# Patient Record
Sex: FEMALE | Race: OTHER | HISPANIC OR LATINO | Employment: UNEMPLOYED | URBAN - METROPOLITAN AREA
[De-identification: names, ages, dates, MRNs, and addresses within clinical notes are randomized per-mention and may not be internally consistent; named-entity substitution may affect disease eponyms.]

---

## 2017-01-05 ENCOUNTER — TRANSCRIBE ORDERS (OUTPATIENT)
Dept: ADMINISTRATIVE | Facility: HOSPITAL | Age: 47
End: 2017-01-05

## 2017-01-05 ENCOUNTER — ALLSCRIPTS OFFICE VISIT (OUTPATIENT)
Dept: OTHER | Facility: OTHER | Age: 47
End: 2017-01-05

## 2017-01-05 ENCOUNTER — APPOINTMENT (OUTPATIENT)
Dept: LAB | Facility: HOSPITAL | Age: 47
End: 2017-01-05
Payer: COMMERCIAL

## 2017-01-05 DIAGNOSIS — R30.0 DYSURIA: ICD-10-CM

## 2017-01-05 DIAGNOSIS — R20.2 PARESTHESIA: Primary | ICD-10-CM

## 2017-01-05 PROCEDURE — 87086 URINE CULTURE/COLONY COUNT: CPT

## 2017-01-05 PROCEDURE — 81001 URINALYSIS AUTO W/SCOPE: CPT

## 2017-01-06 LAB
BACTERIA UR QL AUTO: ABNORMAL /HPF
BILIRUB UR QL STRIP: NEGATIVE
CLARITY UR: ABNORMAL
COLOR UR: YELLOW
GLUCOSE UR STRIP-MCNC: NEGATIVE MG/DL
HGB UR QL STRIP.AUTO: ABNORMAL
KETONES UR STRIP-MCNC: NEGATIVE MG/DL
LEUKOCYTE ESTERASE UR QL STRIP: ABNORMAL
NITRITE UR QL STRIP: NEGATIVE
NON-SQ EPI CELLS URNS QL MICRO: ABNORMAL /HPF
PH UR STRIP.AUTO: 7 [PH] (ref 4.5–8)
PROT UR STRIP-MCNC: NEGATIVE MG/DL
RBC #/AREA URNS AUTO: ABNORMAL /HPF
SP GR UR STRIP.AUTO: 1 (ref 1–1.03)
UROBILINOGEN UR QL STRIP.AUTO: 0.2 E.U./DL
WBC #/AREA URNS AUTO: ABNORMAL /HPF

## 2017-01-07 LAB — BACTERIA UR CULT: NORMAL

## 2017-01-10 ENCOUNTER — ALLSCRIPTS OFFICE VISIT (OUTPATIENT)
Dept: OTHER | Facility: OTHER | Age: 47
End: 2017-01-10

## 2017-01-10 LAB
BILIRUB UR QL STRIP: NORMAL
CLARITY UR: NORMAL
COLOR UR: YELLOW
GLUCOSE (HISTORICAL): NORMAL
HGB UR QL STRIP.AUTO: NORMAL
KETONES UR STRIP-MCNC: NORMAL MG/DL
LEUKOCYTE ESTERASE UR QL STRIP: NORMAL
NITRITE UR QL STRIP: NORMAL
PH UR STRIP.AUTO: 6 [PH]
PROT UR STRIP-MCNC: NORMAL MG/DL
SP GR UR STRIP.AUTO: 1.02
UROBILINOGEN UR QL STRIP.AUTO: 0.2

## 2017-01-25 ENCOUNTER — TRANSCRIBE ORDERS (OUTPATIENT)
Dept: ADMINISTRATIVE | Facility: HOSPITAL | Age: 47
End: 2017-01-25

## 2017-01-25 ENCOUNTER — OFFICE VISIT (OUTPATIENT)
Dept: RADIOLOGY | Facility: CLINIC | Age: 47
End: 2017-01-25
Payer: COMMERCIAL

## 2017-01-25 ENCOUNTER — ALLSCRIPTS OFFICE VISIT (OUTPATIENT)
Dept: OTHER | Facility: OTHER | Age: 47
End: 2017-01-25

## 2017-01-25 DIAGNOSIS — R20.2 PARESTHESIA: ICD-10-CM

## 2017-01-25 DIAGNOSIS — R30.0 DYSURIA: ICD-10-CM

## 2017-01-25 DIAGNOSIS — R10.10 PAIN OF UPPER ABDOMEN: Primary | ICD-10-CM

## 2017-01-25 PROCEDURE — 95908 NRV CNDJ TST 3-4 STUDIES: CPT

## 2017-02-01 ENCOUNTER — HOSPITAL ENCOUNTER (OUTPATIENT)
Dept: RADIOLOGY | Facility: HOSPITAL | Age: 47
Discharge: HOME | End: 2017-02-01
Payer: COMMERCIAL

## 2017-02-01 DIAGNOSIS — R10.9 ABDOMINAL PAIN: ICD-10-CM

## 2017-02-01 PROCEDURE — 74178 CT ABD&PLV WO CNTR FLWD CNTR: CPT

## 2017-02-01 RX ADMIN — IODIXANOL 100 ML: 320 INJECTION, SOLUTION INTRAVASCULAR at 12:12

## 2017-02-03 ENCOUNTER — GENERIC CONVERSION - ENCOUNTER (OUTPATIENT)
Dept: OTHER | Facility: OTHER | Age: 47
End: 2017-02-03

## 2017-02-03 ENCOUNTER — TRANSCRIBE ORDERS (OUTPATIENT)
Dept: ADMINISTRATIVE | Facility: HOSPITAL | Age: 47
End: 2017-02-03

## 2017-02-03 DIAGNOSIS — M54.9 DORSALGIA: ICD-10-CM

## 2017-02-03 DIAGNOSIS — R10.9 ABDOMINAL PAIN: ICD-10-CM

## 2017-02-03 DIAGNOSIS — R10.9 ABDOMINAL PAIN, UNSPECIFIED SITE: Primary | ICD-10-CM

## 2017-02-16 ENCOUNTER — HOSPITAL ENCOUNTER (OUTPATIENT)
Dept: RADIOLOGY | Age: 47
Discharge: HOME | End: 2017-02-16
Payer: COMMERCIAL

## 2017-02-16 DIAGNOSIS — R10.9 ABDOMINAL PAIN, UNSPECIFIED SITE: ICD-10-CM

## 2017-02-16 LAB — GLUCOSE SERPL-MCNC: 93 MG/DL (ref 65–140)

## 2017-02-16 PROCEDURE — 82948 REAGENT STRIP/BLOOD GLUCOSE: CPT

## 2017-02-16 PROCEDURE — 78815 PET IMAGE W/CT SKULL-THIGH: CPT

## 2017-02-16 PROCEDURE — A9552 F18 FDG: HCPCS

## 2017-02-16 RX ADMIN — IOHEXOL 5 ML: 240 INJECTION, SOLUTION INTRATHECAL; INTRAVASCULAR; INTRAVENOUS; ORAL at 10:05

## 2017-02-21 ENCOUNTER — HOSPITAL ENCOUNTER (OUTPATIENT)
Dept: RADIOLOGY | Age: 47
Discharge: HOME | End: 2017-02-21
Payer: COMMERCIAL

## 2017-02-21 DIAGNOSIS — M54.9 DORSALGIA: ICD-10-CM

## 2017-02-21 PROCEDURE — 77080 DXA BONE DENSITY AXIAL: CPT

## 2017-02-22 ENCOUNTER — ALLSCRIPTS OFFICE VISIT (OUTPATIENT)
Dept: OTHER | Facility: OTHER | Age: 47
End: 2017-02-22

## 2017-03-22 ENCOUNTER — ALLSCRIPTS OFFICE VISIT (OUTPATIENT)
Dept: OTHER | Facility: OTHER | Age: 47
End: 2017-03-22

## 2017-03-22 ENCOUNTER — TRANSCRIBE ORDERS (OUTPATIENT)
Dept: ADMINISTRATIVE | Facility: HOSPITAL | Age: 47
End: 2017-03-22

## 2017-03-22 DIAGNOSIS — G89.29 OTHER CHRONIC PAIN: Primary | ICD-10-CM

## 2017-03-23 ENCOUNTER — HOSPITAL ENCOUNTER (EMERGENCY)
Facility: HOSPITAL | Age: 47
Discharge: HOME | End: 2017-03-23
Attending: EMERGENCY MEDICINE | Admitting: EMERGENCY MEDICINE
Payer: COMMERCIAL

## 2017-03-23 ENCOUNTER — APPOINTMENT (EMERGENCY)
Dept: RADIOLOGY | Facility: HOSPITAL | Age: 47
End: 2017-03-23
Payer: COMMERCIAL

## 2017-03-23 VITALS
OXYGEN SATURATION: 98 % | RESPIRATION RATE: 18 BRPM | BODY MASS INDEX: 20.57 KG/M2 | HEIGHT: 66 IN | WEIGHT: 128 LBS | HEART RATE: 61 BPM | DIASTOLIC BLOOD PRESSURE: 71 MMHG | SYSTOLIC BLOOD PRESSURE: 111 MMHG

## 2017-03-23 DIAGNOSIS — R07.9 CHEST PAIN: Primary | ICD-10-CM

## 2017-03-23 LAB
ATRIAL RATE: 84 BPM
P AXIS: 78 DEGREES
PR INTERVAL: 170 MS
QRS AXIS: 57 DEGREES
QRSD INTERVAL: 82 MS
QT INTERVAL: 350 MS
QTC INTERVAL: 413 MS
SPECIMEN SOURCE: NORMAL
T WAVE AXIS: 70 DEGREES
TROPONIN I BLD-MCNC: 0 NG/ML (ref 0–0.08)
VENTRICULAR RATE: 84 BPM

## 2017-03-23 PROCEDURE — 96375 TX/PRO/DX INJ NEW DRUG ADDON: CPT

## 2017-03-23 PROCEDURE — 93005 ELECTROCARDIOGRAM TRACING: CPT | Performed by: EMERGENCY MEDICINE

## 2017-03-23 PROCEDURE — 96374 THER/PROPH/DIAG INJ IV PUSH: CPT

## 2017-03-23 PROCEDURE — 99285 EMERGENCY DEPT VISIT HI MDM: CPT

## 2017-03-23 PROCEDURE — 71020 HB CHEST X-RAY 2VW FRONTAL&LATL: CPT

## 2017-03-23 PROCEDURE — 84484 ASSAY OF TROPONIN QUANT: CPT

## 2017-03-23 RX ORDER — DIAZEPAM 5 MG/ML
5 INJECTION, SOLUTION INTRAMUSCULAR; INTRAVENOUS ONCE
Status: COMPLETED | OUTPATIENT
Start: 2017-03-23 | End: 2017-03-23

## 2017-03-23 RX ORDER — LIDOCAINE 50 MG/G
1 PATCH TOPICAL EVERY 24 HOURS
Qty: 14 PATCH | Refills: 0 | Status: SHIPPED | OUTPATIENT
Start: 2017-03-23 | End: 2018-03-30

## 2017-03-23 RX ORDER — IBUPROFEN 600 MG/1
600 TABLET ORAL EVERY 6 HOURS PRN
Qty: 30 TABLET | Refills: 0 | Status: SHIPPED | OUTPATIENT
Start: 2017-03-23 | End: 2018-02-13 | Stop reason: CLARIF

## 2017-03-23 RX ORDER — LIDOCAINE 50 MG/G
1 PATCH TOPICAL ONCE
Status: DISCONTINUED | OUTPATIENT
Start: 2017-03-23 | End: 2017-03-23 | Stop reason: HOSPADM

## 2017-03-23 RX ORDER — KETOROLAC TROMETHAMINE 30 MG/ML
15 INJECTION, SOLUTION INTRAMUSCULAR; INTRAVENOUS ONCE
Status: COMPLETED | OUTPATIENT
Start: 2017-03-23 | End: 2017-03-23

## 2017-03-23 RX ORDER — LISINOPRIL AND HYDROCHLOROTHIAZIDE 12.5; 1 MG/1; MG/1
TABLET ORAL
COMMUNITY
Start: 2015-05-07 | End: 2018-05-15 | Stop reason: SDDI

## 2017-03-23 RX ORDER — ACETAMINOPHEN 325 MG/1
650 TABLET ORAL EVERY 6 HOURS PRN
Qty: 30 TABLET | Refills: 0 | Status: SHIPPED | OUTPATIENT
Start: 2017-03-23 | End: 2017-04-22

## 2017-03-23 RX ADMIN — LIDOCAINE 1 PATCH: 50 PATCH CUTANEOUS at 10:27

## 2017-03-23 RX ADMIN — KETOROLAC TROMETHAMINE 15 MG: 30 INJECTION, SOLUTION INTRAMUSCULAR at 10:28

## 2017-03-23 RX ADMIN — DIAZEPAM 5 MG: 5 INJECTION, SOLUTION INTRAMUSCULAR; INTRAVENOUS at 10:28

## 2017-04-06 ENCOUNTER — HOSPITAL ENCOUNTER (OUTPATIENT)
Dept: NON INVASIVE DIAGNOSTICS | Facility: HOSPITAL | Age: 47
Discharge: HOME | End: 2017-04-06
Payer: COMMERCIAL

## 2017-04-06 DIAGNOSIS — G89.29 OTHER CHRONIC PAIN: ICD-10-CM

## 2017-04-06 PROCEDURE — 93306 TTE W/DOPPLER COMPLETE: CPT

## 2017-04-12 ENCOUNTER — ALLSCRIPTS OFFICE VISIT (OUTPATIENT)
Dept: OTHER | Facility: OTHER | Age: 47
End: 2017-04-12

## 2017-07-14 ENCOUNTER — LAB REQUISITION (OUTPATIENT)
Dept: LAB | Facility: HOSPITAL | Age: 47
End: 2017-07-14
Payer: COMMERCIAL

## 2017-07-14 ENCOUNTER — ALLSCRIPTS OFFICE VISIT (OUTPATIENT)
Dept: OTHER | Facility: OTHER | Age: 47
End: 2017-07-14

## 2017-07-14 DIAGNOSIS — R30.0 DYSURIA: ICD-10-CM

## 2017-07-14 LAB
BACTERIA UR QL AUTO: ABNORMAL /HPF
BILIRUB UR QL STRIP: NEGATIVE
BILIRUB UR QL STRIP: NORMAL
CLARITY UR: ABNORMAL
CLARITY UR: NORMAL
COLOR UR: YELLOW
COLOR UR: YELLOW
GLUCOSE (HISTORICAL): NORMAL
GLUCOSE UR STRIP-MCNC: NEGATIVE MG/DL
HGB UR QL STRIP.AUTO: ABNORMAL
HGB UR QL STRIP.AUTO: NORMAL
HYALINE CASTS #/AREA URNS LPF: ABNORMAL /LPF
KETONES UR STRIP-MCNC: NEGATIVE MG/DL
KETONES UR STRIP-MCNC: NORMAL MG/DL
LEUKOCYTE ESTERASE UR QL STRIP: ABNORMAL
LEUKOCYTE ESTERASE UR QL STRIP: NORMAL
NITRITE UR QL STRIP: NEGATIVE
NITRITE UR QL STRIP: NORMAL
NON-SQ EPI CELLS URNS QL MICRO: ABNORMAL /HPF
PH UR STRIP.AUTO: 6.5 [PH]
PH UR STRIP.AUTO: 6.5 [PH] (ref 4.5–8)
PROT UR STRIP-MCNC: NEGATIVE MG/DL
PROT UR STRIP-MCNC: NORMAL MG/DL
RBC #/AREA URNS AUTO: ABNORMAL /HPF
SP GR UR STRIP.AUTO: 1.02 (ref 1–1.03)
SP GR UR STRIP.AUTO: 1015
UROBILINOGEN UR QL STRIP.AUTO: 0.2
UROBILINOGEN UR QL STRIP.AUTO: 0.2 E.U./DL
WBC #/AREA URNS AUTO: ABNORMAL /HPF

## 2017-07-14 PROCEDURE — 81001 URINALYSIS AUTO W/SCOPE: CPT | Performed by: FAMILY MEDICINE

## 2017-08-01 ENCOUNTER — ALLSCRIPTS OFFICE VISIT (OUTPATIENT)
Dept: OTHER | Facility: OTHER | Age: 47
End: 2017-08-01

## 2017-08-01 DIAGNOSIS — Z12.31 ENCOUNTER FOR SCREENING MAMMOGRAM FOR MALIGNANT NEOPLASM OF BREAST: ICD-10-CM

## 2017-08-01 LAB
BACTERIA UR QL AUTO: NORMAL
CLUE CELL (HISTORICAL): NORMAL
HYPHAL YEAST (HISTORICAL): NORMAL
KOH PREP (HISTORICAL): NORMAL
TRICHOMONAS (HISTORICAL): NORMAL
YEAST (HISTORICAL): NORMAL

## 2017-09-12 ENCOUNTER — HOSPITAL ENCOUNTER (OUTPATIENT)
Dept: RADIOLOGY | Facility: HOSPITAL | Age: 47
Discharge: HOME | End: 2017-09-12
Payer: COMMERCIAL

## 2017-09-12 DIAGNOSIS — Z12.31 ENCOUNTER FOR SCREENING MAMMOGRAM FOR MALIGNANT NEOPLASM OF BREAST: ICD-10-CM

## 2017-09-12 PROCEDURE — G0202 SCR MAMMO BI INCL CAD: HCPCS

## 2017-09-22 ENCOUNTER — GENERIC CONVERSION - ENCOUNTER (OUTPATIENT)
Dept: OTHER | Facility: OTHER | Age: 47
End: 2017-09-22

## 2017-11-03 ENCOUNTER — ALLSCRIPTS OFFICE VISIT (OUTPATIENT)
Dept: OTHER | Facility: OTHER | Age: 47
End: 2017-11-03

## 2017-11-03 ENCOUNTER — LAB REQUISITION (OUTPATIENT)
Dept: LAB | Facility: HOSPITAL | Age: 47
End: 2017-11-03
Payer: COMMERCIAL

## 2017-11-03 DIAGNOSIS — R30.0 DYSURIA: ICD-10-CM

## 2017-11-03 LAB
BACTERIA UR QL AUTO: ABNORMAL /HPF
BILIRUB UR QL STRIP: NEGATIVE
BILIRUB UR QL STRIP: NORMAL
CLARITY UR: CLEAR
CLARITY UR: NORMAL
COLOR UR: YELLOW
COLOR UR: YELLOW
GLUCOSE (HISTORICAL): NORMAL
GLUCOSE UR STRIP-MCNC: NEGATIVE MG/DL
HGB UR QL STRIP.AUTO: ABNORMAL
HGB UR QL STRIP.AUTO: NORMAL
HYALINE CASTS #/AREA URNS LPF: ABNORMAL /LPF
KETONES UR STRIP-MCNC: NEGATIVE MG/DL
KETONES UR STRIP-MCNC: NORMAL MG/DL
LEUKOCYTE ESTERASE UR QL STRIP: ABNORMAL
LEUKOCYTE ESTERASE UR QL STRIP: NORMAL
NITRITE UR QL STRIP: NEGATIVE
NITRITE UR QL STRIP: NORMAL
NON-SQ EPI CELLS URNS QL MICRO: ABNORMAL /HPF
PH UR STRIP.AUTO: 6.5 [PH]
PH UR STRIP.AUTO: 7 [PH] (ref 4.5–8)
PROT UR STRIP-MCNC: NEGATIVE MG/DL
PROT UR STRIP-MCNC: NORMAL MG/DL
RBC #/AREA URNS AUTO: ABNORMAL /HPF
SP GR UR STRIP.AUTO: 1.01 (ref 1–1.03)
SP GR UR STRIP.AUTO: 1005
UROBILINOGEN UR QL STRIP.AUTO: 0.2
UROBILINOGEN UR QL STRIP.AUTO: 0.2 E.U./DL
WBC #/AREA URNS AUTO: ABNORMAL /HPF

## 2017-11-03 PROCEDURE — 81001 URINALYSIS AUTO W/SCOPE: CPT | Performed by: FAMILY MEDICINE

## 2017-11-06 ENCOUNTER — GENERIC CONVERSION - ENCOUNTER (OUTPATIENT)
Dept: OTHER | Facility: OTHER | Age: 47
End: 2017-11-06

## 2018-01-05 ENCOUNTER — ALLSCRIPTS OFFICE VISIT (OUTPATIENT)
Dept: OTHER | Facility: OTHER | Age: 48
End: 2018-01-05

## 2018-01-10 NOTE — PROGRESS NOTES
Assessment    1  Vulvovaginitis candida albicans (112 1) (B37 3)    Plan  Vulvovaginitis candida albicans    · Miconazole 7 2 % Vaginal Cream; INSERT 1 APPLICATORFUL INTRAVAGINALLY AT  BEDTIME NIGHTLY   Rx By: Jim De La Rosa; Dispense: 7 Days ; #:1 X 45 GM Tube; Refill: 1; For: Vulvovaginitis candida albicans; RADHA = N; Verified Transmission to [id number] Last Updated By: System, SureScripts; 2016 2:30:39 PM   · 97 Casi Daly; Status:Complete;   Done: 12RSM4617 02:31PM   Performed: In Office; CRJ:26XSB7235;AEDORIS; Today; For:Vulvovaginitis candida albicans; Ordered By:Maria D Rivera;   · Follow-up visit in 6 months Evaluation and Treatment  Follow-up for annual  Status: Hold  For - Scheduling  Requested for: 26TZA8366   Ordered; For: Vulvovaginitis candida albicans; Ordered By: Jim De La Rosa Performed:  Due: 32KEF6855    Discussion/Summary  Discussion Summary:   Vaginal yeast infection, possibly from using new scented, colored soap:  1  Miconazole cream at bedtime for 7 nights  2  Switch to unscented, non colored soap, such as Dove Sensitive  3  Return 6 months for annual gyn visit and prn  Chief Complaint  Chief Complaint Free Text Note Form: vaginal discharge      History of Present Illness  HPI: 38 yo female presents with 3 day history vaginal discharge with itching  Mirena in place  Not sexually active since   in [address] 2015  New soap for 3 days, blue with scent  Review of Systems   Female ROS: vaginal discharge and vaginal itching, but no pelvic pain and no vaginal odor  Active Problems    1  Abdominal pain (789 00) (R10 9)   2  Depression (311) (F32 9)   3  Encounter for screening mammogram for breast cancer (V76 12) (Z12 31)   4  Fatigue (780 79) (R53 83)   5  Hematuria (599 70) (R31 9)   6  Hypertension (401 9) (I10)   7  IUD contraception (V45 51) (Z97 5)   8  Lower abdominal pain (789 09) (R10 30)   9  Lower back pain (724 2) (M54 5)   10  Menopausal symptoms (627 2) (N95 1)   11  Microscopic hematuria (599 72) (R31 2)   12  Neck pain (723 1) (M54 2)   13  Neck Strain (847 0)   14  Need for prophylactic vaccination and inoculation against influenza (V04 81) (Z23)   15  Pain of both elbows (719 42) (M25 522,M25 521)    Past Medical History    1  History of Encounter for insertion of mirena IUD (V25 11) (Z30 430)   2  History of Encounter for routine gynecological examination (V72 31) (Z01 419)   3  No pertinent past medical history   4  History of Screening for hyperlipidemia (V77 91) (O24 785)    Surgical History    1  Denied: History Of Prior Surgery    Family History    1  Family history of hypertension (V17 49) (Z82 49)    2  Family history of Arthritis   3  Family history of Diabetes   4  Family history of hyperlipidemia (V18 19) (Z83 49)   5  Family history of High blood pressure    Social History    · Cigarette smoker one half pack a day or less (305 1) (F17 210)   · Consumes alcohol occasionally (V49 89) (Z78 9)   · Current some day smoker (305 1) (F17 210)   · Denied: History of Drug use   · IUD contraception (V45 51) (Z97 5)   · Light tobacco smoker (305 1) (F17 200)    Current Meds   1  Lisinopril-Hydrochlorothiazide 10-12 5 MG Oral Tablet; TAKE 1 TABLET DAILY IN THE   MORNING; Therapy: 85PSJ8792 to (Evaluate:14Mar2016)  Requested for: 50FHA2374; Last   Rx:23Xfh4039 Ordered   2  TraMADol HCl - 50 MG Oral Tablet; TAKE 1 TABLET EVERY 8 HOURS AS NEEDED; Therapy: 83JKT8130 to (Evaluate:29Jan2016); Last Rx:22Jan2016 Ordered    Allergies    1  No Known Drug Allergies    2  No Known Environmental Allergies   3  No Known Food Allergies    Vitals  Vital Signs [Data Includes: Current Encounter]    Recorded: 65EOL1878 82:28GW   Systolic 018   Diastolic 73   Weight 120 lb    BMI Calculated 20 41   BSA Calculated 1 61     Physical Exam    Constitutional   General appearance: No acute distress, well appearing and well nourished      Genitourinary External genitalia: Abnormal   (erythema at introitus)   Vagina: Abnormal   (clumpy white discharge)   Cervix: Normal, no palpable masses  Health Management  History of Screening for breast cancer   Digital Bilateral Screening Mammogram With CAD; every 1 year; Next Due: 90TSN7521;  Overdue    Signatures   Electronically signed by : Anish Morales; Jan 27 2016  2:35PM EST                       (Author)    Electronically signed by : Mitch Wiley MD; Feb 4 2016  8:22AM EST

## 2018-01-10 NOTE — RESULT NOTES
Message   i will call patient today with results and order a PET scan     Verified Results  Rebecca Bright 44 [id number] 11:35AM Gloria Suryai Order Number: [id number]    - Patient Instructions: To schedule this appointment, please contact Central Scheduling at [phone]  Test Name Result Flag Reference   CT ABDOMEN PELVIS W WO CONTRAST (Report)     CT ABDOMEN AND PELVIS WITH AND WITHOUT IV CONTRAST     INDICATION: Right-sided pain  Abnormal urinalysis  COMPARISON: 10/21/2008     TECHNIQUE: CT of the kidneys was performed without intravenous contrast  Postcontrast CT evaluation of the abdomen and pelvis was performed simultaneously in both nephrographic and delayed post contrast phases using a split bolus technique  Axial,    sagittal, and coronal reformatted images were submitted for interpretation  This examination, like all CT scans performed in the [facility], was performed utilizing techniques to minimize radiation dose exposure, including the use of    iterative reconstruction and automated exposure control  IV Contrast: iodixanol (VISIPAQUE) 320 MG/ML injection 100 mL Note: (SINGLE DOSE/MULTI DOSE) information refers to the container from which the contrast was acquired  Contrast was injected one time intravenously without immediate complication  Enteric Contrast: Enteric contrast was administered  FINDINGS:      ABDOMEN     RIGHT KIDNEY AND URETER:   No solid renal mass  No detectable ureteral mass  No hydronephrosis or hydroureter  No urinary tract calculi  No perinephric collection  LEFT KIDNEY AND URETER:   No solid renal mass  No detectable ureteral mass  No hydronephrosis or hydroureter  No urinary tract calculi  No perinephric collection  URINARY BLADDER: No bladder wall mass  No calculi  LUNG BASES:  Unremarkable  LIVER/BILIARY TREE: Unremarkable  GALLBLADDER: No calcified gallstones   No pericholecystic inflammatory change  SPLEEN: Unremarkable  PANCREAS: Unremarkable  ADRENAL GLANDS: Unremarkable  PELVIS     REPRODUCTIVE ORGANS: Contraceptive intrauterine device is noted in good position  There is a slightly peripherally enhanced cystic-appearing structure along the left side of the vagina, possibly a Bartholin's cyst  This is 6 x 11 mm  There is a small   right ovary cyst or dominant follicle noted as well  This is 19 mm  APPENDIX: No findings to suggest acute appendicitis  ADDITIONAL ABDOMINAL AND PELVIC STRUCTURES     STOMACH AND BOWEL: Unremarkable  ABDOMINOPELVIC CAVITY: There is new mesenteric adenopathy of uncertain etiology  Nodes measure up to about 10 x 21 mm and 11 x 17 mm  There is a fat-containing umbilical hernia  OSSEOUS STRUCTURES: There is a new sclerotic density in the left side of the sacrum which is slightly ill-defined and measures about 11 x 21 mm  There is slightly increased sclerosis along the iliac sides of the sacroiliac joints  There is a new small   sclerotic lesion in the right iliac bone along the wing measuring 5 mm  Along the left lower buttocks region there seems to be a prominent indentation of the skin surface with some surrounding fatty stranding  This might represent an area of soft tissue ulceration and should be correlated with physical exam findings  There   is no underlying soft tissue gas  IMPRESSION:     No urinary tract pathology visible  New mesenteric adenopathy identified as well as new sclerotic bone lesions in the sacrum and pelvis  Etiology and significance uncertain  Neoplastic process could not be excluded  Consider follow-up PET scan to assess for metabolic activity  Bones    scan might also be helpful  Incidental finding of small cystic lesion along the left side of the vagina which might be a Bartholin's cyst      Questionable ulceration of the soft tissues of the left lower buttocks region   Correlate with physical exam findings         ##sigslh##sigslh     ##fuslh2##fuslh2        Workstation performed: [id number]     Signed by:   Jean Marie Acosta MD   2/1/17

## 2018-01-12 VITALS
HEIGHT: 66 IN | BODY MASS INDEX: 22.34 KG/M2 | WEIGHT: 139 LBS | DIASTOLIC BLOOD PRESSURE: 80 MMHG | HEART RATE: 76 BPM | RESPIRATION RATE: 18 BRPM | SYSTOLIC BLOOD PRESSURE: 114 MMHG

## 2018-01-12 VITALS
TEMPERATURE: 98.3 F | BODY MASS INDEX: 20.57 KG/M2 | RESPIRATION RATE: 18 BRPM | WEIGHT: 128 LBS | HEART RATE: 76 BPM | SYSTOLIC BLOOD PRESSURE: 110 MMHG | DIASTOLIC BLOOD PRESSURE: 80 MMHG | HEIGHT: 66 IN

## 2018-01-13 VITALS
BODY MASS INDEX: 21.86 KG/M2 | WEIGHT: 136 LBS | DIASTOLIC BLOOD PRESSURE: 83 MMHG | HEIGHT: 66 IN | SYSTOLIC BLOOD PRESSURE: 137 MMHG

## 2018-01-13 VITALS
WEIGHT: 131 LBS | OXYGEN SATURATION: 98 % | HEART RATE: 81 BPM | TEMPERATURE: 98.1 F | DIASTOLIC BLOOD PRESSURE: 80 MMHG | BODY MASS INDEX: 21.05 KG/M2 | HEIGHT: 66 IN | RESPIRATION RATE: 18 BRPM | SYSTOLIC BLOOD PRESSURE: 112 MMHG

## 2018-01-13 VITALS
HEIGHT: 66 IN | DIASTOLIC BLOOD PRESSURE: 90 MMHG | SYSTOLIC BLOOD PRESSURE: 138 MMHG | TEMPERATURE: 97.6 F | WEIGHT: 127.13 LBS | BODY MASS INDEX: 20.43 KG/M2 | HEART RATE: 76 BPM | RESPIRATION RATE: 16 BRPM

## 2018-01-14 VITALS
HEIGHT: 66 IN | RESPIRATION RATE: 16 BRPM | TEMPERATURE: 97.3 F | HEART RATE: 78 BPM | BODY MASS INDEX: 20.49 KG/M2 | WEIGHT: 127.5 LBS | SYSTOLIC BLOOD PRESSURE: 132 MMHG | DIASTOLIC BLOOD PRESSURE: 90 MMHG

## 2018-01-14 VITALS
WEIGHT: 131 LBS | SYSTOLIC BLOOD PRESSURE: 106 MMHG | BODY MASS INDEX: 21.05 KG/M2 | DIASTOLIC BLOOD PRESSURE: 74 MMHG | HEIGHT: 66 IN
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
## 2018-01-15 NOTE — RESULT NOTES
Verified Results  (1) URINALYSIS w URINE C/S REFLEX (will reflex a microscopy if leukocytes, occult blood, or nitrites are not within normal limits) 31AWU0059 08:28PM Jackeline Batesison     Test Name Result Flag Reference   COLOR Yellow     CLARITY Clear     PH UA 7 0  4 5-8 0   LEUKOCYTE ESTERASE UA Trace A Negative   NITRITE UA Negative  Negative   PROTEIN UA Negative mg/dl  Negative   GLUCOSE UA Negative mg/dl  Negative   KETONES UA Negative mg/dl  Negative   UROBILINOGEN UA 0 2 E U /dl  0 2, 1 0 E U /dl   BILIRUBIN UA Negative  Negative   BLOOD UA Small A Negative   SPECIFIC GRAVITY UA 1 012  1 003-1 030     (1) URINALYSIS w URINE C/S REFLEX (will reflex a microscopy if leukocytes, occult blood, or nitrites are not within normal limits) 89XLU0184 08:28PM Jackeline Arita     Test Name Result Flag Reference   BACTERIA Occasional /hpf  None Seen, Occasional   EPITHELIAL CELLS None Seen /hpf  None Seen, Occasional   HYALINE CASTS None Seen /lpf  None Seen   RBC UA 4-10 /hpf A None Seen, 0-5   WBC UA None Seen /hpf  None Seen, 0-5, 5-55, 5-65

## 2018-01-16 NOTE — MISCELLANEOUS
Message  Insurance denied Elidel and Lyrica requests   Will try desonide cream and start patient on gabapentin      Plan  Lower back pain, Lumbar radiculopathy    · Gabapentin 300 MG Oral Capsule; TAKE 1 CAPSULE AT BEDTIME for 2 weeks  then increase to BID  Lower back pain, PMH: Thoracic back pain    · Cyclobenzaprine HCl - 10 MG Oral Tablet; TAKE 1 TABLET 3 TIMES DAILY AS  NEEDED  Skin rash    · Desonide 0 05 % External Cream; APPLY SPARINGLY TO AFFECTED AREA(S)  TWICE DAILY    Signatures   Electronically signed by : ALONSO Iqbal; Sep 13 2016 12:42PM EST                       (Author)

## 2018-01-16 NOTE — PROGRESS NOTES
Assessment    1  Dysuria (788 1) (R30 0)   2  Arm numbness (782 0) (R20 0)    Plan  Arm numbness left, Dysuria, Right arm numbness    · EMG TWO EXTREMITIES WITH OR W/O RELATED PARASPINAL AREAS; Status:Hold For -  Scheduling; Requested DKK:[id number];   TWO : RUE  ONE : LUE  Dysuria    · (1) URINALYSIS w URINE C/S REFLEX (will reflex a microscopy if leukocytes, occult blood, or  nitrites are not within normal limits); Status:Active; Requested [facility]:[id number];     Discussion/Summary  Discussion Summary:   1  Dysuria ongoing abdominal pain-will check a urinalysis to see if there's any signs of infection and to a reflex culture and sensitivity if positive  2  Arm numbness bilateral-Will do EMG of bilateral upper extremities  Medication SE Review and Pt Understands Tx: Possible side effects of new medications were reviewed with the patient/guardian today  The treatment plan was reviewed with the patient/guardian  The patient/guardian understands and agrees with the treatment plan      Chief Complaint  Chief Complaint Free Text Note Form: follow up side pain wraps around to back kidney area, she stated pain has eased up a little this is why she believes its some type of infection states her urine is really dark yellow      History of Present Illness  HPI: This is a 51-year-old female here for follow-up of her abdominal pain  She states that it is in her Right flank area and wraps around to her kidney  She states the pain has eased up a little but is still there  Her urine is very dark  She denies any dysuria  She will likely tested for UTI  She denies any fever or chills  She is also complaining of numbness in her hands and arms  She states that she has neck pain and pain radiates into bilateral upper extremities  She is a little bit of weakness as well Her bilateral upper extremities  She states that the pain and numbness is worse at night and wakes her up   If she moves her arms around it does improve a little bit     Patient is Divehi-speaking and the language line was used to communicate  #[id number]      Review of Systems  Complete-Female:   Constitutional: no fever and no chills  ENT: no complaints of earache, no loss of hearing, no nose bleeds, no nasal discharge, no sore throat, no hoarseness  Cardiovascular: No complaints of slow heart rate, no fast heart rate, no chest pain, no palpitations, no leg claudication, no lower extremity edema  Respiratory: No complaints of shortness of breath, no wheezing, no cough, no SOB on exertion, no orthopnea, no PND  Gastrointestinal: abdominal pain  Genitourinary: dysuria, pelvic pain and Flank pain, but as noted in HPI  Neurological: numbness, but as noted in HPI  ROS Reviewed:   ROS reviewed  Active Problems    1  Abdominal pain (789 00) (R10 9)   2  Abdominal pain, RUQ (789 01) (R10 11)   3  Acute sinusitis (461 9) (J01 90)   4  Burning with urination (788 1) (R30 0)   5  Depression, major, recurrent (296 30) (F33 9)   6  Dysuria (788 1) (R30 0)   7  Fatigue (780 79) (R53 83)   8  Fissure, anal (565 0) (K60 2)   9  Gastritis (535 50) (K29 70)   10  Hematuria (599 70) (R31 9)   11  Hypertension (401 9) (I10)   12  IUD contraception (V45 51) (Z97 5)   13  Lower back pain (724 2) (M54 5)   14  Menopausal symptoms (627 2) (N95 1)   15  Microscopic hematuria (599 72) (R31 29)   16  Neck pain (723 1) (M54 2)   17  Neck Strain (847 0)   18  Need for prophylactic vaccination and inoculation against influenza (V04 81) (Z23)   19  Nephrolithiasis (592 0) (N20 0)   20  Routine screening for STI (sexually transmitted infection) (V74 5) (Z11 3)   21  Skin rash (782 1) (R21)   22  Vulvovaginitis candida albicans (112 1) (B37 3)    Past Medical History    1  History of Acute UTI (599 0) (N39 0)   2  History of Encounter for insertion of mirena IUD (V25 11) (Z30 430)   3  No pertinent past medical history   4   History of Screening for hyperlipidemia (K67 51) (Z13 220)  Active Problems And Past Medical History Reviewed: The active problems and past medical history were reviewed and updated today  Surgical History    1  Denied: History Of Prior Surgery  Surgical History Reviewed: The surgical history was reviewed and updated today  Family History  Mother    1  Family history of hypertension (V17 49) (Z82 49)  Father    2  Family history of Arthritis   3  Family history of Diabetes   4  Family history of hyperlipidemia (V18 19) (Z83 49)   5  Family history of High blood pressure  Family History Reviewed: The family history was reviewed and updated today  Social History    · Cigarette smoker one half pack a day or less (305 1) (F17 210)   · Consumes alcohol occasionally (V49 89) (Z78 9)   · Current some day smoker (305 1) (F17 200)   · Denied: History of Drug use   · Engages in a hobby   · Housewife or homemaker   · IUD contraception (V45 51) (Z97 5)   · Light tobacco smoker (305 1) (F17 200)   · Lives with family   · Single   · Unemployed (V62 0) (Z56 0)  Social History Reviewed: The social history was reviewed and updated today  The social history was reviewed and is unchanged  Current Meds   1  Aquaphor External Ointment; Apply to affected area 2-3 times daily as needed; Therapy: 17GIU6062 to (Last Rx:67Znm6688)  Requested for: 30QHG5885 Ordered   2  Benadryl 25 MG TABS; TAKE 1 TABLET EVERY 6 HOURS AS NEEDED; Therapy: 33GYK1377 to ([place])  Requested for: 91Cub7106; Last Rx:07Qyv9824   Ordered   3  Clobetasol Propionate 0 05 % External Cream; APPLY SPARINGLY TO AFFECTED AREA(S) TWICE   DAILY; Therapy: 54Fci8753 to (Last Rx:01Kwo8199)  Requested for: 79Mdv2466 Ordered   4  [employer] Allergy 25 MG Oral Capsule; Therapy: 30SRP4220 to Recorded   5  Fluticasone Propionate 50 MCG/ACT Nasal Suspension; SPRAY 2 SPRAYS INTO EACH NOSTRIL   ONCE DAILY;    Therapy: 64HKY6532 to (Cezar Chaudhry)  Requested for: 71OHH4149; Last Rx:96Thy8175   Ordered   6  Lisinopril-Hydrochlorothiazide 10-12 5 MG Oral Tablet; TAKE 1 TABLET DAILY IN THE MORNING; Therapy: 03MNU5943 to (Evaluate:20Mar2017)  Requested for: 24Ejk1576; Last Rx:99Ktn5004   Ordered   7  Meloxicam 15 MG Oral Tablet; take once daily with food; Therapy: 66FFO1394 to (Last Rx:05Jan2017)  Requested for: 34QBG1354 Ordered   8  Omeprazole 40 MG Oral Capsule Delayed Release; take 1 capsule by mouth once daily; Therapy: 12JEW8385 to (Last Rx:89Jnk3306)  Requested for: 53Jee0237 Ordered   9  Ondansetron HCl - 4 MG Oral Tablet; Therapy: 64AVB6062 to Recorded   10  Sucralfate 1 GM Oral Tablet; Therapy: 95TOU3717 to Recorded  Medication List Reviewed: The medication list was reviewed and updated today  Allergies    1  No Known Drug Allergies    2  No Known Environmental Allergies   3  No Known Food Allergies    Vitals  Vital Signs    Recorded: 57MGG3602 10:50AM   Heart Rate 72   Respiration 16   Systolic 876   Diastolic 82   Height 5 ft 6 in   Weight 126 lb 6 4 oz   BMI Calculated 20 4   BSA Calculated 1 65   O2 Saturation 98     Physical Exam    Constitutional   General appearance: No acute distress, well appearing and well nourished  Ears, Nose, Mouth, and Throat   External inspection of ears and nose: Normal     Otoscopic examination: Tympanic membranes translucent with normal light reflex  Canals patent without erythema  Nasal mucosa, septum, and turbinates: Normal without edema or erythema  Oropharynx: Normal with no erythema, edema, exudate or lesions  Pulmonary   Respiratory effort: No increased work of breathing or signs of respiratory distress  Auscultation of lungs: Clear to auscultation  Cardiovascular   Auscultation of heart: Normal rate and rhythm, normal S1 and S2, without murmurs  Abdomen   Abdomen: Non-tender, no masses  Mild tenderness right side of abdomen and flank area     Musculoskeletal   Gait and station: Normal     Neurologic Decreased sensation bilateral hands  Additional Exam:  Neck with full range of motion  3 out of 5 weakness noted with triceps extension against resistance  Thenar atrophy noted on the right hand  Negative Tinel's and negative Phalen's  Health Management  History of Screening for breast cancer   Digital Bilateral Screening Mammogram With CAD; every 1 year; Next Due: 93UMA4167; Overdue    Future Appointments    Date/Time Provider Specialty Site   02/02/2017 10:20 AM Haris Padgett [facility] Urgent [facility] PRIMARY CARE   01/25/2017 03:30 PM DEJON Chávez  [address] [place] [address]   02/01/2017 09:30 AM DEJON Huang   Gastroenterology Adult [facility] GASTROENTEROLOGY SPECIAL     Signatures   Electronically signed by : Debbi Bryant [facility]; Jan 5 2017 11:22AM EST                       (Author)

## 2018-01-19 ENCOUNTER — ALLSCRIPTS OFFICE VISIT (OUTPATIENT)
Dept: OTHER | Facility: OTHER | Age: 48
End: 2018-01-19

## 2018-01-22 VITALS
HEIGHT: 66 IN | WEIGHT: 141.25 LBS | BODY MASS INDEX: 22.7 KG/M2 | DIASTOLIC BLOOD PRESSURE: 80 MMHG | SYSTOLIC BLOOD PRESSURE: 138 MMHG | TEMPERATURE: 97.3 F | HEART RATE: 84 BPM | RESPIRATION RATE: 16 BRPM

## 2018-01-22 VITALS
SYSTOLIC BLOOD PRESSURE: 114 MMHG | TEMPERATURE: 98.3 F | HEIGHT: 66 IN | RESPIRATION RATE: 14 BRPM | HEART RATE: 80 BPM | WEIGHT: 143 LBS | DIASTOLIC BLOOD PRESSURE: 72 MMHG | BODY MASS INDEX: 22.98 KG/M2

## 2018-01-22 VITALS
BODY MASS INDEX: 20.31 KG/M2 | WEIGHT: 126.4 LBS | OXYGEN SATURATION: 98 % | HEART RATE: 72 BPM | DIASTOLIC BLOOD PRESSURE: 82 MMHG | SYSTOLIC BLOOD PRESSURE: 120 MMHG | RESPIRATION RATE: 16 BRPM | HEIGHT: 66 IN

## 2018-01-22 VITALS
HEIGHT: 66 IN | HEART RATE: 64 BPM | WEIGHT: 140.13 LBS | DIASTOLIC BLOOD PRESSURE: 70 MMHG | BODY MASS INDEX: 22.52 KG/M2 | SYSTOLIC BLOOD PRESSURE: 104 MMHG | RESPIRATION RATE: 16 BRPM

## 2018-01-23 VITALS
DIASTOLIC BLOOD PRESSURE: 80 MMHG | BODY MASS INDEX: 23.14 KG/M2 | WEIGHT: 144 LBS | SYSTOLIC BLOOD PRESSURE: 122 MMHG | HEART RATE: 78 BPM | TEMPERATURE: 97.9 F | RESPIRATION RATE: 16 BRPM | HEIGHT: 66 IN

## 2018-01-23 NOTE — MISCELLANEOUS
Message  Return to work or school:   Stephanie Mejia is under my professional care  She was seen in my office on 1/5/18   She is able to return to work on  1/8/18       Veronika Bautista        Signatures   Electronically signed by : Veronika Bautista DO; Jan 5 2018 12:26PM EST                       (Author)    Electronically signed by : DEJON Anaya ; Jan 5 2018  4:08PM EST                       (Author)

## 2018-01-23 NOTE — PROGRESS NOTES
Assessment    1  Trapezius muscle spasm (728 85) (M62 838)   2  Anxiety (300 00) (F41 9)    Plan  Anxiety, Other chronic pain    · Amitriptyline HCl - 50 MG Oral Tablet; TAKE 1 TABLET AT BEDTIME  Trapezius muscle spasm    · Cyclobenzaprine HCl - 5 MG Oral Tablet; TAKE 1 TABLET AT BEDTIME AS NEEDED    Discussion/Summary    1  trapezius muscle spasm   - will prescribe flexeril 5 mg qhs    - can use OTC ibuprofen as needed with food  - patient tolerated OMT well and can return for OMT in 2 weeks  2  Anxiety   - uncontrolled  - will increase to amitriptyline from 25 mg to 50 mg, discussed with PCP Dr Karin Brothers    - follow up in 2-4 weeks  Chief Complaint  This is a 42 yo F who presents for neck pain for OMT  History of Present Illness  This is a 42 yo F who presents for neck pain for OMT  She reports upper trapezius muscle pain, she takes 400 mg of ibuprofen at night time  She reports that she worries about things a lot  She feels anxious very often, this was improved with amytriptilline but still feels daily anxiety      Review of Systems    Constitutional: feeling tired, but no fever and no chills  Cardiovascular: no chest pain and no palpitations  Respiratory: no cough and no shortness of breath during exertion  Gastrointestinal: no abdominal pain, no nausea, no vomiting, no constipation, no diarrhea and no blood in stools  Musculoskeletal: as noted in HPI  Active Problems    1  Abdominal pain (789 00) (R10 9)   2  Abdominal pain, RUQ (789 01) (R10 11)   3  Abdominal pain, suprapubic (789 09) (R10 2)   4  Abnormal CT of the abdomen (793 6) (R93 5)   5  Acute pain of left shoulder (719 41) (M25 512)   6  Acute sinusitis (461 9) (J01 90)   7  Anxiety (300 00) (F41 9)   8  Arm numbness (782 0) (R20 0)   9  Arm numbness left (782 0) (R20 0)   10  Back pain, chronic (724 5,338 29) (M54 9,G89 29)   11  Burning with urination (788 1) (R30 0)   12   Depression, major, recurrent (296 30) (F33 9) 13  Dermatitis (692 9) (L30 9)   14  Dysuria (788 1) (R30 0)   15  Encounter for annual routine gynecological examination (V72 31) (Z01 419)   16  Encounter for screening mammogram for malignant neoplasm of breast (V76 12)    (Z12 31)   17  Fatigue (780 79) (R53 83)   18  Fissure, anal (565 0) (K60 2)   19  Gastritis (535 50) (K29 70)   20  Hematuria (599 70) (R31 9)   21  Hypertension (401 9) (I10)   22  IUD check up (V25 42) (Z30 431)   23  IUD contraception (V45 51) (Z97 5)   24  Lower back pain (724 2) (M54 5)   25  Medicine refill (V68 1) (Z76 0)   26  Menopausal symptoms (627 2) (N95 1)   27  Microscopic hematuria (599 72) (R31 29)   28  Mitral regurgitation (424 0) (I34 0)   29  Neck pain (723 1) (M54 2)   30  Neck Strain (847 0)   31  Need for prophylactic vaccination and inoculation against influenza (V04 81) (Z23)   32  Nephrolithiasis (592 0) (N20 0)   33  Newly recognized murmur (785 2) (R01 1)   34  Other chronic pain (338 29) (G89 29)   35  Retroperitoneal lymphadenopathy (785 6) (R59 0)   36  Right arm numbness (782 0) (R20 2)   37  Routine screening for STI (sexually transmitted infection) (V74 5) (Z11 3)   38  Skin rash (782 1) (R21)   39  Trapezius muscle spasm (728 85) (M62 838)   40  Tricuspid regurgitation (397 0) (I07 1)   41  Tuberculosis screening (V74 1) (Z11 1)   42  UTI (urinary tract infection) (599 0) (N39 0)   43  Vaginal discharge (623 5) (N89 8)   44  Vaginal itching (698 1) (L29 8)   45  Viral URI (465 9) (J06 9,B97 89)   46  Vulvovaginitis candida albicans (112 1) (B37 3)   47  Yeast infection of the vagina (112 1) (B37 3)    Past Medical History    1  History of Acute UTI (599 0) (N39 0)   2  History of Encounter for insertion of mirena IUD (V25 11) (Z30 430)   3  No pertinent past medical history   4  History of Screening for hyperlipidemia (V77 91) (Z13 220)    The active problems and past medical history were reviewed and updated today  Surgical History    1   Denied: History Of Prior Surgery    The surgical history was reviewed and updated today  Family History  Mother    1  Family history of hypertension (V17 49) (Z82 49)  Father    2  Family history of Arthritis   3  Family history of Diabetes   4  Family history of hyperlipidemia (V18 19) (Z83 49)   5  Family history of High blood pressure    The family history was reviewed and updated today  Social History    · Cigarette smoker one half pack a day or less (305 1) (F17 210)   · Consumes alcohol occasionally (V49 89) (Z78 9)   · Current some day smoker (305 1) (F17 200)   · Denied: History of Drug use   · Engages in a hobby   · Housewife or homemaker   · IUD contraception (V45 51) (Z97 5)   · Light tobacco smoker (305 1) (F17 200)   · Lives with family   · Single   · Unemployed (V62 0) (Z56 0)  The social history was reviewed and updated today  Current Meds   1  Amitriptyline HCl - 25 MG Oral Tablet; TAKE 1 TABLET AT BEDTIME; Therapy: 35CWH0474 to (SEMAUZLP:28MAU7217)  Requested for: 40NNC0481; Last   Rx:22Sep2017 Ordered   2  Fluticasone Propionate 50 MCG/ACT Nasal Suspension; USE 1 SPRAY IN EACH   NOSTRIL TWICE DAILY; Therapy: 82ZDY6307 to (Last Rx:05Jan2018)  Requested for: 36MNR7590 Ordered   3  Hydrocortisone 1 % External Cream; APPLY SPARINGLY TO AFFECTED AREA(S) TWICE   DAILY; Therapy: 98WMK4106 to (Evaluate:17Nov2017)  Requested for: 81SBU8696; Last   Rx:03Nov2017 Ordered   4  Ibuprofen 600 MG Oral Tablet; Therapy: 62QAY4240 to Recorded   5  Lidocaine 5 % External Patch; Therapy: 49OQC7293 to Recorded   6  Lisinopril-Hydrochlorothiazide 10-12 5 MG Oral Tablet; TAKE 1 TABLET DAILY IN THE   MORNING; Therapy: 11CGX0289 to (Evaluate:17Sep2018)  Requested for: 56ARZ0146; Last   Rx:22Sep2017 Ordered   7  Mapap 325 MG Oral Tablet; Therapy: 46EHD8794 to Recorded   8  Mupirocin 2 % External Ointment; Therapy: ([id number]) to Recorded   9   Omeprazole 40 MG Oral Capsule Delayed Release; take 1 capsule by mouth once daily; Therapy: 25NQU4575 to (Last Jonathan Jerez)  Requested for: 19Oka2558 Ordered    The medication list was reviewed and updated today  Allergies    1  No Known Drug Allergies    2  No Known Environmental Allergies   3  No Known Food Allergies    Vitals  Vital Signs    Recorded: 02BQB8577 09:14AM   Temperature 98 3 F, Tympanic   Heart Rate 80   Respiration 14   Systolic 748, RUE, Sitting   Diastolic 72, RUE, Sitting   Height 5 ft 6 in   Weight 143 lb    BMI Calculated 23 08   BSA Calculated 1 73   Pain Scale 8     Physical Exam    Region Evaluated and Treated: Cervical   Examination Method: Tissue Texture Change, Stability, Laxity, Effusions, Tone, Asymmetry, Misalignment, Crepitation, Defects, Masses and Range of Motion, Contracture  Severity: Moderate  Osteopathic Findings: R paraspinal hypertonicity  Treatment Method: Soft Tissue Treatment  Response: The somatic dysfunction is improved but not completely resolved   Region Evaluated and Treated: Thoracic T1- T4   Examination Method: Tissue Texture Change, Stability, Laxity, Effusions, Tone, Asymmetry, Misalignment, Crepitation, Defects, Masses and Tenderness, Pain  Severity: Severe  Osteopathic Findings: hypertonicity of trapezius L>R and three tenderpoints of trapezius muscle that patient has extreme tenderness to light touch  Treatment Method: Muscle Energy Treatment, Myofascial Release Treatment and Soft Tissue Treatment  Response: The somatic dysfunction is improved but not completely resolved   Region Evaluated and Treated: Thoracic T5 - T9   Examination Method: Tissue Texture Change, Stability, Laxity, Effusions, Tone, Asymmetry, Misalignment, Crepitation, Defects, Masses and Range of Motion, Contracture  Severity:  Osteopathic Findings: R paraspinal hypertonicity  Treatment Method: Soft Tissue Treatment  Response:  The somatic dysfunction is improved but not completely resolved   Region Evaluated and Treated: Pelvis Innominate   Examination Method: Tissue Texture Change, Stability, Laxity, Effusions, Tone, Asymmetry, Misalignment, Crepitation, Defects, Masses and Range of Motion, Contracture  Severity: Moderate  Osteopathic Findings: +ASIS compression on left, L anterior innominate  Treatment Method: Muscle Energy Treatment  Response: The somatic dysfunction is completely resolved without evidence of it ever having been present      Health Management  History of Screening for breast cancer   Digital Bilateral Screening Mammogram With CAD; every 1 year; Next Due: 59HCU2038; Overdue    Attending Note  Attending Note: I agree with the Resident management plan as it was presented to me  Level of Participation: I was present in clinic, but did not examine the patient  I agree with the Resident's note        Signatures   Electronically signed by : Cristian Rudd DO; Jan 19 2018  1:01PM EST                       (Author)    Electronically signed by : Cristian Rudd DO; Jan 22 2018  4:28PM EST                       (Author)    Electronically signed by : DEJON Blevins ; Jan 23 2018  8:43AM EST                       (Author)

## 2018-01-30 ENCOUNTER — TELEPHONE (OUTPATIENT)
Dept: FAMILY MEDICINE CLINIC | Facility: CLINIC | Age: 48
End: 2018-01-30

## 2018-01-31 ENCOUNTER — TELEPHONE (OUTPATIENT)
Dept: FAMILY MEDICINE CLINIC | Facility: CLINIC | Age: 48
End: 2018-01-31

## 2018-02-13 ENCOUNTER — OFFICE VISIT (OUTPATIENT)
Dept: FAMILY MEDICINE CLINIC | Facility: CLINIC | Age: 48
End: 2018-02-13
Payer: COMMERCIAL

## 2018-02-13 VITALS
WEIGHT: 141 LBS | SYSTOLIC BLOOD PRESSURE: 120 MMHG | RESPIRATION RATE: 18 BRPM | DIASTOLIC BLOOD PRESSURE: 80 MMHG | TEMPERATURE: 96.7 F | BODY MASS INDEX: 22.66 KG/M2 | HEIGHT: 66 IN | HEART RATE: 76 BPM

## 2018-02-13 DIAGNOSIS — M25.512 BILATERAL SHOULDER PAIN, UNSPECIFIED CHRONICITY: ICD-10-CM

## 2018-02-13 DIAGNOSIS — M54.2 NECK PAIN, MUSCULOSKELETAL: Primary | ICD-10-CM

## 2018-02-13 DIAGNOSIS — K21.9 GASTROESOPHAGEAL REFLUX DISEASE WITHOUT ESOPHAGITIS: ICD-10-CM

## 2018-02-13 DIAGNOSIS — M25.511 BILATERAL SHOULDER PAIN, UNSPECIFIED CHRONICITY: ICD-10-CM

## 2018-02-13 PROCEDURE — 99213 OFFICE O/P EST LOW 20 MIN: CPT | Performed by: FAMILY MEDICINE

## 2018-02-13 RX ORDER — AMITRIPTYLINE HYDROCHLORIDE 50 MG/1
1 TABLET, FILM COATED ORAL
COMMUNITY
Start: 2017-03-22 | End: 2018-05-07 | Stop reason: SDUPTHER

## 2018-02-13 RX ORDER — CYCLOBENZAPRINE HCL 5 MG
1 TABLET ORAL
COMMUNITY
Start: 2018-01-19 | End: 2018-02-20 | Stop reason: SDUPTHER

## 2018-02-13 RX ORDER — NAPROXEN 500 MG/1
250 TABLET ORAL 2 TIMES DAILY WITH MEALS
Qty: 28 TABLET | Refills: 0 | Status: SHIPPED | OUTPATIENT
Start: 2018-02-13 | End: 2018-05-15 | Stop reason: ALTCHOICE

## 2018-02-13 RX ORDER — OMEPRAZOLE 20 MG/1
20 CAPSULE, DELAYED RELEASE ORAL DAILY
Qty: 30 CAPSULE | Refills: 2 | Status: SHIPPED | OUTPATIENT
Start: 2018-02-13 | End: 2018-02-23 | Stop reason: SDUPTHER

## 2018-02-13 NOTE — PATIENT INSTRUCTIONS
Dolor de gee mari   LO QUE NECESITA SABER:   El dolor de gee mari comienza repentinamente, [place] rápidamente y desaparece en unos días  El dolor podría ir y venir, o podría empeorar con ciertos movimientos  El dolor podría sentirse solamente en solares gee, o podría pasarse a sandra brazos, espalda u hombros  También podría sentir dolor que comienza en otra área de solraes cuerpo y se pasa a solares gee  INSTRUCCIONES SOBRE EL [facility]ITAL[facility]:   Regrese a la alba de emergencias si:   · Usted tiene julita lesión que le provoca dolor en el gee y dolor punzante debajo de sandra brazos o piernas  · Solares dolor de gee se agrava repentinamente  · Usted tiene dolor de gee junto con entumecimiento, hormigueo o debilidad en sandra brazos o piernas  · Usted tiene rigidez en el gee, dolor de Rushie Parikh y [place]  Pregúntele a solares Rasheeda Meade vitaminas y minerales son adecuados para usted  · Usted tiene nuevos síntomas o sandra síntomas empeoran  · Sandra síntomas continúan aún después del [address]  · Usted tiene preguntas o inquietudes acerca de solares condición o cuidado  Medicamentos:   · AINEs (Analgésicos antiinflamatorios no esteroides) brittany el ibuprofeno, ayudan a disminuir la inflamación, el dolor y la fiebre  Estos medicamentos pueden ser comprados sin orden de un médico  Pregunte a solares médico cuál medicamento cash y con qué frecuencia  [address]  Cuando no se юлия de la [employer], los medicamentos antiinflamatorios no esteroides pueden causar sangrado estomacal o problemas renales  Si usted esta tomando un anticoágulante,  siempre  pregunte si los AINEs son seguros para usted  · El acetaminofén  sirve para calmar el dolor y bajar la fiebre del [place]  Pregunte a solares médico cuánto cash y con qué frecuencia  [address]  El acetaminofén puede causar daño en el hígado cuando no se klaus de forma correcta  · Medicamentos esteroideos  podría usarse para reducir la inflamación  [place] puede ayudarlo a aliviar el dolor a causa de la inflamación  · [place] joyce medicamentos brittany se le haya indicado  Consulte con solares médico si usted marcelo que solares medicamento no le está ayudando o si presenta efectos secundarios  Infórmele si es alérgico a cualquier medicamento  Mantenga julita lista actualizada de los [place], las vitaminas y los productos herbales que klaus  Incluya los siguientes datos de los medicamentos: cantidad, frecuencia y motivo de administración  Traiga con usted la lista o los envases de la píldoras a joyce citas de seguimiento  Lleve la lista de los medicamentos con usted en mari de julita emergencia  Controle o evite el dolor de gee mari:   · Repose el gee brittany se lo indiquen  No realice movimientos repentinos, brittany voltear solares gita rápidamente  Solares médico podría recomendarle que use un collarín cervical por un periodo corto de Sandra  El collarín evitará que usted [place] solares [place]  [place] ayudará a darle tiempo a solares gee para que sane si es que julita lesión está provocando solares dolor  Pregunte a solares médico cuándo puede volver a practicar deportes o realizar otras actividades cotidianas  · Aplique calor según indicaciones  El calor ayuda a aliviar el dolor y la inflamación  Use julita envoltura caliente o empape julita toalla pequeña en agua tibia  Escurra el exceso de [place]  Aplique la venda caliente o la toalla por 20 minutos cada hora o brittany se le indique  · Aplique hielo según las indicaciones  El hielo ayuda a aliviar el dolor y la inflamación, y a evitar daño al tejido  Use un paquete con hielo o ponga hielo en julita bolsa  Cubra el paquete con hielo o la espalda con julita toalla antes de aplicarlo en solares gee  Aplique el paquete de hielo o la bolsa por 15 minutos cada hora o brittany se lo indiquen  Solares médico puede indicarle con qué frecuencia aplicar el hielo  · Mk ejercicios para el gee brittany se lo indiquen    Los ejercicios para el gee ayudan a [employer] y a aumentar el rango de [place]  Solares médico le indicará cuáles ejercicios son adecuados para usted  Podría darle instrucciones o podría recomendarle que acuda con un fisioterapeuta  Solares médico o terapeuta pueden asegurarse que usted esté haciendo estos ejercicios correctamente  · Mantenga julita buena postura  Trate de mantener solares gita y hombros levantados cuando se siente  Si usted trabaja en frente de julita computadora, asegúrese de que el monitor esté al nivel adecuado  Usted no debería tener que mirar hacia abajo para jimmy la pantalla  [place] debe tener que inclinarse hacia adelante para poder leer lo que está en la pantalla  Asegúrese de que solares teclado, ratón y otros artículos de computadora estén colocados en donde usted no tenga que extender joyce hombros para alcanzarlos  [place] a menudo si usted trabaja frente a julita computadora o permanece sentado [place] largos períodos  Estírese o camine para [place] de solares gee relajados  Acuda a joyce consultas de control con solares médico según le indicaron  Solares médico podría referirlo a un especialista si solares dolor no mejora con el tratamiento  Anote joyce preguntas para que se acuerde de hacerlas [place] joyce visitas  © 2017 [address] [facility] Information is for End User's use only and may not be sold, redistributed or otherwise used for commercial purposes  All illustrations and images included in CareNotes® are the copyrighted property of A D A M , Inc  or [address]iel [address]  Esta información es sólo para uso en educación  Solares intención no es darle un consejo médico sobre enfermedades o tratamientos  Colsulte con solares Burlene Jacey farmacéutico antes de seguir cualquier régimen médico para saber si es seguro y efectivo para usted

## 2018-02-13 NOTE — PROGRESS NOTES
Saritha Bustamante 1970 female MRN: [id number]    Family Medicine Follow-up Visit    ASSESSMENT/PLAN  Problem List Items Addressed This Visit     Bilateral shoulder pain     Some improvement with x2 OMT  Will have next OMT in two weeks with Charly Smith  Have increased Elavil 25--> 50mg QD  Will r/o PMR vs myositis vs thyroid ds vs vit deficiency   Order Sed rate, CK, CRP, TSH, Vit D,B12         Relevant Medications    Lido-Capsaicin-Men-Methyl Sal 0 5-0 035-5-20 % PTCH    Other Relevant Orders    Sedimentation rate, automated    C-reactive protein    CK (with reflex to MB)    Vitamin B12    TSH baseline    Vitamin D 25 hydroxy    Neck pain, musculoskeletal - Primary    Relevant Medications    Lido-Capsaicin-Men-Methyl Sal 0 5-0 035-5-20 % PTCH    Other Relevant Orders    Sedimentation rate, automated    C-reactive protein    CK (with reflex to MB)    Vitamin B12    TSH baseline    Vitamin D 25 hydroxy    Gastroesophageal reflux disease without esophagitis     Improved  Will decrease Omeprazole 40mg --> 20mg QD         Relevant Medications    omeprazole (PriLOSEC) 20 mg delayed release capsule              Future Appointments  Date Time Provider Indira Wood   2/20/2018 1:40 PM Sis Smith, DO [facility]          SUBJECTIVE  CC: Follow-up (left shoulder)      HPI:  Saritha Bustamante is a 41 y o  female who presents for follow up visit for B/L shoulder pain and Neck pain L>R  Was seen for OMT 2x and has felt some reduction in pain  Pain: 4/10  Pain increased with weight bearing  Neck: left side: tender to touch  States flexeril helped her a lot  Denies B/L UE numbness, tingling or radiation of pain  Review of Systems   Constitutional: Positive for activity change  Negative for appetite change, chills, diaphoresis, fatigue and fever  HENT: Negative for congestion, rhinorrhea and voice change  Eyes: Negative for visual disturbance     Respiratory: Negative for choking, chest tightness, shortness of breath, wheezing and stridor  Cardiovascular: Negative for chest pain, palpitations and leg swelling  Gastrointestinal: Negative for abdominal distention, abdominal pain, anal bleeding, blood in stool, constipation and vomiting  Endocrine: Negative for polyuria  Genitourinary: Negative for dyspareunia  Musculoskeletal: Positive for myalgias and neck pain  Negative for arthralgias, back pain, gait problem, joint swelling and neck stiffness  Allergic/Immunologic: Negative for immunocompromised state  Neurological: Negative for dizziness, facial asymmetry and speech difficulty  Hematological: Negative for adenopathy  Does not bruise/bleed easily  Psychiatric/Behavioral: Negative for agitation, behavioral problems, confusion, decreased concentration, dysphoric mood and hallucinations  Historical Information   The patient history was reviewed as follows:    Past Medical History:   Diagnosis Date    GERD (gastroesophageal reflux disease)     Headache     Hypertension     Panic attacks     Psychiatric disorder      History reviewed  No pertinent surgical history  No family history on file     Social History   History   Alcohol Use No     History   Drug Use No     History   Smoking Status    Current Every Day Smoker    Packs/day: 0 20    Types: Cigarettes   Smokeless Tobacco    Never Used       Medications:     Current Outpatient Prescriptions:     amitriptyline (ELAVIL) 50 mg tablet, Take 1 tablet by mouth, Disp: , Rfl:     cyclobenzaprine (FLEXERIL) 5 mg tablet, Take 1 tablet by mouth, Disp: , Rfl:     lisinopril-hydrochlorothiazide (PRINZIDE,ZESTORETIC) 10-12 5 MG per tablet, Take by mouth, Disp: , Rfl:     omeprazole (PriLOSEC) 20 mg delayed release capsule, Take 1 capsule (20 mg total) by mouth daily for 30 days, Disp: 30 capsule, Rfl: 2    ibuprofen (MOTRIN) 600 mg tablet, Take 1 tablet by mouth every 6 (six) hours as needed for mild pain for up to 7 days, Disp: 30 tablet, Rfl: 0    Lido-Capsaicin-Men-Methyl Sal 0 5-0 035-5-20 % PTCH, Apply 5 patches topically daily, Disp: 5 patch, Rfl: 1    lidocaine (LIDODERM) 5 %, Place 1 patch on the skin every 24 hours for 14 days Remove & Discard patch within 12 hours or as directed by MD, Disp: 14 patch, Rfl: 0  No Known Allergies    OBJECTIVE    Vitals:   Vitals:    02/13/18 1701   BP: 120/80   Pulse: 76   Resp: 18   Temp: (!) 96 7 °F (35 9 °C)   Weight: 64 kg (141 lb)   Height: 5' 6" (1 676 m)           Physical Exam   Constitutional: She is oriented to person, place, and time  She appears well-developed and well-nourished  No distress  HENT:   Head: Normocephalic  Eyes: Pupils are equal, round, and reactive to light  Right eye exhibits no discharge  Left eye exhibits no discharge  Neck: Normal range of motion  No thyromegaly present  Cardiovascular: Normal rate  Murmur heard  Pulmonary/Chest: Effort normal  No respiratory distress  She has no wheezes  She has no rales  Abdominal: Soft  She exhibits no distension  There is no tenderness  There is no rebound  Musculoskeletal: Normal range of motion  She exhibits no edema  Left shoulder: She exhibits tenderness  She exhibits normal range of motion, no swelling, no effusion, no crepitus, no deformity, no laceration, no pain, no spasm, normal pulse and normal strength  Neurological: She is alert and oriented to person, place, and time  Skin: Skin is warm  No rash noted  She is not diaphoretic  No erythema  No pallor                    Tana Puente MD, PGY-2  [facility]   2/13/2018

## 2018-02-13 NOTE — LETTER
February 13, 2018     Patient: Lala Ruiz   YOB: 1970   Date of Visit: 2/13/2018       To Whom it May Concern:    Lala Ruiz is under my professional care  She was seen in my office on 2/13/2018  She may return to work on Feb 14, 2018       If you have any questions or concerns, please don't hesitate to call           Sincerely,          Jaylen Sterling MD        CC: No Recipients

## 2018-02-14 ENCOUNTER — APPOINTMENT (OUTPATIENT)
Dept: LAB | Facility: HOSPITAL | Age: 48
End: 2018-02-14
Payer: COMMERCIAL

## 2018-02-14 DIAGNOSIS — M54.2 NECK PAIN, MUSCULOSKELETAL: ICD-10-CM

## 2018-02-14 DIAGNOSIS — M25.512 BILATERAL SHOULDER PAIN, UNSPECIFIED CHRONICITY: ICD-10-CM

## 2018-02-14 DIAGNOSIS — M25.511 BILATERAL SHOULDER PAIN, UNSPECIFIED CHRONICITY: ICD-10-CM

## 2018-02-14 LAB
25(OH)D3 SERPL-MCNC: 26 NG/ML (ref 30–100)
CK SERPL-CCNC: 113 U/L (ref 26–192)
CRP SERPL QL: <3 MG/L
ERYTHROCYTE [SEDIMENTATION RATE] IN BLOOD: 11 MM/HOUR (ref 0–20)
TSH SERPL DL<=0.05 MIU/L-ACNC: 0.8 UIU/ML
VIT B12 SERPL-MCNC: 355 PG/ML (ref 100–900)

## 2018-02-14 PROCEDURE — 84443 ASSAY THYROID STIM HORMONE: CPT

## 2018-02-14 PROCEDURE — 82607 VITAMIN B-12: CPT

## 2018-02-14 PROCEDURE — 85652 RBC SED RATE AUTOMATED: CPT

## 2018-02-14 PROCEDURE — 82550 ASSAY OF CK (CPK): CPT

## 2018-02-14 PROCEDURE — 86140 C-REACTIVE PROTEIN: CPT

## 2018-02-14 PROCEDURE — 36415 COLL VENOUS BLD VENIPUNCTURE: CPT

## 2018-02-14 PROCEDURE — 82306 VITAMIN D 25 HYDROXY: CPT

## 2018-02-20 ENCOUNTER — OFFICE VISIT (OUTPATIENT)
Dept: FAMILY MEDICINE CLINIC | Facility: CLINIC | Age: 48
End: 2018-02-20
Payer: COMMERCIAL

## 2018-02-20 VITALS — HEIGHT: 66 IN | WEIGHT: 143.2 LBS | BODY MASS INDEX: 23.01 KG/M2

## 2018-02-20 DIAGNOSIS — M62.838 TRAPEZIUS MUSCLE SPASM: Primary | ICD-10-CM

## 2018-02-20 DIAGNOSIS — M99.01 SOMATIC DYSFUNCTION OF CERVICAL REGION: ICD-10-CM

## 2018-02-20 PROCEDURE — 99213 OFFICE O/P EST LOW 20 MIN: CPT | Performed by: FAMILY MEDICINE

## 2018-02-20 RX ORDER — CYCLOBENZAPRINE HCL 5 MG
5 TABLET ORAL
Qty: 30 TABLET | Refills: 0 | Status: SHIPPED | OUTPATIENT
Start: 2018-02-20 | End: 2018-05-15 | Stop reason: ALTCHOICE

## 2018-02-20 NOTE — ASSESSMENT & PLAN NOTE
Patient tolerated OMT session well, will refill flexeril 5 mg qhs  - gave patient instructions on neck exercises   - follow up for OMT in 2-4 weeks

## 2018-02-20 NOTE — PATIENT INSTRUCTIONS
Neck Exercises   AMBULATORY CARE:   Neck exercises  help reduce neck pain, and improve neck movement and strength  Neck exercises also help prevent long-term neck problems  What you need to know about neck exercises:   · Do the exercises every day,  or as often as directed by your healthcare provider  · Move slowly, gently, and smoothly  Avoid fast or jerky motions  · Stand and sit the way your healthcare provider shows you  Good posture may reduce your neck pain  Check your posture often, even when you are not doing your neck exercises  How to perform neck exercises safely:   · Exercise position:  You may sit or stand while you do neck exercises  Face forward  Your shoulders should be straight and relaxed, with a good posture  · Head tilts, forward and back:  Gently bow your head and try to touch your chin to your chest  Your healthcare provider may tell you to push on the back of your neck to help bow your head  Raise your chin back to the starting position  Tilt your head back as far as possible so you are looking up at the ceiling  Your healthcare provider may tell you to lift your chin to help tilt your head back  Return your head to the starting position  · Head tilts, side to side:  Tilt your head, bringing your ear toward your shoulder  Then tilt your head toward the other shoulder  · Head turns:  Turn your head to look over your shoulder  Tilt your chin down and try to touch it to your shoulder  Do not raise your shoulder to your chin  Face forward again  Do the same on the other side  · Head rolls:  Slowly bring your chin toward your chest  Next, roll your head to the right  Your ear should be positioned over your shoulder  Hold this position for 5 seconds  Roll your head back toward your chest and to the left into the same position  Hold for 5 seconds  Gently roll your head back and around in a clockwise [place] 3 times   Next, move your head in the reverse direction (counterclockwise) in a [place] 3 times  Do not shrug your shoulders upwards while you do this exercise  Contact your healthcare provider if:   · Your pain does not get better, or gets worse  · You have questions or concerns about your condition, care, or exercise program   © 2017 [facility] Information is for End User's use only and may not be sold, redistributed or otherwise used for commercial purposes  All illustrations and images included in CareNotes® are the copyrighted property of [employer]  or [address]  The above information is an  only  It is not intended as medical advice for individual conditions or treatments  Talk to your doctor, nurse or pharmacist before following any medical regimen to see if it is safe and effective for you  Deni Wise

## 2018-02-20 NOTE — PROGRESS NOTES
The Assessment/Plan     Trapezius muscle spasm  Patient tolerated OMT session well, will refill flexeril 5 mg qhs  - gave patient instructions on neck exercises   - follow up for OMT in 2-4 weeks      Diagnoses and all orders for this visit:    Trapezius muscle spasm  -     cyclobenzaprine (FLEXERIL) 5 mg tablet; Take 1 tablet (5 mg total) by mouth daily at bedtime    Somatic dysfunction of cervical region       This is a 41 y o  female who presents for OMT follow-up  1  Patient tolerated OMT well for the above problems,  advised patient to drink fluids and can use NSAID for soreness after treatment     2  OMT Follow up in 2 weeks  Subjective     Neris Franne Schirmer is a 41 y o  female and is here for a OMT follow up  The patient reports she has continued shoulder pain  She felt better after last OMT visit, however, was shoveling snow over the weekend and now has increased pain  She is not having side effects from increased amitriptyline dose  She sometimes feels like her left arm "falls asleep at night "  She had improvement of symptoms with flexeril qhs and she could not afford the patch  She is still talking naproxen BID  Is the patient taking Pain medication? yes  Has the patient completed physical therapy for this condition? no  Did Patient symptoms improve from last OMT appointment? yes    The following portions of the patient's history were reviewed and updated as appropriate: allergies, current medications, past family history, past medical history, past social history, past surgical history and problem list     Review of Systems  Review of Systems   Constitutional: Negative for fatigue and fever  HENT: Negative for congestion  Respiratory: Negative for cough and shortness of breath  Cardiovascular: Negative for chest pain and leg swelling  Gastrointestinal: Negative for abdominal pain, constipation and diarrhea  Genitourinary: Negative for difficulty urinating     Musculoskeletal:        As noted in HPI    Neurological: Positive for headaches           Objective     OMT Exam   Head:   Somatic Dysfunction:  Tissue Texture Changes, Asymmetry  and Restriction  Severity :  2  Osteopathic Findings: exhalation dysfunction of SBS cranial bones   Treatment Method: BLT  Response: improved  Cervical:   Somatic Dysfunction:  Tissue Texture Changes, Asymmetry  and Tenderness  Severity :  2  Osteopathic Findings: tenderpoint of C4 transvere process  Treatment Method: ME, ST and CS  Response: improved  Thoracic T1-4:   Somatic Dysfunction:  Tissue Texture Changes, Asymmetry  and Tenderness  Severity :  3  Osteopathic Findings: hypertonicity and tenderness of left trapezius muscle  Treatment Method: ME and ST  Response: improved  Thoracic T10-12:  Somatic Dysfunction:  Tissue Texture Changes, Asymmetry  and Restriction  Severity :  2  Osteopathic Findings: left paraspinal hypertonicity   Treatment Method: ME and ST  Response: improved

## 2018-02-23 DIAGNOSIS — K21.9 GASTROESOPHAGEAL REFLUX DISEASE WITHOUT ESOPHAGITIS: ICD-10-CM

## 2018-02-26 RX ORDER — OMEPRAZOLE 20 MG/1
20 CAPSULE, DELAYED RELEASE ORAL DAILY
Qty: 90 CAPSULE | Refills: 1 | Status: SHIPPED | OUTPATIENT
Start: 2018-02-26 | End: 2018-06-23 | Stop reason: SDUPTHER

## 2018-03-30 ENCOUNTER — OFFICE VISIT (OUTPATIENT)
Dept: FAMILY MEDICINE CLINIC | Facility: CLINIC | Age: 48
End: 2018-03-30
Payer: COMMERCIAL

## 2018-03-30 VITALS
RESPIRATION RATE: 18 BRPM | HEART RATE: 76 BPM | SYSTOLIC BLOOD PRESSURE: 122 MMHG | BODY MASS INDEX: 23.14 KG/M2 | DIASTOLIC BLOOD PRESSURE: 80 MMHG | HEIGHT: 66 IN | WEIGHT: 144 LBS | TEMPERATURE: 96.2 F

## 2018-03-30 DIAGNOSIS — R30.0 DYSURIA: ICD-10-CM

## 2018-03-30 DIAGNOSIS — Z12.39 SCREENING FOR BREAST CANCER: ICD-10-CM

## 2018-03-30 DIAGNOSIS — F41.9 ANXIETY: ICD-10-CM

## 2018-03-30 DIAGNOSIS — K21.9 GASTROESOPHAGEAL REFLUX DISEASE WITHOUT ESOPHAGITIS: ICD-10-CM

## 2018-03-30 DIAGNOSIS — R93.5 ABNORMAL CT SCAN, PELVIS: Primary | ICD-10-CM

## 2018-03-30 DIAGNOSIS — Z12.4 SCREENING FOR CERVICAL CANCER: ICD-10-CM

## 2018-03-30 DIAGNOSIS — N39.0 UTI (URINARY TRACT INFECTION), UNCOMPLICATED: ICD-10-CM

## 2018-03-30 LAB
BACTERIA UR QL AUTO: ABNORMAL /HPF
BILIRUB UR QL STRIP: NEGATIVE
CLARITY UR: CLEAR
COLOR UR: YELLOW
GLUCOSE UR STRIP-MCNC: NEGATIVE MG/DL
HGB UR QL STRIP.AUTO: ABNORMAL
HYALINE CASTS #/AREA URNS LPF: ABNORMAL /LPF
KETONES UR STRIP-MCNC: NEGATIVE MG/DL
LEUKOCYTE ESTERASE UR QL STRIP: ABNORMAL
NITRITE UR QL STRIP: NEGATIVE
NON-SQ EPI CELLS URNS QL MICRO: ABNORMAL /HPF
PH UR STRIP.AUTO: 7.5 [PH] (ref 4.5–8)
PROT UR STRIP-MCNC: NEGATIVE MG/DL
RBC #/AREA URNS AUTO: ABNORMAL /HPF
SL AMB  POCT GLUCOSE, UA: NEGATIVE
SL AMB LEUKOCYTE ESTERASE,UA: ABNORMAL
SL AMB POCT BILIRUBIN,UA: NEGATIVE
SL AMB POCT BLOOD,UA: NEGATIVE
SL AMB POCT CLARITY,UA: CLEAR
SL AMB POCT COLOR,UA: YELLOW
SL AMB POCT KETONES,UA: NEGATIVE
SL AMB POCT NITRITE,UA: NEGATIVE
SL AMB POCT PH,UA: 5
SL AMB POCT SPECIFIC GRAVITY,UA: 1
SL AMB POCT URINE PROTEIN: NEGATIVE
SL AMB POCT UROBILINOGEN: 0.2
SP GR UR STRIP.AUTO: 1.01 (ref 1–1.03)
UROBILINOGEN UR QL STRIP.AUTO: 0.2 E.U./DL
WBC #/AREA URNS AUTO: ABNORMAL /HPF

## 2018-03-30 PROCEDURE — 81003 URINALYSIS AUTO W/O SCOPE: CPT | Performed by: FAMILY MEDICINE

## 2018-03-30 PROCEDURE — 99213 OFFICE O/P EST LOW 20 MIN: CPT | Performed by: FAMILY MEDICINE

## 2018-03-30 PROCEDURE — 81001 URINALYSIS AUTO W/SCOPE: CPT | Performed by: FAMILY MEDICINE

## 2018-03-30 RX ORDER — PHENAZOPYRIDINE HYDROCHLORIDE 100 MG/1
100 TABLET, FILM COATED ORAL 3 TIMES DAILY PRN
Qty: 10 TABLET | Refills: 0 | Status: SHIPPED | OUTPATIENT
Start: 2018-03-30 | End: 2018-05-15 | Stop reason: ALTCHOICE

## 2018-03-30 RX ORDER — FLUTICASONE PROPIONATE 50 MCG
SPRAY, SUSPENSION (ML) NASAL
COMMUNITY
Start: 2018-03-23 | End: 2019-03-01

## 2018-03-30 RX ORDER — SULFAMETHOXAZOLE AND TRIMETHOPRIM 800; 160 MG/1; MG/1
1 TABLET ORAL EVERY 12 HOURS SCHEDULED
Qty: 14 TABLET | Refills: 0 | Status: SHIPPED | OUTPATIENT
Start: 2018-03-30 | End: 2018-04-06

## 2018-03-30 NOTE — PROGRESS NOTES
Andrei Arango 1970 female MRN: [id number]    Family Medicine Follow-up Visit    ASSESSMENT/PLAN  Problem List Items Addressed This Visit     Gastroesophageal reflux disease without esophagitis    Abnormal CT scan, pelvis - Primary     Stable: much improved    1  Patient has been taking every other day: no acute symptoms  2  Will take only every 2-3 days in order to DC eventually         Relevant Orders    CT abdomen pelvis w wo contrast    Anxiety    Screening for breast cancer     Last Mammogram :  Birad 1 : negative   Sept 2017    Follow up in Sept 2018         Screening for cervical cancer    Dysuria    Relevant Medications    sulfamethoxazole-trimethoprim (BACTRIM DS) 800-160 mg per tablet    phenazopyridine (PYRIDIUM) 100 mg tablet    Other Relevant Orders    UA w Reflex to Microscopic w Reflex to Culture - Clinic Collect      Other Visit Diagnoses     UTI (urinary tract infection), uncomplicated        Relevant Medications    sulfamethoxazole-trimethoprim (BACTRIM DS) 800-160 mg per tablet    phenazopyridine (PYRIDIUM) 100 mg tablet    Other Relevant Orders    UA w Reflex to Microscopic w Reflex to Culture - Clinic Collect              Future Appointments  Date Time Provider Indira Wood   6/11/2018 2:00 PM Nayely Browne MD [facility]          SUBJECTIVE  CC: Follow-up (neck pain)      Difficulty Urinating    This is a new problem  The current episode started yesterday  The problem occurs intermittently  The quality of the pain is described as burning  The pain is at a severity of 7/10  The pain is moderate  There has been no fever  The fever has been present for 1 - 2 days  She is not sexually active  There is no history of pyelonephritis  Associated symptoms include flank pain, frequency and urgency  Pertinent negatives include no chills, discharge, hematuria, hesitancy, nausea, possible pregnancy, sweats or vomiting  She has tried nothing for the symptoms   The treatment provided no relief  There is no history of kidney stones, a single kidney, urinary stasis or a urological procedure  Ezequiel Portillo is a 41 y o  female who presents for follow up:    1  GYN Hx:   : with Mirena IUD: placed : currently amenorrheic   -Screen for cervical Ca: Pap smear + HPV up to date, normal cytology and neg HPV, not due for Pap smear until 2020  - Screen for breast Ca: 2017: Negative BiRad 1    2  Smoking:  +2-3 day: 3x a week only        Review of Systems   Constitutional: Negative for chills  Gastrointestinal: Negative for nausea and vomiting  Genitourinary: Positive for dysuria, flank pain, frequency and urgency  Negative for hematuria and hesitancy         Historical Information   The patient history was reviewed as follows:    Past Medical History:   Diagnosis Date    GERD (gastroesophageal reflux disease)     Headache     Hypertension     Panic attacks     Psychiatric disorder      Past Surgical History:   Procedure Laterality Date    NO PAST SURGERIES       Family History   Problem Relation Age of Onset    Hypertension Mother     Arthritis Father     Diabetes Father     Hypertension Father     Hyperlipidemia Father       Social History   History   Alcohol Use No     Comment: CONSUMES ALCOHOL OCCASIONALLY AS PER [employer]     History   Drug Use No     History   Smoking Status    Current Every Day Smoker    Packs/day: 0 20    Types: Cigarettes   Smokeless Tobacco    Never Used     Comment: ONE HALF PACK A DAY OR LESS, CURRENT SOME DAY SMOKER, LIGHT TOBACCO SMOKER  AS PER [employer]       Medications:     Current Outpatient Prescriptions:     amitriptyline (ELAVIL) 50 mg tablet, Take 1 tablet by mouth, Disp: , Rfl:     cyclobenzaprine (FLEXERIL) 5 mg tablet, Take 1 tablet (5 mg total) by mouth daily at bedtime, Disp: 30 tablet, Rfl: 0    fluticasone (FLONASE) 50 mcg/act nasal spray, , Disp: , Rfl:     lisinopril-hydrochlorothiazide (PRINZIDE,ZESTORETIC) 10-12 5 MG per tablet, Take by mouth, Disp: , Rfl:     naproxen (NAPROSYN) 500 mg tablet, Take 0 5 tablets (250 mg total) by mouth 2 (two) times a day with meals for 14 days, Disp: 28 tablet, Rfl: 0    omeprazole (PriLOSEC) 20 mg delayed release capsule, Take 1 capsule (20 mg total) by mouth daily for 30 days, Disp: 90 capsule, Rfl: 1    phenazopyridine (PYRIDIUM) 100 mg tablet, Take 1 tablet (100 mg total) by mouth 3 (three) times a day as needed for bladder spasms, Disp: 10 tablet, Rfl: 0    sulfamethoxazole-trimethoprim (BACTRIM DS) 800-160 mg per tablet, Take 1 tablet by mouth every 12 (twelve) hours for 7 days, Disp: 14 tablet, Rfl: 0  No Known Allergies    OBJECTIVE    Vitals:   Vitals:    03/30/18 1117   BP: 122/80   Pulse: 76   Resp: 18   Temp: (!) 96 2 °F (35 7 °C)   Weight: 65 3 kg (144 lb)   Height: 5' 6" (1 676 m)           Physical Exam   Constitutional: She is oriented to person, place, and time  She appears well-developed and well-nourished  No distress  HENT:   Head: Normocephalic  Eyes: Pupils are equal, round, and reactive to light  Right eye exhibits no discharge  Left eye exhibits no discharge  Neck: Normal range of motion  Cardiovascular: Normal rate  Pulmonary/Chest: Effort normal and breath sounds normal  No respiratory distress  She has no wheezes  She has no rales  She exhibits no tenderness  Abdominal: Soft  She exhibits no distension  Neurological: She is alert and oriented to person, place, and time  Skin: Skin is warm  No rash noted  She is not diaphoretic  No erythema  No pallor  Psychiatric: She has a normal mood and affect   Her behavior is normal  Judgment and thought content normal              Harsh Wayne MD, PGY-2  [address] [place] [address] Family Medicine   3/30/2018

## 2018-03-30 NOTE — PATIENT INSTRUCTIONS
Disuria   LO QUE NECESITA SABER:   La disuria es la dificultad al orinar, o el dolor, ardor o molestias al Ailin Galen  La disuria por lo general es un síntoma de algún otro problema  INSTRUCCIONES SOBRE [facility]ITAL[facility]:   Regrese a la alba de emergencias si:   · Usted tiene dolor intenso en la espalda, en un costado o en el abdomen  · Usted tiene fiebre y escalofríos con temblor  · Usted vomita varias veces seguidas  Pregúntele a solares Lesle [place] vitaminas y minerales son adecuados para usted  · Joyce síntomas no desaparecen, aún después del tratamiento  · Usted tiene preguntas o inquietudes acerca de solares condición o cuidado  Medicamentos:   · Medicamentos,  para ayudar a tratar julita infección bacteriana o para disminuir los espasmos musculares  · [place] joyce medicamentos brittany se le haya indicado  Consulte con solares médico si usted marcelo que solares medicamento no le está ayudando o si presenta efectos secundarios  Infórmele si es alérgico a algún medicamento  Mantenga julita lista actualizada de los [place], las vitaminas y los productos herbales que klaus  Incluya los siguientes datos de los medicamentos: cantidad, frecuencia y motivo de administración  Traiga con usted la lista o los envases de la píldoras a joyce citas de seguimiento  Lleve la lista de los medicamentos con usted en mari de julita emergencia  Acuda a joyce consultas de control con solares médico según le indicaron  Solares médico podría referirlo a un urólogo o nefrólogo para que le realicen exámenes adicionales  Anote joyce preguntas para que se acuerde de hacerlas [place] joyce visitas  Controle solares disuria:   · Ingiera más líquidos  Los líquidos ayudan a desechar las bacterias que estén provocando julita infección  Pregunte a solares médico sobre la cantidad de líquido que necesita cash todos los días y cuáles le recomienda  · [place] rosalia de asiento brittany se le indique  Llene julita danny de baño con 4 a 6 pulgadas de [place]   [employer] usar un recipiente para baño de asiento que quepa en el inodoro  Siént[place] en el baño de danny por 20 minutos  Mk esto 2 a 3 veces a la semana según le indicaron  El agua cálida va a ayudara reducir el dolor y la inflamación  © 2017 [address] Information is for End User's use only and may not be sold, redistributed or otherwise used for commercial purposes  All illustrations and images included in CareNotes® are the copyrighted property of [employer]  or Esequiel Lau  Esta información es sólo para uso en educación  [place] intención no es darle un consejo médico sobre enfermedades o tratamientos  Colsulte con [place] Payal Revering farmacéutico antes de seguir cualquier régimen médico para saber si es seguro y efectivo para usted

## 2018-03-30 NOTE — ASSESSMENT & PLAN NOTE
Stable: much improved    1  Patient has been taking every other day: no acute symptoms  2   Will take only every 2-3 days in order to DC eventually

## 2018-03-30 NOTE — ASSESSMENT & PLAN NOTE
NM PET CT 2/16/17: IMPRESSION:  1  Mildly prominent mesenteric nodes are again visualized, but without significant hypermetabolism  Six-month follow-up CT is however suggested to ensure stability  2   Sclerotic lesions again visualized in the pelvis and sacrum, but without significant hypermetabolism  3   Mild skin thickening overlying the anterior shoulders with minimal FDG activity may be inflammatory  This may be correlated clinically  4   There is a small cutaneous hypermetabolic focus along the medial left upper thigh region, possibly from contamination  This may also be correlated clinically to exclude a skin lesion        Will order CT abd pelvis

## 2018-04-11 ENCOUNTER — TELEPHONE (OUTPATIENT)
Dept: FAMILY MEDICINE CLINIC | Facility: CLINIC | Age: 48
End: 2018-04-11

## 2018-04-11 NOTE — TELEPHONE ENCOUNTER
PATIENT CALLED SAID THE ANTIBIOTICS DR Gregorio Mukherjee PRESCRIBED HER ARE MAKING HER SICK SHE WOULD LIKE DR Gregorio Mukherjee TO CHANGE MEDICATION  IF SO CAN THE NEW MEDICATION BE SENT TO [facility]

## 2018-04-12 NOTE — TELEPHONE ENCOUNTER
Patient was prescribed antibiotic March 30  Patient states she called numerous times about her nausea 2n2 to antibiotics  Please call patient and make an acute appointment with a provider

## 2018-04-13 ENCOUNTER — OFFICE VISIT (OUTPATIENT)
Dept: FAMILY MEDICINE CLINIC | Facility: CLINIC | Age: 48
End: 2018-04-13
Payer: COMMERCIAL

## 2018-04-13 ENCOUNTER — TELEPHONE (OUTPATIENT)
Dept: FAMILY MEDICINE CLINIC | Facility: CLINIC | Age: 48
End: 2018-04-13

## 2018-04-13 VITALS
HEART RATE: 78 BPM | WEIGHT: 138.4 LBS | SYSTOLIC BLOOD PRESSURE: 112 MMHG | BODY MASS INDEX: 22.24 KG/M2 | DIASTOLIC BLOOD PRESSURE: 80 MMHG | HEIGHT: 66 IN | TEMPERATURE: 96.5 F | RESPIRATION RATE: 14 BRPM

## 2018-04-13 DIAGNOSIS — R30.0 BURNING WITH URINATION: Primary | ICD-10-CM

## 2018-04-13 DIAGNOSIS — R93.5 ABNORMAL CT SCAN, PELVIS: ICD-10-CM

## 2018-04-13 DIAGNOSIS — R35.0 INCREASED FREQUENCY OF URINATION: ICD-10-CM

## 2018-04-13 DIAGNOSIS — R31.29 OTHER MICROSCOPIC HEMATURIA: ICD-10-CM

## 2018-04-13 DIAGNOSIS — F17.200 SMOKER: ICD-10-CM

## 2018-04-13 LAB
BACTERIA UR QL AUTO: ABNORMAL /HPF
BILIRUB UR QL STRIP: NEGATIVE
CLARITY UR: CLEAR
COLOR UR: YELLOW
GLUCOSE UR STRIP-MCNC: NEGATIVE MG/DL
HGB UR QL STRIP.AUTO: ABNORMAL
HYALINE CASTS #/AREA URNS LPF: ABNORMAL /LPF
KETONES UR STRIP-MCNC: NEGATIVE MG/DL
LEUKOCYTE ESTERASE UR QL STRIP: ABNORMAL
NITRITE UR QL STRIP: NEGATIVE
NON-SQ EPI CELLS URNS QL MICRO: ABNORMAL /HPF
PH UR STRIP.AUTO: 7 [PH] (ref 4.5–8)
PROT UR STRIP-MCNC: NEGATIVE MG/DL
RBC #/AREA URNS AUTO: ABNORMAL /HPF
SL AMB  POCT GLUCOSE, UA: NEGATIVE
SL AMB LEUKOCYTE ESTERASE,UA: ABNORMAL
SL AMB POCT BILIRUBIN,UA: NEGATIVE
SL AMB POCT BLOOD,UA: ABNORMAL
SL AMB POCT CLARITY,UA: ABNORMAL
SL AMB POCT COLOR,UA: YELLOW
SL AMB POCT KETONES,UA: NEGATIVE
SL AMB POCT NITRITE,UA: NEGATIVE
SL AMB POCT PH,UA: 7.5
SL AMB POCT SPECIFIC GRAVITY,UA: 1
SL AMB POCT URINE PROTEIN: NEGATIVE
SL AMB POCT UROBILINOGEN: 0.2
SP GR UR STRIP.AUTO: 1.01 (ref 1–1.03)
UROBILINOGEN UR QL STRIP.AUTO: 0.2 E.U./DL
WBC #/AREA URNS AUTO: ABNORMAL /HPF

## 2018-04-13 PROCEDURE — 81003 URINALYSIS AUTO W/O SCOPE: CPT | Performed by: FAMILY MEDICINE

## 2018-04-13 PROCEDURE — 81001 URINALYSIS AUTO W/SCOPE: CPT | Performed by: FAMILY MEDICINE

## 2018-04-13 PROCEDURE — 99213 OFFICE O/P EST LOW 20 MIN: CPT | Performed by: FAMILY MEDICINE

## 2018-04-13 RX ORDER — CEPHALEXIN 500 MG/1
500 CAPSULE ORAL EVERY 12 HOURS SCHEDULED
Qty: 14 CAPSULE | Refills: 0 | Status: SHIPPED | OUTPATIENT
Start: 2018-04-13 | End: 2018-04-20

## 2018-04-13 NOTE — PROGRESS NOTES
Adelina Lucas 1970 female MRN: [id number]    Family Medicine Acute Visit    ASSESSMENT/PLAN   Problem List Items Addressed This Visit        Genitourinary    Other microscopic hematuria     - UA: RBC 4-10 since 7/2017 and POC trace blood today  --> treat as cystitis for now, will repeat UA in 2 weeks; if persistent, will consider cytology and cystoscopy given h/o smoking status             Other    Abnormal CT scan, pelvis     - CT A/P (2017):  new mesenteric adenopathy of uncertain etiology, and new sclerotic bone lesions in sacrum and pelvis; repeat CT A/P 4/20  - in the setting of persistent of hematuria, will consider further testings for urinary tract cancer         Burning with urination - Primary     - POC Ua: trace leukocytes, moderate blood, pH 1 005, pH 7 5  - likely due to untreated cystitis   - s/p 1 dose of Bactrim 3/30, reported to have severe nausea whole day and vomiting multiple times after that dose  --> d/c Bactrim, prescribing Keflex 500 PO BID x 7d; education about UTI given  --> pending microscopic UA w/ reflex culture           Relevant Medications    cephalexin (KEFLEX) 500 mg capsule    Other Relevant Orders    UA w Reflex to Microscopic w Reflex to Culture - Clinic Collect    POCT urine dip auto non-scope (Completed)    Increased frequency of urination     - likely due to cystitis, managed as above         Relevant Medications    cephalexin (KEFLEX) 500 mg capsule    Smoker     - encourage pt to quit smoking and pt stated she would consider that  --> info about quit smoking program given, will address next visit                   Future Appointments  Date Time Provider Indira Wood   4/20/2018 11:00 AM BE CT 1 BE CT 1930 [facility] [facility]   4/27/2018 1:30 PM Kelly Kim MD [facility]   6/11/2018 2:00 PM Farzad Doyle MD [facility]          SUBJECTIVE  CC: Follow-up and Medication Problem      HPI:  Adelina Lucas is a 41 y o  female who presents for urinary symptoms persistent for 2 weeks  Pt stated she has had burning urination, increased frequency of urination, intermittent suprapubic discomfort as urinating  Denies discharge and blood, colic pain; no vaginal discharge, sexual active over 10 yrs, fever/chillings, fatigue  Pt was given Bactrim 3/30, but took only one dose and stopped taking because of severe nausea and multiple vomiting episodes  No headache, chest pain, sob, cough, wheezing, headache, blurry vision, headache, lightheadedness, diarrhea      Review of Systems  As above     Historical Information   The patient history was reviewed as follows:  Past Medical History:   Diagnosis Date    GERD (gastroesophageal reflux disease)     Headache     Hypertension     Panic attacks     Psychiatric disorder        Past Surgical History:   Procedure Laterality Date    NO PAST SURGERIES       Family History   Problem Relation Age of Onset    Hypertension Mother     Arthritis Father     Diabetes Father     Hypertension Father     Hyperlipidemia Father       Social History   History   Alcohol Use No     Comment: CONSUMES ALCOHOL OCCASIONALLY AS PER ALLSCRIPTS     History   Drug Use No     History   Smoking Status    Current Every Day Smoker    Packs/day: 0 20    Types: Cigarettes   Smokeless Tobacco    Never Used     Comment: ONE HALF PACK A DAY OR LESS, CURRENT SOME DAY SMOKER, LIGHT TOBACCO SMOKER  AS PER ALLSCRIPTS       Medications:     Current Outpatient Prescriptions:     amitriptyline (ELAVIL) 50 mg tablet, Take 1 tablet by mouth, Disp: , Rfl:     cyclobenzaprine (FLEXERIL) 5 mg tablet, Take 1 tablet (5 mg total) by mouth daily at bedtime, Disp: 30 tablet, Rfl: 0    fluticasone (FLONASE) 50 mcg/act nasal spray, , Disp: , Rfl:     lisinopril-hydrochlorothiazide (PRINZIDE,ZESTORETIC) 10-12 5 MG per tablet, Take by mouth, Disp: , Rfl:     phenazopyridine (PYRIDIUM) 100 mg tablet, Take 1 tablet (100 mg total) by mouth 3 (three) times a day as needed for bladder spasms, Disp: 10 tablet, Rfl: 0    cephalexin (KEFLEX) 500 mg capsule, Take 1 capsule (500 mg total) by mouth every 12 (twelve) hours for 7 days, Disp: 14 capsule, Rfl: 0    naproxen (NAPROSYN) 500 mg tablet, Take 0 5 tablets (250 mg total) by mouth 2 (two) times a day with meals for 14 days, Disp: 28 tablet, Rfl: 0    omeprazole (PriLOSEC) 20 mg delayed release capsule, Take 1 capsule (20 mg total) by mouth daily for 30 days, Disp: 90 capsule, Rfl: 1    No Known Allergies    OBJECTIVE  Vitals:   Vitals:    04/13/18 1018   BP: 112/80   BP Location: Left arm   Patient Position: Sitting   Cuff Size: Standard   Pulse: 78   Resp: 14   Temp: (!) 96 5 °F (35 8 °C)   TempSrc: Tympanic   Weight: 62 8 kg (138 lb 6 4 oz)   Height: 5' 6" (1 676 m)         Physical Exam   GEN: NAD, AAOx3  HEENT: no pallor  Heart: RRR, no murmurs  Lungs: CTAB, no wheezing  ABD: soft, BS (+), mild tenderness/ suprapubic, no CVAT b/l  EXT: no edema/rash/tenderness; no paraspinal tenderness      Kelly Mercado MD, PGY-1  [facility]   4/13/2018

## 2018-04-13 NOTE — PATIENT INSTRUCTIONS
Infección del tracto urinario en mujeres   LO QUE NECESITA SABER:   Julita infección en el tracto urinario (ITU) ocurre cuando entran bacterias en [place] tracto urinario  La mayoría de las bacterias que entran al tracto urinario salen al Morene Muniz  Si la bacteria permanece en el tracto urinario, usted podría contraer Pitney Yasmine  [place] tracto urinario incluye joyce riñones, uréteres, vejiga y Gondregnies  La orina es producida en los riñones y fluye del uréter a la vejiga  La orina sale de la vejiga a través de la uretra  Julita infección del tracto urinario es más común in [place] parte inferior de [place] tracto urinario que incluye [place] vejiga y uretra  INSTRUCCIONES SOBRE EL CATHIE HOSPITALARIA:   Regrese a la alba de emergencias si:   · Usted está orinando muy poco o nada en absoluto  · Usted tiene fiebre cathie con temblor y escalofríos  · Usted tiene dolor en el costado o en la espalda que DUNIA  Pregúntele a [place] Gabriela Remedies vitaminas y minerales son adecuados para usted  · Usted tiene fiebre  · Usted no siente mejoría después de 2 días de cash los antibióticos  · Usted esta vomitando  · Usted tiene preguntas o inquietudes acerca de [place] condición o cuidado  Medicamentos:   · Antibióticos  ayudan a combatir julita infección bacterial      · Medicamentos,  para disminuir el dolor y el ardor al orinar  También ayudarán a disminuir la sensación de necesitar orinar con frecuencia  Estos medicamentos harán que orine de color anaranjado o rodriguez  · [place] joyce medicamentos brittany se le haya indicado  Consulte con [place] médico si usted marcelo que [place] medicamento no le está ayudando o si presenta efectos secundarios  Infórmele si es alérgico a cualquier medicamento  Mantenga julita lista actualizada de los [place], las vitaminas y los productos herbales que klaus  Incluya los siguientes datos de los medicamentos: cantidad, frecuencia y motivo de administración   Traiga con usted la lista o los envases de la píldoras a joyce citas de seguimiento  Lleve la lista de los medicamentos con usted en mari de julita emergencia  Acuda a joyce consultas de control con solares médico según le indicaron  Anote joyce preguntas para que se acuerde de hacerlas [place] joyce visitas  Evite otra ITU:   · Vacíe la vejiga con frecuencia  Orine y vacíe la vejiga tan pronto brittany usted sienta la necesidad  No retenga la orina por largos períodos de [place]  · Límpiese de adelante hacia atrás después de orinar o de tener julita evacuación intestinal   [place] ayudará a evitar que los gérmenes entren en el tracto urinario a través de la uretra  · [address] se le haya indicado  Pregunte cuánto líquido debe cash cada día y cuáles líquidos son los más adecuados para usted  Es probable que usted necesite cash más líquidos de lo habitual para ayudar a deshacerse de la bacteria  No tome alcohol, cafeína ni jugos cítricos  Estos pueden irritar solares vejiga y aumentar joyce síntomas  Solares médico puede recomendarle el jugo de arándano para prevenir julita infección Emmanuelle Robert  · Orine después de [employer]  Pocola puede ayudar a eliminar las bacterias que pasan [place] el sexo  · No tome duchas vaginales ni use desodorantes femeninos  Estos pueden cambiar el equilibrio químico de la vagina  · [place] las compresas o los tampones a menudo  Pocola ayudará a evitar que los gérmenes entren en el tracto urinario  · Realice ejercicios para el piso pélvico con frecuencia  Los músculos pélvicos ayudan a empezar y parar de orinar  Los músculos pélvicos shawn ayudan a vaciar la vejiga más fácilmente  Apriete estos músculos firmemente [place] 5 segundos brittany si estuviera tratando de retener el flujo de [facility]  Luego relaje por 5 segundos  Aumente gradualmente a 10 segundos  Mk 3 series de 15 repeticiones al día o brittany se le indique    © 2017 [address] Information is for End User's use only and may not be sold, redistributed or otherwise used for commercial purposes  All illustrations and images included in CareNotes® are the copyrighted property of A D A M , Inc  or Esequiel Lua  Esta información es sólo para uso en educación  Solares intención no es darle un consejo médico sobre enfermedades o tratamientos  Colsulte con solares Renee Sparks farmacéutico antes de seguir cualquier régimen médico para saber si es seguro y efectivo para usted

## 2018-04-13 NOTE — ASSESSMENT & PLAN NOTE
- CT A/P (2017):  new mesenteric adenopathy of uncertain etiology, and new sclerotic bone lesions in sacrum and pelvis; repeat CT A/P 4/20  - in the setting of persistent of hematuria, will consider further testings for urinary tract cancer

## 2018-04-13 NOTE — ASSESSMENT & PLAN NOTE
- POC Ua: trace leukocytes, moderate blood, pH 1 005, pH 7 5  - likely due to untreated cystitis   - s/p 1 dose of Bactrim 3/30, reported to have severe nausea whole day and vomiting multiple times after that dose  --> d/c Bactrim, prescribing Keflex 500 PO BID x 7d; education about UTI given  --> pending microscopic UA w/ reflex culture

## 2018-04-13 NOTE — TELEPHONE ENCOUNTER
Dain Ramos~    Thanks for the heads up  Will see pt tmr am to figure out causes of multiple vomiting episodes      Tue

## 2018-04-13 NOTE — ASSESSMENT & PLAN NOTE
- encourage pt to quit smoking and pt stated she would consider that  --> info about quit smoking program given, will address next visit

## 2018-04-13 NOTE — ASSESSMENT & PLAN NOTE
- UA: RBC 4-10 since 7/2017 and POC trace blood today  --> treat as cystitis for now, will repeat UA in 2 weeks; if persistent, will consider cytology and cystoscopy given h/o smoking status

## 2018-04-20 ENCOUNTER — HOSPITAL ENCOUNTER (OUTPATIENT)
Dept: RADIOLOGY | Facility: HOSPITAL | Age: 48
Discharge: HOME | End: 2018-04-20
Payer: COMMERCIAL

## 2018-04-20 ENCOUNTER — APPOINTMENT (OUTPATIENT)
Dept: LAB | Facility: HOSPITAL | Age: 48
End: 2018-04-20
Attending: FAMILY MEDICINE
Payer: COMMERCIAL

## 2018-04-20 DIAGNOSIS — R93.5 ABNORMAL CT SCAN, PELVIS: ICD-10-CM

## 2018-04-20 DIAGNOSIS — R31.9 HEMATURIA, UNSPECIFIED TYPE: Primary | ICD-10-CM

## 2018-04-20 DIAGNOSIS — R31.9 HEMATURIA, UNSPECIFIED TYPE: ICD-10-CM

## 2018-04-20 LAB
ANION GAP SERPL CALCULATED.3IONS-SCNC: 6 MMOL/L (ref 4–13)
BUN SERPL-MCNC: 7 MG/DL (ref 5–25)
CALCIUM SERPL-MCNC: 9.3 MG/DL (ref 8.3–10.1)
CHLORIDE SERPL-SCNC: 104 MMOL/L (ref 100–108)
CO2 SERPL-SCNC: 30 MMOL/L (ref 21–32)
CREAT SERPL-MCNC: 0.68 MG/DL (ref 0.6–1.3)
GFR SERPL CREATININE-BSD FRML MDRD: 106 ML/MIN/1.73SQ M
GLUCOSE P FAST SERPL-MCNC: 98 MG/DL (ref 65–99)
POTASSIUM SERPL-SCNC: 3.4 MMOL/L (ref 3.5–5.3)
SODIUM SERPL-SCNC: 140 MMOL/L (ref 136–145)

## 2018-04-20 PROCEDURE — 74177 CT ABD & PELVIS W/CONTRAST: CPT

## 2018-04-20 PROCEDURE — 80048 BASIC METABOLIC PNL TOTAL CA: CPT

## 2018-04-20 PROCEDURE — 36415 COLL VENOUS BLD VENIPUNCTURE: CPT

## 2018-04-20 RX ADMIN — IOHEXOL 100 ML: 350 INJECTION, SOLUTION INTRAVENOUS at 13:08

## 2018-04-27 ENCOUNTER — OFFICE VISIT (OUTPATIENT)
Dept: FAMILY MEDICINE CLINIC | Facility: CLINIC | Age: 48
End: 2018-04-27
Payer: COMMERCIAL

## 2018-04-27 VITALS
TEMPERATURE: 97 F | HEIGHT: 65 IN | SYSTOLIC BLOOD PRESSURE: 110 MMHG | WEIGHT: 139.6 LBS | BODY MASS INDEX: 23.26 KG/M2 | DIASTOLIC BLOOD PRESSURE: 70 MMHG | HEART RATE: 80 BPM | RESPIRATION RATE: 14 BRPM

## 2018-04-27 DIAGNOSIS — E87.6 HYPOKALEMIA: ICD-10-CM

## 2018-04-27 DIAGNOSIS — R31.29 OTHER MICROSCOPIC HEMATURIA: Primary | ICD-10-CM

## 2018-04-27 DIAGNOSIS — R93.5 ABNORMAL CT SCAN, PELVIS: ICD-10-CM

## 2018-04-27 PROBLEM — R30.0 BURNING WITH URINATION: Status: RESOLVED | Noted: 2018-04-13 | Resolved: 2018-04-27

## 2018-04-27 PROBLEM — R35.0 INCREASED FREQUENCY OF URINATION: Status: RESOLVED | Noted: 2018-04-13 | Resolved: 2018-04-27

## 2018-04-27 PROBLEM — R30.0 DYSURIA: Status: RESOLVED | Noted: 2018-03-30 | Resolved: 2018-04-27

## 2018-04-27 LAB
BACTERIA UR QL AUTO: ABNORMAL /HPF
BILIRUB UR QL STRIP: NEGATIVE
CLARITY UR: CLEAR
COLOR UR: YELLOW
GLUCOSE UR STRIP-MCNC: NEGATIVE MG/DL
HGB UR QL STRIP.AUTO: ABNORMAL
HYALINE CASTS #/AREA URNS LPF: ABNORMAL /LPF
KETONES UR STRIP-MCNC: NEGATIVE MG/DL
LEUKOCYTE ESTERASE UR QL STRIP: NEGATIVE
NITRITE UR QL STRIP: NEGATIVE
NON-SQ EPI CELLS URNS QL MICRO: ABNORMAL /HPF
PH UR STRIP.AUTO: 7 [PH] (ref 4.5–8)
PROT UR STRIP-MCNC: NEGATIVE MG/DL
RBC #/AREA URNS AUTO: ABNORMAL /HPF
SP GR UR STRIP.AUTO: 1.01 (ref 1–1.03)
UROBILINOGEN UR QL STRIP.AUTO: 0.2 E.U./DL
WBC #/AREA URNS AUTO: ABNORMAL /HPF

## 2018-04-27 PROCEDURE — 99213 OFFICE O/P EST LOW 20 MIN: CPT | Performed by: FAMILY MEDICINE

## 2018-04-27 PROCEDURE — 81001 URINALYSIS AUTO W/SCOPE: CPT | Performed by: FAMILY MEDICINE

## 2018-04-27 NOTE — PATIENT INSTRUCTIONS
Hypokalemia   AMBULATORY CARE:   Hypokalemia  is a low level of potassium in your blood  Potassium helps control how your muscles, heart, and digestive system work  Hypokalemia occurs when your body loses too much potassium or does not absorb enough from food  Common signs and symptoms include the following: You may not have any signs or symptoms if you have mild hypokalemia  You may have any of the following if it is more severe:  · Fatigue    · Constipation    · Frequent urination or urinating large amounts    · Muscle cramps or skin tingling    · Muscle weakness    Fast or irregular heartbeat  Hipocalemia   LO QUE NECESITA SABER:   La hipocalemia es cuando el nivel de potasio en la mary es [place]  El potasio ayuda a controlar el funcionamiento de los músculos, el corazón y del aparato digestivo  La hipocalemia ocurre cuando el cuerpo pierde demasiado potasio o no absorbe lo suficiente a partir de los alimentos  INSTRUCCIONES SOBRE EL CATHIE HOSPITALARIA:   Busque atención médica de inmediato si:   Usted no puede  el brazo o la pierna  Solares ritmo cardíaco es acelerado o irregular  Se siente demasiado débil o mareado para ponerse de pie  Pregúntele a solares Gabriela Remedies vitaminas y minerales son adecuados para usted  Tiene vómitos o diarrea  Usted tiene entumecimiento u hormigueo en los brazos o las piernas  Los síntomas no desaparecen o [place]  Usted tiene preguntas o inquietudes acerca de solares condición o cuidado  Medicamentos:   El potasio  para regresar joyce niveles de potasio a la normalidad  [place] joyce medicamentos brittnay se le haya indicado  Consulte con solares médico si usted marcelo que solares medicamento no le está ayudando o si presenta efectos secundarios  Infórmele si es alérgico a algún medicamento  Mantenga juliat lista actualizada de los [place], las vitaminas y los productos herbales que klaus   Incluya los siguientes datos de los medicamentos: cantidad, frecuencia y [place] de administración  Traiga con usted la lista o los envases de la píldoras a joyce citas de seguimiento  Lleve la lista de los medicamentos con usted en mari de julita emergencia  Consuma alimentos ricos en potasio:  Algunos alimentos que tienen alto contenido de potasio son los [place], las [place], los [place], las patatas y el aguacate  Los frijoles pintos, el [place], el salmón, la carne [place], [place] yogur y la [place] también son ricos en potasio  [place] más información a [place] médico o dietista sobre los alimentos que son ricos en potasio  Acuda a joyce consultas de control con [place] médico según le indicaron  Anote joyce preguntas para que se acuerde de hacerlas [place] joyce visitas  © 2017 [address] Information is for End User's use only and may not be sold, redistributed or otherwise used for commercial purposes  All illustrations and images included in CareNotes® are the copyrighted property of [employer] , Inc  or Esequiel Lua  Esta información es sólo para uso en educación  [place] intención no es darle un consejo médico sobre enfermedades o tratamientos  Colsulte con [place] farmacéutico antes de seguir cualquier régimen médico para saber si es seguro y efectivo para usted  ·   Seek care immediately if:   · You cannot move your arm or leg  · You have a fast or irregular heartbeat  · You are too tired or weak to stand up  Contact your healthcare provider if:   · You are vomiting, or you have diarrhea  · You have numbness or tingling in your arms or legs  · Your symptoms do not go away or they get worse  · You have questions or concerns about your condition or care  Treatment:  You will receive potassium to bring your levels back to normal  This may be given as a pill or IV  The amount of potassium you will be given depends on your potassium level     Eat foods that are high in potassium:  Foods that are high in potassium include bananas, oranges, tomatoes, potatoes, and avocado  Glez beans, turkey, salmon, lean beef, yogurt, and milk are also high in potassium  Ask your healthcare provider or dietitian for more information about foods that are high in potassium  Follow up with your healthcare provider as directed:  Write down your questions so you remember to ask them during your visits  © 2017 [facility] Information is for End User's use only and may not be sold, redistributed or otherwise used for commercial purposes  All illustrations and images included in CareNotes® are the copyrighted property of [employer]  or [address]  The above information is an  only  It is not intended as medical advice for individual conditions or treatments  Talk to your doctor, nurse or pharmacist before following any medical regimen to see if it is safe and effective for you

## 2018-04-27 NOTE — PROGRESS NOTES
Genesis Spain 1970 female MRN: [id number]    Family Medicine Acute Visit    ASSESSMENT/PLAN   Problem List Items Addressed This Visit        Genitourinary    Other microscopic hematuria - Primary     - burning urination, dysuria, no fever  - finished 7 day course of Cephalexin  - POCT UA at office: Small blood, pH 6 0, negative for leukocyte and nitrite, ketone, bili, gluc; urobil 0 2; sp gr 1 010  --> referral to Urology given  --> also sent urine sample for micro and reflex           Relevant Orders    Ambulatory referral to Urology    UA w Reflex to Microscopic w Reflex to Culture - Clinic Collect       Other    Abnormal CT scan, pelvis     CT scan (4/2018):  -  Mild interval improvement in mesenteric lymphadenopathy, suggesting infectious or inflammatory process  - Left iliac bone sclerotic focus with some groundglass density on 2/61 raising suspicion for fibrous dysplasia  No significant hypermetabolism PET/CT dated 2/16/2017  Additional sclerotic foci are stable, likely benign   - currently pt stated she is stable and pain resolved with analgesics OTC  Advised pt to monitor off analgesics  If persistent, will refer to specialist    - LAD: will clinically f/u in 3 months and re-assess the need of repeat imaging studies  Relevant Orders    Ambulatory referral to Urology    Hypokalemia     - mild hypokalemia K 3 4  --> will correct by diet; instruction given               Future Appointments  Date Time Provider Indira Navasi   5/24/2018 10:15 AM Dion Addison MD [address]   6/11/2018 2:00 PM Armando Fisher MD [facility]          SUBJECTIVE  CC: Follow-up      HPI:  Genesis Spain is a 41 y o  female here for f/u on her urinary symptoms and CT scan result  All her urinary symptoms significantly resolved s/p a course of abx  However, POCT UA at office showed small blood which has been persistent since 2017    CT Scan repeated and showed adenopathy which mildly improved compared to previous CT in 2016, and a suggestion of FD but PET scan 2016 showed no increased metabolism  Chronic shoulder and back pain controlled with analgesics OTC and seemed to be minimally improved with PT  Review of Systems   Constitutional: Negative for activity change, appetite change, chills, diaphoresis, fatigue, fever and unexpected weight change  HENT: Negative for ear pain, postnasal drip, sinus pain, sinus pressure, sneezing, sore throat, tinnitus, trouble swallowing and voice change  Eyes: Negative for pain and itching  Respiratory: Negative for cough, shortness of breath and wheezing  Cardiovascular: Negative for chest pain and palpitations  Gastrointestinal: Negative for abdominal pain, blood in stool, constipation, diarrhea, nausea and vomiting  Genitourinary: Negative for dysuria, enuresis, flank pain, frequency, vaginal bleeding, vaginal discharge and vaginal pain  Musculoskeletal: Positive for back pain (shoulder, upper back pain not radiating)  Negative for neck pain  Skin: Negative for pallor and rash  Neurological: Negative for tremors, seizures, syncope, weakness, numbness and headaches  Hematological: Does not bruise/bleed easily  Psychiatric/Behavioral: Negative for dysphoric mood         Historical Information   The patient history was reviewed as follows:  Past Medical History:   Diagnosis Date    GERD (gastroesophageal reflux disease)     Headache     Hypertension     Panic attacks     Psychiatric disorder          Past Surgical History:   Procedure Laterality Date    NO PAST SURGERIES       Family History   Problem Relation Age of Onset    Hypertension Mother     Arthritis Father     Diabetes Father     Hypertension Father     Hyperlipidemia Father       Social History   History   Alcohol Use No     Comment: CONSUMES ALCOHOL OCCASIONALLY AS PER ALLSCRIPTS     History   Drug Use No     History   Smoking Status    Current Every Day Smoker    Packs/day: 0 20    Types: Cigarettes   Smokeless Tobacco    Never Used     Comment: ONE HALF PACK A DAY OR LESS, CURRENT SOME DAY SMOKER, LIGHT TOBACCO SMOKER  AS PER ALLSCRIPTS       Medications:     Current Outpatient Prescriptions:     amitriptyline (ELAVIL) 50 mg tablet, Take 1 tablet by mouth, Disp: , Rfl:     cyclobenzaprine (FLEXERIL) 5 mg tablet, Take 1 tablet (5 mg total) by mouth daily at bedtime, Disp: 30 tablet, Rfl: 0    fluticasone (FLONASE) 50 mcg/act nasal spray, , Disp: , Rfl:     lisinopril-hydrochlorothiazide (PRINZIDE,ZESTORETIC) 10-12 5 MG per tablet, Take by mouth, Disp: , Rfl:     phenazopyridine (PYRIDIUM) 100 mg tablet, Take 1 tablet (100 mg total) by mouth 3 (three) times a day as needed for bladder spasms, Disp: 10 tablet, Rfl: 0    naproxen (NAPROSYN) 500 mg tablet, Take 0 5 tablets (250 mg total) by mouth 2 (two) times a day with meals for 14 days, Disp: 28 tablet, Rfl: 0    omeprazole (PriLOSEC) 20 mg delayed release capsule, Take 1 capsule (20 mg total) by mouth daily for 30 days, Disp: 90 capsule, Rfl: 1    No Known Allergies    OBJECTIVE  Vitals:   Vitals:    04/27/18 1334   BP: 110/70   BP Location: Right arm   Patient Position: Sitting   Cuff Size: Standard   Pulse: 80   Resp: 14   Temp: (!) 97 °F (36 1 °C)   TempSrc: Tympanic   Weight: 63 3 kg (139 lb 9 6 oz)   Height: 5' 5" (1 651 m)         Physical Exam   Constitutional: She is oriented to person, place, and time  She appears well-developed and well-nourished  No distress  HENT:   Head: Normocephalic and atraumatic  Right Ear: External ear normal    Left Ear: External ear normal    Nose: Nose normal    Mouth/Throat: Oropharynx is clear and moist  No oropharyngeal exudate  Eyes: Pupils are equal, round, and reactive to light  Right eye exhibits no discharge  Left eye exhibits no discharge  No scleral icterus  Neck: Neck supple  No JVD present  No thyromegaly present     Cardiovascular: Normal rate, regular rhythm and normal heart sounds  Exam reveals no gallop and no friction rub  No murmur heard  Pulmonary/Chest: Effort normal and breath sounds normal  No respiratory distress  She has no wheezes  She has no rales  No axillary lymph nodes b/l   Abdominal: Soft  Bowel sounds are normal  She exhibits no distension  There is no tenderness  There is no rebound and no guarding  Genitourinary:   Genitourinary Comments: No lymph nodes appreciated at groins b/l   Musculoskeletal: Normal range of motion  She exhibits no edema, tenderness or deformity  Shoulder b/l, spine no tenderness or edema  ROM WNL   Lymphadenopathy:     She has cervical adenopathy (right anterior cervical lymph node 0 5 x  0 5 mm, mobile, nontender, no sign of infection surrounding)  Neurological: She is alert and oriented to person, place, and time  No cranial nerve deficit  Skin: Skin is warm and dry  No rash noted  She is not diaphoretic  No erythema  Psychiatric: She has a normal mood and affect          Kelly Moe MD, PGY-1  [facility]   4/27/2018

## 2018-04-27 NOTE — ASSESSMENT & PLAN NOTE
CT scan (4/2018):  -  Mild interval improvement in mesenteric lymphadenopathy, suggesting infectious or inflammatory process  - Left iliac bone sclerotic focus with some groundglass density on 2/61 raising suspicion for fibrous dysplasia  No significant hypermetabolism PET/CT dated 2/16/2017  Additional sclerotic foci are stable, likely benign   - currently pt stated she is stable and pain resolved with analgesics OTC  Advised pt to monitor off analgesics  If persistent, will refer to specialist    - LAD: will clinically f/u in 3 months and re-assess the need of repeat imaging studies

## 2018-04-27 NOTE — ASSESSMENT & PLAN NOTE
- burning urination, dysuria, no fever  - finished 7 day course of Cephalexin  - POCT UA at office: Small blood, pH 6 0, negative for leukocyte and nitrite, ketone, bili, gluc; urobil 0 2; sp gr 1 010  --> referral to Urology given  --> also sent urine sample for micro and reflex

## 2018-04-28 ENCOUNTER — HOSPITAL ENCOUNTER (EMERGENCY)
Facility: HOSPITAL | Age: 48
Discharge: HOME | End: 2018-04-28
Attending: EMERGENCY MEDICINE
Payer: COMMERCIAL

## 2018-04-28 VITALS
DIASTOLIC BLOOD PRESSURE: 71 MMHG | HEIGHT: 67 IN | SYSTOLIC BLOOD PRESSURE: 134 MMHG | RESPIRATION RATE: 20 BRPM | HEART RATE: 69 BPM | WEIGHT: 140 LBS | TEMPERATURE: 98.8 F | BODY MASS INDEX: 21.97 KG/M2 | OXYGEN SATURATION: 98 %

## 2018-04-28 DIAGNOSIS — R31.9 HEMATURIA: Primary | ICD-10-CM

## 2018-04-28 LAB
ALBUMIN SERPL BCP-MCNC: 3.9 G/DL (ref 3.5–5)
ALP SERPL-CCNC: 53 U/L (ref 46–116)
ALT SERPL W P-5'-P-CCNC: 11 U/L (ref 12–78)
ANION GAP SERPL CALCULATED.3IONS-SCNC: 6 MMOL/L (ref 4–13)
AST SERPL W P-5'-P-CCNC: 9 U/L (ref 5–45)
BACTERIA UR QL AUTO: ABNORMAL /HPF
BASOPHILS # BLD AUTO: 0.03 THOUSANDS/ΜL (ref 0–0.1)
BASOPHILS NFR BLD AUTO: 0 % (ref 0–1)
BILIRUB SERPL-MCNC: 0.28 MG/DL (ref 0.2–1)
BILIRUB UR QL STRIP: NEGATIVE
BUN SERPL-MCNC: 9 MG/DL (ref 5–25)
CALCIUM SERPL-MCNC: 9.1 MG/DL (ref 8.3–10.1)
CHLORIDE SERPL-SCNC: 103 MMOL/L (ref 100–108)
CLARITY UR: CLEAR
CO2 SERPL-SCNC: 29 MMOL/L (ref 21–32)
COLOR UR: YELLOW
CREAT SERPL-MCNC: 0.61 MG/DL (ref 0.6–1.3)
EOSINOPHIL # BLD AUTO: 0.64 THOUSAND/ΜL (ref 0–0.61)
EOSINOPHIL NFR BLD AUTO: 8 % (ref 0–6)
ERYTHROCYTE [DISTWIDTH] IN BLOOD BY AUTOMATED COUNT: 12.7 % (ref 11.6–15.1)
GFR SERPL CREATININE-BSD FRML MDRD: 110 ML/MIN/1.73SQ M
GLUCOSE SERPL-MCNC: 101 MG/DL (ref 65–140)
GLUCOSE UR STRIP-MCNC: NEGATIVE MG/DL
HCT VFR BLD AUTO: 36.5 % (ref 34.8–46.1)
HGB BLD-MCNC: 12.1 G/DL (ref 11.5–15.4)
HGB UR QL STRIP.AUTO: ABNORMAL
HYALINE CASTS #/AREA URNS LPF: ABNORMAL /LPF
KETONES UR STRIP-MCNC: NEGATIVE MG/DL
LEUKOCYTE ESTERASE UR QL STRIP: NEGATIVE
LYMPHOCYTES # BLD AUTO: 2.8 THOUSANDS/ΜL (ref 0.6–4.47)
LYMPHOCYTES NFR BLD AUTO: 36 % (ref 14–44)
MCH RBC QN AUTO: 31.3 PG (ref 26.8–34.3)
MCHC RBC AUTO-ENTMCNC: 33.2 G/DL (ref 31.4–37.4)
MCV RBC AUTO: 95 FL (ref 82–98)
MONOCYTES # BLD AUTO: 0.27 THOUSAND/ΜL (ref 0.17–1.22)
MONOCYTES NFR BLD AUTO: 4 % (ref 4–12)
NEUTROPHILS # BLD AUTO: 4.05 THOUSANDS/ΜL (ref 1.85–7.62)
NEUTS SEG NFR BLD AUTO: 52 % (ref 43–75)
NITRITE UR QL STRIP: NEGATIVE
NON-SQ EPI CELLS URNS QL MICRO: ABNORMAL /HPF
NRBC BLD AUTO-RTO: 0 /100 WBCS
PH UR STRIP.AUTO: 6.5 [PH] (ref 4.5–8)
PLATELET # BLD AUTO: 202 THOUSANDS/UL (ref 149–390)
PMV BLD AUTO: 11 FL (ref 8.9–12.7)
POTASSIUM SERPL-SCNC: 3.1 MMOL/L (ref 3.5–5.3)
PROT SERPL-MCNC: 7 G/DL (ref 6.4–8.2)
PROT UR STRIP-MCNC: NEGATIVE MG/DL
RBC # BLD AUTO: 3.86 MILLION/UL (ref 3.81–5.12)
RBC #/AREA URNS AUTO: ABNORMAL /HPF
SODIUM SERPL-SCNC: 138 MMOL/L (ref 136–145)
SP GR UR STRIP.AUTO: 1.01 (ref 1–1.03)
UROBILINOGEN UR QL STRIP.AUTO: 0.2 E.U./DL
WBC # BLD AUTO: 7.8 THOUSAND/UL (ref 4.31–10.16)
WBC #/AREA URNS AUTO: ABNORMAL /HPF

## 2018-04-28 PROCEDURE — 81001 URINALYSIS AUTO W/SCOPE: CPT

## 2018-04-28 PROCEDURE — 99284 EMERGENCY DEPT VISIT MOD MDM: CPT

## 2018-04-28 PROCEDURE — 36415 COLL VENOUS BLD VENIPUNCTURE: CPT | Performed by: EMERGENCY MEDICINE

## 2018-04-28 PROCEDURE — 96361 HYDRATE IV INFUSION ADD-ON: CPT

## 2018-04-28 PROCEDURE — 85025 COMPLETE CBC W/AUTO DIFF WBC: CPT | Performed by: EMERGENCY MEDICINE

## 2018-04-28 PROCEDURE — 80053 COMPREHEN METABOLIC PANEL: CPT | Performed by: EMERGENCY MEDICINE

## 2018-04-28 PROCEDURE — 96374 THER/PROPH/DIAG INJ IV PUSH: CPT

## 2018-04-28 RX ORDER — KETOROLAC TROMETHAMINE 30 MG/ML
15 INJECTION, SOLUTION INTRAMUSCULAR; INTRAVENOUS ONCE
Status: COMPLETED | OUTPATIENT
Start: 2018-04-28 | End: 2018-04-28

## 2018-04-28 RX ORDER — POTASSIUM CHLORIDE 20 MEQ/1
40 TABLET, EXTENDED RELEASE ORAL ONCE
Status: COMPLETED | OUTPATIENT
Start: 2018-04-28 | End: 2018-04-28

## 2018-04-28 RX ADMIN — SODIUM CHLORIDE 1000 ML: 0.9 INJECTION, SOLUTION INTRAVENOUS at 21:22

## 2018-04-28 RX ADMIN — POTASSIUM CHLORIDE 40 MEQ: 1500 TABLET, EXTENDED RELEASE ORAL at 22:15

## 2018-04-28 RX ADMIN — KETOROLAC TROMETHAMINE 15 MG: 30 INJECTION, SOLUTION INTRAMUSCULAR at 21:22

## 2018-04-29 NOTE — ED ATTENDING ATTESTATION
Jane Da Silva DO, saw and evaluated the patient  I have discussed the patient with the resident/non-physician practitioner and agree with the resident's/non-physician practitioner's findings, Plan of Care, and MDM as documented in the resident's/non-physician practitioner's note, except where noted  All available labs and Radiology studies were reviewed  At this point I agree with the current assessment done in the Emergency Department  I have conducted an independent evaluation of this patient a history and physical is as follows:    40 yo female c/o hematuria, lower abd pain has been going on for more than a week, constant but waxes and wanes  Denies radiation of symptoms  Denies f/c, flank pain, vomiting  Pt denies vaginal bleeding or discharge  CT last week unremarkable for acute pathology  Told to f/u with urology  Has appt end of next month  Was told she had a UTI, culture wasn't done seemingly because there were no WBCs or bacteria seen on urine micro  Imp: hematuria, lower abd pain  plan: pelvic exam, labs reassess  Unlikely to elucidate cause of pts symptoms, would strongly recommend urology f/u as planned        Critical Care Time  CritCare Time    Procedures

## 2018-04-29 NOTE — ED PROVIDER NOTES
History  Chief Complaint   Patient presents with    Abdominal Pain     Pt having a lot of UTI, having blood in urine, had ct scan about 1 week ago  Pt today having increased blood with urinatin and pain in the sides of abdomen  This is a 43-year-old female who presents today with abdominal pain  And hematuria  Patient states she was seen by her PCP regarding hematuria prior and 1 week ago had a CT scan which was benign  She was advised to follow up with Urology but does not have an appointment till and the next month  Patient states her pain is not resolved  She describes it as a crampy sensation in her lower abdomen area  She states today when she wiped she noticed some blood on the toilet paper  She states she also has some mild dysuria no frequency or urgency  No nausea vomiting no fevers no night sweats no weight loss  Patient states she is unsure if the bleeding is from bladder versus vaginal   Patient states she stopped having her periods at the age of 47 and early menopausal   She states she has not seen a gyn doctor for about a year  History of 2 pregnancies  No diarrhea  History of constipation  43-year-old female presenting with abdominal pain  Concern for possible bleeding from vaginal source was urethra  Will check labs and urine will do a pelvic exam             Prior to Admission Medications   Prescriptions Last Dose Informant Patient Reported?  Taking?   amitriptyline (ELAVIL) 50 mg tablet  Self Yes Yes   Sig: Take 1 tablet by mouth   cyclobenzaprine (FLEXERIL) 5 mg tablet  Self No Yes   Sig: Take 1 tablet (5 mg total) by mouth daily at bedtime   fluticasone (FLONASE) 50 mcg/act nasal spray  Self Yes Yes   lisinopril-hydrochlorothiazide (PRINZIDE,ZESTORETIC) 10-12 5 MG per tablet  Self Yes Yes   Sig: Take by mouth   naproxen (NAPROSYN) 500 mg tablet  Self No No   Sig: Take 0 5 tablets (250 mg total) by mouth 2 (two) times a day with meals for 14 days   omeprazole (PriLOSEC) 20 mg delayed release capsule  Self No Yes   Sig: Take 1 capsule (20 mg total) by mouth daily for 30 days   phenazopyridine (PYRIDIUM) 100 mg tablet  Self No Yes   Sig: Take 1 tablet (100 mg total) by mouth 3 (three) times a day as needed for bladder spasms      Facility-Administered Medications: None       Past Medical History:   Diagnosis Date    GERD (gastroesophageal reflux disease)     Headache     Hypertension     Panic attacks     Psychiatric disorder        Past Surgical History:   Procedure Laterality Date    NO PAST SURGERIES         Family History   Problem Relation Age of Onset    Hypertension Mother     Arthritis Father     Diabetes Father     Hypertension Father     Hyperlipidemia Father      I have reviewed and agree with the history as documented  Social History   Substance Use Topics    Smoking status: Current Every Day Smoker     Packs/day: 0 20     Types: Cigarettes    Smokeless tobacco: Never Used      Comment: ONE HALF PACK A DAY OR LESS, CURRENT SOME DAY SMOKER, LIGHT TOBACCO SMOKER  AS PER [employer]    Alcohol use No      Comment: CONSUMES ALCOHOL OCCASIONALLY AS PER [employer]        Review of Systems   Constitutional: Negative  Negative for diaphoresis and fever  HENT: Negative  Respiratory: Negative  Negative for cough, shortness of breath and wheezing  Cardiovascular: Negative  Negative for chest pain, palpitations and leg swelling  Gastrointestinal: Positive for abdominal pain  Negative for abdominal distention, nausea and vomiting  Genitourinary: Positive for dysuria and hematuria  Musculoskeletal: Negative  Skin: Negative  Neurological: Negative  Psychiatric/Behavioral: Negative  All other systems reviewed and are negative        Physical Exam  ED Triage Vitals   Temperature Pulse Respirations Blood Pressure SpO2   04/28/18 2010 04/28/18 2010 04/28/18 2010 04/28/18 2010 04/28/18 2010   98 8 °F (37 1 °C) 82 18 125/77 99 %      Temp Source Heart Rate Source Patient Position - Orthostatic VS BP Location FiO2 (%)   04/28/18 2010 04/28/18 2039 04/28/18 2039 04/28/18 2039 --   Oral Monitor Sitting Right arm       Pain Score       04/28/18 2010       8           Orthostatic Vital Signs  Vitals:    04/28/18 2010 04/28/18 2039 04/28/18 2045 04/28/18 2156   BP: 125/77 125/78 125/78 134/71   Pulse: 82 86 84 69   Patient Position - Orthostatic VS:  Sitting  Lying       Physical Exam   Constitutional: She is oriented to person, place, and time  She appears well-developed  HENT:   Head: Normocephalic and atraumatic  Eyes: EOM are normal  Pupils are equal, round, and reactive to light  Neck: Normal range of motion  Neck supple  Cardiovascular: Normal rate, regular rhythm and normal heart sounds  No murmur heard  Pulmonary/Chest: Effort normal and breath sounds normal  No respiratory distress  She has no wheezes  Abdominal: Soft  Bowel sounds are normal  She exhibits no distension  There is tenderness  There is no rebound and no guarding  Mild lower quadrant pain with no rebound or guarding    Musculoskeletal: Normal range of motion  Neurological: She is alert and oriented to person, place, and time  Skin: Skin is warm and dry  Psychiatric: She has a normal mood and affect         ED Medications  Medications   sodium chloride 0 9 % bolus 1,000 mL (0 mL Intravenous Stopped 4/28/18 2301)   ketorolac (TORADOL) injection 15 mg (15 mg Intravenous Given 4/28/18 2122)   potassium chloride (K-DUR,KLOR-CON) CR tablet 40 mEq (40 mEq Oral Given 4/28/18 2215)       Diagnostic Studies  Results Reviewed     Procedure Component Value Units Date/Time    CBC and differential [[id number]]  (Abnormal) Collected:  04/28/18 2122    Lab Status:  Final result Specimen:  Blood from Arm, Left Updated:  04/28/18 2213     WBC 7 80 Thousand/uL      RBC 3 86 Million/uL      Hemoglobin 12 1 g/dL      Hematocrit 36 5 %      MCV 95 fL      MCH 31 3 pg      MCHC 33 2 g/dL      RDW 12 7 %      MPV 11 0 fL      Platelets 349 Thousands/uL      nRBC 0 /100 WBCs      Neutrophils Relative 52 %      Lymphocytes Relative 36 %      Monocytes Relative 4 %      Eosinophils Relative 8 (H) %      Basophils Relative 0 %      Neutrophils Absolute 4 05 Thousands/µL      Lymphocytes Absolute 2 80 Thousands/µL      Monocytes Absolute 0 27 Thousand/µL      Eosinophils Absolute 0 64 (H) Thousand/µL      Basophils Absolute 0 03 Thousands/µL     Comprehensive metabolic panel [[id number]]  (Abnormal) Collected:  04/28/18 2122    Lab Status:  Final result Specimen:  Blood from Arm, Left Updated:  04/28/18 2149     Sodium 138 mmol/L      Potassium 3 1 (L) mmol/L      Chloride 103 mmol/L      CO2 29 mmol/L      Anion Gap 6 mmol/L      BUN 9 mg/dL      Creatinine 0 61 mg/dL      Glucose 101 mg/dL      Calcium 9 1 mg/dL      AST 9 U/L      ALT 11 (L) U/L      Alkaline Phosphatase 53 U/L      Total Protein 7 0 g/dL      Albumin 3 9 g/dL      Total Bilirubin 0 28 mg/dL      eGFR 110 ml/min/1 73sq m     Narrative:         National Kidney Disease Education Program recommendations are as follows:  GFR calculation is accurate only with a steady state creatinine  Chronic Kidney disease less than 60 ml/min/1 73 sq  meters  Kidney failure less than 15 ml/min/1 73 sq  meters      Urine Microscopic [[id number]]  (Abnormal) Collected:  04/28/18 2048    Lab Status:  Final result Specimen:  Urine from Urine, Clean Catch Updated:  04/28/18 2124     RBC, UA 4-10 (A) /hpf      WBC, UA 2-4 (A) /hpf      Epithelial Cells Occasional /hpf      Bacteria, UA Occasional /hpf      Hyaline Casts, UA None Seen /lpf     ED Urine Macroscopic [[id number]]  (Abnormal) Collected:  04/28/18 2048    Lab Status:  Final result Specimen:  Urine Updated:  04/28/18 2045     Color, UA Yellow     Clarity, UA Clear     pH, UA 6 5     Leukocytes, UA Negative     Nitrite, UA Negative     Protein, UA Negative mg/dl      Glucose, UA Negative mg/dl      Ketones, UA Negative mg/dl      Urobilinogen, UA 0 2 E U /dl      Bilirubin, UA Negative     Blood, UA Moderate (A)     Specific Miller, UA 1 015    Narrative:       CLINITEK RESULT                 No orders to display         Procedures  Procedures      Phone Consults  ED Phone Contact    ED Course  ED Course as of Apr 29 2058   Sat Apr 28, 2018 2138 Patient pelvic exam: no blood                                [facility]  CritCare Time    Disposition  Final diagnoses:   Hematuria     Time reflects when diagnosis was documented in both MDM as applicable and the Disposition within this note     Time User Action Codes Description Comment    4/28/2018 10:10 PM Fabi Ashley Add [R31 9] Hematuria       ED Disposition     ED Disposition Condition Comment    Discharge  Rodrigozeushalina 1978 discharge to home/self care      Condition at discharge: Good        Follow-up Information     Follow up With Specialties Details Why Contact Info    Shani Anderson MD Urology Schedule an appointment as soon as possible for a visit you will receive a call from case management regarding appointment  [address] [facility] [address] [facility]  [phone]          Discharge Medication List as of 4/28/2018 10:10 PM      CONTINUE these medications which have NOT CHANGED    Details   amitriptyline (ELAVIL) 50 mg tablet Take 1 tablet by mouth, Starting Wed 3/22/2017, Historical Med      cyclobenzaprine (FLEXERIL) 5 mg tablet Take 1 tablet (5 mg total) by mouth daily at bedtime, Starting Tue 2/20/2018, Normal      fluticasone (FLONASE) 50 mcg/act nasal spray Starting Fri 3/23/2018, Historical Med      lisinopril-hydrochlorothiazide (PRINZIDE,ZESTORETIC) 10-12 5 MG per tablet Take by mouth, Starting Thu 5/7/2015, Historical Med      phenazopyridine (PYRIDIUM) 100 mg tablet Take 1 tablet (100 mg total) by mouth 3 (three) times a day as needed for bladder spasms, Starting Fri 3/30/2018, Normal      naproxen (NAPROSYN) 500 mg tablet Take 0 5 tablets (250 mg total) by mouth 2 (two) times a day with meals for 14 days, Starting Tue 2/13/2018, Until Fri 3/30/2018, Normal      omeprazole (PriLOSEC) 20 mg delayed release capsule Take 1 capsule (20 mg total) by mouth daily for 30 days, Starting Mon 2/26/2018, Until Sat 4/28/2018, Normal           No discharge procedures on file  ED Provider  Attending physically available and evaluated Eileen Mathews I managed the patient along with the ED Attending      Electronically Signed by         Diaz Hernandez MD  04/29/18 9833

## 2018-04-29 NOTE — DISCHARGE INSTRUCTIONS
Hematuria aguda   LO QUE USTED DEBE SABER:   La hematuria significa que hay mary en la orina debido a alguna lesión o trastorno médico  Se considera aguda cuando el problema empieza repentinamente, empeorando con rapidez y es de corta duración  La orina podría ponerse de color rodriguez brillante a café obscuro  Algunas de las causas comunes de la hematuria son infecciones en la vejiga, piedras en los riñones, trauma a los riñones o vejiga, y algunos medicamentos  A veces coágulos sanguíneos pueden terminar bloqueando la uretra provocando que usted no pueda vaciar solares vejiga  DESPUÉS DE SER DADO DE CATHIE:   Medicamentos:  Pregunte sobre éstos y otros medicamentos que podrían ser útiles para tratar la causa de solares hematuria aguda:  · Antibióticos: Estos medicamentos pueden ser administrados para ayudarle a combatir las infecciones causadas por bacterias  [place] joyce antibióticos siguiendo siempre las indicaciones de solares proveedor de [place]  No deje de cash solares medicamento a menos que se lo haya indicado solares proveedor de charlie  Nunca guarde antibióticos o tome antibióticos de sobrante que le de la garza dado antes para otra enfermedad  · [place] joyce medicamentos brittany se le haya indicado  Llame a solares proveedor de charlie si usted piensa que solares medicamento no le está ayudando o si tiene efectos secundarios  Infórmele si es alérgico a cualquier medicamento  Mantenga julita lista vigente de los medicamentos, vitaminas, y hierbas que klaus  [address] cantidades, la frecuencia con que los klaus y por qué los klaus  Traiga la lista o los envases de joyce medicamentos a las citas de seguimiento  Mantenga la lista consigo en mari de julita emergencia  Bote las listas [place]  Aumente la cantidad de líquido que reyna:  Denise líquidos wolf para ayudar a drenar la mary de solares tracto urinario  Siga las instrucciones sobre la cantidad de líquido que debe beber     Programe julita triny con solares médico de cabecera brittany se le indica:  Solares médico de cabecera le indicará la frecuencia de joyce citas de seguimiento  Es probable también que lo refiera a un especialista, brittany un urólogo o nefrólogo  Los especialistas podrían realizarle pruebas o procedimientos para buscar complicaciones con [place] tracto urinario  Anote joyce preguntas para que recuerde hacerlas [place] joyce citas  Comuníquese con [place] médico de cabecera, urólogo, o nefrólogo si:   · [place] fiebre empeora o no se soy con el tratamiento  · No puede mantener líquidos o medicamentos en [place] estómago  · [place] orina se oscurece, aún después de [employer] líquidos extra  · Usted tiene preguntas o inquietudes Evangelist Davila, [address], o cuidado  Busque ayuda inmediata o llame al 911 si:   · Nota mary en la orina después de melany sufrido julita lesión nueva, brittany julita caída  · Orina cantidades muy pequeñas o no orina del todo  · Siente que no puede vaciar la vejiga  · Siente un dolor romina en la espalda o la lado y no se le janis con el [address]  © 2014 [address] is for End User's use only and may not be sold, redistributed or otherwise used for commercial purposes  All illustrations and images included in CareNotes® are the copyrighted property of A D A M , Inc  or DaWanda  Esta información es sólo para uso en educación  [place] intención no es darle un consejo médico sobre enfermedades o tratamientos  Colsulte con [place] [employer] farmacéutico antes de seguir cualquier régimen médico para saber si es seguro y efectivo para usted  Hematuria   LO QUE NECESITA SABER:   ¿Qué es la hematuria? La hematuria significa que hay mary en la orina  La orina podría ponerse de color rodriguez brillante a café obscuro  ¿Qué otros signos o síntomas podría presentar la hematuria?    · Chrystine Tariq o escalofríos     · Náuseas y vómitos     · Dolor o moretones en la parte baja de la espalda o a los lados     · Dolor al orinar     · Más orina de la usual, o necesidad de orinar de inmediato  ¿Qué causa la hematuria? [place]dale a solares médico más información sobre éstas y otras causas de hematuria:  · Infección del tracto urinario    · Cálculos en el riñón o la vejiga    · Próstata inflamada    · Enfermedad del riñón    · Lesiones en el abdomen o pelvis    · Cáncer de próstata, riñón o vejiga    · Ejercicio intenso  ¿Cómo se diagnostica la hematuria aguda? Solares médico le preguntará cuándo fue que usted notó un cambio en el color de solares orina  Dígale sobre cualquier trastorno médico suyo o medicamentos que klaus  Algunos medicamentos pueden dañar joyce riñones o aumentar solares riesgo de sangrado  Es posible que usted necesite alguno de los siguientes:  · Exámenes de mary y [place]  que podrían mostrar julita infección y cómo están funcionando joyce riñones  · [place] M[place], un ultrasonido o julita tomografía computarizada  que podrían mostrar la causa de solares hematuria  Es probable que le administren un medio de contraste para que solares tracto urinario se aprecie mejor en las imágenes  Dígale al médico si usted alguna vez ha tenido julita reacción alérgica al líquido de [place]  ¿Cómo se trata la hematuria? La hematuria podría desaparecer por sí nathalia y sin tratamiento  Usted podría necesitar medicamentos para tratar [place] Yasmine  El tratamiento depende de la causa de solares hematuria  Pídale a solares médico más información sobre el tratamiento que usted podría necesitar  ¿Cómo puedo controlar los síntomas? [place] líquidos brittany se le haya indicado  Es probable que usted necesite cash líquidos adicionales para ayudar a eliminar la mary de solares cuerpo por medio de la orina  El agua es el mejor líquido para cash  Pregunte cuánto líquido debe cash cada día y cuáles líquidos son los más adecuados para usted  ¿Cuándo keke buscar atención inmediata? · Nota mary en la orina después de melany sufrido julita lesión nueva, brittany julita caída  · Orina cantidades muy pequeñas o nada en absoluto       · Siente que no puede vaciar la vejiga  · Siente un dolor romina en la espalda o el costado y no se le janis con el [place]  ¿Cuándo keke comunicarme con mi médico?   · [place] fiebre empeora o no se soy con [address] [place]  · No puede retener líquidos o medicamentos en bernstein estómago  · [place] orina se oscurece, aún después de melany tomado líquidos adicionales  · Usted tiene preguntas o inquietudes acerca de bernstein condición, tratamiento o cuidado  ACUERDOS SOBRE BERNSTEIN CUIDADO:   Usted tiene el derecho de ayudar a planear bernstein cuidado  Aprenda todo lo que pueda sobre bernstein condición y brittany darle tratamiento  Discuta joyce opciones de tratamiento con joyce médicos para decidir el cuidado que usted desea recibir  Usted siempre tiene el derecho de rechazar el tratamiento  Esta información es sólo para uso en educación  [place] intención no es darle un consejo médico sobre enfermedades o tratamientos  Colsulte con bernstein [place] farmacéutico antes de seguir cualquier régimen médico para saber si es seguro y efectivo para usted  © 2017 [address] Information is for End User's use only and may not be sold, redistributed or otherwise used for commercial purposes  All illustrations and images included in CareNotes® are the copyrighted property of A D A M , Inc  or Esequiel Lua

## 2018-04-30 ENCOUNTER — TELEPHONE (OUTPATIENT)
Dept: UROLOGY | Facility: AMBULATORY SURGERY CENTER | Age: 48
End: 2018-04-30

## 2018-04-30 NOTE — SOCIAL WORK
CM consulted to f/u with urology to inquire about an earlier appointment for Pt  Juliann Asif from [facility] reported Pt's appointment is on May 24, and that is the first available  Juliann Asif stated that she will have the nurse call the Pt if there is a cancellation  Pt will be receiving new Pt paperwork in the mail regarding this appointment

## 2018-04-30 NOTE — TELEPHONE ENCOUNTER
Called and spoke with patient  Moved patient to Wednesday 5/2 with Nelda at 1pm per Dr Armando Prince nurse

## 2018-04-30 NOTE — TELEPHONE ENCOUNTER
Patient called said she is now seeing gross hematuria and would like a sooner appt  She was also seen in the [phone] Irish only or you can reach her mother Lizzie Moss at [phone] before 9 or after 3:30pm she speaks english

## 2018-05-02 ENCOUNTER — OFFICE VISIT (OUTPATIENT)
Dept: UROLOGY | Facility: AMBULATORY SURGERY CENTER | Age: 48
End: 2018-05-02
Payer: COMMERCIAL

## 2018-05-02 VITALS
HEART RATE: 80 BPM | HEIGHT: 66 IN | WEIGHT: 135.6 LBS | DIASTOLIC BLOOD PRESSURE: 80 MMHG | SYSTOLIC BLOOD PRESSURE: 132 MMHG | BODY MASS INDEX: 21.79 KG/M2

## 2018-05-02 DIAGNOSIS — R31.29 OTHER MICROSCOPIC HEMATURIA: ICD-10-CM

## 2018-05-02 DIAGNOSIS — R31.0 GROSS HEMATURIA: Primary | ICD-10-CM

## 2018-05-02 DIAGNOSIS — R93.5 ABNORMAL CT SCAN, PELVIS: ICD-10-CM

## 2018-05-02 LAB
SL AMB  POCT GLUCOSE, UA: NORMAL
SL AMB LEUKOCYTE ESTERASE,UA: NORMAL
SL AMB POCT BILIRUBIN,UA: NORMAL
SL AMB POCT BLOOD,UA: NORMAL
SL AMB POCT CLARITY,UA: NORMAL
SL AMB POCT COLOR,UA: YELLOW
SL AMB POCT KETONES,UA: NORMAL
SL AMB POCT NITRITE,UA: NORMAL
SL AMB POCT PH,UA: 6
SL AMB POCT SPECIFIC GRAVITY,UA: 1.01
SL AMB POCT URINE PROTEIN: NORMAL
SL AMB POCT UROBILINOGEN: NORMAL

## 2018-05-02 PROCEDURE — 81002 URINALYSIS NONAUTO W/O SCOPE: CPT | Performed by: UROLOGY

## 2018-05-02 PROCEDURE — 88112 CYTOPATH CELL ENHANCE TECH: CPT | Performed by: PATHOLOGY

## 2018-05-02 PROCEDURE — 99244 OFF/OP CNSLTJ NEW/EST MOD 40: CPT | Performed by: UROLOGY

## 2018-05-02 NOTE — PROGRESS NOTES
5/2/2018    Arianne Horton  1970  [id number]        Assessment  Microscopic/gross hematuria      Discussion  I provided the patient and her sister with reassurance that her recent CT scan showed no evidence of urologic pathology or abnormalities  There were some enlarged mesenteric lymph nodes identified but these appeared to be improved from prior  To complete her hematuria workup a urine cytology was sent from the office today and flexible cystoscopy will be scheduled in the near future  History of Present Illness  41 y o  female with a history of microscopic as well as a single episode of painless gross hematuria  She has had nonspecific abdominal pain for some time and this appears to have been unrelated to the hematuria  She was seen in the emergency room and a CT scan was performed  There was no evidence of urologic abnormalities identified  No kidney stones were visualized  A recent urinalysis shows 4-10 red blood cells from April 2018  She is a long-time smoker  AUA Symptom Score      Review of Systems  Review of Systems   Constitutional: Negative  HENT: Negative  Eyes: Negative  Respiratory: Negative  Cardiovascular: Negative  Gastrointestinal: Negative  Endocrine: Negative  Genitourinary: Positive for hematuria  Musculoskeletal: Negative  Skin: Negative  Allergic/Immunologic: Negative  Neurological: Negative  Hematological: Negative  Psychiatric/Behavioral: Negative  All other systems reviewed and are negative          Past Medical History  Past Medical History:   Diagnosis Date    GERD (gastroesophageal reflux disease)     Headache     Hypertension     Panic attacks     Psychiatric disorder        Past Social History  Past Surgical History:   Procedure Laterality Date    NO PAST SURGERIES         Past Family History  Family History   Problem Relation Age of Onset    Hypertension Mother     Arthritis Father     Diabetes Father    [employer] Hypertension Father     Hyperlipidemia Father        Past Social history  Social History     Social History    Marital status: Single     Spouse name: N/A    Number of children: N/A    Years of education: N/A     Occupational History    HOUSEWIFE OR HOMEMAKER      Social History Main Topics    Smoking status: Current Every Day Smoker     Packs/day: 0 20     Types: Cigarettes    Smokeless tobacco: Never Used      Comment: ONE HALF PACK A DAY OR LESS, CURRENT SOME DAY SMOKER, LIGHT TOBACCO SMOKER  AS PER ALLSCRIPTS    Alcohol use No      Comment: CONSUMES ALCOHOL OCCASIONALLY AS PER ALLSCRIPTS    Drug use: No    Sexual activity: Not on file     Other Topics Concern    Not on file     Social History Narrative    ENGAGES IN A HOBBY - [employer]"    LIVES WITH FAMILY    UNEMPLOYED       Current Medications  Current Outpatient Prescriptions   Medication Sig Dispense Refill    amitriptyline (ELAVIL) 50 mg tablet Take 1 tablet by mouth      cyclobenzaprine (FLEXERIL) 5 mg tablet Take 1 tablet (5 mg total) by mouth daily at bedtime 30 tablet 0    fluticasone (FLONASE) 50 mcg/act nasal spray       lisinopril-hydrochlorothiazide (PRINZIDE,ZESTORETIC) 10-12 5 MG per tablet Take by mouth      phenazopyridine (PYRIDIUM) 100 mg tablet Take 1 tablet (100 mg total) by mouth 3 (three) times a day as needed for bladder spasms 10 tablet 0    naproxen (NAPROSYN) 500 mg tablet Take 0 5 tablets (250 mg total) by mouth 2 (two) times a day with meals for 14 days 28 tablet 0    omeprazole (PriLOSEC) 20 mg delayed release capsule Take 1 capsule (20 mg total) by mouth daily for 30 days 90 capsule 1     No current facility-administered medications for this visit  Allergies  No Known Allergies    Past Medical History, Social History, Family History, medications and allergies were reviewed      Vitals  Vitals:    05/02/18 1259   BP: 132/80   BP Location: Left arm   Patient Position: Sitting   Cuff Size: Adult   Pulse: 80   Weight: 61 5 kg (135 lb 9 6 oz)   Height: 5' 6" (1 676 m)       Physical Exam  Physical Exam  On examination she is in no acute distress  Her abdomen is soft nontender nondistended   examination reveals no CVA tenderness  Skin is warm  Extremities without edema    Neurologic is grossly intact and nonfocal   Gait normal   Affect normal      Results  No results found for: PSA  Lab Results   Component Value Date    GLUCOSE 101 04/28/2018    CALCIUM 9 1 04/28/2018     04/28/2018    K 3 1 (L) 04/28/2018    CO2 29 04/28/2018     04/28/2018    BUN 9 04/28/2018    CREATININE 0 61 04/28/2018     Lab Results   Component Value Date    WBC 7 80 04/28/2018    HGB 12 1 04/28/2018    HCT 36 5 04/28/2018    MCV 95 04/28/2018     04/28/2018         Office Urine Dip  Recent Results (from the past 1 hour(s))   POCT urine dip    Collection Time: 05/02/18  1:02 PM   Result Value Ref Range    LEUKOCYTE ESTERASE,UA -      NITRITE,UA -     SL AMB POCT UROBILINOGEN -     SL AMB POCT URINE PROTEIN +      PH,UA 6 0      BLOOD,UA trace      SPECIFIC GRAVITY,UA 1 010      KETONES,UA -      BILIRUBIN,UA -     GLUCOSE, UA -      COLOR,UA yellow      CLARITY,UA transparent    ]

## 2018-05-07 DIAGNOSIS — F41.9 ANXIETY: Primary | ICD-10-CM

## 2018-05-08 RX ORDER — AMITRIPTYLINE HYDROCHLORIDE 50 MG/1
50 TABLET, FILM COATED ORAL
Qty: 30 TABLET | Refills: 0 | Status: SHIPPED | OUTPATIENT
Start: 2018-05-08 | End: 2018-06-23 | Stop reason: SDUPTHER

## 2018-05-15 ENCOUNTER — OFFICE VISIT (OUTPATIENT)
Dept: FAMILY MEDICINE CLINIC | Facility: CLINIC | Age: 48
End: 2018-05-15
Payer: COMMERCIAL

## 2018-05-15 VITALS
WEIGHT: 134.4 LBS | SYSTOLIC BLOOD PRESSURE: 112 MMHG | DIASTOLIC BLOOD PRESSURE: 60 MMHG | HEART RATE: 78 BPM | BODY MASS INDEX: 22.39 KG/M2 | RESPIRATION RATE: 16 BRPM | HEIGHT: 65 IN | TEMPERATURE: 97.4 F

## 2018-05-15 DIAGNOSIS — E87.6 HYPOKALEMIA: ICD-10-CM

## 2018-05-15 DIAGNOSIS — R31.0 GROSS HEMATURIA: Primary | ICD-10-CM

## 2018-05-15 PROBLEM — R93.5 ABNORMAL CT OF THE ABDOMEN: Status: ACTIVE | Noted: 2017-02-22

## 2018-05-15 PROBLEM — R59.0 RETROPERITONEAL LYMPHADENOPATHY: Status: ACTIVE | Noted: 2017-02-15

## 2018-05-15 PROBLEM — I34.0 MITRAL REGURGITATION: Status: ACTIVE | Noted: 2017-04-12

## 2018-05-15 PROCEDURE — 99213 OFFICE O/P EST LOW 20 MIN: CPT | Performed by: FAMILY MEDICINE

## 2018-05-15 PROCEDURE — 3008F BODY MASS INDEX DOCD: CPT | Performed by: FAMILY MEDICINE

## 2018-05-15 NOTE — PATIENT INSTRUCTIONS
Hipocalemia   LO QUE NECESITA SABER:   La hipocalemia es cuando [address] de potasio en la mary es [place]  El potasio ayuda a controlar el funcionamiento de los músculos, el corazón y del aparato digestivo  La hipocalemia ocurre cuando el cuerpo pierde demasiado potasio o no absorbe lo suficiente a partir de los alimentos  INSTRUCCIONES SOBRE EL [facility]PITAL[facility]IA:   Regrese a la alba de emergencias si:   Usted no puede  el brazo o la pierna  Hipocalemia   CUIDADO AMBULATORIO:   Hipocalemia  es la presencia de niveles bajos de potasio en la [place]  El potasio ayuda a controlar el funcionamiento de los músculos, el corazón y del aparato digestivo  La hipocalemia ocurre cuando el cuerpo pierde demasiado potasio o no absorbe lo suficiente a partir de los alimentos  Los signos y síntomas más comunes incluyen los siguientes:  Es probable que usted no presente ningún signo o síntoma si tiene hipocalemia leve  Es posible que tenga alguno de los siguientes si es más grave:  Cansancio    Estreñimiento    Orinar frecuentemente u orinar en grandes cantidades    Calambres musculares u sensación de hormigueo en la piel    Debilidad muscular    Ritmo cardíaco acelerado o irregular  Busque atención médica de inmediato si:   Usted no puede  el brazo o la pierna  Solares ritmo cardíaco es acelerado o irregular  Se siente demasiado débil o mareado para ponerse de pie  Pregúntele a solares Samule Rode vitaminas y minerales son adecuados para usted  Tiene vómitos o diarrea  Usted tiene entumecimiento u hormigueo en los brazos o las piernas  Los síntomas no desaparecen o Glorietta Kuhn  Usted tiene preguntas o inquietudes acerca de solares condición o cuidado  Tratamiento:  A usted le administrarán potasio para regresar los niveles a la normalidad  [place] podría administrarse en forma de píldora o de julita vía intravenosa (IV)  La cantidad de potasio que se le dará depende de solares nivel de potasio     Consuma alimentos ricos en potasio:  Algunos alimentos que tienen alto contenido de potasio son los [address], las [place], los [place], las patatas y el aguacate  Los frijoles pintos, el [place], el salmón, la carne [place], [place] yogur y la [place] también son ricos en potasio  [place] más información a [place] médico o dietista sobre los alimentos que son ricos en potasio  Acuda a joyce consultas de control con [place] médico según le indicaron  Anote joyce preguntas para que se acuerde de hacerlas [place] joyce visitas  © 2017 [address] Information is for End User's use only and may not be sold, redistributed or otherwise used for commercial purposes  All illustrations and images included in CareNotes® are the copyrighted property of A D A M , Inc  or Esequiel Lua  Esta información es sólo para uso en educación  [place] intención no es darle un consejo médico sobre enfermedades o tratamientos  Colsulte con [place] farmacéutico antes de seguir cualquier régimen médico para saber si es seguro y efectivo para usted  ·   · [place] ritmo cardíaco es acelerado o irregular  · Se siente demasiado débil o mareado para ponerse de pie  Pregúntele a [place] Gabriela Remedies vitaminas y minerales son adecuados para usted  · Tiene vómitos o diarrea  · Usted tiene entumecimiento u hormigueo en los brazos o las piernas  · Los síntomas no desaparecen o empeoran  · Usted tiene preguntas o inquietudes acerca de [place] condición o cuidado  Medicamentos:   · El potasio  para regresar joyce niveles de potasio a la normalidad  · [place] joyce medicamentos brittany se le haya indicado  Consulte con [place] médico si usted marcelo que [place] medicamento no le está ayudando o si presenta efectos secundarios  Infórmele si es alérgico a algún medicamento  Mantenga julita lista actualizada de los [place], las vitaminas y los productos herbales que klaus  Incluya los siguientes datos de los medicamentos: cantidad, frecuencia y motivo de administración   [place] con daisy la lista o los envases de la píldoras a joyce citas de seguimiento  Lleve la lista de los medicamentos con usted en mari de julita emergencia  Consuma alimentos ricos en potasio:  Algunos alimentos que tienen alto contenido de potasio son los [place], las [place], los [place], las patatas y el aguacate  Los frijoles pintos, el [place], el salmón, la carne [place], [place] yogur y la [place] también son ricos en potasio  Pí[place] más información a [place] médico o dietista sobre los alimentos que son ricos en potasio  Acuda a joyce consultas de control con [place] médico según le indicaron  Anote joyce preguntas para que se acuerde de hacerlas [place] joyce visitas  © 2017 [address] Information is for End User's use only and may not be sold, redistributed or otherwise used for commercial purposes  All illustrations and images included in CareNotes® are the copyrighted property of A D A M , Inc  or Esequiel Lua  Esta información es sólo para uso en educación  [place] intención no es darle un consejo médico sobre enfermedades o tratamientos  Colsulte con [place] Rosi Cables farmacéutico antes de seguir cualquier régimen médico para saber si es seguro y efectivo para usted

## 2018-05-15 NOTE — PROGRESS NOTES
Winthrop Hatchet 1970 female MRN: [id number]    Family Medicine Follow-up Visit    ASSESSMENT/PLAN  Problem List Items Addressed This Visit     Hypokalemia     Hypokalemia : etiology unknown    1  K : 3 1 ( 4/28/18)  -s/p 40mEq KDUR at ED   - Patient has not been taking HCTZ  2  Will order BMP           Relevant Orders    Basic metabolic panel    Gross hematuria - Primary     Gross Hematuria/ dysuria:   Improved     - S/p UTI 4/27/18: treated with Cephelexin    Imaging:   CT: abd/pelvis w Contrast: (4/28/18): No acute findings no match hematuria: no nephrolithiasis  Improved lymph nodes      Consult Urology: Appreciate recommendation and treatment: Dr Miller [place] (5/2/18) complete hematuria workup  - CT scan showed no evidence of urologic pathology or abnormalities  There were some enlarged mesenteric lymph nodes identified but these appeared to be improved from prior     - Urine cytology   - Flexible cystoscopy :  Near future      1  Will follow up with Urology on June 6   - Flexible Cystoscopy   2  Monitor s/s for now                   Future Appointments  Date Time Provider Indira Wood   6/8/2018 10:00 AM Dre Rod MD [facility]   6/11/2018 2:00 PM Delmi Murphy MD [facility]          SUBJECTIVE  CC: Follow-up      HPI  Winthrop Hatchet is a 41 y o  female who presents for  Follow-up visit    1  Gross hematuria:  Was seen in the ED on April 28th, and had follow-up urology appointment on May 2nd  Patient reports decrease hematuria,  Denies dysuria,  Fevers, chills, pelvic/ abdominal pain,  Bilateral flank pain  2  Low Potassium:  Was diagnosed in the ED   patient on our help lower her potassium was, was given potassium in the ED and mass to increased her diet with bananas  When asked if patient takes her lisinopril hydrochlorothiazide patient states she has not been taking it over the one or two weeks    Denies weakness, dizziness, nausea, vomiting, chest pain, shortness of breath  Review of Systems   Constitutional: Negative for activity change, chills, fatigue and fever  HENT: Negative for congestion and rhinorrhea  Eyes: Negative for visual disturbance  Respiratory: Negative for cough and shortness of breath  Cardiovascular: Negative for chest pain, palpitations and leg swelling  Gastrointestinal: Negative for abdominal pain, constipation, diarrhea, nausea and vomiting  Endocrine: Negative for polyuria  Genitourinary: Negative for decreased urine volume, difficulty urinating, dysuria, flank pain, frequency, hematuria, pelvic pain, urgency, vaginal bleeding, vaginal discharge and vaginal pain  Musculoskeletal: Negative for back pain and neck pain  Skin: Negative for color change, pallor, rash and wound  Allergic/Immunologic: Negative for immunocompromised state  Neurological: Negative for dizziness, tremors, seizures, speech difficulty, weakness and numbness  Hematological: Negative for adenopathy  Does not bruise/bleed easily  Psychiatric/Behavioral: Negative for sleep disturbance  The patient is not nervous/anxious          Historical Information   The patient history was reviewed as follows:    Past Medical History:   Diagnosis Date    GERD (gastroesophageal reflux disease)     Headache     Hypertension     Panic attacks     Psychiatric disorder      Past Surgical History:   Procedure Laterality Date    NO PAST SURGERIES       Family History   Problem Relation Age of Onset    Hypertension Mother     Arthritis Father     Diabetes Father     Hypertension Father     Hyperlipidemia Father       Social History   History   Alcohol Use No     Comment: CONSUMES ALCOHOL OCCASIONALLY AS PER ALLSCRIPTS     History   Drug Use No     History   Smoking Status    Current Every Day Smoker    Packs/day: 0 20    Types: Cigarettes   Smokeless Tobacco    Never Used     Comment: ONE HALF PACK A DAY OR LESS, CURRENT SOME DAY SMOKER, LIGHT TOBACCO SMOKER  AS PER ALLSCRIPTS       Medications:     Current Outpatient Prescriptions:     amitriptyline (ELAVIL) 50 mg tablet, Take 1 tablet (50 mg total) by mouth daily at bedtime, Disp: 30 tablet, Rfl: 0    fluticasone (FLONASE) 50 mcg/act nasal spray, , Disp: , Rfl:     omeprazole (PriLOSEC) 20 mg delayed release capsule, Take 1 capsule (20 mg total) by mouth daily for 30 days, Disp: 90 capsule, Rfl: 1  No Known Allergies    OBJECTIVE    Vitals:   Vitals:    05/15/18 1450   BP: 112/60   BP Location: Right arm   Patient Position: Sitting   Cuff Size: Standard   Pulse: 78   Resp: 16   Temp: (!) 97 4 °F (36 3 °C)   Weight: 61 kg (134 lb 6 4 oz)   Height: 5' 5" (1 651 m)           Physical Exam   Constitutional: She is oriented to person, place, and time  She appears well-developed and well-nourished  No distress  HENT:   Head: Normocephalic  Eyes: Conjunctivae and EOM are normal  Pupils are equal, round, and reactive to light  Right eye exhibits no discharge  Left eye exhibits no discharge  Neck: Normal range of motion  Cardiovascular: Normal rate  Murmur heard  Pulmonary/Chest: Effort normal and breath sounds normal  No respiratory distress  She has no wheezes  She has no rales  She exhibits no tenderness  Abdominal: Soft  She exhibits no distension and no mass  There is no tenderness  There is no rebound and no guarding  Musculoskeletal: Normal range of motion  She exhibits no edema  Neurological: She is alert and oriented to person, place, and time  Skin: Skin is warm  No rash noted  She is not diaphoretic  No erythema  No pallor  Psychiatric: She has a normal mood and affect   Her behavior is normal  Judgment and thought content normal               Kiki James MD, PGY-2  [address]   5/15/2018

## 2018-05-15 NOTE — ASSESSMENT & PLAN NOTE
Gross Hematuria/ dysuria:   Improved     - S/p UTI 4/27/18: treated with Cephelexin    Imaging:   CT: abd/pelvis w Contrast: (4/28/18): No acute findings no match hematuria: no nephrolithiasis  Improved lymph nodes      Consult Urology: Appreciate recommendation and treatment: Dr Selvin Roque (5/2/18) complete hematuria workup  - CT scan showed no evidence of urologic pathology or abnormalities  There were some enlarged mesenteric lymph nodes identified but these appeared to be improved from prior     - Urine cytology   - Flexible cystoscopy :  Near future      1  Will follow up with Urology on June 6   - Flexible Cystoscopy   2   Monitor s/s for now

## 2018-05-15 NOTE — ASSESSMENT & PLAN NOTE
Hypokalemia : etiology unknown    1  K : 3 1 ( 4/28/18)  -s/p 40mEq KDUR at ED   - Patient has not been taking HCTZ  2   Will order BMP

## 2018-05-21 ENCOUNTER — APPOINTMENT (OUTPATIENT)
Dept: LAB | Facility: HOSPITAL | Age: 48
End: 2018-05-21
Payer: COMMERCIAL

## 2018-05-21 DIAGNOSIS — E87.6 HYPOKALEMIA: ICD-10-CM

## 2018-05-21 LAB
ANION GAP SERPL CALCULATED.3IONS-SCNC: 6 MMOL/L (ref 4–13)
BUN SERPL-MCNC: 5 MG/DL (ref 5–25)
CALCIUM SERPL-MCNC: 9.3 MG/DL (ref 8.3–10.1)
CHLORIDE SERPL-SCNC: 107 MMOL/L (ref 100–108)
CO2 SERPL-SCNC: 28 MMOL/L (ref 21–32)
CREAT SERPL-MCNC: 0.71 MG/DL (ref 0.6–1.3)
GFR SERPL CREATININE-BSD FRML MDRD: 103 ML/MIN/1.73SQ M
GLUCOSE P FAST SERPL-MCNC: 91 MG/DL (ref 65–99)
POTASSIUM SERPL-SCNC: 4.1 MMOL/L (ref 3.5–5.3)
SODIUM SERPL-SCNC: 141 MMOL/L (ref 136–145)

## 2018-05-21 PROCEDURE — 36415 COLL VENOUS BLD VENIPUNCTURE: CPT

## 2018-05-21 PROCEDURE — 80048 BASIC METABOLIC PNL TOTAL CA: CPT

## 2018-05-24 ENCOUNTER — TELEPHONE (OUTPATIENT)
Dept: FAMILY MEDICINE CLINIC | Facility: CLINIC | Age: 48
End: 2018-05-24

## 2018-05-24 DIAGNOSIS — Z12.11 SCREEN FOR COLON CANCER: Primary | ICD-10-CM

## 2018-05-24 NOTE — TELEPHONE ENCOUNTER
PATIENT NEEDS DR Nomi Wadsworth TO CALL HER BECAUSE SHE HAS A PERSONAL QUESTION TO ASK HER  S PATIENT CAN BE REACHED [phone]

## 2018-06-08 ENCOUNTER — PROCEDURE VISIT (OUTPATIENT)
Dept: UROLOGY | Facility: AMBULATORY SURGERY CENTER | Age: 48
End: 2018-06-08
Payer: COMMERCIAL

## 2018-06-08 VITALS
DIASTOLIC BLOOD PRESSURE: 80 MMHG | WEIGHT: 134 LBS | SYSTOLIC BLOOD PRESSURE: 138 MMHG | BODY MASS INDEX: 22.3 KG/M2 | HEART RATE: 104 BPM

## 2018-06-08 DIAGNOSIS — R31.29 MICROHEMATURIA: Primary | ICD-10-CM

## 2018-06-08 LAB
SL AMB  POCT GLUCOSE, UA: ABNORMAL
SL AMB LEUKOCYTE ESTERASE,UA: ABNORMAL
SL AMB POCT BILIRUBIN,UA: ABNORMAL
SL AMB POCT BLOOD,UA: ABNORMAL
SL AMB POCT CLARITY,UA: CLEAR
SL AMB POCT COLOR,UA: CLEAR
SL AMB POCT KETONES,UA: ABNORMAL
SL AMB POCT NITRITE,UA: ABNORMAL
SL AMB POCT PH,UA: 5
SL AMB POCT SPECIFIC GRAVITY,UA: 1
SL AMB POCT URINE PROTEIN: ABNORMAL
SL AMB POCT UROBILINOGEN: ABNORMAL

## 2018-06-08 PROCEDURE — 81002 URINALYSIS NONAUTO W/O SCOPE: CPT | Performed by: UROLOGY

## 2018-06-08 PROCEDURE — 52000 CYSTOURETHROSCOPY: CPT | Performed by: UROLOGY

## 2018-06-08 NOTE — PROGRESS NOTES
Romina is a 59-year-old female with a history of gross hematuria which has since resolved  A CT scan of the abdomen and pelvis with IV contrast revealed mild interval improvement of some mesenteric adenopathy  Otherwise there were no acute findings  A urine cytology did reveal some atypical cells  She returns to the office today to undergo flexible cystoscopy  Risk and benefits of the procedure were discussed and reviewed informed consent was obtained  Cystoscopy Procedure note    Risk and benefits of flexible cystoscopy were discussed  Informed consent was obtained  A urine dip is adequate for cystoscopy  The patient was placed into the modified supine position  Her genitalia was prepped and draped in a sterile fashion  Viscous lidocaine was instilled into the urethra  Flexible cystoscopy was then performed  The bladder was thoroughly inspected  Both ureteral orifices were visualized with clear efflux of urine  The bladder mucosa was thoroughly inspected  There was no evidence of mucosal abnormalities, stones, or lesions  Retroflexion was normal   Overall this was a negative cystoscopy  A female office staff member was present throughout the entire procedure  My impression is resolved gross hematuria, negative hematuria workup  Provided the patient with reassurance that her cystoscopy in the office today was within normal limits  I recommend that she return in 1 year with a repeat urine cytology or sooner if she were to have recurrent gross hematuria

## 2018-06-23 DIAGNOSIS — K21.9 GASTROESOPHAGEAL REFLUX DISEASE WITHOUT ESOPHAGITIS: ICD-10-CM

## 2018-06-23 DIAGNOSIS — F41.9 ANXIETY: ICD-10-CM

## 2018-06-26 RX ORDER — AMITRIPTYLINE HYDROCHLORIDE 50 MG/1
TABLET, FILM COATED ORAL
Qty: 30 TABLET | Refills: 3 | Status: SHIPPED | OUTPATIENT
Start: 2018-06-26 | End: 2018-12-11 | Stop reason: SDUPTHER

## 2018-06-26 RX ORDER — OMEPRAZOLE 20 MG/1
CAPSULE, DELAYED RELEASE ORAL
Qty: 30 CAPSULE | Refills: 0 | Status: SHIPPED | OUTPATIENT
Start: 2018-06-26 | End: 2018-10-05 | Stop reason: SDUPTHER

## 2018-08-24 ENCOUNTER — HOSPITAL ENCOUNTER (EMERGENCY)
Facility: HOSPITAL | Age: 48
Discharge: HOME | End: 2018-08-24
Attending: EMERGENCY MEDICINE
Payer: COMMERCIAL

## 2018-08-24 VITALS
RESPIRATION RATE: 18 BRPM | SYSTOLIC BLOOD PRESSURE: 133 MMHG | TEMPERATURE: 98.1 F | WEIGHT: 140 LBS | DIASTOLIC BLOOD PRESSURE: 83 MMHG | HEIGHT: 67 IN | HEART RATE: 103 BPM | BODY MASS INDEX: 21.97 KG/M2 | OXYGEN SATURATION: 100 %

## 2018-08-24 DIAGNOSIS — R51.9 HEADACHE: Primary | ICD-10-CM

## 2018-08-24 DIAGNOSIS — R29.898 LEG HEAVINESS: ICD-10-CM

## 2018-08-24 LAB
ANION GAP SERPL CALCULATED.3IONS-SCNC: 6 MMOL/L (ref 4–13)
BASOPHILS # BLD AUTO: 0.03 THOUSANDS/ΜL (ref 0–0.1)
BASOPHILS NFR BLD AUTO: 0 % (ref 0–1)
BUN SERPL-MCNC: 7 MG/DL (ref 5–25)
CALCIUM SERPL-MCNC: 9.4 MG/DL (ref 8.3–10.1)
CHLORIDE SERPL-SCNC: 102 MMOL/L (ref 100–108)
CO2 SERPL-SCNC: 28 MMOL/L (ref 21–32)
CREAT SERPL-MCNC: 0.68 MG/DL (ref 0.6–1.3)
EOSINOPHIL # BLD AUTO: 0.17 THOUSAND/ΜL (ref 0–0.61)
EOSINOPHIL NFR BLD AUTO: 2 % (ref 0–6)
ERYTHROCYTE [DISTWIDTH] IN BLOOD BY AUTOMATED COUNT: 12.3 % (ref 11.6–15.1)
GFR SERPL CREATININE-BSD FRML MDRD: 106 ML/MIN/1.73SQ M
GLUCOSE SERPL-MCNC: 107 MG/DL (ref 65–140)
HCT VFR BLD AUTO: 38.5 % (ref 34.8–46.1)
HGB BLD-MCNC: 13.1 G/DL (ref 11.5–15.4)
IMM GRANULOCYTES # BLD AUTO: 0.02 THOUSAND/UL (ref 0–0.2)
IMM GRANULOCYTES NFR BLD AUTO: 0 % (ref 0–2)
LYMPHOCYTES # BLD AUTO: 1.69 THOUSANDS/ΜL (ref 0.6–4.47)
LYMPHOCYTES NFR BLD AUTO: 24 % (ref 14–44)
MCH RBC QN AUTO: 31.3 PG (ref 26.8–34.3)
MCHC RBC AUTO-ENTMCNC: 34 G/DL (ref 31.4–37.4)
MCV RBC AUTO: 92 FL (ref 82–98)
MONOCYTES # BLD AUTO: 0.21 THOUSAND/ΜL (ref 0.17–1.22)
MONOCYTES NFR BLD AUTO: 3 % (ref 4–12)
NEUTROPHILS # BLD AUTO: 5.02 THOUSANDS/ΜL (ref 1.85–7.62)
NEUTS SEG NFR BLD AUTO: 71 % (ref 43–75)
NRBC BLD AUTO-RTO: 0 /100 WBCS
PLATELET # BLD AUTO: 234 THOUSANDS/UL (ref 149–390)
PMV BLD AUTO: 10 FL (ref 8.9–12.7)
POTASSIUM SERPL-SCNC: 3.3 MMOL/L (ref 3.5–5.3)
RBC # BLD AUTO: 4.19 MILLION/UL (ref 3.81–5.12)
SODIUM SERPL-SCNC: 136 MMOL/L (ref 136–145)
WBC # BLD AUTO: 7.14 THOUSAND/UL (ref 4.31–10.16)

## 2018-08-24 PROCEDURE — 80048 BASIC METABOLIC PNL TOTAL CA: CPT | Performed by: EMERGENCY MEDICINE

## 2018-08-24 PROCEDURE — 99285 EMERGENCY DEPT VISIT HI MDM: CPT

## 2018-08-24 PROCEDURE — 85025 COMPLETE CBC W/AUTO DIFF WBC: CPT | Performed by: EMERGENCY MEDICINE

## 2018-08-24 PROCEDURE — 96374 THER/PROPH/DIAG INJ IV PUSH: CPT

## 2018-08-24 PROCEDURE — 36415 COLL VENOUS BLD VENIPUNCTURE: CPT | Performed by: EMERGENCY MEDICINE

## 2018-08-24 RX ORDER — KETOROLAC TROMETHAMINE 30 MG/ML
15 INJECTION, SOLUTION INTRAMUSCULAR; INTRAVENOUS ONCE
Status: COMPLETED | OUTPATIENT
Start: 2018-08-24 | End: 2018-08-24

## 2018-08-24 RX ADMIN — KETOROLAC TROMETHAMINE 15 MG: 30 INJECTION, SOLUTION INTRAMUSCULAR at 14:29

## 2018-08-24 NOTE — ED ATTENDING ATTESTATION
Minesh Del Cid DO, saw and evaluated the patient  I have discussed the patient with the resident/non-physician practitioner and agree with the resident's/non-physician practitioner's findings, Plan of Care, and MDM as documented in the resident's/non-physician practitioner's note, except where noted  All available labs and Radiology studies were reviewed  At this point I agree with the current assessment done in the Emergency Department  I have conducted an independent evaluation of this patient a history and physical is as follows:    Patient complains of mild, occipital headache similar to previous tension headaches but now associated with lower extremity weakness  She is able to ambulate and her coordination is at her baseline but states that she feels like her legs are heavy  She denies any recent trauma or over exertion  She has had no lower extremity edema or pain  She does have a h/o hypertension and states she has been taking her medications as prescribed  ROS: Denies f/c, back pain, CP, SOB, abdominal pain, n/v/d  12 system ROS o/w negative  PE: NAD, appears comfortable, alert; MMM, no posterior oropharyngeal exudate, edema or erythema; no nuchal rigidity; HRR; lungs CTA, POx 100% on RA (nl); abdomen s/nt/nd, nl BS; (-) LE edema; skin p/w/d; CN II-XII GI/NF, oriented, FROM extremities x4 with intact strength and sensation, negative Stiven's sign  DDx: HA - tension headache, doubt migraine, ICH, edema  DDx:  Lower extremity heaviness - headache aura, abnormal electrolytes, doubt Guillain-[place], Lyme or radiculopathy  A/P: Will treat symptoms, reevaluate for further w/u or disposition      Critical Care Time  CritCare Time    Procedures

## 2018-08-24 NOTE — ED PROVIDER NOTES
History  Chief Complaint   Patient presents with    Weakness - Generalized     Pt c/o generally weak and headaches over the last several days     HPI  Patient is a 79-year-old woman with a history of hypertension, headaches, and anxiety who presents to the emergency department for leg weakness and headaches  Patient states that her symptoms began approximately 1 week ago when she began feeling more anxious than usual   Over the past 2 days she has had a tension-type headache that has gradually increased in severity  The headache is holoacranial and associated with tension in her trapezius muscles  This feels like her normal tension headaches  She has tried taking Tylenol without significant improvement  She has no recent falls or injuries to her head  Over the past couple days she also notes a sensation of heaviness in her bilateral lower extremities  This is a subjective weakness and has not actually affected her ability to walk or move her legs  She has no leg pain or calf swelling  Leg heaviness is not exertional   No numbness or tingling in her extremities  No recent travel or immobilization  No chest pain, dyspnea, abdominal pain  Prior to Admission Medications   Prescriptions Last Dose Informant Patient Reported?  Taking?   amitriptyline (ELAVIL) 50 mg tablet 8/24/2018 at Unknown time  No Yes   Sig: TAKE ONE TABLET BY MOUTH AT BEDTIME   fluticasone (FLONASE) 50 mcg/act nasal spray 8/24/2018 at Unknown time Self Yes Yes   omeprazole (PriLOSEC) 20 mg delayed release capsule 8/24/2018 at Unknown time  No Yes   Sig: TAKE ONE CAPSULE BY MOUTH DAILY      Facility-Administered Medications: None       Past Medical History:   Diagnosis Date    GERD (gastroesophageal reflux disease)     Headache     Hematuria     Hypertension     Panic attacks     Psychiatric disorder        Past Surgical History:   Procedure Laterality Date    CYSTOSCOPY  06/08/2018    NO PAST SURGERIES         Family History Problem Relation Age of Onset    Hypertension Mother     Arthritis Father     Diabetes Father     Hypertension Father     Hyperlipidemia Father      I have reviewed and agree with the history as documented  Social History   Substance Use Topics    Smoking status: Current Every Day Smoker     Packs/day: 0 20     Types: Cigarettes    Smokeless tobacco: Never Used      Comment: ONE HALF PACK A DAY OR LESS, CURRENT SOME DAY SMOKER, LIGHT TOBACCO SMOKER  AS PER [facility]    Alcohol use No      Comment: CONSUMES ALCOHOL OCCASIONALLY AS PER [facility]TS        Review of Systems   Constitutional: Negative for chills, diaphoresis, fatigue and fever  HENT: Negative for hearing loss, nosebleeds, sinus pain and sore throat  Eyes: Negative for photophobia, pain, redness and visual disturbance  Respiratory: Negative for cough, chest tightness, shortness of breath, wheezing and stridor  Cardiovascular: Negative for chest pain, palpitations and leg swelling  Gastrointestinal: Negative for abdominal distention, abdominal pain, constipation, diarrhea, nausea and vomiting  Genitourinary: Negative for dysuria, flank pain and hematuria  Musculoskeletal: Negative for back pain and neck stiffness  Skin: Negative for pallor and rash  Allergic/Immunologic: Negative for immunocompromised state  Neurological: Positive for weakness and headaches  Negative for syncope and numbness  Hematological: Negative for adenopathy  Psychiatric/Behavioral: Negative for agitation and confusion  The patient is nervous/anxious  All other systems reviewed and are negative           Physical Exam  ED Triage Vitals [08/24/18 1348]   Temperature Pulse Respirations Blood Pressure SpO2   98 1 °F (36 7 °C) 103 18 133/83 100 %      Temp Source Heart Rate Source Patient Position - Orthostatic VS BP Location FiO2 (%)   Tympanic Monitor Sitting Left arm --      Pain Score       8           Orthostatic Vital Signs  Vitals: 08/24/18 1348   BP: 133/83   Pulse: 103   Patient Position - Orthostatic VS: Sitting       Physical Exam   Constitutional: She is oriented to person, place, and time  She appears well-developed and well-nourished  No distress  HENT:   Head: Normocephalic and atraumatic  Right Ear: External ear normal    Left Ear: External ear normal    Nose: Nose normal    Mouth/Throat: Oropharynx is clear and moist    Eyes: Conjunctivae and EOM are normal  Pupils are equal, round, and reactive to light  No scleral icterus  Neck: Normal range of motion  Neck supple  No JVD present  No tracheal deviation present  There is tenderness to palpation of the bilateral trapezius muscles  No midline spinal tenderness  The neck is not rigid and has full range of motion  Cardiovascular: Normal rate and regular rhythm  Exam reveals no gallop and no friction rub  No murmur heard  Pulmonary/Chest: Effort normal and breath sounds normal  No stridor  No respiratory distress  She has no wheezes  She has no rales  She exhibits no tenderness  Abdominal: Soft  She exhibits no distension  There is no tenderness  There is no rebound and no guarding  Musculoskeletal: Normal range of motion  She exhibits no edema or tenderness  The legs are symmetric without calf swelling or tenderness to palpation  There are no overlying skin changes  Lymphadenopathy:     She has no cervical adenopathy  Neurological: She is alert and oriented to person, place, and time  No cranial nerve deficit or sensory deficit  She exhibits normal muscle tone  5/5 flexor / extensor strength in the bilateral upper and lower extremities  No sensory deficits  Patient is able to ambulate around the room without difficulty  Skin: Skin is warm and dry  She is not diaphoretic  No erythema  No pallor  Psychiatric: She has a normal mood and affect  Her behavior is normal    Nursing note and vitals reviewed        ED Medications  Medications   ketorolac (TORADOL) injection 15 mg (15 mg Intravenous Given 8/24/18 1429)       Diagnostic Studies  Results Reviewed     Procedure Component Value Units Date/Time    Basic metabolic panel [[id number]]  (Abnormal) Collected:  08/24/18 1429    Lab Status:  Final result Specimen:  Blood from Arm, Left Updated:  08/24/18 1510     Sodium 136 mmol/L      Potassium 3 3 (L) mmol/L      Chloride 102 mmol/L      CO2 28 mmol/L      Anion Gap 6 mmol/L      BUN 7 mg/dL      Creatinine 0 68 mg/dL      Glucose 107 mg/dL      Calcium 9 4 mg/dL      eGFR 106 ml/min/1 73sq m     Narrative:         National Kidney Disease Education Program recommendations are as follows:  GFR calculation is accurate only with a steady state creatinine  Chronic Kidney disease less than 60 ml/min/1 73 sq  meters  Kidney failure less than 15 ml/min/1 73 sq  meters      CBC and differential [[id number]]  (Abnormal) Collected:  08/24/18 1429    Lab Status:  Final result Specimen:  Blood from Arm, Left Updated:  08/24/18 1450     WBC 7 14 Thousand/uL      RBC 4 19 Million/uL      Hemoglobin 13 1 g/dL      Hematocrit 38 5 %      MCV 92 fL      MCH 31 3 pg      MCHC 34 0 g/dL      RDW 12 3 %      MPV 10 0 fL      Platelets 845 Thousands/uL      nRBC 0 /100 WBCs      Neutrophils Relative 71 %      Immat GRANS % 0 %      Lymphocytes Relative 24 %      Monocytes Relative 3 (L) %      Eosinophils Relative 2 %      Basophils Relative 0 %      Neutrophils Absolute 5 02 Thousands/µL      Immature Grans Absolute 0 02 Thousand/uL      Lymphocytes Absolute 1 69 Thousands/µL      Monocytes Absolute 0 21 Thousand/µL      Eosinophils Absolute 0 17 Thousand/µL      Basophils Absolute 0 03 Thousands/µL                  No orders to display         Procedures  Procedures      Phone Consults  ED Phone Contact    ED Course  ED Course as of Aug 24 1534   Fri Aug 24, 2018   1451 WBC: 7 14   1451 Hemoglobin: 13 1   1511 Potassium: (!) 3 3           MDM   42-year-old woman presenting to the emergency department for headache and sensation of bilateral leg weakness  Patient has a history of similar headaches, and her current symptoms are consistent with a tension-type headache  She has not tried any anti-inflammatory medications for this  I will treat her with a injection of IV Toradol  I will also check a CBC and electrolytes given her leg discomfort/weakness could possibly be explained by an electrolyte disturbance or anemia  On reassessment patient states she feels better after receiving Toradol  She does not have any significant laboratory abnormalities and clinically appears very well  I have instructed her to take ibuprofen as needed for her headache and return if she experiences worsening head pain, worsening weakness in her legs, or any other neurologic abnormalities  Otherwise she will follow up with her PCP  CritCare Time    Disposition  Final diagnoses:   Headache   Leg heaviness     Time reflects when diagnosis was documented in both MDM as applicable and the Disposition within this note     Time User Action Codes Description Comment    8/24/2018  3:14 PM June Curling Add [R51] Headache     8/24/2018  3:14 PM June Curling Add [R29 898] Leg heaviness       ED Disposition     ED Disposition Condition Comment    Discharge  Mikki Galvan discharge to home/self care      Condition at discharge: Good        Follow-up Information     Follow up With Specialties Details Why Contact Info Additional [address] JIMY Henry MD Family Medicine, Obstetrics and Gynecology, Obstetrics, Gynecology Schedule an appointment as soon as possible for a visit As needed, If symptoms worsen [place] [phone] [id number]       [address] [place] [address] Emergency Department Emergency Medicine Go to As needed, If symptoms worsen [address]  [phone]  ED, [address] [place], [place], [address]          Discharge Medication List as of 8/24/2018  3:15 PM      CONTINUE these medications which have NOT CHANGED    Details   amitriptyline (ELAVIL) 50 mg tablet TAKE ONE TABLET BY MOUTH AT BEDTIME, Normal      fluticasone (FLONASE) 50 mcg/act nasal spray Starting Fri 3/23/2018, Historical Med      omeprazole (PriLOSEC) 20 mg delayed release capsule TAKE ONE CAPSULE BY MOUTH DAILY, Normal           No discharge procedures on file  ED Provider  Attending physically available and evaluated Claudette Lover I managed the patient along with the ED Attending      Electronically Signed by         Jannetta Baumgarten, MD  08/24/18 [address], MD  08/24/18 0463

## 2018-08-24 NOTE — DISCHARGE INSTRUCTIONS
Dolor de gita mari   LO QUE NECESITA SABER:   El dolor de [place] mari es un dolor o molestia que comienza de repetessa y DUNIA rápidamente  Usted puede tener un dolor de gita mari sólo cuando siente estrés o come ciertos alimentos  Otro tipo dolor de gita mari puede producirse todos los días y a veces varias veces al día  INSTRUCCIONES SOBRE EL [facility]PITAL[facility]IA:   Regrese a la alba de emergencias si:   · Usted tiene dolor intenso  · Usted tiene entumecimiento en un lado de solares dirk o cuerpo  · Usted tiene un dolor de gita que ocurre después de un golpe en la gita, julita caída u otro trauma  · Tiene dolor de [place], está olvidadizo o confundido o tiene dificultad para hablar  · Tiene dolor de [place], rigidez en el gee y [place]  Pregúntele a solares Anjum Forester vitaminas y minerales son adecuados para usted  · Usted tiene un dolor de gita vaishali y está vomitando  · Usted tiene dolor de gita todos los días y no se [place] aun después de tratarlo  · Sandra galye de [facility] u ocurren nuevos síntomas cuando tiene dolor de [place]  · Usted tiene preguntas o inquietudes acerca de solares condición o cuidado  Medicamentos:  Es posible que usted necesite alguno de los siguientes:  · Un medicamento con receta para el dolor  podrían ser Gerhardt Shawna  El medicamento que recomienda solares médico dependerá del tipo de dolor de gita que tenga  Usted necesitará cash medicamentos para el dolor de gita según las indicaciones para evitar un problema llamado dolor de gita de rebote  Estos gayle de [place] ocurren con el uso regular de analgésicos para los trastornos de dolor de [place]  · AINEs (Analgésicos antiinflamatorios no esteroides) brittany el ibuprofeno, ayudan a disminuir la inflamación, el dolor y la [place]  [place]anda medicamento esta disponible con o sin julita receta médica  Los AINEs pueden causar sangrado estomacal o problemas renales en ciertas personas   Si usted klaus un medicamento anticoagulante, siempre pregúntele a solares médico si los DOMI son seguros para usted  Siempre bhavana la etiqueta de wilian medicamento y [place] instrucciones  · El acetaminofén  Kissousa el dolor y baja la fiebre  Está disponible sin receta médica  Pregunte la cantidad y la frecuencia con que debe tomarlos  [address]  Bhavana las etiquetas de todos los demás medicamentos que esté usando para saber si también contienen acetaminofén, o pregunte a solares médico o farmacéutico  El acetaminofén puede causar daño en el hígado cuando no se klaus de forma correcta  No use más de 3 gramos (3,000 miligramos) en total de acetaminofeno en un día  · Antidepresivos  se pueden administrar para algunos tipos de gayle de [place]  · [place] joyce medicamentos brittany se le haya indicado  Consulte con solares médico si usted marcelo que solares medicamento no le está ayudando o si presenta efectos secundarios  Infórmele si es alérgico a cualquier medicamento  Mantenga julita lista actualizada de los [facility], las vitaminas y los productos herbales que klaus  Incluya los siguientes datos de los medicamentos: cantidad, frecuencia y motivo de administración  Traiga con usted la lista o los envases de la píldoras a joyce citas de seguimiento  Lleve la lista de los medicamentos con usted en mari de julita emergencia  El manejo de solares síntomas:   · Aplique hielo o calor  en la arvin donde solares hijo siente el dolor de gita  Utilice un paquete (compresa) de hielo o calor  Para un paquete de hielo, también puede colocar hielo molido en julita bolsa plástica  Cubra el paquete de hielo o la bolsa con julita toalla pequeña antes de aplicarla en la piel  Tanto el hielo brittany el calor ayudan a reducir el dolor, y el calor también contribuye a reducir los [employer]  Aplique calor [place] 20 a 30 minutos cada 2 horas  Aplique hielo [place] 15 a 20 minutos cada hora  Aplique calor o hielo [place] el tiempo y la cantidad de días que se le indique   Usted puede alternar el calor y el hielo  · Relaje joyce músculos  Acuéstese en julita posición cómoda y cierre joyce ojos  Relaje joyce músculos lentamente  Comience por los dedos de los pies y avance hacia arriba al aniket de solares cuerpo  · Registre en un diario joyce gayle de [place]  Escriba cuándo comienzan y terminan joyce migrañas  Aayush Means y qué estaba haciendo cuando comenzó la migraña  Registre lo que comió y lo que tomó las 24 horas previas al comienzo de solares migraña  Lucille Ear dolor y dónde le duele: Lleve un registro de lo que hizo para tratar solares Yennifer Garter y si obtuvo un resultado satisfactorio  Cómo prevenir un dolor de gita mari:   · Evite cualquier cosa que provoque un dolor de gita mari  Los ejemplos incluyen la exposición a sustancias químicas, las grandes altitudes o no dormir lo suficiente  Diana julita rutina para dormir  Acuéstese y [employer] días a la misma hora  No utilice aparatos electrónicos antes de acostarse  Pueden provocarle un dolor de gita o impedirle dormir hamlet  · No fume  La nicotina y otras sustancias químicas en los cigarrillos y puros pueden desencadenar un dolor de gita mari o [place]  Pida información a solares médico si usted actualmente fuma y necesita ayuda para dejar de fumar  Los cigarrillos electrónicos o tabaco sin humo todavía contienen nicotina  Consulte con solares médico antes de [employer]  · Limite el consumo de alcohol según le indicaron  El alcohol puede provocar un dolor de gita mari o empeorarlo  Si usted tiene gayle de [place] de racimo, no caitie alcohol [place] un episodio  Para otros tipos de gayle de [place], pregúntele a solares proveedor de atención médica si es seguro para usted beber alcohol  Pregunte cuál es la cantidad maradiaga que puede beber y con qué frecuencia  · Ejercítese según indicaciones  El ejercicio puede reducir la tensión y [place] a aliviar el dolor de [place]   Propóngase hacer 30 minutos de [place] todos los días de la [place]  [place] médico puede ayudarle a crear un plan de ejercicios  · Consuma alimentos saludables y variados  [address] frutas, verduras, productos lácteos bajos en grasa, rosanne [place], pescado y frijoles cocidos  [place] médico o dietista puede ayudarle a crear planes de comidas si desea evitar los alimentos que provocan gayle de [place]  Acuda a joyce consultas de control con [place] médico según le indicaron  Traiga [place] registro de gayle de gita con usted cuando visite a [place] médico  Anote joyce preguntas para que se acuerde de hacerlas [place] joyce visitas  © 2017 [address] Information is for End User's use only and may not be sold, redistributed or otherwise used for commercial purposes  All illustrations and images included in CareNotes® are the copyrighted property of A RANDA A M , Inc  or Esequiel [address]  Esta información es sólo para uso en educación  [place] intención no es darle un consejo médico sobre enfermedades o tratamientos  Colsulte con [place] Snell Cranker farmacéutico antes de seguir cualquier régimen médico para saber si es seguro y efectivo para usted  Calambres en las piernas   LO QUE NECESITA SABER:   Un calambre en la pierna es un apretón doloroso y repentino en la pantorrilla (pierna inferior) o en los músculos del muslo (pierna superior)  El músculo puede sacudirse debajo de la piel o sentirse tyler  [place] pierna podría sentirse dolorida mucho después de que los músculos se relajan  INSTRUCCIONES SOBRE EL [facility]ATHIE HOSPITALARIA:   Para estirar [place] pierna:  [place] joyce músculos antes de estirarse  Mk julita caminata corta o corra lentamente en lugar  Si usted tiene CHIKA Peguero que duerme, es posible que también desee estirarse antes de [place]   [address] [facility] y los muslos para ayudar a detener o prevenir calambres en las piernas:  · Para estirar [place] pantorrilla y [place] talón: ¨ Póngase de pie y [employer] de joyce cecil sobre julita pared  ¨ Inclínese hacia la pared, con julita pierna atrás de la otra  Doble la pierna de adelante y deje la pierna de atrás derecha  ¨ Presione el talón de solares pierna de atrás [employer]  ¨ Mk esto por 15 a 30 segundos, entonces cambie de lado  · Para estirar la parte de atrás de solares Jettie Edman y solares muslo:      ¨ Siéntese en el piso con las piernas enfrente suyo  No extienda las puntas de los pies  [address] solares mano a joyce lados en el piso  Deslice joyce cecil más allá de joyce caderas hacia solares tobillos  ¨ Muévase hacia joyce tobillos hasta que sienta un estiramiento en la parte posterior de joyce piernas  No intente tocar joyce rodillas con solares gita ni redondee solares espalda  ¨ Sostenga está posición por 30 segundos  Denise abundantes líquidos [place] el ejercicio:  El agua y [de-identified] líquidos Ama Alonso a prevenir calambres al [employer] líquidos que se pierden al sudar  Denise más líquidos cuando esté activo cuando hace calor  Para detener un calambre en la pierna si le pasa otra vez:   · Estire solares músculo y leonard un masaje para detener el calambre  · Aplique calor o hielo al calambre  Julita almohadilla o compresa caliente podría ayudar a [employer]  Julita bolsa de hielo o hielo steven en julita bolsa, envuelto en julita toalla puede [employer] músculos sensibles o doloridos  [place] solares medicamento según joyce indicaciones:  Llame a solares médico si usted piensa que el medicamento no está ayudando o si tiene efectos secundarios  Infórmele si está tomando vitaminas, hierbas u otros medicamentos  Lleve julita lista de los medicamentos que klaus  Incluya los siguientes datos de los medicamentos: cantidad, frecuencia y motivo de administración  Traiga con usted la lista o los envases de la píldoras a joyce citas de seguimiento  Acuda a joyce consultas de control con solares médico según le indicaron    Anote cualquier pregunta que tenga así se acuerda de hacerla [place] sandra citas de seguimiento  Pregúntele a solares Fredrich Files vitaminas y minerales son adecuados para usted  · Sandra calambres empeoran o suceden más seguido  · Solares pierna se siente entumecida  · Sandra calambres en las piernas no desaparecen al estirarse ni con masajes  · Se siente mareado, aturdido o confundido cuando hace ejercicios y hace calor  Podría tener dolor de gita o visión qamarrosvivi  © 2017 [address] Information is for End User's use only and may not be sold, redistributed or otherwise used for commercial purposes  All illustrations and images included in CareNotes® are the copyrighted property of A D A M , Inc  or Esequiel Lua  Esta información es sólo para uso en educación  Solares intención no es darle un consejo médico sobre enfermedades o tratamientos  Colsulte con solares Melven Rimes farmacéutico antes de seguir cualquier régimen médico para saber si es seguro y efectivo para usted

## 2018-10-02 ENCOUNTER — TELEPHONE (OUTPATIENT)
Dept: FAMILY MEDICINE CLINIC | Facility: CLINIC | Age: 48
End: 2018-10-02

## 2018-10-02 NOTE — TELEPHONE ENCOUNTER
Pt calling for a refill of her Lisinopril HCTZ 10/12/5 and omeprazole 20 mg 90 day supply I did not see linipril on list

## 2018-10-05 DIAGNOSIS — I10 HYPERTENSION: Primary | ICD-10-CM

## 2018-10-05 DIAGNOSIS — K21.9 GASTROESOPHAGEAL REFLUX DISEASE WITHOUT ESOPHAGITIS: ICD-10-CM

## 2018-10-05 RX ORDER — OMEPRAZOLE 20 MG/1
20 CAPSULE, DELAYED RELEASE ORAL DAILY
Qty: 30 CAPSULE | Refills: 1 | Status: SHIPPED | OUTPATIENT
Start: 2018-10-05 | End: 2018-12-11 | Stop reason: SDUPTHER

## 2018-10-05 RX ORDER — LISINOPRIL AND HYDROCHLOROTHIAZIDE 12.5; 1 MG/1; MG/1
1 TABLET ORAL DAILY
Qty: 90 TABLET | Refills: 1 | Status: SHIPPED | OUTPATIENT
Start: 2018-10-05 | End: 2019-05-20 | Stop reason: SDUPTHER

## 2018-10-18 ENCOUNTER — OFFICE VISIT (OUTPATIENT)
Dept: FAMILY MEDICINE CLINIC | Facility: CLINIC | Age: 48
End: 2018-10-18
Payer: COMMERCIAL

## 2018-10-18 VITALS
DIASTOLIC BLOOD PRESSURE: 72 MMHG | RESPIRATION RATE: 18 BRPM | SYSTOLIC BLOOD PRESSURE: 126 MMHG | HEART RATE: 76 BPM | TEMPERATURE: 97.7 F | BODY MASS INDEX: 20.56 KG/M2 | HEIGHT: 67 IN | WEIGHT: 131 LBS

## 2018-10-18 DIAGNOSIS — Z62.21 FOSTER CARE (STATUS): Primary | ICD-10-CM

## 2018-10-18 PROCEDURE — 99396 PREV VISIT EST AGE 40-64: CPT | Performed by: FAMILY MEDICINE

## 2018-10-18 NOTE — PROGRESS NOTES
Kathi Benitez 1970 female MRN: [id number]    Family Medicine Follow-up Visit    ASSESSMENT/PLAN  Problem List Items Addressed This Visit     Jes Baldwin care (status) - Primary     Foster Care: paperwork needed to be completed    1  Completed paperwork for patient  21 Casi Bowden children/ family: [facility] LIABILITY PARTNERSHIP                 Future Appointments  Date Time Provider Indira Wood   10/19/2018 1:00 PM [address] K[address]y Rizwan [facility]   10/22/2018 1:00 PM Aaron Gregorio Rizwan BE [facility]   2019 11:00 AM Prerna Dillon PA-C [facility]        SUBJECTIVE  CC: Follow-up (forms filled out)    HPI  Kathi Benitez is a 57 y o  female who presents for acute visit:    1  Application/ Physical paperwork for foster Care:  Patient is fostering her nephew Gay Johnson 5and [de-identified] year old: ( ) 3/3/2017  Paperwork is for [address] [place] [address] and family's has taken custody of nephew on 10/2/2018  The patient was removed from the care the biological mother Marianela Cuba secondary to negligence, drug use  Review of Systems   Constitutional: Negative for activity change, chills, fatigue and fever  HENT: Negative for congestion and rhinorrhea  Eyes: Negative for visual disturbance  Respiratory: Negative for cough and shortness of breath  Cardiovascular: Negative for chest pain, palpitations and leg swelling  Gastrointestinal: Negative for abdominal pain, constipation, diarrhea, nausea and vomiting  Endocrine: Negative for polyuria  Genitourinary: Negative for decreased urine volume, difficulty urinating, dysuria, flank pain, frequency, hematuria, pelvic pain, urgency, vaginal bleeding, vaginal discharge and vaginal pain  Musculoskeletal: Negative for back pain and neck pain  Skin: Negative for color change, pallor, rash and wound  Allergic/Immunologic: Negative for immunocompromised state     Neurological: Negative for dizziness, tremors, seizures, speech difficulty, weakness and numbness  Hematological: Negative for adenopathy  Does not bruise/bleed easily  Psychiatric/Behavioral: Negative for sleep disturbance  The patient is not nervous/anxious  Historical Information   The patient history was reviewed as follows:    Past Medical History:   Diagnosis Date    GERD (gastroesophageal reflux disease)     Headache     Hematuria     Hypertension     Panic attacks     Psychiatric disorder      Past Surgical History:   Procedure Laterality Date    CYSTOSCOPY  06/08/2018    NO PAST SURGERIES       Family History   Problem Relation Age of Onset    Hypertension Mother     Arthritis Father     Diabetes Father     Hypertension Father     Hyperlipidemia Father       Social History   History   Alcohol Use No     Comment: CONSUMES ALCOHOL OCCASIONALLY AS PER [employer]SCRIPTS     History   Drug Use No     History   Smoking Status    Current Every Day Smoker    Packs/day: 0 20    Types: Cigarettes   Smokeless Tobacco    Never Used     Comment: ONE HALF PACK A DAY OR LESS, CURRENT SOME DAY SMOKER, LIGHT TOBACCO SMOKER  AS PER [employer]SCRIPTS       Medications:     Current Outpatient Prescriptions:     amitriptyline (ELAVIL) 50 mg tablet, TAKE ONE TABLET BY MOUTH AT BEDTIME, Disp: 30 tablet, Rfl: 3    fluticasone (FLONASE) 50 mcg/act nasal spray, , Disp: , Rfl:     lisinopril-hydrochlorothiazide (PRINZIDE,ZESTORETIC) 10-12 5 MG per tablet, Take 1 tablet by mouth daily, Disp: 90 tablet, Rfl: 1    omeprazole (PriLOSEC) 20 mg delayed release capsule, Take 1 capsule (20 mg total) by mouth daily, Disp: 30 capsule, Rfl: 1  No Known Allergies    OBJECTIVE    Vitals:   Vitals:    10/18/18 1356   BP: 126/72   Pulse: 76   Resp: 18   Temp: 97 7 °F (36 5 °C)   Weight: 59 4 kg (131 lb)   Height: 5' 7" (1 702 m)           Physical Exam   Constitutional: She is oriented to person, place, and time   She appears well-developed and well-nourished  No distress  HENT:   Head: Normocephalic  Eyes: Pupils are equal, round, and reactive to light  Conjunctivae and EOM are normal  Right eye exhibits no discharge  Left eye exhibits no discharge  Neck: Normal range of motion  Cardiovascular: Normal rate  Murmur heard  Pulmonary/Chest: Effort normal and breath sounds normal  No respiratory distress  She has no wheezes  She has no rales  She exhibits no tenderness  Abdominal: Soft  She exhibits no distension and no mass  There is no tenderness  There is no rebound and no guarding  Musculoskeletal: Normal range of motion  She exhibits no edema  Neurological: She is alert and oriented to person, place, and time  Skin: Skin is warm  No rash noted  She is not diaphoretic  No erythema  No pallor  Psychiatric: She has a normal mood and affect   Her behavior is normal  Judgment and thought content normal               Tana Puente MD, PGY-3  [facility]   10/18/2018

## 2018-10-18 NOTE — ASSESSMENT & PLAN NOTE
Foster Care: paperwork needed to be completed    1  Completed paperwork for patient     21 Nachodulce maria Dennise children/ family: [facility] LIMITED LIABILITY PARTNERSHIP

## 2018-10-24 ENCOUNTER — CLINICAL SUPPORT (OUTPATIENT)
Dept: FAMILY MEDICINE CLINIC | Facility: CLINIC | Age: 48
End: 2018-10-24
Payer: COMMERCIAL

## 2018-10-24 DIAGNOSIS — Z23 NEED FOR TUBERCULOSIS VACCINATION: Primary | ICD-10-CM

## 2018-10-24 PROCEDURE — 86580 TB INTRADERMAL TEST: CPT

## 2018-10-26 ENCOUNTER — CLINICAL SUPPORT (OUTPATIENT)
Dept: FAMILY MEDICINE CLINIC | Facility: CLINIC | Age: 48
End: 2018-10-26

## 2018-10-26 DIAGNOSIS — Z11.1 PPD SCREENING TEST: Primary | ICD-10-CM

## 2018-10-26 LAB
INDURATION: 0 MM
TB SKIN TEST: NEGATIVE

## 2018-11-05 ENCOUNTER — TELEPHONE (OUTPATIENT)
Dept: FAMILY MEDICINE CLINIC | Facility: CLINIC | Age: 48
End: 2018-11-05

## 2018-11-05 NOTE — TELEPHONE ENCOUNTER
Pt came in with [address] paper to be signed by doctor, pt was last seen by Dr Rosio Craig on 10/18/18, Can someone else sign since Dr Rosio Craig isnt here, I will place the paper in Dr Rosio Craig folder  Please call pt when ready to  [phone]

## 2018-11-06 NOTE — TELEPHONE ENCOUNTER
Please have attending review -- on quick chart review I am unable to find reason for disability  Thank you!

## 2018-12-11 DIAGNOSIS — K21.9 GASTROESOPHAGEAL REFLUX DISEASE WITHOUT ESOPHAGITIS: ICD-10-CM

## 2018-12-11 DIAGNOSIS — F41.9 ANXIETY: ICD-10-CM

## 2018-12-12 RX ORDER — AMITRIPTYLINE HYDROCHLORIDE 50 MG/1
50 TABLET, FILM COATED ORAL
Qty: 30 TABLET | Refills: 2 | Status: SHIPPED | OUTPATIENT
Start: 2018-12-12 | End: 2020-11-18 | Stop reason: SDUPTHER

## 2018-12-12 RX ORDER — OMEPRAZOLE 20 MG/1
20 CAPSULE, DELAYED RELEASE ORAL DAILY
Qty: 30 CAPSULE | Refills: 2 | Status: SHIPPED | OUTPATIENT
Start: 2018-12-12 | End: 2021-02-24

## 2019-01-08 ENCOUNTER — OFFICE VISIT (OUTPATIENT)
Dept: FAMILY MEDICINE CLINIC | Facility: CLINIC | Age: 49
End: 2019-01-08

## 2019-01-08 VITALS
TEMPERATURE: 98.3 F | DIASTOLIC BLOOD PRESSURE: 88 MMHG | SYSTOLIC BLOOD PRESSURE: 128 MMHG | HEIGHT: 67 IN | HEART RATE: 88 BPM | RESPIRATION RATE: 16 BRPM | BODY MASS INDEX: 20.53 KG/M2 | WEIGHT: 130.8 LBS

## 2019-01-08 DIAGNOSIS — M77.11 LATERAL EPICONDYLITIS, RIGHT ELBOW: Primary | ICD-10-CM

## 2019-01-08 PROCEDURE — 99213 OFFICE O/P EST LOW 20 MIN: CPT | Performed by: FAMILY MEDICINE

## 2019-01-08 RX ORDER — NAPROXEN 375 MG/1
375 TABLET ORAL 2 TIMES DAILY WITH MEALS
Qty: 28 TABLET | Refills: 0 | Status: SHIPPED | OUTPATIENT
Start: 2019-01-08 | End: 2019-03-09

## 2019-01-08 NOTE — PATIENT INSTRUCTIONS
Ejercicios para el codo de [place]   CUIDADO AMBULATORIO:   Ejercicios para el codo de [place] a disminuir el dolor del codo, el Gar[place], la [address] y la mano  Lenetta Porteous a fortalecer los músculos del Graydon Matas y prevenir más lesiones  Empiece con estos ejercicios lentamente  Mk los ejercicios con ambos brazos  Deténgase si siente dolor  · Extensión de los dedos:  Mantenga los dedos juntos  Coloque julita goma elástica alredmarkoor [employer] dedos y pulgar  Separe los dedos y luego júntelos lentamente sin permitir que la cabezas se suelte  Repita 40 veces  Codo de [place]   CUIDADO AMBULATORIO:   El codo de [place]  es la inflamación de los tendones en solares codo  Los tendones son tejidos shawn que conectan el músculo al [employer]  Los síntomas más comunes incluyen los siguientes:   · Dolor en el costado del codo que llega hasta el Graydon Matas, el antebrazo o los dedos    · Debilidad en la [address] 1 o mano    · Dificultad para sujetar, levantar, o agarrar un objeto brittany julita taza de café    · Enrojecimiento, hinchazón, calentura en la piel, en la parte externa de solares codo  Busque atención médica de inmediato si:   · Repentinamente no tiene sensación en solares brazo, mano o dedos  · Repentinamente no puede  solares brazo, dewey o dedos  Pregúntele a solares Cherylann Bimler vitaminas y minerales son adecuados para usted  · Los síntomas no mejoran dentro de las [facility], incluso con tratamiento  · Usted tiene [employer] o debilidad en el brazo, [address], [place] o dedos  · Usted tiene entumecimiento u hormigueo [employer] brazo, [address], mano o dedos  · Usted tiene preguntas o inquietudes acerca de solares condición o cuidado  Tratamiento:   · Los dispositivos de apoyo  pueden ser necesarios para limitar el movimiento del brazo  Por ejemplo, un brazalete, julita abrazadera o julita férula  Estos dispositivos también ayudan a reducir el dolor y prevenir más daño al tendón  · Acetaminofeno:  soy el dolor y baja la fiebre   Está disponible sin receta médica  Pregunte la cantidad y la frecuencia con que debe tomarlos  [address]  Bhavana las etiquetas de todos los demás medicamentos que esté usando para saber si también contienen acetaminofén, o pregunte a solares médico o farmacéutico  El acetaminofén puede causar daño en el hígado cuando no se klaus de forma correcta  No use más de 4 gramos (4000 miligramos) en total de acetaminofeno en un día  · AINEs (Analgésicos antiinflamatorios no esteroides) brittany el ibuprofeno, ayudan a disminuir la inflamación, el dolor y la [place]  Yolanda medicamento esta disponible con o sin marian receta médica  Los AINEs pueden causar sangrado estomacal o problemas renales en ciertas personas  Si usted klaus un medicamento anticoagulante, siempre pregúntele a solares médico si los DOMI son seguros para usted  Siempre bhavana la etiqueta de yolanda medicamento y [place] instrucciones  · Marian inyección de esteroides  ayudará a aliviar el dolor y la hinchazón  · Fisioterapia  podría recomendarse  Un fisioterapeuta le puede enseñar ejercicios para ayudarle a mejorar el movimiento y la fuerza, y para disminuir el dolor  · Cirugía  podría ser necesaria si los síntomas no mejoran con otros tratamientos  Дмитрий la [facility], solares médico extraerá cualquier tejido dañado  También el médico puede cortar solares tendón y luego unirlo nuevamente  Cuidados personales:   · Descanse  el Cyndra Denny y [employer] actividades que causan dolor  [place] ayudará a que los tendones sanen  · Aplique hielo  en el codo de 15 a 20 minutos cada hora o brittany se le indique  Use un paquete de hielo o ponga hielo molido dentro de [employer]  Cúbralo con marian toalla antes de aplicarlo sobre solares piel  El hielo ayuda a evitar daño al tejido y a disminuir la inflamación y el dolor  · Eleve  solares codo de manera que quede por encima del nivel de solares corazón lo más que pueda  [place] va a disminuir inflamación y el dolor   [place] solares codo sobre almohadas o [place] para mantenerlo elevado cómodamente  Acuda a joyce consultas de control con birimngham médico según le indicaron  Anote joyce preguntas para que se acuerde de hacerlas [place] joyce visitas  © 2017 [address] Information is for End User's use only and may not be sold, redistributed or otherwise used for commercial purposes  All illustrations and images included in CareNotes® are the copyrighted property of A D A M , Inc  or Esequiel Lua  Esta información es sólo para uso en educación  [place] intención no es darle un consejo médico sobre enfermedades o tratamientos  Colsulte con [place] Nely Bourdon farmacéutico antes de seguir cualquier régimen médico para saber si es seguro y efectivo para usted  Ejercicios para el codo de [place]   LO QUE NECESITA SABER:   ¿Qué son los ejercicios para el codo de [place]? Los ejercicios para el codo de [place] a disminuir el dolor del codo, el [place], la [address] y la mano  [place] a fortalecer los músculos del [place] y prevenir más lesiones  Empiece con estos ejercicios lentamente  Mk los ejercicios con ambos brazos  Deténgase si siente dolor  · Extensión de los dedos:  Mantenga los dedos juntos  Coloque julita goma elástica alredpriya [employer] dedos y pulgar  Separe los dedos y luego júntelos lentamente sin permitir que la cabezas se suelte  Repita 40 veces  · Estiramiento del flexor de la dewey:  Sostenga el brazo estirado en frente de usted con la huff hacia abajo  Use la otra mano para agarrar los dedos  [employer] codo recto y doble lentamente la mano hacia atrás  Las yemas de los dedos deberían apuntar [employer] arriba y la huff debería apuntar al lado contrario de usted  Mk esto hasta que sienta que se estira la parte superior de [place] [address]  Sostenga está posición por 10 segundos  Repita 5 veces  · Estiramiento del extensor de la dewey:  Yolanda estiramiento es lo opuesto al estiramiento del flexor de la dewey   [place] estirado en frente de usted con la huff hacia abajo  Use la otra mano para agarrar los dedos  Mantenga el codo recto y doble lentamente la mano hacia abajo  Las yemas de los dedos deberían apuntar hacia abajo y la huff debería apuntar hacia usted  Mk esto hasta que sienta el estiramiento de la [address]  Sostenga está posición por 10 segundos  Repita 5 veces  · Flexión del bíceps:  Coloque la mano debajo del codo lesionado brittany soporte  [place] la huff para que apunte [place] y sostenga julita pesa en la mano  Consulte con bernstein médico cuánto peso debería usar  Anisa Robert y estire el codo 30 veces  · Agarre:  Sostenga julita pelota de goma suave o julita pelota de tenis en la [place]  Exprima la pelota tan romina brittany pueda y [place] posición  Pregúntele a bernstein médico por cuánto tiempo tiene que [employer]  Repita estos pasos brittany se lo haya indicado bernstein médico   ¿Cuándo keke comunicarme con mi médico?   · El dolor o la debilidad en el brazo, la [address], la mano o los dedos [place]  · Usted tiene entumecimiento u hormigueo [employer] brazo, [address], mano o dedos  · Tiene preguntas o inquietudes acerca de los ejercicios para el codo de [place]  ACUERDOS SOBRE BERNSTEIN CUIDADO:   Usted tiene el derecho de ayudar a planear bernstein cuidado  Aprenda todo lo que pueda sobre bernstein condición y brittany darle tratamiento  Discuta joyce opciones de tratamiento con joyce médicos para decidir el cuidado que usted desea recibir  Usted siempre tiene el derecho de rechazar el tratamiento  Esta información es sólo para uso en educación  [place] intención no es darle un consejo médico sobre enfermedades o tratamientos  Colsulte con bernstein Karina Seals farmacéutico antes de seguir cualquier régimen médico para saber si es seguro y efectivo para usted  © 2017 [address] Information is for End User's use only and may not be sold, redistributed or otherwise used for commercial purposes   All illustrations and images included in CareNotes® are the copyrighted property of A D A M , Inc  or Esequiel Chanell  · Estiramiento del flexor de la dewey:  Sostenga el brazo estirado en frente de usted con la huff hacia abajo  Use la otra mano para agarrar los dedos  Milon Gent codo recto y doble lentamente la mano hacia atrás  Las yemas de los dedos deberían apuntar Piper Fossa arriba y la huff debería apuntar al lado contrario de usted  Mk esto hasta que sienta que se estira la parte superior de solares [address]  Sostenga está posición por 10 segundos  Repita 5 veces  · Estiramiento del extensor de la dewey:  Yolanda estiramiento es lo opuesto al estiramiento del flexor de la dewey  Sostenga el brazo estirado en frente de usted con la huff hacia abajo  Use la otra mano para agarrar los dedos  Mantenga el codo recto y doble lentamente la mano hacia abajo  Las yemas de los dedos deberían apuntar hacia abajo y la huff debería apuntar hacia usted  Mk esto hasta que sienta el estiramiento de la [address]  Sostenga está posición por 10 segundos  Repita 5 veces  · Flexión del bíceps:  Coloque la mano debajo del codo lesionado brittany soporte  Voltee la huff para que apunte [place] y sostenga julita pesa en la mano  Consulte con solares médico cuánto peso debería usar  Mark Parody y estire el codo 30 veces  · Agarre:  Sostenga julita pelota de goma suave o julita pelota de tenis en la [place]  Exprima la pelota tan romina brittany pueda y [place] posición  Pregúntele a solares médico por cuánto tiempo tiene que [employer]  Repita estos pasos brittany se lo haya indicado solares Julee Miles a solares médico qué vitaminas y minerales son adecuados para usted  · El dolor o la debilidad en el brazo, la [address], la mano o los dedos [place]  · Usted tiene entumecimiento u hormigueo [employer] brazo, [address], mano o dedos      · Tiene preguntas o inquietudes acerca de los ejercicios para el codo de [place]  © 2017 [address] Henry Information is for End User's use only and may not be sold, redistributed or otherwise used for commercial purposes  All illustrations and images included in CareNotes® are the copyrighted property of A RANDA A M , Inc  or Esequiel Lua  Esta información es sólo para uso en educación  [place] intención no es darle un consejo médico sobre enfermedades o tratamientos  Colsulte con [place] Kalyani Kayode farmacéutico antes de seguir cualquier régimen médico para saber si es seguro y efectivo para usted

## 2019-01-08 NOTE — ASSESSMENT & PLAN NOTE
Right elbow pain x 2wks: likely lateral epicondylitis     1  REST  2  Naproxen 375mg BID for 7 days ( prn after that )  3   Follow up prn

## 2019-02-15 ENCOUNTER — OFFICE VISIT (OUTPATIENT)
Dept: OBGYN CLINIC | Facility: CLINIC | Age: 49
End: 2019-02-15

## 2019-02-15 VITALS
HEART RATE: 96 BPM | HEIGHT: 66 IN | WEIGHT: 130 LBS | BODY MASS INDEX: 20.89 KG/M2 | DIASTOLIC BLOOD PRESSURE: 92 MMHG | SYSTOLIC BLOOD PRESSURE: 155 MMHG

## 2019-02-15 DIAGNOSIS — Z97.5 IUD (INTRAUTERINE DEVICE) IN PLACE: ICD-10-CM

## 2019-02-15 DIAGNOSIS — B37.3 VAGINAL YEAST INFECTION: Primary | ICD-10-CM

## 2019-02-15 PROCEDURE — 99213 OFFICE O/P EST LOW 20 MIN: CPT | Performed by: NURSE PRACTITIONER

## 2019-02-15 PROCEDURE — 87210 SMEAR WET MOUNT SALINE/INK: CPT | Performed by: NURSE PRACTITIONER

## 2019-02-15 RX ORDER — FLUCONAZOLE 150 MG/1
150 TABLET ORAL ONCE
Qty: 1 TABLET | Refills: 0 | Status: SHIPPED | OUTPATIENT
Start: 2019-02-15 | End: 2019-02-15

## 2019-02-15 NOTE — PATIENT INSTRUCTIONS
Fluconazol (Por la boca)   Se [place] para prevenir y tratar infecciones causadas por hongos  Zach(s) : Diflucan   Existen muchas otras marcas de yolanda medicamento  Yolanda medicamento no debe ser usado cuando:   Yolanda medicamento no es adecuado para todas las personas  No lo use si ha tenido julita reacción alérgica al fluconazol, o si está embarazada  Forma de usar yolanda medicamento:   Abel Miller  · [place] médico le indicara cuanto medicamento necesita usar  No use más medicamento de lo indicado  · Suspensión oral: Agite hamlet antes de cada uso  Mida el líquido oral con Patrice Perea, [place] para uso oral o taza especialmente marcadas para medir medicamentos  · [place] todo [place] medicamento recetado para eliminar [place] infección por completo aunque usted se sienta mejor después de las primeras dosis  · [place] y siga las instrucciones para el paciente que vienen con el medicamento  Hable con [place] médico o farmacéutico si tiene alguna pregunta  · Si olvida julita dosis: Si olvida julita dosis de [place] medicamento, tómelo lo más pronto posible  Si es fermin la hora para [place] próxima dosis, espere hasta entonces para cash [place] dosis regular  No use medicamento adicional para reponer la dosis olvidada  · Guarde el medicamento en un recipiente cerrado a temperatura ambiente y alejado del calor, la humedad y la facundo directa  Guarde la solución oral  en el refrigerador o a temperatura ambiente y BODØ de 15 días  No lo congele  Medicamentos y [employer] que debe evitar:   Consulte con [place] médico o farmacéutico antes de usar cualquier medicamento, incluyendo los que compra sin receta médica, las vitaminas y los productos herbales  · No use yolanda medicamento junto con astemizol, cisaprida, eritromicina, pimozida, quinidina, o terfenadine  · Algunos alimentos y medicamentos pueden afectar la eficacia de fluconazol   Infórmele al médico si usted está usando cimetidina, midazolam, prednisona, rifabutina, rifampicina, teofilina, tofacitinib, triazolam, suplementos de la vitamina A, o voriconazol  Faythe Drilling a solares médico si usted está usando alguno de los siguientes:   ¨ Un anticoagulante (brittany warfarina)  ¨ Un diurético (brittany hidroclorotiazida), o un medicamento para la presión arterial (brittany amlodipino, felodipina, isradipino, losartán, nifedipina)  ¨ Las pastillas anticonceptivas  ¨ Medicamentos para tratar el cáncer (ciclofosfamida, vinblastina, vincristina)  ¨ Algún medicamento para la diabetes que se klaus por vía oral (glipizida, gliburida, tolbutamida)  ¨ Medicamentos para disminuir el colesterol (atorvastatina, fluvastatina, simvastatina)  ¨ Medicamento para tratar la depresión (amitriptilina, nortriptilina)  ¨ Medicamentos para tratar la infección del VIH/SIDA (saquinavir, zidovudina)  ¨ Medicamentos para tratar la malaria (halofantrina)  ¨ Medicamentos para tratar las convulsiones (carbamazepina, fenitoína)  ¨ Medicamentos que debilitan el sistema inmunológico (ciclosporina, sirolimús, tacrolimús)  ¨ Medicamentos narcóticos para el dolor (alfentanil, fentanil, metadona)  ¨ Medicamentos para el dolor o la artritis (aspirina, celecoxib, diclofenaco, ibuprofeno, naproxeno)  Precauciones [place] el uso de yolanda medicamento:   · No es seguro cash yolanda medicamento [place] el embarazo  Podría dañar al feto  Dígale a solares médico de inmediato si usted [place]  · Infórmele al médico si usted está dando de lactar (amamantando), o si tiene enfermedad renal, enfermedad hepática, enfermedad cardíaca, problemas del ritmo cardíaco, cáncer, o infección del VIH/SIDA  · Yolanda medicamento puede provocarle los siguientes problemas:   ¨ Problemas hepáticos  ¨ Reacciones graves en la piel  ¨ Cambios en el ritmo cardíaco, brittany julita condición conocida brittany prolongación del intervalo QT  · Yolanda medicamento podría causarle a usted mareos o somnolencia  No maneje ni antoni otra tarea que pueda ser peligrosa hasta que sepa cómo le afecta yolanda medicamento    · Llame a solares médico si joyce síntomas no mejoran, o si empeoran  · Guarde todos los medicamentos fuera del alcance de los niños  Nunca comparta joyce medicamentos con [employer]  Efectos secundarios que pueden presentarse [place] el uso de wilian medicamento:   Consulte inmediatamente con el médico si nota cualquiera de estos efectos secundarios:  · Reacción alérgica: Comezón o ronchas, hinchazón del madison o las cecil, hinchazón u hormigueo en la boca o garganta, opresión en el pecho, dificultad para respirar  · Ampollas, despelleje, o sarpullido rodriguez en la piel  · Orina oscura o heces pálidas, náuseas, vómitos, falta de apetito, dolor estomacal, coloración amarillenta en la piel u ojos  · Ritmo cardíaco acelerado, golpeante o irregular  · Sangrados, moretones o debilidad inusuales  Consulte con el médico si nota los siguientes efectos secundarios menos graves:   · Dolor de gita  · Náuseas moderadas, vómitos, dolor de BJURHOLM, o diarrea  Consulte con el médico si nota otros efectos secundarios que marcelo son causados por wilian medicamento  Llame a [place] médico para consultarle Miguel Mahoney puede notificar joyce efectos secundarios al FDA al [phone]  © 2017 [address] Information is for End User's use only and may not be sold, redistributed or otherwise used for commercial purposes  Esta información es sólo para uso en educación  [place] intención no es darle un consejo médico sobre enfermedades o tratamientos  Colsulte con [place] Angelo Keas farmacéutico antes de seguir cualquier régimen médico para saber si es seguro y efectivo para usted  Candidiasis vulvovaginal   LO QUE NECESITA SABER:   ¿Qué es la candidiasis vulvovaginal?  La candidiasis vulvovaginal o vaginitis por hongos es julita infección vaginal común  La candidiasis vulvovaginal es causada por un hongo o microorganismos levaduriformes  La cándida se encuentran normalmente en la vagina   Cuando hay demasiados hongos, se puede provocar julita infección  ¿Qué aumenta mi riesgo de candidiasis vulvovaginal?   · Un embarazo,    · Condiciones médicas que suprimen el sistema inmunológico, brittany la diabetes o el VIH y SIDA    · Los medicamentos, brittany los antibióticos, píldoras anticonceptivas o medicamentos con esteroides    · Dispositivos anticonceptivos, brittany los Cherylene Heller y los dispositivos intrauterinos  ¿Cuáles son los signos y síntomas de la candidiasis vulvovaginal?   · Flujo espeso, bay y con apariencia de queso que proviene de la vagina    · Comezón, inflamación o enrojecimiento en solares vagina    · Ardor al orinar  ¿Cómo se diagnostica la candidiasis vulvovaginal?  Solares médico le preguntará acerca de solares historial médico y lo examinará  Nicholas Bonus del flujo vaginal puede mostrar que levadura le está causando la infección  ¿Cómo se trata la candidiasis vulvovaginal?  Los medicamentos ayudan a tratar la infección por el hongo y a disminuir la inflamación  El [employer] puede venir en presentación de [place], de crema tópica o supositorios para la vagina  Con tratamiento, la infección usualmente desaparece en [facility]  ¿Cómo puedo controlar los síntomas? · Use ropa interior de algodón  · [address] vaginal limpia y [place]  · Límpiese de adelante hacia atrás después de orinar o de tener julita evacuación intestinal     · No tenga relaciones sexuales hasta que joyce síntomas hayan desaparecido  · No tome duchas vaginales  · No use perfumes o jabones shawn    · No use aerosoles para higiene femenina, talcos o rosalia de burbujas  ¿Cómo puedo evitar otra infección? · [place] julita ducha en vez de bañarse  · Coma yogur  · Limite la cantidad de alcohol que klaus  · Limite el tiempo en las tinas de hidromasaje  · Controle el azúcar en solares mary si usted es diabético   ¿Cuándo keke buscar atención inmediata? · Usted tiene fiebre y [address]      · Usted está sangrando por solares vagina y no es solares menstruación  · Usted desarrolla un dolor abdominal o pélvico   ¿Cuándo keke comunicarme con mi médico?   · Sandra signos y síntomas empeoran, [address]  · ted tiene preguntas o inquietudes acerca de bernstein condición o cuidado  ACUERDOS SOBRE BERNSTEIN CUIDADO:   Usted tiene el derecho de ayudar a planear bernstein cuidado  Aprenda todo lo que pueda sobre bernstein condición y brittany darle tratamiento  Discuta sandra opciones de tratamiento con sandra médicos para decidir el cuidado que usted desea recibir  Usted siempre tiene el derecho de rechazar el tratamiento  Esta información es sólo para uso en educación  [place] intención no es darle un consejo médico sobre enfermedades o tratamientos  Colsulte con bernstein Hussain Romo farmacéutico antes de seguir cualquier régimen médico para saber si es seguro y efectivo para usted  © 2017 [address] Information is for End User's use only and may not be sold, redistributed or otherwise used for commercial purposes  All illustrations and images included in CareNotes® are the copyrighted property of [employer]IS [employer] , Inc  or Esequiel Lua

## 2019-03-01 ENCOUNTER — PATIENT OUTREACH (OUTPATIENT)
Dept: OBGYN CLINIC | Facility: CLINIC | Age: 49
End: 2019-03-01

## 2019-03-01 ENCOUNTER — OFFICE VISIT (OUTPATIENT)
Dept: OBGYN CLINIC | Facility: CLINIC | Age: 49
End: 2019-03-01

## 2019-03-01 VITALS
HEIGHT: 67 IN | DIASTOLIC BLOOD PRESSURE: 86 MMHG | SYSTOLIC BLOOD PRESSURE: 125 MMHG | HEART RATE: 93 BPM | WEIGHT: 130 LBS | BODY MASS INDEX: 20.4 KG/M2

## 2019-03-01 DIAGNOSIS — Z12.39 BREAST CANCER SCREENING: ICD-10-CM

## 2019-03-01 DIAGNOSIS — Z78.9 LANGUAGE BARRIER AFFECTING HEALTH CARE: Primary | ICD-10-CM

## 2019-03-01 DIAGNOSIS — Z01.419 WOMEN'S ANNUAL ROUTINE GYNECOLOGICAL EXAMINATION: Primary | ICD-10-CM

## 2019-03-01 DIAGNOSIS — Z30.431 IUD CHECK UP: ICD-10-CM

## 2019-03-01 DIAGNOSIS — R39.9 URINARY SYMPTOM OR SIGN: ICD-10-CM

## 2019-03-01 DIAGNOSIS — N39.0 URINARY TRACT INFECTION WITHOUT HEMATURIA, SITE UNSPECIFIED: ICD-10-CM

## 2019-03-01 PROCEDURE — 87086 URINE CULTURE/COLONY COUNT: CPT | Performed by: NURSE PRACTITIONER

## 2019-03-01 PROCEDURE — 99396 PREV VISIT EST AGE 40-64: CPT | Performed by: NURSE PRACTITIONER

## 2019-03-01 RX ORDER — SULFAMETHOXAZOLE AND TRIMETHOPRIM 800; 160 MG/1; MG/1
1 TABLET ORAL EVERY 12 HOURS SCHEDULED
Qty: 6 TABLET | Refills: 0 | Status: SHIPPED | OUTPATIENT
Start: 2019-03-01 | End: 2019-03-04

## 2019-03-01 NOTE — PROGRESS NOTES
CHERRY MET WITH 49 Y/O-- P2-  Japanese SPEAKING WOMAN TO ASSIST WITH INTERPRETATION DURING GYN VISIT  PT EXAM WNL  NEED PAP NEXT YEAR  PT C/O BACK PAIN  UTI ON URINE DIP, WILL  ANTIBIOTIC TODAY AND WILL TAKE IT TWICE A DAY FOR 3 DAYS  PT WILL CALL CHIKA [address] IF SYMPTOMS NOT RESOLVE  PROVIDER WILL CALL AS NEEDED ONCE CULTURE RESULT ARRIVED  PT VERBALIZED UNDERSTANDING  NO OTHER CONCERN AT THIS TIME   WILL CALL TO SCHEDULE MAMMOGRAM EXAM

## 2019-03-01 NOTE — PATIENT INSTRUCTIONS
Fumar cigarrillos y [address], cuidados ambulatorios   INFORMACIÓN GENERAL:   ¿Cuáles son los riesgos para mi charlie si fumo? Los químicos en el tabaco son Mayo Parsley y dañan cada célula de solares cuerpo  Todos los productos que contienen tabaco son peligrosos para usted y para las personas que no fuman, corwin que inhalan el humo de [place]  Aún si usted fuma poco o es un fumador social, el riesgo de cáncer, enfermedad del corazón y enfermedad de los pulmones [place]  Si usted está embarazada o tiene diabetes, el fumar aumenta solares riesgo de sufrir complicaciones  ¿Cuáles son los beneficios para mi charlie si dejo de fumar? · Riesgo más bajo de cáncer, enfermedad cardíaca, coágulos sanguíneos, ataque cardíaco y derrame cerebral    · Riesgo más bajo de complicaciones de diabetes, brittany enfermedad del riñón, arterias o de los ojos y daños al nervio que pueden resultar en amputaciones    · Riesgo más bajo de infecciones y enfermedades pulmonares, brittany neumonía, asma, bronquitis crónica y enfisema           · Beneficios más altos de la quimioterapia si usted ya tiene cáncer y riesgo más bajo de que el cáncer regrese después del tratamiento    · Mejor circulación, permitiendo así que solares cuerpo reciba más oxígeno    · Mejora en la capacidad de sanar y luchar contra infecciones  ¿Cuáles son los beneficios para la charlie de las otras personas si yo dejo de fumar? · Riesgo más bajo de cáncer pulmonar y enfermedad cardíaca en las personas que no fuman    · Riesgo más bajo de pérdidas, parto temprano, peso bajo en recién nacidos y [facility]    · Riesgo más bajo del Síndrome de muerte súbita del lactante (SMSL), obesidad, atrasos en el desarrollo, infecciones en los oídos, resfriados, neumonía, bronquitis, asma y Trastorno de hiperactividad con déficit de atención para solares ray  ¿Dónde puedo encontrar más información y [de-identified] para dejar de fumar? Nunca es muy tarde para dejar de fumar   Pídale a solares proveedor de [employer] información si usted necesita ayuda  · [employer]  Phone: [phone]  Web Address: www SOLARBRUSH  Programe julita triny con bernstein proveedor de [employer] se le haya indicado: Anote joyce preguntas para que se acuerde de hacerlas [place] joyce visitas  ACUERDOS SOBRE BERNSTEIN CUIDADO:   Usted tiene el derecho de participar en la planificación de bernstein cuidado  Aprenda todo lo que pueda sobre bernstein condición y brittany darle tratamiento  Discuta con joyce médicos joyce opciones de tratamiento para juntos decidir el cuidado que usted quiere recibir  Usted siempre tiene el derecho a rechazar bernstein tratamiento  Esta información es sólo para uso en educación  Bernstein intención no es darle un consejo médico sobre enfermedades o tratamientos  Colsulte con bernstein Paulette Angst farmacéutico antes de seguir cualquier régimen médico para saber si es seguro y efectivo para usted  © 2014 [address] is for End User's use only and may not be sold, redistributed or otherwise used for commercial purposes  All illustrations and images included in CareNotes® are the copyrighted property of A D A M , Inc  or Esequiel Lua  Autoexamen del seno para las mujeres   LO QUE NECESITA SABER:   ¿Qué es el autoexamen de seno? Un autoexamen de seno es Bern[place] de revisar si joyce senos tienen protuberancias u otros cambios  Los autoexámenes de seno regulares pueden ayudarla a conocer cómo se lakshmi y se sienten joyce senos normalmente  La mayoría de las protuberancias o cambios en el seno  no son cáncer, corwin usted siempre debería ir con bernstein médico para que la revise  Bernstein médico también puede observarla e indicarle si usted está realizando bernstein autoexamen correctamente  ¿Por qué debería realizarme un autoexamen de seno? El cáncer de seno es el tipo de cáncer más común en las mujeres  Aún si a usted le [employer], todavía puede seguir revisándose regularmente   Si usted sabe cómo se sienten y se lakshmi joyce senos normalmente, eso podría ayudarle a determinar cuándo comunicarse con solares médico  Es posible que julita mamografía no detecte algún cáncer  Usted podría encontrar julita protuberancia [place] un autoexamen de seno que no se detectó con solares mamografía  ¿Cuándo debería realizarme un autoexamen de seno? Mukul solares calendario para que recuerde hacerse un autoexamen del seno en julita fecha regular  Julita forma fácil de recordar es realizar el autoexamen de seno en el mismo día cada mes  Si usted tiene [place], es posible que lo mejor sea que se antoni un autoexamen 1 semana después de que terminó solares periodo  [place] es cuando joyce senos podrían estar menos inflamados, abultados o sensibles  Usted puede realizarse autoexámenes del seno regulares incluso si está dando de lactar o si tiene implantes en el seno  ¿Cómo debería realizarme un autoexamen de seno? · Observe joyce senos en un dalila  Observe el tamaño y la forma de cada seno y del pezón  Revise si hay inflamación, protuberancias, hoyuelos, piel descamada u otros cambios en la piel  Vigile los cambios en el pezón, brittany cuando tiene dolor o que comienza a hundirse  Apriete cuidadosamente ambos pezones y revise si sale líquido (que no sea [employer]) de ellos  Si usted detecta cualquiera de estos u otros cambios en joyce senos, comuníquese con solares médico  Revise joyce senos mientras está sentada o curiel en las 3 siguientes posiciones:    ¨ Cuelgue joyce brazos en joyce costados  ¨ Levante joyce cecil y [place] detrás de solares gita  ¨ Ejerza presión firme con joyce cecil sobre joyce caderas  Inclínese un poco hacia adelante mientras observa joyce senos en el dalila  · Acuéstese y palpe joyce senos  Cuando usted se [place], el tejido de joyce senos se extiende uniformemente sobre solares pecho  [place] facilita que usted sienta protuberancias o cualquier cosa que podría no ser normal para joyce senos  Realice un autoexamen con un seno a la vez      ¨ Coloque julita [employer] o julita toalla debajo de solares hombro ebony  Coloque solares brazo ebony detrás de solares gita  ¨ Use los 3 dedos medios de solares mano derecha  Use las yemas de los dedos, en la parte superior  La yema del dedo es la parte más sensible de solares dedo  ¨ Mk círculos pequeños para sentir el tejido del seno  Use las yemas de joyce dedos para hacer círculos pequeños empalmados sobre joyce senos y [place]  Use presión ligera, media y firme  More, presione ligeramente  Después, presione con julita presión media para sentir un poco más profundo en solares seno  Por último, use presión firme para sentir solares seno en lo más profundo  ¨ Examine el área completa de solares seno  Examine el área del seno desde arriba hasta abajo donde usted siente las [place]  Noy  pequeños con las yemas de los dedos, comenzando en la parte media de solares axila  Noy  subiendo y [place] el área del seno  Continúe hacia solares seno, hasta llegar al [employer]  Examine toda el área desde la axila hasta el centro de solares pecho (Arabella Rodriguez)  Deténgase en el centro de solares pecho  ¨ Mueva la almohada o toalla hacia solares hombro derecho y coloque solares brazo derecho detrás de solares gita  Use las yemas de los 3 dedos medios de solares mano izquierda y repita los pasos anteriores para realizar un autoexamen en solares seno derecho  ¿Qué más puedo hacer para la revisión de problemas de seno o del cáncer de seno? Algunos expertos sugieren que las mujeres de 36 años de edad y mayores deberían realizarse julita mamografía cada año  Otros sugieren [employer] 50 y 78 años de edad se ramon julita mamografía cada 2 años  Consulte con solares médico sobre cuándo usted debería [employer]  ¿Cuándo keke comunicarme con mi médico?   · Usted encuentra algún tipo de bulto o cambio en joyce senos  · Usted tiene [facility], o le sale líquido de joyce pezones  · Usted tiene preguntas o inquietudes acerca de solares condición o cuidado    ACUERDOS SOBRE SOLARES CUIDADO: Usted tiene el derecho de ayudar a planear [place] cuidado  Aprenda todo lo que pueda sobre [place] condición y brittany darle tratamiento  Discuta joyce opciones de tratamiento con joyce médicos para decidir el cuidado que usted desea recibir  Usted siempre tiene el derecho de rechazar el tratamiento  Esta información es sólo para uso en educación  [place] intención no es darle un consejo médico sobre enfermedades o tratamientos  Colsulte con [place] Hector Patch farmacéutico antes de seguir cualquier régimen médico para saber si es seguro y efectivo para usted  © 2017 [address] is for End User's use only and may not be sold, redistributed or otherwise used for commercial purposes  All illustrations and images included in CareNotes® are the copyrighted property of A D A M , Inc  or Esequiel Lua  Mamografía   INFORMACIÓN GENERAL:   ¿Qué es Makayla Hair? Julita mamografía es julita radiografía que revisa joyce senos para cáncer  ¿Quién debería tener julita mamografía? Usted debe realizarse julita mamografía si siente julita protuberancia o nota otros cambios [place] un autoexamen de los senos  Consulte con [place] médico acerca de cuándo usted debería iniciar a realizar las mamografías  ¿Cómo se prepara para la mamografía? · No se aplique desodorante, talco, crema o perfume  Estos productos podrían provocar que aparezcan partículas en la mamografía  · Vístase con ropa de dos piezas  · Es necesario que se exprima la leche materna antes de realizar la mamografía si usted está amamantando  · [place] consigo julita lista con las fechas y el lugar de las [address], estudios de los senos u otros tratamientos que le de la garza realizado anteriormente  · Si joyce senos se encuentran sensibles antes de [place] período mensual, no realice julita [employer]  Programe [place] triny para la mamografia para julita semana después de terminar [place] período mensual   ¿Cómo se [place]?   Usualmente se klaus julita radiografía de vista superior y Akanksha Flurry de vista lateral para cada seno  Infórmele a los médicos si usted tiene implantes mamarios o problemas en joyce senos antes de realizar la mamografía  Es posible que necesite radiografías adicionales de cada seno  · Se le dará [place] de hospital  Remueva solares ropa de la cintura para [facility]  Se [place] la bata de hospital con la apertura en la parte delantera  · Usted se sentará o se parará cerca de julita pequeña máquina de ayan X  El médico le ayudará a colocar ashok de joyce senos sobre la lámina de ayan X  Se mueve solares brazo y el seno hasta encontrar la posición correcta  · Solares seno se presiona suavemente entre [place] de plástico por unos segundos mientras se klaus la radiografía  [place] podría sentir incómodo  · Se le pedirá que contenga solares respiración mientras se klaus la radiografía  Se efectuará otra radiografía en el mismo seno, luego de modificar la posición de la máquina de ayan X     · Examinarán el otro seno de la [place]  ¿Qué sucede después de la mamografía? Usted podría presentar sensibilidad en joyce senos por julita duración corta de tiempo después de la mamografía  Usted puede realizar todas joyce actividades regulares después de la [place]  Pregúntele al médico cuándo se supone que usted recibirá joyce [place]  ¿Cuáles son los riegos de la mamografía? Se le expondrá a julita pequeña cantidad de radiación  Es posible que no se detecte algunos cánceres con la mamografía  ¿Cuándo necesito comunicarme con el médico?  Comuníquese con solares médico de cabecera si:  · Usted no puede acudir a solares triny a tiempo  · Usted no recibe los resultados cuando esperaba  · Usted tiene preguntas o inquietudes acerca de la mamografía  ACUERDOS SOBRE SOLARES CUIDADO:   Usted tiene el derecho de participar en la planificación de solares cuidado  Aprenda todo lo que pueda sobre solares condición y brittany darle tratamiento   Discuta con joyce médicos joyce opciones de tratamiento para juntos decidir el cuidado que usted quiere recibir  Usted siempre tiene el derecho a rechazar [place] tratamiento  Esta información es sólo para uso en educación  [place] intención no es darle un consejo médico sobre enfermedades o tratamientos  Colsulte con [place] Gaylyn Harsh farmacéutico antes de seguir cualquier régimen médico para saber si es seguro y efectivo para usted  © 2014 [address] is for End User's use only and may not be sold, redistributed or otherwise used for commercial purposes  All illustrations and images included in CareNotes® are the copyrighted property of A D A M , Inc  or Esequiel Lua  Prueba de extensión de Pap   INFORMACIÓN GENERAL:   ¿Qué es julita extensión de Pap? Catherne Raffaele de Pap, o prueba de Pap, es un procedimiento para buscar células anormales en [place] gee uterino o cérvix  El gee uterino es la abertura estrecha en la parte inferior de [place]  El gee uterino se une a la parte superior de la vagina  ¿Cómo me preparo para julita extensión de Pap? El mejor momento para programar julita prueba es inmediatamente después que termine [place] menstruación  No se realice julita extensión de Pap [place] menstruación  No tenga relaciones sexuales o inserte nada dentro de [place] vagina [place] 24 horas antes de [place] examen de extensión de Pap  ¿Qué ocurrirá [place] julita extensión de Pap? · Usted se acostará sobre [place] espalda y colocará joyce pies en unos posapiés conocidos brittany estribos  [place] médico cuidadosamente insertará un aparato conocido brittany espéculo dentro de [place] vagina  El espéculo se [place] para expandir las fuentes de [place] vagina para que él pueda jimmy [place] cérvix  Él utilizará julita brocha delgada o un hisopo de algodón para obtener células de la parte interior de [place] cérvix  · [place] médico también obtendrá células de la superficie de [place] cérvix con julita herramienta de plástico o anderson conocida brittany espátula   Es posible que él también raspe cuidadosamente la parte superior de bernstein vagina para obtener Shannan Quant  Salvadore Bullard son colocadas en un envase con líquido o en julita lámina de jeana  Estos se envían a un laboratorio para ser analizados en busca de células anormales  ¿Con qué frecuencia necesito julita extensión de Pap? Las extensiones de Pap generalmente son realizadas cada 1 a 3 años  Usted puede que necesite julita extensión de Pap más frecuentemente si usted tiene cualquiera de lo siguiente:  · Un resultado positivo de la prueba del virus del papiloma humano (VPH)    · Intraepitelial neoplasma cervical o cáncer cervical    · VIH    · Un sistema inmune débil    · Exposición al [facility]  Holdings dietilstilbestrol (MARYANN) cuando bernstein madre estuvo embarazada de usted  ACUERDOS SOBRE BERNSTEIN CUIDADO:   Usted tiene el derecho de participar en la planificación de bernstein cuidado  Aprenda todo lo que pueda sobre bernstein condición y brittany darle tratamiento  Discuta con joyce médicos joyce opciones de tratamiento para juntos decidir el cuidado que usted quiere recibir  Usted siempre tiene el derecho a rechazar bernstein tratamiento  Esta información es sólo para uso en educación  Bernstein intención no es darle un consejo médico sobre enfermedades o tratamientos  Colsulte con bernstein Lesleigh Margaret farmacéutico antes de seguir cualquier régimen médico para saber si es seguro y efectivo para usted  © 2014 [address] is for End User's use only and may not be sold, redistributed or otherwise used for commercial purposes  All illustrations and images included in CareNotes® are the copyrighted property of A D A M , Inc  or Esequiel Lua  Menopausia   LO QUE NECESITA SABER:   ¿Qué es la menopausia? La menopausia es julita fase de transición normal en la otoniel de la zackary cuando cesa por definitivo bernstein menstruación  La menopausia comienza cuando los ovarios disminuyen lentamente la producción de hormonas femeninas conocidas brittany estrógeno y progesterona   Después de la menopausia, a julita zackary ya no le es posible quedar embarazada  Marian zackary que no ha tenido un periodo por un año completo después de la edad de 47 es considerada que está en la menopausia  La perimenopausia o pre-menopausia es un estado previo a la menopausia que puede causar signos y síntomas parecidos a la menopausia  La perimenopausia puede durar un promedio de 4 a 5 años  ¿Cuáles son los signos y síntomas de la menopausia? Los signos y síntomas de la menopausia pueden ser diferentes de [place] zackary a otra:  · Cambios en los ciclos de la menstruación brittany ausencia de períodos o períodos que son más seguidos el ashok del otro, más ligeros o profusos de lo usual     · Sofocos (sentirse Anny Sierra y pontyrel Wolf)  · Cambios en el estado de ánimo brittany irritabilidad o falta de interés en el sexo  · Cambios en los senos brittany sentirlos sensibles o adoloridos    · Cambios en el jorje brittany adelgazamiento del grosor del jorje o puede notar más vello en solares madison    · Cambios vaginales brittany mayor sequedad     · Cambios del tracto urinario brittany un incremento en las infecciones en el tracto urinario o la urgencia de orinar (sensación que necesita orinar de inmediato)    · Otros síntomas brittany dolor de gita, dificultad para dormir, fatiga o palpitaciones del corazón (latidos cardíacos shawn, acelerados)  ¿Qué es lo que necesito saber sobre la menopausia? · Usted todavía puede quedar embarazada mientras tiene joyce periodos  Si usted no quiere quedar embarazada debe seguir [employer]  Usted necesita seguir utilizando anticonceptivos hasta que haya pasado 1 año de melany terminado joyce períodos  Consulte con solares médico para que le indique cuando puede dejar de utilizar anticonceptivos para prevenir un [place]  · La terapia de reemplazo hormonal se puede utilizar para tratar los síntomas de la menopausia  La terapia de reemplazo hormonal es un medicamento que reemplaza el bajo nivel de joyce hormonas   La terapia contiene estrógeno y algunas veces progestina  La terapia hormonal tiene beneficios y riesgos  La terapia de reemplazo hormonal reduce el riesgo de fractura de huesos al ayudar a prevenir la osteoporosis  También la terapia la proteje de cáncer de colon  Sin embargo esta terapia puede llegar a aumentar solares riesgo de cáncer de seno, coágulos sanguíneos, enfermedad cardíaca y derrame cerebral  Consulte con solares médico si la terapia de reemplazo hormonal es conveniente para usted  ¿Cómo puedo seguir un estilo de otoniel saludable [place] y después de la menopausia? Después de la [facility] solares riesgo de presentar enfermedades del corazón y pérdida de densidad ósea  Consulte [employer] siguientes y otras formas para mantenerse saludable:  · Realice actividad física con regularidad  El ejercicio le ayuda a mantener un peso saludable  Realizar julita actividad física también puede ayudarle a controlar solares presión arterial y los niveles de [place]  Incluya ejercicios de resistencia o levantamiento de pesas para fortalecer los [employer]  Pregunte a solares médico acerca del mejor plan de ejercicio para usted  · Consuma alimentos saludables y variados  Debe incluir frutas, verduras, granos enteros (pan integral, pasta y cereales) productos lácteos descremados, y alimentos con proteínas (frijoles, carne de aves y pescado)  Limite los alimentos altos en sodio (sal)  Solicite con solares médico más información sobre un plan de comidas que sea el adecuado para usted  · Mantenga un peso saludable  Consulte con solares médico antes de empezar cualquier programa para bajar de [place]  · [address]  Usted podría necesitar calcio y vitamina D adicionales para prevenir la osteoporosis  · Limite el consumo de alcohol y cafeína  El alcohol y la cafeína pueden empeorar joyce síntomas  · No fume  Si usted fuma, nunca es demasiado tarde para dejar de hacerlo   Si usted fuma, es más probable que tenga un ataque cardíaco, enfermedad pulmonar, coágulos sanguíneos o cáncer  Solicite información a bernstein médico si usted necesita ayuda para dejar de fumar  ¿Cuándo keke comunicarme con mi médico?   · Usted tiene sangrado vaginal después de la menopausia  · Usted tiene preguntas o inquietudes acerca de bernstein condición o cuidado  ACUERDOS SOBRE BERNSTEIN CUIDADO:   Usted tiene el derecho de ayudar a planear bernstein cuidado  Aprenda todo lo que pueda sobre bernstein condición y brittany darle tratamiento  Discuta joyce opciones de tratamiento con jocye médicos para decidir el cuidado que usted desea recibir  Usted siempre tiene el derecho de rechazar el tratamiento  Esta información es sólo para uso en educación  Bernstein intención no es darle un consejo médico sobre enfermedades o tratamientos  Colsulte con bernstein Rosalva Omid farmacéutico antes de seguir cualquier régimen médico para saber si es seguro y efectivo para usted  © 2017 [address] Information is for End User's use only and may not be sold, redistributed or otherwise used for commercial purposes  All illustrations and images included in CareNotes® are the copyrighted property of A D A M , Inc  or Esequiel Lua  Levonorgestrel (Dentro del Marlinda Sees)   Sirve para prevenir el embarazo y tratar sangrado menstrual abundante  [place] es un dispositivo intrauterino (DIU), el cual es un método anticonceptivo reversible  Wilian dispositivo va liberando lentamente levonorgestrel, julita hormona  Zach(s) : Celena Obregon Arm, Skyla   Existen muchas otras marcas de Marta  Wilian medicamento no debe ser usado cuando:   Wilian dispositivo no es adecuado para todas las personas  No lo use si ha tenido julita reacción alérgica al levonorgestrel, o si está embarazada    Forma de usar wilian medicamento:   Dispositivo  · El DIU por lo general es insertado por bernstein médico [place] bernstein período menstrual  Usted necesitará acudir al [facility] 4 a 6 semanas después de que le coloquen el dispositivo y [employer] vez al Jimenez [place]  · Solares DIU tiene julita cuerda o "cola" que está hecha de hilo plástico  Cerca de Joseph Dayan a dos pulgadas quedan colgando dentro de solares vagina  El cordón no se puede jimmy y no le causará ningún problema cuando tenga relaciones sexuales  Revise el hilo del dispositivo después de cada período menstrual  Es posible que no esté protegida contra el embarazo si usted no puede tocar la cuerda o si puede tocar el plástico del DIU  Mk lo siguiente para comprobar que el DIU está en solares lugar:  [address] cecil con José Miguel y agua tibia  Séqueselas con julita toalla limpia  ¨ Doble las rodillas y póngase en cuclillas cerca del suelo  ¨ Inserte suavemente solares dedo índice dentro de la vagina  El gee uterino (cérvix) está en la parte superior de la vagina  Busque la cuerda del DIU que sale de solares cérvix  Nunca coyne de la cuerda  Usted no  debe sentir el plástico del DIU  Lávese las [place], después de terminar el chequeo de la cuerda de solares dispositivo  · Al cabo de 3 años será necesario que solares [employer] reemplace solares [address], o después de 5 años para [address]  También será necesario reemplazarlo en mari que se salga de solares útero  Medicamentos y [employer] que debe evitar:   Consulte con solares médico o farmacéutico antes de usar cualquier medicamento, incluyendo los que compra sin receta médica, las vitaminas y los productos herbales  · Algunos medicamento pueden afectar el funcionamiento de wilian dispositivo  Dígale a solares médico si usted está usando un anticoagulante (incluyendo Levester Ever)  Precauciones [place] el uso de wilian medicamento:   · Informe a solares médico si está dando de lactar (amamantando) o ha tenido un bebé, un aborto espontaneo o un aborto en los últimos 3 meses   Informe a solares médico si usted tiene enfermedad hepática (incluyendo cáncer o un tumor), cáncer de mama, problemas cardíacos o circulatorios, incluyendo antecedentes de problemas con las válvulas cardíacas, enfermedad del corazón, problemas de coagulación, accidente cerebrovascular, ataque cardíaco o presión arterial sushila  Informe a solares médico si usted tiene problemas con solares sistema inmune o si se ha sometido a [facility] de joyce órganos femeninos (especialmente las trompas de [place])  · Informe a solares médico si ha tenido problemas, infecciones u otras condiciones que le de la garza afectado el sistema reproductivo  Existen demasiados problemas que pueden hacer que el dispositivo no sea la mejor opción, incluyendo fibrosis, sangrados sin explicación, un útero que tiene Diego Lindon forma inusual, julita infección reciente, julita enfermedad de inflamación pélvica, un resultado anormal del examen de [place]apan[place]olaou, un embarazo ectópico, cáncer o sospecha de cáncer o un dispositivo intrauterino anteriormente  · Existe julita mínima posibilidad que usted pueda quedar embarazada mientras utiliza un dispositivo uterino, así brittany la hay con cualquier método de holm para el control de la natalidad  Si usted [place] solares médico puede extraer el dispositivo para disminuir el riesgo de un aborto espontáneo u otros problemas  · Yolanda medicamento puede causar los siguientes problemas:  ¨ Un mayor riesgo de presentar un embarazo ectópico (un [place]on Lemon del [place])  ¨ Un mayor riesgo de presentar julita infección grave conocida brittany enfermedad inflamatoria pélvica  ¨ Un mayor riesgo de presentar quistes en el ovario  ¨ Perforación o un orificio en la pared del [place], lo cual puede dañar otros órganos  · Es posible que usted presente manchado y cólicos [place] las primeras semanas después de melany sido insertado el [place]  Estos síntomas deben disminuir o desaparecer en un par de semanas o duraran hasta 6 meses  · Usted puede tener menos sangrado o incluso la ausencia del período o dina hacia el final del primer año   Llame a solares médico si usted tiene un cambio del patrón de solares sangrado habitual después de melany tenido solares dispositivo uterino por un tiempo, brittany por ejemplo aumento del sangrado o si tiene la ausencia de un período (y usted estaba teniendo períodos aún con solares DIU)  · Un dispositivo, DIU, se puede deslizar parcialmente o totalmente fuera del útero  En mari que ésto suceda, use preservativos u otro método de control anticonceptivo y llame a solares médico de inmediato  · Wilian dispositivo no la protegerá contra el VIH/SIDA, el herpes, o cualquier otras enfermedades de transmisión sexual   · Si usted tiene el dispositivo de la leif Jocelyne IUD o Lesa Pulling, informe al médico que la atiende antes de que le realicen julita RM  Efectos secundarios que pueden presentarse [place] el uso de wilian medicamento:   Consulte inmediatamente con el médico si nota cualquiera de estos efectos secundarios:  · Reacción alérgica: Comezón o ronchas, hinchazón del madison o las cecil, hinchazón u hormigueo en la boca o garganta, opresión en el pecho, dificultad para respirar  · Dolor en el pecho, problemas con el habla o para caminar, adormecimiento o debilidad en solares brazo o pierna, o en un lado de solares cuerpo  · Sangrado abundante de solares vagina  · Dolor [place] el coito o solares priscila siente el plástico tyler del DIU [place] el coito  · Sergo dolor de gita, cambios de la visión  · Dolor de estómago o en la pelvis, sensibilidad o cólicos súbitos o intensos  · Flujo vaginal maloliente, fiebre, escalofríos, llagas en el área genital  · Piel u ojos amarillos  Consulte con el médico si nota los siguientes efectos secundarios menos graves:   · Acné u otros cambios en la piel  · Dolor en el pecho  · Cambio en el patrón de sangrado después de los primeros meses  · [employer], desvanecimientos después de introducido el DIU  · Comezón leve alrededor de solares vagina o en el área genital  Consulte con el médico si nota otros efectos secundarios que marcelo son causados por wilian medicamento  Llame a solares médico para consultarle Miguel Mahoney puede notificar joyce [employer] secundarios al [facility] [phone]  © 2017 [address] Information is for End User's use only and may not be sold, redistributed or otherwise used for commercial purposes  Esta información es sólo para uso en educación  Solares intención no es darle un consejo médico sobre enfermedades o tratamientos  Colsulte con solares Litzy Lesser farmacéutico antes de seguir cualquier régimen médico para saber si es seguro y efectivo para usted  Sulfametoxazol/trimetoprima (Sulfamethoxazole/Trimethoprim) (Por la boca)   Se [place] para tratar o prevenir infecciones  Zach(s) : Bactrim, Bactrim DS, [id number]-TMP Pediatric, Sulfatrim, Sulfatrim Pediatric   Existen muchas otras marcas de yolanda medicamento  Yolanda medicamento no debe ser usado cuando:   Yolanda medicamento no es adecuado para todas las personas  No lo use si ha tenido julita reacción alérgica a la trimetroprima, al sulfametoxazol o a cualquier sulfonamida  No use yolanda medicamento si está embarazada, si padece de anemia causada por niveles bajos de ácido fólico o tiene antecedentes de trombocitopenia inducida por fármacos  Forma de usar yolanda medicamento:   Aline Alex  · Solares médico le indicara cuanto medicamento necesita usar  No use más medicamento de lo indicado  · Mida el líquido oral con [place], [place] para uso oral o taza especialmente marcadas para medir medicamentos  · [employer] líquidos para que pueda orinar con Alveria Limber y ayudar a prevenir problemas en joyce riñones  · [place] todo solares medicamento recetado para eliminar solares infección por completo aunque usted se sienta mejor después de las primeras dosis  · Si olvida julita dosis: Si olvida julita dosis de solares medicamento, tómelo lo más pronto posible  Si es fermin la hora para solares próxima dosis, espere hasta entonces para cash solares dosis regular  No use medicamento adicional para reponer la dosis olvidada    · Guarde el medicamento en un recipiente cerrado a temperatura ambiente y [place] del calor, la humedad y [facility]  No congele la solución oral   Medicamentos y alimentos que debe evitar:   Consulte con solares médico o farmacéutico antes de usar cualquier medicamento, incluyendo los que compra sin receta médica, las vitaminas y los productos herbales  · Algunos medicamentos pueden afectar la función de [email or web address]  Informe a solares médico si [facility] [place] siguientes medicamentos:   ¨ amantadina, ciclosporina, digoxina, indometacina, [place], metotrexato, fenitoína, pirimetamina, o warfarina  ¨ un inhibidor de la ECA, medicamentos para la diabetes (glipizida, gliburida, metformina, pioglitazone, repaglinida, rosiglitazona), un diurético (brittany hidroclorotiazida), o antidepresivos tricíclicos  Precauciones [place] el uso de yolanda medicamento:   · No es seguro cash yolanda medicamento [place] el embarazo  Podría dañar al feto  Dígale a solares médico de inmediato si usted [place]  · Infórmele a solares médico si usted está dando de lactar, o si padece de enfermedad en el riñón o hígado, diabetes, malabsorción o desnutrición, deficiencia de ácido fólico, angie, problemas de tiroides, o antecedentes de alcoholismo  Infórmele a solares médico si usted sufre de asma o alergias graves, especialmente si usted es alérgico a cualquier medicamento  Es importante que solares médico sepa si usted padece de VIH o BODØ, porque yolanda medicamento puede funcionar de forma diferente para usted  · Yolanda medicamento podría causar Alberteen Levar reacción alérgica severa  · Yolanda medicamento podría disminuir la cantidad de plaquetas en solares cuerpo, que son necesarias para la coagulación [place] de [facility]  [place] podría ocasionar que usted mary o que contraiga infecciones con más facilidad  Consulte con solares médico si tiene inquietudes al [employer]  · Yolanda medicamento puede causar diarrea  Llame a solares médico si la diarrea se intensifica, no se detiene, o contiene mary   No tome ningún medicamento para suspender la diarrea hasta que hable con solares médico  La diarrea puede ocurrir 2 meces o más después de suspender el uso de Marta  · Dígale a todo médico o dentista encargado de atenderle que usted está usando Marta  Puede que wilian medicamento afecte algunos resultados de [employer]  · El médico solicitará exámenes de laboratorio [place] las citas de rutina para revisar los efectos de Marta  Asista a todas joyce citas  · Guarde todos los medicamentos fuera del alcance de los niños  Nunca comparta joyce medicamentos con [employer]  Efectos secundarios que pueden presentarse [place] el uso de wilian medicamento:   Consulte inmediatamente con el médico si nota cualquiera de estos efectos secundarios:  · Reacción alérgica: Comezón o ronchas, hinchazón del madison o las cecil, hinchazón u hormigueo en la boca o garganta, opresión en el pecho, dificultad para respirar  · Ampollas, despelleje, o sarpullido rodriguez en la piel  · Orina oscura o heces pálidas, náuseas, vómitos, falta de apetito, dolor estomacal, coloración amarillenta en la piel u ojos  · Dolor del pecho, tos, dificultad para respirar  · Confusión, debilidad  · Espasmos musculares  · Diarrea intensa, dolor, calambres, o distensión estomacal  · Sarpullido o manchas moradas en la piel, o piel muy pálida o amarillenta  · Dolor de garganta, fiebre, dolor muscular  · Ritmo cardíaco irregular, entumecimiento u hormigueo en las cecil, pies, o labios  · Sangrados, moretones o debilidad inusuales  Consulte con el médico si nota los siguientes efectos secundarios menos graves:   · [place], vómito, o pérdida del apetito  Consulte con el médico si nota otros efectos secundarios que marcelo son causados por wilian medicamento  Llame a solares médico para consultarle Miguel Mahoney puede notificar joyce efectos secundarios al FDA al [phone]    © 2017 [address] Information is for End User's use only and may not be sold, redistributed or otherwise used for commercial purposes  Esta información es sólo para uso en educación  Solares intención no es darle un consejo médico sobre enfermedades o tratamientos  Colsulte con solares Karina Seals farmacéutico antes de seguir cualquier régimen médico para saber si es seguro y efectivo para usted  Infección del tracto urinario relacionada al uso de julita sonda   LO QUE NECESITA SABER:   ¿Qué es julita infección del tracto urinario relacionada al uso de julita sonda? Julita infección urinaria relacionada al uso de julita sonda (CAUTI, por joyce siglas en inglés) es julita infección causada por julita sonda urinaria permanente  Julita sonda urinaria permanente es un tubo wen y flexible que se inserta en la vejiga  Se fernandez en el lugar para drenar la orina  La infección podría viajar a lo maik de la sonda y entrar a la vejiga o a los riñones  ¿Cuál es la causa de julita infección del tracto urinario relacionada al uso de julita sonda? Julita infección urinaria relacionada al uso de julita sonda es provocada por gérmenes que usualmente no viven en el tracto urinario  El germen puede ser un hongo o julita bacteria  Los gérmenes podrían entrar en solares tracto urinario cuando le colocan la sonda o mientras la sonda permanece en la vejiga  Ser zackary y tener julita sonda por más de 48 horas podría aumentar solares riesgo de julita infección relacionada al uso de julita sonda  Las [employer], brittany la diabetes también pueden aumentar solares riesgo  ¿Cuáles son los signos y síntomas de Caitlin infección del tracto urinario relacionada al uso de julita sonda?   Julita infección del tracto urinario relacionada al uso de julita sonda podría no provocar síntomas, o es posible que presente cualquiera de los siguientes:  · Dawn Blacksmith y escalofríos    · Dolor en la parte baja del abdomen o la espalda    · Pus que sale del área en donde se introdujo la sonda, o pus en solares orina     · Orina que tiene mal [place], [place] turbia, con Yudi u Dileep     · Ardor mientras orina u orinar con frecuencia después de que le quitan la sonda     · Necesidad repentina e intensa de orinar    · Confusión en los adultos mayores  ¿Cómo se diagnostica julita infección del tracto urinario relacionada al uso de julita sonda? · Un análisis de orina  proporcionará información acerca del tracto urinario o de la charlie en general      · Un cultivo de orina  podría mostrar el tipo de germen que provoca la infección  · Los análisis de mary:  mostrarán la infección y la función del [place]  ¿Cómo se trata julita infección del tracto urinario relacionada al uso de julita sonda? · Medicamentos,  podrían administrarse para tratar [place] infección o disminuir el dolor y la [place]  · El quitar o cambiar la sonda  podría ayudar a eliminar la infección  ¿Cómo puedo controlar los síntomas? · [address] se le indique  Los líquidos podrían ayudar a que joyce riñones y vejiga eliminen la infección  · Mantenga el área de la sonda limpia  Limpie solares piel alrededor de la sonda brittany se le indique  Báñese julita vez al día  No tome rosalia de danny o se meta en julita danny de hidromasaje hasta que solares infección se cure  · No tenga relaciones sexuales  hasta que solares médico lo autorice  Las relaciones sexuales podrían retardar solares recuperación o provocar otra infección del tracto urinario  ¿Cómo puedo ayudar a evitar julita infección del tracto urinario relacionada al uso de julita sonda? · [employer] cecil antes y después de usar el baño o de tocar la sonda  Lávese las cecil para evitar la propagación de julita infección a solares tracto urinario  · Limpie todas las partes de solares sonda brittany se le indique  Mantenga la tubería de solares sonda limpia  No ponga la sonda en el piso  No permita que la boquilla del drenaje toque el inodoro  Use un hisopo con alcohol para limpiar el extremo de la boquilla del drenaje brittany se le indique  · Mantenga la bolsa del drenaje por debajo de solares cintura    [place] evitará que la orina se devuelva a solares vejiga, lo cuál puede [employer] infección  · Vacíe la bolsa de la orina brtitany se le indique  [place] podría evitar que la orina se devuelva a bernstein vejiga  · Las mujeres deberían limpiarse de adelante hacia atrás  después de julita evacuación intestinal  [place] podría evitar que los gérmenes entren en el tracto urinario  · Mantenga la sonda asegurada a bernstein pierna brittany se le indique  Use cinta o soporte especial para la sonda para evitar que la jale  [place] también podría evitar torceduras que provoquen que la orina se devuelva a bernstein vejiga  ¿Cuándo keke buscar atención inmediata? · Usted tiene dolor severo en la parte inferior de bernstein espalda o en el abdomen  · Usted orina con mary  · Usted fernandez de orinar u orina mucho menos de lo normal   ¿Cuándo keke comunicarme con mi médico?   · Usted tiene fiebre  · Joyce síntomas no mejoran o si empeoran  · Usted tiene preguntas o inquietudes acerca de bernstein condición o cuidado  ACUERDOS SOBRE BERNSTEIN CUIDADO:   Usted tiene el derecho de ayudar a planear bernstein cuidado  Aprenda todo lo que pueda sobre bernstein condición y brittany darle tratamiento  Discuta joyce opciones de tratamiento con joyce médicos para decidir el cuidado que usted desea recibir  Usted siempre tiene el derecho de rechazar el tratamiento  Esta información es sólo para uso en educación  Bernstein intención no es darle un consejo médico sobre enfermedades o tratamientos  Colsulte con bernstein Daniela Meade farmacéutico antes de seguir cualquier régimen médico para saber si es seguro y efectivo para usted  © 2017 [facility] Information is for End User's use only and may not be sold, redistributed or otherwise used for commercial purposes  All illustrations and images included in CareNotes® are the copyrighted property of A D A M , Inc  or Esequiel Lua

## 2019-03-01 NOTE — PROGRESS NOTES
Patient is Malay-speaking- MARIUSZ Jeffery used to translate at today's visit  Subjective      Romina Stallings is a 57 y o  female who presents for annual well woman exam  Last pap 2015- NILM/ HR HPV negative  Next pap smear due 2020  Last mammogram 2017  Due for mammogram today  Amenorrhea  Has Mirena IUD since 2015  Complains today of low back pain, urinary frequency  Denies urgency, dysuria and odor x 2 days  Current contraception: Mirena   History of abnormal Pap smear: no  Family history of uterine or ovarian cancer: no  Regular self breast exam: yes  History of abnormal mammogram: no  Family history of breast cancer: no  History of abnormal lipids: no  Menstrual History:    OB History        2    Para   2    Term   2            AB        Living   2       SAB        TAB        Ectopic        Multiple        Live Births                    Menarche age: 17   No LMP recorded (lmp unknown)  Patient is postmenopausal   Period Pattern: (Mirena IUD - amenorrhea )    The following portions of the patient's history were reviewed and updated as appropriate: allergies, current medications, past family history, past medical history, past social history, past surgical history and problem list     Review of Systems  Pertinent items are noted in HPI        Objective      /86 (BP Location: Left arm)   Pulse 93   Ht 5' 7" (1 702 m)   Wt 59 kg (130 lb)   LMP  (LMP Unknown)   BMI 20 36 kg/m²     General:   alert and oriented, in no acute distress, alert, appears stated age and cooperative   Heart: regular rate and rhythm, S1, S2 normal, no murmur, click, rub or gallop   Lungs: clear to auscultation bilaterally   Abdomen: soft, non-tender, without masses or organomegaly, nondistended and normal bowel sounds   Vulva: normal, Bartholin's, Urethra, Brimley's normal, female escutcheon   Vagina: normal mucosa, normal discharge, no palpable nodules, Mirena IUD string easily visualized on speculum exam Cervix: no cervical motion tenderness and no lesions   Uterus: normal size, non-tender, normal shape and consistency   Adnexa: normal adnexa and no mass, fullness, tenderness   Breast:  Nontender, no palpable masses, no nipple discharge, no skin changes bilaterally          Assessment      @well woman  no contraindication to continue hormonal therapy@   Plan      All questions answered  Breast self exam technique reviewed and patient encouraged to perform self-exam monthly  Contraception: Mirena placed 2/2015 effective through 2/2020  Diagnosis explained in detail, including differential   Dietary diary  Discussed healthy lifestyle modifications  Educational material distributed  Follow up in 1 Year for annual exam   Follow up as needed  Mammogram   Urinalysis  Urine culture and sensitivity  Breast awareness reviewed    Will call with abnormal results  Urinary symptoms-urine dip in office + nitrates; will send urine culture     - Rx Bactrim DS 1 tablet every 12 hours x3 days       - encouraged to increase hydration  Encouraged smoking cessation   RTO 2/ 2020 Mirena removal, 3/2020 annual exam or sooner as needed

## 2019-03-02 LAB — BACTERIA UR CULT: NORMAL

## 2019-03-06 ENCOUNTER — TELEPHONE (OUTPATIENT)
Dept: OBGYN CLINIC | Facility: CLINIC | Age: 49
End: 2019-03-06

## 2019-03-06 NOTE — TELEPHONE ENCOUNTER
Rec'd call from pt, still with c/o lower back pain "feels like kidney pain" and urinary frequency (has been drinking a lot of water)  Pt seen in office on 3/1/19, completed antibiotic treatment for UTI  Message sent to provider for recommendations

## 2019-03-07 ENCOUNTER — TELEPHONE (OUTPATIENT)
Dept: OBGYN CLINIC | Facility: CLINIC | Age: 49
End: 2019-03-07

## 2019-03-07 NOTE — TELEPHONE ENCOUNTER
Per recommendation from [place], [address] [place] [address] Drive, pt is to f/u with her PCP for lower back issues and urinary frequency (urine culture was negative, is not r/t UTI)  I called pt to relay to her this info- pt verbalized understanding

## 2019-03-09 ENCOUNTER — HOSPITAL ENCOUNTER (EMERGENCY)
Facility: HOSPITAL | Age: 49
Discharge: HOME | End: 2019-03-09
Attending: EMERGENCY MEDICINE | Admitting: EMERGENCY MEDICINE
Payer: COMMERCIAL

## 2019-03-09 VITALS
HEART RATE: 80 BPM | HEIGHT: 66 IN | TEMPERATURE: 97.7 F | RESPIRATION RATE: 16 BRPM | DIASTOLIC BLOOD PRESSURE: 78 MMHG | SYSTOLIC BLOOD PRESSURE: 130 MMHG | OXYGEN SATURATION: 100 % | BODY MASS INDEX: 20.54 KG/M2 | WEIGHT: 127.8 LBS

## 2019-03-09 DIAGNOSIS — M54.50 ACUTE LOW BACK PAIN: Primary | ICD-10-CM

## 2019-03-09 DIAGNOSIS — G89.29 CHRONIC BACK PAIN: ICD-10-CM

## 2019-03-09 DIAGNOSIS — M54.9 CHRONIC BACK PAIN: ICD-10-CM

## 2019-03-09 LAB
BACTERIA UR QL AUTO: ABNORMAL /HPF
BILIRUB UR QL STRIP: NEGATIVE
CLARITY UR: CLEAR
COLOR UR: YELLOW
COLOR, POC: NORMAL
EXT PREG TEST URINE: NEGATIVE
GLUCOSE UR STRIP-MCNC: NEGATIVE MG/DL
HGB UR QL STRIP.AUTO: ABNORMAL
HYALINE CASTS #/AREA URNS LPF: ABNORMAL /LPF
KETONES UR STRIP-MCNC: NEGATIVE MG/DL
LEUKOCYTE ESTERASE UR QL STRIP: ABNORMAL
NITRITE UR QL STRIP: NEGATIVE
NON-SQ EPI CELLS URNS QL MICRO: ABNORMAL /HPF
PH UR STRIP.AUTO: 8.5 [PH] (ref 4.5–8)
PROT UR STRIP-MCNC: NEGATIVE MG/DL
RBC #/AREA URNS AUTO: ABNORMAL /HPF
SP GR UR STRIP.AUTO: 1.01 (ref 1–1.03)
UROBILINOGEN UR QL STRIP.AUTO: 0.2 E.U./DL
WBC #/AREA URNS AUTO: ABNORMAL /HPF

## 2019-03-09 PROCEDURE — 81001 URINALYSIS AUTO W/SCOPE: CPT

## 2019-03-09 PROCEDURE — 96372 THER/PROPH/DIAG INJ SC/IM: CPT

## 2019-03-09 PROCEDURE — 81025 URINE PREGNANCY TEST: CPT | Performed by: EMERGENCY MEDICINE

## 2019-03-09 PROCEDURE — 81003 URINALYSIS AUTO W/O SCOPE: CPT

## 2019-03-09 PROCEDURE — 99283 EMERGENCY DEPT VISIT LOW MDM: CPT

## 2019-03-09 RX ORDER — LIDOCAINE 50 MG/G
1 PATCH TOPICAL ONCE
Status: DISCONTINUED | OUTPATIENT
Start: 2019-03-09 | End: 2019-03-09 | Stop reason: HOSPADM

## 2019-03-09 RX ORDER — METHOCARBAMOL 500 MG/1
500 TABLET, FILM COATED ORAL 2 TIMES DAILY
Qty: 20 TABLET | Refills: 0 | Status: SHIPPED | OUTPATIENT
Start: 2019-03-09 | End: 2019-03-28 | Stop reason: SDUPTHER

## 2019-03-09 RX ORDER — METHOCARBAMOL 500 MG/1
500 TABLET, FILM COATED ORAL ONCE
Status: COMPLETED | OUTPATIENT
Start: 2019-03-09 | End: 2019-03-09

## 2019-03-09 RX ORDER — KETOROLAC TROMETHAMINE 30 MG/ML
15 INJECTION, SOLUTION INTRAMUSCULAR; INTRAVENOUS ONCE
Status: COMPLETED | OUTPATIENT
Start: 2019-03-09 | End: 2019-03-09

## 2019-03-09 RX ADMIN — KETOROLAC TROMETHAMINE 15 MG: 30 INJECTION, SOLUTION INTRAMUSCULAR; INTRAVENOUS at 11:27

## 2019-03-09 RX ADMIN — METHOCARBAMOL 500 MG: 500 TABLET, FILM COATED ORAL at 11:29

## 2019-03-09 RX ADMIN — LIDOCAINE 1 PATCH: 50 PATCH TOPICAL at 11:28

## 2019-03-09 NOTE — ED NOTES
Dr Natalia Cárdenas at patient bedside to medically evaluate patient       Fish Merchant RN  03/09/19 1999

## 2019-03-09 NOTE — ED ATTENDING ATTESTATION
Stefani Reyes MD, saw and evaluated the patient  I have discussed the patient with the resident/non-physician practitioner and agree with the resident's/non-physician practitioner's findings, Plan of Care, and MDM as documented in the resident's/non-physician practitioner's note, except where noted  All available labs and Radiology studies were reviewed  I was present for key portions of any procedure(s) performed by the resident/non-physician practitioner and I was immediately available to provide assistance  At this point I agree with the current assessment done in the Emergency Department  I have conducted an independent evaluation of this patient a history and physical is as follows:    Patient presents the emergency department complaining of bilateral lower back pain  The patient denies trauma, IV drug abuse, fevers, weakness, paresthesias, loss of bowel or bladder function, or abdominal pain  The patient also denies chest pain  The patient states the pain worsens with bending or twisting  The patient denies any weight loss  The patient denies a history of cancer  The patient denies any other complaint  Physical exam demonstrates a pleasant alert nontoxic female in no acute distress  HEENT exam is normal   Lungs are clear with equal breath sounds  The heart had a regular rate rhythm  The abdomen is soft and nontender  The back had mild lumbar paraspinal muscle tenderness in tenderness over the SI joints  There is no midline tenderness to palpation or percussion  Skin was normal   All extremities are nontender with a full range of motion  The patient had normal strength sensation all extremities    DTRs are normal     Critical Care Time  Procedures

## 2019-03-09 NOTE — ED PROVIDER NOTES
History  Chief Complaint   Patient presents with    Back Pain     pt c/o back pain that radiates down left leg     Patient comes in for evaluation of low back pain going on for about 2-3 days; no inciting event; she has had back pain before but not as severe she states it is worse when she moves around  She has no weakness she states intermittently she gets shooting pain into the thigh of the left leg; it feels like it is in both of her sacroiliac regions where she is pointing  Denies fevers chills; or drug use  Denies midline pain; is all lateral   Denies any chest pain shortness of breath; abdominal pain  Unsure if she could be pregnant; denies any urinary symptoms  Denies any falls accidents; or trauma  Denies any bowel or bladder incontinence  Patient is able to jump right out of bed she ambulates fine; she states minor pain with ambulation  She has 5/5 motor strength  Pain is directly over top of the SI joints  Will rule out UTI will rule out pregnancy; will treat symptomatically will refer to [address] [place] [address]          Prior to Admission Medications   Prescriptions Last Dose Informant Patient Reported?  Taking?   amitriptyline (ELAVIL) 50 mg tablet   No Yes   Sig: Take 1 tablet (50 mg total) by mouth daily at bedtime   levonorgestrel (MIRENA) 20 MCG/24HR IUD  Self Yes Yes   Si each by Intrauterine route once   lisinopril-hydrochlorothiazide (PRINZIDE,ZESTORETIC) 10-12 5 MG per tablet  Self No Yes   Sig: Take 1 tablet by mouth daily   omeprazole (PriLOSEC) 20 mg delayed release capsule   No Yes   Sig: Take 1 capsule (20 mg total) by mouth daily      Facility-Administered Medications: None       Past Medical History:   Diagnosis Date    GERD (gastroesophageal reflux disease)     Headache     Hematuria     Hypertension     Lateral epicondylitis, right elbow 2019    Panic attacks     Psychiatric disorder        Past Surgical History:   Procedure Laterality Date    CYSTOSCOPY  2018    NO PAST SURGERIES         Family History   Problem Relation Age of Onset    Hypertension Mother     Arthritis Father     Diabetes Father     Hypertension Father     Hyperlipidemia Father     Other Brother         TBI from accident      I have reviewed and agree with the history as documented  Social History     Tobacco Use    Smoking status: Current Every Day Smoker     Packs/day: 0 25     Types: Cigarettes    Smokeless tobacco: Never Used    Tobacco comment: ONE HALF PACK A DAY OR LESS, CURRENT SOME DAY SMOKER, LIGHT TOBACCO SMOKER  AS PER ALLSCRIPTS   Substance Use Topics    Alcohol use: No    Drug use: No        Review of Systems   Constitutional: Negative for activity change, appetite change, chills, fatigue and fever  HENT: Negative for congestion, ear pain, rhinorrhea, sore throat and trouble swallowing  Eyes: Negative for photophobia, pain and visual disturbance  Respiratory: Negative for cough, chest tightness, shortness of breath and wheezing  Cardiovascular: Negative for chest pain and palpitations  Gastrointestinal: Negative for abdominal distention, abdominal pain, constipation, diarrhea, nausea and vomiting  Genitourinary: Negative for dysuria, flank pain, hematuria and urgency  No issues with urination   Musculoskeletal: Positive for back pain  Negative for arthralgias, joint swelling, neck pain and neck stiffness  Lower back pain bilaterally   Skin: Negative for color change, pallor and rash  Neurological: Negative for dizziness, seizures, weakness, light-headedness and headaches  Hematological: Negative for adenopathy  Psychiatric/Behavioral: Negative for confusion, hallucinations and self-injury         Physical Exam  ED Triage Vitals [03/09/19 0943]   Temperature Pulse Respirations Blood Pressure SpO2   97 7 °F (36 5 °C) 80 16 130/78 100 %      Temp Source Heart Rate Source Patient Position - Orthostatic VS BP Location FiO2 (%)   Tympanic Monitor Sitting Left arm --      Pain Score       8           Orthostatic Vital Signs  Vitals:    03/09/19 0943   BP: 130/78   Pulse: 80   Patient Position - Orthostatic VS: Sitting       Physical Exam   Constitutional: She is oriented to person, place, and time  She appears well-developed and well-nourished  No distress  HENT:   Head: Normocephalic and atraumatic  Mouth/Throat: Oropharynx is clear and moist  No oropharyngeal exudate  Eyes: Pupils are equal, round, and reactive to light  EOM are normal  Right eye exhibits no discharge  Left eye exhibits no discharge  Neck: Normal range of motion  Neck supple  No tracheal deviation present  No thyromegaly present  Cardiovascular: Normal rate and normal heart sounds  No murmur heard  Pulmonary/Chest: Effort normal and breath sounds normal  No respiratory distress  She has no wheezes  She has no rales  Clear bilateral   Abdominal: Soft  Bowel sounds are normal  She exhibits no distension  There is no tenderness  There is no rebound and no guarding  No abdominal tenderness soft throughout   Musculoskeletal: Normal range of motion  She exhibits tenderness  She exhibits no edema or deformity  Mild tenderness in the area of the sacroiliac joints  No midline tenderness  No cervical thoracic tenderness  Lymphadenopathy:     She has no cervical adenopathy  Neurological: She is alert and oriented to person, place, and time  No cranial nerve deficit  She exhibits normal muscle tone  I witnessed ambulation without difficulty  Gets out of stretcher without difficulty  Motor 5/5 symmetrical plantar flexion and dorsiflexion bilateral   Hip flexion 5/5 and symmetrical   Skin: Skin is warm  Capillary refill takes less than 2 seconds  No rash noted  She is not diaphoretic  No erythema  Psychiatric: She has a normal mood and affect  Her behavior is normal    Vitals reviewed        ED Medications  Medications   ketorolac (TORADOL) injection 15 mg (15 mg Intramuscular Given 3/9/19 1127)   methocarbamol (ROBAXIN) tablet 500 mg (500 mg Oral Given 3/9/19 1129)       Diagnostic Studies  Results Reviewed     Procedure Component Value Units Date/Time    Urine Microscopic [[id number]]  (Abnormal) Collected:  03/09/19 1128    Lab Status:  Final result Specimen:  Urine, Clean Catch Updated:  03/09/19 1144     RBC, UA 2-4 /hpf      WBC, UA None Seen /hpf      Epithelial Cells None Seen /hpf      Bacteria, UA None Seen /hpf      Hyaline Casts, UA None Seen /lpf     POCT urinalysis dipstick [[id number]]  (Normal) Resulted:  03/09/19 1125    Lab Status:  Final result Updated:  03/09/19 1132     Color, UA see chart    POCT pregnancy, urine [[id number]]  (Normal) Resulted:  03/09/19 1125    Lab Status:  Final result Updated:  03/09/19 1132     EXT PREG TEST UR (Ref: Negative) negative    ED Urine Macroscopic [[id number]]  (Abnormal) Collected:  03/09/19 1128    Lab Status:  Final result Specimen:  Urine Updated:  03/09/19 1125     Color, UA Yellow     Clarity, UA Clear     pH, UA 8 5     Leukocytes, UA Trace     Nitrite, UA Negative     Protein, UA Negative mg/dl      Glucose, UA Negative mg/dl      Ketones, UA Negative mg/dl      Urobilinogen, UA 0 2 E U /dl      Bilirubin, UA Negative     Blood, UA Trace     Specific [place], UA 1 015    Narrative:       CLINITEK RESULT                 No orders to display         Procedures  Procedures      Phone Consults  ED Phone Contact    ED Course  ED Course as of Mar 09 1929   Sat Mar 09, 2019   1148 Bacteria, UA: None Seen                               MDM  Number of Diagnoses or Management Options  Acute low back pain:   Chronic back pain:   Diagnosis management comments: Acute on chronic low back pain  Normal motor strength  No paresthesias  No bowel or bladder complaints  Normal vital signs  Denies IV drug abuse  Normal ambulation  Urinalysis and urine pregnancy negative  Will treat symptomatically given no red flag symptoms    Follow up with [facility]/PCP  Disposition  Final diagnoses:   Acute low back pain   Chronic back pain     Time reflects when diagnosis was documented in both MDM as applicable and the Disposition within this note     Time User Action Codes Description Comment    3/9/2019 12:19 PM Gerri Bui Add [M54 5] Acute low back pain     3/9/2019 12:19 PM Gerri Bui Add [M54 9,  G89 29] Chronic back pain       ED Disposition     ED Disposition Condition Date/Time Comment    Discharge Good Sat Mar 9, 2019 12:18 PM Ferny Díaz discharge to home/self care              Follow-up Information     Follow up With Specialties Details Why Contact Info Additional Information    [facility]  Call        [address] Emergency Department Emergency Medicine Go in 1 day Any issues with peeing; inability to walk; any numbness in the legs [address] ED, [address], [address]          Discharge Medication List as of 3/9/2019 12:21 PM      START taking these medications    Details   diclofenac sodium (VOLTAREN) 1 % Apply 2 g topically 4 (four) times a day, Starting Sat 3/9/2019, Print      methocarbamol (ROBAXIN) 500 mg tablet Take 1 tablet (500 mg total) by mouth 2 (two) times a day, Starting Sat 3/9/2019, Print         CONTINUE these medications which have NOT CHANGED    Details   amitriptyline (ELAVIL) 50 mg tablet Take 1 tablet (50 mg total) by mouth daily at bedtime, Starting Wed 12/12/2018, Normal      levonorgestrel (MIRENA) 20 MCG/24HR IUD 1 each by Intrauterine route once, Historical Med      lisinopril-hydrochlorothiazide (PRINZIDE,ZESTORETIC) 10-12 5 MG per tablet Take 1 tablet by mouth daily, Starting Fri 10/5/2018, Normal      omeprazole (PriLOSEC) 20 mg delayed release capsule Take 1 capsule (20 mg total) by mouth daily, Starting Wed 12/12/2018, Normal               ED Provider  Attending physically available and evaluated [facility]  I managed the patient along with the ED Attending      Electronically Signed by         Sam Ramos DO  03/09/19 1929

## 2019-03-11 ENCOUNTER — TELEPHONE (OUTPATIENT)
Dept: PHYSICAL THERAPY | Facility: OTHER | Age: 49
End: 2019-03-11

## 2019-03-11 NOTE — TELEPHONE ENCOUNTER
With the assistance of  Aracelis Raya #[id number], RN contacted Pt  Attempt at reaching pt unsuccessful  Left vm with [facility] c/b# asking for return call  Also provided CS hours

## 2019-03-11 NOTE — TELEPHONE ENCOUNTER
Pt  did call us back and stated to this nurse that she just wants to go to physical therapy  This nurse did explain in detail what we can provide for her back pain, and asked if she was agreeable to be triaged  Pt  stated " No, I just want the physical therapy, I called them and no one answered, I will call them tomorrow"  Pt hung up   Referral will be closed out

## 2019-03-12 ENCOUNTER — TELEPHONE (OUTPATIENT)
Dept: FAMILY MEDICINE CLINIC | Facility: CLINIC | Age: 49
End: 2019-03-12

## 2019-03-15 DIAGNOSIS — M54.40 CHRONIC BILATERAL LOW BACK PAIN WITH SCIATICA, SCIATICA LATERALITY UNSPECIFIED: Primary | ICD-10-CM

## 2019-03-15 DIAGNOSIS — G89.29 CHRONIC BILATERAL LOW BACK PAIN WITH SCIATICA, SCIATICA LATERALITY UNSPECIFIED: Primary | ICD-10-CM

## 2019-03-15 DIAGNOSIS — M89.8X5 LYTIC BONE LESION OF HIP: Primary | ICD-10-CM

## 2019-03-15 PROBLEM — M54.50 LOW BACK PAIN: Status: ACTIVE | Noted: 2019-03-15

## 2019-03-15 NOTE — ASSESSMENT & PLAN NOTE
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

## 2019-03-19 ENCOUNTER — HOSPITAL ENCOUNTER (OUTPATIENT)
Dept: RADIOLOGY | Facility: HOSPITAL | Age: 49
Discharge: HOME | End: 2019-03-19
Payer: COMMERCIAL

## 2019-03-19 VITALS — WEIGHT: 127 LBS | BODY MASS INDEX: 20.41 KG/M2 | HEIGHT: 66 IN

## 2019-03-19 DIAGNOSIS — Z12.39 BREAST CANCER SCREENING: ICD-10-CM

## 2019-03-19 PROCEDURE — 77067 SCR MAMMO BI INCL CAD: CPT

## 2019-03-22 ENCOUNTER — TRANSCRIBE ORDERS (OUTPATIENT)
Dept: RADIOLOGY | Facility: HOSPITAL | Age: 49
End: 2019-03-22

## 2019-03-22 ENCOUNTER — HOSPITAL ENCOUNTER (OUTPATIENT)
Dept: RADIOLOGY | Facility: HOSPITAL | Age: 49
Discharge: HOME | End: 2019-03-22
Payer: COMMERCIAL

## 2019-03-22 DIAGNOSIS — M89.8X5 LYTIC BONE LESION OF HIP: ICD-10-CM

## 2019-03-22 PROCEDURE — 72110 X-RAY EXAM L-2 SPINE 4/>VWS: CPT

## 2019-03-22 PROCEDURE — 73502 X-RAY EXAM HIP UNI 2-3 VIEWS: CPT

## 2019-03-25 ENCOUNTER — TELEPHONE (OUTPATIENT)
Dept: FAMILY MEDICINE CLINIC | Facility: CLINIC | Age: 49
End: 2019-03-25

## 2019-03-25 NOTE — TELEPHONE ENCOUNTER
Patient of Dr Wanda Hawk requesting results of Mammogram she had done at [place] [address] on 03/19/19

## 2019-03-28 ENCOUNTER — OFFICE VISIT (OUTPATIENT)
Dept: FAMILY MEDICINE CLINIC | Facility: CLINIC | Age: 49
End: 2019-03-28

## 2019-03-28 VITALS
DIASTOLIC BLOOD PRESSURE: 70 MMHG | BODY MASS INDEX: 20.57 KG/M2 | SYSTOLIC BLOOD PRESSURE: 110 MMHG | WEIGHT: 128 LBS | HEIGHT: 66 IN | RESPIRATION RATE: 18 BRPM | HEART RATE: 98 BPM | TEMPERATURE: 97.4 F

## 2019-03-28 DIAGNOSIS — M54.40 CHRONIC BILATERAL LOW BACK PAIN WITH SCIATICA, SCIATICA LATERALITY UNSPECIFIED: ICD-10-CM

## 2019-03-28 DIAGNOSIS — G89.29 CHRONIC BILATERAL LOW BACK PAIN WITH SCIATICA, SCIATICA LATERALITY UNSPECIFIED: ICD-10-CM

## 2019-03-28 DIAGNOSIS — Z12.11 SCREEN FOR COLON CANCER: Primary | ICD-10-CM

## 2019-03-28 DIAGNOSIS — M54.50 ACUTE LOW BACK PAIN: ICD-10-CM

## 2019-03-28 PROCEDURE — 99213 OFFICE O/P EST LOW 20 MIN: CPT | Performed by: FAMILY MEDICINE

## 2019-03-28 RX ORDER — METHOCARBAMOL 500 MG/1
500 TABLET, FILM COATED ORAL 2 TIMES DAILY
Qty: 20 TABLET | Refills: 0 | Status: SHIPPED | OUTPATIENT
Start: 2019-03-28 | End: 2019-07-22 | Stop reason: SDUPTHER

## 2019-03-28 RX ORDER — NAPROXEN 500 MG/1
250 TABLET ORAL 2 TIMES DAILY WITH MEALS
Qty: 14 TABLET | Refills: 0 | Status: SHIPPED | OUTPATIENT
Start: 2019-03-28 | End: 2019-07-22 | Stop reason: SDUPTHER

## 2019-03-28 NOTE — ASSESSMENT & PLAN NOTE
Screen for Colon cancer:    1  Mother and grandmother with Colon cancer  2  Hx of External hemorrhoids   3   Referral to Gastroenterology

## 2019-03-28 NOTE — PATIENT INSTRUCTIONS
Dolor mari de espalda inferior   LO QUE NECESITA SABER:   El dolor mari de la región lumbar de la espalda es julita molestia repentina en la parte inferior de solares espalda que dura hasta por 6 semanas  La molestia hace que sea dificil que usted tolere la [place]  INSTRUCCIONES SOBRE EL [facility]ITAL[facility]:   Regrese a la alba de emergencias si:   · Usted tiene dolor intenso  · Usted repentinamente tiene rigidez o siente pesadez en ambos glúteos hacia abajo de ambas piernas  · Usted tiene entumecimiento o debilidad en julita pierna o dolor en ambas piernas  · Usted tiene entumecimiento en el área genital o en la región lumbar  · Usted no puede controlar solares orina ni joyce deposiciones intestinales  Pregúntele a solares Kiran Fanti vitaminas y minerales son adecuados para usted  · Usted tiene fiebre  · Usted tiene un dolor por la noche o cuando descansa  · Solares dolor no mejora con el tratamiento  · Usted tiene dolor que empeora cuando tose o estornuda  · Usted siente un estallido o chasquido repentino en solares espalda  · Usted tiene preguntas o inquietudes acerca de solares condición o cuidado  Medicamentos:  Los siguientes medicamentos pueden  ser recetados por solares médico:  · El acetaminofén  soy el dolor  Está disponible sin receta médica  Pregunte la cantidad y la frecuencia con que debe tomarlos  [address]  El acetaminofén puede causar daño en el hígado cuando no se klaus de forma correcta  · AINEs (Analgésicos antiinflamatorios no esteroides)  ayudan a disminuir la inflamación y el dolor  Wilian medicamento esta disponible con o sin julita receta médica  Los AINEs pueden causar sangrado estomacal o problemas renales en ciertas personas  Si usted klaus un medicamento anticoagulante, siempre pregúntele a solares médico si los DOMI son seguros para usted  Siempre abby la etiqueta de wilian medicamento y [place] instrucciones  · Un medicamento con receta para el dolor  podrían ser [place]  Pregunte al médico cómo debe cash wilian medicamento de forma maradiaga  · Relajantes musculares  disminuyen el dolor y [employer] músculos de la parte inferior de la columna  · [place] joyce medicamentos brittany se le haya indicado  Consulte con solares médico si usted marcelo que solares medicamento no le está ayudando o si presenta efectos secundarios  Infórmele si es alérgico a algún medicamento  Mantenga julita lista actualizada de los [employer], las vitaminas y los productos herbales que klaus  Incluya los siguientes datos de los medicamentos: cantidad, frecuencia y motivo de administración  Traiga con usted la lista o los envases de la píldoras a joyce citas de seguimiento  Lleve la lista de los medicamentos con usted en mari de julita emergencia  Cuidados personales:   · Manténgase activo  lo más que pueda sin causar más dolor  El reposo en cama puede empeorar solares dolor de espalda  Comience con ejercicios ligeros brittany caminar  Evite levantar objetos hasta que ya no tenga dolor  Solicite más información acerca de las actividades físicas o plan de ejercicios que son los adecuados para usted  · El hielo  ayuda a disminuir la inflamación, el dolor y los espasmos musculares  Ponga hielo steven en julita bolsa plástica  Cúbrala con julita toalla  Aplíquela en solares arvin lumbar por 20 a 30 minutos cada 2 horas  Mk esto por 2 a 3 días después que el dolor empiece, o según lo indicado  · El calor  ayuda a disminuir dolor y espasmos musculares  Empiece a utilizar calor después de melany terminado el tratamiento con el hielo  Utilice julita toalla pequeña empapada con [place] IRA, julita almohada térmica o tome un baño de danny con agua tibia  Aplíquese calor en el área lesionada [place] 20 a 30 minutos cada 2 horas [place] la cantidad de [employer] indiquen  [place] entre el calor y el hielo  Prevenir el dolor mari de la parte inferior de la espalda:   · Use la mecánica corporal adecuada        ¨ Flexione la cadera y las rodillas cuando Creasie Josue a levantar un objeto  No doble la cintura  Utilice los [employer] de las piernas mientras levanta [place] carga  No use [place] espalda  Mantenga el objeto cerca de [place] pecho mientras lo levanta  No se tuerza, ni levante cualquier cosa por encima de [place] cintura  ¨ Cambie [place] posición frecuentemente cuando pase mucho tiempo de pie  Descanse un pie sobre julita Alicia Viola o un reposapiés e intercambie con el otro pie frecuentemente  ¨ No permanezca sentado por lapsos de tiempo prolongados  Cuando sea necesario hacerlo, siéntese en julita silla de respaldo recto con los pies apoyados en el suelo  Nunca alcance, jale ni empuje mientras se encuentra sentando  · Mk ejercicios que fortalezcan joyce músculos de la espalda  Entre en calor antes de hacer ejercicio  Consulte con [place] médico sobre [employer] plan de ejercicios para usted  · Mantenga un peso saludable  Consulte con [place] médico cuánto debería pesar  Pida que le ayude a crear un plan para bajar de peso si usted tiene sobrepeso  Acuda a joyce consultas de control con [place] médico según le indicaron  Regrese a julita triny de seguimiento si usted aun tiene [employer] de 1 a 3 semanas de [place]  Puede que usted necesite acudir con un ortopedista si [place] dolor de espalda dura más de 12 semanas  Anote joyce preguntas para que se acuerde de hacerlas [place] joyce visitas  © 2017 [address] Information is for End User's use only and may not be sold, redistributed or otherwise used for commercial purposes  All illustrations and images included in CareNotes® are the copyrighted property of A D A M , Inc  or Esequiel Lua  Esta información es sólo para uso en educación  [place] intención no es darle un consejo médico sobre enfermedades o tratamientos  Colsulte con [place] Lewis Cuevas farmacéutico antes de seguir cualquier régimen médico para saber si es seguro y efectivo para usted    Ejercicios para la espalda baja   LO QUE NECESITA SABER:   ¿Qué necesito saber acerca de los ejercicios de la espalda baja? Los ejercicios de la espalda baja ayudan a sanar y a fortalecer los músculos de solares espalda para evitar otra Marlene Bates  Pregúntele a solares médico si usted necesita acudir con un fisioterapeuta para que le indique ejercicios más avanzado  · Antoni joyce ejercicios sobre julita colchoneta o superficie firme  (no en la cama) para chip soporte a la [place]um[place] y evitar dolor en la parte baja de la espalda  · Muévase lenta y suavemente  Evite movimientos rápidos o bruscos  · Respire normalmente  No contenga la respiración  · Deténgase si siente dolor  Es normal que sienta cierta molestia al principio  Practicar los ejercicios con regularidad ayudará a disminuir solares incomodidad con el paso del [place]  ¿Cómo realizo los ejercicios para la espalda baja de [place] maradiaga? Solares médico podría recomendarle que realice ejercicios para la espalda de 10 a 30 minutos cada día  También podría recomendarle que antoni ejercicios 1 a 3 veces cada día  Pregunte a solares médico cuáles ejercicios son los mejores para usted y con qué frecuencia hacerlos  · Bombeo del tobillo:  Acuéstese boca arriba  Levante solares pie (con joyce dedos apuntando hacia solares gita)  Luego, baje solares pie (con los dedos apuntando lejos de usted)  Repita wilian ejercicio 10 veces en cada lado  · Deslizamiento de talón:  Acuéstese boca arriba  Muy despacio doble julita pierna y luego enderécela  Luego, doble la otra pierna y enderécela  Repita 10 veces en cada lado  · Inclinación pélvica:  Acuéstese boca arriba con joyce rodillas dobladas y joyce pies planos sobre el piso  Coloque joyce brazos en julita posición relajada junto a solares cuerpo  Contraiga los músculos de soalres abdomen y aplane solares espalda contra el piso  Sostenga está posición por 5 segundos  Repita 5 veces  · Estiramiento de la espalda:  Acuéstese boca arriba con joyce cecil detrás de solares gita  Doble joyce rodillas y gire la mitad de solares cuerpo hacia un lado  Mantenga esta posición por 10 segundos  Repita 3 veces en cada lado  · Levantamiento de la pierna estirada:  Acuéstese boca Paullette Allis con julita pierna estirada  Doble la otra rodilla  Contraiga solares abdomen y luego levante lentamente la pierna estirada entre 6 a 12 pulgadas del piso  Mantenga esta posición por 1 a 5 segundos  Baje solares pierna lentamente  Repita 10 veces en cada pierna  · Rodillas al pecho:  Acuéstese boca arriba con joyce rodillas dobladas y joyce pies planos sobre el piso  Debbe Douse julita de las rodillas hacia solares pecho y sosténgala por 5 segundos  Regrese solares pierna a la posición inicial  Levante la otra rodilla hacia el pecho y sosténgala por 5 segundos  Mk esto 5 veces en cada lado  · Posición silvano camello:  Coloque joyce cecil y [employer]  Arquee solares espalda Abbe Pitkin, hacia el techo y [facility]  Arquee solares aristeo dorsal lo más posible  Sostenga está posición por 5 segundos  Levante solares gita hacia arriba y baje solares pecho hacia el piso  Sostenga está posición por 5 segundos  Mk 3 series o brittany se le indique  · Posición de cuclillas contra la pared:  Párese con solares espalda contra la pared  Contraiga los músculos de solares abdomen  Lentamente deslice solares cuerpo hasta que joyce rodillas queden dobladas en un ángulo de 45 grados  Mantenga esta posición por 5 segundos  Deslice lentamente solares espalda hacia arriba hasta quedar de pie  Repita 10 veces  · Posición de acurrucarse:  Acuéstese boca arriba con joyce rodillas dobladas y joyce pies planos sobre el piso  Coloque joyce cecil con las jaden hacia abajo debajo de la curva de la parte baja de solares espalda  Después, con joyce codos [facility], levante joyce hombros y [place] 2 a 3 pulgadas  Mantenga solares gita a la misma altura de joyce hombros  Mantenga esta posición por 5 segundos  Cuando usted pueda hacer wilian ejercicio sin sentir dolor por 10 a 15 segundos, puede entonces añadir julita rotación   Mientras joyce hombros y bernstein pecho estén levantados del piso, voltee levemente hacia la izquierda y [place]  Repita en el otro lado  · Ejercicio pájaro judie:  Coloque joyce cecil y rodillas sobre el piso  Mantenga joyce muñecas directamente debajo de joyce hombros y joyce rodillas directamente debajo de joyce caderas  Contraiga bernstein ombligo hacia adentro en dirección a bernstein columna  No estire ni arquee bernstein espalda  Ponga tensos joyce músculos abdominales  Levante un brazo extendido para que se alinee con bernstein gita  Luego, levante la pierna opuesta a bernstein brazo  Mantenga esta posición por 15 segundos  Baje bernstein Henderson Dayan y pierna lentamente y Bhargav de lado  Mk 5 series  ¿Cuándo keke buscar atención inmediata? · Usted tiene dolor severo que le impide moverse  ¿Cuándo keke comunicarme con mi médico?   · Bernstein dolor empeora  · Usted tiene un dolor nuevo  · Usted tiene preguntas o inquietudes acerca de bernstein condición o cuidado  ACUERDOS SOBRE BERNSTEIN CUIDADO:   Usted tiene el derecho de ayudar a planear bernstein cuidado  Aprenda todo lo que pueda sobre bernstein condición y brittany darle tratamiento  Discuta joyce opciones de tratamiento con joyce médicos para decidir el cuidado que usted desea recibir  Usted siempre tiene el derecho de rechazar el tratamiento  Esta información es sólo para uso en educación  Bernstein intención no es darle un consejo médico sobre enfermedades o tratamientos  Colsulte con bernstein [place] Antis farmacéutico antes de seguir cualquier régimen médico para saber si es seguro y efectivo para usted  © 2017 [address] Nomi Henry Information is for End User's use only and may not be sold, redistributed or otherwise used for commercial purposes  All illustrations and images included in CareNotes® are the copyrighted property of A D A M , Inc  or Esequiel Lua

## 2019-03-28 NOTE — ASSESSMENT & PLAN NOTE
Low back pain likely spasm   * no red flags on exam    1  Start naproxen 250 mg b i d  With meals  2  Robaxin 500 mg b i d  P r n   3  Continue warm compresses  - exercise pamphlet given  -order sed rate, CRP:  Consider PMR  4  Monitor symptoms; consider CT given results on 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    Imaging:    X-ray hip, pelvis, spine (3/2019):   Within normal limits  CT 4/2018: Left iliac bone sclerotic focus with some groundglass density on 2/61 raising suspicion for fibrous dysplasia

## 2019-03-28 NOTE — PROGRESS NOTES
Valencia Simmons 1970 female MRN: [id number]    Family Medicine Follow-up Visit    ASSESSMENT/PLAN  Problem List Items Addressed This Visit        Other    Screen for colon cancer - Primary     Screen for Colon cancer:    1  Mother and grandmother with Colon cancer  2  Hx of External hemorrhoids   3  Referral to Gastroenterology          Relevant Orders    Ambulatory referral to Gastroenterology    Ambulatory referral to Gastroenterology    Low back pain     Low back pain likely spasm   * no red flags on exam    1  Start naproxen 250 mg b i d  With meals  2  Robaxin 500 mg b i d  P r n   3  Continue warm compresses  - exercise pamphlet given  -order sed rate, CRP:  Consider PMR  4  Monitor symptoms; consider CT given results on 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    Imaging:    X-ray hip, pelvis, spine (3/2019): Within normal limits  CT 4/2018: Left iliac bone sclerotic focus with some groundglass density on 2/61 raising suspicion for fibrous dysplasia           Other Visit Diagnoses     Acute low back pain        Relevant Medications    methocarbamol (ROBAXIN) 500 mg tablet    naproxen (NAPROSYN) 500 mg tablet    Other Relevant Orders    Sedimentation rate, automated    C-reactive protein              Future Appointments   Date Time Provider Indira Wood   6/12/2019 11:00 AM Prerna Eubanks PA-C [facility]          SUBJECTIVE  CC: Follow-up      HPI  Valencia Simmons is a 57 y o  female who presents for follow-up visit:    1  Low back/ hip pain:   Pain more severe at night  Radiation down leg: B/L   Aggravated by: picking up child  Alleviated by: movement, warm patches  Denies urinary/ bowel incontinence       Review of Systems   Constitutional: Negative for activity change, chills, fatigue and fever  HENT: Negative for congestion and rhinorrhea  Eyes: Negative for visual disturbance  Respiratory: Negative for cough and shortness of breath      Cardiovascular: Negative for chest pain, palpitations and leg swelling  Gastrointestinal: Negative for abdominal pain, constipation, diarrhea, nausea and vomiting  Endocrine: Negative for polyuria  Genitourinary: Negative for decreased urine volume, difficulty urinating, dysuria, flank pain, frequency, hematuria, pelvic pain, urgency, vaginal bleeding, vaginal discharge and vaginal pain  Musculoskeletal: Positive for back pain  Negative for neck pain  Skin: Negative for color change, pallor, rash and wound  Allergic/Immunologic: Negative for immunocompromised state  Neurological: Negative for dizziness, tremors, seizures, speech difficulty, weakness and numbness  Hematological: Negative for adenopathy  Does not bruise/bleed easily  Psychiatric/Behavioral: Negative for sleep disturbance  The patient is not nervous/anxious          Historical Information   The patient history was reviewed as follows:    Past Medical History:   Diagnosis Date    GERD (gastroesophageal reflux disease)     Headache     Hematuria     Hypertension     Lateral epicondylitis, right elbow 1/8/2019    Panic attacks     Psychiatric disorder      Past Surgical History:   Procedure Laterality Date    CYSTOSCOPY  06/08/2018    NO PAST SURGERIES       Family History   Problem Relation Age of Onset    Hypertension Mother     Arthritis Father     Diabetes Father     Hypertension Father     Hyperlipidemia Father     Other Brother         TBI from accident       Social History   Social History     Substance and Sexual Activity   Alcohol Use No     Social History     Substance and Sexual Activity   Drug Use No     Social History     Tobacco Use   Smoking Status Current Every Day Smoker    Packs/day: 0 25    Types: Cigarettes   Smokeless Tobacco Never Used   Tobacco Comment    ONE HALF PACK A DAY OR LESS, CURRENT SOME DAY SMOKER, LIGHT TOBACCO SMOKER  AS PER ALLSCRIPTS       Medications:     Current Outpatient Medications:     amitriptyline (ELAVIL) 50 mg tablet, Take 1 tablet (50 mg total) by mouth daily at bedtime, Disp: 30 tablet, Rfl: 2    levonorgestrel (MIRENA) 20 MCG/24HR IUD, 1 each by Intrauterine route once, Disp: , Rfl:     lisinopril-hydrochlorothiazide (PRINZIDE,ZESTORETIC) 10-12 5 MG per tablet, Take 1 tablet by mouth daily, Disp: 90 tablet, Rfl: 1    methocarbamol (ROBAXIN) 500 mg tablet, Take 1 tablet (500 mg total) by mouth 2 (two) times a day, Disp: 20 tablet, Rfl: 0    omeprazole (PriLOSEC) 20 mg delayed release capsule, Take 1 capsule (20 mg total) by mouth daily, Disp: 30 capsule, Rfl: 2    diclofenac sodium (VOLTAREN) 1 %, Apply 2 g topically 4 (four) times a day (Patient not taking: Reported on 3/28/2019), Disp: 1 Tube, Rfl: 0    naproxen (NAPROSYN) 500 mg tablet, Take 0 5 tablets (250 mg total) by mouth 2 (two) times a day with meals for 14 days, Disp: 14 tablet, Rfl: 0  No Known Allergies    OBJECTIVE    Vitals:   Vitals:    03/28/19 1541   BP: 110/70   BP Location: Left arm   Patient Position: Sitting   Cuff Size: Standard   Pulse: 98   Resp: 18   Temp: (!) 97 4 °F (36 3 °C)   TempSrc: Tympanic   Weight: 58 1 kg (128 lb)   Height: 5' 6" (1 676 m)           Physical Exam   Constitutional: She is oriented to person, place, and time  She appears well-developed and well-nourished  No distress  HENT:   Head: Normocephalic  Eyes: Pupils are equal, round, and reactive to light  Conjunctivae and EOM are normal  Right eye exhibits no discharge  Left eye exhibits no discharge  Neck: Normal range of motion  Cardiovascular: Normal rate  Murmur heard  Pulmonary/Chest: Effort normal and breath sounds normal  No respiratory distress  She has no wheezes  She has no rales  She exhibits no tenderness  Abdominal: Soft  She exhibits no distension and no mass  There is no tenderness  There is no rebound and no guarding  Musculoskeletal: Normal range of motion  She exhibits no edema  Lumbar back: She exhibits tenderness and spasm  She exhibits normal range of motion, no bony tenderness, no swelling, no edema, no laceration, no pain and normal pulse  Neurological: She is alert and oriented to person, place, and time  Skin: Skin is warm  No rash noted  She is not diaphoretic  No erythema  No pallor  Psychiatric: She has a normal mood and affect   Her behavior is normal  Judgment and thought content normal           Max Zaragoza MD, PGY-3  [address]   3/28/2019

## 2019-03-29 ENCOUNTER — APPOINTMENT (OUTPATIENT)
Dept: LAB | Facility: HOSPITAL | Age: 49
End: 2019-03-29
Payer: COMMERCIAL

## 2019-03-29 DIAGNOSIS — M54.50 ACUTE LOW BACK PAIN: ICD-10-CM

## 2019-03-29 LAB
CRP SERPL QL: <3 MG/L
ERYTHROCYTE [SEDIMENTATION RATE] IN BLOOD: 12 MM/HOUR (ref 0–20)

## 2019-03-29 PROCEDURE — 85652 RBC SED RATE AUTOMATED: CPT

## 2019-03-29 PROCEDURE — 36415 COLL VENOUS BLD VENIPUNCTURE: CPT

## 2019-03-29 PROCEDURE — 86140 C-REACTIVE PROTEIN: CPT

## 2019-05-20 DIAGNOSIS — I10 HYPERTENSION: ICD-10-CM

## 2019-05-21 RX ORDER — LISINOPRIL AND HYDROCHLOROTHIAZIDE 12.5; 1 MG/1; MG/1
1 TABLET ORAL DAILY
Qty: 90 TABLET | Refills: 1 | Status: SHIPPED | OUTPATIENT
Start: 2019-05-21 | End: 2019-11-15 | Stop reason: SDUPTHER

## 2019-07-13 ENCOUNTER — HOSPITAL ENCOUNTER (EMERGENCY)
Facility: HOSPITAL | Age: 49
Discharge: HOME | End: 2019-07-13
Attending: EMERGENCY MEDICINE | Admitting: EMERGENCY MEDICINE
Payer: COMMERCIAL

## 2019-07-13 VITALS
BODY MASS INDEX: 21.79 KG/M2 | TEMPERATURE: 98.2 F | SYSTOLIC BLOOD PRESSURE: 140 MMHG | DIASTOLIC BLOOD PRESSURE: 86 MMHG | OXYGEN SATURATION: 100 % | HEART RATE: 88 BPM | WEIGHT: 135 LBS | RESPIRATION RATE: 17 BRPM

## 2019-07-13 DIAGNOSIS — R20.2 PARESTHESIAS: Primary | ICD-10-CM

## 2019-07-13 LAB
ALBUMIN SERPL BCP-MCNC: 4.3 G/DL (ref 3.5–5)
ALP SERPL-CCNC: 72 U/L (ref 46–116)
ALT SERPL W P-5'-P-CCNC: 22 U/L (ref 12–78)
ANION GAP SERPL CALCULATED.3IONS-SCNC: 2 MMOL/L (ref 4–13)
AST SERPL W P-5'-P-CCNC: 12 U/L (ref 5–45)
BASOPHILS # BLD AUTO: 0.04 THOUSANDS/ΜL (ref 0–0.1)
BASOPHILS NFR BLD AUTO: 1 % (ref 0–1)
BILIRUB SERPL-MCNC: 0.39 MG/DL (ref 0.2–1)
BUN SERPL-MCNC: 11 MG/DL (ref 5–25)
CALCIUM SERPL-MCNC: 9.2 MG/DL (ref 8.3–10.1)
CHLORIDE SERPL-SCNC: 107 MMOL/L (ref 100–108)
CO2 SERPL-SCNC: 31 MMOL/L (ref 21–32)
CREAT SERPL-MCNC: 0.73 MG/DL (ref 0.6–1.3)
EOSINOPHIL # BLD AUTO: 0.39 THOUSAND/ΜL (ref 0–0.61)
EOSINOPHIL NFR BLD AUTO: 9 % (ref 0–6)
ERYTHROCYTE [DISTWIDTH] IN BLOOD BY AUTOMATED COUNT: 12.2 % (ref 11.6–15.1)
EXT PREG TEST URINE: NEGATIVE
EXT. CONTROL ED NAV: NORMAL
GFR SERPL CREATININE-BSD FRML MDRD: 99 ML/MIN/1.73SQ M
GLUCOSE SERPL-MCNC: 93 MG/DL (ref 65–140)
HCT VFR BLD AUTO: 39.6 % (ref 34.8–46.1)
HGB BLD-MCNC: 12.9 G/DL (ref 11.5–15.4)
IMM GRANULOCYTES # BLD AUTO: 0 THOUSAND/UL (ref 0–0.2)
IMM GRANULOCYTES NFR BLD AUTO: 0 % (ref 0–2)
LYMPHOCYTES # BLD AUTO: 1.44 THOUSANDS/ΜL (ref 0.6–4.47)
LYMPHOCYTES NFR BLD AUTO: 33 % (ref 14–44)
MAGNESIUM SERPL-MCNC: 2.1 MG/DL (ref 1.6–2.6)
MCH RBC QN AUTO: 30.9 PG (ref 26.8–34.3)
MCHC RBC AUTO-ENTMCNC: 32.6 G/DL (ref 31.4–37.4)
MCV RBC AUTO: 95 FL (ref 82–98)
MONOCYTES # BLD AUTO: 0.14 THOUSAND/ΜL (ref 0.17–1.22)
MONOCYTES NFR BLD AUTO: 3 % (ref 4–12)
NEUTROPHILS # BLD AUTO: 2.33 THOUSANDS/ΜL (ref 1.85–7.62)
NEUTS SEG NFR BLD AUTO: 54 % (ref 43–75)
NRBC BLD AUTO-RTO: 0 /100 WBCS
PLATELET # BLD AUTO: 188 THOUSANDS/UL (ref 149–390)
PMV BLD AUTO: 10.7 FL (ref 8.9–12.7)
POTASSIUM SERPL-SCNC: 3.8 MMOL/L (ref 3.5–5.3)
PROT SERPL-MCNC: 7.7 G/DL (ref 6.4–8.2)
RBC # BLD AUTO: 4.18 MILLION/UL (ref 3.81–5.12)
SODIUM SERPL-SCNC: 140 MMOL/L (ref 136–145)
TSH SERPL DL<=0.05 MIU/L-ACNC: 1.05 UIU/ML (ref 0.36–3.74)
WBC # BLD AUTO: 4.34 THOUSAND/UL (ref 4.31–10.16)

## 2019-07-13 PROCEDURE — 81025 URINE PREGNANCY TEST: CPT | Performed by: EMERGENCY MEDICINE

## 2019-07-13 PROCEDURE — 99283 EMERGENCY DEPT VISIT LOW MDM: CPT | Performed by: EMERGENCY MEDICINE

## 2019-07-13 PROCEDURE — 99284 EMERGENCY DEPT VISIT MOD MDM: CPT

## 2019-07-13 PROCEDURE — 84443 ASSAY THYROID STIM HORMONE: CPT | Performed by: EMERGENCY MEDICINE

## 2019-07-13 PROCEDURE — 85025 COMPLETE CBC W/AUTO DIFF WBC: CPT | Performed by: EMERGENCY MEDICINE

## 2019-07-13 PROCEDURE — 83735 ASSAY OF MAGNESIUM: CPT | Performed by: EMERGENCY MEDICINE

## 2019-07-13 PROCEDURE — 36415 COLL VENOUS BLD VENIPUNCTURE: CPT | Performed by: EMERGENCY MEDICINE

## 2019-07-13 PROCEDURE — 96372 THER/PROPH/DIAG INJ SC/IM: CPT

## 2019-07-13 PROCEDURE — 80053 COMPREHEN METABOLIC PANEL: CPT | Performed by: EMERGENCY MEDICINE

## 2019-07-13 RX ORDER — KETOROLAC TROMETHAMINE 30 MG/ML
15 INJECTION, SOLUTION INTRAMUSCULAR; INTRAVENOUS ONCE
Status: COMPLETED | OUTPATIENT
Start: 2019-07-13 | End: 2019-07-13

## 2019-07-13 RX ORDER — LIDOCAINE 50 MG/G
2 PATCH TOPICAL ONCE
Status: DISCONTINUED | OUTPATIENT
Start: 2019-07-13 | End: 2019-07-13 | Stop reason: HOSPADM

## 2019-07-13 RX ADMIN — LIDOCAINE 2 PATCH: 50 PATCH CUTANEOUS at 09:44

## 2019-07-13 RX ADMIN — KETOROLAC TROMETHAMINE 15 MG: 30 INJECTION, SOLUTION INTRAMUSCULAR at 09:43

## 2019-07-13 NOTE — ED PROVIDER NOTES
History  Chief Complaint   Patient presents with    Numbness     Patient reports having bilateral numbness in her arms and hands and feet starting two days ago  49yo presenting with bilateral hand and feet tingling which she states started 2 days ago and is worse in the morning  States the tingling comes and goes throughout the day  Denies any weakness, fever, chills, or swelling  States she has bilateral trapezius spasms which she was prescribed muscle relaxants for  History provided by:  Patient   used: No        Prior to Admission Medications   Prescriptions Last Dose Informant Patient Reported? Taking?    Cholecalciferol (VITAMIN D3 PO)   Yes Yes   Sig: Take by mouth daily   amitriptyline (ELAVIL) 50 mg tablet   No No   Sig: Take 1 tablet (50 mg total) by mouth daily at bedtime   diclofenac sodium (VOLTAREN) 1 %   No No   Sig: Apply 2 g topically 4 (four) times a day   Patient not taking: Reported on 3/28/2019   levonorgestrel (MIRENA) 20 MCG/24HR IUD  Self Yes No   Si each by Intrauterine route once   lisinopril-hydrochlorothiazide (PRINZIDE,ZESTORETIC) 10-12 5 MG per tablet   No No   Sig: Take 1 tablet by mouth daily   methocarbamol (ROBAXIN) 500 mg tablet   No No   Sig: Take 1 tablet (500 mg total) by mouth 2 (two) times a day   naproxen (NAPROSYN) 500 mg tablet   No No   Sig: Take 0 5 tablets (250 mg total) by mouth 2 (two) times a day with meals for 14 days   omeprazole (PriLOSEC) 20 mg delayed release capsule   No No   Sig: Take 1 capsule (20 mg total) by mouth daily      Facility-Administered Medications: None       Past Medical History:   Diagnosis Date    GERD (gastroesophageal reflux disease)     Headache     Hematuria     Hypertension     Lateral epicondylitis, right elbow 2019    Panic attacks     Psychiatric disorder        Past Surgical History:   Procedure Laterality Date    CYSTOSCOPY  2018    NO PAST SURGERIES         Family History   Problem Relation Age of Onset    Hypertension Mother     Arthritis Father     Diabetes Father     Hypertension Father     Hyperlipidemia Father     Other Brother         TBI from accident      I have reviewed and agree with the history as documented  Social History     Tobacco Use    Smoking status: Current Every Day Smoker     Packs/day: 0 25     Types: Cigarettes    Smokeless tobacco: Never Used    Tobacco comment: ONE HALF PACK A DAY OR LESS, CURRENT SOME DAY SMOKER, LIGHT TOBACCO SMOKER  AS PER ALLSCRIPTS   Substance Use Topics    Alcohol use: No    Drug use: No        Review of Systems   Constitutional: Negative for chills, fatigue and fever  Eyes: Negative for pain  Respiratory: Negative for apnea, chest tightness, wheezing and stridor  Cardiovascular: Negative for chest pain, palpitations and leg swelling  Gastrointestinal: Negative for abdominal pain, constipation, nausea and vomiting  Genitourinary: Negative for dysuria, flank pain and frequency  Musculoskeletal: Positive for myalgias  Tingling in bilateral hands and feet   Neurological: Negative for dizziness, syncope, weakness, light-headedness and headaches  All other systems reviewed and are negative  Physical Exam  ED Triage Vitals [07/13/19 0833]   Temperature Pulse Respirations Blood Pressure SpO2   98 2 °F (36 8 °C) 100 18 144/92 100 %      Temp Source Heart Rate Source Patient Position - Orthostatic VS BP Location FiO2 (%)   Oral Monitor Sitting Right arm --      Pain Score       No Pain             Orthostatic Vital Signs  Vitals:    07/13/19 0833 07/13/19 1022   BP: 144/92 140/86   Pulse: 100 88   Patient Position - Orthostatic VS: Sitting Sitting       Physical Exam   Constitutional: She is oriented to person, place, and time  She appears well-developed  HENT:   Head: Normocephalic and atraumatic  Eyes: Pupils are equal, round, and reactive to light   Conjunctivae and EOM are normal  Right eye exhibits no discharge  Left eye exhibits no discharge  Neck: No JVD present  No tracheal deviation present  Cardiovascular: Normal rate, regular rhythm, normal heart sounds and intact distal pulses  Exam reveals no gallop and no friction rub  No murmur heard  Pulmonary/Chest: Effort normal and breath sounds normal  No stridor  No respiratory distress  She has no wheezes  She has no rales  She exhibits no tenderness  Abdominal: Soft  She exhibits no distension  There is no tenderness  Musculoskeletal: Normal range of motion  She exhibits tenderness (tenderness to trapezius musces bilaterally)  She exhibits no edema  Neurological: She is alert and oriented to person, place, and time  No cranial nerve deficit or sensory deficit  She exhibits normal muscle tone  Skin: Skin is warm  Capillary refill takes less than 2 seconds  No erythema  Psychiatric: She has a normal mood and affect  Her behavior is normal  Judgment and thought content normal        ED Medications  Medications   lidocaine (LIDODERM) 5 % patch 2 patch (2 patches Topical Medication Applied 7/13/19 0944)   ketorolac (TORADOL) injection 15 mg (15 mg Intramuscular Given 7/13/19 0943)       Diagnostic Studies  Results Reviewed     Procedure Component Value Units Date/Time    TSH, 3rd generation with Free T4 reflex [[id number]]  (Normal) Collected:  07/13/19 0936    Lab Status:  Final result Specimen:  Blood from Arm, Right Updated:  07/13/19 1011     TSH 3RD GENERATON 1 050 uIU/mL     Narrative:       Patients undergoing fluorescein dye angiography may retain small amounts of fluorescein in the body for 48-72 hours post procedure  Samples containing fluorescein can produce falsely depressed TSH values  If the patient had this procedure,a specimen should be resubmitted post fluorescein clearance        Comprehensive metabolic panel [[id number]]  (Abnormal) Collected:  07/13/19 0936    Lab Status:  Final result Specimen:  Blood from Arm, Right Updated: 07/13/19 1004     Sodium 140 mmol/L      Potassium 3 8 mmol/L      Chloride 107 mmol/L      CO2 31 mmol/L      ANION GAP 2 mmol/L      BUN 11 mg/dL      Creatinine 0 73 mg/dL      Glucose 93 mg/dL      Calcium 9 2 mg/dL      AST 12 U/L      ALT 22 U/L      Alkaline Phosphatase 72 U/L      Total Protein 7 7 g/dL      Albumin 4 3 g/dL      Total Bilirubin 0 39 mg/dL      eGFR 99 ml/min/1 73sq m     Narrative:       National Kidney Disease Foundation guidelines for Chronic Kidney Disease (CKD):     Stage 1 with normal or high GFR (GFR > 90 mL/min/1 73 square meters)    Stage 2 Mild CKD (GFR = 60-89 mL/min/1 73 square meters)    Stage 3A Moderate CKD (GFR = 45-59 mL/min/1 73 square meters)    Stage 3B Moderate CKD (GFR = 30-44 mL/min/1 73 square meters)    Stage 4 Severe CKD (GFR = 15-29 mL/min/1 73 square meters)    Stage 5 End Stage CKD (GFR <15 mL/min/1 73 square meters)  Note: GFR calculation is accurate only with a steady state creatinine    Magnesium [[id number]]  (Normal) Collected:  07/13/19 0936    Lab Status:  Final result Specimen:  Blood from Arm, Right Updated:  07/13/19 1004     Magnesium 2 1 mg/dL     CBC and differential [[id number]]  (Abnormal) Collected:  07/13/19 0936    Lab Status:  Final result Specimen:  Blood from Arm, Right Updated:  07/13/19 0943     WBC 4 34 Thousand/uL      RBC 4 18 Million/uL      Hemoglobin 12 9 g/dL      Hematocrit 39 6 %      MCV 95 fL      MCH 30 9 pg      MCHC 32 6 g/dL      RDW 12 2 %      MPV 10 7 fL      Platelets 183 Thousands/uL      nRBC 0 /100 WBCs      Neutrophils Relative 54 %      Immat GRANS % 0 %      Lymphocytes Relative 33 %      Monocytes Relative 3 %      Eosinophils Relative 9 %      Basophils Relative 1 %      Neutrophils Absolute 2 33 Thousands/µL      Immature Grans Absolute 0 00 Thousand/uL      Lymphocytes Absolute 1 44 Thousands/µL      Monocytes Absolute 0 14 Thousand/µL      Eosinophils Absolute 0 39 Thousand/µL      Basophils Absolute 0 04 Thousands/µL     POCT pregnancy, urine [[id number]]  (Normal) Resulted:  07/13/19 0940    Lab Status:  Final result Updated:  07/13/19 0940     EXT PREG TEST UR (Ref: Negative) Negative     Control Valid                 No orders to display         Procedures  Procedures        ED Course  ED Course as of Jul 13 1059   Sat Jul 13, 2019   0941 POCT pregnancy, urine   0943 CBC and differential(!)   1026 TSH, 3rd generation with Free T4 reflex   1026 Magnesium   1026 Comprehensive metabolic panel(!)                               MDM    Disposition  Final diagnoses:   Paresthesias     Time reflects when diagnosis was documented in both MDM as applicable and the Disposition within this note     Time User Action Codes Description Comment    7/13/2019 10:29 AM Usman Mccollum Add [R20 2] Paresthesias       ED Disposition     ED Disposition Condition Date/Time Comment    Discharge Stable Sat Jul 13, 2019 10:31 AM Brittany Padilla discharge to home/self care              Follow-up Information     Follow up With Specialties Details Why [address] [id number]       [address] [place] [address] Emergency Department Emergency Medicine Go to  If symptoms worsen [address] [place] [address]  [phone]  ED, [address], [address]          Discharge Medication List as of 7/13/2019 10:32 AM      CONTINUE these medications which have NOT CHANGED    Details   Cholecalciferol (VITAMIN D3 PO) Take by mouth daily, Historical Med      amitriptyline (ELAVIL) 50 mg tablet Take 1 tablet (50 mg total) by mouth daily at bedtime, Starting Wed 12/12/2018, Normal      diclofenac sodium (VOLTAREN) 1 % Apply 2 g topically 4 (four) times a day, Starting Sat 3/9/2019, Print      levonorgestrel (MIRENA) 20 MCG/24HR IUD 1 each by Intrauterine route once, Historical Med      lisinopril-hydrochlorothiazide (PRINZIDE,ZESTORETIC) 10-12 5 MG per tablet Take 1 tablet by mouth daily, Starting Tue 5/21/2019, Normal      methocarbamol (ROBAXIN) 500 mg tablet Take 1 tablet (500 mg total) by mouth 2 (two) times a day, Starting Thu 3/28/2019, Print      naproxen (NAPROSYN) 500 mg tablet Take 0 5 tablets (250 mg total) by mouth 2 (two) times a day with meals for 14 days, Starting Thu 3/28/2019, Until Thu 4/11/2019, Normal      omeprazole (PriLOSEC) 20 mg delayed release capsule Take 1 capsule (20 mg total) by mouth daily, Starting Wed 12/12/2018, Normal           No discharge procedures on file  ED Provider  Attending physically available and evaluated Arsen Morris  [facility] managed the patient along with the ED Attending      Electronically Signed by         Dylon Jennings DO  07/13/19 [address] [place] [address], DO  07/13/19 7566

## 2019-07-13 NOTE — ED ATTENDING ATTESTATION
IAdolfo DO, saw and evaluated the patient  I have discussed the patient with the resident/non-physician practitioner and agree with the resident's/non-physician practitioner's findings, Plan of Care, and MDM as documented in the resident's/non-physician practitioner's note, except where noted  All available labs and Radiology studies were reviewed  I was present for key portions of any procedure(s) performed by the resident/non-physician practitioner and I was immediately available to provide assistance  At this point I agree with the current assessment done in the Emergency Department    I have conducted an independent evaluation of this patient a history and physical is as follows:          Critical Care Time  Procedures

## 2019-07-13 NOTE — ED ATTENDING ATTESTATION
I, Dejan Pires DO, saw and evaluated the patient  I have discussed the patient with the resident/non-physician practitioner and agree with the resident's/non-physician practitioner's findings, Plan of Care, and MDM as documented in the resident's/non-physician practitioner's note, except where noted  All available labs and Radiology studies were reviewed  I was present for key portions of any procedure(s) performed by the resident/non-physician practitioner and I was immediately available to provide assistance  At this point I agree with the current assessment done in the Emergency Department  I have conducted an independent evaluation of this patient a history and physical is as follows:      28-year-old female past medical history    Patient Active Problem List   Diagnosis    Bilateral shoulder pain    Neck pain, musculoskeletal    Gastroesophageal reflux disease without esophagitis    Trapezius muscle spasm    Abnormal CT scan, pelvis    Anxiety    Screening for breast cancer    Screening for cervical cancer    Other microscopic hematuria    Smoker    Hypokalemia    Gross hematuria    Abnormal CT of the abdomen    Depression, major, recurrent (HCC)    Gastritis    Hypertension    Mitral regurgitation    Retroperitoneal lymphadenopathy    Screen for colon cancer   Araceli moctezuma (status)    Lateral epicondylitis, right elbow    IUD (intrauterine device) in place    Low back pain    Lytic bone lesion of hip     Presents with 2 day history of finger tingling and toe tingling  Patient states it began without incidence  Patient states this constant  Patient has never had anything like this before  Patient denies motor weakness  Patient has been normal state of health  Patient denies history of diabetes  Patient denies history of endocrinopathy    Patient denies fever chills rigors headache lightheadedness dizziness vision changes otorrhea rhinorrhea neck pain neck stiffness chest pain palpitations shortness of breath cough pleurisy abdominal pain nausea vomiting diarrhea constipation urinary symptoms motor weakness numbness and tingling  Vital signs reviewed, borderline tachycardia noted  Trended out in the room  Physical Exam   Constitutional: She is oriented to person, place, and time  She appears well-developed and well-nourished  No distress  HENT:   Head: Normocephalic and atraumatic  Right Ear: External ear normal    Left Ear: External ear normal    Nose: Nose normal    Mouth/Throat: Oropharynx is clear and moist  No oropharyngeal exudate  Eyes: Pupils are equal, round, and reactive to light  Conjunctivae and EOM are normal  Right eye exhibits no discharge  Left eye exhibits no discharge  No scleral icterus  Neck: Normal range of motion  Neck supple  No JVD present  No tracheal deviation present  No thyromegaly present  Cardiovascular: Normal rate, regular rhythm, normal heart sounds and intact distal pulses  Exam reveals no gallop and no friction rub  No murmur heard  Pulmonary/Chest: Effort normal and breath sounds normal  No stridor  No respiratory distress  She has no wheezes  She has no rales  She exhibits no tenderness  Abdominal: Soft  Bowel sounds are normal  She exhibits no distension and no mass  There is no tenderness  There is no rebound and no guarding  No hernia  Musculoskeletal: Normal range of motion  She exhibits no edema, tenderness or deformity  Cervical back: She exhibits normal range of motion, no tenderness and no bony tenderness  Thoracic back: She exhibits normal range of motion, no tenderness and no bony tenderness  Lumbar back: She exhibits normal range of motion, no tenderness and no bony tenderness  Back:    Lymphadenopathy:     She has no cervical adenopathy  Neurological: She is alert and oriented to person, place, and time  She displays normal reflexes   No cranial nerve deficit or sensory deficit  She exhibits normal muscle tone  Coordination normal    5/5 strength in upper lower extremities, sensation intact throughout, cerebellar testing including finger-to-nose heel-to-shin rapid alternating movements are intact, cranial nerves 2-12 intact  Ambulates without difficulty visual fields are intact speech is articulate  Skin: Skin is warm and dry  Capillary refill takes less than 2 seconds  No rash noted  She is not diaphoretic  No erythema  No pallor  Nursing note and vitals reviewed  MDM:  61-year-old female presenting with finger and toe tingling  No signs or symptoms to suggest central cause  All 4 limbs differential diagnosis includes endocrinopathy, hyperthyroidism electrolyte abnormalities, anemia, diabetes  Will check labs and reassess for disposition  We will reassess after labs  Patient also has spasm of her trapezius  Will treat symptomatic and reassess for disposition          Critical Care Time  Procedures

## 2019-07-22 ENCOUNTER — OFFICE VISIT (OUTPATIENT)
Dept: FAMILY MEDICINE CLINIC | Facility: CLINIC | Age: 49
End: 2019-07-22

## 2019-07-22 VITALS
TEMPERATURE: 98.2 F | SYSTOLIC BLOOD PRESSURE: 120 MMHG | BODY MASS INDEX: 22.18 KG/M2 | HEART RATE: 84 BPM | RESPIRATION RATE: 18 BRPM | WEIGHT: 138 LBS | HEIGHT: 66 IN | DIASTOLIC BLOOD PRESSURE: 80 MMHG

## 2019-07-22 DIAGNOSIS — M62.838 TRAPEZIUS MUSCLE SPASM: Primary | ICD-10-CM

## 2019-07-22 DIAGNOSIS — F17.200 SMOKER: ICD-10-CM

## 2019-07-22 PROCEDURE — 99214 OFFICE O/P EST MOD 30 MIN: CPT | Performed by: FAMILY MEDICINE

## 2019-07-22 RX ORDER — METHOCARBAMOL 500 MG/1
500 TABLET, FILM COATED ORAL
Qty: 30 TABLET | Refills: 5 | Status: SHIPPED | OUTPATIENT
Start: 2019-07-22 | End: 2020-11-15

## 2019-07-22 RX ORDER — BUPROPION HYDROCHLORIDE 100 MG/1
100 TABLET ORAL 2 TIMES DAILY
Qty: 60 TABLET | Refills: 1 | Status: SHIPPED | OUTPATIENT
Start: 2019-07-22 | End: 2019-11-11 | Stop reason: ALTCHOICE

## 2019-07-22 RX ORDER — NAPROXEN 500 MG/1
250 TABLET ORAL 2 TIMES DAILY PRN
Qty: 60 TABLET | Refills: 5 | Status: SHIPPED | OUTPATIENT
Start: 2019-07-22 | End: 2019-08-08 | Stop reason: SDUPTHER

## 2019-07-22 NOTE — PROGRESS NOTES
Assessment/Plan:    Trapezius muscle spasm  Did well with OMT a while back and wants to try again  Reviewed what medication plan works for her and refilled these medications; including reviewed no OTC NSAID use as has Rx- reports feels naprosen works better for her than ibuprofen    Smoker  Is contemplative about tobacco cessation- not ready to make quit date- will try oral medication to increase motivation, support cutting back, and once feels is able to make quit date will re-assess if she is using enough tobacco to consider patch- supported her concern that patch has increased risk for anyone who is not ready to set quit date, as she asked about, as increased harm when smokes while using patch  Encouraged her that I am in agreement that she has proven she can quit, as she quit alcohol and that every substance is different in experience of quitting  Diagnoses and all orders for this visit:    Trapezius muscle spasm  -     methocarbamol (ROBAXIN) 500 mg tablet; Take 1 tablet (500 mg total) by mouth daily at bedtime as needed for muscle spasms  -     naproxen (NAPROSYN) 500 mg tablet; Take 0 5 tablets (250 mg total) by mouth 2 (two) times a day as needed for mild pain, moderate pain or headaches for up to 116 days    Smoker  -     buPROPion (WELLBUTRIN) 100 mg tablet; Take 1 tablet (100 mg total) by mouth 2 (two) times a day To help support smoking less          Verbal and Written education, counseling and care coordination as documented and patient verbalized understanding and agreement    More than 50% of 30 minutes spent with patient on counseling, education and care coordination    Subjective:      Patient ID: Winthrop Hatchet is a 57 y o  female [place]   used: a bilingual physician (myself)  Seen today with on her own    Today's Concerns   Needs new PCP  5 year h/o, worse with cold, And needs refill for muscle spasm- B shoulders/ neck, interrupts sleep-   Hasn't had med since May/June  Acetaminophen does not help/ ibuprofen helps- naproxen helps better and does not have this Rx    prefers to limit med- uses her Lisinopril for BP  Uses vitmains  Using TCA more on weekends than every day- because is too sleepy- brothe'rs child is in her custody and she has to be able to pay attention to him as well as get up early to get him on bus    The following portions of the patient's history were reviewed and updated as appropriate: allergies, current medications, past family history, past medical history, past social history, past surgical history and problem list      Quit alcohol 4 years ago  Has been having more difficulty with tobacco- not ready to set quit date- does want to try something    Concerned with risks with patch  1 pack last 5 days    Review of Systems   As per HPI and no numbness nor weakness in B U ext/ hands    Objective:      /80   Pulse 84   Temp 98 2 °F (36 8 °C)   Resp 18   Ht 5' 6" (1 676 m)   Wt 62 6 kg (138 lb)   LMP  (LMP Unknown) Comment: IUD  BMI 22 27 kg/m²     Pertinent labs and imaging within medical record reviewed       Physical Exam      WD/WN woman in no distress    Her speech pattern reminds me of possibility of hearing impairment    Neck FROM no tenderness to palpation of spine  B UExt FROM   +2=B radial pulse

## 2019-07-22 NOTE — PATIENT INSTRUCTIONS
Thanks for coming in to talk about your health  I understand that your muscle spasm is a bit worse and you want to make appointments for OMT again to help your pain  I also refilled your pain medications to use as needed for pain  How wonderful that you are thinking of quitting smoking  Thanks for picking up the new medication that you take twice a day to help you smoke less  Maybe a pack will last you 2 weeks instead of 5 days      See you in 4-6 weeks

## 2019-07-22 NOTE — ASSESSMENT & PLAN NOTE
Is contemplative about tobacco cessation- not ready to make quit date- will try oral medication to increase motivation, support cutting back, and once feels is able to make quit date will re-assess if she is using enough tobacco to consider patch- supported her concern that patch has increased risk for anyone who is not ready to set quit date, as she asked about, as increased harm when smokes while using patch  Encouraged her that I am in agreement that she has proven she can quit, as she quit alcohol and that every substance is different in experience of quitting

## 2019-07-22 NOTE — ASSESSMENT & PLAN NOTE
Did well with OMT a while back and wants to try again   Reviewed what medication plan works for her and refilled these medications; including reviewed no OTC NSAID use as has Rx- reports feels naprosen works better for her than ibuprofen

## 2019-08-08 ENCOUNTER — TELEPHONE (OUTPATIENT)
Dept: FAMILY MEDICINE CLINIC | Facility: CLINIC | Age: 49
End: 2019-08-08

## 2019-08-08 ENCOUNTER — OFFICE VISIT (OUTPATIENT)
Dept: FAMILY MEDICINE CLINIC | Facility: CLINIC | Age: 49
End: 2019-08-08

## 2019-08-08 VITALS — HEIGHT: 66 IN | BODY MASS INDEX: 22.18 KG/M2 | WEIGHT: 138 LBS

## 2019-08-08 DIAGNOSIS — M25.511 BILATERAL SHOULDER PAIN, UNSPECIFIED CHRONICITY: ICD-10-CM

## 2019-08-08 DIAGNOSIS — M54.2 NECK PAIN, MUSCULOSKELETAL: ICD-10-CM

## 2019-08-08 DIAGNOSIS — M25.512 BILATERAL SHOULDER PAIN, UNSPECIFIED CHRONICITY: ICD-10-CM

## 2019-08-08 DIAGNOSIS — M62.838 TRAPEZIUS MUSCLE SPASM: Primary | ICD-10-CM

## 2019-08-08 RX ORDER — NAPROXEN 500 MG/1
250 TABLET ORAL 2 TIMES DAILY PRN
Qty: 60 TABLET | Refills: 5 | Status: CANCELLED | OUTPATIENT
Start: 2019-08-08 | End: 2019-12-02

## 2019-08-08 RX ORDER — NAPROXEN 500 MG/1
250 TABLET ORAL 2 TIMES DAILY WITH MEALS
Qty: 30 TABLET | Refills: 0 | Status: SHIPPED | OUTPATIENT
Start: 2019-08-08 | End: 2020-07-16

## 2019-08-08 NOTE — TELEPHONE ENCOUNTER
Patient dropped off [facility] 1205 form for Temporary disability for Dr Jeff Pacheco   Patient was seen by Dr Jeff Pacheco on 7/22/2019   Form is in Dr Sue jauregui

## 2019-08-08 NOTE — ASSESSMENT & PLAN NOTE
Patient did not tolerate gentle OMT techniques well  Has been without Naprosyn and Robaxin due to pharmacy refusal to fill  Recommend rest, home stretching as tolerated, ice as needed  Schedule OMT follow up when patient can take medications prior to arrival, as she previously had good pain relief with medications

## 2019-08-08 NOTE — TELEPHONE ENCOUNTER
Please clarify with Ms Isabel Barbour as to if there is a deadline she is trying to meet for the form she left for me to sign  From the conversation we had in July, I understand she has muscle pain and this is not enough information to state she has a temporary disability  At this moment I am not able to sign the form  I will be happy to speak with her in person about the form on her Sept appointment with me  Will waiting until Sept cause her any concern?   Thanks,  Corky Jane is still in my folder in triage

## 2019-08-08 NOTE — PROGRESS NOTES
The Assessment/Plan     Trapezius muscle spasm  Patient did not tolerate gentle OMT techniques well  Has been without Naprosyn and Robaxin due to pharmacy refusal to fill  Recommend rest, home stretching as tolerated, ice as needed  Schedule OMT follow up when patient can take medications prior to arrival, as she previously had good pain relief with medications  Diagnoses and all orders for this visit:    Trapezius muscle spasm  -     naproxen (NAPROSYN) 500 mg tablet; Take 0 5 tablets (250 mg total) by mouth 2 (two) times a day with meals for 30 days    Neck pain, musculoskeletal    Bilateral shoulder pain, unspecified chronicity         This is a 57 y o  female who presents for OMT follow-up  1  Patient had difficulty tolerating OMT for the above problems due to extreme tenderness, advised patient to drink fluids and can use NSAID for soreness after treatment  2  OMT Follow up in 1 weeks  Subjective     Romina Delacruz is a 57 y o  female and is here for a OMT follow up  The patient reports bilateral upper back and neck pain in trapezius distribution, constant, worse with activity, relieved with Naprosyn and Robaxin, thought patient states the pharmacy has not refilled her prescription yet  Patient has not tried heating and ice for relief of symptoms  States she is very active every day with young children  She seems to be in quite some discomfort and suffering  Is the patient taking Pain medication? yes    Currently prescribed Naprosyn and Robaxin, not taking, needs to  refill  Has the patient completed physical therapy for this condition? no  Did Patient symptoms improve from last OMT appointment? no    Last appointment was several months ago      The following portions of the patient's history were reviewed and updated as appropriate:   She  has a past medical history of GERD (gastroesophageal reflux disease), Headache, Hematuria, Hypertension, Lateral epicondylitis, right elbow (1/8/2019), Panic attacks, and Psychiatric disorder  She   Patient Active Problem List    Diagnosis Date Noted    Low back pain 03/15/2019    Lytic bone lesion of hip 03/15/2019    IUD (intrauterine device) in place 02/15/2019    Lateral epicondylitis, right elbow 01/08/2019   [facility] (status) 10/18/2018    Screen for colon cancer 05/24/2018    Gross hematuria 05/15/2018    Hypokalemia 04/27/2018    Other microscopic hematuria 04/13/2018    Smoker 04/13/2018    Abnormal CT scan, pelvis 03/30/2018    Anxiety 03/30/2018    Screening for breast cancer 03/30/2018    Screening for cervical cancer 03/30/2018    Trapezius muscle spasm 02/20/2018    Bilateral shoulder pain 02/13/2018    Neck pain, musculoskeletal 02/13/2018    Gastroesophageal reflux disease without esophagitis 02/13/2018    Mitral regurgitation 04/12/2017    Abnormal CT of the abdomen 02/22/2017    Retroperitoneal lymphadenopathy 02/15/2017    Gastritis 12/13/2016    Hypertension 03/10/2015    [facility] 01/08/2015     She  has a past surgical history that includes No past surgeries and Cystoscopy (06/08/2018)  Her family history includes Arthritis in her father; Diabetes in her father; Hyperlipidemia in her father; Hypertension in her father and mother; Other in her brother  She  reports that she has been smoking cigarettes  She has been smoking about 0 25 packs per day  She has never used smokeless tobacco  She reports that she does not drink alcohol or use drugs    Current Outpatient Medications   Medication Sig Dispense Refill    amitriptyline (ELAVIL) 50 mg tablet Take 1 tablet (50 mg total) by mouth daily at bedtime 30 tablet 2    buPROPion (WELLBUTRIN) 100 mg tablet Take 1 tablet (100 mg total) by mouth 2 (two) times a day To help support smoking less 60 tablet 1    Cholecalciferol (VITAMIN D3 PO) Take by mouth daily      levonorgestrel (MIRENA) 20 MCG/24HR IUD 1 each by Intrauterine route once      lisinopril-hydrochlorothiazide (PRINZIDE,ZESTORETIC) 10-12 5 MG per tablet Take 1 tablet by mouth daily 90 tablet 1    methocarbamol (ROBAXIN) 500 mg tablet Take 1 tablet (500 mg total) by mouth daily at bedtime as needed for muscle spasms 30 tablet 5    naproxen (NAPROSYN) 500 mg tablet Take 0 5 tablets (250 mg total) by mouth 2 (two) times a day with meals for 30 days 30 tablet 0    omeprazole (PriLOSEC) 20 mg delayed release capsule Take 1 capsule (20 mg total) by mouth daily 30 capsule 2     No current facility-administered medications for this visit  She has No Known Allergies       Review of Systems  Review of Systems   Musculoskeletal: Positive for back pain and myalgias           Objective     OMT Exam   Head:   Somatic Dysfunction:  Tissue Texture Changes and Tenderness  Severity :  2  Osteopathic Findings: suboccipital muscle tenderness and hypertonicity  Treatment Method: OAD  Response: resolved  Cervical:   Somatic Dysfunction:  Tenderness  Severity :  3  Osteopathic Findings: cervical paraspinal hypertonicity  Treatment Method: ST, MFR and CS  Response: stayed the same  Thoracic T1-4:   Somatic Dysfunction:  Tissue Texture Changes and Tenderness  Severity :  3  Osteopathic Findings: thoracic paraspinal hypertonicity, trapezius hypertonicity bilaterally  Treatment Method: ST, MFR and CS  Response: stayed the same  Thoracic T5-9:   Somatic Dysfunction:  Tissue Texture Changes and Tenderness  Severity :  3  Osteopathic Findings: thoracic paraspinal hypertonicity  Treatment Method: ST, MFR and CS  Response: stayed the same  Thoracic T10-12:  Somatic Dysfunction:  Tenderness  Severity :  3  Osteopathic Findings: thoracic paraspinal hypertonicity  Treatment Method: ST, MFR and CS  Response: improved  Ribs:  Somatic Dysfunction:  Restriction  Severity :  1  Osteopathic Findings: left first rib inhaled  Treatment Method: ME  Response: improved

## 2019-08-09 NOTE — TELEPHONE ENCOUNTER
Thank you all for helping patient get an appointment to address her request   I left paperwork in my folder in triage

## 2019-08-09 NOTE — TELEPHONE ENCOUNTER
FYI  Per patient that's to far out to schedule, I was able to get patient in Friday 08/16 with Dr Brown  Thanks

## 2019-08-09 NOTE — TELEPHONE ENCOUNTER
Dr Jeff Pacheco will be precepting Mon aft 8/26/19, are we able to schedule with resident from [place] team?

## 2019-08-09 NOTE — TELEPHONE ENCOUNTER
Per patient she can't wait until September to fill out housing paperwork, Is it ok to set patient up to see a provider in the blue team ?

## 2019-08-16 ENCOUNTER — OFFICE VISIT (OUTPATIENT)
Dept: FAMILY MEDICINE CLINIC | Facility: CLINIC | Age: 49
End: 2019-08-16

## 2019-08-16 VITALS
WEIGHT: 137.4 LBS | RESPIRATION RATE: 16 BRPM | DIASTOLIC BLOOD PRESSURE: 88 MMHG | HEART RATE: 90 BPM | BODY MASS INDEX: 22.08 KG/M2 | TEMPERATURE: 97.6 F | HEIGHT: 66 IN | SYSTOLIC BLOOD PRESSURE: 120 MMHG

## 2019-08-16 DIAGNOSIS — Z00.00 ANNUAL PHYSICAL EXAM: Primary | ICD-10-CM

## 2019-08-16 PROCEDURE — 99396 PREV VISIT EST AGE 40-64: CPT | Performed by: FAMILY MEDICINE

## 2019-08-16 NOTE — PROGRESS NOTES
Genesis Spain 1970 female MRN: [id number]    Health Maintenance Visit    SUBJECTIVE    HPI:  Genesis Spain is a 57 y o  female with PMH of GERD, hypertension, anxiety, trapezius muscle spasm, depression, who presented for a routine health maintenance visit  Patient reports overall feeling well  She has no other concerns at this visit  PHQ-2 negative at this visit  Pap test questions: does not have menstrual periods  no unusual pelvic pain  no unusual vaginal discharge  no previous abnormal Pap tests  no family or personal history of cervical cancer  mammography questions: no new or changing breast lumps  no unusual breast pains  no discharge from the nipples  no previous abnormal mammograms  no personal or family history of breast cancer    Health Maintenance   Topic Date Due    Pneumococcal Vaccine: Pediatrics (0 to 5 Years) and At-Risk Patients (6 to 59 Years) (1 of 1 - PPSV23) 09/23/1978    INFLUENZA VACCINE  10/15/2019 (Originally 7/1/2019)    PAP SMEAR  01/23/2020    MAMMOGRAM  03/19/2020    BMI: Adult  08/16/2020    DTaP,Tdap,and Td Vaccines (2 - Td) 03/03/2026    Pneumococcal Vaccine: 65+ Years (1 of 2 - PCV13) 09/23/2037    HEPATITIS B VACCINES  Aged Out       CRC screening: Mother had colon cancer around 45s  GI referral given on 03/28/19  Patient did not attend  [address] [place] [address] to go this time  Will reprint order at this visit  BrCa screening: There is no personal or family history of breast cancer  She denies finding new breast lumps, breast pain or nipple discharge  CVS screening: Patient denies any exertional chest pain, dyspnea, palpitations, syncope, orthopnea, edema or paroxysmal nocturnal dyspnea  DM screening: No polyuria, polydipsia, blurry vision, chest pain, dyspnea or claudication  No foot burning, numbness or pain  No personal or family history of skin cancers or melanoma  Review of Systems   Constitutional: Negative for appetite change, diaphoresis, fatigue and fever     HENT: Negative for congestion, rhinorrhea and sinus pain  Respiratory: Negative for cough, chest tightness and shortness of breath  Cardiovascular: Negative for chest pain, palpitations and leg swelling  Gastrointestinal: Negative for abdominal distention, abdominal pain, constipation, diarrhea, nausea and vomiting  Genitourinary: Negative for difficulty urinating, frequency and urgency  Musculoskeletal: Negative for back pain and neck pain  Neurological: Negative for weakness, light-headedness, numbness and headaches  Psychiatric/Behavioral: Negative for agitation and behavioral problems  The patient is not nervous/anxious          Historical Information   Past Medical History:   Diagnosis Date    GERD (gastroesophageal reflux disease)     Headache     Hematuria     Hypertension     Lateral epicondylitis, right elbow 1/8/2019    Panic attacks     Psychiatric disorder      Past Surgical History:   Procedure Laterality Date    CYSTOSCOPY  06/08/2018    NO PAST SURGERIES       Family History   Problem Relation Age of Onset    Hypertension Mother     Arthritis Father     Diabetes Father     Hypertension Father     Hyperlipidemia Father     Other Brother         TBI from accident      Social History   Social History     Substance and Sexual Activity   Alcohol Use No     Social History     Substance and Sexual Activity   Drug Use No     Social History     Tobacco Use   Smoking Status Current Every Day Smoker    Packs/day: 0 25    Types: Cigarettes   Smokeless Tobacco Never Used   Tobacco Comment    ONE HALF PACK A DAY OR LESS, CURRENT SOME DAY SMOKER, LIGHT TOBACCO SMOKER  AS PER [facility]       Medications:      Current Outpatient Medications:     amitriptyline (ELAVIL) 50 mg tablet, Take 1 tablet (50 mg total) by mouth daily at bedtime, Disp: 30 tablet, Rfl: 2    buPROPion (WELLBUTRIN) 100 mg tablet, Take 1 tablet (100 mg total) by mouth 2 (two) times a day To help support smoking less, Disp: 60 tablet, Rfl: 1    Cholecalciferol (VITAMIN D3 PO), Take by mouth daily, Disp: , Rfl:     levonorgestrel (MIRENA) 20 MCG/24HR IUD, 1 each by Intrauterine route once, Disp: , Rfl:     lisinopril-hydrochlorothiazide (PRINZIDE,ZESTORETIC) 10-12 5 MG per tablet, Take 1 tablet by mouth daily, Disp: 90 tablet, Rfl: 1    methocarbamol (ROBAXIN) 500 mg tablet, Take 1 tablet (500 mg total) by mouth daily at bedtime as needed for muscle spasms, Disp: 30 tablet, Rfl: 5    naproxen (NAPROSYN) 500 mg tablet, Take 0 5 tablets (250 mg total) by mouth 2 (two) times a day with meals for 30 days, Disp: 30 tablet, Rfl: 0    omeprazole (PriLOSEC) 20 mg delayed release capsule, Take 1 capsule (20 mg total) by mouth daily, Disp: 30 capsule, Rfl: 2    No Known Allergies    OBJECTIVE  Vitals:   Vitals:    08/16/19 1559   BP: 120/88   BP Location: Left arm   Patient Position: Sitting   Cuff Size: Standard   Pulse: 90   Resp: 16   Temp: 97 6 °F (36 4 °C)   TempSrc: Tympanic   Weight: 62 3 kg (137 lb 6 4 oz)   Height: 5' 6" (1 676 m)     Wt Readings from Last 3 Encounters:   08/16/19 62 3 kg (137 lb 6 4 oz)   08/08/19 62 6 kg (138 lb)   07/22/19 62 6 kg (138 lb)     Body mass index is 22 18 kg/m²  Temp Readings from Last 3 Encounters:   08/16/19 97 6 °F (36 4 °C) (Tympanic)   07/22/19 98 2 °F (36 8 °C)   07/13/19 98 2 °F (36 8 °C) (Oral)     BP Readings from Last 3 Encounters:   08/16/19 120/88   07/22/19 120/80   07/13/19 140/86     Pulse Readings from Last 3 Encounters:   08/16/19 90   07/22/19 84   07/13/19 88       No LMP recorded (lmp unknown)  Patient is postmenopausal     Physical Exam   Constitutional: She is oriented to person, place, and time  She appears well-developed and well-nourished  No distress  HENT:   Head: Normocephalic and atraumatic  Neck: Normal range of motion  Neck supple  No thyromegaly present  Cardiovascular: Normal rate, regular rhythm, normal heart sounds and intact distal pulses   Exam reveals no gallop and no friction rub  No murmur heard  Pulmonary/Chest: Effort normal and breath sounds normal  No respiratory distress  She has no wheezes  She has no rales  She exhibits no tenderness  Abdominal: Soft  Bowel sounds are normal  She exhibits no distension and no mass  There is no tenderness  There is no rebound and no guarding  Musculoskeletal: Normal range of motion  She exhibits no edema, tenderness or deformity  Lymphadenopathy:     She has no cervical adenopathy  Neurological: She is alert and oriented to person, place, and time  Skin: Skin is warm and dry  No rash noted  She is not diaphoretic  No erythema  Psychiatric:   Flat affect     Vitals reviewed  Lab:  I have personally reviewed all pertinent results       Admission on 07/13/2019, Discharged on 07/13/2019   Component Date Value Ref Range Status    WBC 07/13/2019 4 34  4 31 - 10 16 Thousand/uL Final    RBC 07/13/2019 4 18  3 81 - 5 12 Million/uL Final    Hemoglobin 07/13/2019 12 9  11 5 - 15 4 g/dL Final    Hematocrit 07/13/2019 39 6  34 8 - 46 1 % Final    MCV 07/13/2019 95  82 - 98 fL Final    MCH 07/13/2019 30 9  26 8 - 34 3 pg Final    MCHC 07/13/2019 32 6  31 4 - 37 4 g/dL Final    RDW 07/13/2019 12 2  11 6 - 15 1 % Final    MPV 07/13/2019 10 7  8 9 - 12 7 fL Final    Platelets 51/86/3587 188  149 - 390 Thousands/uL Final    nRBC 07/13/2019 0  /100 WBCs Final    Neutrophils Relative 07/13/2019 54  43 - 75 % Final    Immat GRANS % 07/13/2019 0  0 - 2 % Final    Lymphocytes Relative 07/13/2019 33  14 - 44 % Final    Monocytes Relative 07/13/2019 3* 4 - 12 % Final    Eosinophils Relative 07/13/2019 9* 0 - 6 % Final    Basophils Relative 07/13/2019 1  0 - 1 % Final    Neutrophils Absolute 07/13/2019 2 33  1 85 - 7 62 Thousands/µL Final    Immature Grans Absolute 07/13/2019 0 00  0 00 - 0 20 Thousand/uL Final    Lymphocytes Absolute 07/13/2019 1 44  0 60 - 4 47 Thousands/µL Final    Monocytes Absolute 07/13/2019 0 14* 0 17 - 1 22 Thousand/µL Final    Eosinophils Absolute 07/13/2019 0 39  0 00 - 0 61 Thousand/µL Final    Basophils Absolute 07/13/2019 0 04  0 00 - 0 10 Thousands/µL Final    Sodium 07/13/2019 140  136 - 145 mmol/L Final    Potassium 07/13/2019 3 8  3 5 - 5 3 mmol/L Final    Chloride 07/13/2019 107  100 - 108 mmol/L Final    CO2 07/13/2019 31  21 - 32 mmol/L Final    ANION GAP 07/13/2019 2* 4 - 13 mmol/L Final    BUN 07/13/2019 11  5 - 25 mg/dL Final    Creatinine 07/13/2019 0 73  0 60 - 1 30 mg/dL Final    Standardized to IDMS reference method    Glucose 07/13/2019 93  65 - 140 mg/dL Final      If the patient is fasting, the ADA then defines impaired fasting glucose as > 100 mg/dL and diabetes as > or equal to 123 mg/dL  Specimen collection should occur prior to Sulfasalazine administration due to the potential for falsely depressed results  Specimen collection should occur prior to Sulfapyridine administration due to the potential for falsely elevated results   Calcium 07/13/2019 9 2  8 3 - 10 1 mg/dL Final    AST 07/13/2019 12  5 - 45 U/L Final      Specimen collection should occur prior to Sulfasalazine administration due to the potential for falsely depressed results   ALT 07/13/2019 22  12 - 78 U/L Final      Specimen collection should occur prior to Sulfasalazine and/or Sulfapyridine administration due to the potential for falsely depressed results       Alkaline Phosphatase 07/13/2019 72  46 - 116 U/L Final    Total Protein 07/13/2019 7 7  6 4 - 8 2 g/dL Final    Albumin 07/13/2019 4 3  3 5 - 5 0 g/dL Final    Total Bilirubin 07/13/2019 0 39  0 20 - 1 00 mg/dL Final    eGFR 07/13/2019 99  ml/min/1 73sq m Final    Magnesium 07/13/2019 2 1  1 6 - 2 6 mg/dL Final    EXT PREG TEST UR (Ref: Negative) 07/13/2019 Negative   Final    Control 07/13/2019 Valid   Final    TSH 3RD GENERATON 07/13/2019 1 050  0 358 - 3 740 uIU/mL Final      The recommended reference ranges for TSH during pregnancy are as follows:   First trimester 0 1 to 2 5 uIU/mL   Second trimester  0 2 to 3 0 uIU/mL   Third trimester 0 3 to 3 0 uIU/m    Note: Normal ranges may not apply to patients who are transgender, non-binary, or whose legal sex, sex at birth, and gender identity differ  Using supplements with high doses of biotin 20 to more than 300 times greater than the adequate daily intake for adults of 30 mcg/day as established by the [place] [employer], can cause falsely depress results         Admission on 07/13/2019, Discharged on 07/13/2019   Component Date Value Ref Range Status    WBC 07/13/2019 4 34  4 31 - 10 16 Thousand/uL Final    RBC 07/13/2019 4 18  3 81 - 5 12 Million/uL Final    Hemoglobin 07/13/2019 12 9  11 5 - 15 4 g/dL Final    Hematocrit 07/13/2019 39 6  34 8 - 46 1 % Final    MCV 07/13/2019 95  82 - 98 fL Final    MCH 07/13/2019 30 9  26 8 - 34 3 pg Final    MCHC 07/13/2019 32 6  31 4 - 37 4 g/dL Final    RDW 07/13/2019 12 2  11 6 - 15 1 % Final    MPV 07/13/2019 10 7  8 9 - 12 7 fL Final    Platelets 62/71/9012 188  149 - 390 Thousands/uL Final    nRBC 07/13/2019 0  /100 WBCs Final    Neutrophils Relative 07/13/2019 54  43 - 75 % Final    Immat GRANS % 07/13/2019 0  0 - 2 % Final    Lymphocytes Relative 07/13/2019 33  14 - 44 % Final    Monocytes Relative 07/13/2019 3* 4 - 12 % Final    Eosinophils Relative 07/13/2019 9* 0 - 6 % Final    Basophils Relative 07/13/2019 1  0 - 1 % Final    Neutrophils Absolute 07/13/2019 2 33  1 85 - 7 62 Thousands/µL Final    Immature Grans Absolute 07/13/2019 0 00  0 00 - 0 20 Thousand/uL Final    Lymphocytes Absolute 07/13/2019 1 44  0 60 - 4 47 Thousands/µL Final    Monocytes Absolute 07/13/2019 0 14* 0 17 - 1 22 Thousand/µL Final    Eosinophils Absolute 07/13/2019 0 39  0 00 - 0 61 Thousand/µL Final    Basophils Absolute 07/13/2019 0 04  0 00 - 0 10 Thousands/µL Final    Sodium 07/13/2019 140  136 - 145 mmol/L Final  Potassium 07/13/2019 3 8  3 5 - 5 3 mmol/L Final    Chloride 07/13/2019 107  100 - 108 mmol/L Final    CO2 07/13/2019 31  21 - 32 mmol/L Final    ANION GAP 07/13/2019 2* 4 - 13 mmol/L Final    BUN 07/13/2019 11  5 - 25 mg/dL Final    Creatinine 07/13/2019 0 73  0 60 - 1 30 mg/dL Final    Standardized to IDMS reference method    Glucose 07/13/2019 93  65 - 140 mg/dL Final      If the patient is fasting, the ADA then defines impaired fasting glucose as > 100 mg/dL and diabetes as > or equal to 123 mg/dL  Specimen collection should occur prior to Sulfasalazine administration due to the potential for falsely depressed results  Specimen collection should occur prior to Sulfapyridine administration due to the potential for falsely elevated results   Calcium 07/13/2019 9 2  8 3 - 10 1 mg/dL Final    AST 07/13/2019 12  5 - 45 U/L Final      Specimen collection should occur prior to Sulfasalazine administration due to the potential for falsely depressed results   ALT 07/13/2019 22  12 - 78 U/L Final      Specimen collection should occur prior to Sulfasalazine and/or Sulfapyridine administration due to the potential for falsely depressed results       Alkaline Phosphatase 07/13/2019 72  46 - 116 U/L Final    Total Protein 07/13/2019 7 7  6 4 - 8 2 g/dL Final    Albumin 07/13/2019 4 3  3 5 - 5 0 g/dL Final    Total Bilirubin 07/13/2019 0 39  0 20 - 1 00 mg/dL Final    eGFR 07/13/2019 99  ml/min/1 73sq m Final    Magnesium 07/13/2019 2 1  1 6 - 2 6 mg/dL Final    EXT PREG TEST UR (Ref: Negative) 07/13/2019 Negative   Final    Control 07/13/2019 Valid   Final    TSH 3RD GENERATON 07/13/2019 1 050  0 358 - 3 740 uIU/mL Final      The recommended reference ranges for TSH during pregnancy are as follows:   First trimester 0 1 to 2 5 uIU/mL   Second trimester  0 2 to 3 0 uIU/mL   Third trimester 0 3 to 3 0 uIU/m    Note: Normal ranges may not apply to patients who are transgender, non-binary, or whose legal sex, sex at birth, and gender identity differ  Using supplements with high doses of biotin 20 to more than 300 times greater than the adequate daily intake for adults of 30 mcg/day as established by the [place] of Medicine, can cause falsely depress results  Imaging:  I have personally reviewed all pertinent results  Assessment/Plan     Annual physical exam  -Balanced diet and exercise recommended at this visit  -PHQ-2: 0 at this visit  -Patient with FH of Colon cancer in mother and grandmother  She lost previous referral and afraid of going for colonoscopy  Encouraged patient to follow up with GI given her increased risk for colon cancer    -Next annual physical in 1 year    Romina was seen today for forms  Diagnoses and all orders for this visit:    Annual physical exam        No orders of the defined types were placed in this encounter  In addition to the above, the patient was counseled on general preventative health care subjects, including but not limited to:  - Nutrition, healthy weight, aerobic and weight-bearing exercise  - Mental health, social support, and self care  - Advised of the importance of dental hygiene and routine dental visits   - Patient made aware of  services at the office  Full counseling on the many choices of family planning methods including IUD is provided, and all questions answered  Compliance is strongly emphasized  Most Recent Immunizations   Administered Date(s) Administered    Influenza Quadrivalent, 6-35 Months IM 10/26/2016    Tdap 03/03/2016    Tuberculin Skin Test-PPD Intradermal 10/24/2018       Immunization status: up to date and documented  Return to [facility] in 1 year for annual  visit     PCP: Heike Rutherford MD    Future Appointments   Date Time Provider Indira Navasi   9/9/2019 10:50 AM Heike Rutherford MD [facility] Emiliano Humphreys         _____________________________________________________________________   The attending physician, Dr Odalys Badillo, agreed with the plan  Macey Cleaning MD, PGY-2  [facility]   8/16/2019     Please be aware that this note contains text that was dictated and there may be errors pertaining to "sound-alike "words during the dictation process

## 2019-08-16 NOTE — ASSESSMENT & PLAN NOTE
-Balanced diet and exercise recommended at this visit  -PHQ-2: 0 at this visit  -Patient with FH of Colon cancer in mother and grandmother  She lost previous referral and afraid of going for colonoscopy   Encouraged patient to follow up with GI given her increased risk for colon cancer    -Next annual physical in 1 year

## 2019-09-09 ENCOUNTER — OFFICE VISIT (OUTPATIENT)
Dept: FAMILY MEDICINE CLINIC | Facility: CLINIC | Age: 49
End: 2019-09-09

## 2019-09-09 VITALS
SYSTOLIC BLOOD PRESSURE: 130 MMHG | HEIGHT: 66 IN | RESPIRATION RATE: 14 BRPM | TEMPERATURE: 98.3 F | BODY MASS INDEX: 22.43 KG/M2 | WEIGHT: 139.6 LBS | DIASTOLIC BLOOD PRESSURE: 70 MMHG | HEART RATE: 78 BPM

## 2019-09-09 DIAGNOSIS — Z02.9 ADMINISTRATIVE ENCOUNTER: Primary | ICD-10-CM

## 2019-09-10 NOTE — PROGRESS NOTES
Romina is a [place]ian speaking woman whom I met at her brothers visit a couple months ago  At his visit she mentioned to me that when her brother had surgery she wanted to apply to care for him at home as he will need more help then he usually needs  She reports she is here today as she and her brother did not receive the completed forms she dropped off related to this need  She personally does not have a medical need today  Romina was not evaluated for medical appointment today  Forms completed as documented in her brother's chart

## 2019-11-06 PROBLEM — Z02.9 ADMINISTRATIVE ENCOUNTER: Status: ACTIVE | Noted: 2019-11-06

## 2019-11-06 NOTE — ASSESSMENT & PLAN NOTE
Visit scheduled incorrectly as Romina is here as an advocate for her brother to look for his paperwork needing PCP completion

## 2019-11-11 ENCOUNTER — OFFICE VISIT (OUTPATIENT)
Dept: FAMILY MEDICINE CLINIC | Facility: CLINIC | Age: 49
End: 2019-11-11

## 2019-11-11 VITALS
HEIGHT: 66 IN | SYSTOLIC BLOOD PRESSURE: 122 MMHG | BODY MASS INDEX: 23.37 KG/M2 | TEMPERATURE: 98.3 F | WEIGHT: 145.4 LBS | RESPIRATION RATE: 16 BRPM | HEART RATE: 76 BPM | DIASTOLIC BLOOD PRESSURE: 80 MMHG

## 2019-11-11 DIAGNOSIS — F17.200 SMOKER: Primary | ICD-10-CM

## 2019-11-11 DIAGNOSIS — Z23 ENCOUNTER FOR IMMUNIZATION: ICD-10-CM

## 2019-11-11 DIAGNOSIS — I10 ESSENTIAL HYPERTENSION: ICD-10-CM

## 2019-11-11 PROCEDURE — 90686 IIV4 VACC NO PRSV 0.5 ML IM: CPT | Performed by: FAMILY MEDICINE

## 2019-11-11 PROCEDURE — 90471 IMMUNIZATION ADMIN: CPT | Performed by: FAMILY MEDICINE

## 2019-11-11 PROCEDURE — 3008F BODY MASS INDEX DOCD: CPT | Performed by: FAMILY MEDICINE

## 2019-11-11 PROCEDURE — 99214 OFFICE O/P EST MOD 30 MIN: CPT | Performed by: FAMILY MEDICINE

## 2019-11-11 PROCEDURE — 3079F DIAST BP 80-89 MM HG: CPT | Performed by: FAMILY MEDICINE

## 2019-11-11 PROCEDURE — 3074F SYST BP LT 130 MM HG: CPT | Performed by: FAMILY MEDICINE

## 2019-11-11 PROCEDURE — 4004F PT TOBACCO SCREEN RCVD TLK: CPT | Performed by: FAMILY MEDICINE

## 2019-11-11 PROCEDURE — 3725F SCREEN DEPRESSION PERFORMED: CPT | Performed by: FAMILY MEDICINE

## 2019-11-11 RX ORDER — VARENICLINE TARTRATE 25 MG
KIT ORAL
Qty: 53 TABLET | Refills: 0 | Status: SHIPPED | OUTPATIENT
Start: 2019-11-11 | End: 2019-12-02 | Stop reason: DRUGHIGH
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
## 2019-11-11 NOTE — ASSESSMENT & PLAN NOTE
Taking meds without difficulty, labs > 1 year ago and ordered today- as has primary FHx of DM thinks of this a lot and prefers being tested once a year- does take her own BS at home randomly in AM and in 90s so will test A1C for screening

## 2019-11-11 NOTE — PATIENT INSTRUCTIONS
Thanks for coming in to talk about quitting smoking  The plan as we understand it is to start the chantix that I sent to your pharmacy  Try to decrease by one cigarette a day for a few days and once you feel comfortable decrease by another cigarette a day  Try to add a 5 minute walk to the time you usually take a cigarette- for example after a meal     Please come back in 3 weeks to keep talking about quitting smoking  Make a list of things you like to do that you can do when you are thinking of smoking and bring the list to your next visit

## 2019-11-15 DIAGNOSIS — I10 HYPERTENSION: ICD-10-CM

## 2019-11-17 RX ORDER — LISINOPRIL AND HYDROCHLOROTHIAZIDE 12.5; 1 MG/1; MG/1
TABLET ORAL
Qty: 90 TABLET | Refills: 3 | Status: SHIPPED | OUTPATIENT
Start: 2019-11-17 | End: 2020-11-24

## 2019-11-27 ENCOUNTER — APPOINTMENT (OUTPATIENT)
Dept: LAB | Facility: HOSPITAL | Age: 49
End: 2019-11-27
Attending: FAMILY MEDICINE
Payer: COMMERCIAL

## 2019-11-27 DIAGNOSIS — I10 ESSENTIAL HYPERTENSION: ICD-10-CM

## 2019-11-27 LAB
ANION GAP SERPL CALCULATED.3IONS-SCNC: 4 MMOL/L (ref 4–13)
BUN SERPL-MCNC: 11 MG/DL (ref 5–25)
CALCIUM SERPL-MCNC: 9.7 MG/DL (ref 8.3–10.1)
CHLORIDE SERPL-SCNC: 107 MMOL/L (ref 100–108)
CO2 SERPL-SCNC: 29 MMOL/L (ref 21–32)
CREAT SERPL-MCNC: 0.72 MG/DL (ref 0.6–1.3)
EST. AVERAGE GLUCOSE BLD GHB EST-MCNC: 97 MG/DL
GFR SERPL CREATININE-BSD FRML MDRD: 100 ML/MIN/1.73SQ M
GLUCOSE P FAST SERPL-MCNC: 95 MG/DL (ref 65–99)
HBA1C MFR BLD: 5 % (ref 4.2–6.3)
POTASSIUM SERPL-SCNC: 4.2 MMOL/L (ref 3.5–5.3)
SODIUM SERPL-SCNC: 140 MMOL/L (ref 136–145)

## 2019-11-27 PROCEDURE — 83036 HEMOGLOBIN GLYCOSYLATED A1C: CPT

## 2019-11-27 PROCEDURE — 80048 BASIC METABOLIC PNL TOTAL CA: CPT

## 2019-11-27 PROCEDURE — 36415 COLL VENOUS BLD VENIPUNCTURE: CPT

## 2019-12-02 ENCOUNTER — OFFICE VISIT (OUTPATIENT)
Dept: FAMILY MEDICINE CLINIC | Facility: CLINIC | Age: 49
End: 2019-12-02

## 2019-12-02 VITALS
HEART RATE: 80 BPM | WEIGHT: 144.6 LBS | TEMPERATURE: 97 F | DIASTOLIC BLOOD PRESSURE: 70 MMHG | RESPIRATION RATE: 14 BRPM | BODY MASS INDEX: 23.24 KG/M2 | HEIGHT: 66 IN | SYSTOLIC BLOOD PRESSURE: 112 MMHG

## 2019-12-02 DIAGNOSIS — R31.0 GROSS HEMATURIA: ICD-10-CM

## 2019-12-02 DIAGNOSIS — I10 ESSENTIAL HYPERTENSION: ICD-10-CM

## 2019-12-02 DIAGNOSIS — M54.50 CHRONIC BILATERAL LOW BACK PAIN WITHOUT SCIATICA: ICD-10-CM

## 2019-12-02 DIAGNOSIS — G89.29 CHRONIC BILATERAL LOW BACK PAIN WITHOUT SCIATICA: ICD-10-CM

## 2019-12-02 DIAGNOSIS — F17.200 SMOKER: Primary | ICD-10-CM

## 2019-12-02 LAB
SL AMB  POCT GLUCOSE, UA: ABNORMAL
SL AMB LEUKOCYTE ESTERASE,UA: ABNORMAL
SL AMB POCT BILIRUBIN,UA: ABNORMAL
SL AMB POCT BLOOD,UA: ABNORMAL
SL AMB POCT CLARITY,UA: CLEAR
SL AMB POCT COLOR,UA: YELLOW
SL AMB POCT KETONES,UA: ABNORMAL
SL AMB POCT NITRITE,UA: ABNORMAL
SL AMB POCT PH,UA: 6.5
SL AMB POCT SPECIFIC GRAVITY,UA: 1.01
SL AMB POCT URINE PROTEIN: ABNORMAL
SL AMB POCT UROBILINOGEN: 0.2

## 2019-12-02 PROCEDURE — 3008F BODY MASS INDEX DOCD: CPT | Performed by: FAMILY MEDICINE

## 2019-12-02 PROCEDURE — 81003 URINALYSIS AUTO W/O SCOPE: CPT | Performed by: FAMILY MEDICINE

## 2019-12-02 PROCEDURE — 99214 OFFICE O/P EST MOD 30 MIN: CPT | Performed by: FAMILY MEDICINE

## 2019-12-02 PROCEDURE — 3078F DIAST BP <80 MM HG: CPT | Performed by: FAMILY MEDICINE

## 2019-12-02 PROCEDURE — 3074F SYST BP LT 130 MM HG: CPT | Performed by: FAMILY MEDICINE

## 2019-12-02 PROCEDURE — 4004F PT TOBACCO SCREEN RCVD TLK: CPT | Performed by: FAMILY MEDICINE

## 2019-12-02 RX ORDER — VARENICLINE TARTRATE 1 MG/1
1 TABLET, FILM COATED ORAL 2 TIMES DAILY
Qty: 60 TABLET | Refills: 1 | Status: SHIPPED | OUTPATIENT
Start: 2019-12-02 | End: 2020-07-15 | Stop reason: ALTCHOICE

## 2019-12-02 NOTE — PATIENT INSTRUCTIONS
Thank you for coming in to talk about your quitting smoking  How wonderful that you have been smoking less and you feel like the medication is helping you and you will keep using the medication  I understand that you are next trying to make your pack of cigarettes last one week    I sent a new prescription for your current medication to your pharmacy to make sure you do not run out of medicine  We also spoke about how wonderful and healthy year blood work results are  Your blood pressure in the office is healthy  Thank you for keeping a record of how often at home you find her blood pressure to be greater than 140/85    We spoke about the pain getting in her back is due to her muscles being tight and your urine test results in the office showed no concern for infection  Similar to last year, your urine still has hidden blood in it, and since it has been 18 months since you last saw the urologist, and the blood continues, thank you for making another visit to the urologist    Thank you for coming back in about 3 weeks

## 2019-12-02 NOTE — ASSESSMENT & PLAN NOTE
Reviewed BP at goal today and Romina responds positively to plan of not changing medication and keep track of BP at home
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
## 2019-12-02 NOTE — ASSESSMENT & PLAN NOTE
As cystoscopy was over a year ago, hematuria continues and is trying to quit tobacco, however has not quit- urology consult for probably repeat cystoscopy

## 2019-12-02 NOTE — ASSESSMENT & PLAN NOTE
Muscle strain by history and exam- completed UA at [facility] request to confirm no infection, to use heat, stretching for pain- not limiting her activities

## 2019-12-23 ENCOUNTER — OFFICE VISIT (OUTPATIENT)
Dept: UROLOGY | Facility: AMBULATORY SURGERY CENTER | Age: 49
End: 2019-12-23
Payer: COMMERCIAL

## 2019-12-23 VITALS
HEIGHT: 65 IN | BODY MASS INDEX: 24.16 KG/M2 | HEART RATE: 82 BPM | SYSTOLIC BLOOD PRESSURE: 120 MMHG | WEIGHT: 145 LBS | DIASTOLIC BLOOD PRESSURE: 82 MMHG

## 2019-12-23 DIAGNOSIS — R31.0 GROSS HEMATURIA: Primary | ICD-10-CM

## 2019-12-23 DIAGNOSIS — R31.21 ASYMPTOMATIC MICROSCOPIC HEMATURIA: ICD-10-CM

## 2019-12-23 LAB
BACTERIA UR QL AUTO: ABNORMAL /HPF
BILIRUB UR QL STRIP: NEGATIVE
CLARITY UR: CLEAR
COLOR UR: YELLOW
GLUCOSE UR STRIP-MCNC: NEGATIVE MG/DL
HGB UR QL STRIP.AUTO: ABNORMAL
HYALINE CASTS #/AREA URNS LPF: ABNORMAL /LPF
KETONES UR STRIP-MCNC: NEGATIVE MG/DL
LEUKOCYTE ESTERASE UR QL STRIP: NEGATIVE
NITRITE UR QL STRIP: NEGATIVE
NON-SQ EPI CELLS URNS QL MICRO: ABNORMAL /HPF
PH UR STRIP.AUTO: 8 [PH]
PROT UR STRIP-MCNC: NEGATIVE MG/DL
RBC #/AREA URNS AUTO: ABNORMAL /HPF
SL AMB  POCT GLUCOSE, UA: NORMAL
SL AMB LEUKOCYTE ESTERASE,UA: NORMAL
SL AMB POCT BILIRUBIN,UA: NORMAL
SL AMB POCT BLOOD,UA: NORMAL
SL AMB POCT CLARITY,UA: CLEAR
SL AMB POCT COLOR,UA: YELLOW
SL AMB POCT KETONES,UA: NORMAL
SL AMB POCT NITRITE,UA: NORMAL
SL AMB POCT PH,UA: 8
SL AMB POCT SPECIFIC GRAVITY,UA: 1
SL AMB POCT URINE PROTEIN: NORMAL
SL AMB POCT UROBILINOGEN: 0.2
SP GR UR STRIP.AUTO: 1.01 (ref 1–1.03)
UROBILINOGEN UR QL STRIP.AUTO: 0.2 E.U./DL
WBC #/AREA URNS AUTO: ABNORMAL /HPF

## 2019-12-23 PROCEDURE — 88112 CYTOPATH CELL ENHANCE TECH: CPT | Performed by: PATHOLOGY

## 2019-12-23 PROCEDURE — 81001 URINALYSIS AUTO W/SCOPE: CPT | Performed by: NURSE PRACTITIONER

## 2019-12-23 PROCEDURE — 81002 URINALYSIS NONAUTO W/O SCOPE: CPT | Performed by: NURSE PRACTITIONER

## 2019-12-23 PROCEDURE — 99213 OFFICE O/P EST LOW 20 MIN: CPT | Performed by: NURSE PRACTITIONER

## 2019-12-23 NOTE — PROGRESS NOTES
12/23/2019      Chief Complaint   Patient presents with   García Colder Hematuria     Assessment and Plan    54 y o  female managed by Dr Lexi Talley    1  Microscopic Hematuria  · Urine dip in office reveals small blood  ·  Will send urine for urinalysis  ·  urine for cytology ordered  ·  continue to maintain hydration upwards to 40-60 oz per day  ·  use caution with use Naprosyn for back pain   ·  most recent BUN and creatinine performed 11/27/2019 reveals a BUN of 11 with creatinine of 0 72  · Follow up in the office in 1 year      History of Present Illness  Romina Amaro Ma is a 54 y o  female here for follow up evaluation of  microscopic gross hematuria  At previous office visit patient had a CT scan which showed no evidence of urologic pathology or abnormalities  Urine for cytology sent 05/02/2018 showed atypical urothelial cells and a cystoscopy was performed 06/08/2018 which yielded negative findings  She presents to the office today for evaluation of microscopic hematuria  Patient denies all lower urinary tract symptoms including dysuria, hematuria, urinary frequency and urgency  She reports recently being told that she is beginning to go through menopause which is causing her to be very symptomatic with hot flashes  She continues to complain of very low back discomfort which appears to be muscular in nature  Review of Systems   Constitutional: Negative for chills and fever  Respiratory: Negative for cough and shortness of breath  Cardiovascular: Negative for chest pain  Gastrointestinal: Negative for abdominal distention, abdominal pain, blood in stool, nausea and vomiting  Genitourinary: Negative for difficulty urinating, dysuria, enuresis, flank pain, frequency, hematuria and urgency  Musculoskeletal: Negative for back pain  Skin: Negative for rash  Neurological: Negative for dizziness       Past Medical History  Past Medical History:   Diagnosis Date    GERD (gastroesophageal reflux disease)  Headache     Hematuria     Hypertension     Lateral epicondylitis, right elbow 1/8/2019    Panic attacks     Psychiatric disorder        Past Social History  Past Surgical History:   Procedure Laterality Date    CYSTOSCOPY  06/08/2018    NO PAST SURGERIES       Social History     Tobacco Use   Smoking Status Current Every Day Smoker    Packs/day: 0 25    Types: Cigarettes   Smokeless Tobacco Never Used   Tobacco Comment    ONE HALF PACK A DAY OR LESS, CURRENT SOME DAY SMOKER, LIGHT TOBACCO SMOKER  AS PER [employer]       Past Family History  Family History   Problem Relation Age of Onset    Hypertension Mother     Arthritis Father     Diabetes Father     Hypertension Father     Hyperlipidemia Father     Other Brother         TBI from accident        Past Social history  Social History     Socioeconomic History    Marital status: Single     Spouse name: Not on file    Number of children: Not on file    Years of education: Not on file    Highest education level: Not on file   Occupational History    Occupation: HOUSEWIFE OR HOMEMAKER   Social Needs    Financial resource strain: Not on file    Food insecurity:     Worry: Not on file     Inability: Not on file    Transportation needs:     Medical: Not on file     Non-medical: Not on file   Tobacco Use    Smoking status: Current Every Day Smoker     Packs/day: 0 25     Types: Cigarettes    Smokeless tobacco: Never Used    Tobacco comment: ONE HALF PACK A DAY OR LESS, CURRENT SOME DAY SMOKER, LIGHT TOBACCO SMOKER  AS PER [employer]   Substance and Sexual Activity    Alcohol use: No    Drug use: No    Sexual activity: Not Currently     Partners: Male     Birth control/protection: IUD   Lifestyle    Physical activity:     Days per week: Not on file     Minutes per session: Not on file    Stress: Not on file   Relationships    Social connections:     Talks on phone: Not on file     Gets together: Not on file     Attends Synagogue service: Not on file     Active member of club or organization: Not on file     Attends meetings of clubs or organizations: Not on file     Relationship status: Not on file    Intimate partner violence:     Fear of current or ex partner: Not on file     Emotionally abused: Not on file     Physically abused: Not on file     Forced sexual activity: Not on file   Other Topics Concern    Not on file   Social History Narrative    ENGAGES IN A HOBBY - "[employer]"    LIVES WITH FAMILY    UNEMPLOYED       Current Medications  Current Outpatient Medications   Medication Sig Dispense Refill    amitriptyline (ELAVIL) 50 mg tablet Take 1 tablet (50 mg total) by mouth daily at bedtime 30 tablet 2    Cholecalciferol (VITAMIN D3 PO) Take by mouth daily      levonorgestrel (MIRENA) 20 MCG/24HR IUD 1 each by Intrauterine route once      lisinopril-hydrochlorothiazide (PRINZIDE,ZESTORETIC) 10-12 5 MG per tablet TAKE ONE TABLET BY MOUTH DAILY 90 tablet 3    methocarbamol (ROBAXIN) 500 mg tablet Take 1 tablet (500 mg total) by mouth daily at bedtime as needed for muscle spasms 30 tablet 5    omeprazole (PriLOSEC) 20 mg delayed release capsule Take 1 capsule (20 mg total) by mouth daily 30 capsule 2    varenicline (CHANTIX) 1 mg tablet Take 1 tablet (1 mg total) by mouth 2 (two) times a day 60 tablet 1    naproxen (NAPROSYN) 500 mg tablet Take 0 5 tablets (250 mg total) by mouth 2 (two) times a day with meals for 30 days 30 tablet 0     No current facility-administered medications for this visit          Allergies  No Known Allergies      The following portions of the patient's history were reviewed and updated as appropriate: allergies, current medications, past medical history, past social history, past surgical history and problem list       Vitals  Vitals:    12/23/19 1026   BP: 120/82   BP Location: Left arm   Patient Position: Sitting   Cuff Size: Adult   Pulse: 82   Weight: 65 8 kg (145 lb)   Height: 5' 5" (1 651 m) Physical Exam  Physical Exam   Constitutional: She appears well-developed and well-nourished  No distress  HENT:   Head: Atraumatic  Cardiovascular: Normal rate  Pulmonary/Chest: Effort normal    Abdominal: Bowel sounds are normal    Musculoskeletal: Normal range of motion  Neurological: She is alert  Skin: Skin is warm  Psychiatric: Her mood appears anxious  Vitals reviewed      Results  Recent Results (from the past 1 hour(s))   POCT urine dip    Collection Time: 12/23/19 10:32 AM   Result Value Ref Range    LEUKOCYTE ESTERASE,UA neg     NITRITE,UA neg     SL AMB POCT UROBILINOGEN 0 2     POCT URINE PROTEIN neg      PH,UA 8 0     BLOOD,UA small     SPECIFIC GRAVITY,UA 1 005     KETONES,UA neg     BILIRUBIN,UA neg     GLUCOSE, UA neg      COLOR,UA yellow     CLARITY,UA clear      No results found for: PSA  Lab Results   Component Value Date    GLUCOSE 88 09/25/2016    CALCIUM 9 7 11/27/2019     11/01/2015    K 4 2 11/27/2019    CO2 29 11/27/2019     11/27/2019    BUN 11 11/27/2019    CREATININE 0 72 11/27/2019     Lab Results   Component Value Date    WBC 4 34 07/13/2019    HGB 12 9 07/13/2019    HCT 39 6 07/13/2019    MCV 95 07/13/2019     07/13/2019     Orders  Orders Placed This Encounter   Procedures    Urinalysis with microscopic    POCT urine dip       RENE Cervantes

## 2020-01-09 ENCOUNTER — OFFICE VISIT (OUTPATIENT)
Dept: FAMILY MEDICINE CLINIC | Facility: CLINIC | Age: 50
End: 2020-01-09

## 2020-01-09 VITALS
DIASTOLIC BLOOD PRESSURE: 80 MMHG | HEART RATE: 78 BPM | SYSTOLIC BLOOD PRESSURE: 122 MMHG | BODY MASS INDEX: 24.39 KG/M2 | WEIGHT: 146.4 LBS | TEMPERATURE: 98 F | RESPIRATION RATE: 16 BRPM | HEIGHT: 65 IN

## 2020-01-09 DIAGNOSIS — R23.2 HOT FLASHES: ICD-10-CM

## 2020-01-09 DIAGNOSIS — F17.200 SMOKER: Primary | ICD-10-CM

## 2020-01-09 PROCEDURE — 99214 OFFICE O/P EST MOD 30 MIN: CPT | Performed by: FAMILY MEDICINE

## 2020-01-09 PROCEDURE — 3008F BODY MASS INDEX DOCD: CPT | Performed by: FAMILY MEDICINE

## 2020-01-09 PROCEDURE — 4004F PT TOBACCO SCREEN RCVD TLK: CPT | Performed by: FAMILY MEDICINE

## 2020-01-09 NOTE — PROGRESS NOTES
Assessment/Plan:    Hot flashes  Educated on pros and cons and lack of recommendation to use hormones  Reviewed option of trying Black Cohosh  I understand that Romina verbalized understanding and she will think about if she wants to try [employer]- currently undecided    Smoker  Continues to decrease amount of smoking  Reviewed pros and cons of increasing medication and after conversation I understand Romina will increase to BID and return to see me in 4-6 weeks  Will stop before that if she feels she is ready or we will talk about quit date at next visit       Diagnoses and all orders for this visit:    Smoker    Hot flashes          Verbal and Written education, counseling and care coordination as documented and patient verbalized understanding and agreement  More than 50% of 30 minutes spent with patient 1:1 (and with family present) on counseling, education and care coordination    Subjective:      Patient ID: Dannielle Faust is a 54 y o  female Korean   used: a bilingual physician (myself)  Seen today with father whom I have met once before    Today's Concerns none    Does have question about hot flashes; is it worth taking hormones? Tobacco- is only using varenicline at night time- did not start it twice a day  Feels smoking less, 5-6 days a pack lasts (twice as long as before)  Smoking half a cig at one time    'reply to my inquiry on her  thoughts related to not increasing venlafaxine from daily to bid:"I am not in a good mood to take many pills"  Does not feel ready to make quit date and feels motivated to continue to decrease how much she smokes  Did this with drinking- less and less and less and then one day stopped    Review of Systems   Respiratory: Negative for cough and chest tightness  Cardiovascular: Negative for chest pain  Neurological: Negative for headaches           The following portions of the patient's history were reviewed and updated as appropriate: allergies, current medications, past family history, past medical history, past social history, past surgical history and problem list    Acthyvivi Leónat to urologist and told OK to be on yearly f/u              Objective:      /80 (BP Location: Left arm, Patient Position: Sitting, Cuff Size: Large)   Pulse 78   Temp 98 °F (36 7 °C) (Tympanic)   Resp 16   Ht 5' 5" (1 651 m)   Wt 66 4 kg (146 lb 6 4 oz)   LMP  (LMP Unknown) Comment: IUD  BMI 24 36 kg/m²     Pertinent labs and imaging within medical record reviewed       Physical Exam      WD/WN in no distress  Minimally blunted affect compared to mood, which is variable and appropriate to content     Thought process and judgment WNL/intact

## 2020-01-09 NOTE — ASSESSMENT & PLAN NOTE
Continues to decrease amount of smoking  Reviewed pros and cons of increasing medication and after conversation I understand Romina will increase to BID and return to see me in 4-6 weeks    Will stop before that if she feels she is ready or we will talk about quit date at next visit

## 2020-01-09 NOTE — PATIENT INSTRUCTIONS
Thanks for coming in to talk about your health  Today we spoke about how hormones have been found to not help as many women with hot flashes as we thought in the past to help and have other risks and complications  There is a herb called Black Cohosh that has helped some people and has little side effects or complications  If you try it for 3 months and it hleps, keep using it  If you try it for 3 months and you don't notice it helping, then stop it  I also celebrate that you have been able to cut down on your smoking and continue to feel motivated to quit eventually, with right now not being ready to set a quit date  We spoke about the chantix is usually twice a day and so now it is time for your to increase from once a day to twice a day and I understand that you are in agreement with this plan  You have one refill waiting for you at the pharmacy      Thanks for returning to talk about your quitting smoking in 4-6 weeks

## 2020-01-09 NOTE — ASSESSMENT & PLAN NOTE
Educated on pros and cons and lack of recommendation to use hormones  Reviewed option of trying Black Cohosh   I understand that Romina verbalized understanding and she will think about if she wants to try [employer]- currently undecided

## 2020-02-05 ENCOUNTER — TELEPHONE (OUTPATIENT)
Dept: FAMILY MEDICINE CLINIC | Facility: CLINIC | Age: 50
End: 2020-02-05

## 2020-02-26 ENCOUNTER — TRANSCRIBE ORDERS (OUTPATIENT)
Dept: ADMINISTRATIVE | Facility: HOSPITAL | Age: 50
End: 2020-02-26

## 2020-02-26 DIAGNOSIS — Z12.31 VISIT FOR SCREENING MAMMOGRAM: Primary | ICD-10-CM

## 2020-03-02 ENCOUNTER — TELEPHONE (OUTPATIENT)
Dept: GASTROENTEROLOGY | Facility: CLINIC | Age: 50
End: 2020-03-02

## 2020-07-15 ENCOUNTER — OFFICE VISIT (OUTPATIENT)
Dept: FAMILY MEDICINE CLINIC | Facility: CLINIC | Age: 50
End: 2020-07-15

## 2020-07-15 VITALS
SYSTOLIC BLOOD PRESSURE: 110 MMHG | BODY MASS INDEX: 23.32 KG/M2 | WEIGHT: 140 LBS | TEMPERATURE: 97.7 F | RESPIRATION RATE: 16 BRPM | DIASTOLIC BLOOD PRESSURE: 60 MMHG | HEART RATE: 78 BPM | HEIGHT: 65 IN

## 2020-07-15 DIAGNOSIS — Z12.39 SCREENING FOR BREAST CANCER: Primary | ICD-10-CM

## 2020-07-15 DIAGNOSIS — I10 ESSENTIAL HYPERTENSION: ICD-10-CM

## 2020-07-15 PROCEDURE — 99213 OFFICE O/P EST LOW 20 MIN: CPT | Performed by: PHYSICIAN ASSISTANT

## 2020-07-15 PROCEDURE — 3078F DIAST BP <80 MM HG: CPT | Performed by: PHYSICIAN ASSISTANT

## 2020-07-15 PROCEDURE — 3074F SYST BP LT 130 MM HG: CPT | Performed by: PHYSICIAN ASSISTANT

## 2020-07-15 PROCEDURE — 3008F BODY MASS INDEX DOCD: CPT | Performed by: PHYSICIAN ASSISTANT

## 2020-07-15 NOTE — PROGRESS NOTES
Assessment/Plan:    Hypertension  Well controlled on medication -continue  Will check labs    Screening for breast cancer  Reordered screening mammogram       Pt advised to schedule appt with GYN for IUD removal and needs pap  Diagnoses and all orders for this visit:    Screening for breast cancer  -     Mammo screening bilateral w 3d & cad; Future    Essential hypertension  -     Lipid panel; Future  -     Comprehensive metabolic panel; Future  -     CBC and differential; Future        Subjective:      Patient ID: Lala Ruiz is a 54 y o  female presents today for f/u HTN    HPI   Checks bp regularly, readings usually in the 120s-110s/70s-80s  Continues taking lisinopril/HCTZ 10/12 5 mg po qd  Follows a low-sodium diet  Walks 20-30 mins daily  Doing well no complaints voiced at this time  Had Mirena placed 2015, is due for removal 2020  Requesting an updated order for mammogram, previous order   The following portions of the patient's history were reviewed and updated as appropriate: allergies, current medications, past family history, past medical history, past social history, past surgical history and problem list     Review of Systems   Constitutional: Negative for chills, fatigue and fever  Respiratory: Negative for cough, chest tightness, shortness of breath and wheezing  Cardiovascular: Negative for chest pain and palpitations  Gastrointestinal: Negative for abdominal pain, constipation, diarrhea, nausea and vomiting  Genitourinary: Negative for difficulty urinating  Musculoskeletal: Negative for arthralgias, myalgias, neck pain and neck stiffness  Skin: Negative for rash and wound  Neurological: Negative for dizziness, weakness, light-headedness, numbness and headaches  Psychiatric/Behavioral: Negative for agitation and behavioral problems  The patient is not nervous/anxious            Objective:      /60   Pulse 78   Temp 97 7 °F (36 5 °C)   Resp 16   Ht 5' 5" (1 651 m)   Wt 63 5 kg (140 lb)   LMP  (LMP Unknown) Comment: IUD  BMI 23 30 kg/m²          Physical Exam   Constitutional: She is oriented to person, place, and time  She appears well-developed and well-nourished  No distress  HENT:   Head: Normocephalic and atraumatic  Eyes: Conjunctivae are normal    Neck: Normal range of motion  Neck supple  Cardiovascular: Normal rate, regular rhythm and normal heart sounds  No murmur heard  Pulmonary/Chest: Effort normal and breath sounds normal  No respiratory distress  She has no wheezes  Musculoskeletal: She exhibits no edema or deformity  Lymphadenopathy:     She has no cervical adenopathy  Neurological: She is alert and oriented to person, place, and time  Psychiatric: She has a normal mood and affect   Her behavior is normal  Thought content normal

## 2020-07-16 ENCOUNTER — HOSPITAL ENCOUNTER (EMERGENCY)
Facility: HOSPITAL | Age: 50
Discharge: HOME | End: 2020-07-16
Attending: EMERGENCY MEDICINE
Payer: COMMERCIAL

## 2020-07-16 ENCOUNTER — APPOINTMENT (EMERGENCY)
Dept: RADIOLOGY | Facility: HOSPITAL | Age: 50
End: 2020-07-16
Payer: COMMERCIAL

## 2020-07-16 VITALS
WEIGHT: 147 LBS | RESPIRATION RATE: 18 BRPM | HEIGHT: 65 IN | SYSTOLIC BLOOD PRESSURE: 124 MMHG | DIASTOLIC BLOOD PRESSURE: 73 MMHG | BODY MASS INDEX: 24.49 KG/M2 | TEMPERATURE: 98.8 F | HEART RATE: 66 BPM | OXYGEN SATURATION: 100 %

## 2020-07-16 DIAGNOSIS — S99.912A INJURY OF LEFT ANKLE, INITIAL ENCOUNTER: ICD-10-CM

## 2020-07-16 DIAGNOSIS — S80.211A ABRASION OF RIGHT KNEE, INITIAL ENCOUNTER: ICD-10-CM

## 2020-07-16 DIAGNOSIS — S93.402A LEFT ANKLE SPRAIN: Primary | ICD-10-CM

## 2020-07-16 PROCEDURE — 99283 EMERGENCY DEPT VISIT LOW MDM: CPT

## 2020-07-16 PROCEDURE — 73610 X-RAY EXAM OF ANKLE: CPT

## 2020-07-16 PROCEDURE — 99284 EMERGENCY DEPT VISIT MOD MDM: CPT | Performed by: EMERGENCY MEDICINE

## 2020-07-16 PROCEDURE — 73564 X-RAY EXAM KNEE 4 OR MORE: CPT

## 2020-07-16 RX ORDER — ACETAMINOPHEN 500 MG
500 TABLET ORAL EVERY 6 HOURS PRN
Qty: 30 TABLET | Refills: 0 | Status: SHIPPED | OUTPATIENT
Start: 2020-07-16

## 2020-07-16 RX ORDER — NAPROXEN 500 MG/1
500 TABLET ORAL 2 TIMES DAILY WITH MEALS
Qty: 30 TABLET | Refills: 0 | Status: SHIPPED | OUTPATIENT
Start: 2020-07-16 | End: 2020-11-15

## 2020-07-16 RX ORDER — LIDOCAINE 50 MG/G
1 PATCH TOPICAL DAILY
Qty: 15 PATCH | Refills: 0 | Status: SHIPPED | OUTPATIENT
Start: 2020-07-16 | End: 2020-11-15

## 2020-07-16 RX ORDER — IBUPROFEN 600 MG/1
600 TABLET ORAL ONCE
Status: COMPLETED | OUTPATIENT
Start: 2020-07-16 | End: 2020-07-16

## 2020-07-16 RX ORDER — GINSENG 100 MG
1 CAPSULE ORAL ONCE
Status: COMPLETED | OUTPATIENT
Start: 2020-07-16 | End: 2020-07-16

## 2020-07-16 RX ORDER — OXYCODONE HYDROCHLORIDE 5 MG/1
5 TABLET ORAL ONCE
Status: COMPLETED | OUTPATIENT
Start: 2020-07-16 | End: 2020-07-16

## 2020-07-16 RX ADMIN — IBUPROFEN 600 MG: 600 TABLET, FILM COATED ORAL at 17:54

## 2020-07-16 RX ADMIN — BACITRACIN 1 SMALL APPLICATION: 500 OINTMENT TOPICAL at 17:54

## 2020-07-16 RX ADMIN — OXYCODONE HYDROCHLORIDE 5 MG: 5 TABLET ORAL at 19:23

## 2020-07-16 NOTE — ED ATTENDING ATTESTATION
7/16/2020  I, Dalton Garcia MD, saw and evaluated the patient  I have discussed the patient with the resident/non-physician practitioner and agree with the resident's/non-physician practitioner's findings, Plan of Care, and MDM as documented in the resident's/non-physician practitioner's note, except where noted  All available labs and Radiology studies were reviewed  I was present for key portions of any procedure(s) performed by the resident/non-physician practitioner and I was immediately available to provide assistance  At this point I agree with the current assessment done in the Emergency Department  I have conducted an independent evaluation of this patient a history and physical is as follows:    OA: 55 y/o f c/o L ankle pain after tripping forward while carrying a box and twisting her L ankle  Also had an abrasion to her R knee  Denies hitting her head  No numbness/weakness/tingling  Denies hitting hear or other injuries  Tried to ambulate after the fall and was able to stand but had pain with walking  Denies blood thinner/ASA use  PE, well developed f in NAD, VSS, NC/AT, MMM, RR, + 2 DP pulses, + ttp with swelling to lateral malleolus, no ttp over navicular or 5th metatarsal  - ecchymosis, - ttp over proximal tib-fib, + abrasion to R knee, FROM b/l knees, no deformity, + FROM all toes, decreased ROM L ankle secondary to pain, intact DP pulses, capillary refill < 2 sec, AAO  A/p ankle pain, knee abrasion, chart review shows that tetanus is UTD, xray, splint, non-narcotic pain control, f/u and return precautions         ED Course         Critical Care Time  Procedures

## 2020-07-16 NOTE — ED PROVIDER NOTES
History  Chief Complaint   Patient presents with    Ankle Injury     pt was holding box going down step, didn't see step and fell on to right knee and twisted left ankle  (-)head stike (-)LOC, no blood thinners       Ankle Injury   Location:  Left ankle  Severity:  Moderate  Onset quality:  Sudden  Timing:  Constant  Progression:  Unchanged  Chronicity:  New  Context:  Twisted her ankle while carrying a box  Associated symptoms: no abdominal pain, no chest pain, no cough, no diarrhea, no fever, no nausea, no rash, no rhinorrhea, no shortness of breath, no sore throat and no vomiting        Prior to Admission Medications   Prescriptions Last Dose Informant Patient Reported? Taking?    Cholecalciferol (VITAMIN D3 PO)  Self Yes No   Sig: Take by mouth daily   amitriptyline (ELAVIL) 50 mg tablet  Self No No   Sig: Take 1 tablet (50 mg total) by mouth daily at bedtime   levonorgestrel (MIRENA) 20 MCG/24HR IUD  Self Yes No   Si each by Intrauterine route once   lisinopril-hydrochlorothiazide (PRINZIDE,ZESTORETIC) 10-12 5 MG per tablet  Self No No   Sig: TAKE ONE TABLET BY MOUTH DAILY   methocarbamol (ROBAXIN) 500 mg tablet  Self No No   Sig: Take 1 tablet (500 mg total) by mouth daily at bedtime as needed for muscle spasms   naproxen (NAPROSYN) 500 mg tablet  Self No No   Sig: Take 0 5 tablets (250 mg total) by mouth 2 (two) times a day with meals for 30 days   omeprazole (PriLOSEC) 20 mg delayed release capsule  Self No No   Sig: Take 1 capsule (20 mg total) by mouth daily      Facility-Administered Medications: None       Past Medical History:   Diagnosis Date    GERD (gastroesophageal reflux disease)     Headache     Hematuria     Hypertension     Lateral epicondylitis, right elbow 2019    Panic attacks     Psychiatric disorder        Past Surgical History:   Procedure Laterality Date    CYSTOSCOPY  2018    NO PAST SURGERIES         Family History   Problem Relation Age of Onset    Hypertension Mother     Arthritis Father     Diabetes Father     Hypertension Father     Hyperlipidemia Father     Other Brother         TBI from accident      I have reviewed and agree with the history as documented  E-Cigarette/Vaping    E-Cigarette Use Never User      E-Cigarette/Vaping Substances     Social History     Tobacco Use    Smoking status: Current Every Day Smoker     Packs/day: 0 25     Types: Cigarettes    Smokeless tobacco: Never Used    Tobacco comment: ONE HALF PACK A DAY OR LESS, CURRENT SOME DAY SMOKER, LIGHT TOBACCO SMOKER  AS PER [employer]CR[employer]TS   Substance Use Topics    Alcohol use: No    Drug use: No        Review of Systems   Constitutional: Negative for chills and fever  HENT: Negative for rhinorrhea and sore throat  Eyes: Negative for photophobia and visual disturbance  Respiratory: Negative for cough and shortness of breath  Cardiovascular: Negative for chest pain and palpitations  Gastrointestinal: Negative for abdominal pain, blood in stool, diarrhea, nausea and vomiting  Genitourinary: Negative for dysuria and hematuria  Musculoskeletal: Negative for back pain, neck pain and neck stiffness  Skin: Negative for rash and wound  Neurological: Negative for syncope, weakness and numbness  Psychiatric/Behavioral: Negative for agitation and confusion  All other systems reviewed and are negative  Physical Exam  ED Triage Vitals [07/16/20 1736]   Temperature Pulse Respirations Blood Pressure SpO2   98 8 °F (37 1 °C) 66 18 124/73 100 %      Temp Source Heart Rate Source Patient Position - Orthostatic VS BP Location FiO2 (%)   Oral Monitor -- Left arm --      Pain Score       9             Orthostatic Vital Signs  Vitals:    07/16/20 1736   BP: 124/73   Pulse: 66       Physical Exam   Constitutional: She is oriented to person, place, and time  She appears well-developed  No distress  HENT:   Head: Normocephalic and atraumatic     Right Ear: External ear normal    Left Ear: External ear normal    Nose: Nose normal    Mouth/Throat: Oropharynx is clear and moist    Eyes: Conjunctivae and EOM are normal    Neck: No tracheal deviation present  Cardiovascular: Normal rate, normal heart sounds and intact distal pulses  Pulmonary/Chest: Effort normal and breath sounds normal  No stridor  No respiratory distress  She has no wheezes  She has no rales  She exhibits no tenderness  Abdominal: Soft  She exhibits no distension  There is no tenderness  There is no rebound and no guarding  Musculoskeletal:   Left lower extremity:  Tenderness to palpation over the left lateral malleolus with appreciated swelling, no crepitus or deformity, range of motion limited by pain, no bony tenderness distal to the ankle with no bony tenderness in the foot, no bony tenderness appreciated proximal to the ankle, full range of motion of the left knee  Intact distal motor and sensation  Right lower extremity:  Abrasion over right knee, no bony tenderness palpation, mild pain with range of motion, no appreciated edema or bruising, no crepitus or deformity, intact distal motor and sensation    2+ DP and PT pulses bilaterally    Upper extremities nontender to palpation throughout with no bruising or swelling, full range of motion, intact motor and distal sensation, intact pincher grasp, no snuffbox tenderness   Neurological: She is alert and oriented to person, place, and time  No cranial nerve deficit or sensory deficit  She exhibits normal muscle tone  Skin: Skin is warm and dry  Capillary refill takes less than 2 seconds  She is not diaphoretic  Psychiatric: Her behavior is normal    Nursing note and vitals reviewed        ED Medications  Medications   ibuprofen (MOTRIN) tablet 600 mg (600 mg Oral Given 7/16/20 1754)   bacitracin topical ointment 1 small application (1 small application Topical Given 7/16/20 1754)   oxyCODONE (ROXICODONE) IR tablet 5 mg (5 mg Oral Given 7/16/20 1923) Diagnostic Studies  Results Reviewed     None                 XR ankle 3+ views LEFT   Final Result by Payam Hickman MD (07/16 1837)      No acute osseous abnormality  Lateral and anterior soft tissue swelling  Workstation performed: [id number]         XR knee 4+ views Right injury   Final Result by Mary Kay Antony MD (07/16 1912)      No acute osseous abnormality  Workstation performed: [id number]         XR knee 4+ views left injury   Final Result by Mary Kay Antony MD (07/16 1913)      No acute osseous abnormality  Workstation performed: [id number]               Procedures  Procedures      ED Course  ED Course as of Jul 16 1937   Thu Jul 16, 2020   6513 No fractures appreciated on my wet read of the x-rays and no fracture on the final read by radiologist of the left ankle x-rays, however, patient has noted to be quite swelling over the left ankle, given this will place patient in a posterior ankle splint on the left and give patient crutches and have patient follow-up with orthopedic surgery clinic  1 Pt's son at bedside and will help pt get home  Pt declined crutches states she has at home  DAVID/DAST-10      Most Recent Value   How many times in the past year have you    Used an illegal drug or used a prescription medication for non-medical reasons? Never Filed at: 07/16/2020 1738                              MDM  Number of Diagnoses or Management Options  Diagnosis management comments: This is a 61-year-old female who presents with chief complaint of an ankle injury  Patient reports that shortly prior to arrival she was carrying a box when she tripped and inverted her left ankle  She did stand on it afterwards with some pain but did not really ambulate on it  She also did scrape her right knee, she is complaining of some pain at the site of an abrasion on her right knee as well although most of her pain is in her left ankle    She denies any pain proximal to the left ankle  Son helped her into the car and brought her to the emergency department  She does is noted to have had her tetanus updated in 2016 per chart review  She denies any head injury or neck pain  On exam patient is alert and oriented with normal vital signs, she is noted to have some swelling and tenderness over the lateral ankle on exam with no gross deformity or crepitus appreciated  She is neurovascularly intact in all extremities  She does have an abrasion over her right knee as well, mild pain with range of motion  She does not have any tenderness proximal to the left ankle on that extremity  Overall there is concerned that she may have a left ankle sprain or fracture  Will obtain x-rays of the left ankle, and will extend proximally to image the left knee to rule out occult proximal fracture, will also obtain x-rays of the right knee to rule out fracture  Will give ibuprofen for pain  Will apply bacitracin to right knee abrasion  Disposition  Final diagnoses:   Left ankle sprain   Injury of left ankle, initial encounter   Abrasion of right knee, initial encounter     Time reflects when diagnosis was documented in both MDM as applicable and the Disposition within this note     Time User Action Codes Description Comment    7/16/2020  6:51 PM Chelsea Banks Add [S04 244P] Left ankle sprain     7/16/2020  6:51 PM Chelsea Banks Add [Z38 690X] Injury of left ankle, initial encounter     7/16/2020  6:51 PM Chelsea Banks Add [S80 211A] Abrasion of right knee, initial encounter       ED Disposition     ED Disposition Condition Date/Time Comment    Discharge Stable Thu Jul 16, 2020  6:55 PM Summer Aguilar discharge to home/self care              Follow-up Information     Follow up With Specialties Details Why Contact Info Additional [address] [facility] Schedule an appointment as soon as possible for a visit  Left ankle injury [address] [phone]  [address] [facility] [place], [address] [place] [address], [address]  [facility]          Patient's Medications   Discharge Prescriptions    ACETAMINOPHEN (TYLENOL) 500 MG TABLET    Take 1 tablet (500 mg total) by mouth every 6 (six) hours as needed for mild pain       Start Date: 7/16/2020 End Date: --       Order Dose: 500 mg       Quantity: 30 tablet    Refills: 0    LIDOCAINE (LIDODERM) 5 %    Apply 1 patch topically daily Remove & Discard patch within 12 hours or as directed by MD       Start Date: 7/16/2020 End Date: --       Order Dose: 1 patch       Quantity: 15 patch    Refills: 0    NAPROXEN (NAPROSYN) 500 MG TABLET    Take 1 tablet (500 mg total) by mouth 2 (two) times a day with meals       Start Date: 7/16/2020 End Date: --       Order Dose: 500 mg       Quantity: 30 tablet    Refills: 0     No discharge procedures on file  PDMP Review     None           ED Provider  Attending physically available and evaluated Shyam Lopez I managed the patient along with the ED Attending      Electronically Signed by         Melisa Guadarrama MD  07/16/20 Ailyn Falcon MD  07/16/20 0235

## 2020-07-20 ENCOUNTER — ANNUAL EXAM (OUTPATIENT)
Dept: OBGYN CLINIC | Facility: CLINIC | Age: 50
End: 2020-07-20

## 2020-07-20 VITALS
DIASTOLIC BLOOD PRESSURE: 85 MMHG | SYSTOLIC BLOOD PRESSURE: 129 MMHG | WEIGHT: 144.2 LBS | HEART RATE: 93 BPM | HEIGHT: 65 IN | TEMPERATURE: 98.4 F | BODY MASS INDEX: 24.03 KG/M2

## 2020-07-20 DIAGNOSIS — Z01.419 WOMEN'S ANNUAL ROUTINE GYNECOLOGICAL EXAMINATION: ICD-10-CM

## 2020-07-20 DIAGNOSIS — Z12.4 SCREENING FOR MALIGNANT NEOPLASM OF THE CERVIX: Primary | ICD-10-CM

## 2020-07-20 PROCEDURE — 88175 CYTOPATH C/V AUTO FLUID REDO: CPT | Performed by: OBSTETRICS & GYNECOLOGY

## 2020-07-20 PROCEDURE — 87624 HPV HI-RISK TYP POOLED RSLT: CPT | Performed by: OBSTETRICS & GYNECOLOGY

## 2020-07-20 PROCEDURE — 99396 PREV VISIT EST AGE 40-64: CPT | Performed by: OBSTETRICS & GYNECOLOGY

## 2020-07-20 NOTE — PROGRESS NOTES
Subjective      Romina Wood is a 54 y o  female who presents for annual well woman exam  Amenorrheic  Mirena IUD  No intermenstrual bleeding, spotting, or discharge  GYN:  · Denies vaginal discharge, labial erythema or lesions, dyspareunia  · Amenorrheic due to Mirena IUD use, placed 2015  · Patient is not currently sexually active (  8 years ago)  · Last pap smear 2015 NILM, HR HPV negative  Due today  :  · Denies dysuria, urinary frequency or urgency  · Denies hematuria, flank pain, incontinence  Breast:  · Denies breast mass, skin changes, dimpling, reddening, nipple retraction  · Denies breast discharge  · Last mammogram was in 3/19/2019  Results showed no mammographic evidence of malignancy  She has mammogram scheduled for 2021 - patient reports they are unable to move up her mammogram date, despite being overdue for mammogram   · Patient denies family history of breast, endometrial, or ovarian ca  General:  · ETOH use: No  · Tobacco use: No  · Recreational drug use: No    Screening:  · Cervical cancer: due today  · Breast cancer: scheduled for mammogram 2021  · Colon cancer: not indicated  · STD screening: declines  Review of Systems  Pertinent items are noted in HPI  Objective      /85 (BP Location: Left arm, Patient Position: Sitting, Cuff Size: Adult)   Pulse 93   Temp 98 4 °F (36 9 °C) (Temporal)   Ht 5' 5" (1 651 m)   Wt 65 4 kg (144 lb 3 2 oz)   LMP  (LMP Unknown) Comment: IUD  BMI 24 00 kg/m²     Physical Exam   Constitutional: She is oriented to person, place, and time  She appears well-developed and well-nourished  No distress  Pulmonary/Chest: Effort normal  Right breast exhibits no inverted nipple, no mass, no nipple discharge and no skin change  Left breast exhibits no inverted nipple, no mass, no nipple discharge and no skin change  Abdominal: Soft  Bowel sounds are normal  She exhibits no distension  There is no tenderness   There is no rebound and no guarding  Genitourinary: Vagina normal and uterus normal  Rectal exam shows guaiac negative stool  No vaginal discharge found  Genitourinary Comments: The external female genitalia is normal  The Bartholin and Skenes glands are normal  The urethral meatus is normal and midline  Anus without fissure or lesion  Speculum exam reveals vagina without lesion or discharge  Cervix is normal appearing without visible lesions  Mirena IUD strings visualized  No bleeding  No significant cystocele or rectocele noted  Neurological: She is alert and oriented to person, place, and time  No cranial nerve deficit  Coordination normal    Skin: She is not diaphoretic  Psychiatric: She has a normal mood and affect  Her behavior is normal  Judgment and thought content normal    Vitals reviewed        Assessment     54year old with Mirena IUD here for annual exam     Plan     Routine GYN exam  Mirena in place, patient satisfied, would like to continue use  Follow up pap smear with co-testing  Mammogram scheduled 1/4/2021  RTC in 1 year for exam      Janee Parra MD  OBGYN, PGY-4  7/20/2020  1:35 PM

## 2020-07-21 ENCOUNTER — OFFICE VISIT (OUTPATIENT)
Dept: OBGYN CLINIC | Facility: OTHER | Age: 50
End: 2020-07-21
Payer: COMMERCIAL

## 2020-07-21 ENCOUNTER — APPOINTMENT (OUTPATIENT)
Dept: RADIOLOGY | Facility: OTHER | Age: 50
End: 2020-07-21
Payer: COMMERCIAL

## 2020-07-21 ENCOUNTER — APPOINTMENT (OUTPATIENT)
Dept: LAB | Facility: HOSPITAL | Age: 50
End: 2020-07-21
Attending: FAMILY MEDICINE
Payer: COMMERCIAL

## 2020-07-21 VITALS
WEIGHT: 145 LBS | BODY MASS INDEX: 21.98 KG/M2 | HEIGHT: 68 IN | SYSTOLIC BLOOD PRESSURE: 129 MMHG | DIASTOLIC BLOOD PRESSURE: 86 MMHG | HEART RATE: 80 BPM

## 2020-07-21 DIAGNOSIS — S93.402A SPRAIN OF UNSPECIFIED LIGAMENT OF LEFT ANKLE, INITIAL ENCOUNTER: ICD-10-CM

## 2020-07-21 DIAGNOSIS — R23.8 SKIN BULLA: ICD-10-CM

## 2020-07-21 DIAGNOSIS — T14.8XXA SKIN ABRASION: ICD-10-CM

## 2020-07-21 DIAGNOSIS — S93.402A SPRAIN OF UNSPECIFIED LIGAMENT OF LEFT ANKLE, INITIAL ENCOUNTER: Primary | ICD-10-CM

## 2020-07-21 LAB
HPV HR 12 DNA CVX QL NAA+PROBE: NEGATIVE
HPV16 DNA CVX QL NAA+PROBE: NEGATIVE
HPV18 DNA CVX QL NAA+PROBE: NEGATIVE

## 2020-07-21 PROCEDURE — 3074F SYST BP LT 130 MM HG: CPT | Performed by: FAMILY MEDICINE

## 2020-07-21 PROCEDURE — 99204 OFFICE O/P NEW MOD 45 MIN: CPT | Performed by: FAMILY MEDICINE

## 2020-07-21 PROCEDURE — 3008F BODY MASS INDEX DOCD: CPT | Performed by: OBSTETRICS & GYNECOLOGY

## 2020-07-21 PROCEDURE — 73610 X-RAY EXAM OF ANKLE: CPT

## 2020-07-21 PROCEDURE — 3079F DIAST BP 80-89 MM HG: CPT | Performed by: FAMILY MEDICINE

## 2020-07-21 PROCEDURE — 87205 SMEAR GRAM STAIN: CPT

## 2020-07-21 PROCEDURE — 3008F BODY MASS INDEX DOCD: CPT | Performed by: FAMILY MEDICINE

## 2020-07-21 PROCEDURE — 87070 CULTURE OTHR SPECIMN AEROBIC: CPT

## 2020-07-21 RX ORDER — CEPHALEXIN 500 MG/1
500 CAPSULE ORAL EVERY 6 HOURS SCHEDULED
Qty: 40 CAPSULE | Refills: 0 | Status: SHIPPED | OUTPATIENT
Start: 2020-07-21 | End: 2020-07-31

## 2020-07-21 NOTE — PATIENT INSTRUCTIONS
Explained the patient she has left lateral ankle sprain  We will start her on a controlled ankle motion boot for   Walking  She may require crutches for next few days due to pain  We will repeat x-rays at her next visit to further evaluate for possible hidden fracture  Also, patient has a blister on the lateral aspect of her ankle from walking in her splint the could be developing into infection  Today I started antibiotics including cephalexin as well as obtained a culture and sent to the lab  I reviewed red flags including development of redness worsening pain worsening swelling instructed this occurs to go to the ER immediately

## 2020-07-21 NOTE — PROGRESS NOTES
1  Sprain of unspecified ligament of left ankle, initial encounter  XR ankle 3+ vw left    Wound culture and Gram stain    cephalexin (KEFLEX) 500 mg capsule   2  Skin abrasion       Orders Placed This Encounter   Procedures    Incision and drain    Wound culture and Gram stain    XR ankle 3+ vw left        Imaging Studies (I personally reviewed images in PACS and report):  X-ray left ankle 07/21/2020:  No acute osseous abnormality  X-ray left ankle 07/16/2020:  No acute osseous abnormality    IMPRESSION:  Left lateral ankle sprain  Left ankle blister with erythema concerning for developing cellulitis  Date of injury:  07/16/2020  Follow-up from injury:  5 days    Repeat X-ray next visit:   Left ankle      Return in about 1 week (around 7/28/2020)  Patient Instructions   Explained the patient she has left lateral ankle sprain  We will start her on a controlled ankle motion boot for   Walking  She may require crutches for next few days due to pain  We will repeat x-rays at her next visit to further evaluate for possible hidden fracture  Also, patient has a blister on the lateral aspect of her ankle from walking in her splint the could be developing into infection  Today I started antibiotics including cephalexin as well as obtained a culture and sent to the lab  I reviewed red flags including development of redness worsening pain worsening swelling instructed this occurs to go to the ER immediately  CHIEF COMPLAINT:  Left ankle injury    HPI:  Lisa Cardona is a 54 y o  female  who presents for       Visit 7/21/2020 :  Evaluation left ankle injury status post twisting injury event that occurred on 07/16/2020  She was evaluated emergency department where she had x-ray performed showing lateral soft tissue swelling  She had significant swelling with placed in a posterior ankle splint nonweightbearing with crutches      Today, she points left lateral ankle as source of intermittent moderate pain exacerbated by ambulating  She has been ambulating in her splint at home  Associated symptoms included itching left lateral ankle with redness  Upon removal of her splint today blister was reveal lateral ankle  Interpretation today provided by medical assistant Ramon Houser          Review of Systems   Constitutional: Negative for chills, fever and unexpected weight change  HENT: Negative for hearing loss, nosebleeds and sore throat  Eyes: Negative for pain, redness and visual disturbance  Respiratory: Negative for cough, shortness of breath and wheezing  Cardiovascular: Negative for chest pain, palpitations and leg swelling  Gastrointestinal: Negative for abdominal distention, nausea and vomiting  Endocrine: Negative for polydipsia and polyuria  Genitourinary: Negative for dysuria and hematuria  Skin: Negative for rash and wound  Neurological: Negative for dizziness, numbness and headaches  Psychiatric/Behavioral: Negative for decreased concentration and suicidal ideas           Following history reviewed and update:    Past Medical History:   Diagnosis Date    GERD (gastroesophageal reflux disease)     Headache     Hematuria     Hypertension     Lateral epicondylitis, right elbow 1/8/2019    Panic attacks     Psychiatric disorder      Past Surgical History:   Procedure Laterality Date    CYSTOSCOPY  06/08/2018    NO PAST SURGERIES       Social History   Social History     Substance and Sexual Activity   Alcohol Use No     Social History     Substance and Sexual Activity   Drug Use No     Social History     Tobacco Use   Smoking Status Current Every Day Smoker    Packs/day: 0 25    Types: Cigarettes   Smokeless Tobacco Never Used   Tobacco Comment    ONE HALF PACK A DAY OR LESS, CURRENT SOME DAY SMOKER, LIGHT TOBACCO SMOKER  AS PER [employer]CRIPTS     Family History   Problem Relation Age of Onset    Hypertension Mother     Arthritis Father     Diabetes Father    Denia Valencia Hypertension Father     Hyperlipidemia Father     Other Brother         TBI from accident      No Known Allergies       Physical Exam  /86 (BP Location: Right arm, Patient Position: Sitting, Cuff Size: Adult)   Pulse 80   Ht 5' 7 5" (1 715 m)   Wt 65 8 kg (145 lb)   LMP  (LMP Unknown) Comment: IUD  BMI 22 38 kg/m²     Constitutional:  see vital signs  Gen: well-developed, normocephalic/atraumatic, well-groomed  Eyes: No inflammation or discharge of conjunctiva or lids; sclera clear   Pharynx: no inflammation, lesion, or mass of lips  Neck: supple, no masses, non-distended  MSK: no inflammation, lesion, mass, or clubbing of nails and digits except for other than mentioned below  SKIN:  Right knee anterior abrasion no erythema healing well with scab  See below for ankle abrasion and pictures  Pulmonary/Chest: Effort normal  No respiratory distress  NEURO: cranial nerves grossly intact  PSYCH:  Alert and oriented to person, place, and time; recent and remote memory intact; mood normal, no depression, anxiety, or agitation, judgment and insight good and intact     Ortho Exam  LEFT ANKLE  EXAM  Observation  GAIT:  Nonweightbearing    Inspection  Erythema: + mild  See picture below  Ecchymosis: no  Edema:   Moderate lateral ankle edema  Small superficial abrasion anterior ankle healing well  Quarter-sized bulla lateral ankle with surrounding mild erythema nontender to light touch    Tenderness  Proximal Fibula: no  (Maisonneuve frx)  AiTFL: no  (2cm proximal-medial to tip lateral malleolus 92% sens, 29% spec)  ATFL: + reproduces chief complaint of pain  CFL: +  PTFL: no  Achilles:  no  deltoid: No  Peroneal: no  Tibialis Anterior: no  Tibialis Posterior: no     Bony Tenderpoints:  Lateral Malleolus: no  Base of 5th MT: no  Medial Malleolus: no  Navicular: no  Talar dome: No    ROM  Dorsiflexion: intact  Plantarflexion: intact    Muscle Strength  Pronation: intact without pain  Supination: intact without pain    Tib-Fib Squeeze: negative  (vzdsigpaktsn-yr-uuiovlnmazxwkh squeeze; 26% sens, 88% spec; rule in test)    Calcaneal Squeeze: negative    Dorsiflexion (+) ER Stress Test: negative  (reproduce ATiFL mech; 71% sens, 63% spec)      LEFT ACHILLES EXAMINATION:  Simmonds Triad:  Palpable Gap or Defect of Achilles: none  Angle of Declination: angle of baseline plantarflexion symmetric to contralateral side  Matles Test (patient prone, intact and symmetric plantarflexion of ankle when flexing knee): intact  Najera's Calf Squeeze Test: intact obligatory plantarflexion          Incision and drain  Date/Time: 7/21/2020 1:05 PM  Performed by: Curt Duarte III, DO  Authorized by: Curt Duarte III, DO     Patient location:  Clinic  Other Assisting Provider: Yes (comment) (Medical Student Jenn Alonzo)    Consent:     Consent obtained:  Verbal    Consent given by:  Patient    Risks discussed:  Infection    Alternatives discussed:  No treatment and delayed treatment  Universal protocol:     Patient identity confirmed:  Verbally with patient  Location:     Type:  Bulla    Location:  Lower extremity    Lower extremity location:  L ankle  Pre-procedure details:     Skin preparation:  Chloraprep  Anesthesia (see MAR for exact dosages): Anesthesia method:  None  Procedure details:     Complexity:  Simple    Incision types:  Stab incision (18 gauge needle)    Approach:  Puncture    Incision depth:  Dermal    Drainage:  Serous    Drainage amount:  Scant    Wound treatment:  Wound left open    Packing materials:  None  Post-procedure details:     Patient tolerance of procedure: Tolerated well, no immediate complications  Comments:      Antibiotic ointment placed  Wound covered with large adhesive bandage  Anterior ankle abrasion also covered with adhesive bandage as well as right knee abrasion        Lateral ankle bulla status post puncture with 18 gauge needle        Anterior ankle abrasion:

## 2020-07-24 LAB
BACTERIA WND AEROBE CULT: NO GROWTH
GRAM STN SPEC: NORMAL

## 2020-07-27 LAB
LAB AP GYN PRIMARY INTERPRETATION: NORMAL
Lab: NORMAL

## 2020-07-28 ENCOUNTER — OFFICE VISIT (OUTPATIENT)
Dept: OBGYN CLINIC | Facility: OTHER | Age: 50
End: 2020-07-28
Payer: COMMERCIAL

## 2020-07-28 ENCOUNTER — APPOINTMENT (OUTPATIENT)
Dept: RADIOLOGY | Facility: OTHER | Age: 50
End: 2020-07-28
Payer: COMMERCIAL

## 2020-07-28 VITALS
BODY MASS INDEX: 22.22 KG/M2 | DIASTOLIC BLOOD PRESSURE: 85 MMHG | HEART RATE: 81 BPM | WEIGHT: 144 LBS | SYSTOLIC BLOOD PRESSURE: 136 MMHG

## 2020-07-28 DIAGNOSIS — S93.402D SPRAIN OF UNSPECIFIED LIGAMENT OF LEFT ANKLE, SUBSEQUENT ENCOUNTER: ICD-10-CM

## 2020-07-28 DIAGNOSIS — S93.402D SPRAIN OF UNSPECIFIED LIGAMENT OF LEFT ANKLE, SUBSEQUENT ENCOUNTER: Primary | ICD-10-CM

## 2020-07-28 PROCEDURE — 99213 OFFICE O/P EST LOW 20 MIN: CPT | Performed by: FAMILY MEDICINE

## 2020-07-28 PROCEDURE — 3075F SYST BP GE 130 - 139MM HG: CPT | Performed by: FAMILY MEDICINE

## 2020-07-28 PROCEDURE — 3079F DIAST BP 80-89 MM HG: CPT | Performed by: FAMILY MEDICINE

## 2020-07-28 PROCEDURE — 73610 X-RAY EXAM OF ANKLE: CPT

## 2020-07-28 NOTE — PROGRESS NOTES
1  Sprain of unspecified ligament of left ankle, subsequent encounter  XR ankle 3+ vw left    [facility]     Orders Placed This Encounter   Procedures    XR ankle 3+ vw left    [facility]        Imaging Studies (I personally reviewed images in PACS and report):  XR Left ankle 3 view - no acute bony abnormality      IMPRESSION:  Left lateral ankle sprain  Left ankle blister with erythema concerning for developing cellulitis  Date of injury:  07/16/2020  Follow-up from injury:  12 days    Repeat X-ray next visit: Left Ankle    Return in about 6 weeks (around 9/8/2020)  Patient Instructions   Transition lace-up ankle brace  Start physical therapy  We reviewed red flags for infection including worsening pain, swelling, development of redness, fever instructed this occurs to contact physician immediately or go to the emergency department  Recommended completion of her antibiotics in case of possible resolving infection  CHIEF COMPLAINT:  Follow-up left ankle sprain    HPI:  Dulce Dacosta is a 54 y o  female  who presents for follow-up after her left ankle injury on 7/16/20  She states she was holding a box going on a step and fell twisting her left ankle  In the ED she had x-rays of her ankle, and bilateral knees performed  No acute fractures were seen  She followed up in our office on 7/21/20, and was given a Cam boot  At the time she also had a blister on the lateral portion of her ankle, and antibiotics were started  A wound culture was sent, which was negative  Visit 7/28/2020 : Today she presents for follow-up and reports Significant improvement in pain  Minimal to no pain even when walking  She is still icing it to help with the minimal residual swelling  Does not work formally, but around the house  Review of Systems   Constitutional: Negative for chills and fever  Neurological: Negative for weakness and numbness       Following history reviewed and update:    Past Medical History:   Diagnosis Date    GERD (gastroesophageal reflux disease)     Headache     Hematuria     Hypertension     Lateral epicondylitis, right elbow 1/8/2019    Panic attacks     Psychiatric disorder      Past Surgical History:   Procedure Laterality Date    CYSTOSCOPY  06/08/2018    NO PAST SURGERIES       Social History   Social History     Substance and Sexual Activity   Alcohol Use No     Social History     Substance and Sexual Activity   Drug Use No     Social History     Tobacco Use   Smoking Status Current Every Day Smoker    Packs/day: 0 25    Types: Cigarettes   Smokeless Tobacco Never Used   Tobacco Comment    ONE HALF PACK A DAY OR LESS, CURRENT SOME DAY SMOKER, LIGHT TOBACCO SMOKER  AS PER ALLSCRIPTS     Family History   Problem Relation Age of Onset    Hypertension Mother     Arthritis Father     Diabetes Father     Hypertension Father     Hyperlipidemia Father     Other Brother         TBI from accident      No Known Allergies       Physical Exam  /85 (BP Location: Right arm, Patient Position: Sitting, Cuff Size: Adult)   Pulse 81   Wt 65 3 kg (144 lb)   LMP  (LMP Unknown) Comment: IUD  BMI 22 22 kg/m²     Constitutional:  see vital signs  Gen: well-developed, normocephalic/atraumatic, well-groomed  Eyes: No inflammation or discharge of conjunctiva or lids; sclera clear   Pharynx: no inflammation, lesion, or mass of lips  Neck: supple, no masses, non-distended  MSK: no inflammation, lesion, mass, or clubbing of nails and digits except for other than mentioned below  SKIN: no visible rashes or skin lesions  Pulmonary/Chest: Effort normal  No respiratory distress     NEURO: cranial nerves grossly intact  PSYCH:  Alert and oriented to person, place, and time; recent and remote memory intact; mood normal, no depression, anxiety, or agitation, judgment and insight good and intact     Ortho Exam  LEFT ANKLE  EXAM  Observation  GAIT: normal    Inspection  Skin wounds healing both anteriorly and laterally - pictures in media  Erythema: no  Ecchymosis: no  Edema:  trace    Tenderness  Proximal Fibula: no  (Maisonneuve frx)  AiTFL: no  (2cm proximal-medial to tip lateral malleolus 92% sens, 29% spec)  ATFL: minimal tenderness   CFL: no  PTFL: no  Achilles:  no  Deltoid: No  Peroneal: no  Tibialis Anterior: no  Tibialis Posterior: no     Bony Tenderpoints:  Lateral Malleolus: yes  Base of 5th MT: no  Medial Malleolus: no  Navicular: no  Talar dome: No    ROM  Dorsiflexion: intact  Plantarflexion: intact    Muscle Strength  Pronation: intact without pain  Supination: intact without pain    Tib-Fib Squeeze: negative  (eabgchvsyvhv-ch-yhfunyavsubybj squeeze; 26% sens, 88% spec; rule in test)    Calcaneal Squeeze: negative    Dorsiflexion (+) ER Stress Test: negative  (reproduce ATiFL mech; 71% sens, 63% spec)

## 2020-07-28 NOTE — PATIENT INSTRUCTIONS
Transition lace-up ankle brace  Start physical therapy  We reviewed red flags for infection including worsening pain, swelling, development of redness, fever instructed this occurs to contact physician immediately or go to the emergency department  Recommended completion of her antibiotics in case of possible resolving infection

## 2020-11-15 ENCOUNTER — HOSPITAL ENCOUNTER (EMERGENCY)
Facility: HOSPITAL | Age: 50
Discharge: HOME | End: 2020-11-15
Attending: EMERGENCY MEDICINE | Admitting: EMERGENCY MEDICINE
Payer: COMMERCIAL

## 2020-11-15 VITALS
DIASTOLIC BLOOD PRESSURE: 89 MMHG | WEIGHT: 145 LBS | BODY MASS INDEX: 22.38 KG/M2 | SYSTOLIC BLOOD PRESSURE: 145 MMHG | HEART RATE: 92 BPM | TEMPERATURE: 97.7 F | RESPIRATION RATE: 18 BRPM | OXYGEN SATURATION: 99 %

## 2020-11-15 DIAGNOSIS — M54.50 LUMBAR BACK PAIN: Primary | ICD-10-CM

## 2020-11-15 LAB
BACTERIA UR QL AUTO: NORMAL /HPF
BILIRUB UR QL STRIP: NEGATIVE
CLARITY UR: CLEAR
COLOR UR: YELLOW
COLOR, POC: YELLOW
EXT PREG TEST URINE: NEGATIVE
EXT. CONTROL ED NAV: NORMAL
GLUCOSE UR STRIP-MCNC: NEGATIVE MG/DL
HGB UR QL STRIP.AUTO: ABNORMAL
HYALINE CASTS #/AREA URNS LPF: NORMAL /LPF
KETONES UR STRIP-MCNC: NEGATIVE MG/DL
LEUKOCYTE ESTERASE UR QL STRIP: NEGATIVE
NITRITE UR QL STRIP: NEGATIVE
NON-SQ EPI CELLS URNS QL MICRO: NORMAL /HPF
PH UR STRIP.AUTO: 7 [PH] (ref 4.5–8)
PROT UR STRIP-MCNC: NEGATIVE MG/DL
RBC #/AREA URNS AUTO: NORMAL /HPF
SP GR UR STRIP.AUTO: 1.01 (ref 1–1.03)
UROBILINOGEN UR QL STRIP.AUTO: 0.2 E.U./DL
WBC #/AREA URNS AUTO: NORMAL /HPF

## 2020-11-15 PROCEDURE — 81025 URINE PREGNANCY TEST: CPT | Performed by: EMERGENCY MEDICINE

## 2020-11-15 PROCEDURE — 99283 EMERGENCY DEPT VISIT LOW MDM: CPT

## 2020-11-15 PROCEDURE — 99284 EMERGENCY DEPT VISIT MOD MDM: CPT | Performed by: EMERGENCY MEDICINE

## 2020-11-15 PROCEDURE — 81001 URINALYSIS AUTO W/SCOPE: CPT

## 2020-11-15 PROCEDURE — 96372 THER/PROPH/DIAG INJ SC/IM: CPT

## 2020-11-15 RX ORDER — LIDOCAINE 50 MG/G
2 PATCH TOPICAL ONCE
Status: DISCONTINUED | OUTPATIENT
Start: 2020-11-15 | End: 2020-11-15 | Stop reason: HOSPADM

## 2020-11-15 RX ORDER — KETOROLAC TROMETHAMINE 30 MG/ML
30 INJECTION, SOLUTION INTRAMUSCULAR; INTRAVENOUS ONCE
Status: COMPLETED | OUTPATIENT
Start: 2020-11-15 | End: 2020-11-15

## 2020-11-15 RX ORDER — ACETAMINOPHEN 325 MG/1
975 TABLET ORAL ONCE
Status: COMPLETED | OUTPATIENT
Start: 2020-11-15 | End: 2020-11-15

## 2020-11-15 RX ORDER — IBUPROFEN 800 MG/1
800 TABLET ORAL 3 TIMES DAILY
Qty: 21 TABLET | Refills: 0 | Status: SHIPPED | OUTPATIENT
Start: 2020-11-15 | End: 2021-12-02 | Stop reason: SDUPTHER

## 2020-11-15 RX ORDER — CYCLOBENZAPRINE HCL 10 MG
10 TABLET ORAL ONCE
Status: COMPLETED | OUTPATIENT
Start: 2020-11-15 | End: 2020-11-15

## 2020-11-15 RX ORDER — CYCLOBENZAPRINE HCL 10 MG
10 TABLET ORAL 2 TIMES DAILY PRN
Qty: 20 TABLET | Refills: 0 | Status: SHIPPED | OUTPATIENT
Start: 2020-11-15 | End: 2020-11-18 | Stop reason: SDUPTHER

## 2020-11-15 RX ADMIN — ACETAMINOPHEN 975 MG: 325 TABLET, FILM COATED ORAL at 08:46

## 2020-11-15 RX ADMIN — LIDOCAINE 5% 2 PATCH: 700 PATCH TOPICAL at 08:45

## 2020-11-15 RX ADMIN — CYCLOBENZAPRINE HYDROCHLORIDE 10 MG: 10 TABLET, FILM COATED ORAL at 08:46

## 2020-11-15 RX ADMIN — KETOROLAC TROMETHAMINE 30 MG: 30 INJECTION, SOLUTION INTRAMUSCULAR at 08:47

## 2020-11-18 ENCOUNTER — OFFICE VISIT (OUTPATIENT)
Dept: FAMILY MEDICINE CLINIC | Facility: CLINIC | Age: 50
End: 2020-11-18

## 2020-11-18 VITALS
TEMPERATURE: 97.9 F | BODY MASS INDEX: 22.37 KG/M2 | RESPIRATION RATE: 18 BRPM | WEIGHT: 147.6 LBS | HEART RATE: 97 BPM | OXYGEN SATURATION: 99 % | DIASTOLIC BLOOD PRESSURE: 72 MMHG | SYSTOLIC BLOOD PRESSURE: 102 MMHG | HEIGHT: 68 IN

## 2020-11-18 DIAGNOSIS — Z11.4 SCREENING FOR HIV (HUMAN IMMUNODEFICIENCY VIRUS): ICD-10-CM

## 2020-11-18 DIAGNOSIS — D48.9 NEOPLASM OF UNCERTAIN BEHAVIOR: ICD-10-CM

## 2020-11-18 DIAGNOSIS — M54.50 LUMBAR BACK PAIN: Primary | ICD-10-CM

## 2020-11-18 DIAGNOSIS — I10 ESSENTIAL HYPERTENSION: ICD-10-CM

## 2020-11-18 DIAGNOSIS — F41.9 ANXIETY: ICD-10-CM

## 2020-11-18 PROCEDURE — 3074F SYST BP LT 130 MM HG: CPT | Performed by: PHYSICIAN ASSISTANT

## 2020-11-18 PROCEDURE — 4004F PT TOBACCO SCREEN RCVD TLK: CPT | Performed by: PHYSICIAN ASSISTANT

## 2020-11-18 PROCEDURE — 99214 OFFICE O/P EST MOD 30 MIN: CPT | Performed by: PHYSICIAN ASSISTANT

## 2020-11-18 PROCEDURE — 3008F BODY MASS INDEX DOCD: CPT | Performed by: PHYSICIAN ASSISTANT

## 2020-11-18 PROCEDURE — 3725F SCREEN DEPRESSION PERFORMED: CPT | Performed by: PHYSICIAN ASSISTANT

## 2020-11-18 PROCEDURE — 3078F DIAST BP <80 MM HG: CPT | Performed by: PHYSICIAN ASSISTANT

## 2020-11-18 RX ORDER — CYCLOBENZAPRINE HCL 10 MG
10 TABLET ORAL 2 TIMES DAILY PRN
Qty: 20 TABLET | Refills: 0 | Status: SHIPPED | OUTPATIENT
Start: 2020-11-18 | End: 2022-06-28 | Stop reason: SDUPTHER

## 2020-11-18 RX ORDER — CYCLOBENZAPRINE HCL 10 MG
10 TABLET ORAL 2 TIMES DAILY PRN
Qty: 20 TABLET | Refills: 0 | Status: SHIPPED | OUTPATIENT
Start: 2020-11-18 | End: 2020-11-18 | Stop reason: SDUPTHER

## 2020-11-18 RX ORDER — AMITRIPTYLINE HYDROCHLORIDE 50 MG/1
50 TABLET, FILM COATED ORAL
Qty: 30 TABLET | Refills: 2 | Status: SHIPPED | OUTPATIENT
Start: 2020-11-18 | End: 2022-05-09 | Stop reason: SDUPTHER

## 2020-11-23 DIAGNOSIS — I10 HYPERTENSION: ICD-10-CM

## 2020-11-24 RX ORDER — LISINOPRIL AND HYDROCHLOROTHIAZIDE 12.5; 1 MG/1; MG/1
TABLET ORAL
Qty: 90 TABLET | Refills: 2 | Status: SHIPPED | OUTPATIENT
Start: 2020-11-24 | End: 2021-02-02

## 2020-12-29 ENCOUNTER — OFFICE VISIT (OUTPATIENT)
Dept: FAMILY MEDICINE CLINIC | Facility: CLINIC | Age: 50
End: 2020-12-29

## 2020-12-29 VITALS
HEART RATE: 92 BPM | BODY MASS INDEX: 22 KG/M2 | TEMPERATURE: 98.3 F | OXYGEN SATURATION: 98 % | SYSTOLIC BLOOD PRESSURE: 120 MMHG | DIASTOLIC BLOOD PRESSURE: 84 MMHG | WEIGHT: 142.6 LBS | RESPIRATION RATE: 16 BRPM

## 2020-12-29 DIAGNOSIS — Z83.71 FAMILY HISTORY OF POLYPS IN THE COLON: Primary | ICD-10-CM

## 2020-12-29 PROBLEM — Z83.719 FAMILY HISTORY OF POLYPS IN THE COLON: Status: ACTIVE | Noted: 2020-12-29

## 2020-12-29 PROCEDURE — 3074F SYST BP LT 130 MM HG: CPT | Performed by: PHYSICIAN ASSISTANT

## 2020-12-29 PROCEDURE — 3079F DIAST BP 80-89 MM HG: CPT | Performed by: PHYSICIAN ASSISTANT

## 2020-12-29 PROCEDURE — 99213 OFFICE O/P EST LOW 20 MIN: CPT | Performed by: PHYSICIAN ASSISTANT

## 2021-01-23 ENCOUNTER — HOSPITAL ENCOUNTER (OUTPATIENT)
Dept: RADIOLOGY | Age: 51
Discharge: HOME | End: 2021-01-23
Payer: COMMERCIAL

## 2021-01-23 VITALS — WEIGHT: 142 LBS | HEIGHT: 68 IN | BODY MASS INDEX: 21.52 KG/M2

## 2021-01-23 DIAGNOSIS — Z12.39 SCREENING FOR BREAST CANCER: ICD-10-CM

## 2021-01-23 DIAGNOSIS — Z12.31 ENCOUNTER FOR SCREENING MAMMOGRAM FOR MALIGNANT NEOPLASM OF BREAST: ICD-10-CM

## 2021-01-23 PROCEDURE — 77063 BREAST TOMOSYNTHESIS BI: CPT

## 2021-01-23 PROCEDURE — 77067 SCR MAMMO BI INCL CAD: CPT

## 2021-01-29 ENCOUNTER — TRANSCRIBE ORDERS (OUTPATIENT)
Dept: MAMMOGRAPHY | Facility: CLINIC | Age: 51
End: 2021-01-29

## 2021-01-29 ENCOUNTER — HOSPITAL ENCOUNTER (OUTPATIENT)
Dept: MAMMOGRAPHY | Facility: CLINIC | Age: 51
Discharge: HOME | End: 2021-01-29
Payer: COMMERCIAL

## 2021-01-29 VITALS — WEIGHT: 142 LBS | BODY MASS INDEX: 21.52 KG/M2 | HEIGHT: 68 IN

## 2021-01-29 DIAGNOSIS — R92.8 ABNORMAL MAMMOGRAM: ICD-10-CM

## 2021-01-29 DIAGNOSIS — R92.0 MAMMOGRAPHIC MICROCALCIFICATION: Primary | ICD-10-CM

## 2021-01-29 PROCEDURE — 77065 DX MAMMO INCL CAD UNI: CPT

## 2021-01-30 DIAGNOSIS — I10 HYPERTENSION: ICD-10-CM

## 2021-02-02 RX ORDER — LISINOPRIL AND HYDROCHLOROTHIAZIDE 12.5; 1 MG/1; MG/1
TABLET ORAL
Qty: 90 TABLET | Refills: 1 | Status: SHIPPED | OUTPATIENT
Start: 2021-02-02 | End: 2022-02-14

## 2021-02-24 ENCOUNTER — OFFICE VISIT (OUTPATIENT)
Dept: GASTROENTEROLOGY | Facility: CLINIC | Age: 51
End: 2021-02-24
Payer: COMMERCIAL

## 2021-02-24 VITALS
BODY MASS INDEX: 20.61 KG/M2 | DIASTOLIC BLOOD PRESSURE: 82 MMHG | SYSTOLIC BLOOD PRESSURE: 124 MMHG | HEIGHT: 68 IN | WEIGHT: 136 LBS | TEMPERATURE: 98.7 F

## 2021-02-24 DIAGNOSIS — Z12.11 COLON CANCER SCREENING: Primary | ICD-10-CM

## 2021-02-24 DIAGNOSIS — Z83.71 FAMILY HISTORY OF POLYPS IN THE COLON: ICD-10-CM

## 2021-02-24 PROBLEM — K21.9 GASTROESOPHAGEAL REFLUX DISEASE WITHOUT ESOPHAGITIS: Status: RESOLVED | Noted: 2018-02-13 | Resolved: 2021-02-24

## 2021-02-24 PROCEDURE — 4004F PT TOBACCO SCREEN RCVD TLK: CPT | Performed by: INTERNAL MEDICINE

## 2021-02-24 PROCEDURE — 3008F BODY MASS INDEX DOCD: CPT | Performed by: INTERNAL MEDICINE

## 2021-02-24 PROCEDURE — 3074F SYST BP LT 130 MM HG: CPT | Performed by: INTERNAL MEDICINE

## 2021-02-24 PROCEDURE — 3079F DIAST BP 80-89 MM HG: CPT | Performed by: INTERNAL MEDICINE

## 2021-02-24 PROCEDURE — 99243 OFF/OP CNSLTJ NEW/EST LOW 30: CPT | Performed by: INTERNAL MEDICINE

## 2021-02-24 RX ORDER — POLYETHYLENE GLYCOL 3350 17 G/17G
238 POWDER, FOR SOLUTION ORAL DAILY
Qty: 238 G | Refills: 0 | Status: SHIPPED | OUTPATIENT
Start: 2021-02-24 | End: 2022-08-09

## 2021-02-24 NOTE — PATIENT INSTRUCTIONS
Colon on 4/1/21 with Dr Jessica Frances at [facility]    Miralax/dulcolax prep instructions given by Shelly LEWIS

## 2021-02-24 NOTE — PROGRESS NOTES
[facility] - Outpatient Note  Nile Conde 52 y o  female MRN: [id number]  Encounter: [id number]      ASSESSMENT AND PLAN:    Nile Conde is a 52 y o  old female with no relevant past medical history presents for colonoscopy referral    Colon cancer screening - no family history of colon cancer  No previous colonoscopy  Does have family history of polyps but does not know any further details  · Plan for colonoscopy        1  Family history of polyps in the colon  - Ambulatory referral to Gastroenterology    2  Colon cancer screening  - Colonoscopy; Future  - polyethylene glycol (GLYCOLAX) 17 GM/SCOOP powder; Take 238 g by mouth daily Take as instructed in clinic  Dispense: 238 g; Refill: 0    ______________________________________________________________________    SUBJECTIVE: Nile Conde is a 52 y o  old female with no relevant past medical history presents for colonoscopy referral  Patient denies abdominal pain, nausea, vomiting, constipation, diarrhea, fevers/chills  Patient not on blood thinners  No history of IBD  No significant alcohol history  Does smoke 1 pack of cigarettes per week  REVIEW OF SYSTEMS IS OTHERWISE NEGATIVE        Historical Information   Past Medical History:   Diagnosis Date    GERD (gastroesophageal reflux disease)     Headache     Hematuria     Hypertension     Lateral epicondylitis, right elbow 1/8/2019    Panic attacks     Psychiatric disorder      Past Surgical History:   Procedure Laterality Date    CYSTOSCOPY  06/08/2018    NO PAST SURGERIES       Social History   Social History     Substance and Sexual Activity   Alcohol Use No     Social History     Substance and Sexual Activity   Drug Use No     Social History     Tobacco Use   Smoking Status Current Every Day Smoker    Packs/day: 0 50    Types: Cigarettes   Smokeless Tobacco Never Used   Tobacco Comment    ONE HALF PACK A DAY OR LESS, CURRENT SOME DAY SMOKER, LIGHT TOBACCO SMOKER  AS PER ALLSCRIPTS     Family History   Problem Relation Age of Onset    Hypertension Mother     Colonic polyp Mother     Arthritis Father     Diabetes Father     Hypertension Father     Hyperlipidemia Father     Other Brother         TBI from accident     Colonic polyp Maternal Grandmother     No Known Problems Sister     No Known Problems Daughter     No Known Problems Maternal Grandfather     No Known Problems Paternal Grandmother     No Known Problems Paternal Grandfather     No Known Problems Son     No Known Problems Sister     No Known Problems Maternal Aunt     No Known Problems Maternal Aunt     No Known Problems Maternal Aunt     No Known Problems Maternal Aunt     No Known Problems Maternal Aunt     No Known Problems Paternal Aunt     No Known Problems Paternal Aunt     No Known Problems Paternal Aunt        Meds/Allergies       Current Outpatient Medications:     acetaminophen (TYLENOL) 500 mg tablet    amitriptyline (ELAVIL) 50 mg tablet    Cholecalciferol (VITAMIN D3 PO)    cyclobenzaprine (FLEXERIL) 10 mg tablet    ibuprofen (MOTRIN) 800 mg tablet    levonorgestrel (MIRENA) 20 MCG/24HR IUD    lisinopril-hydrochlorothiazide (PRINZIDE,ZESTORETIC) 10-12 5 MG per tablet    polyethylene glycol (GLYCOLAX) 17 GM/SCOOP powder    No Known Allergies        Objective     Blood pressure 124/82, temperature 98 7 °F (37 1 °C), temperature source Tympanic, height 5' 7 5" (1 715 m), weight 61 7 kg (136 lb), not currently breastfeeding  Body mass index is 20 99 kg/m²  PHYSICAL EXAM:      General Appearance:   Alert, cooperative, no distress   HEENT:   Normocephalic, atraumatic, anicteric  Neck:  Supple, symmetrical, trachea midline   Lungs:   Clear to auscultation bilaterally; no rales, rhonchi or wheezing; respirations unlabored    Heart[de-identified]   Regular rate and rhythm; no murmur, rub, or gallop     Abdomen:   Soft, non-tender, non-distended; normal bowel sounds; no masses, no organomegaly    Genitalia:   Deferred    Rectal:   Deferred    Extremities:  No cyanosis, clubbing or edema    Pulses:  2+ and symmetric    Skin:  No jaundice, rashes, or lesions    Lymph nodes:  No palpable cervical lymphadenopathy        Lab Results:   No visits with results within 1 Day(s) from this visit  Latest known visit with results is:   Admission on 11/15/2020, Discharged on 11/15/2020   Component Date Value    EXT PREG TEST UR (Ref: N* 11/15/2020 negative     Control 11/15/2020 valid     Color, UA 11/15/2020 yellow     Color, UA 11/15/2020 Yellow     Clarity, UA 11/15/2020 Clear     pH, UA 11/15/2020 7 0     Leukocytes, UA 11/15/2020 Negative     Nitrite, UA 11/15/2020 Negative     Protein, UA 11/15/2020 Negative     Glucose, UA 11/15/2020 Negative     Ketones, UA 11/15/2020 Negative     Urobilinogen, UA 11/15/2020 0 2     Bilirubin, UA 11/15/2020 Negative     Blood, UA 11/15/2020 Moderate*    Specific [place], UA 11/15/2020 1 015     RBC, UA 11/15/2020 2-4     WBC, UA 11/15/2020 None Seen     Epithelial Cells 11/15/2020 None Seen     Bacteria, UA 11/15/2020 None Seen     Hyaline Casts, UA 11/15/2020 None Seen          Radiology Results:   Mammo Diagnostic Right W Cad    Result Date: 1/29/2021  Narrative: DIAGNOSIS: Abnormal mammogram TECHNIQUE: Digital screening mammography was performed  Computer Aided Detection (CAD) analyzed all applicable images  COMPARISONS: Prior breast imaging dated: 01/23/2021, 03/19/2019, 09/12/2017, and 08/23/2016 RELEVANT HISTORY: Family Breast Cancer History: No known family history of breast cancer  Family Medical History: No known relevant family medical history  Personal History: Hormone history includes other  No known relevant surgical history  No known relevant medical history   RISK ASSESSMENT: 5 Year Tyrer-Cuzick: 1 13 % 10 Year Tyrer-Cuzick: 2 39 % Lifetime Tyrer-Cuzick: 11 45 % TISSUE DENSITY: The breasts are extremely dense, which lowers the sensitivity of mammography  INDICATION: Juliana Leger is a 52 y o  female presenting for abnormal mammogram  FINDINGS: RIGHT 1) CALCIFICATIONS: There are amorphous calcifications in a grouped distribution seen in the outer region of the right breast at 9 o'clock in the posterior depth  Smudgy calcifications do not have a worrisome appearance although six-month 2) CALCIFICATIONS: There are milk of calcium calcifications in a grouped distribution seen in the right breast at 6 o'clock in the anterior depth  3) CALCIFICATIONS: There are round calcifications in a grouped distribution seen in the right breast at 6 o'clock in the middle depth  Impression: At least 4  groups of microcalcifications, 1 of which is definitively benign in appearance and 2 of which are composed of uniform small round calcifications (closely situated groups at 6:00 o'clock Middle depth), and the original finding on the screening study which is probably benign smudgy calcifications at 9:00 o'clock  Six-month diagnostic surveillance imaging recommended with repeat magnification views   ASSESSMENT/BI-RADS CATEGORY: Right: 3 - Probably Benign Overall: 3 - Probably Benign RECOMMENDATION:      - Diagnostic mammogram in 6 months for the right breast  Workstation ID: [id number]

## 2021-03-23 ENCOUNTER — TELEPHONE (OUTPATIENT)
Dept: GASTROENTEROLOGY | Facility: AMBULARY SURGERY CENTER | Age: 51
End: 2021-03-23

## 2021-03-23 NOTE — TELEPHONE ENCOUNTER
Patients GI provider:  Dr Ade Bowden    Number to return call: (  [phone]    Reason for call: Pt calling to reschedule her colon on 4-1-21    Scheduled procedure/appointment date if applicable: Apt/procedure 4-1-21

## 2021-06-16 ENCOUNTER — TELEPHONE (OUTPATIENT)
Dept: GASTROENTEROLOGY | Facility: HOSPITAL | Age: 51
End: 2021-06-16

## 2021-07-20 ENCOUNTER — HOSPITAL ENCOUNTER (EMERGENCY)
Facility: HOSPITAL | Age: 51
Discharge: HOME | End: 2021-07-20
Attending: EMERGENCY MEDICINE
Payer: COMMERCIAL

## 2021-07-20 VITALS
BODY MASS INDEX: 20.99 KG/M2 | RESPIRATION RATE: 18 BRPM | WEIGHT: 136 LBS | SYSTOLIC BLOOD PRESSURE: 119 MMHG | DIASTOLIC BLOOD PRESSURE: 80 MMHG | TEMPERATURE: 98.7 F | OXYGEN SATURATION: 99 % | HEART RATE: 94 BPM

## 2021-07-20 DIAGNOSIS — M79.89 FINGER SWELLING: Primary | ICD-10-CM

## 2021-07-20 PROCEDURE — 99283 EMERGENCY DEPT VISIT LOW MDM: CPT

## 2021-07-20 PROCEDURE — 99284 EMERGENCY DEPT VISIT MOD MDM: CPT | Performed by: EMERGENCY MEDICINE

## 2021-07-20 RX ORDER — IBUPROFEN 400 MG/1
400 TABLET ORAL ONCE
Status: COMPLETED | OUTPATIENT
Start: 2021-07-20 | End: 2021-07-20

## 2021-07-20 RX ORDER — CEPHALEXIN 250 MG/1
500 CAPSULE ORAL ONCE
Status: COMPLETED | OUTPATIENT
Start: 2021-07-20 | End: 2021-07-20

## 2021-07-20 RX ORDER — ACETAMINOPHEN 325 MG/1
975 TABLET ORAL ONCE
Status: COMPLETED | OUTPATIENT
Start: 2021-07-20 | End: 2021-07-20

## 2021-07-20 RX ORDER — CEPHALEXIN 500 MG/1
500 CAPSULE ORAL EVERY 6 HOURS SCHEDULED
Qty: 20 CAPSULE | Refills: 0 | Status: SHIPPED | OUTPATIENT
Start: 2021-07-20 | End: 2021-07-25

## 2021-07-20 RX ADMIN — IBUPROFEN 400 MG: 400 TABLET ORAL at 18:00

## 2021-07-20 RX ADMIN — ACETAMINOPHEN 975 MG: 325 TABLET, FILM COATED ORAL at 18:00

## 2021-07-20 RX ADMIN — CEPHALEXIN 500 MG: 250 CAPSULE ORAL at 18:00

## 2021-07-20 NOTE — ED PROVIDER NOTES
History  Chief Complaint   Patient presents with    Finger Swelling     pt has swelling and small abrasion on right pointer finger for 2 days     79-year-old female who presents for evaluation of right 2nd digit pain  Patient reports that the swelling began approximately 2 days ago  She thinks she may have struck the digit while cleaning her house  Yesterday she developed a pinpoint area of ulceration in the middle of the swelling  She reports no drainage from the site  She has pain with movement of the digit  She denies numbness  She has had no fever, nausea, vomiting, or other systemic symptoms  She has not taken any medications prior to arrival           Prior to Admission Medications   Prescriptions Last Dose Informant Patient Reported? Taking?    Cholecalciferol (VITAMIN D3 PO)  Self Yes No   Sig: Take by mouth daily   acetaminophen (TYLENOL) 500 mg tablet  Self No No   Sig: Take 1 tablet (500 mg total) by mouth every 6 (six) hours as needed for mild pain   amitriptyline (ELAVIL) 50 mg tablet  Self No No   Sig: Take 1 tablet (50 mg total) by mouth daily at bedtime   cyclobenzaprine (FLEXERIL) 10 mg tablet  Self No No   Sig: Take 1 tablet (10 mg total) by mouth 2 (two) times a day as needed for muscle spasms   ibuprofen (MOTRIN) 800 mg tablet  Self No No   Sig: Take 1 tablet (800 mg total) by mouth 3 (three) times a day   levonorgestrel (MIRENA) 20 MCG/24HR IUD  Self Yes No   Si each by Intrauterine route once   lisinopril-hydrochlorothiazide (PRINZIDE,ZESTORETIC) 10-12 5 MG per tablet  Self No No   Sig: TAKE ONE TABLET BY MOUTH DAILY   polyethylene glycol (GLYCOLAX) 17 GM/SCOOP powder   No No   Sig: Take 238 g by mouth daily Take as instructed in clinic      Facility-Administered Medications: None       Past Medical History:   Diagnosis Date    GERD (gastroesophageal reflux disease)     Headache     Hematuria     Hypertension     Lateral epicondylitis, right elbow 2019    Panic attacks  Psychiatric disorder        Past Surgical History:   Procedure Laterality Date    CYSTOSCOPY  06/08/2018    NO PAST SURGERIES         Family History   Problem Relation Age of Onset    Hypertension Mother     Colonic polyp Mother     Arthritis Father     Diabetes Father     Hypertension Father     Hyperlipidemia Father     Other Brother         TBI from accident     Colonic polyp Maternal Grandmother     No Known Problems Sister     No Known Problems Daughter     No Known Problems Maternal Grandfather     No Known Problems Paternal Grandmother     No Known Problems Paternal Grandfather     No Known Problems Son     No Known Problems Sister     No Known Problems Maternal Aunt     No Known Problems Maternal Aunt     No Known Problems Maternal Aunt     No Known Problems Maternal Aunt     No Known Problems Maternal Aunt     No Known Problems Paternal Aunt     No Known Problems Paternal Aunt     No Known Problems Paternal Aunt      I have reviewed and agree with the history as documented  E-Cigarette/Vaping    E-Cigarette Use Never User      E-Cigarette/Vaping Substances     Social History     Tobacco Use    Smoking status: Current Every Day Smoker     Packs/day: 0 50     Types: Cigarettes    Smokeless tobacco: Never Used    Tobacco comment: ONE HALF PACK A DAY OR LESS, CURRENT SOME DAY SMOKER, LIGHT TOBACCO SMOKER  AS PER ALLSCRIPTS   Vaping Use    Vaping Use: Never used   Substance Use Topics    Alcohol use: No    Drug use: No        Review of Systems   Constitutional: Negative for chills and fever  Respiratory: Negative for shortness of breath  Cardiovascular: Negative for chest pain  Gastrointestinal: Negative for abdominal pain, nausea and vomiting  Genitourinary: Negative for flank pain  Musculoskeletal: Positive for arthralgias and joint swelling  Negative for back pain and gait problem  Skin: Negative for pallor and rash     Neurological: Negative for syncope, weakness, light-headedness and headaches  All other systems reviewed and are negative  Physical Exam  ED Triage Vitals   Temperature Pulse Respirations Blood Pressure SpO2   07/20/21 1633 07/20/21 1633 07/20/21 1633 07/20/21 1633 07/20/21 1633   98 7 °F (37 1 °C) 94 18 119/80 99 %      Temp Source Heart Rate Source Patient Position - Orthostatic VS BP Location FiO2 (%)   07/20/21 1633 07/20/21 1633 07/20/21 1633 07/20/21 1633 --   Tympanic Monitor Lying Right arm       Pain Score       07/20/21 1800       8             Orthostatic Vital Signs  Vitals:    07/20/21 1633   BP: 119/80   Pulse: 94   Patient Position - Orthostatic VS: Lying       Physical Exam  Vitals and nursing note reviewed  Constitutional:       General: She is not in acute distress  Appearance: She is well-developed  HENT:      Head: Normocephalic and atraumatic  Eyes:      Conjunctiva/sclera: Conjunctivae normal       Pupils: Pupils are equal, round, and reactive to light  Cardiovascular:      Rate and Rhythm: Normal rate and regular rhythm  Heart sounds: No murmur heard  No friction rub  No gallop  Comments: 2+ radial pulses bilaterally  Pulmonary:      Effort: Pulmonary effort is normal       Breath sounds: Normal breath sounds  No wheezing, rhonchi or rales  Abdominal:      General: Bowel sounds are normal  There is no distension  Palpations: Abdomen is soft  Tenderness: There is no abdominal tenderness  Musculoskeletal:      Cervical back: Normal range of motion and neck supple  Comments: Mild swelling between the D IP and PIP of the right 2nd digit  There is tenderness in this area  Range of motion of the PIP is limited secondary to pain  Skin:     General: Skin is warm and dry  Coloration: Skin is not pale  Findings: No rash  Comments: Mild erythema overlying the above-mentioned area  There is a small area of ulceration in the middle of the erythema  See media  Neurological:      General: No focal deficit present  Mental Status: She is alert and oriented to person, place, and time  Comments: Sensation intact to the right upper extremity  Psychiatric:         Behavior: Behavior normal          ED Medications  Medications   acetaminophen (TYLENOL) tablet 975 mg (975 mg Oral Given 7/20/21 1800)   ibuprofen (MOTRIN) tablet 400 mg (400 mg Oral Given 7/20/21 1800)   cephalexin (KEFLEX) capsule 500 mg (500 mg Oral Given 7/20/21 1800)       Diagnostic Studies  Results Reviewed     None                 No orders to display         Procedures  Procedures      ED Course                             SBIRT 20yo+      Most Recent Value   SBIRT (24 yo +)   In order to provide better care to our patients, we are screening all of our patients for alcohol and drug use  Would it be okay to ask you these screening questions? No Filed at: 07/20/2021 1802                [facility]  Number of Diagnoses or Management Options  Finger swelling: new and does not require workup  Diagnosis management comments: 46-year-old female who presents for evaluation of swelling and pain in her right 2nd digit  On bedside ultrasound, no visible abscess or effusion  The right upper extremity is neurovascularly intact  Vitals normal   No systemic signs of infection  Will treat with Keflex for presumed infection, 1st dose given in the ED  Advised follow-up with PCP  Return precautions discussed         Amount and/or Complexity of Data Reviewed  Review and summarize past medical records: yes    Risk of Complications, Morbidity, and/or Mortality  Presenting problems: high  Diagnostic procedures: minimal  Management options: low    Patient Progress  Patient progress: stable      Disposition  Final diagnoses:   Finger swelling     Time reflects when diagnosis was documented in both MDM as applicable and the Disposition within this note     Time User Action Codes Description Comment    7/20/2021  6:13 PM Lul Gallegos Add [C16 86] Finger swelling       ED Disposition     ED Disposition Condition Date/Time Comment    Discharge Stable Tue Jul 20, 2021  6:13 PM Jesusita Galindo discharge to home/self care  Follow-up Information     Follow up With Specialties Details Why [address] [facility], [facility], Physician Assistant In 2 days  [address]  [phone]            Discharge Medication List as of 7/20/2021  6:14 PM      START taking these medications    Details   cephalexin (KEFLEX) 500 mg capsule Take 1 capsule (500 mg total) by mouth every 6 (six) hours for 5 days, Starting Tue 7/20/2021, Until Sun 7/25/2021, Print         CONTINUE these medications which have NOT CHANGED    Details   acetaminophen (TYLENOL) 500 mg tablet Take 1 tablet (500 mg total) by mouth every 6 (six) hours as needed for mild pain, Starting u 7/16/2020, Normal      amitriptyline (ELAVIL) 50 mg tablet Take 1 tablet (50 mg total) by mouth daily at bedtime, Starting Wed 11/18/2020, Normal      Cholecalciferol (VITAMIN D3 PO) Take by mouth daily, Historical Med      cyclobenzaprine (FLEXERIL) 10 mg tablet Take 1 tablet (10 mg total) by mouth 2 (two) times a day as needed for muscle spasms, Starting Wed 11/18/2020, Normal      ibuprofen (MOTRIN) 800 mg tablet Take 1 tablet (800 mg total) by mouth 3 (three) times a day, Starting Sun 11/15/2020, Print      levonorgestrel (MIRENA) 20 MCG/24HR IUD 1 each by Intrauterine route once, Historical Med      lisinopril-hydrochlorothiazide (PRINZIDE,ZESTORETIC) 10-12 5 MG per tablet TAKE ONE TABLET BY MOUTH DAILY, Normal      polyethylene glycol (GLYCOLAX) 17 GM/SCOOP powder Take 238 g by mouth daily Take as instructed in clinic, Starting Wed 2/24/2021, Normal           No discharge procedures on file  PDMP Review     None           ED Provider  Attending physically available and evaluated Jesusita Galindo I managed the patient along with the ED Attending      Electronically Signed by         Lynne Brown MD  07/20/21 7769

## 2021-07-20 NOTE — DISCHARGE INSTRUCTIONS
Follow up with your primary care physician  Take the prescribed antibiotic as directed  Please return to the emergency department if you develop worsening symptoms, worsening swelling or redness, drainage from the wound, fever, or anything else concerning to you

## 2021-07-28 NOTE — ED ATTENDING ATTESTATION
7/20/2021  ISaskia MD, saw and evaluated the patient  I have discussed the patient with the resident/non-physician practitioner and agree with the resident's/non-physician practitioner's findings, Plan of Care, and MDM as documented in the resident's/non-physician practitioner's note, except where noted  All available labs and Radiology studies were reviewed  I was present for key portions of any procedure(s) performed by the resident/non-physician practitioner and I was immediately available to provide assistance  At this point I agree with the current assessment done in the Emergency Department  I have conducted an independent evaluation of this patient a history and physical is as follows:        ED Course    29-year-old female presents for evaluation of painful right 2nd digit  Patient states that she had an at abrasion followed by swelling erythema and pain  Vitals reviewed  Patient is well-appearing nontoxic  Examination of finger shows mild swelling of the right 2nd digit between the D IP and PIP  No crepitus range of motion is slightly limited secondary to pain but I am able to passively range digit through full range of motion  Digit is neurovascular intact    Impression:Right 2nd digit swelling likely due to cellulitis from underlying abrasion  Will splint for comfort  Oral antibiotics  Patient will be discharged with return precautions for worsening swelling pain fever            Critical Care Time  Procedures

## 2021-08-08 NOTE — ED PROCEDURE NOTE
PROCEDURE  Splint application    Date/Time: 7/20/2021 11:00 PM  Performed by: Lissette Arnett MD  Authorized by: Lissette Arnett MD   [facility] Protocol:  Procedure performed by:  Consent: Verbal consent obtained  Consent given by: patient  Patient understanding: patient states understanding of the procedure being performed  Patient consent: the patient's understanding of the procedure matches consent given  Procedure consent: procedure consent matches procedure scheduled  Relevant documents: relevant documents present and verified  Test results: test results available and properly labeled  Site marked: the operative site was marked  Radiology Images displayed and confirmed  If images not available, report reviewed: imaging studies available  Patient identity confirmed: verbally with patient      Procedure details:     Laterality:  Right    Location:  Finger    Finger:  R index finger    Strapping: no      Splint type:  Finger splint, static    Supplies:  Aluminum splint  Post-procedure details:     Pain:  Unchanged    Sensation:  Normal    Skin color:  Pink     Patient tolerance of procedure:   Tolerated well, no immediate complications         Lissette Arnett MD  08/08/21 1731

## 2021-08-24 ENCOUNTER — TELEMEDICINE (OUTPATIENT)
Dept: FAMILY MEDICINE CLINIC | Facility: CLINIC | Age: 51
End: 2021-08-24

## 2021-08-24 DIAGNOSIS — J02.9 PHARYNGITIS, UNSPECIFIED ETIOLOGY: Primary | ICD-10-CM

## 2021-08-24 PROCEDURE — 3074F SYST BP LT 130 MM HG: CPT | Performed by: PHYSICIAN ASSISTANT

## 2021-08-24 PROCEDURE — 99213 OFFICE O/P EST LOW 20 MIN: CPT | Performed by: PHYSICIAN ASSISTANT

## 2021-08-24 PROCEDURE — 3079F DIAST BP 80-89 MM HG: CPT | Performed by: PHYSICIAN ASSISTANT

## 2021-08-24 RX ORDER — LIDOCAINE HYDROCHLORIDE 20 MG/ML
15 SOLUTION OROPHARYNGEAL 4 TIMES DAILY PRN
Qty: 100 ML | Refills: 0 | Status: SHIPPED | OUTPATIENT
Start: 2021-08-24 | End: 2022-08-09

## 2021-08-24 NOTE — PROGRESS NOTES
Virtual Regular Visit    Verification of patient location:    Patient is located in the following state in which I hold an active license [place]      Assessment/Plan:    Problem List Items Addressed This Visit        Digestive    Pharyngitis - Primary     Pt has no other symptom  Lidocaine viscous swish and spit PRN  Advised to avoid any acidic drinks or foods  OTC pain medication prn  If symptoms persist advised to call office                       Reason for visit is   Chief Complaint   Patient presents with    Virtual Regular Visit        Encounter provider Ariel Saldana PA-C    Provider located at [address] [place] [address]   [phone]      Recent Visits  No visits were found meeting these conditions  Showing recent visits within past 7 days and meeting all other requirements  Today's Visits  Date Type Provider Dept   08/24/21 Telemedicine Ariel Saldana PA-C  [facility]   Showing today's visits and meeting all other requirements  Future Appointments  No visits were found meeting these conditions  Showing future appointments within next 150 days and meeting all other requirements       The patient was identified by name and date of birth  Sheryl Hill was informed that this is a telemedicine visit and that the visit is being conducted through [address] [place] [address] Now and patient was informed that this is a secure, HIPAA-compliant platform  She agrees to proceed     My office door was closed  No one else was in the room  She acknowledged consent and understanding of privacy and security of the video platform  The patient has agreed to participate and understands they can discontinue the visit at any time  Patient is aware this is a billable service  Subjective  Romina Guidry is a 52 y o  female presents for complaint of sore throat x 1 week   Sore Throat   This is a new problem  Episode onset: 1 week   The problem has been unchanged  There has been no fever  The pain is at a severity of 7/10  The patient is experiencing no pain  Pertinent negatives include no abdominal pain, congestion, coughing, diarrhea, drooling, ear discharge, ear pain, headaches, hoarse voice, neck pain, shortness of breath, stridor, swollen glands, trouble swallowing or vomiting  She has had no exposure to strep  She has tried gargles for the symptoms  The treatment provided no relief  Past Medical History:   Diagnosis Date    GERD (gastroesophageal reflux disease)     Headache     Hematuria     Hypertension     Lateral epicondylitis, right elbow 1/8/2019    Panic attacks     Psychiatric disorder        Past Surgical History:   Procedure Laterality Date    CYSTOSCOPY  06/08/2018    NO PAST SURGERIES         Current Outpatient Medications   Medication Sig Dispense Refill    acetaminophen (TYLENOL) 500 mg tablet Take 1 tablet (500 mg total) by mouth every 6 (six) hours as needed for mild pain 30 tablet 0    amitriptyline (ELAVIL) 50 mg tablet Take 1 tablet (50 mg total) by mouth daily at bedtime 30 tablet 2    Cholecalciferol (VITAMIN D3 PO) Take by mouth daily      cyclobenzaprine (FLEXERIL) 10 mg tablet Take 1 tablet (10 mg total) by mouth 2 (two) times a day as needed for muscle spasms 20 tablet 0    ibuprofen (MOTRIN) 800 mg tablet Take 1 tablet (800 mg total) by mouth 3 (three) times a day 21 tablet 0    levonorgestrel (MIRENA) 20 MCG/24HR IUD 1 each by Intrauterine route once      lisinopril-hydrochlorothiazide (PRINZIDE,ZESTORETIC) 10-12 5 MG per tablet TAKE ONE TABLET BY MOUTH DAILY 90 tablet 1    polyethylene glycol (GLYCOLAX) 17 GM/SCOOP powder Take 238 g by mouth daily Take as instructed in clinic 238 g 0     No current facility-administered medications for this visit  No Known Allergies    Review of Systems   Constitutional: Negative for diaphoresis, fatigue and fever  HENT: Positive for sore throat   Negative for congestion, drooling, ear discharge, ear pain, hoarse voice, mouth sores, postnasal drip, rhinorrhea, sinus pressure, sinus pain, sneezing, trouble swallowing and voice change  Respiratory: Negative for cough, shortness of breath and stridor  Gastrointestinal: Negative for abdominal pain, constipation, diarrhea, nausea and vomiting  Musculoskeletal: Negative for neck pain  Neurological: Negative for headaches  Video Exam    There were no vitals filed for this visit  Physical Exam  Constitutional:       Appearance: Normal appearance  HENT:      Head: Normocephalic and atraumatic  Mouth/Throat:      Comments: Unable to perform assess due to limited video capacities  Pulmonary:      Effort: Pulmonary effort is normal  No respiratory distress  Neurological:      Mental Status: She is alert and oriented to person, place, and time  Psychiatric:         Mood and Affect: Mood normal          Behavior: Behavior normal          Thought Content: Thought content normal          Judgment: Judgment normal           I spent 12 minutes directly with the patient during this visit    Hermes verbally agrees to participate in [employer]  Pt is aware that [employer] could be limited without vital signs or the ability to perform a full hands-on physical Davinjosephine John understands she or the provider may request at any time to terminate the video visit and request the patient to seek care or treatment in person

## 2021-08-24 NOTE — ASSESSMENT & PLAN NOTE
Pt has no other symptom  Lidocaine viscous swish and spit PRN  Advised to avoid any acidic drinks or foods  OTC pain medication prn  If symptoms persist advised to call office

## 2021-08-25 ENCOUNTER — HOSPITAL ENCOUNTER (EMERGENCY)
Facility: HOSPITAL | Age: 51
Discharge: HOME | End: 2021-08-25
Attending: EMERGENCY MEDICINE | Admitting: EMERGENCY MEDICINE
Payer: COMMERCIAL

## 2021-08-25 ENCOUNTER — APPOINTMENT (EMERGENCY)
Dept: RADIOLOGY | Facility: HOSPITAL | Age: 51
End: 2021-08-25
Payer: COMMERCIAL

## 2021-08-25 VITALS
BODY MASS INDEX: 21.5 KG/M2 | DIASTOLIC BLOOD PRESSURE: 83 MMHG | TEMPERATURE: 97.6 F | HEIGHT: 67 IN | WEIGHT: 137 LBS | HEART RATE: 86 BPM | RESPIRATION RATE: 18 BRPM | OXYGEN SATURATION: 99 % | SYSTOLIC BLOOD PRESSURE: 139 MMHG

## 2021-08-25 DIAGNOSIS — J02.0 STREP PHARYNGITIS: Primary | ICD-10-CM

## 2021-08-25 LAB — S PYO DNA THROAT QL NAA+PROBE: DETECTED

## 2021-08-25 PROCEDURE — 70360 X-RAY EXAM OF NECK: CPT

## 2021-08-25 PROCEDURE — 87651 STREP A DNA AMP PROBE: CPT

## 2021-08-25 PROCEDURE — 99283 EMERGENCY DEPT VISIT LOW MDM: CPT

## 2021-08-25 PROCEDURE — 99284 EMERGENCY DEPT VISIT MOD MDM: CPT | Performed by: EMERGENCY MEDICINE

## 2021-08-25 RX ORDER — PENICILLIN V POTASSIUM 250 MG/1
500 TABLET ORAL ONCE
Status: DISCONTINUED | OUTPATIENT
Start: 2021-08-25 | End: 2021-08-25 | Stop reason: HOSPADM

## 2021-08-25 RX ORDER — PENICILLIN V POTASSIUM 500 MG/1
500 TABLET ORAL 2 TIMES DAILY
Qty: 20 TABLET | Refills: 0 | Status: SHIPPED | OUTPATIENT
Start: 2021-08-25 | End: 2021-09-04

## 2021-08-25 NOTE — ED ATTENDING ATTESTATION
8/25/2021  IBala MD, saw and evaluated the patient  I have discussed the patient with the resident/non-physician practitioner and agree with the resident's/non-physician practitioner's findings, Plan of Care, and MDM as documented in the resident's/non-physician practitioner's note, except where noted  All available labs and Radiology studies were reviewed  I was present for key portions of any procedure(s) performed by the resident/non-physician practitioner and I was immediately available to provide assistance  At this point I agree with the current assessment done in the Emergency Department  I have conducted an independent evaluation of this patient a history and physical is as follows:    OA: 51 y/o f c/o irritation/sore throat x 1 week  Denies any difficulty swallowing, vomiting, chest pain, SOB or difficulty breathing  Patient has been tolerating p o  Without difficulty since that time  Eating and drinking normally  Denies any difficulty with breathing  No coughing  States that she noted the discomfort in her throat while eating an ascitic fruit  She denies choking or gagging on food  Has not had any food gets stuck in her throat  Denies fevers chills or cough  Unknown sick contacts but denies any fevers or chills  On exam patient is sitting comfortably on stretcher in no acute distress  Patient is handling secretions without any difficulty  Vital signs are stable  HEENT is normocephalic and atraumatic with moist mucous membranes and clear sclera conjunctiva  Posterior oropharynx is erythematous with cryptic tonsils bilaterally that are minimally enlarged  Patient has midline uvula  There is no pooling of secretions  There is no stridor or lymphadenopathy  Neck is symmetrical   There is no edema erythema  There is no tenderness to palpation  There is no crepitus  Patient has full range of motion  Heart is regular rate  Lungs are clear no wheezing rhonchi rales  Moving all extremities equally  No rash  Intact distal pulses  Capillary refill less than 2 seconds  Awake alert oriented appropriate   phone was utilized for history and physical exam   Assessment and plan patient with irritation sore throat  Initially concerned that it was from acidic fruits she ate or possibly a foreign body however denies any episodes of choking gagging or feeling like something got caught in her throat  Will obtain lateral neck imaging  Will obtain strep swab  Will treat accordingly  Will advise follow-up with PCP or return to the emergency department for any worsening of symptoms  Portions of the record may have been created with voice recognition software  Occasional wrong word or sound-a-like" substitutions may have occurred due to the inherent limitations of voice recognition software  Review chart carefully and recognize, using context, where substitutions have occurred      ED Course         Critical Care Time  Procedures

## 2021-08-25 NOTE — DISCHARGE INSTRUCTIONS
Thank you for coming to the emergency department today  At this time, we have determined that you are stable for discharge  Your strep test came back positive  I will prescribe a course of antibiotics  Please continue taking antibiotics to completion  If you experience fever, increased pain, shortness of breath, please return to the ED for evaluation  Otherwise, please see your primary care provider in 2-3 days if symptoms worsen  I advise avoiding acidic foods at this time as they may irritate the throat

## 2021-08-25 NOTE — ED PROVIDER NOTES
History  Chief Complaint   Patient presents with    Sore Throat     Pt c/o sore throat x1 week  Patient is a 33-year-old female with history of GERD as well as hypertension, presented to the ED for one-week history of foreign body sensation in her throat, along with throat irritation and painful swallowing  Patient states that she has been eating Sinhala lungs and feels as if it is part of the fruit or a seed from the fruit might have gotten stuck in her throat  She denies throat swelling, difficulty breathing, or chest pain  Patient has been able to tolerate her own secretions and has been eating and drinking normally without issue since feeling something stuck in her throat  Patient had a virtual visit with her primary care doctor yesterday, and was told to do lidocaine viscous swish and spit  Patient was advised to avoid any acidic drinks or foods as well  Patient denies fevers, cough or congestion postnasal drip, chest pain, shortness of breath, abdominal pain, nausea vomiting  No sick contacts at home  Prior to Admission Medications   Prescriptions Last Dose Informant Patient Reported? Taking?    Cholecalciferol (VITAMIN D3 PO)  Self Yes No   Sig: Take by mouth daily   Lidocaine Viscous HCl (XYLOCAINE) 2 % mucosal solution   No No   Sig: Swish and spit 15 mL 4 (four) times a day as needed for mouth pain or discomfort   acetaminophen (TYLENOL) 500 mg tablet  Self No No   Sig: Take 1 tablet (500 mg total) by mouth every 6 (six) hours as needed for mild pain   amitriptyline (ELAVIL) 50 mg tablet  Self No No   Sig: Take 1 tablet (50 mg total) by mouth daily at bedtime   cyclobenzaprine (FLEXERIL) 10 mg tablet  Self No No   Sig: Take 1 tablet (10 mg total) by mouth 2 (two) times a day as needed for muscle spasms   ibuprofen (MOTRIN) 800 mg tablet  Self No No   Sig: Take 1 tablet (800 mg total) by mouth 3 (three) times a day   levonorgestrel (MIRENA) 20 MCG/24HR IUD  Self Yes No   Si each by Intrauterine route once   lisinopril-hydrochlorothiazide (PRINZIDE,ZESTORETIC) 10-12 5 MG per tablet  Self No No   Sig: TAKE ONE TABLET BY MOUTH DAILY   polyethylene glycol (GLYCOLAX) 17 GM/SCOOP powder   No No   Sig: Take 238 g by mouth daily Take as instructed in clinic      Facility-Administered Medications: None       Past Medical History:   Diagnosis Date    GERD (gastroesophageal reflux disease)     Headache     Hematuria     Hypertension     Lateral epicondylitis, right elbow 1/8/2019    Panic attacks     Psychiatric disorder        Past Surgical History:   Procedure Laterality Date    CYSTOSCOPY  06/08/2018    NO PAST SURGERIES         Family History   Problem Relation Age of Onset    Hypertension Mother     Colonic polyp Mother     Arthritis Father     Diabetes Father     Hypertension Father     Hyperlipidemia Father     Other Brother         TBI from accident     Colonic polyp Maternal Grandmother     No Known Problems Sister     No Known Problems Daughter     No Known Problems Maternal Grandfather     No Known Problems Paternal Grandmother     No Known Problems Paternal Grandfather     No Known Problems Son     No Known Problems Sister     No Known Problems Maternal Aunt     No Known Problems Maternal Aunt     No Known Problems Maternal Aunt     No Known Problems Maternal Aunt     No Known Problems Maternal Aunt     No Known Problems Paternal Aunt     No Known Problems Paternal Aunt     No Known Problems Paternal Aunt      I have reviewed and agree with the history as documented      E-Cigarette/Vaping    E-Cigarette Use Never User      E-Cigarette/Vaping Substances     Social History     Tobacco Use    Smoking status: Current Every Day Smoker     Packs/day: 0 50     Types: Cigarettes    Smokeless tobacco: Never Used    Tobacco comment: ONE HALF PACK A DAY OR LESS, CURRENT SOME DAY SMOKER, LIGHT TOBACCO SMOKER  AS PER [employer]   Vaping Use    Vaping Use: Never used Substance Use Topics    Alcohol use: No    Drug use: No        Review of Systems   Constitutional: Negative for chills and fever  HENT: Positive for sore throat  Negative for congestion and ear pain  Foreign body sensation in throat   Eyes: Negative for pain and visual disturbance  Respiratory: Negative for cough and shortness of breath  Cardiovascular: Negative for chest pain and palpitations  Gastrointestinal: Negative for abdominal pain, diarrhea, nausea and vomiting  Genitourinary: Negative for dysuria and hematuria  Musculoskeletal: Negative for arthralgias and back pain  Skin: Negative for color change and rash  Neurological: Negative for dizziness, seizures, syncope and headaches  All other systems reviewed and are negative  Physical Exam  ED Triage Vitals [08/25/21 1143]   Temperature Pulse Respirations Blood Pressure SpO2   97 6 °F (36 4 °C) 86 18 139/83 99 %      Temp Source Heart Rate Source Patient Position - Orthostatic VS BP Location FiO2 (%)   Oral Monitor Sitting Left arm --      Pain Score       --             Orthostatic Vital Signs  Vitals:    08/25/21 1143   BP: 139/83   Pulse: 86   Patient Position - Orthostatic VS: Sitting       Physical Exam  Vitals and nursing note reviewed  Constitutional:       General: She is not in acute distress  Appearance: She is well-developed  HENT:      Head: Normocephalic and atraumatic  Nose: No congestion or rhinorrhea  Mouth/Throat:      Mouth: Mucous membranes are moist       Pharynx: Oropharynx is clear  Uvula midline  Posterior oropharyngeal erythema present  No uvula swelling  Tonsils: No tonsillar exudate  Eyes:      Conjunctiva/sclera: Conjunctivae normal       Pupils: Pupils are equal, round, and reactive to light  Cardiovascular:      Rate and Rhythm: Normal rate and regular rhythm  Heart sounds: Normal heart sounds  No murmur heard       Pulmonary:      Effort: Pulmonary effort is normal  No respiratory distress  Breath sounds: Normal breath sounds  Abdominal:      Palpations: Abdomen is soft  Tenderness: There is no abdominal tenderness  Musculoskeletal:      Cervical back: Neck supple  Skin:     General: Skin is warm and dry  Capillary Refill: Capillary refill takes less than 2 seconds  Neurological:      General: No focal deficit present  Mental Status: She is alert and oriented to person, place, and time  Psychiatric:         Mood and Affect: Mood normal          ED Medications  Medications   dexamethasone (DECADRON) tablet 6 mg (has no administration in time range)   penicillin V potassium (VEETID) tablet 500 mg (has no administration in time range)       Diagnostic Studies  Results Reviewed     Procedure Component Value Units Date/Time    Strep A PCR [[id number]]  (Abnormal) Collected: 08/25/21 1225    Lab Status: Final result Specimen: Throat Updated: 08/25/21 1320     STREP A PCR Detected                 XR neck soft tissue   ED Interpretation by Roberta Marcano DO (08/25 1355)   No foreign body            Procedures  Procedures      ED Course  ED Course as of Aug 25 1355   Wed Aug 25, 2021   1253 Will swab for strep throat  Also ordered lateral neck x-ray to rule out foreign body  1354 Patient is strep positive  Her XR shows no foreign body  Discussed with patient that I will giver her antibiotics for the infection, 1st dose here, as well as Decadron for her discomfort  Patient agrees with plan and is stable for discharge                                            MDM    Disposition  Final diagnoses:   Strep pharyngitis     Time reflects when diagnosis was documented in both MDM as applicable and the Disposition within this note     Time User Action Codes Description Comment    8/25/2021  1:44 PM Zehra Dang Add [J02 0] Strep pharyngitis       ED Disposition     ED Disposition Condition Date/Time Comment    Discharge Stable Wed Aug 25, 2021  1:44 PM Romina Cleaning discharge to home/self care  Follow-up Information     Follow up With Specialties Details Why [address] [facility], PA-C Family Medicine, Physician Assistant Call in 2 days If symptoms worsen [facility] [phone]            Patient's Medications   Discharge Prescriptions    PENICILLIN V POTASSIUM (VEETID) 500 MG TABLET    Take 1 tablet (500 mg total) by mouth 2 (two) times a day for 10 days       Start Date: 8/25/2021 End Date: 9/4/2021       Order Dose: 500 mg       Quantity: 20 tablet    Refills: 0     No discharge procedures on file  PDMP Review     None           ED Provider  Attending physically available and evaluated Jonnathan Casillas I managed the patient along with the ED Attending      Electronically Signed by         Denzel Becerra DO  08/25/21 9604

## 2021-09-16 ENCOUNTER — OFFICE VISIT (OUTPATIENT)
Dept: FAMILY MEDICINE CLINIC | Facility: CLINIC | Age: 51
End: 2021-09-16

## 2021-09-16 VITALS
HEART RATE: 72 BPM | OXYGEN SATURATION: 99 % | SYSTOLIC BLOOD PRESSURE: 130 MMHG | TEMPERATURE: 98.1 F | DIASTOLIC BLOOD PRESSURE: 82 MMHG | HEIGHT: 67 IN | BODY MASS INDEX: 21.06 KG/M2 | RESPIRATION RATE: 18 BRPM | WEIGHT: 134.2 LBS

## 2021-09-16 DIAGNOSIS — I10 ESSENTIAL HYPERTENSION: ICD-10-CM

## 2021-09-16 DIAGNOSIS — Z00.00 ANNUAL PHYSICAL EXAM: ICD-10-CM

## 2021-09-16 DIAGNOSIS — Z11.59 NEED FOR HEPATITIS C SCREENING TEST: ICD-10-CM

## 2021-09-16 DIAGNOSIS — Z13.6 SCREENING FOR CARDIOVASCULAR CONDITION: ICD-10-CM

## 2021-09-16 DIAGNOSIS — Z23 ENCOUNTER FOR IMMUNIZATION: Primary | ICD-10-CM

## 2021-09-16 PROBLEM — Z12.4 SCREENING FOR CERVICAL CANCER: Status: RESOLVED | Noted: 2018-03-30 | Resolved: 2021-09-16

## 2021-09-16 PROBLEM — R31.0 GROSS HEMATURIA: Status: RESOLVED | Noted: 2018-05-15 | Resolved: 2021-09-16

## 2021-09-16 PROBLEM — J02.9 PHARYNGITIS: Status: RESOLVED | Noted: 2021-08-24 | Resolved: 2021-09-16

## 2021-09-16 PROBLEM — M62.838 TRAPEZIUS MUSCLE SPASM: Status: RESOLVED | Noted: 2018-02-20 | Resolved: 2021-09-16

## 2021-09-16 PROCEDURE — 90471 IMMUNIZATION ADMIN: CPT | Performed by: PHYSICIAN ASSISTANT

## 2021-09-16 PROCEDURE — 90686 IIV4 VACC NO PRSV 0.5 ML IM: CPT | Performed by: PHYSICIAN ASSISTANT

## 2021-09-16 PROCEDURE — 99396 PREV VISIT EST AGE 40-64: CPT | Performed by: PHYSICIAN ASSISTANT

## 2021-09-16 NOTE — PROGRESS NOTES
[address] [facility]    NAME: Lazara Leo  AGE: 52 y o  SEX: female  : 1970     DATE: 2021     Assessment and Plan:     Problem List Items Addressed This Visit        Cardiovascular and Mediastinum    Hypertension    Relevant Orders    Comprehensive metabolic panel    CBC and Platelet       Other    Annual physical exam     No abnormalities noted on exam   Will check labs  Per pt due had  Recommended 6 month follow-up mammogram in 2021 at a site not affiliated with [place]  Received a letter requiring repeat mammogram in September  Patient advised to bring results letter  Flu vaccine placed today   Discussed following healthy eating habits and increasing physical activity           Other Visit Diagnoses     Encounter for immunization    -  Primary    Relevant Orders    influenza vaccine, quadrivalent, 0 5 mL, preservative-free, for adult and pediatric patients 6 mos+ (AFLURIA, FLUARIX, FLULAVAL, FLUZONE) (Completed)    Screening for cardiovascular condition        Relevant Orders    Lipid panel    Need for hepatitis C screening test              Immunizations and preventive care screenings were discussed with patient today  Appropriate education was printed on patient's after visit summary  Counseling:  · Exercise: the importance of regular exercise/physical activity was discussed  Recommend exercise 3-5 times per week for at least 30 minutes  Tobacco Cessation Counseling: Tobacco cessation counseling and education was provided  The patient is sincerely urged to quit consumption of tobacco  She is not ready to quit tobacco  The numerous health risks of tobacco consumption were discussed  If she decides to quit, there are a number of helpful adjunctive aids, and she can see me to discuss nicotine replacement therapy, chantix, or bupropion anytime in the future        Return in about 6 months (around 3/16/2022)  Chief Complaint:     Chief Complaint   Patient presents with    Physical Exam      History of Present Illness:     Adult Annual Physical   Patient here for a comprehensive physical exam  The patient reports no problems  Diet and Physical Activity  · Diet/Nutrition: well balanced diet, limited junk food, low fat diet, consuming 3-5 servings of fruits/vegetables daily and adequate fiber intake  · Exercise: no formal exercise  Depression Screening  PHQ-9 Depression Screening    PHQ-9:   Frequency of the following problems over the past two weeks:      Little interest or pleasure in doing things: 0 - not at all  Feeling down, depressed, or hopeless: 0 - not at all  PHQ-2 Score: 0       General Health  · Sleep: sleeps well and gets more than 8 hours of sleep on average  · Hearing: normal - bilateral   · Vision: vision problems: wears reading glasses and most recent eye exam <1 year ago  · Dental: regular dental visits, brushes teeth three times daily and flosses teeth occasionally  /GYN Health  · Patient is: postmenopausal  · Contraceptive method: none  Review of Systems:     Review of Systems   Constitutional: Negative for chills, fatigue and fever  Respiratory: Negative for cough, chest tightness, shortness of breath and wheezing  Cardiovascular: Negative for chest pain and palpitations  Gastrointestinal: Negative for abdominal pain, constipation, diarrhea, nausea and vomiting  Genitourinary: Negative for difficulty urinating  Musculoskeletal: Negative for arthralgias, myalgias, neck pain and neck stiffness  Skin: Negative for rash and wound  Neurological: Negative for dizziness, weakness, light-headedness, numbness and headaches  Psychiatric/Behavioral: Negative for agitation and behavioral problems  The patient is not nervous/anxious  All other systems reviewed and are negative       Past Medical History:     Past Medical History:   Diagnosis Date    GERD (gastroesophageal reflux disease)     Headache     Hematuria     Hypertension     Lateral epicondylitis, right elbow 1/8/2019    Panic attacks     Psychiatric disorder       Past Surgical History:     Past Surgical History:   Procedure Laterality Date    CYSTOSCOPY  06/08/2018    NO PAST SURGERIES        Social History:     Social History     Socioeconomic History    Marital status: Single     Spouse name: None    Number of children: None    Years of education: None    Highest education level: 12th grade   Occupational History    Occupation: HOUSEWIFE OR HOMEMAKER   Tobacco Use    Smoking status: Current Every Day Smoker     Packs/day: 0 50     Types: Cigarettes    Smokeless tobacco: Never Used    Tobacco comment: ONE HALF PACK A DAY OR LESS, CURRENT SOME DAY SMOKER, LIGHT TOBACCO SMOKER  AS PER [employer]   Vaping Use    Vaping Use: Never used   Substance and Sexual Activity    Alcohol use: No    Drug use: No    Sexual activity: Not Currently     Partners: Male     Birth control/protection: I U D  Other Topics Concern    None   Social History Narrative    ENGAGES IN A HOBBY - "[employer]"    LIVES WITH FAMILY    UNEMPLOYED     Social Determinants of Health     Financial Resource Strain: Low Risk     Difficulty of Paying Living Expenses: Not hard at all   Food Insecurity: No Food Insecurity    Worried About Running Out of Food in the Last Year: Never true    [address] in the Last Year: Never true   Transportation Needs: No Transportation Needs    Lack of Transportation (Medical): No    Lack of Transportation (Non-Medical):  No   Physical Activity:     Days of Exercise per Week:     Minutes of Exercise per Session:    Stress:     Feeling of Stress :    Social Connections:     Frequency of Communication with Friends and Family:     Frequency of Social Gatherings with Friends and Family:     Attends Ep[employer] Services:     Active Member of Clubs or Organizations:     Attends Club or Organization Meetings:     Marital Status:    Intimate Partner Violence:     Fear of Current or Ex-Partner:     Emotionally Abused:     Physically Abused:     Sexually Abused:       Family History:     Family History   Problem Relation Age of Onset    Hypertension Mother     Colonic polyp Mother     Arthritis Father     Diabetes Father     Hypertension Father     Hyperlipidemia Father     Other Brother         TBI from accident     Colonic polyp Maternal Grandmother     No Known Problems Sister     No Known Problems Daughter     No Known Problems Maternal Grandfather     No Known Problems Paternal Grandmother     No Known Problems Paternal Grandfather     No Known Problems Son     No Known Problems Sister     No Known Problems Maternal Aunt     No Known Problems Maternal Aunt     No Known Problems Maternal Aunt     No Known Problems Maternal Aunt     No Known Problems Maternal Aunt     No Known Problems Paternal Aunt     No Known Problems Paternal Aunt     No Known Problems Paternal Aunt       Current Medications:     Current Outpatient Medications   Medication Sig Dispense Refill    acetaminophen (TYLENOL) 500 mg tablet Take 1 tablet (500 mg total) by mouth every 6 (six) hours as needed for mild pain 30 tablet 0    amitriptyline (ELAVIL) 50 mg tablet Take 1 tablet (50 mg total) by mouth daily at bedtime 30 tablet 2    Cholecalciferol (VITAMIN D3 PO) Take by mouth daily      cyclobenzaprine (FLEXERIL) 10 mg tablet Take 1 tablet (10 mg total) by mouth 2 (two) times a day as needed for muscle spasms 20 tablet 0    ibuprofen (MOTRIN) 800 mg tablet Take 1 tablet (800 mg total) by mouth 3 (three) times a day 21 tablet 0    levonorgestrel (MIRENA) 20 MCG/24HR IUD 1 each by Intrauterine route once      Lidocaine Viscous HCl (XYLOCAINE) 2 % mucosal solution Swish and spit 15 mL 4 (four) times a day as needed for mouth pain or discomfort 100 mL 0    lisinopril-hydrochlorothiazide (PRINZIDE,ZESTORETIC) 10-12 5 MG per tablet TAKE ONE TABLET BY MOUTH DAILY 90 tablet 1    polyethylene glycol (GLYCOLAX) 17 GM/SCOOP powder Take 238 g by mouth daily Take as instructed in clinic 238 g 0     No current facility-administered medications for this visit  Allergies:     No Known Allergies   Physical Exam:     /82 (BP Location: Left arm, Patient Position: Sitting, Cuff Size: Standard)   Pulse 72   Temp 98 1 °F (36 7 °C) (Temporal)   Resp 18   Ht 5' 7" (1 702 m)   Wt 60 9 kg (134 lb 3 2 oz)   SpO2 99%   BMI 21 02 kg/m²     Physical Exam  Vitals and nursing note reviewed  Constitutional:       General: She is not in acute distress  Appearance: Normal appearance  She is well-developed  She is not ill-appearing  HENT:      Head: Normocephalic and atraumatic  Right Ear: Tympanic membrane, ear canal and external ear normal  There is no impacted cerumen  Left Ear: Tympanic membrane, ear canal and external ear normal  There is no impacted cerumen  Nose: Nose normal       Mouth/Throat:      Mouth: Mucous membranes are moist       Pharynx: No oropharyngeal exudate or posterior oropharyngeal erythema  Eyes:      General:         Right eye: No discharge  Left eye: No discharge  Conjunctiva/sclera: Conjunctivae normal       Pupils: Pupils are equal, round, and reactive to light  Cardiovascular:      Rate and Rhythm: Normal rate and regular rhythm  Pulses: Normal pulses  Heart sounds: Normal heart sounds  No murmur heard  Pulmonary:      Effort: Pulmonary effort is normal  No respiratory distress  Breath sounds: Normal breath sounds  No wheezing or rhonchi  Abdominal:      General: Bowel sounds are normal  There is no distension  Palpations: Abdomen is soft  There is no mass  Tenderness: There is no abdominal tenderness  There is no guarding  Musculoskeletal:         General: No swelling or deformity        Cervical back: Normal range of motion and neck supple  No muscular tenderness  Lymphadenopathy:      Cervical: No cervical adenopathy  Skin:     General: Skin is warm and dry  Findings: No erythema  Neurological:      Mental Status: She is alert and oriented to person, place, and time  Psychiatric:         Mood and Affect: Mood normal          Behavior: Behavior normal          Thought Content:  Thought content normal          Judgment: Judgment normal           Vy Kent PA-C  [address]

## 2021-09-16 NOTE — PATIENT INSTRUCTIONS

## 2021-09-16 NOTE — ASSESSMENT & PLAN NOTE
No abnormalities noted on exam   Will check labs  Per pt due had  Recommended 6 month follow-up mammogram in July 2021 at a site not affiliated with [facility]  Received a letter requiring repeat mammogram in September   Patient advised to bring results letter  Flu vaccine placed today   Discussed following healthy eating habits and increasing physical activity

## 2021-09-17 ENCOUNTER — TELEPHONE (OUTPATIENT)
Dept: GASTROENTEROLOGY | Facility: CLINIC | Age: 51
End: 2021-09-17

## 2021-09-17 NOTE — TELEPHONE ENCOUNTER
Patients GI provider:  Dr Vicky Queen    Number to return call: [phone]    Reason for call: Pt calling to reschedule her procedure which was cancelled in June       Scheduled procedure/appointment date if applicable: Apt/procedure NA

## 2021-09-20 ENCOUNTER — APPOINTMENT (OUTPATIENT)
Dept: LAB | Facility: HOSPITAL | Age: 51
End: 2021-09-20
Payer: COMMERCIAL

## 2021-09-20 DIAGNOSIS — Z11.4 SCREENING FOR HIV (HUMAN IMMUNODEFICIENCY VIRUS): ICD-10-CM

## 2021-09-20 DIAGNOSIS — I10 ESSENTIAL HYPERTENSION: ICD-10-CM

## 2021-09-20 DIAGNOSIS — Z13.6 SCREENING FOR CARDIOVASCULAR CONDITION: ICD-10-CM

## 2021-09-20 LAB
ALBUMIN SERPL BCP-MCNC: 4.5 G/DL (ref 3.5–5)
ALP SERPL-CCNC: 77 U/L (ref 46–116)
ALT SERPL W P-5'-P-CCNC: 22 U/L (ref 12–78)
ANION GAP SERPL CALCULATED.3IONS-SCNC: 4 MMOL/L (ref 4–13)
AST SERPL W P-5'-P-CCNC: 10 U/L (ref 5–45)
BILIRUB SERPL-MCNC: 0.51 MG/DL (ref 0.2–1)
BUN SERPL-MCNC: 12 MG/DL (ref 5–25)
CALCIUM SERPL-MCNC: 9.7 MG/DL (ref 8.3–10.1)
CHLORIDE SERPL-SCNC: 109 MMOL/L (ref 100–108)
CHOLEST SERPL-MCNC: 176 MG/DL (ref 50–200)
CO2 SERPL-SCNC: 29 MMOL/L (ref 21–32)
CREAT SERPL-MCNC: 0.61 MG/DL (ref 0.6–1.3)
ERYTHROCYTE [DISTWIDTH] IN BLOOD BY AUTOMATED COUNT: 13 % (ref 11.6–15.1)
GFR SERPL CREATININE-BSD FRML MDRD: 108 ML/MIN/1.73SQ M
GLUCOSE P FAST SERPL-MCNC: 91 MG/DL (ref 65–99)
HCT VFR BLD AUTO: 39.4 % (ref 34.8–46.1)
HDLC SERPL-MCNC: 36 MG/DL
HGB BLD-MCNC: 12.8 G/DL (ref 11.5–15.4)
LDLC SERPL CALC-MCNC: 115 MG/DL (ref 0–100)
MCH RBC QN AUTO: 31.5 PG (ref 26.8–34.3)
MCHC RBC AUTO-ENTMCNC: 32.5 G/DL (ref 31.4–37.4)
MCV RBC AUTO: 97 FL (ref 82–98)
NONHDLC SERPL-MCNC: 140 MG/DL
PLATELET # BLD AUTO: 245 THOUSANDS/UL (ref 149–390)
PMV BLD AUTO: 10.9 FL (ref 8.9–12.7)
POTASSIUM SERPL-SCNC: 5 MMOL/L (ref 3.5–5.3)
PROT SERPL-MCNC: 7.9 G/DL (ref 6.4–8.2)
RBC # BLD AUTO: 4.06 MILLION/UL (ref 3.81–5.12)
SODIUM SERPL-SCNC: 142 MMOL/L (ref 136–145)
TRIGL SERPL-MCNC: 127 MG/DL
WBC # BLD AUTO: 5.83 THOUSAND/UL (ref 4.31–10.16)

## 2021-09-20 PROCEDURE — 87389 HIV-1 AG W/HIV-1&-2 AB AG IA: CPT

## 2021-09-20 PROCEDURE — 80053 COMPREHEN METABOLIC PANEL: CPT

## 2021-09-20 PROCEDURE — 80061 LIPID PANEL: CPT

## 2021-09-20 PROCEDURE — 85027 COMPLETE CBC AUTOMATED: CPT

## 2021-09-20 PROCEDURE — 36415 COLL VENOUS BLD VENIPUNCTURE: CPT

## 2021-09-20 NOTE — TELEPHONE ENCOUNTER
Colon on 11/18/21 with Dr Juanita Mcdowell at [facility]  Miralax/dulcolax Uzbek prep instructions mailed to the patient

## 2021-09-21 LAB — HIV 1+2 AB+HIV1 P24 AG SERPL QL IA: NORMAL

## 2021-10-04 ENCOUNTER — TELEPHONE (OUTPATIENT)
Dept: GASTROENTEROLOGY | Facility: AMBULARY SURGERY CENTER | Age: 51
End: 2021-10-04

## 2021-11-17 ENCOUNTER — ANESTHESIA (OUTPATIENT)
Dept: ANESTHESIOLOGY | Facility: HOSPITAL | Age: 51
End: 2021-11-17

## 2021-11-17 ENCOUNTER — TELEPHONE (OUTPATIENT)
Dept: GASTROENTEROLOGY | Facility: HOSPITAL | Age: 51
End: 2021-11-17

## 2021-11-17 ENCOUNTER — ANESTHESIA EVENT (OUTPATIENT)
Dept: ANESTHESIOLOGY | Facility: HOSPITAL | Age: 51
End: 2021-11-17

## 2021-11-18 ENCOUNTER — HOSPITAL ENCOUNTER (OUTPATIENT)
Dept: GASTROENTEROLOGY | Facility: HOSPITAL | Age: 51
Setting detail: OUTPATIENT SURGERY
Discharge: HOME | End: 2021-11-18
Attending: INTERNAL MEDICINE | Admitting: INTERNAL MEDICINE
Payer: COMMERCIAL

## 2021-11-18 ENCOUNTER — ANESTHESIA EVENT (OUTPATIENT)
Dept: GASTROENTEROLOGY | Facility: HOSPITAL | Age: 51
End: 2021-11-18

## 2021-11-18 ENCOUNTER — ANESTHESIA (OUTPATIENT)
Dept: GASTROENTEROLOGY | Facility: HOSPITAL | Age: 51
End: 2021-11-18

## 2021-11-18 VITALS
TEMPERATURE: 98.1 F | OXYGEN SATURATION: 99 % | DIASTOLIC BLOOD PRESSURE: 97 MMHG | SYSTOLIC BLOOD PRESSURE: 162 MMHG | RESPIRATION RATE: 20 BRPM | HEART RATE: 82 BPM

## 2021-11-18 DIAGNOSIS — Z12.11 COLON CANCER SCREENING: ICD-10-CM

## 2021-11-18 LAB
EXT PREGNANCY TEST URINE: NEGATIVE
EXT. CONTROL: NORMAL

## 2021-11-18 PROCEDURE — 81025 URINE PREGNANCY TEST: CPT | Performed by: ANESTHESIOLOGY

## 2021-11-18 PROCEDURE — G0105 COLORECTAL SCRN; HI RISK IND: HCPCS | Performed by: INTERNAL MEDICINE

## 2021-11-18 RX ORDER — PROPOFOL 10 MG/ML
INJECTION, EMULSION INTRAVENOUS CONTINUOUS PRN
Status: DISCONTINUED | OUTPATIENT
Start: 2021-11-18 | End: 2021-11-18

## 2021-11-18 RX ORDER — SODIUM CHLORIDE 9 MG/ML
INJECTION, SOLUTION INTRAVENOUS CONTINUOUS PRN
Status: DISCONTINUED | OUTPATIENT
Start: 2021-11-18 | End: 2021-11-18

## 2021-11-18 RX ORDER — PROPOFOL 10 MG/ML
INJECTION, EMULSION INTRAVENOUS AS NEEDED
Status: DISCONTINUED | OUTPATIENT
Start: 2021-11-18 | End: 2021-11-18

## 2021-11-18 RX ADMIN — PROPOFOL 70 MCG/KG/MIN: 10 INJECTION, EMULSION INTRAVENOUS at 12:00

## 2021-11-18 RX ADMIN — PROPOFOL 20 MG: 10 INJECTION, EMULSION INTRAVENOUS at 12:00

## 2021-11-18 RX ADMIN — SODIUM CHLORIDE: 0.9 INJECTION, SOLUTION INTRAVENOUS at 11:49

## 2021-11-18 RX ADMIN — PROPOFOL 100 MG: 10 INJECTION, EMULSION INTRAVENOUS at 11:59

## 2021-11-28 ENCOUNTER — HOSPITAL ENCOUNTER (EMERGENCY)
Facility: HOSPITAL | Age: 51
Discharge: HOME | End: 2021-11-28
Attending: EMERGENCY MEDICINE
Payer: COMMERCIAL

## 2021-11-28 VITALS
RESPIRATION RATE: 18 BRPM | SYSTOLIC BLOOD PRESSURE: 157 MMHG | TEMPERATURE: 98.8 F | HEART RATE: 83 BPM | OXYGEN SATURATION: 99 % | DIASTOLIC BLOOD PRESSURE: 100 MMHG

## 2021-11-28 DIAGNOSIS — K11.20 SIALOADENITIS: Primary | ICD-10-CM

## 2021-11-28 PROCEDURE — 99284 EMERGENCY DEPT VISIT MOD MDM: CPT | Performed by: EMERGENCY MEDICINE

## 2021-11-28 PROCEDURE — 99283 EMERGENCY DEPT VISIT LOW MDM: CPT

## 2021-12-02 ENCOUNTER — OFFICE VISIT (OUTPATIENT)
Dept: FAMILY MEDICINE CLINIC | Facility: CLINIC | Age: 51
End: 2021-12-02

## 2021-12-02 VITALS
BODY MASS INDEX: 22.1 KG/M2 | SYSTOLIC BLOOD PRESSURE: 150 MMHG | TEMPERATURE: 98.2 F | HEART RATE: 85 BPM | WEIGHT: 140.8 LBS | HEIGHT: 67 IN | RESPIRATION RATE: 18 BRPM | DIASTOLIC BLOOD PRESSURE: 100 MMHG | OXYGEN SATURATION: 98 %

## 2021-12-02 DIAGNOSIS — Z23 ENCOUNTER FOR IMMUNIZATION: ICD-10-CM

## 2021-12-02 DIAGNOSIS — M54.50 LUMBAR BACK PAIN: Primary | ICD-10-CM

## 2021-12-02 DIAGNOSIS — F17.200 SMOKER: ICD-10-CM

## 2021-12-02 PROBLEM — M54.2 NECK PAIN, MUSCULOSKELETAL: Status: RESOLVED | Noted: 2018-02-13 | Resolved: 2021-12-02

## 2021-12-02 PROBLEM — M77.11 LATERAL EPICONDYLITIS, RIGHT ELBOW: Status: RESOLVED | Noted: 2019-01-08 | Resolved: 2021-12-02

## 2021-12-02 PROBLEM — E87.6 HYPOKALEMIA: Status: RESOLVED | Noted: 2018-04-27 | Resolved: 2021-12-02

## 2021-12-02 PROBLEM — R23.2 HOT FLASHES: Status: RESOLVED | Noted: 2020-01-09 | Resolved: 2021-12-02

## 2021-12-02 PROBLEM — M25.512 BILATERAL SHOULDER PAIN: Status: RESOLVED | Noted: 2018-02-13 | Resolved: 2021-12-02

## 2021-12-02 PROBLEM — Z12.39 SCREENING FOR BREAST CANCER: Status: RESOLVED | Noted: 2018-03-30 | Resolved: 2021-12-02

## 2021-12-02 PROBLEM — M25.511 BILATERAL SHOULDER PAIN: Status: RESOLVED | Noted: 2018-02-13 | Resolved: 2021-12-02

## 2021-12-02 PROCEDURE — 3080F DIAST BP >= 90 MM HG: CPT | Performed by: PHYSICIAN ASSISTANT

## 2021-12-02 PROCEDURE — 3077F SYST BP >= 140 MM HG: CPT | Performed by: PHYSICIAN ASSISTANT

## 2021-12-02 PROCEDURE — 99213 OFFICE O/P EST LOW 20 MIN: CPT | Performed by: PHYSICIAN ASSISTANT

## 2021-12-02 RX ORDER — IBUPROFEN 200 MG
TABLET ORAL
Qty: 90 TABLET | Refills: 0 | Status: SHIPPED | OUTPATIENT
Start: 2021-12-02 | End: 2022-06-28 | Stop reason: SDUPTHER

## 2021-12-08 ENCOUNTER — TELEPHONE (OUTPATIENT)
Dept: FAMILY MEDICINE CLINIC | Facility: CLINIC | Age: 51
End: 2021-12-08

## 2022-01-14 ENCOUNTER — TELEMEDICINE (OUTPATIENT)
Dept: FAMILY MEDICINE CLINIC | Facility: CLINIC | Age: 52
End: 2022-01-14

## 2022-01-14 DIAGNOSIS — B34.9 VIRAL INFECTION, UNSPECIFIED: Primary | ICD-10-CM

## 2022-01-14 DIAGNOSIS — Z20.822 EXPOSURE TO COVID-19 VIRUS: ICD-10-CM

## 2022-01-14 PROCEDURE — 3080F DIAST BP >= 90 MM HG: CPT | Performed by: FAMILY MEDICINE

## 2022-01-14 PROCEDURE — G2012 BRIEF CHECK IN BY MD/QHP: HCPCS | Performed by: FAMILY MEDICINE

## 2022-01-14 PROCEDURE — 3077F SYST BP >= 140 MM HG: CPT | Performed by: FAMILY MEDICINE

## 2022-01-14 NOTE — PROGRESS NOTES
COVID-19 Outpatient Progress Note    Assessment/Plan:    Problem List Items Addressed This Visit        Other    Viral infection, unspecified - Primary      Symptom onset 01/13/2022, vaccinated x1   family member exposed in school   mild symptoms overall   will order COVID test at this time   advised to continue all symptomatic treatment as needed and encourage hydration   advised to quarantine until results return   ER and return precautions advised         Relevant Orders    COVID Only- Collected at [place] [address] or [facility]      Other Visit Diagnoses     Exposure to COVID-19 virus        Relevant Orders    COVID Only- Collected at [place] [address] or [facility]         Disposition:     Referred patient to centralized site to test for COVID-19  I have spent 5 minutes directly with the patient  Encounter provider Juana Zuleta MD    Provider located at [address] [place] [id number] [phone]    Recent Visits  No visits were found meeting these conditions  Showing recent visits within past 7 days and meeting all other requirements  Today's Visits  Date Type Provider Dept   01/14/22 Telemedicine Elliot Banks Do [facility] 11 today's visits and meeting all other requirements  Future Appointments  No visits were found meeting these conditions  Showing future appointments within next 150 days and meeting all other requirements     This virtual check-in was done via telephone and she agrees to proceed  Patient agrees to participate in a virtual check in via telephone or video visit instead of presenting to the office to address urgent/immediate medical needs  Patient is aware this is a billable service  After connecting through Telephone, the patient was identified by name and date of birth  Shyam Lopez was informed that this was a telemedicine visit and that the exam was being conducted confidentially over secure lines  My office door was closed  No one else was in the room  Summer Aguilar acknowledged consent and understanding of privacy and security of the telemedicine visit  I informed the patient that I have reviewed her record in Epic and presented the opportunity for her to ask any questions regarding the visit today  The patient agreed to participate  It was my intent to perform this visit via video technology but the patient was not able to do a video connection so the visit was completed via audio telephone only  Verification of patient location:  Patient is located in the following state in which I hold an active license: [place]    Subjective:   Summer Aguilar is a 54 y o  female who is concerned about COVID-19  Patient's symptoms include rhinorrhea and headache  Patient denies fever, congestion and cough       - Date of symptom onset: 1/13/2022      COVID-19 vaccination status: Partially vaccinated    No results found for: Karo Rodriguez, [facility], [facility]  Past Medical History:   Diagnosis Date    GERD (gastroesophageal reflux disease)     Headache     Hematuria     Hypertension     Lateral epicondylitis, right elbow 1/8/2019    Panic attacks     Psychiatric disorder      Past Surgical History:   Procedure Laterality Date    CYSTOSCOPY  06/08/2018    NO PAST SURGERIES       Current Outpatient Medications   Medication Sig Dispense Refill    acetaminophen (TYLENOL) 500 mg tablet Take 1 tablet (500 mg total) by mouth every 6 (six) hours as needed for mild pain 30 tablet 0    amitriptyline (ELAVIL) 50 mg tablet Take 1 tablet (50 mg total) by mouth daily at bedtime 30 tablet 2    Cholecalciferol (VITAMIN D3 PO) Take by mouth daily      cyclobenzaprine (FLEXERIL) 10 mg tablet Take 1 tablet (10 mg total) by mouth 2 (two) times a day as needed for muscle spasms 20 tablet 0    ibuprofen (MOTRIN) 200 mg tablet Take 1-2 tabs po q6hrs prn pain 90 tablet 0    levonorgestrel (MIRENA) 20 MCG/24HR IUD 1 each by Intrauterine route once      Lidocaine Viscous HCl (XYLOCAINE) 2 % mucosal solution Swish and spit 15 mL 4 (four) times a day as needed for mouth pain or discomfort (Patient not taking: Reported on 12/2/2021 ) 100 mL 0    lisinopril-hydrochlorothiazide (PRINZIDE,ZESTORETIC) 10-12 5 MG per tablet TAKE ONE TABLET BY MOUTH DAILY 90 tablet 1    polyethylene glycol (GLYCOLAX) 17 GM/SCOOP powder Take 238 g by mouth daily Take as instructed in clinic (Patient not taking: Reported on 12/2/2021 ) 238 g 0     No current facility-administered medications for this visit  No Known Allergies    Review of Systems   Constitutional: Negative for fever  HENT: Positive for rhinorrhea  Negative for congestion  Respiratory: Negative for cough  Neurological: Positive for headaches  VIRTUAL VISIT DISCLAIMER    Romina Zuñiga verbally agrees to participate in [employer]  Pt is aware that [employer] could be limited without vital signs or the ability to perform a full hands-on physical Jenniffer Maddox understands she or the provider may request at any time to terminate the video visit and request the patient to seek care or treatment in person

## 2022-01-14 NOTE — ASSESSMENT & PLAN NOTE
Symptom onset 01/13/2022, vaccinated x1   family member exposed in school   mild symptoms overall   will order COVID test at this time   advised to continue all symptomatic treatment as needed and encourage hydration   advised to quarantine until results return   ER and return precautions advised

## 2022-01-15 PROCEDURE — U0003 INFECTIOUS AGENT DETECTION BY NUCLEIC ACID (DNA OR RNA); SEVERE ACUTE RESPIRATORY SYNDROME CORONAVIRUS 2 (SARS-COV-2) (CORONAVIRUS DISEASE [COVID-19]), AMPLIFIED PROBE TECHNIQUE, MAKING USE OF HIGH THROUGHPUT TECHNOLOGIES AS DESCRIBED BY CMS-2020-01-R: HCPCS | Performed by: FAMILY MEDICINE

## 2022-01-15 PROCEDURE — U0005 INFEC AGEN DETEC AMPLI PROBE: HCPCS | Performed by: FAMILY MEDICINE

## 2022-02-09 ENCOUNTER — HOSPITAL ENCOUNTER (OUTPATIENT)
Dept: MAMMOGRAPHY | Facility: CLINIC | Age: 52
Discharge: HOME | End: 2022-02-09
Payer: COMMERCIAL

## 2022-02-09 VITALS — HEIGHT: 67 IN | WEIGHT: 140 LBS | BODY MASS INDEX: 21.97 KG/M2

## 2022-02-09 DIAGNOSIS — Z09 FOLLOW-UP EXAM, 3-6 MONTHS SINCE PREVIOUS EXAM: ICD-10-CM

## 2022-02-09 PROCEDURE — G0279 TOMOSYNTHESIS, MAMMO: HCPCS

## 2022-02-09 PROCEDURE — 77066 DX MAMMO INCL CAD BI: CPT

## 2022-02-12 DIAGNOSIS — I10 HYPERTENSION: ICD-10-CM

## 2022-02-14 RX ORDER — LISINOPRIL AND HYDROCHLOROTHIAZIDE 12.5; 1 MG/1; MG/1
TABLET ORAL
Qty: 90 TABLET | Refills: 0 | Status: SHIPPED | OUTPATIENT
Start: 2022-02-14 | End: 2022-05-09 | Stop reason: SDUPTHER

## 2022-03-03 ENCOUNTER — OFFICE VISIT (OUTPATIENT)
Dept: FAMILY MEDICINE CLINIC | Facility: CLINIC | Age: 52
End: 2022-03-03

## 2022-03-03 VITALS
RESPIRATION RATE: 16 BRPM | SYSTOLIC BLOOD PRESSURE: 134 MMHG | HEART RATE: 86 BPM | HEIGHT: 67 IN | OXYGEN SATURATION: 98 % | TEMPERATURE: 98.4 F | BODY MASS INDEX: 22.91 KG/M2 | DIASTOLIC BLOOD PRESSURE: 82 MMHG | WEIGHT: 146 LBS

## 2022-03-03 DIAGNOSIS — I10 PRIMARY HYPERTENSION: ICD-10-CM

## 2022-03-03 DIAGNOSIS — Z23 ENCOUNTER FOR IMMUNIZATION: ICD-10-CM

## 2022-03-03 DIAGNOSIS — F17.200 SMOKER: ICD-10-CM

## 2022-03-03 DIAGNOSIS — E78.2 MIXED HYPERLIPIDEMIA: Primary | ICD-10-CM

## 2022-03-03 PROBLEM — Z02.9 ADMINISTRATIVE ENCOUNTER: Status: RESOLVED | Noted: 2019-11-06 | Resolved: 2022-03-03

## 2022-03-03 PROBLEM — R31.29 OTHER MICROSCOPIC HEMATURIA: Status: RESOLVED | Noted: 2018-04-13 | Resolved: 2022-03-03

## 2022-03-03 PROBLEM — B34.9 VIRAL INFECTION, UNSPECIFIED: Status: RESOLVED | Noted: 2022-01-14 | Resolved: 2022-03-03

## 2022-03-03 PROCEDURE — 3075F SYST BP GE 130 - 139MM HG: CPT | Performed by: PHYSICIAN ASSISTANT

## 2022-03-03 PROCEDURE — 3079F DIAST BP 80-89 MM HG: CPT | Performed by: PHYSICIAN ASSISTANT

## 2022-03-03 PROCEDURE — 90471 IMMUNIZATION ADMIN: CPT | Performed by: PHYSICIAN ASSISTANT

## 2022-03-03 PROCEDURE — 99213 OFFICE O/P EST LOW 20 MIN: CPT | Performed by: PHYSICIAN ASSISTANT

## 2022-03-03 PROCEDURE — 90732 PPSV23 VACC 2 YRS+ SUBQ/IM: CPT | Performed by: PHYSICIAN ASSISTANT

## 2022-03-03 RX ORDER — ATORVASTATIN CALCIUM 10 MG/1
10 TABLET, FILM COATED ORAL DAILY
Qty: 30 TABLET | Refills: 5 | Status: SHIPPED | OUTPATIENT
Start: 2022-03-03 | End: 2022-08-09 | Stop reason: SDUPTHER

## 2022-03-03 NOTE — ASSESSMENT & PLAN NOTE
BP today 134/82-stable   Continue lisinopril/hydrochlorothiazide 10-12 5 mg daily  Discussed following a low-sodium diet//heart healthy  Discussed increasing physical activity    Exercising 3-5 days per week for minimum 30 minute

## 2022-03-03 NOTE — PROGRESS NOTES
Assessment/Plan:    Hypertension  BP today 134/82-stable   Continue lisinopril/hydrochlorothiazide 10-12 5 mg daily  Discussed following a low-sodium diet//heart healthy  Discussed increasing physical activity  Exercising 3-5 days per week for minimum 30 minute    Mixed hyperlipidemia  Discussed ASCVD risk score 7 1% Pt agreed to start atorvastatin 10 mg daily  Recheck CMP and lipid panel in 3 months    Encounter for immunization  PCV 23 placed today    Smoker  Tobacco Cessation Counseling: Tobacco cessation counseling and education was provided  The patient is sincerely urged to quit consumption of tobacco  She is not ready to quit tobacco  The numerous health risks of tobacco consumption were discussed  If she decides to quit, there are a number of helpful adjunctive aids, and she can see me to discuss nicotine replacement therapy, chantix, or bupropion anytime in the future  Diagnoses and all orders for this visit:    Mixed hyperlipidemia  -     atorvastatin (LIPITOR) 10 mg tablet; Take 1 tablet (10 mg total) by mouth daily  -     Lipid panel; Future  -     Comprehensive metabolic panel; Future    Encounter for immunization  -     PNEUMOCOCCAL POLYSACCHARIDE VACCINE 23-VALENT =>3YO SQ IM    Primary hypertension    Smoker        Subjective:      Patient ID: Valencia Simmons is a 54 y o  female presents today for 3 month f/u  HTN-continues on lisinopril/hydrochlorothiazide 10-12 5 mg daily  Home BP readings in the 130s/80s -70  Denies any adverse effects  Pt states she's doing well  No complaints voiced at this time  Continues smoking, smoking 4-5 cigarettes a day  The following portions of the patient's history were reviewed and updated as appropriate: allergies, current medications, past family history, past medical history, past social history, past surgical history and problem list     Review of Systems   Constitutional: Negative for fatigue and fever     Respiratory: Negative for cough, chest tightness, shortness of breath and wheezing  Cardiovascular: Negative for chest pain and leg swelling  Objective:      /82 (BP Location: Left arm, Patient Position: Sitting, Cuff Size: Standard)   Pulse 86   Temp 98 4 °F (36 9 °C) (Temporal)   Resp 16   Ht 5' 7" (1 702 m)   Wt 66 2 kg (146 lb)   SpO2 98%   BMI 22 87 kg/m²          Physical Exam  Constitutional:       General: She is not in acute distress  Appearance: Normal appearance  She is well-developed  She is not ill-appearing  Cardiovascular:      Rate and Rhythm: Normal rate and regular rhythm  Heart sounds: Normal heart sounds  No murmur heard  Pulmonary:      Effort: Pulmonary effort is normal  No respiratory distress  Breath sounds: Normal breath sounds  No wheezing  Musculoskeletal:         General: No deformity  Neurological:      Mental Status: She is alert and oriented to person, place, and time  Psychiatric:         Mood and Affect: Mood normal          Behavior: Behavior normal          Thought Content:  Thought content normal          Judgment: Judgment normal

## 2022-03-03 NOTE — ASSESSMENT & PLAN NOTE
Discussed ASCVD risk score 7 1% Pt agreed to start atorvastatin 10 mg daily  Recheck CMP and lipid panel in 3 months

## 2022-03-22 ENCOUNTER — CLINICAL SUPPORT (OUTPATIENT)
Dept: FAMILY MEDICINE CLINIC | Facility: CLINIC | Age: 52
End: 2022-03-22

## 2022-03-22 DIAGNOSIS — Z11.1 ENCOUNTER FOR PPD TEST: Primary | ICD-10-CM

## 2022-03-22 PROCEDURE — 86580 TB INTRADERMAL TEST: CPT

## 2022-03-24 ENCOUNTER — CLINICAL SUPPORT (OUTPATIENT)
Dept: FAMILY MEDICINE CLINIC | Facility: CLINIC | Age: 52
End: 2022-03-24

## 2022-03-24 DIAGNOSIS — Z11.1 ENCOUNTER FOR PPD SKIN TEST READING: Primary | ICD-10-CM

## 2022-03-24 LAB
INDURATION: 0 MM
TB SKIN TEST: NEGATIVE

## 2022-03-24 NOTE — PROGRESS NOTES
Patient here today for an nurse visit for ppd reading  Patient ppd is negative with 0 mm induration  Per patient she doesn't have any form to be completed but would like a print out of her results  Results printed out and handed to patient

## 2022-03-25 ENCOUNTER — TELEPHONE (OUTPATIENT)
Dept: FAMILY MEDICINE CLINIC | Facility: CLINIC | Age: 52
End: 2022-03-25

## 2022-03-25 NOTE — TELEPHONE ENCOUNTER
Patient had a PPD done on 3/22/2022 with negative result but patient states that her employer wants patient her to get a Quantiferon done   Please put order into epic and call patient at [phone]

## 2022-03-26 ENCOUNTER — APPOINTMENT (OUTPATIENT)
Dept: LAB | Facility: HOSPITAL | Age: 52
End: 2022-03-26
Payer: COMMERCIAL

## 2022-03-26 DIAGNOSIS — E78.2 MIXED HYPERLIPIDEMIA: ICD-10-CM

## 2022-03-26 DIAGNOSIS — Z11.1 TUBERCULOSIS SCREENING: ICD-10-CM

## 2022-03-26 LAB
ALBUMIN SERPL BCP-MCNC: 4 G/DL (ref 3.5–5)
ALP SERPL-CCNC: 72 U/L (ref 46–116)
ALT SERPL W P-5'-P-CCNC: 25 U/L (ref 12–78)
ANION GAP SERPL CALCULATED.3IONS-SCNC: 3 MMOL/L (ref 4–13)
AST SERPL W P-5'-P-CCNC: 15 U/L (ref 5–45)
BILIRUB SERPL-MCNC: 0.44 MG/DL (ref 0.2–1)
BUN SERPL-MCNC: 13 MG/DL (ref 5–25)
CALCIUM SERPL-MCNC: 9.3 MG/DL (ref 8.3–10.1)
CHLORIDE SERPL-SCNC: 107 MMOL/L (ref 100–108)
CHOLEST SERPL-MCNC: 158 MG/DL
CO2 SERPL-SCNC: 30 MMOL/L (ref 21–32)
CREAT SERPL-MCNC: 0.75 MG/DL (ref 0.6–1.3)
GFR SERPL CREATININE-BSD FRML MDRD: 93 ML/MIN/1.73SQ M
GLUCOSE SERPL-MCNC: 98 MG/DL (ref 65–140)
HDLC SERPL-MCNC: 31 MG/DL
LDLC SERPL CALC-MCNC: 95 MG/DL (ref 0–100)
NONHDLC SERPL-MCNC: 127 MG/DL
POTASSIUM SERPL-SCNC: 3.5 MMOL/L (ref 3.5–5.3)
PROT SERPL-MCNC: 7.3 G/DL (ref 6.4–8.2)
SODIUM SERPL-SCNC: 140 MMOL/L (ref 136–145)
TRIGL SERPL-MCNC: 162 MG/DL

## 2022-03-26 PROCEDURE — 86480 TB TEST CELL IMMUN MEASURE: CPT

## 2022-03-26 PROCEDURE — 80053 COMPREHEN METABOLIC PANEL: CPT

## 2022-03-26 PROCEDURE — 36415 COLL VENOUS BLD VENIPUNCTURE: CPT

## 2022-03-26 PROCEDURE — 80061 LIPID PANEL: CPT

## 2022-03-29 LAB
GAMMA INTERFERON BACKGROUND BLD IA-ACNC: 0.03 IU/ML
M TB IFN-G BLD-IMP: NEGATIVE
M TB IFN-G CD4+ BCKGRND COR BLD-ACNC: -0.01 IU/ML
M TB IFN-G CD4+ BCKGRND COR BLD-ACNC: -0.01 IU/ML
MITOGEN IGNF BCKGRD COR BLD-ACNC: >10 IU/ML

## 2022-03-30 ENCOUNTER — TELEPHONE (OUTPATIENT)
Dept: FAMILY MEDICINE CLINIC | Facility: CLINIC | Age: 52
End: 2022-03-30

## 2022-03-30 NOTE — TELEPHONE ENCOUNTER
Patient calling about lab work  If someone could reach out to go over it with her        Romina [phone]

## 2022-03-31 NOTE — TELEPHONE ENCOUNTER
Called patient to let her know about her BW results, leave a Voicemail for the patient to call back to the clinic

## 2022-05-09 ENCOUNTER — TELEPHONE (OUTPATIENT)
Dept: FAMILY MEDICINE CLINIC | Facility: CLINIC | Age: 52
End: 2022-05-09

## 2022-05-09 DIAGNOSIS — I10 HYPERTENSION: ICD-10-CM

## 2022-05-09 DIAGNOSIS — F41.9 ANXIETY: ICD-10-CM

## 2022-05-09 NOTE — TELEPHONE ENCOUNTER
Folder (To be completed by) -   [employer]  Name of [facility]Care  Form to be pi/ck-up by   Patient visit 05/18/22 at 4:30 PM  Patient was made aware of the 7 business day form policy

## 2022-05-10 RX ORDER — LISINOPRIL AND HYDROCHLOROTHIAZIDE 12.5; 1 MG/1; MG/1
1 TABLET ORAL DAILY
Qty: 90 TABLET | Refills: 2 | Status: SHIPPED | OUTPATIENT
Start: 2022-05-10 | End: 2022-08-09 | Stop reason: SDUPTHER

## 2022-05-10 RX ORDER — AMITRIPTYLINE HYDROCHLORIDE 50 MG/1
50 TABLET, FILM COATED ORAL
Qty: 30 TABLET | Refills: 2 | Status: SHIPPED | OUTPATIENT
Start: 2022-05-10 | End: 2022-08-09 | Stop reason: SDUPTHER

## 2022-05-18 ENCOUNTER — OFFICE VISIT (OUTPATIENT)
Dept: FAMILY MEDICINE CLINIC | Facility: CLINIC | Age: 52
End: 2022-05-18

## 2022-05-18 VITALS
BODY MASS INDEX: 22.29 KG/M2 | RESPIRATION RATE: 20 BRPM | TEMPERATURE: 98.2 F | SYSTOLIC BLOOD PRESSURE: 114 MMHG | HEART RATE: 82 BPM | OXYGEN SATURATION: 99 % | HEIGHT: 67 IN | WEIGHT: 142 LBS | DIASTOLIC BLOOD PRESSURE: 74 MMHG

## 2022-05-18 DIAGNOSIS — Z02.82 ENCOUNTER FOR ADOPTION SERVICES: Primary | ICD-10-CM

## 2022-05-18 PROCEDURE — 99214 OFFICE O/P EST MOD 30 MIN: CPT | Performed by: PHYSICIAN ASSISTANT

## 2022-05-18 PROCEDURE — 3078F DIAST BP <80 MM HG: CPT | Performed by: PHYSICIAN ASSISTANT

## 2022-05-18 PROCEDURE — 3074F SYST BP LT 130 MM HG: CPT | Performed by: PHYSICIAN ASSISTANT

## 2022-05-18 NOTE — PROGRESS NOTES
Assessment/Plan:    Encounter for adoption services  Adoption form completed with copy made and original return back to patient      Diagnoses and all orders for this visit:    Encounter for adoption services      Subjective:      Patient ID: Avery Hernandez is a 54 y o  female here today for adoption physical requiring a form completion  She plans on adopting her 11year-old nephew who she has been caring for since he was 1  He was removed from his biological mother due to neglect and drug use  The following portions of the patient's history were reviewed and updated as appropriate: allergies, current medications, past family history, past medical history, past social history, past surgical history and problem list     Review of Systems   Constitutional: Negative for chills, fatigue and fever  Respiratory: Negative for cough, chest tightness, shortness of breath and wheezing  Cardiovascular: Negative for chest pain and palpitations  Gastrointestinal: Negative for abdominal pain, constipation, diarrhea, nausea and vomiting  Genitourinary: Negative for difficulty urinating  Musculoskeletal: Negative for arthralgias, myalgias, neck pain and neck stiffness  Skin: Negative for rash and wound  Neurological: Negative for dizziness, weakness, light-headedness, numbness and headaches  Psychiatric/Behavioral: Negative for agitation and behavioral problems  The patient is not nervous/anxious  Objective:      /74 (BP Location: Left arm, Patient Position: Sitting, Cuff Size: Standard)   Pulse 82   Temp 98 2 °F (36 8 °C) (Temporal)   Resp 20   Ht 5' 7" (1 702 m)   Wt 64 4 kg (142 lb)   SpO2 99%   BMI 22 24 kg/m²          Physical Exam  Constitutional:       General: She is not in acute distress  Appearance: Normal appearance  She is well-developed  She is not ill-appearing  HENT:      Head: Normocephalic and atraumatic        Right Ear: Tympanic membrane, ear canal and external ear normal       Left Ear: Tympanic membrane, ear canal and external ear normal       Nose: Nose normal       Mouth/Throat:      Mouth: Mucous membranes are moist       Pharynx: No oropharyngeal exudate or posterior oropharyngeal erythema  Eyes:      General:         Right eye: No discharge  Left eye: No discharge  Conjunctiva/sclera: Conjunctivae normal       Pupils: Pupils are equal, round, and reactive to light  Neck:      Thyroid: No thyroid mass  Cardiovascular:      Rate and Rhythm: Normal rate and regular rhythm  Heart sounds: Normal heart sounds and S1 normal  No murmur heard  Pulmonary:      Effort: Pulmonary effort is normal  No respiratory distress  Breath sounds: Normal breath sounds  No wheezing or rales  Abdominal:      General: Bowel sounds are normal  There is no distension  Palpations: Abdomen is soft  There is no mass  Tenderness: There is no abdominal tenderness  There is no guarding or rebound  Musculoskeletal:         General: No deformity  Normal range of motion  Cervical back: Normal range of motion and neck supple  Lymphadenopathy:      Cervical: No cervical adenopathy  Skin:     General: Skin is warm and dry  Capillary Refill: Capillary refill takes less than 2 seconds  Neurological:      General: No focal deficit present  Mental Status: She is alert and oriented to person, place, and time  Psychiatric:         Mood and Affect: Mood normal          Speech: Speech normal          Behavior: Behavior normal          Thought Content:  Thought content normal          Judgment: Judgment normal

## 2022-06-28 ENCOUNTER — OFFICE VISIT (OUTPATIENT)
Dept: FAMILY MEDICINE CLINIC | Facility: CLINIC | Age: 52
End: 2022-06-28

## 2022-06-28 VITALS
RESPIRATION RATE: 18 BRPM | DIASTOLIC BLOOD PRESSURE: 98 MMHG | TEMPERATURE: 96.7 F | HEIGHT: 67 IN | BODY MASS INDEX: 21.91 KG/M2 | OXYGEN SATURATION: 100 % | WEIGHT: 139.6 LBS | SYSTOLIC BLOOD PRESSURE: 138 MMHG | HEART RATE: 80 BPM

## 2022-06-28 DIAGNOSIS — M54.50 LUMBAR BACK PAIN: ICD-10-CM

## 2022-06-28 PROBLEM — R93.5 ABNORMAL CT OF THE ABDOMEN: Status: RESOLVED | Noted: 2017-02-22 | Resolved: 2022-06-28

## 2022-06-28 PROBLEM — R59.0 RETROPERITONEAL LYMPHADENOPATHY: Status: RESOLVED | Noted: 2017-02-15 | Resolved: 2022-06-28

## 2022-06-28 PROCEDURE — 3080F DIAST BP >= 90 MM HG: CPT | Performed by: PHYSICIAN ASSISTANT

## 2022-06-28 PROCEDURE — 3075F SYST BP GE 130 - 139MM HG: CPT | Performed by: PHYSICIAN ASSISTANT

## 2022-06-28 PROCEDURE — 99213 OFFICE O/P EST LOW 20 MIN: CPT | Performed by: PHYSICIAN ASSISTANT

## 2022-06-28 RX ORDER — DOXYCYCLINE HYCLATE 100 MG
TABLET ORAL
COMMUNITY
Start: 2022-06-15 | End: 2022-06-28

## 2022-06-28 RX ORDER — GUAIFENESIN 600 MG/1
TABLET, EXTENDED RELEASE ORAL
COMMUNITY
Start: 2022-06-15 | End: 2022-06-28

## 2022-06-28 RX ORDER — BENZONATATE 200 MG/1
CAPSULE ORAL
COMMUNITY
Start: 2022-06-06 | End: 2022-06-28 | Stop reason: ALTCHOICE

## 2022-06-28 RX ORDER — PROMETHAZINE HYDROCHLORIDE AND CODEINE PHOSPHATE 6.25; 1 MG/5ML; MG/5ML
5 SYRUP ORAL DAILY PRN
COMMUNITY
Start: 2022-06-06 | End: 2022-06-28

## 2022-06-28 RX ORDER — IBUPROFEN 200 MG
TABLET ORAL
Qty: 90 TABLET | Refills: 0 | Status: SHIPPED | OUTPATIENT
Start: 2022-06-28

## 2022-06-28 RX ORDER — PREDNISONE 20 MG/1
TABLET ORAL
COMMUNITY
Start: 2022-06-06 | End: 2022-06-28 | Stop reason: ALTCHOICE

## 2022-06-28 RX ORDER — CYCLOBENZAPRINE HCL 10 MG
10 TABLET ORAL 2 TIMES DAILY PRN
Qty: 20 TABLET | Refills: 0 | Status: SHIPPED | OUTPATIENT
Start: 2022-06-28 | End: 2022-08-09 | Stop reason: SDUPTHER

## 2022-06-28 NOTE — PROGRESS NOTES
Assessment/Plan:    Lumbar back pain  H/o chronic LBP current flare caused by moving  Bilateral soft tissue tenderness  Decrease ROM with flexion due to pain  No concerning findings for cauda equina syndrome including LE weakness, loss of bowel or bladder control, saddle parasthesias  May continue Lidoderm patches PRN and ice/heat  Refill Flexeril 10 mg TID PRN and ibuprofen PRN  Refer to PT  ED precautions discussed      Diagnoses and all orders for this visit:    Lumbar back pain  -     cyclobenzaprine (FLEXERIL) 10 mg tablet; Take 1 tablet (10 mg total) by mouth 2 (two) times a day as needed for muscle spasms  -     ibuprofen (MOTRIN) 200 mg tablet; Take 1-2 tabs po q6hrs prn pain  -     Ambulatory Referral to Physical Therapy; Future    Other orders  -     Discontinue: benzonatate (TESSALON) 200 MG capsule  -     Discontinue: promethazine-codeine (PHENERGAN WITH CODEINE) 6 25-10 mg/5 mL syrup; Take 5 mL by mouth daily as needed  -     Discontinue: predniSONE 20 mg tablet  -     Discontinue: Mucus Relief 600 MG 12 hr tablet  -     Discontinue: doxycycline hyclate (VIBRA-TABS) 100 mg tablet      Subjective:      Patient ID: Isai Mishra is a 54 y o  female presents today for increase lower back pain  She's moving to a new apartment and with packing and moving is now having constant lower back pain radiating down both legs  Describes pain as sharp  Rates pain 9/10  Sleeping worsening pain  Denies any saddle paresthesia, bowel or bladder incontinence  Tried Lidoderm patch, heat, ice, and otc pain medication w/o any significant improvement  She was told by [facility], assistance with moving is available  She would require note from her PCP to access services  As of note patient was seen at [place] [id number] she was diagnosed with  Sinobronchitis  Treated with doxycycline 100 mg BID x 10 day and Mucinex  She reports symptoms have resolved        The following portions of the patient's history were reviewed and updated as appropriate: allergies, current medications, past family history, past medical history, past social history, past surgical history and problem list     Review of Systems   Constitutional: Negative for fatigue and fever  Respiratory: Negative  Cardiovascular: Negative  Genitourinary: Negative for difficulty urinating  Musculoskeletal: Positive for back pain  Skin: Positive for rash  Neurological: Negative for tremors and numbness  Objective:      /98 (BP Location: Left arm, Patient Position: Sitting, Cuff Size: Standard)   Pulse 80   Temp (!) 96 7 °F (35 9 °C) (Temporal)   Resp 18   Ht 5' 7" (1 702 m)   Wt 63 3 kg (139 lb 9 6 oz)   SpO2 100%   Breastfeeding Yes   BMI 21 86 kg/m²          Physical Exam  Constitutional:       General: She is not in acute distress  Appearance: Normal appearance  She is not ill-appearing  Pulmonary:      Effort: Pulmonary effort is normal  No respiratory distress  Musculoskeletal:      Lumbar back: Tenderness present  No swelling or bony tenderness  Decreased range of motion  Negative right straight leg raise test and negative left straight leg raise test    Neurological:      Mental Status: She is alert  Motor: No weakness  Gait: Gait is intact     Psychiatric:         Mood and Affect: Mood normal          Behavior: Behavior normal

## 2022-06-28 NOTE — LETTER
June 28, 2022     Patient: Jeramy Beard  YOB: 1970  Date of Visit: 6/28/2022        To Whom it May Concern:    Dannielle Faust is under my professional care  Romina was seen in my office on 6/28/2022  Romina suffers from chronic lower back pain  She is unable to lift or move heavy objects d/t pain  If moving assistance is offered, please provide patient with this assistance  If you have any questions or concerns, please don't hesitate to call           Sincerely,          Jasmina Rodriguez PA-C        CC: No Recipients

## 2022-06-28 NOTE — ASSESSMENT & PLAN NOTE
H/o chronic LBP current flare caused by moving  Bilateral soft tissue tenderness  Decrease ROM with flexion due to pain  No concerning findings for cauda equina syndrome including LE weakness, loss of bowel or bladder control, saddle parasthesias    May continue Lidoderm patches PRN and ice/heat  Refill Flexeril 10 mg TID PRN and ibuprofen PRN  Refer to PT  ED precautions discussed

## 2022-07-09 ENCOUNTER — HOSPITAL ENCOUNTER (EMERGENCY)
Facility: HOSPITAL | Age: 52
Discharge: HOME | End: 2022-07-09
Attending: EMERGENCY MEDICINE
Payer: COMMERCIAL

## 2022-07-09 VITALS
WEIGHT: 141 LBS | RESPIRATION RATE: 16 BRPM | HEART RATE: 84 BPM | TEMPERATURE: 97.8 F | SYSTOLIC BLOOD PRESSURE: 142 MMHG | BODY MASS INDEX: 22.08 KG/M2 | OXYGEN SATURATION: 100 % | DIASTOLIC BLOOD PRESSURE: 84 MMHG

## 2022-07-09 DIAGNOSIS — R31.9 HEMATURIA: ICD-10-CM

## 2022-07-09 DIAGNOSIS — M54.9 MUSCULOSKELETAL BACK PAIN: Primary | ICD-10-CM

## 2022-07-09 LAB
BACTERIA UR QL AUTO: ABNORMAL /HPF
BILIRUB UR QL STRIP: NEGATIVE
CLARITY UR: CLEAR
COLOR UR: COLORLESS
GLUCOSE UR STRIP-MCNC: NEGATIVE MG/DL
HGB UR QL STRIP.AUTO: ABNORMAL
KETONES UR STRIP-MCNC: NEGATIVE MG/DL
LEUKOCYTE ESTERASE UR QL STRIP: NEGATIVE
NITRITE UR QL STRIP: NEGATIVE
NON-SQ EPI CELLS URNS QL MICRO: ABNORMAL /HPF
PH UR STRIP.AUTO: 6 [PH]
PROT UR STRIP-MCNC: NEGATIVE MG/DL
RBC #/AREA URNS AUTO: ABNORMAL /HPF
SP GR UR STRIP.AUTO: 1 (ref 1–1.03)
UROBILINOGEN UR STRIP-ACNC: <2 MG/DL
WBC #/AREA URNS AUTO: ABNORMAL /HPF

## 2022-07-09 PROCEDURE — 81001 URINALYSIS AUTO W/SCOPE: CPT

## 2022-07-09 PROCEDURE — 76775 US EXAM ABDO BACK WALL LIM: CPT | Performed by: EMERGENCY MEDICINE

## 2022-07-09 PROCEDURE — 99284 EMERGENCY DEPT VISIT MOD MDM: CPT

## 2022-07-09 PROCEDURE — 99284 EMERGENCY DEPT VISIT MOD MDM: CPT | Performed by: EMERGENCY MEDICINE

## 2022-07-09 PROCEDURE — 96372 THER/PROPH/DIAG INJ SC/IM: CPT

## 2022-07-09 RX ORDER — ACETAMINOPHEN 325 MG/1
650 TABLET ORAL ONCE
Status: COMPLETED | OUTPATIENT
Start: 2022-07-09 | End: 2022-07-09

## 2022-07-09 RX ORDER — LIDOCAINE 50 MG/G
1 PATCH TOPICAL ONCE
Status: DISCONTINUED | OUTPATIENT
Start: 2022-07-09 | End: 2022-07-09 | Stop reason: HOSPADM

## 2022-07-09 RX ORDER — IBUPROFEN 600 MG/1
600 TABLET ORAL EVERY 8 HOURS PRN
Qty: 30 TABLET | Refills: 0 | Status: SHIPPED | OUTPATIENT
Start: 2022-07-09 | End: 2022-08-09

## 2022-07-09 RX ORDER — METHOCARBAMOL 500 MG/1
500 TABLET, FILM COATED ORAL 2 TIMES DAILY
Qty: 20 TABLET | Refills: 0 | Status: SHIPPED | OUTPATIENT
Start: 2022-07-09 | End: 2022-08-09

## 2022-07-09 RX ORDER — KETOROLAC TROMETHAMINE 30 MG/ML
15 INJECTION, SOLUTION INTRAMUSCULAR; INTRAVENOUS ONCE
Status: COMPLETED | OUTPATIENT
Start: 2022-07-09 | End: 2022-07-09

## 2022-07-09 RX ADMIN — KETOROLAC TROMETHAMINE 15 MG: 30 INJECTION, SOLUTION INTRAMUSCULAR; INTRAVENOUS at 07:06

## 2022-07-09 RX ADMIN — ACETAMINOPHEN 650 MG: 325 TABLET, FILM COATED ORAL at 08:39

## 2022-07-09 RX ADMIN — LIDOCAINE 5% 1 PATCH: 700 PATCH TOPICAL at 08:39

## 2022-07-09 NOTE — DISCHARGE INSTRUCTIONS
Your symptoms today are consistent with musculoskeletal back pain  Likely from lifting heavy objects during the moving process  I recommend that you take Motrin 600 mg every 8 hours and take Robaxin twice daily if needed  Please take medications as prescribed  While using Robaxin, do not use Flexeril with that  Flexeril and Robaxin are similar medications, therefore only use Robaxin  Testing today showed that you have some blood in your urine  The urine is not infected, but I would like you to follow-up with your PCP in 3-4 days in order to have a repeat urine test     If you develop worsening pain, inability to urinate, fevers, chills, nausea, vomiting, or diarrhea, please return to the ED for evaluation  I have also provided you with a [facility] follow-up  They will help you with physical therapy and chronic pain management for your back pain

## 2022-07-09 NOTE — ED ATTENDING ATTESTATION
7/9/2022  Nargis BENTLEY MD, saw and evaluated the patient  I have discussed the patient with the resident/non-physician practitioner and agree with the resident's/non-physician practitioner's findings, Plan of Care, and MDM as documented in the resident's/non-physician practitioner's note, except where noted  All available labs and Radiology studies were reviewed  I was present for key portions of any procedure(s) performed by the resident/non-physician practitioner and I was immediately available to provide assistance  At this point I agree with the current assessment done in the Emergency Department  I have conducted an independent evaluation of this patient a history and physical is as follows:    Patient presents emergency department with a chief complaint of pain in her right low back  The patient reports the pain has been there for 1 and half to 2 weeks  The patient reports she has a longstanding history of waxing waning lower back pain and this pain feels similar to pain she has had the past   Over the last 1 and half to 2 weeks she has had pain in her right low back that worsens with movement, twisting, or bending  The pain is minimal at rest   The pain occasionally radiates into her right lateral leg but is not doing so now  The patient denies fever, IV drug abuse, cancer, weakness, paresthesias, loss of bowel or bladder function, or any other systemic symptoms  The patient does admit to 2 days of mild dysuria and urinary frequency  The patient denies any trauma/injury  The patient has no abdominal pain  Physical exam demonstrates a pleasant alert nontoxic female no acute distress  HEENT exam is normal   Lungs are clear with equal breath sounds  The heart had a regular rate rhythm  The abdomen is soft and nontender to deep palpation  There is no rebound or guarding  No masses were appreciated    The back had mild tenderness in the right lumbar paraspinal muscles without any midline tenderness to palpation or percussion  There is no rash  Straight leg raise was negative  The patient had normal strength and sensation of both lower extremities    Neurologic exam is normal   ED Course         Critical Care Time  Procedures

## 2022-07-09 NOTE — ED PROVIDER NOTES
History  Chief Complaint   Patient presents with    Flank Pain     Pt with flank pain on the right side for about 1 week     Patient is a 54year old female with PMHx chronic low back pain, GERD, headache, HTN, presenting to the ED for evaluation of R lower back pain for the past 1 5-2 weeks  Son at bedside states that patient is currently moving and has been lifting heavy boxes during the move  She states that the pain is sharp, worse with movement of the torso, with bending, and with palpation to the area  The pain occasionally radiates to the R hip and down the lateral side of the R leg  Secondarily, patient also notes that it burns when she urinates for the past few days and she is going more often than usual  No visible blood  Patient denies hx of kidney stones  Denies fevers, chills, chest pain, dyspnea, nausea, vomiting, decreased urination, IVDA, difficulty ambulating, numbness and tingling, saddle anesthesia, bowel/bladder incontinence  Prior to Admission Medications   Prescriptions Last Dose Informant Patient Reported? Taking?    Cholecalciferol (VITAMIN D3 PO)  Self Yes No   Sig: Take by mouth daily   Lidocaine Viscous HCl (XYLOCAINE) 2 % mucosal solution  Self No No   Sig: Swish and spit 15 mL 4 (four) times a day as needed for mouth pain or discomfort   acetaminophen (TYLENOL) 500 mg tablet  Self No No   Sig: Take 1 tablet (500 mg total) by mouth every 6 (six) hours as needed for mild pain   amitriptyline (ELAVIL) 50 mg tablet  Self No No   Sig: Take 1 tablet (50 mg total) by mouth daily at bedtime   atorvastatin (LIPITOR) 10 mg tablet  Self No No   Sig: Take 1 tablet (10 mg total) by mouth daily   cyclobenzaprine (FLEXERIL) 10 mg tablet   No No   Sig: Take 1 tablet (10 mg total) by mouth 2 (two) times a day as needed for muscle spasms   ibuprofen (MOTRIN) 200 mg tablet   No No   Sig: Take 1-2 tabs po q6hrs prn pain   levonorgestrel (MIRENA) 20 MCG/24HR IUD  Self Yes No   Si each by Intrauterine route once   lisinopril-hydrochlorothiazide (PRINZIDE,ZESTORETIC) 10-12 5 MG per tablet  Self No No   Sig: Take 1 tablet by mouth daily   polyethylene glycol (GLYCOLAX) 17 GM/SCOOP powder  Self No No   Sig: Take 238 g by mouth daily Take as instructed in clinic      Facility-Administered Medications: None       Past Medical History:   Diagnosis Date    GERD (gastroesophageal reflux disease)     Headache     Hematuria     Hypertension     Lateral epicondylitis, right elbow 1/8/2019    Panic attacks     Psychiatric disorder        Past Surgical History:   Procedure Laterality Date    CYSTOSCOPY  06/08/2018    NO PAST SURGERIES         Family History   Problem Relation Age of Onset    Hypertension Mother     Colonic polyp Mother     Arthritis Father     Diabetes Father     Hypertension Father     Hyperlipidemia Father     Other Brother         TBI from accident     Colonic polyp Maternal Grandmother     No Known Problems Sister     No Known Problems Daughter     No Known Problems Maternal Grandfather     No Known Problems Paternal Grandmother     No Known Problems Paternal Grandfather     No Known Problems Son     No Known Problems Sister     No Known Problems Maternal Aunt     No Known Problems Maternal Aunt     No Known Problems Maternal Aunt     No Known Problems Maternal Aunt     No Known Problems Maternal Aunt     No Known Problems Paternal Aunt     No Known Problems Paternal Aunt     No Known Problems Paternal Aunt      I have reviewed and agree with the history as documented      E-Cigarette/Vaping    E-Cigarette Use Never User      E-Cigarette/Vaping Substances    Nicotine No     THC No     CBD No     Flavoring No     Other No     Unknown No      Social History     Tobacco Use    Smoking status: Current Every Day Smoker     Packs/day: 0 25     Types: Cigarettes    Smokeless tobacco: Never Used    Tobacco comment: ONE HALF PACK A DAY OR LESS, CURRENT SOME DAY SMOKER, LIGHT TOBACCO SMOKER  AS PER [employer]   Vaping Use    Vaping Use: Never used   Substance Use Topics    Alcohol use: No    Drug use: No        Review of Systems   Constitutional: Negative for chills and fever  HENT: Negative for congestion, ear pain, sinus pressure, sinus pain and sore throat  Eyes: Negative for pain and visual disturbance  Respiratory: Negative for cough, chest tightness and shortness of breath  Cardiovascular: Negative for chest pain and palpitations  Gastrointestinal: Negative for abdominal pain, diarrhea, nausea and vomiting  Genitourinary: Positive for dysuria and frequency  Negative for difficulty urinating, flank pain, hematuria and urgency  Musculoskeletal: Positive for back pain  Negative for arthralgias, gait problem, neck pain and neck stiffness  Skin: Negative for color change and rash  Neurological: Negative for dizziness, seizures, syncope, light-headedness and headaches  All other systems reviewed and are negative  Physical Exam  ED Triage Vitals [07/09/22 0606]   Temperature Pulse Respirations Blood Pressure SpO2   97 8 °F (36 6 °C) 84 16 142/84 100 %      Temp Source Heart Rate Source Patient Position - Orthostatic VS BP Location FiO2 (%)   Tympanic -- -- -- --      Pain Score       8             Orthostatic Vital Signs  Vitals:    07/09/22 0606   BP: 142/84   Pulse: 84       Physical Exam  Vitals and nursing note reviewed  Constitutional:       General: She is not in acute distress  Appearance: She is well-developed  HENT:      Head: Normocephalic and atraumatic  Right Ear: External ear normal       Left Ear: External ear normal       Nose: Nose normal  No congestion  Mouth/Throat:      Mouth: Mucous membranes are moist       Pharynx: Oropharynx is clear  No posterior oropharyngeal erythema  Eyes:      General: No scleral icterus  Extraocular Movements: Extraocular movements intact        Conjunctiva/sclera: Conjunctivae normal       Pupils: Pupils are equal, round, and reactive to light  Cardiovascular:      Rate and Rhythm: Normal rate and regular rhythm  Heart sounds: No murmur heard  Pulmonary:      Effort: Pulmonary effort is normal  No respiratory distress  Breath sounds: Normal breath sounds  No wheezing, rhonchi or rales  Abdominal:      General: Abdomen is flat  Palpations: Abdomen is soft  Tenderness: There is no abdominal tenderness  There is no right CVA tenderness, left CVA tenderness or guarding  Musculoskeletal:      Cervical back: Normal, normal range of motion and neck supple  Thoracic back: Normal       Lumbar back: Tenderness (R paralumbar area, as well as SI joint) present  No swelling, deformity, signs of trauma, spasms or bony tenderness  Decreased range of motion (in sidebending)  Negative right straight leg raise test and negative left straight leg raise test       Right lower leg: No edema  Left lower leg: No edema  Skin:     General: Skin is warm and dry  Capillary Refill: Capillary refill takes less than 2 seconds  Findings: No erythema or rash  Neurological:      General: No focal deficit present  Mental Status: She is alert and oriented to person, place, and time  Motor: No weakness        Gait: Gait normal    Psychiatric:         Mood and Affect: Mood normal          ED Medications  Medications   ketorolac (TORADOL) injection 15 mg (15 mg Intramuscular Given 7/9/22 0706)   acetaminophen (TYLENOL) tablet 650 mg (650 mg Oral Given 7/9/22 0839)       Diagnostic Studies  Results Reviewed     Procedure Component Value Units Date/Time    Urine Microscopic [[id number]]  (Abnormal) Collected: 07/09/22 0706    Lab Status: Final result Specimen: Urine, Clean Catch Updated: 07/09/22 0800     RBC, UA 2-4 /hpf      WBC, UA 1-2 /hpf      Epithelial Cells Occasional /hpf      Bacteria, UA Occasional /hpf     UA w Reflex to Microscopic w Reflex to Culture [[id number]]  (Abnormal) Collected: 07/09/22 0706    Lab Status: Final result Specimen: Urine, Clean Catch Updated: 07/09/22 0756     Color, UA Colorless     Clarity, UA Clear     Specific Gravity, UA 1 004     pH, UA 6 0     Leukocytes, UA Negative     Nitrite, UA Negative     Protein, UA Negative mg/dl      Glucose, UA Negative mg/dl      Ketones, UA Negative mg/dl      Urobilinogen, UA <2 0 mg/dl      Bilirubin, UA Negative     Occult Blood, UA Moderate                 No orders to display         Procedures  POC Renal US    Date/Time: 7/9/2022 8:38 AM  Performed by: Shana Love DO  Authorized by: Shana Love DO     Patient location:  ED  Performed by:  Resident  Other Assisting Provider: No    Procedure details:     Exam Type:  Diagnostic    Indications: flank/back pain      Assessment for:  Suspected hydronephrosis    Views obtained: bladder (transverse and sagittal), left kidney and right kidney      Image quality: diagnostic      Image availability:  Images available in PACS, video obtained and still images obtained  Findings:     LEFT kidney findings: unremarkable      LEFT hydronephrosis: none      RIGHT kidney findings: unremarkable      RIGHT hydronephrosis: none      Bladder:  Visualized  Interpretation:     Renal ultrasound impressions: normal exam            ED Course         I suspect patient's pain is related to exacerbation of chronic lower back pain given recent history of moving and lifting heavy objects during the moving process  However, patient also noting urinary symptoms so cannot exclude kidney stone, UTI  Will test urine for infection and give Toradol 15 IM for discomfort  Urinalysis reviewed and shows moderate blood  No evidence of UTI  Bedside ultrasound documented above  Low suspicion for nephrolithiasis, as patient has had symptoms for 2 weeks and the pain occasionally radiates into the right leg which is not consistent with kidney stone pain    Pain more likely due to musculoskeletal back pain  Will prescribe patient Motrin 600 mg, as she has only been taking 200 mg at a time  I discussed that she can take up to 600 every 8 hours for her pain control  I will also prescribe Robaxin for the patient  Patient states that she does takes Flexeril at home  I advised her that she should take the Robaxin only and not combine with Flexeril as I acted in a similar manner  Patient verbalized understanding and all questions were answered  She is appropriate for discharge at this time and return precautions were given  MDM    Disposition  Final diagnoses:   Musculoskeletal back pain   Hematuria     Time reflects when diagnosis was documented in both MDM as applicable and the Disposition within this note     Time User Action Codes Description Comment    7/9/2022  9:09 AM Kimberly Shelton Add [M54 9] Musculoskeletal back pain     7/9/2022  9:09 AM Kimberly Shelton Add [R31 9] Hematuria       ED Disposition     ED Disposition   Discharge    Condition   Stable    Date/Time   Sat Jul 9, 2022  9:09 AM    Comment   [facility] discharge to home/self care  Follow-up Information     Follow up With Specialties Details Why Contact Info    [address], [facility], Physician Assistant Schedule an appointment as soon as possible for a visit   [address]  [phone]            Discharge Medication List as of 7/9/2022  9:11 AM      START taking these medications    Details   !! ibuprofen (MOTRIN) 600 mg tablet Take 1 tablet (600 mg total) by mouth every 8 (eight) hours as needed for mild pain, Starting Sat 7/9/2022, Normal      methocarbamol (ROBAXIN) 500 mg tablet Take 1 tablet (500 mg total) by mouth 2 (two) times a day, Starting Sat 7/9/2022, Normal       !! - Potential duplicate medications found  Please discuss with provider        CONTINUE these medications which have NOT CHANGED    Details   acetaminophen (TYLENOL) 500 mg tablet Take 1 tablet (500 mg total) by mouth every 6 (six) hours as needed for mild pain, Starting Thu 7/16/2020, Normal      amitriptyline (ELAVIL) 50 mg tablet Take 1 tablet (50 mg total) by mouth daily at bedtime, Starting Tue 5/10/2022, Normal      atorvastatin (LIPITOR) 10 mg tablet Take 1 tablet (10 mg total) by mouth daily, Starting Thu 3/3/2022, Normal      Cholecalciferol (VITAMIN D3 PO) Take by mouth daily, Historical Med      cyclobenzaprine (FLEXERIL) 10 mg tablet Take 1 tablet (10 mg total) by mouth 2 (two) times a day as needed for muscle spasms, Starting Tue 6/28/2022, Normal      !! ibuprofen (MOTRIN) 200 mg tablet Take 1-2 tabs po q6hrs prn pain, Normal      levonorgestrel (MIRENA) 20 MCG/24HR IUD 1 each by Intrauterine route once, Historical Med      Lidocaine Viscous HCl (XYLOCAINE) 2 % mucosal solution Swish and spit 15 mL 4 (four) times a day as needed for mouth pain or discomfort, Starting Tue 8/24/2021, Normal      lisinopril-hydrochlorothiazide (PRINZIDE,ZESTORETIC) 10-12 5 MG per tablet Take 1 tablet by mouth daily, Starting Tue 5/10/2022, Normal      polyethylene glycol (GLYCOLAX) 17 GM/SCOOP powder Take 238 g by mouth daily Take as instructed in clinic, Starting Wed 2/24/2021, Normal       !! - Potential duplicate medications found  Please discuss with provider  PDMP Review     None           ED Provider  Attending physically available and evaluated [facility]  I managed the patient along with the ED Attending      Electronically Signed by         Joanie Reid DO  07/09/22 7984

## 2022-07-11 ENCOUNTER — TELEPHONE (OUTPATIENT)
Dept: PHYSICAL THERAPY | Facility: OTHER | Age: 52
End: 2022-07-11

## 2022-07-12 ENCOUNTER — OFFICE VISIT (OUTPATIENT)
Dept: FAMILY MEDICINE CLINIC | Facility: CLINIC | Age: 52
End: 2022-07-12

## 2022-07-12 VITALS
WEIGHT: 139.2 LBS | RESPIRATION RATE: 18 BRPM | BODY MASS INDEX: 21.85 KG/M2 | HEART RATE: 87 BPM | SYSTOLIC BLOOD PRESSURE: 132 MMHG | HEIGHT: 67 IN | DIASTOLIC BLOOD PRESSURE: 88 MMHG | OXYGEN SATURATION: 100 % | TEMPERATURE: 97.6 F

## 2022-07-12 DIAGNOSIS — R31.29 MICROSCOPIC HEMATURIA: ICD-10-CM

## 2022-07-12 DIAGNOSIS — M54.50 LUMBAR BACK PAIN: Primary | ICD-10-CM

## 2022-07-12 PROCEDURE — 3079F DIAST BP 80-89 MM HG: CPT | Performed by: FAMILY MEDICINE

## 2022-07-12 PROCEDURE — 99213 OFFICE O/P EST LOW 20 MIN: CPT | Performed by: FAMILY MEDICINE

## 2022-07-12 PROCEDURE — 3075F SYST BP GE 130 - 139MM HG: CPT | Performed by: FAMILY MEDICINE

## 2022-07-12 RX ORDER — KETOROLAC TROMETHAMINE 30 MG/ML
30 INJECTION, SOLUTION INTRAMUSCULAR; INTRAVENOUS ONCE
Status: COMPLETED | OUTPATIENT
Start: 2022-07-12 | End: 2022-07-12

## 2022-07-12 RX ADMIN — KETOROLAC TROMETHAMINE 30 MG: 30 INJECTION, SOLUTION INTRAMUSCULAR; INTRAVENOUS at 11:10

## 2022-07-12 NOTE — PROGRESS NOTES
Assessment/Plan:    Lumbar back pain  -Acute on chronic complains of lower back pain  Exacerbated by recent move  Pain is mostly of the right lumbar area with no radiation  UA negative for UTI except microscopic hematuria  Ultrasound of bladder/kidney unremarkable  - Muscle relaxant provides relief  Will give shot of toradol injection today in clinic due to pain  - Discussed management with NSAID's and can use voltaren gel   - Advised  physical therapy and OMT for management of back pain   - pt has referral to comprehensive Spine Program  Advised f/u    Microscopic hematuria  - Per chart review, prior hx of hematuria seen by urology and cystoscopy was done in 2019 which was negative  - Moderate blood noted on UA  Denies gross hematuria but endorses to changes in urinary symptoms and about 30 years smoking hx   - will repeat UA in one month and consider referral to urology after repeat UA       Diagnoses and all orders for this visit:    Lumbar back pain  -     ketorolac (TORADOL) injection 30 mg  -     Diclofenac Sodium (VOLTAREN) 1 %; Apply 2 g topically 4 (four) times a day    Microscopic hematuria  -     Cancel: Ambulatory Referral to Urology; Future  -     Ambulatory Referral to Urology; Future        Subjective:      Patient ID: Wilian Calderon is a 54 y o  female  Patient presents to the clinic for follow up the ER due to complains of back pain  Back pain started when they started moving their apartment  Pain is 5/10 right now  She uses flexeril which provides reflief  Per chart review, patient does have a hx of chronic back pain, imaging studies unremarkable  Patient was found to have large blood in urine  She does have a smoking history since she was 18years old  She reports worsening urinary frequency, and she feels like she is not completely emptying her bladder    Denies weight loss     The following portions of the patient's history were reviewed and updated as appropriate: allergies, current medications, past family history, past medical history, past social history, past surgical history and problem list     Review of Systems   Constitutional: Negative for chills and fever  HENT: Negative for ear pain and sore throat  Eyes: Negative for pain and visual disturbance  Respiratory: Negative for cough and shortness of breath  Cardiovascular: Negative for chest pain and palpitations  Gastrointestinal: Negative for abdominal pain and vomiting  Genitourinary: Negative for dysuria and hematuria  Musculoskeletal: Negative for arthralgias and back pain  Skin: Negative for color change and rash  Neurological: Negative for seizures and syncope  All other systems reviewed and are negative  Objective:      /88 (BP Location: Right arm, Patient Position: Sitting, Cuff Size: Large)   Pulse 87   Temp 97 6 °F (36 4 °C) (Temporal)   Resp 18   Ht 5' 7" (1 702 m)   Wt 63 1 kg (139 lb 3 2 oz)   SpO2 100%   BMI 21 80 kg/m²          Physical Exam  Vitals reviewed  Constitutional:       General: She is not in acute distress  Appearance: Normal appearance  She is not ill-appearing, toxic-appearing or diaphoretic  HENT:      Head: Normocephalic and atraumatic  Eyes:      General:         Right eye: No discharge  Left eye: No discharge  Conjunctiva/sclera: Conjunctivae normal    Cardiovascular:      Rate and Rhythm: Normal rate and regular rhythm  Pulses: Normal pulses  Heart sounds: Normal heart sounds  Pulmonary:      Effort: Pulmonary effort is normal  No respiratory distress  Breath sounds: Normal breath sounds  No wheezing  Abdominal:      General: Abdomen is flat  Bowel sounds are normal  There is no distension  Palpations: Abdomen is soft  Tenderness: There is no abdominal tenderness  There is no guarding  Musculoskeletal:         General: Normal range of motion  Right lower leg: No edema        Left lower leg: No edema    Skin:     General: Skin is warm and dry  Neurological:      Mental Status: She is alert  Mental status is at baseline  Psychiatric:         Mood and Affect: Mood normal          Behavior: Behavior normal          Thought Content:  Thought content normal          Judgment: Judgment normal

## 2022-07-13 ENCOUNTER — TELEPHONE (OUTPATIENT)
Dept: FAMILY MEDICINE CLINIC | Facility: CLINIC | Age: 52
End: 2022-07-13

## 2022-07-13 NOTE — TELEPHONE ENCOUNTER
Received fax from patient pharmacy requesting an prior authorization for the medication Diclofenac Sodium 1%  Prior authorization has been started today 07/13/2022, medical support information attached and faxed over to [address] at [phone]  Confirmation has been received and placed in the prior authorization folder at the nurse station  Will follow up in 2 business day

## 2022-07-14 ENCOUNTER — NURSE TRIAGE (OUTPATIENT)
Dept: PHYSICAL THERAPY | Facility: OTHER | Age: 52
End: 2022-07-14

## 2022-07-14 DIAGNOSIS — M54.41 CHRONIC BILATERAL LOW BACK PAIN WITH RIGHT-SIDED SCIATICA: Primary | ICD-10-CM

## 2022-07-14 DIAGNOSIS — G89.29 CHRONIC BILATERAL LOW BACK PAIN WITH RIGHT-SIDED SCIATICA: Primary | ICD-10-CM

## 2022-07-14 NOTE — TELEPHONE ENCOUNTER
Patients son was present and assisted with interpretation  Additional Information   Negative: Is this related to a work injury?  Negative: Are you currently recieving homecare services?  Negative: Is this related to an MVA?  Negative: Has the patient had unexplained weight loss?  Negative: Does the patient have a fever?  Negative: Is the patient experiencing urine retention?  Negative: Is the patient experiencing acute drop foot or paralysis?  Negative: Is the patient experiencing blood in sputum?  Negative: Has the patient experienced major trauma? (fall from height, high speed collision, direct blow to spine) and is also experiencing nausea, light-headedness, or loss of consciousness?  Affirmative: Is this a chronic condition? Background - Initial Assessment  Clinical complaint: right low back pain which radiates down the right leg  No numbness or tingling  States a history of back pain for many yrs  States it has been "a little worse since I moved recently "  Date of onset: "many yrs"  Frequency of pain: constant  Quality of pain: aching and sharp    Protocols used: [facility] COMPREHENSIVE SPINE PROGRAM PROTOCOL    This RN did review in detail the Comprehensive Spine Program and what we can provide for their back pain  Patient is agreeable to being triaged by this RN and would like to proceed with Physical Therapy  Referral was placed for Physical Therapy at the [facility]  Patients information was sent to the  to make evaluation appointment  Patient made aware that the PT office  will be calling to schedule the appointment  Patient was provided with the phone number to the PT office  No further questions and/or concerns were voiced by the patient at this time  Patient states understanding of the referral that was placed  Referral Closed

## 2022-07-15 NOTE — TELEPHONE ENCOUNTER
Received correspondence back from [employer]  Medication was denied due to it not being medically necessary  It was also denied because patient takes Ibuprofen 600mg tablets and medications work in the same way  Please advise of alternate medication for patient or with further instruction  Letter is in media tab to review  Clerical, please scan prior authorization form into patient's chart  It has been placed in red folder in clerical area

## 2022-08-09 ENCOUNTER — OFFICE VISIT (OUTPATIENT)
Dept: FAMILY MEDICINE CLINIC | Facility: CLINIC | Age: 52
End: 2022-08-09

## 2022-08-09 VITALS
WEIGHT: 138.4 LBS | TEMPERATURE: 97.4 F | HEIGHT: 67 IN | OXYGEN SATURATION: 98 % | BODY MASS INDEX: 21.72 KG/M2 | DIASTOLIC BLOOD PRESSURE: 80 MMHG | HEART RATE: 105 BPM | RESPIRATION RATE: 18 BRPM | SYSTOLIC BLOOD PRESSURE: 120 MMHG

## 2022-08-09 DIAGNOSIS — I10 HYPERTENSION: ICD-10-CM

## 2022-08-09 DIAGNOSIS — E78.2 MIXED HYPERLIPIDEMIA: ICD-10-CM

## 2022-08-09 DIAGNOSIS — R31.29 MICROSCOPIC HEMATURIA: ICD-10-CM

## 2022-08-09 DIAGNOSIS — F41.9 ANXIETY: ICD-10-CM

## 2022-08-09 DIAGNOSIS — M54.50 LUMBAR BACK PAIN: ICD-10-CM

## 2022-08-09 PROBLEM — Z02.82 ENCOUNTER FOR ADOPTION SERVICES: Status: RESOLVED | Noted: 2022-03-03 | Resolved: 2022-08-09

## 2022-08-09 PROBLEM — M89.8X5 LYTIC BONE LESION OF HIP: Status: RESOLVED | Noted: 2019-03-15 | Resolved: 2022-08-09

## 2022-08-09 PROCEDURE — 99214 OFFICE O/P EST MOD 30 MIN: CPT | Performed by: PHYSICIAN ASSISTANT

## 2022-08-09 PROCEDURE — 3074F SYST BP LT 130 MM HG: CPT | Performed by: PHYSICIAN ASSISTANT

## 2022-08-09 PROCEDURE — 3079F DIAST BP 80-89 MM HG: CPT | Performed by: PHYSICIAN ASSISTANT

## 2022-08-09 RX ORDER — CYCLOBENZAPRINE HCL 10 MG
10 TABLET ORAL 2 TIMES DAILY PRN
Qty: 30 TABLET | Refills: 0 | Status: SHIPPED | OUTPATIENT
Start: 2022-08-09

## 2022-08-09 RX ORDER — AMITRIPTYLINE HYDROCHLORIDE 25 MG/1
25 TABLET, FILM COATED ORAL
Qty: 30 TABLET | Refills: 2 | Status: SHIPPED | OUTPATIENT
Start: 2022-08-09

## 2022-08-09 RX ORDER — ATORVASTATIN CALCIUM 10 MG/1
10 TABLET, FILM COATED ORAL DAILY
Qty: 90 TABLET | Refills: 2 | Status: SHIPPED | OUTPATIENT
Start: 2022-08-09

## 2022-08-09 RX ORDER — LISINOPRIL AND HYDROCHLOROTHIAZIDE 12.5; 1 MG/1; MG/1
1 TABLET ORAL DAILY
Qty: 90 TABLET | Refills: 2 | Status: SHIPPED | OUTPATIENT
Start: 2022-08-09

## 2022-08-09 NOTE — ASSESSMENT & PLAN NOTE
Patient tolerating atorvastatin 10 mg daily well  Continue current medication  Lifestyle modifications discussed

## 2022-08-09 NOTE — ASSESSMENT & PLAN NOTE
Microscopic urine 2-4 RBC 7/9/2022  Similar sx in the past  Seen by urology 2019 with cystoscopy negative  Pt is a smoker  Was referred to urology at last visit   Has appointment scheduled with Urology on 08/24/2022

## 2022-08-09 NOTE — ASSESSMENT & PLAN NOTE
BP today 120/80- stable  Refill lisinopril/hydrochlorothiazide 10-12 5 mg daily  Continue following low-sodium/heart healthy diet   Exercise as tolerated

## 2022-08-09 NOTE — ASSESSMENT & PLAN NOTE
Had been taking amitriptyline 50 mg at bedtime for many years for anxiety and depression with symptoms well controlled  Ran out several months ago  Admits feeling a little depressed since recently losing her job  Denies any SI/HI  Would like to restart amitriptyline  Decline counseling at this time  Pt agreed to restart at a lower dose 25 mg at bedtime

## 2022-08-09 NOTE — PROGRESS NOTES
Assessment/Plan:    Mixed hyperlipidemia  Patient tolerating atorvastatin 10 mg daily well  Continue current medication  Lifestyle modifications discussed  Microscopic hematuria  Microscopic urine 2-4 RBC 7/9/2022  Similar sx in the past  Seen by urology 2019 with cystoscopy negative  Pt is a smoker  Was referred to urology at last visit  Has appointment scheduled with Urology on 08/24/2022     Hypertension  BP today 120/80- stable  Refill lisinopril/hydrochlorothiazide 10-12 5 mg daily  Continue following low-sodium/heart healthy diet   Exercise as tolerated    Episode of recurrent major depressive disorder ([address] )  Had been taking amitriptyline 50 mg at bedtime for many years for anxiety and depression with symptoms well controlled  Ran out several months ago  Admits feeling a little depressed since recently losing her job  Denies any SI/HI  Would like to restart amitriptyline  Decline counseling at this time  Pt agreed to restart at a lower dose 25 mg at bedtime  Anxiety  See A/P major depression      Diagnoses and all orders for this visit:    Lumbar back pain  -     cyclobenzaprine (FLEXERIL) 10 mg tablet; Take 1 tablet (10 mg total) by mouth 2 (two) times a day as needed for muscle spasms    Hypertension  -     lisinopril-hydrochlorothiazide (PRINZIDE,ZESTORETIC) 10-12 5 MG per tablet; Take 1 tablet by mouth daily    Anxiety  -     amitriptyline (ELAVIL) 25 mg tablet; Take 1 tablet (25 mg total) by mouth daily at bedtime    Mixed hyperlipidemia  -     atorvastatin (LIPITOR) 10 mg tablet; Take 1 tablet (10 mg total) by mouth daily    Microscopic hematuria      Subjective:      Patient ID: Kingsley Poe is a 54 y o  female presents today for 4 week f/u lower back pain  She reports her back pain has improved  Takes flexeril 10 mg PRN with good results  Tends to have increase pain with increase activity  Microscopic hematuria- scheduled [facility] urology 8/24/2022      Needs refills on amitriptyline, losartan/hydrochlorothiazide and atorvastatin  Ran out of amitriptyline per pt prescribed for anxiety/depression several months ago  Would like refills  The following portions of the patient's history were reviewed and updated as appropriate: allergies, current medications, past family history, past medical history, past social history, past surgical history and problem list     Review of Systems   Constitutional: Negative for chills and fever  Respiratory: Negative for cough, chest tightness, shortness of breath and wheezing  Cardiovascular: Negative for chest pain, palpitations and leg swelling  Genitourinary: Negative for dysuria and hematuria  Musculoskeletal: Positive for back pain  Psychiatric/Behavioral: Negative  Objective:      /80 (BP Location: Left arm, Patient Position: Sitting, Cuff Size: Standard)   Pulse 105   Temp (!) 97 4 °F (36 3 °C) (Temporal)   Resp 18   Ht 5' 7" (1 702 m)   Wt 62 8 kg (138 lb 6 4 oz)   SpO2 98%   BMI 21 68 kg/m²          Physical Exam  Constitutional:       General: She is not in acute distress  Appearance: Normal appearance  She is well-developed  She is not ill-appearing  Cardiovascular:      Rate and Rhythm: Normal rate and regular rhythm  Heart sounds: Normal heart sounds  No murmur heard  Pulmonary:      Effort: Pulmonary effort is normal  No respiratory distress  Breath sounds: Normal breath sounds  No wheezing  Musculoskeletal:         General: No deformity  Cervical back: Neck supple  Neurological:      Mental Status: She is alert and oriented to person, place, and time  Psychiatric:         Mood and Affect: Mood normal          Behavior: Behavior normal          Thought Content:  Thought content normal          Judgment: Judgment normal

## 2022-08-24 ENCOUNTER — OFFICE VISIT (OUTPATIENT)
Dept: UROLOGY | Facility: AMBULATORY SURGERY CENTER | Age: 52
End: 2022-08-24
Payer: COMMERCIAL

## 2022-08-24 VITALS
HEART RATE: 104 BPM | WEIGHT: 141 LBS | BODY MASS INDEX: 22.08 KG/M2 | SYSTOLIC BLOOD PRESSURE: 138 MMHG | OXYGEN SATURATION: 99 % | DIASTOLIC BLOOD PRESSURE: 86 MMHG

## 2022-08-24 DIAGNOSIS — R31.29 MICROSCOPIC HEMATURIA: Primary | ICD-10-CM

## 2022-08-24 LAB
BACTERIA UR QL AUTO: ABNORMAL /HPF
BILIRUB UR QL STRIP: NEGATIVE
CLARITY UR: CLEAR
COLOR UR: COLORLESS
GLUCOSE UR STRIP-MCNC: NEGATIVE MG/DL
HGB UR QL STRIP.AUTO: ABNORMAL
KETONES UR STRIP-MCNC: NEGATIVE MG/DL
LEUKOCYTE ESTERASE UR QL STRIP: NEGATIVE
NITRITE UR QL STRIP: NEGATIVE
NON-SQ EPI CELLS URNS QL MICRO: ABNORMAL /HPF
PH UR STRIP.AUTO: 7.5 [PH]
PROT UR STRIP-MCNC: NEGATIVE MG/DL
RBC #/AREA URNS AUTO: ABNORMAL /HPF
SL AMB  POCT GLUCOSE, UA: NORMAL
SL AMB LEUKOCYTE ESTERASE,UA: NORMAL
SL AMB POCT BILIRUBIN,UA: NORMAL
SL AMB POCT BLOOD,UA: NORMAL
SL AMB POCT CLARITY,UA: CLEAR
SL AMB POCT COLOR,UA: NORMAL
SL AMB POCT KETONES,UA: NORMAL
SL AMB POCT NITRITE,UA: NORMAL
SL AMB POCT PH,UA: 8
SL AMB POCT SPECIFIC GRAVITY,UA: 1
SL AMB POCT URINE PROTEIN: NORMAL
SL AMB POCT UROBILINOGEN: 0.2
SP GR UR STRIP.AUTO: 1 (ref 1–1.03)
UROBILINOGEN UR STRIP-ACNC: <2 MG/DL
WBC #/AREA URNS AUTO: ABNORMAL /HPF

## 2022-08-24 PROCEDURE — 81002 URINALYSIS NONAUTO W/O SCOPE: CPT | Performed by: NURSE PRACTITIONER

## 2022-08-24 PROCEDURE — 99213 OFFICE O/P EST LOW 20 MIN: CPT | Performed by: NURSE PRACTITIONER

## 2022-08-24 PROCEDURE — 87086 URINE CULTURE/COLONY COUNT: CPT | Performed by: NURSE PRACTITIONER

## 2022-08-24 PROCEDURE — 81001 URINALYSIS AUTO W/SCOPE: CPT | Performed by: NURSE PRACTITIONER

## 2022-08-24 NOTE — PROGRESS NOTES
08/24/22    Rmoina Polanco   1970   [id number]     Assessment  1 Microscopic hematuria s/p negative work up 2018     Discussion/Plan  1 Microscopic hematuria s/p negative work up 2018    Urine sent for UA micro and culture   Renal US with PVR ordered   Repeat cystoscopy    Smoking cessation encouraged   Increase hydration with water     Patient agreeable to plan  Await urine studies  Schedule routine cystoscopy  Subjective  HPI   Meliton Magaña is a 54 y o  Welsh speaking female here for follow up evaluation of microscopic hematuria  She is accompanied by her daughter who is assisting with translation  She has had microscopic hematuria for many years  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 and a cystoscopy was performed 06/08/2018 with Dr Lorri Patel which yielded negative findings  Patient denies all lower urinary tract symptoms including dysuria, hematuria, urinary frequency and urgency  No family history of kidney or bladder cancer  She is a current smoker  She has smoked since age 14  Currently approximately 1 pack every 4 days  She is interested in trying to quit with vaping  She quit drinking alcohol 7 years ago        Component      Latest Ref Rng & Units 3/30/2018 4/13/2018 4/27/2018 4/28/2018          12:08 PM 10:51 AM  2:56 PM  8:48 PM   RBC, UA      None Seen, 1-2 /hpf 4-10 (A) 2-4 (A) 4-10 (A) 4-10 (A)     Component      Latest Ref Rng & Units 3/9/2019 12/23/2019 11/15/2020 7/9/2022          11:28 AM 10:52 AM  8:37 AM  7:06 AM   RBC, UA      None Seen, 1-2 /hpf 2-4 (A) 4-10 (A) 2-4 2-4 (A)       Review of Systems - History obtained from chart review and the patient  General ROS: negative  Psychological ROS: negative  Respiratory ROS: no cough, shortness of breath, or wheezing  Cardiovascular ROS: no chest pain or dyspnea on exertion  Gastrointestinal ROS: no abdominal pain, change in bowel habits, or black or bloody stools  Genito-Urinary ROS: no dysuria, trouble voiding, or hematuria  Musculoskeletal ROS: negative  Neurological ROS: no TIA or stroke symptoms  Dermatological ROS: negative       Objective  Physical Exam  Vitals and nursing note reviewed  Constitutional:       General: She is not in acute distress  Appearance: Normal appearance  She is normal weight  She is not ill-appearing, toxic-appearing or diaphoretic  HENT:      Head: Normocephalic and atraumatic  Pulmonary:      Effort: Pulmonary effort is normal  No respiratory distress  Abdominal:      Tenderness: There is no right CVA tenderness or left CVA tenderness  Musculoskeletal:         General: Normal range of motion  Cervical back: Normal range of motion  Skin:     General: Skin is warm and dry  Neurological:      General: No focal deficit present  Mental Status: She is alert and oriented to person, place, and time  Mental status is at baseline  Psychiatric:         Mood and Affect: Mood normal          Behavior: Behavior normal          Thought Content: Thought content normal          Judgment: Judgment normal              RENE Davis     I have spent 15 minutes with Patient and family today in which greater than 50% of this time was spent in counseling/coordination of care regarding Intructions for management and Patient and family education

## 2022-08-25 ENCOUNTER — HOSPITAL ENCOUNTER (OUTPATIENT)
Dept: RADIOLOGY | Facility: HOSPITAL | Age: 52
Discharge: HOME | End: 2022-08-25
Payer: COMMERCIAL

## 2022-08-25 DIAGNOSIS — R31.29 MICROSCOPIC HEMATURIA: ICD-10-CM

## 2022-08-25 LAB — BACTERIA UR CULT: NORMAL

## 2022-08-25 PROCEDURE — 76770 US EXAM ABDO BACK WALL COMP: CPT

## 2022-09-28 NOTE — ASSESSMENT & PLAN NOTE
Improved     Will decrease Omeprazole 40mg --> 20mg QD
Some improvement with x2 OMT    Will have next OMT in two weeks with [place] [facility]  Have increased Elavil 25--> 50mg QD  Will r/o PMR vs myositis vs thyroid ds vs vit deficiency   Order Sed rate, CK, CRP, TSH, Vit D,B12
Some improvement with x2 OMT    Will have next OMT in two weeks with [place] [facility]  Have increased Elavil 25--> 50mg QD  Will r/o PMR vs myositis vs thyroid ds vs vit deficiency   Order Sed rate, CK, CRP, TSH, Vit D,B12
[Other: ____] : [unfilled]

## 2022-12-02 ENCOUNTER — OFFICE VISIT (OUTPATIENT)
Dept: FAMILY MEDICINE CLINIC | Facility: CLINIC | Age: 52
End: 2022-12-02

## 2022-12-02 VITALS
HEART RATE: 89 BPM | BODY MASS INDEX: 22.26 KG/M2 | SYSTOLIC BLOOD PRESSURE: 164 MMHG | DIASTOLIC BLOOD PRESSURE: 108 MMHG | OXYGEN SATURATION: 98 % | HEIGHT: 67 IN | WEIGHT: 141.8 LBS | RESPIRATION RATE: 16 BRPM | TEMPERATURE: 98.8 F

## 2022-12-02 DIAGNOSIS — I10 PRIMARY HYPERTENSION: Primary | ICD-10-CM

## 2022-12-02 DIAGNOSIS — F17.200 SMOKER: ICD-10-CM

## 2022-12-02 DIAGNOSIS — R30.0 DYSURIA: ICD-10-CM

## 2022-12-02 DIAGNOSIS — R35.0 URINARY FREQUENCY: ICD-10-CM

## 2022-12-02 LAB
SL AMB  POCT GLUCOSE, UA: ABNORMAL
SL AMB LEUKOCYTE ESTERASE,UA: 15
SL AMB POCT BILIRUBIN,UA: ABNORMAL
SL AMB POCT BLOOD,UA: ABNORMAL
SL AMB POCT CLARITY,UA: YELLOW
SL AMB POCT COLOR,UA: ABNORMAL
SL AMB POCT KETONES,UA: ABNORMAL
SL AMB POCT NITRITE,UA: ABNORMAL
SL AMB POCT PH,UA: 6
SL AMB POCT SPECIFIC GRAVITY,UA: 1
SL AMB POCT URINE PROTEIN: ABNORMAL
SL AMB POCT UROBILINOGEN: 0.2

## 2022-12-02 RX ORDER — VARENICLINE TARTRATE 25 MG
KIT ORAL
Qty: 53 EACH | Refills: 0 | Status: SHIPPED | OUTPATIENT
Start: 2022-12-02 | End: 2023-01-08

## 2022-12-02 RX ORDER — NITROFURANTOIN 25; 75 MG/1; MG/1
100 CAPSULE ORAL 2 TIMES DAILY
Qty: 10 CAPSULE | Refills: 0 | Status: SHIPPED | OUTPATIENT
Start: 2022-12-02 | End: 2022-12-07

## 2022-12-02 NOTE — PROGRESS NOTES
Name: Jeramy Beard      : 1970      MRN: [id number]  Encounter Provider: Olga Berger DO  Encounter Date: 2022   Encounter department: [address] [place] [address]  Primary hypertension  Assessment & Plan:  Current bp 164/108, manual recheck is 142/86  Recheck at goal   Per chart review, usually well controlled on Lisinopril-HCTZ 10-12 5  - Currently asympt and compliant with medication  - Home -130s  - Goal BP <140/90 per JNC-8 guideline  - BUN/Cr/eGFR on wnl  - C/w home regimen for now  -  on lifestyle improvement including weight loss, DASH diet and exercise  - Rec daily BP check at home and bring log to next visit  - RTC in 2 months for BP recheck and annual physical  - Consider up-titrating antihypertensive if BP persistently above goal    BP Readings from Last 3 Encounters:   22 (!) 164/108   22 138/86   22 120/80       Lab Results   Component Value Date/Time    BUN 13 2022 11:22 AM    BUN 12 2021 08:53 AM    BUN 11 2019 09:52 AM    BUN 7 2015 11:27 AM    BUN 6 2015 06:30 AM    BUN 5 2015 12:03 AM    CREATININE 0 75 2022 11:22 AM    CREATININE 0 61 2021 08:53 AM    CREATININE 0 72 2019 09:52 AM    CREATININE 0 73 2015 11:27 AM    CREATININE 0 52 (L) 2015 06:30 AM    CREATININE 0 57 (L) 2015 12:03 AM    EGFR 93 2022 11:22 AM    EGFR 108 2021 08:53 AM    EGFR 100 2019 09:52 AM    EGFR >60 0 2016 03:47 AM    EGFR >60 0 2016 01:21 AM       Orders:  -     Microalbumin / creatinine urine ratio    2  Smoker  Assessment & Plan:  Tobacco Cessation Counseling: Tobacco cessation counseling and education was provided  The patient is sincerely urged to quit consumption of tobacco  She is ready to quit tobacco  The numerous health risks of tobacco consumption were discussed   Prescribed the following medications: varenicline (Chantix)  Patient currently smokes 1 pack in 3-4 days  Orders:  -     varenicline 0 5 MG X 11 & 1 MG X 42 tablet therapy pack; Take 0 5 mg by mouth daily for 3 days, THEN 0 5 mg 2 (two) times a day for 4 days, THEN 1 mg 2 (two) times a day  3  Urinary frequency  -     POCT urine dip  -     Urinalysis with microscopic  -     Urine culture; Future  -     nitrofurantoin (MACROBID) 100 mg capsule; Take 1 capsule (100 mg total) by mouth 2 (two) times a day for 5 days  -     Urine culture    4  Dysuria  Assessment & Plan:  1 week of dysuria and urinary frequency  POCT Dip + leuks, - nitrites  Plan  - treat as symptomatic uti with Macrobid x5 days  - send urine for UA and culture  - adjust abx as needed based on culture sensitivities  Orders:  -     POCT urine dip  -     Urinalysis with microscopic  -     Urine culture; Future  -     nitrofurantoin (MACROBID) 100 mg capsule; Take 1 capsule (100 mg total) by mouth 2 (two) times a day for 5 days  -     Urine culture           Subjective     HPI   Patient is here for BP follow-up  She also c/o 1 week of urinary frequency and burning while peeing  Patient open to quiting smoking and is agreeable to oral medication  She says she smokes when she feels anxious  Amytriptyline helps with patient's anxiety particularly at night time  Review of Systems   Constitutional: Negative for chills and fever  HENT: Negative for ear pain and sore throat  Eyes: Negative for pain and visual disturbance  Respiratory: Negative for cough and shortness of breath  Cardiovascular: Negative for chest pain and palpitations  Gastrointestinal: Negative for abdominal pain, diarrhea, nausea and vomiting  Genitourinary: Positive for dysuria and frequency  Negative for flank pain and hematuria  Musculoskeletal: Negative for arthralgias and back pain  Skin: Negative for rash  Neurological: Negative for seizures and syncope     All other systems reviewed and are negative  Past Medical History:   Diagnosis Date   • GERD (gastroesophageal reflux disease)    • Headache    • Hematuria    • Hypertension    • Lateral epicondylitis, right elbow 1/8/2019   • Panic attacks    • Psychiatric disorder      Past Surgical History:   Procedure Laterality Date   • CYSTOSCOPY  06/08/2018   • NO PAST SURGERIES       Family History   Problem Relation Age of Onset   • Hypertension Mother    • Colonic polyp Mother    • Arthritis Father    • Diabetes Father    • Hypertension Father    • Hyperlipidemia Father    • Other Brother         TBI from accident    • Colonic polyp Maternal Grandmother    • No Known Problems Sister    • No Known Problems Daughter    • No Known Problems Maternal Grandfather    • No Known Problems Paternal Grandmother    • No Known Problems Paternal Grandfather    • No Known Problems Son    • No Known Problems Sister    • No Known Problems Maternal Aunt    • No Known Problems Maternal Aunt    • No Known Problems Maternal Aunt    • No Known Problems Maternal Aunt    • No Known Problems Maternal Aunt    • No Known Problems Paternal Aunt    • No Known Problems Paternal Aunt    • No Known Problems Paternal Aunt      Social History     Socioeconomic History   • Marital status: Single     Spouse name: None   • Number of children: None   • Years of education: None   • Highest education level: 12th grade   Occupational History   • Occupation: HOUSEWIFE OR HOMEMAKER   Tobacco Use   • Smoking status: Every Day     Packs/day: 0 25     Years: 35 00     Pack years: 8 75     Types: Cigarettes   • Smokeless tobacco: Never   • Tobacco comments:     ONE HALF PACK A DAY OR LESS, CURRENT SOME DAY SMOKER, LIGHT TOBACCO SMOKER  AS PER [employer]   Vaping Use   • Vaping Use: Never used   Substance and Sexual Activity   • Alcohol use: No   • Drug use: No   • Sexual activity: Not Currently     Partners: Male     Birth control/protection: I U D     Other Topics Concern   • None Social History Narrative    ENGAGES IN A HOBBY - "[employer]"    LIVES WITH FAMILY    UNEMPLOYED     Social Determinants of Health     Financial Resource Strain: Low Risk    • Difficulty of Paying Living Expenses: Not hard at all   Food Insecurity: No Food Insecurity   • Worried About [address] in the Last Year: Never true   • Ran Out of Food in the Last Year: Never true   Transportation Needs: No Transportation Needs   • Lack of Transportation (Medical): No   • Lack of Transportation (Non-Medical):  No   Physical Activity: Not on file   Stress: Not on file   Social Connections: Not on file   Intimate Partner Violence: Not on file   Housing Stability: Low Risk    • Unable to Pay for Housing in the Last Year: No   • Number of Places Lived in the Last Year: 1   • Unstable Housing in the Last Year: No     Current Outpatient Medications on File Prior to Visit   Medication Sig   • acetaminophen (TYLENOL) 500 mg tablet Take 1 tablet (500 mg total) by mouth every 6 (six) hours as needed for mild pain   • amitriptyline (ELAVIL) 25 mg tablet Take 1 tablet (25 mg total) by mouth daily at bedtime   • atorvastatin (LIPITOR) 10 mg tablet Take 1 tablet (10 mg total) by mouth daily   • Cholecalciferol (VITAMIN D3 PO) Take by mouth daily   • cyclobenzaprine (FLEXERIL) 10 mg tablet Take 1 tablet (10 mg total) by mouth 2 (two) times a day as needed for muscle spasms   • Diclofenac Sodium (VOLTAREN) 1 % Apply 2 g topically 4 (four) times a day   • ibuprofen (MOTRIN) 200 mg tablet Take 1-2 tabs po q6hrs prn pain   • levonorgestrel (MIRENA) 20 MCG/24HR IUD 1 each by Intrauterine route once   • lisinopril-hydrochlorothiazide (PRINZIDE,ZESTORETIC) 10-12 5 MG per tablet Take 1 tablet by mouth daily     No Known Allergies  Immunization History   Administered Date(s) Administered   • Influenza Quadrivalent, 6-35 Months IM 01/22/2016, 10/26/2016   • Influenza, injectable, quadrivalent, preservative free 0 5 mL 11/11/2019, 09/16/2021   • Pneumococcal Polysaccharide PPV23 03/03/2022   • Tdap 03/03/2016   • Tuberculin Skin Test-PPD Intradermal 08/30/2017, 10/24/2018, 03/22/2022       Objective     BP (!) 164/108 (BP Location: Left arm, Patient Position: Sitting, Cuff Size: Standard)   Pulse 89   Temp 98 8 °F (37 1 °C) (Temporal)   Resp 16   Ht 5' 7" (1 702 m)   Wt 64 3 kg (141 lb 12 8 oz)   SpO2 98%   BMI 22 21 kg/m²     Physical Exam  Constitutional:       General: She is not in acute distress  Appearance: Normal appearance  HENT:      Head: Normocephalic and atraumatic  Right Ear: External ear normal       Left Ear: External ear normal       Nose: Nose normal    Eyes:      Extraocular Movements: Extraocular movements intact  Conjunctiva/sclera: Conjunctivae normal    Cardiovascular:      Rate and Rhythm: Normal rate and regular rhythm  Pulses: Normal pulses  Heart sounds: Normal heart sounds  Pulmonary:      Effort: Pulmonary effort is normal  No respiratory distress  Breath sounds: Normal breath sounds  No wheezing, rhonchi or rales  Abdominal:      General: Bowel sounds are normal       Palpations: Abdomen is soft  Tenderness: There is no abdominal tenderness  There is no right CVA tenderness or left CVA tenderness  Musculoskeletal:      Cervical back: Neck supple  No tenderness  Right lower leg: No edema  Left lower leg: No edema  Lymphadenopathy:      Cervical: No cervical adenopathy  Skin:     General: Skin is warm and dry  Neurological:      General: No focal deficit present  Mental Status: She is alert        Gait: Gait normal    Psychiatric:         Mood and Affect: Mood normal          Behavior: Behavior normal        Graciela Staff, DO

## 2022-12-03 LAB
BACTERIA UR QL AUTO: ABNORMAL /HPF
BILIRUB UR QL STRIP: NEGATIVE
CLARITY UR: CLEAR
COLOR UR: COLORLESS
CREAT UR-MCNC: <13 MG/DL
GLUCOSE UR STRIP-MCNC: NEGATIVE MG/DL
HGB UR QL STRIP.AUTO: ABNORMAL
KETONES UR STRIP-MCNC: NEGATIVE MG/DL
LEUKOCYTE ESTERASE UR QL STRIP: NEGATIVE
MICROALBUMIN UR-MCNC: <5 MG/L (ref 0–20)
NITRITE UR QL STRIP: NEGATIVE
NON-SQ EPI CELLS URNS QL MICRO: ABNORMAL /HPF
PH UR STRIP.AUTO: 6.5 [PH]
PROT UR STRIP-MCNC: NEGATIVE MG/DL
RBC #/AREA URNS AUTO: ABNORMAL /HPF
SP GR UR STRIP.AUTO: 1 (ref 1–1.03)
UROBILINOGEN UR STRIP-ACNC: <2 MG/DL
WBC #/AREA URNS AUTO: ABNORMAL /HPF

## 2022-12-03 NOTE — ASSESSMENT & PLAN NOTE
1 week of dysuria and urinary frequency  POCT Dip + leuks, - nitrites  Plan  - treat as symptomatic uti with Macrobid x5 days  - send urine for UA and culture  - adjust abx as needed based on culture sensitivities

## 2022-12-03 NOTE — ASSESSMENT & PLAN NOTE
Current bp 164/108, manual recheck is 142/86   Recheck at goal   Per chart review, usually well controlled on Lisinopril-HCTZ 10-12 5  - Currently asympt and compliant with medication  - Home -130s  - Goal BP <140/90 per JNC-8 guideline  - BUN/Cr/eGFR on wnl  - C/w home regimen for now  -  on lifestyle improvement including weight loss, DASH diet and exercise  - Rec daily BP check at home and bring log to next visit  - RTC in 2 months for BP recheck and annual physical  - Consider up-titrating antihypertensive if BP persistently above goal    BP Readings from Last 3 Encounters:   12/02/22 (!) 164/108   08/24/22 138/86   08/09/22 120/80       Lab Results   Component Value Date/Time    BUN 13 03/26/2022 11:22 AM    BUN 12 09/20/2021 08:53 AM    BUN 11 11/27/2019 09:52 AM    BUN 7 11/01/2015 11:27 AM    BUN 6 05/23/2015 06:30 AM    BUN 5 05/23/2015 12:03 AM    CREATININE 0 75 03/26/2022 11:22 AM    CREATININE 0 61 09/20/2021 08:53 AM    CREATININE 0 72 11/27/2019 09:52 AM    CREATININE 0 73 11/01/2015 11:27 AM    CREATININE 0 52 (L) 05/23/2015 06:30 AM    CREATININE 0 57 (L) 05/23/2015 12:03 AM    EGFR 93 03/26/2022 11:22 AM    EGFR 108 09/20/2021 08:53 AM    EGFR 100 11/27/2019 09:52 AM    EGFR >60 0 09/25/2016 03:47 AM    EGFR >60 0 09/25/2016 01:21 AM

## 2022-12-03 NOTE — ASSESSMENT & PLAN NOTE
Tobacco Cessation Counseling: Tobacco cessation counseling and education was provided  The patient is sincerely urged to quit consumption of tobacco  She is ready to quit tobacco  The numerous health risks of tobacco consumption were discussed  Prescribed the following medications: varenicline (Chantix)  Patient currently smokes 1 pack in 3-4 days

## 2022-12-04 LAB — BACTERIA UR CULT: NORMAL

## 2023-02-05 ENCOUNTER — HOSPITAL ENCOUNTER (EMERGENCY)
Facility: HOSPITAL | Age: 53
Discharge: HOME | End: 2023-02-05
Attending: EMERGENCY MEDICINE

## 2023-02-05 VITALS
OXYGEN SATURATION: 99 % | WEIGHT: 141 LBS | DIASTOLIC BLOOD PRESSURE: 84 MMHG | HEART RATE: 81 BPM | BODY MASS INDEX: 22.13 KG/M2 | TEMPERATURE: 97.3 F | SYSTOLIC BLOOD PRESSURE: 159 MMHG | RESPIRATION RATE: 18 BRPM | HEIGHT: 67 IN

## 2023-02-05 DIAGNOSIS — M62.838 MUSCLE SPASMS OF NECK: ICD-10-CM

## 2023-02-05 DIAGNOSIS — J02.9 SORE THROAT: Primary | ICD-10-CM

## 2023-02-05 RX ORDER — LIDOCAINE 50 MG/G
1 PATCH TOPICAL ONCE
Status: DISCONTINUED | OUTPATIENT
Start: 2023-02-05 | End: 2023-02-05 | Stop reason: HOSPADM

## 2023-02-05 RX ORDER — CYCLOBENZAPRINE HCL 10 MG
10 TABLET ORAL 2 TIMES DAILY PRN
Qty: 20 TABLET | Refills: 0 | Status: SHIPPED | OUTPATIENT
Start: 2023-02-05

## 2023-02-05 RX ORDER — KETOROLAC TROMETHAMINE 30 MG/ML
15 INJECTION, SOLUTION INTRAMUSCULAR; INTRAVENOUS ONCE
Status: COMPLETED | OUTPATIENT
Start: 2023-02-05 | End: 2023-02-05

## 2023-02-05 RX ORDER — ACETAMINOPHEN 325 MG/1
975 TABLET ORAL ONCE
Status: COMPLETED | OUTPATIENT
Start: 2023-02-05 | End: 2023-02-05

## 2023-02-05 RX ORDER — DIAZEPAM 5 MG/1
5 TABLET ORAL ONCE
Status: COMPLETED | OUTPATIENT
Start: 2023-02-05 | End: 2023-02-05

## 2023-02-05 RX ADMIN — KETOROLAC TROMETHAMINE 15 MG: 30 INJECTION, SOLUTION INTRAMUSCULAR; INTRAVENOUS at 08:14

## 2023-02-05 RX ADMIN — DEXAMETHASONE 10 MG: 2 TABLET ORAL at 08:14

## 2023-02-05 RX ADMIN — ACETAMINOPHEN 975 MG: 325 TABLET ORAL at 08:14

## 2023-02-05 RX ADMIN — LIDOCAINE 5% 1 PATCH: 700 PATCH TOPICAL at 08:14

## 2023-02-05 RX ADMIN — DIAZEPAM 5 MG: 5 TABLET ORAL at 08:13

## 2023-02-05 NOTE — ED ATTENDING ATTESTATION
2/5/2023   I, Uday Soni MD, saw and evaluated the patient  I have discussed the patient with the resident/non-physician practitioner and agree with the resident's/non-physician practitioner's findings, Plan of Care, and MDM as documented in the resident's/non-physician practitioner's note, except where noted  All available labs and Radiology studies were reviewed  I was present for key portions of any procedure(s) performed by the resident/non-physician practitioner and I was immediately available to provide assistance  At this point I agree with the current assessment done in the Emergency Department  I have conducted an independent evaluation of this patient a history and physical is as follows:    Chief Complaint   Patient presents with   • Sore Throat     Pt reports that she stopped smoking 4 days ago and 3 days ago she started with a sore throat  61-year-old female with past medical history of hypertension, tobacco use, presenting with sore throat, as well as bilateral musculoskeletal neck pain  Patient reports switching from cigarettes to vaping 4 days ago  For the past 3 days, she has had soreness in her throat  No changes in voice  No difficulty swallowing or breathing  Patient has had an occasional cough which is baseline for her, no worsening in cough  No fevers  No nausea or vomiting  She has also had bilateral neck muscle pain  This pain has been going on for a long time, possibly years  She has taken ibuprofen for the pain with improvement in symptoms  In the past, she has gotten massage which has made pain better  No trauma  Physical Exam  /84 (BP Location: Right arm)   Pulse 81   Temp (!) 97 3 °F (36 3 °C) (Tympanic)   Resp 18   Ht 5' 7" (1 702 m)   Wt 64 kg (141 lb)   SpO2 99%   BMI 22 08 kg/m²      Vital signs and nursing notes reviewed    CONSTITUTIONAL: female appearing stated age resting in bed, in no acute distress  HEENT: atraumatic, normocephalic  Sclera anicteric, conjunctiva are not injected  Posterior oropharynx is without erythema, tonsillar enlargement, or exudates  Uvula elevates midline  No cervical lymphadenopathy  Moist oral mucosa  CARDIOVASCULAR/CHEST: RRR, no M/R/G  2+ radial pulses  PULMONARY: Breathing comfortably on RA  Breath sounds are equal and clear to auscultation  ABDOMEN: non-distended  MSK: There is tenderness with palpation of cervical paraspinal muscle groups without significant spasm  Moves all extremities, no deformities, no peripheral edema, no calf asymmetry  NEURO: Awake, alert, and oriented x 3  Face symmetric  Moves all extremities spontaneously  No focal neurologic deficits  SKIN: Warm, appears well-perfused  MENTAL STATUS: Normal affect      ED Course  Medications   acetaminophen (TYLENOL) tablet 975 mg (975 mg Oral Given 2/5/23 0814)   ketorolac (TORADOL) injection 15 mg (15 mg Intramuscular Given 2/5/23 0814)   diazepam (VALIUM) tablet 5 mg (5 mg Oral Given 2/5/23 0813)   dexamethasone (DECADRON) tablet 10 mg (10 mg Oral Given 2/5/23 184)     53-year-old female presenting with sore throat x3 days, as well as bilateral musculoskeletal neck pain  Concerning sore throat, differential diagnosis includes viral etiologies, allergies, chemical irritation due to vaping, referred symptoms such as due to intrathoracic pathology such as ACS (patient denies chest pain)  There are no red flags such as voice hoarseness and there are no enlarged cervical lymph nodes on physical exam   We will treat patient's sore throat symptomatically with Toradol, Tylenol, as well as Decadron  Concerning bilateral neck pain, no red flags such as trauma or neurologic deficits  Recommend NSAIDs, heating pads, as well as muscle relaxants  We will start patient on cyclobenzaprine in addition to over-the-counter NSAIDs  Follow-up with primary care physician for reevaluation of symptoms

## 2023-02-05 NOTE — DISCHARGE INSTRUCTIONS
Please take motrin and tylenol for your sore throat  You can take flexeril as prescribed for you neck spasms  Do not use flexeril before you drive or use machinery  Please follow-up with primary care doctor and physical therapy

## 2023-02-05 NOTE — ED PROVIDER NOTES
History  Chief Complaint   Patient presents with   • Sore Throat     Pt reports that she stopped smoking 4 days ago and 3 days ago she started with a sore throat  HPI  82-year-old female presents with complaint of 3 days of sore throat  Patient states that she has a sore throat that has been constant for the last 3 days she denies any fever, chills, nausea, vomiting, chest pain, shortness of breath, lateralizing weakness, headache  She tried Tylenol and Motrin for the pain yet last night and that seemed to help  She denies any cough, nasal congestion  Patient also endorses neck muscle spasm which have been bothering her for the last 3 years off and on  Prior to Admission Medications   Prescriptions Last Dose Informant Patient Reported? Taking? Cholecalciferol (VITAMIN D3 PO)   Yes No   Sig: Take by mouth daily   Diclofenac Sodium (VOLTAREN) 1 %   No No   Sig: Apply 2 g topically 4 (four) times a day   acetaminophen (TYLENOL) 500 mg tablet   No No   Sig: Take 1 tablet (500 mg total) by mouth every 6 (six) hours as needed for mild pain   amitriptyline (ELAVIL) 25 mg tablet   No No   Sig: Take 1 tablet (25 mg total) by mouth daily at bedtime   atorvastatin (LIPITOR) 10 mg tablet   No No   Sig: Take 1 tablet (10 mg total) by mouth daily   cyclobenzaprine (FLEXERIL) 10 mg tablet   No No   Sig: Take 1 tablet (10 mg total) by mouth 2 (two) times a day as needed for muscle spasms   ibuprofen (MOTRIN) 200 mg tablet   No No   Sig: Take 1-2 tabs po q6hrs prn pain   levonorgestrel (MIRENA) 20 MCG/24HR IUD   Yes No   Si each by Intrauterine route once   lisinopril-hydrochlorothiazide (PRINZIDE,ZESTORETIC) 10-12 5 MG per tablet   No No   Sig: Take 1 tablet by mouth daily   varenicline 0 5 MG X 11 & 1 MG X 42 tablet therapy pack   No No   Sig: Take 0 5 mg by mouth daily for 3 days, THEN 0 5 mg 2 (two) times a day for 4 days, THEN 1 mg 2 (two) times a day        Facility-Administered Medications: None       Past Medical History:   Diagnosis Date   • GERD (gastroesophageal reflux disease)    • Headache    • Hematuria    • Hypertension    • Lateral epicondylitis, right elbow 2019   • Panic attacks    • Psychiatric disorder        Past Surgical History:   Procedure Laterality Date   • CYSTOSCOPY  2018   • NO PAST SURGERIES         Family History   Problem Relation Age of Onset   • Hypertension Mother    • Colonic polyp Mother    • Arthritis Father    • Diabetes Father    • Hypertension Father    • Hyperlipidemia Father    • Other Brother         TBI from accident    • Colonic polyp Maternal Grandmother    • No Known Problems Sister    • No Known Problems Daughter    • No Known Problems Maternal Grandfather    • No Known Problems Paternal Grandmother    • No Known Problems Paternal Grandfather    • No Known Problems Son    • No Known Problems Sister    • No Known Problems Maternal Aunt    • No Known Problems Maternal Aunt    • No Known Problems Maternal Aunt    • No Known Problems Maternal Aunt    • No Known Problems Maternal Aunt    • No Known Problems Paternal Aunt    • No Known Problems Paternal Aunt    • No Known Problems Paternal Aunt      I have reviewed and agree with the history as documented  E-Cigarette/Vaping   • E-Cigarette Use Never User      E-Cigarette/Vaping Substances   • Nicotine No    • THC No    • CBD No    • Flavoring No    • Other No    • Unknown No      Social History     Tobacco Use   • Smoking status: Former     Packs/day: 0 25     Years: 35 00     Pack years: 8 75     Types: Cigarettes     Quit date: 2023     Years since quittin 0   • Smokeless tobacco: Never   • Tobacco comments:     ONE HALF PACK A DAY OR LESS, CURRENT SOME DAY SMOKER, LIGHT TOBACCO SMOKER  AS PER [employer]   Vaping Use   • Vaping Use: Never used   Substance Use Topics   • Alcohol use: No   • Drug use: No        Review of Systems   Constitutional: Negative for chills and fever  HENT: Positive for sore throat  Negative for congestion, ear pain and rhinorrhea  Eyes: Negative for pain  Respiratory: Negative for apnea, cough, choking, chest tightness, shortness of breath, wheezing and stridor  Cardiovascular: Negative for chest pain, palpitations and leg swelling  Gastrointestinal: Negative for abdominal pain, constipation, diarrhea, nausea and vomiting  Genitourinary: Negative for hematuria  Musculoskeletal: Negative for arthralgias and back pain  Skin: Negative for rash and wound  Neurological: Negative for dizziness  Psychiatric/Behavioral: Negative for agitation and hallucinations  All other systems reviewed and are negative  Physical Exam  ED Triage Vitals   Temperature Pulse Respirations Blood Pressure SpO2   02/05/23 0655 02/05/23 0655 02/05/23 0655 02/05/23 0655 02/05/23 0655   (!) 97 3 °F (36 3 °C) 81 18 159/84 99 %      Temp Source Heart Rate Source Patient Position - Orthostatic VS BP Location FiO2 (%)   02/05/23 0655 02/05/23 0655 02/05/23 0655 02/05/23 0655 --   Tympanic Monitor Sitting Right arm       Pain Score       02/05/23 0814       8             Orthostatic Vital Signs  Vitals:    02/05/23 0655   BP: 159/84   Pulse: 81   Patient Position - Orthostatic VS: Sitting       Physical Exam  Vitals reviewed  Constitutional:       General: She is not in acute distress  Appearance: She is well-developed  HENT:      Head: Normocephalic and atraumatic  Right Ear: External ear normal  No tenderness  Left Ear: External ear normal  No tenderness  Nose: Nose normal  No congestion or rhinorrhea  Mouth/Throat:      Mouth: Mucous membranes are moist       Pharynx: Oropharynx is clear  Uvula midline  No oropharyngeal exudate or posterior oropharyngeal erythema  Tonsils: No tonsillar exudate or tonsillar abscesses  Eyes:      General:         Right eye: No discharge  Left eye: No discharge  Extraocular Movements: Extraocular movements intact  Right eye: Normal extraocular motion  Left eye: Normal extraocular motion  Conjunctiva/sclera: Conjunctivae normal       Pupils: Pupils are equal, round, and reactive to light  Cardiovascular:      Rate and Rhythm: Normal rate and regular rhythm  Pulses: Normal pulses  Heart sounds: Normal heart sounds  Pulmonary:      Effort: Pulmonary effort is normal  No respiratory distress  Breath sounds: Normal breath sounds  No wheezing or rales  Abdominal:      Palpations: Abdomen is soft  Tenderness: There is no abdominal tenderness  Musculoskeletal:         General: No tenderness  Normal range of motion  Cervical back: Normal range of motion and neck supple  No rigidity  Skin:     General: Skin is warm  Findings: No erythema or rash  Neurological:      General: No focal deficit present  Mental Status: She is alert and oriented to person, place, and time  Cranial Nerves: No cranial nerve deficit  Sensory: No sensory deficit  Motor: No weakness  Coordination: Coordination normal    Psychiatric:         Mood and Affect: Mood normal          ED Medications  Medications   lidocaine (LIDODERM) 5 % patch 1 patch (1 patch Topical Medication Applied 2/5/23 0814)   acetaminophen (TYLENOL) tablet 975 mg (975 mg Oral Given 2/5/23 0814)   ketorolac (TORADOL) injection 15 mg (15 mg Intramuscular Given 2/5/23 0814)   diazepam (VALIUM) tablet 5 mg (5 mg Oral Given 2/5/23 0813)   dexamethasone (DECADRON) tablet 10 mg (10 mg Oral Given 2/5/23 0654)       Diagnostic Studies  Results Reviewed     None                 No orders to display         Procedures  Procedures      ED Course                             SBIRT 20yo+    Flowsheet Row Most Recent Value   SBIRT (25 yo +)    In order to provide better care to our patients, we are screening all of our patients for alcohol and drug use  Would it be okay to ask you these screening questions?  No Filed at: 02/05/2023 Cammy Ary [facility]  53-year-old female presents with complaint of 3 days of sore throat  Sore throat/neck muscle spasm  Patient has clear her throat on exam no tenderness palpation of the anterior neck no anterior lymphadenopathy no stridor  Patient endorses neck muscle spasm of the lateral musculature of the neck which is a chronic issue  Patient is given analgesia here in the emergency department has significant relief is given follow-up and return precautions  Muscle spasms of neck: acute illness or injury  Sore throat: acute illness or injury  Risk  OTC drugs  Prescription drug management  Disposition  Final diagnoses:   Sore throat   Muscle spasms of neck     Time reflects when diagnosis was documented in both MDM as applicable and the Disposition within this note     Time User Action Codes Description Comment    2/5/2023  8:02 AM Mk Cortes Add [J02 9] Sore throat     2/5/2023  8:02 AM Mk Cortes Add [B42 580] Muscle spasms of neck       ED Disposition     ED Disposition   Discharge    Condition   Stable    Date/Time   Sun Feb 5, 2023  8:02 AM    Comment   Trevor Triplett discharge to home/self care                 Follow-up Information     Follow up With Specialties Details Why Contact Info Additional [address] Emergency Department Emergency Medicine Go to  If symptoms worsen or if you have additional concerns [address] [id number]  953 [facility] 64 Psychiatric Emergency Department, [address], 517 [facility] Internal Medicine Schedule an appointment as soon as possible for a visit   [address] [facility] [address] [facility] [address]  [facility] [address], [address] [facility], [place], [place], [address] [phone]    Physical Therapy at [place]  [facility] [address] [facility] Physical Therapy Call   Adam Woody [address] [phone] Physical Therapy at [address] [place] [address], [address]          Discharge Medication List as of 2/5/2023  8:31 AM      START taking these medications    Details   !! cyclobenzaprine (FLEXERIL) 10 mg tablet Take 1 tablet (10 mg total) by mouth 2 (two) times a day as needed for muscle spasms, Starting Sun 2/5/2023, Normal       !! - Potential duplicate medications found  Please discuss with provider        CONTINUE these medications which have NOT CHANGED    Details   acetaminophen (TYLENOL) 500 mg tablet Take 1 tablet (500 mg total) by mouth every 6 (six) hours as needed for mild pain, Starting Thu 7/16/2020, Normal      amitriptyline (ELAVIL) 25 mg tablet Take 1 tablet (25 mg total) by mouth daily at bedtime, Starting Tue 8/9/2022, Normal      atorvastatin (LIPITOR) 10 mg tablet Take 1 tablet (10 mg total) by mouth daily, Starting Tue 8/9/2022, Normal      Cholecalciferol (VITAMIN D3 PO) Take by mouth daily, Historical Med      !! cyclobenzaprine (FLEXERIL) 10 mg tablet Take 1 tablet (10 mg total) by mouth 2 (two) times a day as needed for muscle spasms, Starting Tue 8/9/2022, Normal      Diclofenac Sodium (VOLTAREN) 1 % Apply 2 g topically 4 (four) times a day, Starting Tue 7/12/2022, Normal      ibuprofen (MOTRIN) 200 mg tablet Take 1-2 tabs po q6hrs prn pain, Normal      levonorgestrel (MIRENA) 20 MCG/24HR IUD 1 each by Intrauterine route once, Historical Med      lisinopril-hydrochlorothiazide (PRINZIDE,ZESTORETIC) 10-12 5 MG per tablet Take 1 tablet by mouth daily, Starting Tue 8/9/2022, Normal      varenicline 0 5 MG X 11 & 1 MG X 42 tablet therapy pack Multiple Dosages:Starting Fri 12/2/2022, Until Sun 12/4/2022 at 2359, THEN Starting Mon 12/5/2022, Until Thu 12/8/2022 at 2359, THEN Starting Fri 12/9/2022, Until Sat 1/7/2023 at 2359Take 0 5 mg by mouth daily for 3 days, THEN 0 5 mg 2 (two) times a  day for 4 days, THEN 1 mg 2 (two) times a day , Normal       !! - Potential duplicate medications found  Please discuss with provider  No discharge procedures on file  PDMP Review     None           ED Provider  Attending physically available and evaluated Edmund Rasmussen I managed the patient along with the ED Attending      Electronically Signed by         Susi Pennington DO  02/05/23 4606

## 2023-04-06 ENCOUNTER — HOSPITAL ENCOUNTER (EMERGENCY)
Facility: HOSPITAL | Age: 53
Discharge: HOME | End: 2023-04-06
Attending: EMERGENCY MEDICINE

## 2023-04-06 ENCOUNTER — APPOINTMENT (EMERGENCY)
Dept: RADIOLOGY | Facility: HOSPITAL | Age: 53
End: 2023-04-06

## 2023-04-06 VITALS
RESPIRATION RATE: 18 BRPM | TEMPERATURE: 98.2 F | DIASTOLIC BLOOD PRESSURE: 87 MMHG | SYSTOLIC BLOOD PRESSURE: 153 MMHG | OXYGEN SATURATION: 99 % | HEART RATE: 94 BPM

## 2023-04-06 DIAGNOSIS — D17.21 LIPOMA OF RIGHT UPPER EXTREMITY: ICD-10-CM

## 2023-04-06 DIAGNOSIS — M62.838 TRAPEZIUS MUSCLE SPASM: Primary | ICD-10-CM

## 2023-04-06 DIAGNOSIS — R09.89 SCATTERED RHONCHI OF RIGHT LUNG: ICD-10-CM

## 2023-04-06 RX ORDER — CYCLOBENZAPRINE HCL 10 MG
10 TABLET ORAL 2 TIMES DAILY PRN
Qty: 30 TABLET | Refills: 0 | Status: SHIPPED | OUTPATIENT
Start: 2023-04-06 | End: 2023-04-25

## 2023-04-06 RX ORDER — CYCLOBENZAPRINE HCL 10 MG
10 TABLET ORAL ONCE
Status: COMPLETED | OUTPATIENT
Start: 2023-04-06 | End: 2023-04-06

## 2023-04-06 RX ORDER — LIDOCAINE 50 MG/G
1 PATCH TOPICAL ONCE
Status: DISCONTINUED | OUTPATIENT
Start: 2023-04-06 | End: 2023-04-06 | Stop reason: HOSPADM

## 2023-04-06 RX ORDER — KETOROLAC TROMETHAMINE 30 MG/ML
15 INJECTION, SOLUTION INTRAMUSCULAR; INTRAVENOUS ONCE
Status: COMPLETED | OUTPATIENT
Start: 2023-04-06 | End: 2023-04-06

## 2023-04-06 RX ORDER — NAPROXEN 500 MG/1
500 TABLET ORAL 2 TIMES DAILY WITH MEALS
Qty: 30 TABLET | Refills: 0 | Status: SHIPPED | OUTPATIENT
Start: 2023-04-06 | End: 2023-04-25

## 2023-04-06 RX ADMIN — LIDOCAINE 5% 1 PATCH: 700 PATCH TOPICAL at 10:25

## 2023-04-06 RX ADMIN — KETOROLAC TROMETHAMINE 15 MG: 30 INJECTION, SOLUTION INTRAMUSCULAR; INTRAVENOUS at 10:25

## 2023-04-06 RX ADMIN — CYCLOBENZAPRINE HYDROCHLORIDE 10 MG: 10 TABLET, FILM COATED ORAL at 10:25

## 2023-04-06 NOTE — DISCHARGE INSTRUCTIONS
1 - Naproxen 2 times daily with food  2 - Physical therapy referral placed, call for an appointment  3 - Go to your family doctor for the mass  4 - Return to ER for fevers, worsening pain, chest pain, shortness of breath, unable to feel arm, unable to use arm

## 2023-04-06 NOTE — ED PROVIDER NOTES
History  Chief Complaint   Patient presents with   • Shoulder Pain     Pt c/o R shoulder pain and spasms after moving bed a week ago     Boni Johnston is a 50year old female PMHx HTN, Gerd, Right lateral epicondylitis presenting to the ED for right sided shoulder pain and lump under armpit x 1 week  Pain is constantly sharp and tight with radiation into her upper right back  She has been taking ibuprofen with good relief  States she chronically has neck pain and spasms but has worsened since moving her bed about a week ago  Prior to Admission Medications   Prescriptions Last Dose Informant Patient Reported? Taking? Cholecalciferol (VITAMIN D3 PO)   Yes No   Sig: Take by mouth daily   Diclofenac Sodium (VOLTAREN) 1 %   No No   Sig: Apply 2 g topically 4 (four) times a day   acetaminophen (TYLENOL) 500 mg tablet   No No   Sig: Take 1 tablet (500 mg total) by mouth every 6 (six) hours as needed for mild pain   amitriptyline (ELAVIL) 25 mg tablet   No No   Sig: Take 1 tablet (25 mg total) by mouth daily at bedtime   atorvastatin (LIPITOR) 10 mg tablet   No No   Sig: Take 1 tablet (10 mg total) by mouth daily   levonorgestrel (MIRENA) 20 MCG/24HR IUD   Yes No   Si each by Intrauterine route once   lisinopril-hydrochlorothiazide (PRINZIDE,ZESTORETIC) 10-12 5 MG per tablet   No No   Sig: Take 1 tablet by mouth daily   varenicline 0 5 MG X 11 & 1 MG X 42 tablet therapy pack   No No   Sig: Take 0 5 mg by mouth daily for 3 days, THEN 0 5 mg 2 (two) times a day for 4 days, THEN 1 mg 2 (two) times a day        Facility-Administered Medications: None     Past Medical History:   Diagnosis Date   • GERD (gastroesophageal reflux disease)    • Headache    • Hematuria    • Hypertension    • Lateral epicondylitis, right elbow 2019   • Panic attacks    • Psychiatric disorder      Past Surgical History:   Procedure Laterality Date   • CYSTOSCOPY  2018   • NO PAST SURGERIES       Family History   Problem Relation Age of Onset   • Hypertension Mother    • Colonic polyp Mother    • Arthritis Father    • Diabetes Father    • Hypertension Father    • Hyperlipidemia Father    • Other Brother         TBI from accident    • Colonic polyp Maternal Grandmother    • No Known Problems Sister    • No Known Problems Daughter    • No Known Problems Maternal Grandfather    • No Known Problems Paternal Grandmother    • No Known Problems Paternal Grandfather    • No Known Problems Son    • No Known Problems Sister    • No Known Problems Maternal Aunt    • No Known Problems Maternal Aunt    • No Known Problems Maternal Aunt    • No Known Problems Maternal Aunt    • No Known Problems Maternal Aunt    • No Known Problems Paternal Aunt    • No Known Problems Paternal Aunt    • No Known Problems Paternal Aunt      I have reviewed and agree with the history as documented  E-Cigarette/Vaping   • E-Cigarette Use Never User      E-Cigarette/Vaping Substances   • Nicotine No    • THC No    • CBD No    • Flavoring No    • Other No    • Unknown No      Social History     Tobacco Use   • Smoking status: Former     Packs/day: 0 25     Years: 35 00     Pack years: 8 75     Types: Cigarettes     Quit date: 2023     Years since quittin 1   • Smokeless tobacco: Never   • Tobacco comments:     ONE HALF PACK A DAY OR LESS, CURRENT SOME DAY SMOKER, LIGHT TOBACCO SMOKER  AS PER [employer]TS   Vaping Use   • Vaping Use: Never used   Substance Use Topics   • Alcohol use: No   • Drug use: No     Review of Systems   Constitutional: Negative for chills, diaphoresis, fatigue and fever  Eyes: Negative for photophobia and visual disturbance  Respiratory: Negative for cough and shortness of breath  Cardiovascular: Negative for chest pain and palpitations  Gastrointestinal: Negative for abdominal pain, nausea and vomiting  Musculoskeletal: Positive for arthralgias (shoulder), back pain (chronic), joint swelling (shoulder) and neck pain   Negative for neck stiffness  Skin: Negative for rash and wound  Neurological: Positive for headaches (occasional, not currently)  Negative for weakness, light-headedness and numbness  Physical Exam  Physical Exam  Vitals reviewed  Constitutional:       General: She is not in acute distress  Appearance: Normal appearance  She is not ill-appearing, toxic-appearing or diaphoretic  HENT:      Head: Normocephalic and atraumatic  Right Ear: External ear normal       Left Ear: External ear normal       Nose: Nose normal    Eyes:      General: No scleral icterus  Right eye: No discharge  Left eye: No discharge  Extraocular Movements: Extraocular movements intact  Conjunctiva/sclera: Conjunctivae normal    Cardiovascular:      Rate and Rhythm: Normal rate and regular rhythm  Heart sounds: Normal heart sounds  Pulmonary:      Effort: Pulmonary effort is normal  No respiratory distress  Breath sounds: Rhonchi present  Chest:      Chest wall: No tenderness  Musculoskeletal:         General: Normal range of motion  Left shoulder: Normal  No swelling or laceration  Normal range of motion  Normal strength  Normal pulse  Arms:       Cervical back: Normal range of motion and neck supple  Tenderness present  No rigidity  Lymphadenopathy:      Cervical: No cervical adenopathy  Skin:     General: Skin is warm and dry  Capillary Refill: Capillary refill takes less than 2 seconds  Comments: Fatty, mobile, small mass in the right axilla region mid-axilla place  Without tenderness  Exam consistent with possible lipoma  No suspicion for abscess, infection  Neurological:      General: No focal deficit present  Mental Status: She is alert  Cranial Nerves: No facial asymmetry  Sensory: Sensation is intact  Motor: Motor function is intact  Coordination: Coordination is intact  Comments: Upper extremity strength 5/5 bilaterally    Accessory nerve intact  Rotator cuff intact and without deficits on exam    Psychiatric:         Mood and Affect: Mood normal          Behavior: Behavior normal        Vital Signs  ED Triage Vitals   Temperature Pulse Respirations Blood Pressure SpO2   04/06/23 0927 04/06/23 0927 04/06/23 0927 04/06/23 0927 04/06/23 0927   98 2 °F (36 8 °C) 94 18 153/87 99 %      Temp Source Heart Rate Source Patient Position - Orthostatic VS BP Location FiO2 (%)   04/06/23 0927 04/06/23 0927 04/06/23 0927 04/06/23 0927 --   Oral Monitor Lying Right arm       Pain Score       04/06/23 1025       10 - Worst Possible Pain         Vitals:    04/06/23 0927   BP: 153/87   Pulse: 94   Patient Position - Orthostatic VS: Lying     Visual Acuity    ED Medications  Medications   lidocaine (LIDODERM) 5 % patch 1 patch (1 patch Topical Medication Applied 4/6/23 1025)   ketorolac (TORADOL) injection 15 mg (15 mg Intramuscular Given 4/6/23 1025)   cyclobenzaprine (FLEXERIL) tablet 10 mg (10 mg Oral Given 4/6/23 1025)       Diagnostic Studies  Results Reviewed     None             XR chest 1 view portable   ED Interpretation by Rosy Rodriguez PA-C (04/06 1015)   No acute cardiopulmonary pathology  Final Result by Ivan Cleveland MD (04/06 1104)      No acute cardiopulmonary disease  Workstation performed: [id number]         XR shoulder 2+ views RIGHT   ED Interpretation by Rosy Rodriguez PA-C (04/06 1015)   No acute osseous abnormality  Final Result by Ivan Cleveland MD (04/06 1104)      No acute osseous abnormality  Workstation performed: [id number]                Procedures  Procedures     ED Course  ED Course as of 04/06/23 1127   Thu Apr 06, 2023   6585 Blood Pressure: 153/87   0954 Temperature: 98 2 °F (36 8 °C)   0954 Pulse: 94   0954 Respirations: 18   0954 SpO2: 99 %   1015 XR chest 1 view portable  Per my read, no acute cardiopulmonary pathology     1015 XR shoulder 2+ views RIGHT  Per my interpretation, no acute osseous pathology  Medical Decision Making  Used  #[id number] for the initial encounter  50year old female presenting to the ED for right sided neck/shoulder pain and mass under the axilla region x 1 week s/p moving her bed  She has chronic history of shoulder spasms seen in the ED prior for which lidoderm patches worked well for her  Ibuprofen at home has been managing the pain well  The mass is without tenderness, erythema, drainage  Recent mammogram in February 2023  Physical exam is with point tenderness throughout the trapezius and shoulder joint, mass consistent with lipoma noted to the right axilla region  Full rom, strength 5/5, pulses intact, sensation intact  Accessory nerve intact  Rhonchi noted to the right lung upper and middle lobes  CXR - to evaluate the rhonchi, pt not endorsing cough or shortness of breath  Per my interpretation, without acute cardiopulmonary pathology  Radiology confirmed  Shoulder x rays - per my interpretation, no acute osseous abnormality  Radiology confirmed  Toradol, Flexeril, and Lidoderm patch provided for pain relief which helped per patient report  Discussed supportive care for suspected trapezius muscle strain, light stretches with heat application and physical therapy referral  Naproxen for further pain relief and Flexeril for additional  Discussed Salonpas over the counter pain patches which are cheaper options that the lidoderm patches  Suggest PCP discussion about suspected lipoma, patient has appointment in a few days  Patient stable at time of discharge, vital signs reviewed, questions answered  Strict return to the ED precautions provided  Lipoma of right upper extremity:     Details: located in right axilla region  Scattered rhonchi of right lung:     Details: upper and middle lobes, no cough or shortness of breath endorsed    Amount and/or Complexity of Data Reviewed  External Data Reviewed: labs, radiology and notes  Radiology: ordered and independent interpretation performed  Decision-making details documented in ED Course  Details: CXR - no acute cardiopulmonary pathology per my interpretation  Compared to previous CXR in 2017  Shoulder x rays - no acute osseous abnormality per my interpretation  Discussion of management or test interpretation with external provider(s): Discussed with my attending, Dr Shannon Zelaya, the appearance of the fatty tissue who agrees it is consistent with lipoma and can have outpatient follow up with her PCP for further evaluation  Risk  Prescription drug management  Disposition  Final diagnoses:   Trapezius muscle spasm   Lipoma of right upper extremity - axilla region   Scattered rhonchi of right lung - unremarkable CXR     Time reflects when diagnosis was documented in both MDM as applicable and the Disposition within this note     Time User Action Codes Description Comment    4/6/2023 11:03 AM Murl Seamen Add [U24 147] Trapezius muscle spasm     4/6/2023 11:03 AM Murl Seamen Add [D17 21] Lipoma of right upper extremity     4/6/2023 11:03 AM Murl Seamen Modify [D17 21] Lipoma of right upper extremity axilla region    4/6/2023 11:07 AM Murl Seamen Add [R09 89] Scattered rhonchi of right lung     4/6/2023 11:07 AM Murl Seamen Modify [R09 89] Scattered rhonchi of right lung unremarkable CXR      ED Disposition     ED Disposition   Discharge    Condition   Stable    Date/Time   Thu Apr 6, 2023 11:03 AM    Comment   Tsering Rodriguez discharge to home/self care                 Follow-up Information     Follow up With Specialties Details Why Contact Info    Nik Sweet MD [facility] Medicine Schedule an appointment as soon as possible for a visit  For evaluation of the mass Td Agrawal [address]  [phone]            Discharge Medication List as of 4/6/2023 11:08 AM      START taking these medications    Details   cyclobenzaprine (FLEXERIL) 10 mg tablet Take 1 tablet (10 mg total) by mouth 2 (two) times a day as needed for muscle spasms for up to 15 days, Starting Thu 4/6/2023, Until Fri 4/21/2023 at 2359, Normal      naproxen (Naprosyn) 500 mg tablet Take 1 tablet (500 mg total) by mouth 2 (two) times a day with meals for 15 days, Starting Thu 4/6/2023, Until Fri 4/21/2023, Normal         CONTINUE these medications which have NOT CHANGED    Details   acetaminophen (TYLENOL) 500 mg tablet Take 1 tablet (500 mg total) by mouth every 6 (six) hours as needed for mild pain, Starting Thu 7/16/2020, Normal      amitriptyline (ELAVIL) 25 mg tablet Take 1 tablet (25 mg total) by mouth daily at bedtime, Starting Tue 8/9/2022, Normal      atorvastatin (LIPITOR) 10 mg tablet Take 1 tablet (10 mg total) by mouth daily, Starting Tue 8/9/2022, Normal      Cholecalciferol (VITAMIN D3 PO) Take by mouth daily, Historical Med      Diclofenac Sodium (VOLTAREN) 1 % Apply 2 g topically 4 (four) times a day, Starting Tue 7/12/2022, Normal      levonorgestrel (MIRENA) 20 MCG/24HR IUD 1 each by Intrauterine route once, Historical Med      lisinopril-hydrochlorothiazide (PRINZIDE,ZESTORETIC) 10-12 5 MG per tablet Take 1 tablet by mouth daily, Starting Tue 8/9/2022, Normal      varenicline 0 5 MG X 11 & 1 MG X 42 tablet therapy pack Multiple Dosages:Starting Fri 12/2/2022, Until Sun 12/4/2022 at 2359, THEN Starting Mon 12/5/2022, Until Thu 12/8/2022 at 2359, THEN Starting Fri 12/9/2022, Until Sat 1/7/2023 at 2359Take 0 5 mg by mouth daily for 3 days, THEN 0 5 mg 2 (two) times a  day for 4 days, THEN 1 mg 2 (two) times a day , Normal               PDMP Review     None        ED Provider  Electronically Signed by           Janie Hudson PA-C  04/06/23 8759

## 2023-04-25 ENCOUNTER — OFFICE VISIT (OUTPATIENT)
Dept: FAMILY MEDICINE CLINIC | Facility: CLINIC | Age: 53
End: 2023-04-25

## 2023-04-25 VITALS
OXYGEN SATURATION: 100 % | WEIGHT: 143 LBS | BODY MASS INDEX: 22.98 KG/M2 | DIASTOLIC BLOOD PRESSURE: 86 MMHG | HEART RATE: 89 BPM | TEMPERATURE: 97.9 F | RESPIRATION RATE: 20 BRPM | SYSTOLIC BLOOD PRESSURE: 127 MMHG | HEIGHT: 66 IN

## 2023-04-25 DIAGNOSIS — Z11.59 NEED FOR HEPATITIS C SCREENING TEST: ICD-10-CM

## 2023-04-25 DIAGNOSIS — Z00.00 ANNUAL PHYSICAL EXAM: Primary | ICD-10-CM

## 2023-04-25 DIAGNOSIS — E78.2 MIXED HYPERLIPIDEMIA: ICD-10-CM

## 2023-04-25 DIAGNOSIS — I10 PRIMARY HYPERTENSION: ICD-10-CM

## 2023-04-25 NOTE — ASSESSMENT & PLAN NOTE
Mild maladies noted on exam   Check labs    -Patient had mammogram in 2022 which was normal   -Pap smear done in 2020, follow-up in 2025   -Follow-up 4 weeks for smoking cessation counseling via PCP

## 2023-04-25 NOTE — ASSESSMENT & PLAN NOTE
BP: 127/86 mmHg, recheck at goal    -Continue lisinopril HCTZ 10-12 5 mg  -Continue lifestyle modifications, check labs including CMP, lipid panel and TSH

## 2023-04-25 NOTE — PROGRESS NOTES
ADULT ANNUAL PHYSICAL  [facility]    NAME: Gerardo Diaz  AGE: 50 y o  SEX: female  : 1970     DATE: 2023     Assessment and Plan:     Problem List Items Addressed This Visit        Cardiovascular and Mediastinum    Hypertension     BP: 127/86 mmHg, recheck at goal    -Continue lisinopril HCTZ 10-12 5 mg  -Continue lifestyle modifications, check labs including CMP, lipid panel and TSH         Relevant Orders    Comprehensive metabolic panel    TSH, 3rd generation with Free T4 reflex       Other    Annual physical exam - Primary     Mild maladies noted on exam   Check labs  -Patient had mammogram in  which was normal   -Pap smear done in , follow-up in    -Follow-up 4 weeks for smoking cessation counseling via PCP         Mixed hyperlipidemia     Patient tolerating 10 mg of atorvastatin daily well, continue lifestyle modifications   -Check lipid panel         Relevant Orders    Lipid Panel with Direct LDL reflex    Need for hepatitis C screening test     Order hepatitis C screening  Relevant Orders    Hepatitis C antibody       Immunizations and preventive care screenings were discussed with patient today  Appropriate education was printed on patient's after visit summary  Counseling:  Alcohol/drug use: discussed moderation in alcohol intake, the recommendations for healthy alcohol use, and avoidance of illicit drug use  Dental Health: discussed importance of regular tooth brushing, flossing, and dental visits  Injury prevention: discussed safety/seat belts, safety helmets, smoke detectors, carbon dioxide detectors, and smoking near bedding or upholstery  Sexual health: discussed sexually transmitted diseases, partner selection, use of condoms, avoidance of unintended pregnancy, and contraceptive alternatives  · Exercise: the importance of regular exercise/physical activity was discussed  Recommend exercise 3-5 times per week for at least 30 minutes  No follow-ups on file  Chief Complaint:     Chief Complaint   Patient presents with   • Annual Exam     No complaint and no form to be completed      History of Present Illness:     Adult Annual Physical   Patient here for a comprehensive physical exam  The patient reports no problems  Diet and Physical Activity  · Diet/Nutrition: well balanced diet  · Exercise: walking and 1-2 times a week on average  Depression Screening  PHQ-2/9 Depression Screening    Little interest or pleasure in doing things: 0 - not at all  Feeling down, depressed, or hopeless: 0 - not at all  Trouble falling or staying asleep, or sleeping too much: 0 - not at all  Feeling tired or having little energy: 0 - not at all  Poor appetite or overeatin - not at all  Feeling bad about yourself - or that you are a failure or have let yourself or your family down: 0 - not at all  Trouble concentrating on things, such as reading the newspaper or watching television: 0 - not at all  Moving or speaking so slowly that other people could have noticed  Or the opposite - being so fidgety or restless that you have been moving around a lot more than usual: 0 - not at all  Thoughts that you would be better off dead, or of hurting yourself in some way: 0 - not at all  PHQ-9 Score: 0   PHQ-9 Interpretation: No or Minimal depression        General Health  · Sleep: sleeps well and gets 7-8 hours of sleep on average  · Hearing: normal - bilateral   · Vision: goes for regular eye exams and wears glasses  · Dental: regular dental visits and brushes teeth twice daily  /GYN Health  · Patient is: postmenopausal  · Last menstrual period: N/A  · Contraceptive method: IUD placement  Review of Systems:     Review of Systems   Constitutional: Negative for chills and fever  HENT: Negative for ear pain and sore throat  Eyes: Negative for pain and visual disturbance  "  Respiratory: Negative for cough and shortness of breath  Cardiovascular: Negative for chest pain and palpitations  Gastrointestinal: Negative for abdominal pain and vomiting  Genitourinary: Negative for dysuria and hematuria  Musculoskeletal: Negative for arthralgias and back pain  Skin: Negative for color change and rash  Neurological: Negative for seizures and syncope  All other systems reviewed and are negative  Past Medical History:     Past Medical History:   Diagnosis Date   • GERD (gastroesophageal reflux disease)    • Headache    • Hematuria    • Hypertension    • Lateral epicondylitis, right elbow 2019   • Panic attacks    • Psychiatric disorder       Past Surgical History:     Past Surgical History:   Procedure Laterality Date   • CYSTOSCOPY  2018   • NO PAST SURGERIES        Social History:     Social History     Socioeconomic History   • Marital status: Single     Spouse name: None   • Number of children: None   • Years of education: None   • Highest education level: 12th grade   Occupational History   • Occupation: HOUSEWIFE OR HOMEMAKER   Tobacco Use   • Smoking status: Some Days     Packs/day: 0 25     Years: 35 00     Pack years: 8 75     Types: Cigarettes     Last attempt to quit: 2023     Years since quittin 2   • Smokeless tobacco: Never   • Tobacco comments:     ONE HALF PACK A DAY OR LESS, CURRENT SOME DAY SMOKER, LIGHT TOBACCO SMOKER  AS PER [employer]SCRIPTS   Vaping Use   • Vaping Use: Some days   Substance and Sexual Activity   • Alcohol use: No   • Drug use: No   • Sexual activity: Not Currently     Partners: Male     Birth control/protection: I U D     Other Topics Concern   • None   Social History Narrative    ENGAGES IN A HOBBY - \"[employer]\"    LIVES WITH FAMILY    UNEMPLOYED     Social Determinants of Health     Financial Resource Strain: Low Risk    • Difficulty of Paying Living Expenses: Not hard at all   Food Insecurity: No Food Insecurity   • " Worried About [address] [email or web address] in the Last Year: Never true   • Ran Out of Food in the Last Year: Never true   Transportation Needs: No Transportation Needs   • Lack of Transportation (Medical): No   • Lack of Transportation (Non-Medical):  No   Physical Activity: Inactive   • Days of Exercise per Week: 0 days   • Minutes of Exercise per Session: 0 min   Stress: No Stress Concern Present   • Feeling of Stress : Not at all   Social Connections: Socially Isolated   • Frequency of Communication with Friends and Family: More than three times a week   • Frequency of Social Gatherings with Friends and Family: More than three times a week   • Attends Hoahaoism Services: Never   • Active Member of Clubs or Organizations: No   • Attends Club or Organization Meetings: Never   • Marital Status: Never    Intimate Partner Violence: Not At Risk   • Fear of Current or Ex-Partner: No   • Emotionally Abused: No   • Physically Abused: No   • Sexually Abused: No   Housing Stability: Low Risk    • Unable to Pay for Housing in the Last Year: No   • Number of Places Lived in the Last Year: 1   • Unstable Housing in the Last Year: No      Family History:     Family History   Problem Relation Age of Onset   • Hypertension Mother    • Colonic polyp Mother    • Arthritis Father    • Diabetes Father    • Hypertension Father    • Hyperlipidemia Father    • Other Brother         TBI from accident    • Colonic polyp Maternal Grandmother    • No Known Problems Sister    • No Known Problems Daughter    • No Known Problems Maternal Grandfather    • No Known Problems Paternal Grandmother    • No Known Problems Paternal Grandfather    • No Known Problems Son    • No Known Problems Sister    • No Known Problems Maternal Aunt    • No Known Problems Maternal Aunt    • No Known Problems Maternal Aunt    • No Known Problems Maternal Aunt    • No Known Problems Maternal Aunt    • No Known Problems Paternal Aunt    • No Known Problems Paternal Aunt "   • No Known Problems Paternal Aunt       Current Medications:     Current Outpatient Medications   Medication Sig Dispense Refill   • acetaminophen (TYLENOL) 500 mg tablet Take 1 tablet (500 mg total) by mouth every 6 (six) hours as needed for mild pain 30 tablet 0   • amitriptyline (ELAVIL) 25 mg tablet Take 1 tablet (25 mg total) by mouth daily at bedtime 30 tablet 2   • atorvastatin (LIPITOR) 10 mg tablet Take 1 tablet (10 mg total) by mouth daily 90 tablet 2   • Cholecalciferol (VITAMIN D3 PO) Take by mouth daily     • cyclobenzaprine (FLEXERIL) 10 mg tablet Take 1 tablet (10 mg total) by mouth 2 (two) times a day as needed for muscle spasms for up to 15 days 30 tablet 0   • Diclofenac Sodium (VOLTAREN) 1 % Apply 2 g topically 4 (four) times a day 2 g 0   • levonorgestrel (MIRENA) 20 MCG/24HR IUD 1 each by Intrauterine route once     • lisinopril-hydrochlorothiazide (PRINZIDE,ZESTORETIC) 10-12 5 MG per tablet Take 1 tablet by mouth daily 90 tablet 2   • naproxen (Naprosyn) 500 mg tablet Take 1 tablet (500 mg total) by mouth 2 (two) times a day with meals for 15 days 30 tablet 0   • varenicline 0 5 MG X 11 & 1 MG X 42 tablet therapy pack Take 0 5 mg by mouth daily for 3 days, THEN 0 5 mg 2 (two) times a day for 4 days, THEN 1 mg 2 (two) times a day  (Patient not taking: Reported on 4/25/2023) 53 each 0     No current facility-administered medications for this visit  Allergies:     No Known Allergies   Physical Exam:     /86 (BP Location: Left arm, Patient Position: Sitting, Cuff Size: Standard)   Pulse 89   Temp 97 9 °F (36 6 °C) (Temporal)   Resp 20   Ht 5' 5 7\" (1 669 m)   Wt 64 9 kg (143 lb)   SpO2 100%   BMI 23 29 kg/m²     Physical Exam  Vitals reviewed  Constitutional:       General: She is not in acute distress  Appearance: Normal appearance  She is well-developed  HENT:      Head: Normocephalic and atraumatic     Eyes:      Conjunctiva/sclera: Conjunctivae normal  " Cardiovascular:      Rate and Rhythm: Normal rate and regular rhythm  Heart sounds: No murmur heard  Pulmonary:      Effort: Pulmonary effort is normal  No respiratory distress  Breath sounds: Normal breath sounds  Abdominal:      Palpations: Abdomen is soft  Tenderness: There is no abdominal tenderness  Musculoskeletal:         General: No swelling  Cervical back: Neck supple  Skin:     General: Skin is warm and dry  Capillary Refill: Capillary refill takes less than 2 seconds  Neurological:      Mental Status: She is alert     Psychiatric:         Mood and Affect: Mood normal           Ricardo Amador, DO  [address]

## 2023-04-25 NOTE — ASSESSMENT & PLAN NOTE
Patient tolerating 10 mg of atorvastatin daily well, continue lifestyle modifications   -Check lipid panel

## 2023-05-09 ENCOUNTER — APPOINTMENT (OUTPATIENT)
Dept: LAB | Facility: CLINIC | Age: 53
End: 2023-05-09

## 2023-05-09 DIAGNOSIS — I10 PRIMARY HYPERTENSION: ICD-10-CM

## 2023-05-09 DIAGNOSIS — Z11.59 NEED FOR HEPATITIS C SCREENING TEST: ICD-10-CM

## 2023-05-09 DIAGNOSIS — E78.2 MIXED HYPERLIPIDEMIA: ICD-10-CM

## 2023-05-09 LAB
ALBUMIN SERPL BCP-MCNC: 4 G/DL (ref 3.5–5)
ALP SERPL-CCNC: 68 U/L (ref 46–116)
ALT SERPL W P-5'-P-CCNC: 25 U/L (ref 12–78)
ANION GAP SERPL CALCULATED.3IONS-SCNC: 0 MMOL/L (ref 4–13)
AST SERPL W P-5'-P-CCNC: 18 U/L (ref 5–45)
BILIRUB SERPL-MCNC: 0.26 MG/DL (ref 0.2–1)
BUN SERPL-MCNC: 9 MG/DL (ref 5–25)
CALCIUM SERPL-MCNC: 9.1 MG/DL (ref 8.3–10.1)
CHLORIDE SERPL-SCNC: 108 MMOL/L (ref 96–108)
CHOLEST SERPL-MCNC: 142 MG/DL
CO2 SERPL-SCNC: 30 MMOL/L (ref 21–32)
CREAT SERPL-MCNC: 0.72 MG/DL (ref 0.6–1.3)
GFR SERPL CREATININE-BSD FRML MDRD: 97 ML/MIN/1.73SQ M
GLUCOSE P FAST SERPL-MCNC: 101 MG/DL (ref 65–99)
HCV AB SER QL: REACTIVE
HDLC SERPL-MCNC: 34 MG/DL
LDLC SERPL CALC-MCNC: 89 MG/DL (ref 0–100)
POTASSIUM SERPL-SCNC: 4.1 MMOL/L (ref 3.5–5.3)
PROT SERPL-MCNC: 7.3 G/DL (ref 6.4–8.4)
SODIUM SERPL-SCNC: 138 MMOL/L (ref 135–147)
TRIGL SERPL-MCNC: 94 MG/DL
TSH SERPL DL<=0.05 MIU/L-ACNC: 0.74 UIU/ML (ref 0.45–4.5)

## 2023-06-16 ENCOUNTER — OFFICE VISIT (OUTPATIENT)
Dept: FAMILY MEDICINE CLINIC | Facility: CLINIC | Age: 53
End: 2023-06-16

## 2023-06-16 VITALS
BODY MASS INDEX: 23.23 KG/M2 | WEIGHT: 142.6 LBS | TEMPERATURE: 97.8 F | DIASTOLIC BLOOD PRESSURE: 87 MMHG | HEART RATE: 84 BPM | OXYGEN SATURATION: 98 % | SYSTOLIC BLOOD PRESSURE: 136 MMHG

## 2023-06-16 DIAGNOSIS — F17.200 SMOKER: ICD-10-CM

## 2023-06-16 DIAGNOSIS — E78.2 MIXED HYPERLIPIDEMIA: ICD-10-CM

## 2023-06-16 DIAGNOSIS — Z23 NEED FOR PNEUMOCOCCAL 20-VALENT CONJUGATE VACCINATION: ICD-10-CM

## 2023-06-16 DIAGNOSIS — I10 PRIMARY HYPERTENSION: Primary | ICD-10-CM

## 2023-06-16 PROBLEM — Z71.6 ENCOUNTER FOR TOBACCO USE CESSATION COUNSELING: Status: ACTIVE | Noted: 2023-06-16

## 2023-06-16 PROCEDURE — 3079F DIAST BP 80-89 MM HG: CPT | Performed by: FAMILY MEDICINE

## 2023-06-16 PROCEDURE — 3075F SYST BP GE 130 - 139MM HG: CPT | Performed by: FAMILY MEDICINE

## 2023-06-16 PROCEDURE — 90677 PCV20 VACCINE IM: CPT | Performed by: FAMILY MEDICINE

## 2023-06-16 PROCEDURE — 90471 IMMUNIZATION ADMIN: CPT | Performed by: FAMILY MEDICINE

## 2023-06-16 PROCEDURE — 99214 OFFICE O/P EST MOD 30 MIN: CPT | Performed by: FAMILY MEDICINE

## 2023-06-16 NOTE — PROGRESS NOTES
Name: Mary Avila      : 1970      MRN: [id number]  Encounter Provider: Shanice Steiner DO  Encounter Date: 2023   Encounter department: [address] [place] [address]     1  Primary hypertension  Assessment & Plan:  BP: 136/87 mmHg  - Continue lisinopril HCTZ 10-12 5 mg  - Continue lifestyle modifications      2  Smoker  Assessment & Plan:  Tobacco Cessation Counseling: Tobacco cessation counseling and education was provided  The patient is sincerely urged to quit consumption of tobacco  She is ready to quit tobacco  The numerous health risks of tobacco consumption were discussed  Prescribed the following medications: varenicline (Chantix)  Patient currently smokes 2 cigarettes a day  encouraged Chantix compliance      3  Mixed hyperlipidemia  Assessment & Plan:  Patient tolerating 10 mg of atorvastatin daily well, continue lifestyle modifications  Lipid       Lab Results   Component Value Date    CHOLESTEROL 142 2023    TRIG 94 2023    HDL 34 (L) 2023    LDLCALC 89 2023           4  Need for pneumococcal 20-valent conjugate vaccination  -     Pneumococcal Conjugate Vaccine 20-valent (Pcv20)  -     Mammo screening bilateral w 3d & cad; Future; Expected date: 2023         Subjective     HPI   Pt is here for general follow up  She says she is feeling overall well  Pt reports her home SBP is never >140; on average it is 120s  Pt uses muscle relaxant sometimes at night for back pain  She also takes amitriptyline some nights for sleep and pain  She does not take the muscle relaxant and amitriptyline the same night, only one or the other, or neither  Lipitor 10 tolerating well  Pt has adopted her brother's son Olivia Mcclellan) because her brother has brain damage after a car accident  Pt's  passed away in the same car accident  Pt is taking varenicline but not daily   She used to smoke 1 cig box over 4-5 days prior to Chantix  Now pt smokes about 2 cigarettes a day  She reports that her last period was at 37 yo  Her mom and mother's sister also reached menopause in their early 45s  Review of Systems   Constitutional: Negative for fatigue, fever and unexpected weight change  HENT: Negative for congestion, sore throat and tinnitus  Respiratory: Negative for cough and shortness of breath  Cardiovascular: Negative for chest pain  Gastrointestinal: Negative for abdominal pain, blood in stool, constipation and diarrhea  Genitourinary: Negative for dysuria, hematuria and vaginal bleeding  Musculoskeletal: Positive for back pain (sometimes)  Neurological: Negative for dizziness and headaches  Psychiatric/Behavioral: Negative for hallucinations and sleep disturbance         Past Medical History:   Diagnosis Date   • GERD (gastroesophageal reflux disease)    • Headache    • Hematuria    • Hypertension    • Lateral epicondylitis, right elbow 1/8/2019   • Panic attacks    • Psychiatric disorder      Past Surgical History:   Procedure Laterality Date   • CYSTOSCOPY  06/08/2018   • NO PAST SURGERIES       Family History   Problem Relation Age of Onset   • Hypertension Mother    • Colonic polyp Mother    • Arthritis Father    • Diabetes Father    • Hypertension Father    • Hyperlipidemia Father    • Other Brother         TBI from accident    • Colonic polyp Maternal Grandmother    • No Known Problems Sister    • No Known Problems Daughter    • No Known Problems Maternal Grandfather    • No Known Problems Paternal Grandmother    • No Known Problems Paternal Grandfather    • No Known Problems Son    • No Known Problems Sister    • No Known Problems Maternal Aunt    • No Known Problems Maternal Aunt    • No Known Problems Maternal Aunt    • No Known Problems Maternal Aunt    • No Known Problems Maternal Aunt    • No Known Problems Paternal Aunt    • No Known Problems Paternal Aunt    • No Known Problems Paternal Aunt "    Social History     Socioeconomic History   • Marital status: Single     Spouse name: None   • Number of children: None   • Years of education: None   • Highest education level: 12th grade   Occupational History   • Occupation: HOUSEWIFE OR HOMEMAKER   Tobacco Use   • Smoking status: Some Days     Packs/day: 0 25     Years: 35 00     Total pack years: 8 75     Types: Cigarettes     Last attempt to quit: 2023     Years since quittin 3   • Smokeless tobacco: Never   • Tobacco comments:     ONE HALF PACK A DAY OR LESS, CURRENT SOME DAY SMOKER, LIGHT TOBACCO SMOKER  AS PER [employer]TS   Vaping Use   • Vaping Use: Some days   Substance and Sexual Activity   • Alcohol use: No   • Drug use: No   • Sexual activity: Not Currently     Partners: Male     Birth control/protection: I U D  Other Topics Concern   • None   Social History Narrative    ENGAGES IN A HOBBY - \"PLAYS [employer]\"    LIVES WITH FAMILY    UNEMPLOYED     Social Determinants of Health     Financial Resource Strain: Low Risk  (2023)    Overall Financial Resource Strain (CARDIA)    • Difficulty of Paying Living Expenses: Not hard at all   Food Insecurity: No Food Insecurity (2023)    Hunger Vital Sign    • Worried About Running Out of Food in the Last Year: Never true    • Ran Out of Food in the Last Year: Never true   Transportation Needs: No Transportation Needs (2023)    PRAPARE - Transportation    • Lack of Transportation (Medical): No    • Lack of Transportation (Non-Medical):  No   Physical Activity: Inactive (2023)    Exercise Vital Sign    • Days of Exercise per Week: 0 days    • Minutes of Exercise per Session: 0 min   Stress: No Stress Concern Present (2023)    [address]    • Feeling of Stress : Not at all   Social Connections: Socially Isolated (2023)    Social Connection and Isolation Panel [NHANES]    • Frequency of Communication with Friends " and Family: More than three times a week    • Frequency of Social Gatherings with Friends and Family: More than three times a week    • Attends Denominational Services: Never    • Active Member of Clubs or Organizations: No    • Attends Club or Organization Meetings: Never    • Marital Status: Never    Intimate Partner Violence: Not At Risk (4/25/2023)    Humiliation, Afraid, Rape, and Kick questionnaire    • Fear of Current or Ex-Partner: No    • Emotionally Abused: No    • Physically Abused: No    • Sexually Abused: No   Housing Stability: Low Risk  (4/25/2023)    Housing Stability Vital Sign    • Unable to Pay for Housing in the Last Year: No    • Number of [place] in the Last Year: 1    • Unstable Housing in the Last Year: No     Current Outpatient Medications on File Prior to Visit   Medication Sig   • acetaminophen (TYLENOL) 500 mg tablet Take 1 tablet (500 mg total) by mouth every 6 (six) hours as needed for mild pain   • amitriptyline (ELAVIL) 25 mg tablet Take 1 tablet (25 mg total) by mouth daily at bedtime   • atorvastatin (LIPITOR) 10 mg tablet Take 1 tablet (10 mg total) by mouth daily   • Cholecalciferol (VITAMIN D3 PO) Take by mouth daily   • cyclobenzaprine (FLEXERIL) 10 mg tablet Take 1 tablet (10 mg total) by mouth 2 (two) times a day as needed for muscle spasms for up to 15 days   • Diclofenac Sodium (VOLTAREN) 1 % Apply 2 g topically 4 (four) times a day   • levonorgestrel (MIRENA) 20 MCG/24HR IUD 1 each by Intrauterine route once   • lisinopril-hydrochlorothiazide (PRINZIDE,ZESTORETIC) 10-12 5 MG per tablet Take 1 tablet by mouth daily   • naproxen (Naprosyn) 500 mg tablet Take 1 tablet (500 mg total) by mouth 2 (two) times a day with meals for 15 days   • varenicline 0 5 MG X 11 & 1 MG X 42 tablet therapy pack Take 0 5 mg by mouth daily for 3 days, THEN 0 5 mg 2 (two) times a day for 4 days, THEN 1 mg 2 (two) times a day       No Known Allergies  Immunization History   Administered Date(s) Administered   • Influenza Quadrivalent, 6-35 Months IM 01/22/2016, 10/26/2016   • Influenza, injectable, quadrivalent, preservative free 0 5 mL 11/11/2019, 09/16/2021   • Pneumococcal Conjugate Vaccine 20-valent (Pcv20), Polysace 06/16/2023   • Pneumococcal Polysaccharide PPV23 03/03/2022   • Tdap 03/03/2016   • Tuberculin Skin Test-PPD Intradermal 08/30/2017, 10/24/2018, 03/22/2022       Objective     /87 (BP Location: Right arm, Patient Position: Sitting, Cuff Size: Standard)   Pulse 84   Temp 97 8 °F (36 6 °C) (Temporal)   Wt 64 7 kg (142 lb 9 6 oz)   SpO2 98%   BMI 23 23 kg/m²     Physical Exam  Rafat Ferrer DO

## 2023-06-19 NOTE — ASSESSMENT & PLAN NOTE
Tobacco Cessation Counseling: Tobacco cessation counseling and education was provided  The patient is sincerely urged to quit consumption of tobacco  She is ready to quit tobacco  The numerous health risks of tobacco consumption were discussed  Prescribed the following medications: varenicline (Chantix)  Patient currently smokes 2 cigarettes a day   encouraged Chantix compliance

## 2023-06-19 NOTE — ASSESSMENT & PLAN NOTE
Patient tolerating 10 mg of atorvastatin daily well, continue lifestyle modifications      Lipid       Lab Results   Component Value Date    CHOLESTEROL 142 05/09/2023    TRIG 94 05/09/2023    HDL 34 (L) 05/09/2023    LDLCALC 89 05/09/2023

## 2023-06-24 PROBLEM — Z11.59 NEED FOR HEPATITIS C SCREENING TEST: Status: RESOLVED | Noted: 2023-04-25 | Resolved: 2023-06-24

## 2023-09-07 DIAGNOSIS — E78.2 MIXED HYPERLIPIDEMIA: ICD-10-CM

## 2023-09-07 DIAGNOSIS — I10 HYPERTENSION: ICD-10-CM

## 2023-09-07 RX ORDER — LISINOPRIL AND HYDROCHLOROTHIAZIDE 12.5; 1 MG/1; MG/1
1 TABLET ORAL DAILY
Qty: 90 TABLET | Refills: 1 | Status: SHIPPED | OUTPATIENT
Start: 2023-09-07

## 2023-09-07 RX ORDER — ATORVASTATIN CALCIUM 10 MG/1
10 TABLET, FILM COATED ORAL DAILY
Qty: 90 TABLET | Refills: 1 | Status: SHIPPED | OUTPATIENT
Start: 2023-09-07

## 2023-12-19 ENCOUNTER — TELEPHONE (OUTPATIENT)
Dept: FAMILY MEDICINE CLINIC | Facility: CLINIC | Age: 53
End: 2023-12-19

## 2024-02-02 ENCOUNTER — OFFICE VISIT (OUTPATIENT)
Dept: FAMILY MEDICINE CLINIC | Facility: CLINIC | Age: 54
End: 2024-02-02

## 2024-02-02 VITALS
OXYGEN SATURATION: 98 % | BODY MASS INDEX: 24.32 KG/M2 | TEMPERATURE: 98.2 F | DIASTOLIC BLOOD PRESSURE: 98 MMHG | HEART RATE: 88 BPM | WEIGHT: 146 LBS | HEIGHT: 65 IN | SYSTOLIC BLOOD PRESSURE: 165 MMHG

## 2024-02-02 DIAGNOSIS — N63.31 UNSPECIFIED LUMP IN AXILLARY TAIL OF THE RIGHT BREAST: Primary | ICD-10-CM

## 2024-02-02 DIAGNOSIS — M54.2 NECK PAIN: ICD-10-CM

## 2024-02-02 DIAGNOSIS — F41.9 ANXIETY: ICD-10-CM

## 2024-02-02 DIAGNOSIS — M62.838 TRAPEZIUS MUSCLE SPASM: ICD-10-CM

## 2024-02-02 DIAGNOSIS — I10 HYPERTENSION: ICD-10-CM

## 2024-02-02 PROCEDURE — 99214 OFFICE O/P EST MOD 30 MIN: CPT | Performed by: FAMILY MEDICINE

## 2024-02-02 PROCEDURE — 3077F SYST BP >= 140 MM HG: CPT | Performed by: FAMILY MEDICINE

## 2024-02-02 PROCEDURE — 3080F DIAST BP >= 90 MM HG: CPT | Performed by: FAMILY MEDICINE

## 2024-02-02 RX ORDER — CYCLOBENZAPRINE HCL 5 MG
5 TABLET ORAL 3 TIMES DAILY PRN
Qty: 30 TABLET | Refills: 0 | Status: SHIPPED | OUTPATIENT
Start: 2024-02-02

## 2024-02-02 RX ORDER — AMITRIPTYLINE HYDROCHLORIDE 25 MG/1
25 TABLET, FILM COATED ORAL
Qty: 30 TABLET | Refills: 2 | Status: SHIPPED | OUTPATIENT
Start: 2024-02-02

## 2024-02-02 RX ORDER — NAPROXEN 500 MG/1
500 TABLET ORAL 2 TIMES DAILY WITH MEALS
Qty: 30 TABLET | Refills: 0 | Status: SHIPPED | OUTPATIENT
Start: 2024-02-02 | End: 2024-02-17

## 2024-02-02 NOTE — PROGRESS NOTES
Name: Romina Cleaning      : 1970      MRN: [id number]  Encounter Provider: Claire Mcleod DO  Encounter Date: 2024   Encounter department: [facility]    Assessment & Plan     1. Unspecified lump in axillary tail of the right breast  Assessment & Plan:  Approximate 2 x 2 centimeter lump in right axilla.  Lump is soft, movable, nontender, comes and goes since , no nipple discharge or bleeding  Last mammogram normal in  with recommendation of 1 year follow-up although patient has not scheduled this yet.    Plan:  Diagnostic mammogram of right breast with ultrasound ordered  Return in 3 weeks for results discussion    Orders:  -     US breast axilla right; Future; Expected date: 2024  -     Mammo diagnostic right w cad; Future; Expected date: 2024    2. Hypertension  Assessment & Plan:  Chronic, currently uncontrolled at 165/98  Patient states that home blood pressures are around 138/80.  Also currently very anxious about a lump in her right axilla and attributes elevated blood pressure to this.    Plan:  Continue lisinopril hydrochlorothiazide 10/12.5 mg daily  Advised patient to follow-up in 2 to 3 weeks for discussion of diagnostic mammogram as well as blood pressure recheck      3. Trapezius muscle spasm  Assessment & Plan:  Chronic trapezius muscle spasm of right  Usually relieved with Flexeril and naproxen, although patient is out of both of these medications  Previously referred to physical therapy but did not attend    Plan:  Referral for physical therapy placed today  Naproxen and Flexeril refilled, encouraged patient that even if Flexeril and naproxen relieve pain that she should still attend physical therapy    Orders:  -     naproxen (Naprosyn) 500 mg tablet; Take 1 tablet (500 mg total) by mouth 2 (two) times a day with meals for 15 days    4. Neck pain  -     Ambulatory Referral to Physical Therapy; Future  -     cyclobenzaprine  (FLEXERIL) 5 mg tablet; Take 1 tablet (5 mg total) by mouth 3 (three) times a day as needed for muscle spasms    5. Anxiety  -     amitriptyline (ELAVIL) 25 mg tablet; Take 1 tablet (25 mg total) by mouth daily at bedtime           Subjective      HPI  53-year-old female with past medical history of hypertension presents today with her son for blood pressure recheck.  Blood pressure currently elevated to 165/98, however patient states that she is very anxious as she has a lump in her right axilla which comes and goes but has come back recently.  She states that she is worried that it may be cancerous.  She has no family history of cancer or breast cancer.  Her most recent mammogram was in 2022 which was normal and she did not yet go for her 1 year repeat mammogram.  She states that the lump is nonpainful.  She does not have any nipple discharge or bleeding.  She has not noted any other bumps on her breasts or axilla.    She also complains of right-sided neck pain that is chronic.  She states that she has a lot of stress at home and she also does a lot of cleaning which tends to tighten up the neck muscles.  Previously had OMT which she did not tolerate well.  Usually relieved with Flexeril and naproxen but patient is out of both of these medications today.  She was also given a referral to physical therapy, however patient never attended.  She have radiating pain down her right anterior arm, however no radiating nerve symptoms.    Review of Systems   Constitutional:  Negative for chills and fever.   Eyes:  Negative for visual disturbance.   Respiratory:  Negative for cough and shortness of breath.    Cardiovascular:  Negative for chest pain and palpitations.   Gastrointestinal:  Negative for abdominal pain and vomiting.   Genitourinary:  Negative for dysuria and hematuria.   Musculoskeletal:  Positive for neck pain and neck stiffness. Negative for arthralgias and back pain.   Skin:  Negative for color change and  "rash.   Neurological:  Negative for dizziness and headaches.   All other systems reviewed and are negative.      Current Outpatient Medications on File Prior to Visit   Medication Sig    acetaminophen (TYLENOL) 500 mg tablet Take 1 tablet (500 mg total) by mouth every 6 (six) hours as needed for mild pain    atorvastatin (LIPITOR) 10 mg tablet TAKE ONE TABLET BY MOUTH DAILY    Cholecalciferol (VITAMIN D3 PO) Take by mouth daily    Diclofenac Sodium (VOLTAREN) 1 % Apply 2 g topically 4 (four) times a day    levonorgestrel (MIRENA) 20 MCG/24HR IUD 1 each by Intrauterine route once    lisinopril-hydrochlorothiazide (PRINZIDE,ZESTORETIC) 10-12.5 MG per tablet TAKE 1 TABLET BY MOUTH DAILY    [DISCONTINUED] amitriptyline (ELAVIL) 25 mg tablet Take 1 tablet (25 mg total) by mouth daily at bedtime    cyclobenzaprine (FLEXERIL) 10 mg tablet Take 1 tablet (10 mg total) by mouth 2 (two) times a day as needed for muscle spasms for up to 15 days (Patient not taking: Reported on 2/2/2024)    [DISCONTINUED] naproxen (Naprosyn) 500 mg tablet Take 1 tablet (500 mg total) by mouth 2 (two) times a day with meals for 15 days (Patient not taking: Reported on 2/2/2024)    [DISCONTINUED] varenicline 0.5 MG X 11 & 1 MG X 42 tablet therapy pack Take 0.5 mg by mouth daily for 3 days, THEN 0.5 mg 2 (two) times a day for 4 days, THEN 1 mg 2 (two) times a day. (Patient not taking: Reported on 2/2/2024)       Objective     /98 (BP Location: Left arm, Patient Position: Sitting, Cuff Size: Standard)   Pulse 88   Temp 98.2 °F (36.8 °C) (Temporal)   Ht 5' 5\" (1.651 m)   Wt 66.2 kg (146 lb)   SpO2 98%   BMI 24.30 kg/m²     Physical Exam  Constitutional:       General: She is not in acute distress.     Appearance: Normal appearance. She is not ill-appearing.   HENT:      Head: Normocephalic and atraumatic.      Nose: Nose normal.      Mouth/Throat:      Mouth: Mucous membranes are moist.      Pharynx: Oropharynx is clear.   Eyes:      " General: No scleral icterus.     Conjunctiva/sclera: Conjunctivae normal.      Pupils: Pupils are equal, round, and reactive to light.   Cardiovascular:      Rate and Rhythm: Normal rate and regular rhythm.      Pulses: Normal pulses.      Heart sounds: Normal heart sounds. No murmur heard.     No friction rub. No gallop.   Pulmonary:      Effort: Pulmonary effort is normal.      Breath sounds: Normal breath sounds. No wheezing, rhonchi or rales.   Chest:          Comments: 2 x 2 centimeter lump noted in right axilla, soft, movable, nontender.  Abdominal:      General: Abdomen is flat. Bowel sounds are normal.      Palpations: Abdomen is soft. There is no mass.      Tenderness: There is no abdominal tenderness.   Musculoskeletal:         General: No swelling.      Cervical back: Neck supple.      Right lower leg: No edema.      Left lower leg: No edema.      Comments: Hypertonic cervical muscles in the right   Skin:     General: Skin is warm and dry.   Neurological:      General: No focal deficit present.      Mental Status: She is alert and oriented to person, place, and time.   Psychiatric:         Mood and Affect: Mood normal.         Behavior: Behavior normal.       Claire Mcleod, DO

## 2024-02-02 NOTE — ASSESSMENT & PLAN NOTE
Chronic, currently uncontrolled at 165/98  Patient states that home blood pressures are around 138/80.  Also currently very anxious about a lump in her right axilla and attributes elevated blood pressure to this.    Plan:  Continue lisinopril hydrochlorothiazide 10/12.5 mg daily  Advised patient to follow-up in 2 to 3 weeks for discussion of diagnostic mammogram as well as blood pressure recheck

## 2024-02-02 NOTE — ASSESSMENT & PLAN NOTE
Chronic trapezius muscle spasm of right  Usually relieved with Flexeril and naproxen, although patient is out of both of these medications  Previously referred to physical therapy but did not attend    Plan:  Referral for physical therapy placed today  Naproxen and Flexeril refilled, encouraged patient that even if Flexeril and naproxen relieve pain that she should still attend physical therapy

## 2024-02-02 NOTE — ASSESSMENT & PLAN NOTE
Approximate 2 x 2 centimeter lump in right axilla.  Lump is soft, movable, nontender, comes and goes since 2022, no nipple discharge or bleeding  Last mammogram normal in 2022 with recommendation of 1 year follow-up although patient has not scheduled this yet.    Plan:  Diagnostic mammogram of right breast with ultrasound ordered  Return in 3 weeks for results discussion

## 2024-02-07 ENCOUNTER — EVALUATION (OUTPATIENT)
Dept: PHYSICAL THERAPY | Facility: REHABILITATION | Age: 54
End: 2024-02-07
Payer: COMMERCIAL

## 2024-02-07 DIAGNOSIS — M54.2 NECK PAIN: Primary | ICD-10-CM

## 2024-02-07 PROCEDURE — 97162 PT EVAL MOD COMPLEX 30 MIN: CPT

## 2024-02-07 PROCEDURE — 97110 THERAPEUTIC EXERCISES: CPT

## 2024-02-07 NOTE — PROGRESS NOTES
PT Evaluation     Today's date: 2024  Patient name: Romina Cleaning  : 1970  MRN: [id number]  Referring provider: Claire Mcleod DO  Dx:   Encounter Diagnosis     ICD-10-CM    1. Neck pain  M54.2 Ambulatory Referral to Physical Therapy          Start Time: 1400  Stop Time: 1445  Total time in clinic (min): 45 minutes    Assessment  Assessment details:   Romina Cleaning is a pleasant 53 y.o. female who presents with chronic right-sided neck pain. Patient demonstrates a directional preference for retraction. The impairments listed below are resulting pain with ADLs/IADLs, pain limiting her work, and reducing her QoL.  I expect she will improve in 6-8 weeks.          Impairments: abnormal muscle firing, abnormal muscle tone, abnormal or restricted ROM, activity intolerance, impaired physical strength, lacks appropriate home exercise program, pain with function and poor posture     Symptom irritability: moderateUnderstanding of Dx/Px/POC: good   Prognosis: good    Goals  Short Term Goals (Week 4):  1. Decreased pain by 50%  2. Demonstrate full cervical AROM  3. GROC >50%      Long Term Goals (8 weeks):  1. Patient will exceed FOTO predicted outcome score  2. Patient will be fully independent with HEP by discharge  3. Patient will be able to manage symptoms independently.       Plan  Plan details: Plan to see patient 1-2x/week for the next 6-8 weeks  Patient would benefit from: skilled physical therapy  Planned modality interventions: low level laser therapy, electrical stimulation/Russian stimulation and thermotherapy: hydrocollator packs  Planned therapy interventions: IASTM, joint mobilization, manual therapy, massage, nerve gliding, neuromuscular re-education, patient education, strengthening, stretching, therapeutic activities, therapeutic exercise, home exercise program, graded exercise, graded activity, functional ROM exercises, flexibility, behavior modification and activity  modification  Treatment plan discussed with: patient        Subjective  Patient presents to PT with chronic right sided neck pain that has been ongoing for >1 year. Patient reports the symptoms are localized to her right neck and right upper trap. Patient reports pain in the morning and at night. Symptoms are mostly constant. Symptoms are worsened with activity and lifting. Patient reports slight improvements in her symptoms with flexeril. Patient does experience night pain. Patient denies any radiating symptoms in her arms and legs. Patient denies any numbness/tingling. Patient does experience headaches occasionally but nothing correlated with her pain. Patient denies any previous traumas/surgeries to her neck and/or back.       Objective  Postural Observation   Sitting: stiff and kyphotic  Protruded Head: yes  Lateral Deviation: yes  Change of Posture: no effect  Lateral Deviation Relevant: unclear    Myotomes  Strength WNL bilaterally    Dermatomes  Sensation Intact Bilaterally    Reflexes  R Biceps: 2+  L Biceps: 2+  R Brachioradialis: 2+  L Brachioradialis: 2+  R Triceps: 2+  L Triceps: 2+    Neurodynamic Testing  Median Nerve: positive  Ulnar Nerve: positive  Radial Nerve: NT    Cervical Active Range of Motion  Movement Loss Ugo Mod Min Nil Symptoms   Protrusion        Flexion   x  Right sided neck pain   Retraction   x     Extension   x     R Lat Flex  x      L Lat Flex  x      R Rotation   x     L Rotation   x  Right sided neck pain     [place] Assessment  Sitting:    Rep RET: increased ROM, better    Special Tests  Sharp Jen= (-), Alar Ligament= (-), Flexion-Rotation Test= (+), Compression= (=), Spurlings= (=), Distraction= (-), VBI test (-)           Precautions:       Manuals 2/7                                                                Neuro Re-Ed                                                                                                        Ther Ex             TB Rows HEP            TB  Ext HEP            Rep Ret Seated HEP                                                                             Ther Activity                                       Gait Training                                       Modalities

## 2024-02-14 ENCOUNTER — APPOINTMENT (OUTPATIENT)
Dept: PHYSICAL THERAPY | Facility: REHABILITATION | Age: 54
End: 2024-02-14
Payer: COMMERCIAL

## 2024-02-23 ENCOUNTER — OFFICE VISIT (OUTPATIENT)
Dept: PHYSICAL THERAPY | Facility: REHABILITATION | Age: 54
End: 2024-02-23
Payer: COMMERCIAL

## 2024-02-23 DIAGNOSIS — M54.2 NECK PAIN: Primary | ICD-10-CM

## 2024-02-23 PROCEDURE — 97110 THERAPEUTIC EXERCISES: CPT

## 2024-02-23 NOTE — PROGRESS NOTES
Progress / Daily Note     Today's date: 2024  Patient name: Romina Cleaning  : 1970  MRN: [id number]  Referring provider: Claire Mcleod DO  Dx:   Encounter Diagnosis     ICD-10-CM    1. Neck pain  M54.2           Start Time: 1015  Stop Time: 1035  Total time in clinic (min): 20 minutes    Subjective: Patient reports her neck is significantly better since last visit. States she has been doing her exercises everyday and they are making here feel great. Patient denies any significant pain recently.       Objective: See treatment diary below      Assessment: Patient demonstrates full active and passive cervical ROM without pain. No TTP throughout cervical and scapular paraspinals. Patient had normal cervical spine joint mobility without pain. Neck symptoms appear to have resolved at this time. Patient will f/u with PT in 2 weeks if symptoms worsen or don't continue to improve. Patient would benefit from continued PT      Plan: Continue per plan of care. Follow up with PT as needed.     Precautions:       Manuals                                                   Re-Assessment  PRR           Neuro Re-Ed                                                                                                        Ther Ex             TB Rows HEP viewed           TB Ext HEP reviewed           Rep Ret Seated HEP reviewed                                                                            Ther Activity                                       Gait Training                                       Modalities

## 2024-03-02 DIAGNOSIS — I10 HYPERTENSION: ICD-10-CM

## 2024-03-04 ENCOUNTER — TELEPHONE (OUTPATIENT)
Dept: FAMILY MEDICINE CLINIC | Facility: CLINIC | Age: 54
End: 2024-03-04

## 2024-03-04 RX ORDER — LISINOPRIL AND HYDROCHLOROTHIAZIDE 12.5; 1 MG/1; MG/1
1 TABLET ORAL DAILY
Qty: 90 TABLET | Refills: 0 | Status: SHIPPED | OUTPATIENT
Start: 2024-03-04

## 2024-03-04 NOTE — TELEPHONE ENCOUNTER
Patient has an appointment on 3/5/24 to review her ultrasound. Per chart review, ultrasound is not completed. Patient agreeable to cancel appointment and will call to reschedule after ultrasound is complete.

## 2024-06-11 ENCOUNTER — OFFICE VISIT (OUTPATIENT)
Dept: FAMILY MEDICINE CLINIC | Facility: CLINIC | Age: 54
End: 2024-06-11

## 2024-06-11 VITALS
BODY MASS INDEX: 24.79 KG/M2 | SYSTOLIC BLOOD PRESSURE: 170 MMHG | HEART RATE: 84 BPM | TEMPERATURE: 97.8 F | OXYGEN SATURATION: 99 % | DIASTOLIC BLOOD PRESSURE: 98 MMHG | RESPIRATION RATE: 18 BRPM | HEIGHT: 65 IN | WEIGHT: 148.8 LBS

## 2024-06-11 DIAGNOSIS — E78.2 MIXED HYPERLIPIDEMIA: ICD-10-CM

## 2024-06-11 DIAGNOSIS — I10 HYPERTENSION: ICD-10-CM

## 2024-06-11 DIAGNOSIS — R22.31 MASS OF RIGHT AXILLA: ICD-10-CM

## 2024-06-11 DIAGNOSIS — Z13.1 SCREENING FOR DIABETES MELLITUS: ICD-10-CM

## 2024-06-11 DIAGNOSIS — Z13.6 SCREENING FOR CARDIOVASCULAR CONDITION: ICD-10-CM

## 2024-06-11 DIAGNOSIS — M54.2 NECK PAIN: ICD-10-CM

## 2024-06-11 DIAGNOSIS — Z23 ENCOUNTER FOR IMMUNIZATION: ICD-10-CM

## 2024-06-11 DIAGNOSIS — Z00.00 ANNUAL PHYSICAL EXAM: Primary | ICD-10-CM

## 2024-06-11 PROCEDURE — 99213 OFFICE O/P EST LOW 20 MIN: CPT | Performed by: FAMILY MEDICINE

## 2024-06-11 PROCEDURE — 3080F DIAST BP >= 90 MM HG: CPT | Performed by: FAMILY MEDICINE

## 2024-06-11 PROCEDURE — 3077F SYST BP >= 140 MM HG: CPT | Performed by: FAMILY MEDICINE

## 2024-06-11 PROCEDURE — 99396 PREV VISIT EST AGE 40-64: CPT | Performed by: FAMILY MEDICINE

## 2024-06-11 RX ORDER — CYCLOBENZAPRINE HCL 5 MG
5 TABLET ORAL 3 TIMES DAILY PRN
Qty: 30 TABLET | Refills: 0 | Status: SHIPPED | OUTPATIENT
Start: 2024-06-11

## 2024-06-11 RX ORDER — LISINOPRIL AND HYDROCHLOROTHIAZIDE 25; 20 MG/1; MG/1
1 TABLET ORAL DAILY
Qty: 30 TABLET | Refills: 5 | Status: CANCELLED | OUTPATIENT
Start: 2024-06-11

## 2024-06-11 NOTE — ASSESSMENT & PLAN NOTE
- right breast calcifications noted on 2022 mammogram with recommended follow up in 1 year  - mammogram and US right breast reordered as STAT, to be scheduled

## 2024-06-11 NOTE — ASSESSMENT & PLAN NOTE
- BP above goal today at 170/98  - pt did take meds and smoked this morning  - pt states home BP readings are well controlled with 130s systolic, within JNC goal of < 140/90 for women under 61 yo     Plan:  - Blood Thursday with blood pressure machine at ambulatory blood pressure log.  - if blood pressure machine is accurate and ambulatory pressures are well-controlled, continue lisinopril-hydrochlorothiazide 10/12.5.  -If BP machine is inaccurate consider increasing lisinopril-hydrochlorothiazide to 20/25 daily

## 2024-06-11 NOTE — PROGRESS NOTES
"Adult Annual Physical  Name: Romina Cleaning      : 1970      MRN: [id number]  Encounter Provider: Kristine Arzola DO  Encounter Date: 2024   Encounter department: [facility] [place]    Assessment & Plan   1. Annual physical exam  2. Mixed hyperlipidemia  3. Hypertension  -     Albumin / creatinine urine ratio; Future; Expected date: 2024  -     Basic metabolic panel; Future; Expected date: 2024  4. Neck pain  5. Screening for diabetes mellitus  6. Screening for cardiovascular condition  -     Lipid Panel with Direct LDL reflex; Future; Expected date: 2024  7. Encounter for immunization    Immunizations and preventive care screenings were discussed with patient today. Appropriate education was printed on patient's after visit summary.    Counseling:  {Annual Physical; Counselin}         History of Present Illness   {Disappearing Hyperlinks I Encounters * My Last Note * Since Last Visit * History :29254}  Adult Annual Physical  Review of Systems  {Select to Display PMH (Optional):40780}    Objective   {Disappearing Hyperlinks   Review Vitals * Enter New Vitals * Results Review * Labs * Imaging * Cardiology * Procedures * Lung Cancer Screening :32760}  /98 (BP Location: Left arm, Patient Position: Sitting)   Pulse 84   Temp 97.8 °F (36.6 °C) (Temporal)   Resp 18   Ht 5' 5\" (1.651 m)   Wt 67.5 kg (148 lb 12.8 oz)   SpO2 99%   BMI 24.76 kg/m²     Physical Exam  Administrative Statements {Disappearing Hyperlinks I  Level of Service * PCMH/PCSP:79790}  {Time Spent Statement (Optional):59471}      "

## 2024-06-11 NOTE — ASSESSMENT & PLAN NOTE
Patient overall doing well.  Concern for right-sided mass and axilla.  Blood pressure elevated today see hypertension.  - Screening for cardiovascular disease: yearly BMP and Lipid panel ordered today  - ASCVD score: 9.9% , pt on appropriate therapy   - Colonoscopy: due 2026  - PAP/HPV: Due 2025  - Mammogram: Due STAT with STAT US of right axilla, right sided mass in axilla.   - DEXA: at age 66 yo  - Smoking cessation: current smoker   - LDCT: due at age 57  - HIV and Hep C screenings: wnl  - Vaccinations: TDAP 2016  - Depression screening: PHQ score 0 (06/11/24), neg  - Counseled the patient on healthy lifestyle habits including weight loss, diet, and exercise.

## 2024-06-11 NOTE — PROGRESS NOTES
Adult Annual Physical  Name: Romina Cleaning      : 1970      MRN: [id number]  Encounter Provider: Kristine Arzola DO  Encounter Date: 2024   Encounter department: [facility]    Assessment & Plan   1. Annual physical exam  Assessment & Plan:  Patient overall doing well.  Concern for right-sided mass and axilla.  Blood pressure elevated today see hypertension.  - Screening for cardiovascular disease: yearly BMP and Lipid panel ordered today  - ASCVD score: 9.9% , pt on appropriate therapy   - Colonoscopy: due   - PAP/HPV: Due   - Mammogram: Due STAT with STAT US of right axilla, right sided mass in axilla.   - DEXA: at age 66 yo  - Smoking cessation: current smoker   - LDCT: due at age 57  - HIV and Hep C screenings: wnl  - Vaccinations: TDAP   - Depression screening: PHQ score 0 (24), neg  - Counseled the patient on healthy lifestyle habits including weight loss, diet, and exercise.    2. Mixed hyperlipidemia  3. Hypertension  Assessment & Plan:  - BP above goal today at 170/98  - pt did take meds and smoked this morning  - pt states home BP readings are well controlled with 130s systolic, within JNC goal of < 140/90 for women under 61 yo     Plan:  - Blood Thursday with blood pressure machine at ambulatory blood pressure log.  - if blood pressure machine is accurate and ambulatory pressures are well-controlled, continue lisinopril-hydrochlorothiazide 10/12.5.  -If BP machine is inaccurate consider increasing lisinopril-hydrochlorothiazide to 20/25 daily  Orders:  -     Albumin / creatinine urine ratio; Future; Expected date: 2024  -     Basic metabolic panel; Future; Expected date: 2024  4. Neck pain  Assessment & Plan:  - chronic neck pain managed on Flexeril   Orders:  -     cyclobenzaprine (FLEXERIL) 5 mg tablet; Take 1 tablet (5 mg total) by mouth 3 (three) times a day as needed for muscle spasms  5. Screening for diabetes  mellitus  6. Screening for cardiovascular condition  -     Lipid Panel with Direct LDL reflex; Future; Expected date: 06/11/2024  7. Encounter for immunization  8. Mass of right axilla  Assessment & Plan:  - right breast calcifications noted on 2022 mammogram with recommended follow up in 1 year  - mammogram and US right breast reordered as STAT, to be scheduled   Orders:  -     Mammo diagnostic bilateral w 3d & cad; Future  -     US breast axilla right; Future; Expected date: 06/11/2024  Immunizations and preventive care screenings were discussed with patient today. Appropriate education was printed on patient's after visit summary.    Counseling:  Alcohol/drug use: discussed moderation in alcohol intake, the recommendations for healthy alcohol use, and avoidance of illicit drug use.  Dental Health: discussed importance of regular tooth brushing, flossing, and dental visits.  Injury prevention: discussed safety/seat belts, safety helmets, smoke detectors, carbon dioxide detectors, and smoking near bedding or upholstery.  Exercise: the importance of regular exercise/physical activity was discussed. Recommend exercise 3-5 times per week for at least 30 minutes.          History of Present Illness     Adult Annual Physical:  Patient presents for annual physical.  .     Diet and Physical Activity:  - Diet/Nutrition: limited junk food, well balanced diet and consuming 3-5 servings of fruits/vegetables daily. home cooked meals, eats fruit, no sugary drinks  - Exercise: walking and 3-4 times a week on average.    Depression Screening:  - PHQ-2 Score: 0    General Health:  - Sleep: sleeps well and > 8 hours of sleep on average.  - Hearing: normal hearing bilateral ears.  - Vision: no vision problems. reading glasses, appointment in July to get eyes checked  - Dental: regular dental visits and brushes teeth twice daily.    /GYN Health:  - Follows with GYN: yes.   - Menopause: postmenopausal.   - History of STDs:  "no    Review of Systems   Constitutional:  Negative for chills and fever.   HENT:  Negative for ear pain and sore throat.    Eyes:  Negative for pain and visual disturbance.   Respiratory:  Negative for cough and shortness of breath.    Cardiovascular:  Negative for chest pain and palpitations.   Gastrointestinal:  Negative for abdominal pain and vomiting.   Genitourinary:  Negative for dysuria and hematuria.   Musculoskeletal:  Negative for arthralgias and back pain.        Chronic neck pain     Skin:  Negative for color change and rash.   Neurological:  Negative for seizures and syncope.   All other systems reviewed and are negative.        Objective     /98 (BP Location: Left arm, Patient Position: Sitting)   Pulse 84   Temp 97.8 °F (36.6 °C) (Temporal)   Resp 18   Ht 5' 5\" (1.651 m)   Wt 67.5 kg (148 lb 12.8 oz)   SpO2 99%   BMI 24.76 kg/m²     Physical Exam  Vitals and nursing note reviewed.   Constitutional:       General: She is not in acute distress.     Appearance: She is well-developed.   HENT:      Head: Normocephalic and atraumatic.      Right Ear: External ear normal.      Left Ear: External ear normal.      Mouth/Throat:      Mouth: Mucous membranes are moist.      Pharynx: Oropharynx is clear.   Eyes:      Conjunctiva/sclera: Conjunctivae normal.      Pupils: Pupils are equal, round, and reactive to light.   Cardiovascular:      Rate and Rhythm: Normal rate and regular rhythm.      Heart sounds: No murmur heard.  Pulmonary:      Effort: Pulmonary effort is normal. No respiratory distress.      Breath sounds: Normal breath sounds.   Abdominal:      Palpations: Abdomen is soft.      Tenderness: There is no abdominal tenderness.   Musculoskeletal:         General: No swelling.      Cervical back: Neck supple.   Skin:     General: Skin is warm and dry.   Neurological:      General: No focal deficit present.      Mental Status: She is alert and oriented to person, place, and time. "   Psychiatric:         Mood and Affect: Mood normal.         Nancy Garcia, MS3    Kristine Arzola DO  Family Medicine  PGY 3  [facility]

## 2024-09-18 ENCOUNTER — HOSPITAL ENCOUNTER (OUTPATIENT)
Dept: MAMMOGRAPHY | Facility: CLINIC | Age: 54
Discharge: HOME | End: 2024-09-18
Payer: COMMERCIAL

## 2024-09-18 ENCOUNTER — HOSPITAL ENCOUNTER (OUTPATIENT)
Dept: ULTRASOUND IMAGING | Facility: CLINIC | Age: 54
Discharge: HOME | End: 2024-09-18
Payer: COMMERCIAL

## 2024-09-18 VITALS — HEIGHT: 65 IN | BODY MASS INDEX: 24.66 KG/M2 | WEIGHT: 148 LBS

## 2024-09-18 DIAGNOSIS — R22.31 MASS OF RIGHT AXILLA: ICD-10-CM

## 2024-09-18 PROCEDURE — 76642 ULTRASOUND BREAST LIMITED: CPT

## 2024-09-18 PROCEDURE — 77066 DX MAMMO INCL CAD BI: CPT

## 2024-09-18 PROCEDURE — G0279 TOMOSYNTHESIS, MAMMO: HCPCS

## 2024-09-19 NOTE — PROGRESS NOTES
Met with patient and Dr. Mariscal regarding recommendation for;    ___x__ RIGHT ______LEFT      _____Ultrasound guided breast and lymph node  biopsy.      __X___Verbalized understanding.    Reviewed clip placement with patient, pt states understanding: Yes: __x__ No: ____  Comments:    Blood thinners:  No: __x___ Yes: ______ What:          Biopsy teaching sheet given:  Yes: ___X___ No: ________    Pt given contact information and adv to call with any questions/needs

## 2024-09-20 ENCOUNTER — HOSPITAL ENCOUNTER (OUTPATIENT)
Dept: ULTRASOUND IMAGING | Facility: CLINIC | Age: 54
Discharge: HOME | End: 2024-09-20
Payer: COMMERCIAL

## 2024-09-20 ENCOUNTER — HOSPITAL ENCOUNTER (OUTPATIENT)
Dept: MAMMOGRAPHY | Facility: CLINIC | Age: 54
Discharge: HOME | End: 2024-09-20

## 2024-09-20 ENCOUNTER — DOCUMENTATION (OUTPATIENT)
Dept: OTHER | Facility: HOSPITAL | Age: 54
End: 2024-09-20

## 2024-09-20 VITALS — SYSTOLIC BLOOD PRESSURE: 153 MMHG | HEART RATE: 67 BPM | DIASTOLIC BLOOD PRESSURE: 93 MMHG

## 2024-09-20 DIAGNOSIS — R92.8 ABNORMAL MAMMOGRAM: ICD-10-CM

## 2024-09-20 PROCEDURE — 88360 TUMOR IMMUNOHISTOCHEM/MANUAL: CPT | Performed by: STUDENT IN AN ORGANIZED HEALTH CARE EDUCATION/TRAINING PROGRAM

## 2024-09-20 PROCEDURE — 88341 IMHCHEM/IMCYTCHM EA ADD ANTB: CPT | Performed by: STUDENT IN AN ORGANIZED HEALTH CARE EDUCATION/TRAINING PROGRAM

## 2024-09-20 PROCEDURE — 76942 ECHO GUIDE FOR BIOPSY: CPT

## 2024-09-20 PROCEDURE — 88305 TISSUE EXAM BY PATHOLOGIST: CPT | Performed by: STUDENT IN AN ORGANIZED HEALTH CARE EDUCATION/TRAINING PROGRAM

## 2024-09-20 PROCEDURE — 88342 IMHCHEM/IMCYTCHM 1ST ANTB: CPT | Performed by: STUDENT IN AN ORGANIZED HEALTH CARE EDUCATION/TRAINING PROGRAM

## 2024-09-20 PROCEDURE — 19083 BX BREAST 1ST LESION US IMAG: CPT

## 2024-09-20 PROCEDURE — A4648 IMPLANTABLE TISSUE MARKER: HCPCS

## 2024-09-20 RX ORDER — LIDOCAINE HYDROCHLORIDE 10 MG/ML
5 INJECTION, SOLUTION EPIDURAL; INFILTRATION; INTRACAUDAL; PERINEURAL ONCE
Status: COMPLETED | OUTPATIENT
Start: 2024-09-20 | End: 2024-09-20

## 2024-09-20 NOTE — PROGRESS NOTES
Progress Note -     Name: Romina Cleaning 53 y.o. female I MRN: [id number]  Unit/Bed#:  I Date of Admission: 9/20/2024   Date of Service: 9/20/2024 I Hospital Day: 0     Assessment & Plan      Ice pack given:    __X___yes _____no    Discharge instructions reviewed and given to patient:    __X___yes _____no    Discharged via:    __X___amulatory    _____wheelchair    _____stretcher    Biopsy site clean and dry with no bleeding on discharge:    __X___yes ____no

## 2024-09-20 NOTE — PROGRESS NOTES
Progress Note -     Name: Romina Cleaning 53 y.o. female I MRN: [id number]  Unit/Bed#:  I Date of Admission: 9/20/2024   Date of Service: 9/20/2024 I Hospital Day: 0     Assessment & Plan      Patient arrived via:    __X___ambulatory    _____wheelchair    _____stretcher      Domestic violence screen    ___X___negative______positive    Breast Implants:    _______yes ____X____no

## 2024-09-20 NOTE — PROGRESS NOTES
Progress Note -     Name: Romina Cleaning 53 y.o. female I MRN: [id number]  Unit/Bed#:  I Date of Admission: 9/20/2024   Date of Service: 9/20/2024 I Hospital Day: 0     Assessment & Plan      Procedure type: SITE # 1    _x____ultrasound guided _____stereotactic    Breast:    _____Left ___x__Right    Location: 10 o'clock 7cmfn     Needle: 14G    # of passes: 3    Clip: RENETTA        Procedure type: Site # 2    ___x__ultrasound guided _____stereotactic    Breast:    _____Left ___x__Right    Location: Axilla    Needle: 16G    # of passes: 4    Clip: Mini Renetta    Performed by: Dr. Sharp    Pressure held for 5 minutes by: Tarsha Gonzalez Strips:    ___X__yes _____no    Band aid:    __X___yes_____no    Tolerated procedure:    __X___yes _____no

## 2024-09-23 ENCOUNTER — TELEPHONE (OUTPATIENT)
Dept: MAMMOGRAPHY | Facility: CLINIC | Age: 54
End: 2024-09-23

## 2024-09-23 DIAGNOSIS — C50.919 INVASIVE LOBULAR CARCINOMA OF BREAST IN FEMALE (HCC): Primary | ICD-10-CM

## 2024-09-23 PROCEDURE — 88360 TUMOR IMMUNOHISTOCHEM/MANUAL: CPT | Performed by: STUDENT IN AN ORGANIZED HEALTH CARE EDUCATION/TRAINING PROGRAM

## 2024-09-23 PROCEDURE — 88305 TISSUE EXAM BY PATHOLOGIST: CPT | Performed by: STUDENT IN AN ORGANIZED HEALTH CARE EDUCATION/TRAINING PROGRAM

## 2024-09-23 NOTE — PROGRESS NOTES
Post procedure call completed    Bleeding: _____yes __X___no    Pain: _____yes ___X___no    Redness/Swelling: ______yes ___X___no    Band aid removed: ___x__yes ____no (discussed removing when she showers)    Steri-Strips intact: ___X___yes _____no (discussed with patient to remove steri strips on  9/25/2024  if they have not come off on their own)    Pt with no questions at this time, adv will call when results available, adv to call with any questions or concerns, has name/# for contact

## 2024-09-24 ENCOUNTER — TELEPHONE (OUTPATIENT)
Age: 54
End: 2024-09-24

## 2024-09-24 ENCOUNTER — DOCUMENTATION (OUTPATIENT)
Dept: HEMATOLOGY ONCOLOGY | Facility: CLINIC | Age: 54
End: 2024-09-24

## 2024-09-24 ENCOUNTER — TELEPHONE (OUTPATIENT)
Dept: MAMMOGRAPHY | Facility: CLINIC | Age: 54
End: 2024-09-24

## 2024-09-24 DIAGNOSIS — C50.911 BREAST CANCER METASTASIZED TO AXILLARY LYMPH NODE, RIGHT (HCC): Primary | ICD-10-CM

## 2024-09-24 DIAGNOSIS — C77.3 BREAST CANCER METASTASIZED TO AXILLARY LYMPH NODE, RIGHT (HCC): Primary | ICD-10-CM

## 2024-09-24 PROCEDURE — 88341 IMHCHEM/IMCYTCHM EA ADD ANTB: CPT | Performed by: STUDENT IN AN ORGANIZED HEALTH CARE EDUCATION/TRAINING PROGRAM

## 2024-09-24 PROCEDURE — 88342 IMHCHEM/IMCYTCHM 1ST ANTB: CPT | Performed by: STUDENT IN AN ORGANIZED HEALTH CARE EDUCATION/TRAINING PROGRAM

## 2024-09-24 NOTE — TELEPHONE ENCOUNTER
I called Romina in response to a referral that was received for patient to establish care with Surgical Oncology    Outreach was made to schedule a consultation.    A consultation was scheduled for patient during this call. Patient is scheduled on 10/7/24 at 11:00 AM with Dr Cardarelli at the [facility]    Other scheduling guidelines or patient instructions Confirmed breast cancer - please schedule with Dr Philippe, within 7 days. Consider Dr Burrell if she has first available in [place]     Pt requested to be seen at [place] location. Pt was scheduled for first available appt at requested location.     Please advise if this appt can be moved sooner to meet scheduling guidelines per NN review.   Pt speaks limited English, pt will need  if her son is not with her to translate.

## 2024-09-24 NOTE — PROGRESS NOTES
Breast Cancer Nurse Navigator    Chart reviewed for prepping for initial outreach by nurse navigator.    Nurse Navigator and Patient Navigator added to care team    Diagnosis: R ILC ER/SD/HER2      Recent Imagin2024 Mammo diagnostic bilateral w 3d & cad/US breast axilla right  2024 US guided breast biopsy right complete/US guided breast lymph node biopsy right/Mammo post biopsy right    Recent Pathology: Tissue Exam 2024  Right breast, 10:00, 7cmfn - Invasive lobular carcinoma Gr 2  Atypical lobular hyperplasia.   Calcifications associated with LCIS.  Right axillary lymph node - Infiltrating carcinoma, measuring up to 1.5 cm    Does patient have a MARTHA ? Yes x2    Genetic testing: TBD    Family history of cancer:   No known family history of breast cancer.     Previous Breast Cancer Diagnosis:  No    Any further needs:     Other:    Information forwarded to Outbound PEP: 2024    Please schedule patient with Dr Philippe by 7 days at preferred location    Nurse Navigator will call patient for initial outreach.      Will have Patient Navigator outreach patient after surgical oncology consultation.  Any clinical questions to be directed to office nurse.

## 2024-09-24 NOTE — TELEPHONE ENCOUNTER
Patient was given positive breast biopsy results by Dr. Erickson.  The patient was referred to breast surgeon Dr. Philippe and was advised that she will receive a call to schedule an appointment.  Patient verbalized understanding.

## 2024-09-24 NOTE — PROGRESS NOTES
All records needed are in patients chart. No records retrieval needed at this time.     Referral received/ Chart reviewed for work up completed     Imaging completed:    [] PET/CT   [] MRI   [] CT   [] US   [] Mammo   [] Bone scan   [x] N/A    Pathology completed:    Date: 09/20/2024   Location: [facility]   [x]N/A    Additional records needed:   [] Genomic report   [] Genetic testing results   [] Office Note   [] Procedure/ Operative note   [] Lab results   [x] N/A      [] Radiation Oncology records retrieval needed (PN to route to rad/onc clerical pool once scheduled)  Date:  Location:

## 2024-09-24 NOTE — PROGRESS NOTES
In basket message received from pathology.  Patient had recent breast biopsy.  Results came back positive for breast cancer.  Reflex testing to [employer] to be ordered by pathology based upon established criteria.          Information needed for [employer] Test Requisition Form:      [place]unter Status:  Was there a lymph node biopsied?  yes  [] NX-not submitted or found  [] N0-negative  [x] N1-1-3 nodes  []N2 or N3-greater than or equal to 4 nodes     Was ultrasound/imaging of axilla performed? yes       Tumor Information:  Tumor Size: (based upon breast ultrasound-largest tumor, if more than one)  [x] Less than or equal to 5 cm  [] Greater than or equal to 5 cm      Ordering Physician: Dr. Cassandra Cardarelli  Phone #: [phone]  NPI #: [id number]  Fax #: [phone]      Information sent to pathologist as well as referring physician and staff.

## 2024-09-25 ENCOUNTER — PATIENT OUTREACH (OUTPATIENT)
Dept: HEMATOLOGY ONCOLOGY | Facility: CLINIC | Age: 54
End: 2024-09-25

## 2024-09-25 DIAGNOSIS — Z17.0 MALIGNANT NEOPLASM OF RIGHT BREAST IN FEMALE, ESTROGEN RECEPTOR POSITIVE, UNSPECIFIED SITE OF BREAST (HCC): Primary | ICD-10-CM

## 2024-09-25 DIAGNOSIS — C50.911 MALIGNANT NEOPLASM OF RIGHT BREAST IN FEMALE, ESTROGEN RECEPTOR POSITIVE, UNSPECIFIED SITE OF BREAST (HCC): Primary | ICD-10-CM

## 2024-09-25 NOTE — PROGRESS NOTES
"Breast Oncology Nurse Navigator    Called patient for initial outreach from nurse navigator.  Introduced myself and my role as well as members of the navigation team.  Oncology Nurse Navigator will follow patient until they are seen by surgical oncology, after which the patient navigator initiate outreach and follow the patient to survivorship.      Patient is Macedonian speaking.  Does understand some English.  Used  Dio #[id number] throughout the phone call.    Referral received from [facility] [place], operated by [facility]  Breast cancer diagnosis was made after patient felt a \"small ball\" in her right breast approximately one year ago, according to the patient.  She saw her PCP in June 2024 and diagnostic breast imaging was ordered at that time.  Patient states basic understanding/awareness of diagnosis.  Patient declines any questions at this time.    Any other issues/diagnoses?  No, patient declines other issues.     Confirmed upcoming appointment with Dr. Cardarelli, including date, time and location.  Patient will be accompanied by her son and her sister.  Declines the need for an , stating her family will interpret on her behalf.  Patient has transportation to appointments.    Is patient a current smoker: no.  Patient states she quit smoking one week ago.  Offered SL Smoking Cessation.  Patient declined, stating she does not need any assistance.    Patient lives with her three sons  Support system includes: Mother and 24 year old son  Referral placed to oncology social worker: no    Cancer family history includes: Mother had colon cancer, maternal grandmother had colon cancer, maternal grandfather had cancer from smoking   Referral to oncology genetics: yes, STAT referral placed.  Brief instructions provided.  Made patient aware that someone will reach out with further instructions.  Does patient have a prior cancer diagnosis?  None known  Any previous radiation treatment to the chest?  None known  Is " patient pre/post menopausal?  Unsure.  Patient does not have menstrual cycle  Patient has current Mirena IUD.  This is being removed tomorrow, 9/26/24 by her PCP.      Discussed the Cancer Support Community and some of the programs and services offered, in person and virtually, including educational material provided in [place].  Discussed Breast Cancer Support group that meets monthly at [facility].  Information sent via [employer].    Patient has my contact information and knows she can reach out with questions.  Patient provided with the phone number for [employer]-[phone].  General assessment completed.  Patient does have [employer] set up  [employer] message sent with our team's contact information.  Spoke for 25 minutes using the

## 2024-09-26 ENCOUNTER — PROCEDURE VISIT (OUTPATIENT)
Dept: FAMILY MEDICINE CLINIC | Facility: CLINIC | Age: 54
End: 2024-09-26

## 2024-09-26 ENCOUNTER — TELEPHONE (OUTPATIENT)
Dept: FAMILY MEDICINE CLINIC | Facility: CLINIC | Age: 54
End: 2024-09-26

## 2024-09-26 ENCOUNTER — TELEPHONE (OUTPATIENT)
Dept: GENETICS | Facility: CLINIC | Age: 54
End: 2024-09-26

## 2024-09-26 VITALS
RESPIRATION RATE: 20 BRPM | WEIGHT: 141 LBS | HEART RATE: 87 BPM | HEIGHT: 65 IN | BODY MASS INDEX: 23.49 KG/M2 | TEMPERATURE: 98 F | DIASTOLIC BLOOD PRESSURE: 96 MMHG | OXYGEN SATURATION: 99 % | SYSTOLIC BLOOD PRESSURE: 174 MMHG

## 2024-09-26 DIAGNOSIS — I10 HYPERTENSION: ICD-10-CM

## 2024-09-26 DIAGNOSIS — Z30.432 ENCOUNTER FOR IUD REMOVAL: Primary | ICD-10-CM

## 2024-09-26 PROCEDURE — 58301 REMOVE INTRAUTERINE DEVICE: CPT

## 2024-09-26 RX ORDER — ACETAMINOPHEN 500 MG
500 TABLET ORAL ONCE
Status: COMPLETED | OUTPATIENT
Start: 2024-09-26 | End: 2024-09-26

## 2024-09-26 RX ORDER — IBUPROFEN 100 MG/1
100 TABLET, CHEWABLE ORAL ONCE
Status: DISCONTINUED | OUTPATIENT
Start: 2024-09-26 | End: 2024-09-26

## 2024-09-26 RX ADMIN — Medication 500 MG: at 10:54

## 2024-09-26 NOTE — ASSESSMENT & PLAN NOTE
- Patient recently diagnosed with lobar carcinoma on the right breast, she has Mirena insertions in 2019  -Patient plans to seek surgical oncologist on October 7  -Patient has Mirena insertion in 2019; will plan for removal today.  Not require further contraception

## 2024-09-26 NOTE — PROGRESS NOTES
Iud removal    Performed by: Alessandro Milton DO  Authorized by: Alessandro Milton DO    Procedure: IUD removal    Consent obtained by patient, parent, or legal power of  - including discussion of procedure risks and benefits, patient questions answered, and patient education provided.: yes    Other reason for removal:  Recent diagnosis of breast cancer  Strings visualized: yes    IUD grasped by forceps: yes    IUD removed: yes    Date/Time of Removal:  9/26/2024 10:42 AM  Removed without complications: yes     String visualized, IUD removal without complication, minimal bleeding.  She was given small dose ibuprofen to take postprocedure

## 2024-09-26 NOTE — TELEPHONE ENCOUNTER
Patient at Check Out is requesting  lisinopril-hydrochlorothiazide (PRINZIDE,ZESTORETIC)         Patient can be reached at [phone]

## 2024-09-27 ENCOUNTER — APPOINTMENT (OUTPATIENT)
Dept: LAB | Facility: CLINIC | Age: 54
End: 2024-09-27
Payer: COMMERCIAL

## 2024-09-27 DIAGNOSIS — Z80.3 FAMILY HISTORY OF MALIGNANT NEOPLASM OF BREAST: ICD-10-CM

## 2024-09-27 DIAGNOSIS — I10 HYPERTENSION: ICD-10-CM

## 2024-09-27 DIAGNOSIS — Z80.0 FAMILY HISTORY OF MALIGNANT NEOPLASM OF GASTROINTESTINAL TRACT: ICD-10-CM

## 2024-09-27 DIAGNOSIS — Z13.6 SCREENING FOR CARDIOVASCULAR CONDITION: ICD-10-CM

## 2024-09-27 DIAGNOSIS — D05.01 LOBULAR CARCINOMA IN SITU OF RIGHT BREAST: ICD-10-CM

## 2024-09-27 PROCEDURE — 36415 COLL VENOUS BLD VENIPUNCTURE: CPT

## 2024-09-27 RX ORDER — LISINOPRIL/HYDROCHLOROTHIAZIDE 10-12.5 MG
1 TABLET ORAL DAILY
Qty: 90 TABLET | Refills: 1 | Status: SHIPPED | OUTPATIENT
Start: 2024-09-27

## 2024-10-01 PROBLEM — Z17.0 MALIGNANT NEOPLASM OF UPPER-OUTER QUADRANT OF RIGHT BREAST IN FEMALE, ESTROGEN RECEPTOR POSITIVE (HCC): Status: ACTIVE | Noted: 2024-10-01

## 2024-10-01 PROBLEM — C50.411 MALIGNANT NEOPLASM OF UPPER-OUTER QUADRANT OF RIGHT BREAST IN FEMALE, ESTROGEN RECEPTOR POSITIVE (HCC): Status: ACTIVE | Noted: 2024-10-01

## 2024-10-02 PROBLEM — D48.9 NEOPLASM OF UNCERTAIN BEHAVIOR: Status: RESOLVED | Noted: 2020-11-18 | Resolved: 2024-10-02

## 2024-10-02 PROBLEM — C50.911 CARCINOMA OF RIGHT BREAST METASTATIC TO AXILLARY LYMPH NODE (HCC): Status: ACTIVE | Noted: 2024-06-11

## 2024-10-02 PROBLEM — N63.31 UNSPECIFIED LUMP IN AXILLARY TAIL OF THE RIGHT BREAST: Status: RESOLVED | Noted: 2024-02-02 | Resolved: 2024-10-02

## 2024-10-02 PROBLEM — Z97.5 IUD (INTRAUTERINE DEVICE) IN PLACE: Status: RESOLVED | Noted: 2019-02-15 | Resolved: 2024-10-02

## 2024-10-02 PROBLEM — Z83.719 FAMILY HISTORY OF POLYPS IN THE COLON: Status: RESOLVED | Noted: 2020-12-29 | Resolved: 2024-10-02

## 2024-10-02 PROBLEM — Z00.00 ANNUAL PHYSICAL EXAM: Status: RESOLVED | Noted: 2018-05-24 | Resolved: 2024-10-02

## 2024-10-02 PROBLEM — Z30.432 ENCOUNTER FOR IUD REMOVAL: Status: RESOLVED | Noted: 2024-09-26 | Resolved: 2024-10-02

## 2024-10-02 PROBLEM — C77.3 CARCINOMA OF RIGHT BREAST METASTATIC TO AXILLARY LYMPH NODE (HCC): Status: ACTIVE | Noted: 2024-06-11

## 2024-10-03 ENCOUNTER — TELEPHONE (OUTPATIENT)
Dept: HEMATOLOGY ONCOLOGY | Facility: CLINIC | Age: 54
End: 2024-10-03

## 2024-10-03 DIAGNOSIS — C77.3 BREAST CANCER METASTASIZED TO AXILLARY LYMPH NODE, RIGHT (HCC): ICD-10-CM

## 2024-10-03 DIAGNOSIS — C50.411 MALIGNANT NEOPLASM OF UPPER-OUTER QUADRANT OF RIGHT BREAST IN FEMALE, ESTROGEN RECEPTOR POSITIVE (HCC): Primary | ICD-10-CM

## 2024-10-03 DIAGNOSIS — C50.911 BREAST CANCER METASTASIZED TO AXILLARY LYMPH NODE, RIGHT (HCC): ICD-10-CM

## 2024-10-03 DIAGNOSIS — Z17.0 MALIGNANT NEOPLASM OF UPPER-OUTER QUADRANT OF RIGHT BREAST IN FEMALE, ESTROGEN RECEPTOR POSITIVE (HCC): Primary | ICD-10-CM

## 2024-10-03 NOTE — TELEPHONE ENCOUNTER
I contacted patient  to schedule Medical Oncology consult. I called the patient to discuss scheduling her Medical Oncology consult. She wanted me to speak to her son Anuj and had him on the call. I offered the below Medical oncology consult appointmetn . Patient and son were agreeable. I also confirmed Surgical Oncology consult with Denae Goldstein on 10-7-2024 @ 10:30am . I discussed the roles of each of these department's. I also informed Anuj we do not have a Medical communication consent on file. I explained the purpose of this and ways it can be completed. Anuj had no further questions or concerns at this time. I encouraged him to call should any arise.      Type of appointment-Medical Oncology Consult   Provider-Dr. Garcia   [id number]  Time-2:00pm  Location-[place]

## 2024-10-07 ENCOUNTER — DOCUMENTATION (OUTPATIENT)
Dept: HEMATOLOGY ONCOLOGY | Facility: CLINIC | Age: 54
End: 2024-10-07

## 2024-10-07 ENCOUNTER — OFFICE VISIT (OUTPATIENT)
Dept: SURGICAL ONCOLOGY | Facility: CLINIC | Age: 54
End: 2024-10-07
Payer: COMMERCIAL

## 2024-10-07 VITALS
SYSTOLIC BLOOD PRESSURE: 144 MMHG | WEIGHT: 140.8 LBS | OXYGEN SATURATION: 98 % | BODY MASS INDEX: 23.46 KG/M2 | DIASTOLIC BLOOD PRESSURE: 92 MMHG | HEIGHT: 65 IN | RESPIRATION RATE: 16 BRPM | TEMPERATURE: 97.8 F | HEART RATE: 89 BPM

## 2024-10-07 DIAGNOSIS — C50.911 CARCINOMA OF RIGHT BREAST METASTATIC TO AXILLARY LYMPH NODE (HCC): ICD-10-CM

## 2024-10-07 DIAGNOSIS — Z17.0 MALIGNANT NEOPLASM OF UPPER-OUTER QUADRANT OF RIGHT BREAST IN FEMALE, ESTROGEN RECEPTOR POSITIVE (HCC): Primary | ICD-10-CM

## 2024-10-07 DIAGNOSIS — C50.411 MALIGNANT NEOPLASM OF UPPER-OUTER QUADRANT OF RIGHT BREAST IN FEMALE, ESTROGEN RECEPTOR POSITIVE (HCC): Primary | ICD-10-CM

## 2024-10-07 DIAGNOSIS — C50.919 INVASIVE LOBULAR CARCINOMA OF BREAST IN FEMALE (HCC): ICD-10-CM

## 2024-10-07 DIAGNOSIS — C77.3 CARCINOMA OF RIGHT BREAST METASTATIC TO AXILLARY LYMPH NODE (HCC): ICD-10-CM

## 2024-10-07 PROCEDURE — 99245 OFF/OP CONSLTJ NEW/EST HI 55: CPT | Performed by: SURGERY

## 2024-10-07 NOTE — PROGRESS NOTES
Breast Consultation-Surgical Oncology     [address]  [facility]  [address]  [facility] [id number]    Name:  Romina Cleaning  YOB: 1970  MRN:  [id number]    Assessment & Plan   Diagnoses and all orders for this visit:    Malignant neoplasm of upper-outer quadrant of right breast in female, estrogen receptor positive (HCC)    Carcinoma of right breast metastatic to axillary lymph node (HCC)  -     MRI breast bilateral w and wo contrast w cad; Future  -     CT chest abdomen pelvis w contrast; Future  -     NM bone scan whole body; Future        HPI: Romina Cleaning is a 54 y.o. year old female who presents with newly diagnosed carcinoma of the right breast.  She felt a mass and presented for diagnostic imaging.  She did submit a sample for genetic testing which is currently pending.  She is scheduled to meet with medical oncology later this week.    Surgical treatment to date consisted of not applicable.    Oncology History:    Oncology History   Carcinoma of right breast metastatic to axillary lymph node (HCC)   6/11/2024 Initial Diagnosis    Carcinoma of right breast metastatic to axillary lymph node (HCC)     9/20/2024 -  Cancer Staged    Staging form: Breast, AJCC 8th Edition  - Clinical stage from 9/20/2024: cT0, cN1(f), cM0, GX, ER-, ND-, HER2- - Signed by Jodie Philippe MD on 10/7/2024  Stage prefix: Initial diagnosis  Method of lymph node assessment: Core biopsy  Histologic grading system: 3 grade system       Malignant neoplasm of upper-outer quadrant of right breast in female, estrogen receptor positive (HCC)   9/20/2024 Biopsy    Right breast ultrasound-guided biopsy  A. 10 o'clock, 7 cm from nipple (MARTHA)  Invasive lobular carcinoma  Grade 2  ER >95, ND 70, HER2 0  Lymphovascular invasion not identified    B. Right axillary lymph node biopsy (MARTHA)  Infiltrating carcinoma  Although no definitive capsule is noted, it is favored to  represent lymph node involvement by th e carcinoma  ER <1, TN <1, HER2 1+    Concordant. Malignancy appears unifocal; suspicious masses cover an area up to 1.8 cm. US of right axilla showed multiple enlarged, morphologically abnormal axillary lymph nodes with biopsy confirmed metastatic disease. Left breast clear.     2024 -  Cancer Staged    Staging form: Breast, AJCC 8th Edition  - Clinical stage from 2024: Stage Unknown (cT1c, cNX, cM0, G2, ER+, TN+, HER2-) - Signed by Jodie Philippe MD on 10/7/2024  Stage prefix: Initial diagnosis  Histologic grading system: 3 grade system       2024 Genomic Testing    MammaPrint/BluePrint ordered on breast specimen block A  Ultra-Low risk - luminal A     2024 Genetic Testing    Genetic testing with RNA analysis ordered  Ambry         Pertinent reproductive history:  Age at menarche:    OB History          2    Para   2    Term   2            AB        Living   2         SAB        IAB        Ectopic        Multiple        Live Births   2           Obstetric Comments   Age of menarche: 15  Age at first pregnancy: 29                 Problem List:   Patient Active Problem List   Diagnosis    Neck pain    Trapezius muscle spasm    Abnormal CT scan, pelvis    Anxiety    Dysuria    Microscopic hematuria    Smoker    Episode of recurrent major depressive disorder (HCC)    Hypertension    Mitral regurgitation    Foster care (status)    Lumbar back pain    Mixed hyperlipidemia    Encounter for tobacco use cessation counseling    Carcinoma of right breast metastatic to axillary lymph node (HCC)    Malignant neoplasm of upper-outer quadrant of right breast in female, estrogen receptor positive (HCC)     Past Medical History:   Diagnosis Date    GERD (gastroesophageal reflux disease)     Headache     Hematuria     Hypertension     IUD (intrauterine device) in place 02/15/2019    Mirena placed 2015 effective through 2020      Lateral epicondylitis, right  elbow 2019    Panic attacks     Psychiatric disorder      Past Surgical History:   Procedure Laterality Date    BREAST BIOPSY Right 2024    BREAST BIOPSY Right 2024    CYSTOSCOPY  2018    NO PAST SURGERIES      US GUIDED BREAST BIOPSY RIGHT COMPLETE Right 2024    US GUIDED BREAST LYMPH NODE BIOPSY RIGHT Right 2024     Family History   Problem Relation Age of Onset    Hypertension Mother     Colonic polyp Mother     Colon cancer Mother     Arthritis Father     Diabetes Father     Hypertension Father     Hyperlipidemia Father     No Known Problems Sister     No Known Problems Sister     Other Brother         TBI from accident     No Known Problems Son     No Known Problems Daughter     No Known Problems Maternal Aunt     No Known Problems Maternal Aunt     No Known Problems Maternal Aunt     No Known Problems Maternal Aunt     No Known Problems Maternal Aunt     No Known Problems Paternal Aunt     No Known Problems Paternal Aunt     No Known Problems Paternal Aunt     Colonic polyp Maternal Grandmother     Colon cancer Maternal Grandmother     Throat cancer Maternal Grandfather     No Known Problems Paternal Grandmother     No Known Problems Paternal Grandfather      Social History     Socioeconomic History    Marital status: Single     Spouse name: Not on file    Number of children: Not on file    Years of education: Not on file    Highest education level: 12th grade   Occupational History    Occupation: HOUSEWIFE OR HOMEMAKER   Tobacco Use    Smoking status: Former     Current packs/day: 0.00     Average packs/day: 0.3 packs/day for 35.0 years (8.8 ttl pk-yrs)     Types: Cigarettes     Start date: 1988     Quit date: 2024     Years since quittin.0    Smokeless tobacco: Never    Tobacco comments:     ONE HALF PACK A DAY OR LESS, CURRENT SOME DAY SMOKER, LIGHT TOBACCO SMOKER  AS PER [employer]   Vaping Use    Vaping status: Some Days   Substance and Sexual Activity     "Alcohol use: No    Drug use: No    Sexual activity: Not Currently     Partners: Male     Birth control/protection: I.U.D.   Other Topics Concern    Not on file   Social History Narrative    ENGAGES IN A HOBBY - \"[employer]INOES\"    LIVES WITH FAMILY    UNEMPLOYED     Social Determinants of Health     Financial Resource Strain: Low Risk  (6/11/2024)    Overall Financial Resource Strain (CARDIA)     Difficulty of Paying Living Expenses: Not hard at all   Food Insecurity: No Food Insecurity (6/11/2024)    Hunger Vital Sign     Worried About Running Out of Food in the Last Year: Never true     Ran Out of Food in the Last Year: Never true   Transportation Needs: No Transportation Needs (6/11/2024)    PRAPARE - Transportation     Lack of Transportation (Medical): No     Lack of Transportation (Non-Medical): No   Physical Activity: Insufficiently Active (6/11/2024)    Exercise Vital Sign     Days of Exercise per Week: 3 days     Minutes of Exercise per Session: 30 min   Stress: No Stress Concern Present (4/25/2023)    [employer] - Occupational Stress Questionnaire     Feeling of Stress : Not at all   Social Connections: Unknown (6/11/2024)    Social Connection and Isolation Panel [NHANES]     Frequency of Communication with Friends and Family: More than three times a week     Frequency of Social Gatherings with Friends and Family: More than three times a week     Attends [employer] Services: 1 to 4 times per year     Active Member of Clubs or Organizations: No     Attends Club or Organization Meetings: Never     Marital Status: Not on file   Intimate Partner Violence: Not At Risk (6/11/2024)    Humiliation, Afraid, Rape, and Kick questionnaire     Fear of Current or Ex-Partner: No     Emotionally Abused: No     Physically Abused: No     Sexually Abused: No   Housing Stability: Low Risk  (6/11/2024)    Housing Stability Vital Sign     Unable to Pay for Housing in the Last Year: No     Number of " Times Moved in the Last Year: 1     Homeless in the Last Year: No     Current Outpatient Medications   Medication Sig Dispense Refill    acetaminophen (TYLENOL) 500 mg tablet Take 1 tablet (500 mg total) by mouth every 6 (six) hours as needed for mild pain 30 tablet 0    amitriptyline (ELAVIL) 25 mg tablet Take 1 tablet (25 mg total) by mouth daily at bedtime 30 tablet 2    atorvastatin (LIPITOR) 10 mg tablet TAKE ONE TABLET BY MOUTH DAILY 90 tablet 1    Cholecalciferol (VITAMIN D3 PO) Take by mouth daily      cyclobenzaprine (FLEXERIL) 5 mg tablet Take 1 tablet (5 mg total) by mouth 3 (three) times a day as needed for muscle spasms 30 tablet 0    Diclofenac Sodium (VOLTAREN) 1 % Apply 2 g topically 4 (four) times a day 2 g 0    lisinopril-hydrochlorothiazide (PRINZIDE,ZESTORETIC) 10-12.5 MG per tablet Take 1 tablet by mouth daily 90 tablet 1    naproxen (Naprosyn) 500 mg tablet Take 1 tablet (500 mg total) by mouth 2 (two) times a day with meals for 15 days 30 tablet 0    levonorgestrel (MIRENA) 20 MCG/24HR IUD 1 each by Intrauterine route once (Patient not taking: Reported on 10/7/2024)       No current facility-administered medications for this visit.     No Known Allergies      The following portions of the patient's history were reviewed and updated as appropriate: allergies, current medications, past family history, past medical history, past social history, past surgical history, and problem list.    Review of Systems:  Review of Systems   Constitutional:  Positive for appetite change and unexpected weight change. Negative for fever.   HENT: Negative.  Negative for trouble swallowing.    Eyes: Negative.    Respiratory: Negative.  Negative for cough and shortness of breath.    Cardiovascular: Negative.  Negative for chest pain.   Gastrointestinal:  Positive for constipation. Negative for abdominal pain, nausea and vomiting.   Endocrine: Negative.    Genitourinary: Negative.  Negative for dysuria.    Musculoskeletal:  Positive for back pain. Negative for arthralgias and myalgias.   Skin: Negative.    Allergic/Immunologic: Negative.    Neurological: Negative.  Negative for headaches.   Hematological: Negative.  Negative for adenopathy. Does not bruise/bleed easily.   Psychiatric/Behavioral: Negative.         Physical Exam:  Physical Exam  Constitutional:       General: She is not in acute distress.     Appearance: Normal appearance. She is well-developed.   HENT:      Head: Normocephalic and atraumatic.   Cardiovascular:      Heart sounds: Normal heart sounds.   Pulmonary:      Breath sounds: Normal breath sounds.   Chest:   Breasts:     Right: Mass present. No swelling, bleeding, inverted nipple, nipple discharge, skin change or tenderness.      Left: No swelling, bleeding, inverted nipple, mass, nipple discharge, skin change or tenderness.   Abdominal:      Palpations: Abdomen is soft.   Musculoskeletal:      Right lower leg: No edema.      Left lower leg: No edema.   Lymphadenopathy:      Upper Body:      Right upper body: Axillary adenopathy (multiple enlarged nodes, mobile) present. No supraclavicular or pectoral adenopathy.      Left upper body: No supraclavicular, axillary or pectoral adenopathy.   Neurological:      Mental Status: She is alert and oriented to person, place, and time.   Psychiatric:         Mood and Affect: Mood normal.         Laboratory:  9/20/2024 core biopsy right breast 10:00 7 cm from the nipple    Pathology revealed: invasive lobular carcinoma    Histologic grade: moderately differentiated     Angiolymphatic invasion:  absent    Hormone receptor status: ER is greater than 95%, SD is 70% positive and HER2 was 0      9/20/2024 core biopsy right axillary node    Pathology revealed: Infiltrating carcinoma with patchy areas of lymphoid tissue no definite capsule was noted but favored to represent lymph node involvement    Hormone receptor status: ER is negative, SD is negative, HER2 is  1+    Other studies: Genetic testing is pending    Imagin2024 bilateral 3D diagnostic mammogram and right axillary ultrasound tissue is extremely dense, previously noted calcification has nearly resolved, distortion in the upper outer right breast 10:00 with a sonographic correlate measuring 18 mm and multiple suspicious axillary nodes with cortical thickening and replaced fatty mikayla the largest of which measures 24 mm, left side is benign    2024 postbiopsy mammogram is concordant malignancy in the breast was thought to be unifocal, ultrasound showed multiple enlarged nodes, left side was benign    Discussion/Summary: 54-year-old female who presents with right breast carcinoma.  She presented with an enlarged mass in the upper outer breast/axillary tail.  She then had diagnostic imaging and a biopsy of both the breast and axilla.  She has multiple enlarged nodes visualized on imaging as well as on exam.  On ultrasound there was loss of fatty hilum.  The nodes are however mobile in nature.  I discussed these findings with the patient and her family along with  services.  She is scheduled to meet medical oncology later this week.  She submitted a sample for genetic testing which is pending.  I informed her that we are dealing with 2 types of cancers.  The carcinoma in the upper outer quadrant of the breast is ER/MT positive and HER2 negative.  The lymph node biopsy is revealing triple negative carcinoma.  The breast malignancy was thought to be unifocal; however, this was an ER positive tumor and lymph nodes are ER negative.  Therefore she may have an occult malignancy still remaining in the breast that has not been sampled.  I am therefore recommending breast MRI.  I am also recommending staging scans given the extensive adenopathy and triple negative status.  I will call her with these results.  She should meet with medical oncology as scheduled later this week.  We will also call her with  the results of the genetic testing.  Further recommendations will be made following the receipt of all of these results along with medical oncology consultation.  In addition, she will be presented at our breast tumor board.

## 2024-10-07 NOTE — PROGRESS NOTES
In-basket message received from Dr. Philippe to add patient to the breast MDCC on 10/21/2024. Chart reviewed and prep completed.

## 2024-10-08 ENCOUNTER — TELEPHONE (OUTPATIENT)
Dept: HEMATOLOGY ONCOLOGY | Facility: CLINIC | Age: 54
End: 2024-10-08

## 2024-10-08 ENCOUNTER — PATIENT OUTREACH (OUTPATIENT)
Dept: HEMATOLOGY ONCOLOGY | Facility: CLINIC | Age: 54
End: 2024-10-08

## 2024-10-08 NOTE — PROGRESS NOTES
Patient Navigation attempted to outreach patient with the assistance of the Interpretation Line Hayley # [phone] after Oncology consult to complete the Distress Thermometer.    A voicemail was left stating a reason for my call and my contact information was provided. I requested a return call at patient's earliest convenience.

## 2024-10-08 NOTE — TELEPHONE ENCOUNTER
Advised patient that due to a mandatory provider meeting, Radha Goldstein's schedule will be cut short on Thursday 10/10/2024.    Her consultation has been rescheduled to Wednesday 10/16/2024 @ 11:20 AM.     Apologized to patient for the inconvenience. Office address reviewed with patient. Patient verbalized understanding and agreement.

## 2024-10-09 ENCOUNTER — TELEPHONE (OUTPATIENT)
Dept: GENETICS | Facility: CLINIC | Age: 54
End: 2024-10-09

## 2024-10-09 NOTE — TELEPHONE ENCOUNTER
I agree with the information discussed.    
STAT Genetic Test Result    Summary:    I spoke with Romina to review the result of her STAT genetic test.    [facility] (13 genes): HELGA, BARD1, BRCA1, BRCA2, CDH1, CHEK2, NF1, PALB2, PTEN, RAD51C, RAD51D, STK11, TP53     Result: Negative - No Clinically Significant Variants Detected      Assessment:     Negative   A negative result significantly reduces the likelihood that Romina has a hereditary cancer syndrome related to the high-risk breast cancer genes listed above.      This result does not have surgical implications and surgical options should be discussed with her healthcare provider.        Additional Testing:  [facility]: Cancer+RNAinsight (59 genes): APC, HELGA, AXIN2, BAP1, BARD1, BMPR1A, BRCA1, BRCA2, BRIP1, CDH1, CDK4, CDKN1B, CDKN2A, CHEK2, CTNNA1, DICER1, EGLN1, EPCAM, FH, FLCN, GREM1, HOXB13, KIF1B, KIT, MAX, MEN1, MET, MITF, MLH1, MSH2, MSH3, MSH6, MUTYH, NF1, NTHL1, PALB2, PDGFRA PMS2, POLD1, POLE, POT1, PTEN, RAD51C, RAD51D, RB1, RET, SDHA, SDHAF2, SDHB, SDHC, SDHD, SMAD4, SMARCA4, STK11, MVCC172, TP53, TSC1, TSC2, VHL test is pending.  We will contact Romina once results are available.  Additional recommendations for surveillance/medical management will be made upon final genetic test result.         Romina's breast surgeon Dr. Cardarelli  was made aware of this test result.     
7 (severe pain)

## 2024-10-10 ENCOUNTER — PATIENT OUTREACH (OUTPATIENT)
Dept: HEMATOLOGY ONCOLOGY | Facility: CLINIC | Age: 54
End: 2024-10-10

## 2024-10-10 NOTE — PROGRESS NOTES
Patient Navigation attempted to outreach patient for the second time with the assistance of the Interpretation Line Jose # [phone] after Oncology consult to complete the Distress Thermometer.     I was unable to leave a voice message since there is no voicemail option.  I will try to outreach again in a few days.

## 2024-10-11 ENCOUNTER — DOCUMENTATION (OUTPATIENT)
Dept: HEMATOLOGY ONCOLOGY | Facility: CLINIC | Age: 54
End: 2024-10-11

## 2024-10-11 ENCOUNTER — TELEPHONE (OUTPATIENT)
Dept: RADIOLOGY | Facility: HOSPITAL | Age: 54
End: 2024-10-11

## 2024-10-11 ENCOUNTER — TELEPHONE (OUTPATIENT)
Age: 54
End: 2024-10-11

## 2024-10-11 DIAGNOSIS — Z17.0 MALIGNANT NEOPLASM OF RIGHT BREAST IN FEMALE, ESTROGEN RECEPTOR POSITIVE, UNSPECIFIED SITE OF BREAST (HCC): Primary | ICD-10-CM

## 2024-10-11 DIAGNOSIS — C50.911 MALIGNANT NEOPLASM OF RIGHT BREAST IN FEMALE, ESTROGEN RECEPTOR POSITIVE, UNSPECIFIED SITE OF BREAST (HCC): Primary | ICD-10-CM

## 2024-10-11 DIAGNOSIS — Z01.812 PRE-PROCEDURE LAB EXAM: ICD-10-CM

## 2024-10-11 NOTE — TELEPHONE ENCOUNTER
Called the patient using  #[phone] to discuss. There was no answer so a message was left stating the need for the patient to complete her blood work prior to her CT on Monday or the test would be cancelled. Order was placed per radiology's request.

## 2024-10-11 NOTE — TELEPHONE ENCOUNTER
[facility] Scan department calling to inform provider patient needs to have a CMP before she can have her CT done on Monday. No current orders in chart. If order can be placed and patient contacted to have completed so she does not have to cancel CT on Monday

## 2024-10-11 NOTE — PROGRESS NOTES
"Received email form [facility] regarding patients MammaPrint testing:     \"Hello,    I hope this email finds you well. I am reaching out from [facility]’s Customer Care Department regarding Medical Records.    Thank you for submitting an order for MammaPrint testing on patient:    Patient Last Name - Black  YOB: 1970  Order Number - [id number]    Due to your patient’s insurance, we are requiring additional medical records to submit for Prior Authorization.  Currently, we are missing tumor size.  Please provide the following records within the last 2 years ([id number]):  - Imaging Reports (MRI, Ultrasound, Mammogram, PET CT)  - Consultation Reports/Progress Notes  - Surgical Pathology Reports (Lumpectomy, Mastectomy, Excisional)  The Core Biopsy Pathology Report is NOT sufficient to identify tumor size (cm).\"    Please fax records to [phone] or email to [email or web address]  "

## 2024-10-12 ENCOUNTER — APPOINTMENT (OUTPATIENT)
Dept: LAB | Facility: CLINIC | Age: 54
End: 2024-10-12
Payer: COMMERCIAL

## 2024-10-12 DIAGNOSIS — Z17.0 MALIGNANT NEOPLASM OF RIGHT BREAST IN FEMALE, ESTROGEN RECEPTOR POSITIVE, UNSPECIFIED SITE OF BREAST (HCC): ICD-10-CM

## 2024-10-12 DIAGNOSIS — Z01.812 PRE-PROCEDURE LAB EXAM: ICD-10-CM

## 2024-10-12 DIAGNOSIS — C50.911 MALIGNANT NEOPLASM OF RIGHT BREAST IN FEMALE, ESTROGEN RECEPTOR POSITIVE, UNSPECIFIED SITE OF BREAST (HCC): ICD-10-CM

## 2024-10-12 LAB
ALBUMIN SERPL BCG-MCNC: 4.7 G/DL (ref 3.5–5)
ALP SERPL-CCNC: 77 U/L (ref 34–104)
ALT SERPL W P-5'-P-CCNC: 15 U/L (ref 7–52)
ANION GAP SERPL CALCULATED.3IONS-SCNC: 6 MMOL/L (ref 4–13)
AST SERPL W P-5'-P-CCNC: 13 U/L (ref 13–39)
BILIRUB SERPL-MCNC: 0.5 MG/DL (ref 0.2–1)
BUN SERPL-MCNC: 12 MG/DL (ref 5–25)
CALCIUM SERPL-MCNC: 9.8 MG/DL (ref 8.4–10.2)
CHLORIDE SERPL-SCNC: 104 MMOL/L (ref 96–108)
CHOLEST SERPL-MCNC: 123 MG/DL
CO2 SERPL-SCNC: 31 MMOL/L (ref 21–32)
CREAT SERPL-MCNC: 0.61 MG/DL (ref 0.6–1.3)
GFR SERPL CREATININE-BSD FRML MDRD: 104 ML/MIN/1.73SQ M
GLUCOSE P FAST SERPL-MCNC: 105 MG/DL (ref 65–99)
HDLC SERPL-MCNC: 31 MG/DL
LDLC SERPL CALC-MCNC: 72 MG/DL (ref 0–100)
POTASSIUM SERPL-SCNC: 4.9 MMOL/L (ref 3.5–5.3)
PROT SERPL-MCNC: 7.2 G/DL (ref 6.4–8.4)
SODIUM SERPL-SCNC: 141 MMOL/L (ref 135–147)
TRIGL SERPL-MCNC: 100 MG/DL

## 2024-10-12 PROCEDURE — 80061 LIPID PANEL: CPT

## 2024-10-12 PROCEDURE — 80053 COMPREHEN METABOLIC PANEL: CPT

## 2024-10-12 PROCEDURE — 36415 COLL VENOUS BLD VENIPUNCTURE: CPT

## 2024-10-14 ENCOUNTER — HOSPITAL ENCOUNTER (OUTPATIENT)
Dept: RADIOLOGY | Facility: HOSPITAL | Age: 54
Discharge: HOME | End: 2024-10-14
Attending: SURGERY
Payer: COMMERCIAL

## 2024-10-14 DIAGNOSIS — C50.911 CARCINOMA OF RIGHT BREAST METASTATIC TO AXILLARY LYMPH NODE (HCC): ICD-10-CM

## 2024-10-14 DIAGNOSIS — C77.3 CARCINOMA OF RIGHT BREAST METASTATIC TO AXILLARY LYMPH NODE (HCC): ICD-10-CM

## 2024-10-14 PROCEDURE — 74177 CT ABD & PELVIS W/CONTRAST: CPT

## 2024-10-14 PROCEDURE — 71260 CT THORAX DX C+: CPT

## 2024-10-14 PROCEDURE — A9503 TC99M MEDRONATE: HCPCS

## 2024-10-14 PROCEDURE — 78306 BONE IMAGING WHOLE BODY: CPT

## 2024-10-14 RX ADMIN — IOHEXOL 50 ML: 350 INJECTION, SOLUTION INTRAVENOUS at 08:13

## 2024-10-16 ENCOUNTER — OFFICE VISIT (OUTPATIENT)
Dept: HEMATOLOGY ONCOLOGY | Facility: CLINIC | Age: 54
End: 2024-10-16
Payer: COMMERCIAL

## 2024-10-16 VITALS
WEIGHT: 141 LBS | SYSTOLIC BLOOD PRESSURE: 146 MMHG | RESPIRATION RATE: 18 BRPM | BODY MASS INDEX: 23.49 KG/M2 | HEIGHT: 65 IN | DIASTOLIC BLOOD PRESSURE: 82 MMHG | TEMPERATURE: 97 F | OXYGEN SATURATION: 97 % | HEART RATE: 86 BPM

## 2024-10-16 DIAGNOSIS — C50.411 MALIGNANT NEOPLASM OF UPPER-OUTER QUADRANT OF RIGHT BREAST IN FEMALE, ESTROGEN RECEPTOR POSITIVE (HCC): ICD-10-CM

## 2024-10-16 DIAGNOSIS — C50.911 CARCINOMA OF RIGHT BREAST METASTATIC TO AXILLARY LYMPH NODE (HCC): Primary | ICD-10-CM

## 2024-10-16 DIAGNOSIS — Z17.0 MALIGNANT NEOPLASM OF UPPER-OUTER QUADRANT OF RIGHT BREAST IN FEMALE, ESTROGEN RECEPTOR POSITIVE (HCC): ICD-10-CM

## 2024-10-16 DIAGNOSIS — C50.911 BREAST CANCER METASTASIZED TO AXILLARY LYMPH NODE, RIGHT (HCC): ICD-10-CM

## 2024-10-16 DIAGNOSIS — C77.3 BREAST CANCER METASTASIZED TO AXILLARY LYMPH NODE, RIGHT (HCC): ICD-10-CM

## 2024-10-16 DIAGNOSIS — C77.3 CARCINOMA OF RIGHT BREAST METASTATIC TO AXILLARY LYMPH NODE (HCC): Primary | ICD-10-CM

## 2024-10-16 PROCEDURE — 99245 OFF/OP CONSLTJ NEW/EST HI 55: CPT | Performed by: INTERNAL MEDICINE

## 2024-10-16 NOTE — PROGRESS NOTES
Hematology/Oncology Outpatient Office Note  Date of Service: 10/16/2024    [facility] [address] [address] DARLYN  [facility] [place] [address]  [phone]    Reason for Consultation:   Chief Complaint   Patient presents with    Consult     Referral Physician: Jodie Philippe MD  Primary Care Physician:  Alessandro Milton DO     Oncology history:     Oncology History   Carcinoma of right breast metastatic to axillary lymph node (HCC)   6/11/2024 Initial Diagnosis    Carcinoma of right breast metastatic to axillary lymph node (HCC)     9/20/2024 -  Cancer Staged    Staging form: Breast, AJCC 8th Edition  - Clinical stage from 9/20/2024: cT0, cN1(f), cM0, GX, ER-, NM-, HER2- - Signed by Jodie Philippe MD on 10/7/2024  Stage prefix: Initial diagnosis  Method of lymph node assessment: Core biopsy  Histologic grading system: 3 grade system       Malignant neoplasm of upper-outer quadrant of right breast in female, estrogen receptor positive (HCC)   9/20/2024 Biopsy    Right breast ultrasound-guided biopsy  A. 10 o'clock, 7 cm from nipple (MARTHA)  Invasive lobular carcinoma  Grade 2  ER >95, NM 70, HER2 0  Lymphovascular invasion not identified    B. Right axillary lymph node biopsy (MARTHA)  Infiltrating carcinoma  Although no definitive capsule is noted, it is favored to represent lymph node involvement by th e carcinoma  ER <1, NM <1, HER2 1+    Concordant. Malignancy appears unifocal; suspicious masses cover an area up to 1.8 cm. US of right axilla showed multiple enlarged, morphologically abnormal axillary lymph nodes with biopsy confirmed metastatic disease. Left breast clear.     9/20/2024 -  Cancer Staged    Staging form: Breast, AJCC 8th Edition  - Clinical stage from 9/20/2024: Stage Unknown (cT1c, cNX, cM0, G2, ER+, NM+, HER2-) - Signed by Jodie Philippe MD on 10/7/2024  Stage prefix: Initial diagnosis  Histologic grading system: 3 grade system       9/24/2024 Genomic Testing     MammaPrint/BluePrint ordered on breast specimen block A  Ultra-Low risk - luminal A     9/27/2024 Genetic Testing    Genetic testing with RNA analysis ordered  [facility]       Primary Diagnosis:  1.  cT1c cNx cM0 Right breast invasive lobular carcinoma.  Grade 2.  ER positive (>95%), MN positive (70%), HER2 negative by IHC.  MammaPrint: Ultralow risk (luminal A).     2.  cT0 cN1 cM0 Right axillary lymph node biopsy ER negative, MN negative, HER2 negative  3.  BRCA1/2 negative      Previous Hematologic/ Oncologic Treatment:      Current Hematologic/ Oncologic Treatment:      History of present illness:   Romina Cleaning is a 54 y.o. postmenopausal female with PMHx significant for HTN who recently felt a mass in her right breast which she brought to medical attention.  She underwent ultrasound-guided biopsy which was consistent with an invasive lobular carcinoma, ER positive, MN positive, HER2 negative.  Right axillary lymph node biopsy showed infiltrating carcinoma which was ER negative, MN negative and HER2 negative.  MammaPrint resulted ultralow risk (luminal A), genetic testing pending.  She has been evaluated by breast surgery (Dr. Philippe) and given the 2 different pathologies from the breast mass and axillary lymph node she has been recommended a breast MRI to evaluate for an occult site.  CT of the chest, abdomen, pelvis notes enlarged right axillary and subpectoral lymphadenopathy and no other suspicious lesions.  Bone scan with no evidence of osseous metastasis.  Patient presents for initial medical oncology consultation.  Patient's case was submitted to be discussed at the multidisciplinary breast tumor board on 10/21/2024.  She is following with OB/GYN and had Mirena IUD removal. Pt presents for initial medical oncology consultation accompanied by her son and sister for visit.  She denies any chest pain, palpitations, bone pain or other complaints.    OB/GYN history:  G2, P2  Menarche: 15 years  old  Menopause: in mid 40's  Age of first pregnancy: 29 years old    Maternal grandmother, maternal uncle and mother with colon cancer. Maternal grandfather with throat cancer (smoker).     Patient declined , instead preferred to have her sister and son translate.    Review of systems:   Review of Systems   Constitutional: Negative. Negative for fever and malaise/fatigue.   HENT: Negative.     Cardiovascular: Negative.  Negative for chest pain, dyspnea on exertion and palpitations.   Respiratory: Negative.  Negative for shortness of breath.    Hematologic/Lymphatic: Negative.  Does not bruise/bleed easily.   Skin: Negative.    Musculoskeletal: Negative.    Gastrointestinal: Negative.  Negative for abdominal pain.   Neurological: Negative.    Psychiatric/Behavioral: Negative.          A 10-point review of system was performed, pertinent positive and negative were detailed as above. Otherwise, the 10-point review of system was negative.    Past Medical History:   Diagnosis Date    GERD (gastroesophageal reflux disease)     Headache     Hematuria     Hypertension     IUD (intrauterine device) in place 02/15/2019    Mirena placed 2/2015 effective through 2/2020      Lateral epicondylitis, right elbow 01/08/2019    Panic attacks     Psychiatric disorder        Past Surgical History:   Procedure Laterality Date    BREAST BIOPSY Right 09/20/2024    BREAST BIOPSY Right 09/20/2024    CYSTOSCOPY  06/08/2018    NO PAST SURGERIES      US GUIDED BREAST BIOPSY RIGHT COMPLETE Right 9/20/2024    US GUIDED BREAST LYMPH NODE BIOPSY RIGHT Right 9/20/2024       Family History   Problem Relation Age of Onset    Hypertension Mother     Colonic polyp Mother     Colon cancer Mother     Arthritis Father     Diabetes Father     Hypertension Father     Hyperlipidemia Father     No Known Problems Sister     No Known Problems Sister     Other Brother         TBI from accident     No Known Problems Son     No Known Problems Daughter   "   No Known Problems Maternal Aunt     No Known Problems Maternal Aunt     No Known Problems Maternal Aunt     No Known Problems Maternal Aunt     No Known Problems Maternal Aunt     No Known Problems Paternal Aunt     No Known Problems Paternal Aunt     No Known Problems Paternal Aunt     Colonic polyp Maternal Grandmother     Colon cancer Maternal Grandmother     Throat cancer Maternal Grandfather     No Known Problems Paternal Grandmother     No Known Problems Paternal Grandfather        Social History     Socioeconomic History    Marital status: Single     Spouse name: Not on file    Number of children: Not on file    Years of education: Not on file    Highest education level: 12th grade   Occupational History    Occupation: HOUSEWIFE OR HOMEMAKER   Tobacco Use    Smoking status: Former     Current packs/day: 0.00     Average packs/day: 0.3 packs/day for 35.0 years (8.8 ttl pk-yrs)     Types: Cigarettes     Start date: 1988     Quit date: 2024     Years since quittin.0    Smokeless tobacco: Never    Tobacco comments:     ONE HALF PACK A DAY OR LESS, CURRENT SOME DAY SMOKER, LIGHT TOBACCO SMOKER  AS PER [employer]   Vaping Use    Vaping status: Some Days   Substance and Sexual Activity    Alcohol use: No    Drug use: No    Sexual activity: Not Currently     Partners: Male     Birth control/protection: I.U.D.   Other Topics Concern    Not on file   Social History Narrative    ENGAGES IN A HOBBY - \"PLAYS [employer]\"    LIVES WITH FAMILY    UNEMPLOYED     Social Determinants of Health     Financial Resource Strain: Low Risk  (2024)    Overall Financial Resource Strain (CARDIA)     Difficulty of Paying Living Expenses: Not hard at all   Food Insecurity: No Food Insecurity (2024)    Hunger Vital Sign     Worried About Running Out of Food in the Last Year: Never true     Ran Out of Food in the Last Year: Never true   Transportation Needs: No Transportation Needs (2024)    PRAPARE - " Transportation     Lack of Transportation (Medical): No     Lack of Transportation (Non-Medical): No   Physical Activity: Insufficiently Active (6/11/2024)    Exercise Vital Sign     Days of Exercise per Week: 3 days     Minutes of Exercise per Session: 30 min   Stress: No Stress Concern Present (4/25/2023)    [employer] - Occupational Stress Questionnaire     Feeling of Stress : Not at all   Social Connections: Unknown (6/11/2024)    Social Connection and Isolation Panel [NHANES]     Frequency of Communication with Friends and Family: More than three times a week     Frequency of Social Gatherings with Friends and Family: More than three times a week     Attends Scientology Services: 1 to 4 times per year     Active Member of Clubs or Organizations: No     Attends Club or Organization Meetings: Never     Marital Status: Not on file   Intimate Partner Violence: Not At Risk (6/11/2024)    Humiliation, Afraid, Rape, and Kick questionnaire     Fear of Current or Ex-Partner: No     Emotionally Abused: No     Physically Abused: No     Sexually Abused: No   Housing Stability: Low Risk  (6/11/2024)    Housing Stability Vital Sign     Unable to Pay for Housing in the Last Year: No     Number of Times Moved in the Last Year: 1     Homeless in the Last Year: No       No Known Allergies    Current Outpatient Medications   Medication Sig Dispense Refill    acetaminophen (TYLENOL) 500 mg tablet Take 1 tablet (500 mg total) by mouth every 6 (six) hours as needed for mild pain 30 tablet 0    amitriptyline (ELAVIL) 25 mg tablet Take 1 tablet (25 mg total) by mouth daily at bedtime 30 tablet 2    atorvastatin (LIPITOR) 10 mg tablet TAKE ONE TABLET BY MOUTH DAILY 90 tablet 1    Cholecalciferol (VITAMIN D3 PO) Take by mouth daily      cyclobenzaprine (FLEXERIL) 5 mg tablet Take 1 tablet (5 mg total) by mouth 3 (three) times a day as needed for muscle spasms 30 tablet 0    Diclofenac Sodium (VOLTAREN) 1 %  Apply 2 g topically 4 (four) times a day 2 g 0    lisinopril-hydrochlorothiazide (PRINZIDE,ZESTORETIC) 10-12.5 MG per tablet Take 1 tablet by mouth daily 90 tablet 1    levonorgestrel (MIRENA) 20 MCG/24HR IUD 1 each by Intrauterine route once (Patient not taking: Reported on 10/7/2024)      naproxen (Naprosyn) 500 mg tablet Take 1 tablet (500 mg total) by mouth 2 (two) times a day with meals for 15 days 30 tablet 0     No current facility-administered medications for this visit.     I have reviewed the relevant past medical, surgical, social and family history. I have also reviewed allergies and medications for this patient.    Objective:      Vitals:    10/16/24 1114   BP: 146/82   Pulse: 86   Resp: 18   Temp: (!) 97 °F (36.1 °C)   SpO2: 97%       Wt Readings from Last 3 Encounters:   10/16/24 64 kg (141 lb)   10/07/24 63.9 kg (140 lb 12.8 oz)   09/26/24 64 kg (141 lb)     Performance status: ECOG PS: 0  Physical Exam  Vitals and nursing note reviewed.   Constitutional:       General: She is not in acute distress.     Appearance: She is well-developed.   HENT:      Head: Normocephalic and atraumatic.   Eyes:      Conjunctiva/sclera: Conjunctivae normal.   Cardiovascular:      Rate and Rhythm: Normal rate.   Pulmonary:      Effort: Pulmonary effort is normal. No respiratory distress.      Breath sounds: Normal breath sounds.   Chest:          Comments: Right breast: Palpable mass at 10:00.  No skin erythema/thickening.  No nipple inversion.  Palpable right axillary lymphadenopathy (mobile).  No palpable supra/infraclavicular lymphadenopathy.    Left breast: Negative exam  Abdominal:      Palpations: Abdomen is soft.      Tenderness: There is no abdominal tenderness.   Musculoskeletal:         General: No swelling.      Cervical back: Neck supple.   Lymphadenopathy:      Upper Body:      Right upper body: Axillary adenopathy present. No supraclavicular adenopathy.      Left upper body: No supraclavicular or axillary  adenopathy.   Skin:     General: Skin is warm and dry.   Neurological:      General: No focal deficit present.      Mental Status: She is alert and oriented to person, place, and time.      Gait: Gait normal.   Psychiatric:         Mood and Affect: Mood normal.         Data Review:  Pathology Result:  Final Diagnosis   Date Value Ref Range Status   09/20/2024   Final    A. Right breast, 10:00, 7-cm from nipple, ultrasound-guided core biopsy:  - Invasive lobular carcinoma, modified Burk-Cabezas grade II, (tubules=3, nuclear pleomorphism=2, mitoses=1), measuring up to 1.2 cm.   - Atypical lobular hyperplasia.   - Calcifications associated with LCIS.    B. Right axillary lymph node, ultrasound-guided core biopsy:  - Infiltrating carcinoma, measuring up to 1.5 cm. Please see note.      12/23/2019   Final    A. Urine, Clean Catch, :  Negative for high grade urothelial carcinoma (NHGUC) - see comment.  Benign urothelial cells.  Benign squamous cells.  Few neutrophils, lymphocytes and red blood cells.    Satisfactory for Evaluation.    Comment:  The above diagnostic category is from the recently published book, The [employer] System for Reporting Urinary Cytology, and is in keeping with the ongoing effort for utilization of standardized diagnostic terminology in urine cytology.*    *The [employer] System for Reporting Urinary Cytology. Charu Breaux, Praneeth Ramires; 2016.          05/02/2018   Final    A. Urine, Clean Catch:  Atypical urothelial cells (AUC) - see comment.  Scattered red blood cells.  Benign squamous cells.    Satisfactory for evaluation.    Comment:  The above diagnostic category is from the recently published book, The [employer] System for Reporting Urinary Cytology, and is in keeping with the ongoing effort for utilization of standardized diagnostic terminology in urine cytology.*    *The [employer] System for Reporting Urinary Cytology. Charu Breaux Daniel F.I.  Keyla; 2016.              Image Results:  Image result are reviewed and documented in Hematology/Oncology history    9/18/2024 bilateral 3D diagnostic mammogram and right axillary ultrasound tissue is extremely dense, previously noted calcification has nearly resolved, distortion in the upper outer right breast 10:00 with a sonographic correlate measuring 18 mm and multiple suspicious axillary nodes with cortical thickening and replaced fatty mikayla the largest of which measures 24 mm, left side is benign     9/20/2024 postbiopsy mammogram is concordant malignancy in the breast was thought to be unifocal, ultrasound showed multiple enlarged nodes, left side was benign     10/14/2024: CT chest/abdomen/pelvis:  1. Enlarged right axillary and subpectoral lymphadenopathy correlating to known history of breast cancer. No pulmonary nodules  No mediastinal or hilar lymphadenopathy. No contralateral axillary lymphadenopathy. No intra-abdominal metastatic disease. No adrenal or hepatic metastatic lesions. No suspicious osseous lesions   2. Stable right hepatic lobe hemangioma and cyst.   3. Stable prominent central mesenteric lymph nodes unchanged since 2018 unrelated to patient's breast cancer.    10/14/2024: Bone scan:  1. No scintigraphic evidence of osseous metastasis.     Labs:  Lab data are reviewed and documented in HemOnc history.     Lab Results   Component Value Date    HGB 12.8 09/20/2021    HCT 39.4 09/20/2021    MCV 97 09/20/2021     09/20/2021    WBC 5.83 09/20/2021    NRBC 0 07/13/2019     Lab Results   Component Value Date     11/01/2015    K 4.9 10/12/2024     10/12/2024    CO2 31 10/12/2024    ANIONGAP 0 (L) 11/01/2015    BUN 12 10/12/2024    CREATININE 0.61 10/12/2024    GLUCOSE 88 09/25/2016    GLUF 105 (H) 10/12/2024    CALCIUM 9.8 10/12/2024    AST 13 10/12/2024    ALT 15 10/12/2024    ALKPHOS 77 10/12/2024    PROT 7.7 11/01/2015    BILITOT 0.48 11/01/2015    EGFR 104 10/12/2024      Assessment/plan:     1.  cT1c cNx cM0 Right breast invasive lobular carcinoma.  Grade 2.  ER positive (>95%), AL positive (70%), HER2 negative by IHC.  MammaPrint: Ultralow risk (luminal A).     2.  cT0 cN1 cM0 Right axillary lymph node biopsy ER negative, AL negative, HER2 negative  3.  BRCA1/2 negative    Romina Cleaning is a 54 y.o. 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    She initially palpated a mass in her right breast which she brought to medical attention.  She underwent ultrasound-guided biopsy which was consistent with an invasive lobular carcinoma, ER positive, AL positive, HER2 negative.  Right axillary lymph node biopsy showed infiltrating carcinoma which was ER negative, AL negative and HER2 negative.  MammaPrint resulted ultralow risk (luminal A), genetic testing pending.  She has been evaluated by breast surgery (Dr. Philippe) and given the 2 different pathologies from the breast mass and axillary lymph node she has been recommended a breast MRI to evaluate for an occult site.  CT of the chest, abdomen, pelvis notes enlarged right axillary and subpectoral lymphadenopathy and no other suspicious lesions.  Bone scan with no evidence of osseous metastasis. She is following with OB/GYN and had Mirena IUD removal.    I discussed with the patient, her sister and son the clinical course leading up to their cancer diagnosis. I reviewed relevant office notes, imaging reports and pathology result as well. I personally reviewed the lab results, the imaging studies, pathology, other specialty/physicians consult notes and recommendations, and outside medical records. I had a lengthy discussion with the patient and shared the work-up findings. We discussed the diagnosis and management plan.  We discussed 2  different pathologies (invasive lobular carcinoma, ER/CO positive, HER2 negative with a ultralow risk MammaPrint results as well as a triple negative carcinoma noted on axillary lymph node biopsy).  She will undergo MRI of the breast on 10/23/2024 and her case will be discussed at multidisciplinary breast tumor board conference next week.  I introduced the role of chemotherapy +/- immunotherapy as well as treatment in the adjuvant versus neoadjuvant setting.  Final systemic recommendations pending MRI and pathology review/comparison.  Patient will follow-up in approximately 10 days to finalize treatment recommendations.  Will obtain baseline echocardiogram.    Orders Placed This Encounter   Procedures    Echo complete w/ contrast if indicated      Diagnosis ICD-10-CM Associated Orders   1. Carcinoma of right breast metastatic to axillary lymph node (HCC)  C50.911 Echo complete w/ contrast if indicated    C77.3       2. Malignant neoplasm of upper-outer quadrant of right breast in female, estrogen receptor positive (HCC)  C50.411 Ambulatory Referral to Hematology / Oncology    Z17.0 Echo complete w/ contrast if indicated      3. Breast cancer metastasized to axillary lymph node, right (HCC)  C50.911 Ambulatory Referral to Hematology / Oncology    C77.3 Echo complete w/ contrast if indicated          Return in about 10 days (around 10/26/2024) for Office Visit.    Graciela Garcia, DO 10/16/2024   Hematology & Medical Oncology Physician    Disclaimer: This document was prepared using [employer] technology. If a word or phrase is confusing, or does not make sense, this is likely due to recognition error which was not discovered during this clinician's review. If you believe an error has occurred, please contact me through [employer] service line for darby?cation.

## 2024-10-18 ENCOUNTER — PATIENT OUTREACH (OUTPATIENT)
Dept: HEMATOLOGY ONCOLOGY | Facility: CLINIC | Age: 54
End: 2024-10-18

## 2024-10-18 NOTE — PROGRESS NOTES
Patient Navigation attempted to outreach patient for the third time with the assistance of the Interpretation Line Lynsey # [phone] after Oncology consult to complete the Distress Thermometer.    A voicemail was left stating a reason for my call and my contact information was provided. I requested a return call at patient's earliest convenience.

## 2024-10-21 ENCOUNTER — DOCUMENTATION (OUTPATIENT)
Dept: HEMATOLOGY ONCOLOGY | Facility: CLINIC | Age: 54
End: 2024-10-21

## 2024-10-21 ENCOUNTER — PATIENT OUTREACH (OUTPATIENT)
Dept: HEMATOLOGY ONCOLOGY | Facility: CLINIC | Age: 54
End: 2024-10-21

## 2024-10-21 ENCOUNTER — TELEPHONE (OUTPATIENT)
Dept: GENETICS | Facility: CLINIC | Age: 54
End: 2024-10-21

## 2024-10-21 NOTE — PROGRESS NOTES
I reached out and spoke with Anuj (Pt's son) now that consults have been completed with the oncology teams to review for any barriers to care and offer supportive services as needed. Distress Thermometer completed at this time. Patient scored 7/10. Referral to  placed. I reviewed and updated the members assigned to the care team in [facility].   She knows the members of the care team as well as how and when to contact them with any needs.   She verbalizes managing the schedules well.   She is currently able to drive and denies any transportation needs.    She denies any uncontrolled symptoms. Discussed role of Palliative Care in symptom and side effect management. Declined referral at this time.  Patients states that she is eating and drinking as per usual with no unintentional weight loss.     Patient does not smoke.   Patient states she is well supported by family and friends.    Patient feels she has adequate insurance coverage and denies any financial concerns at this time.     Based on individual needs I will follow up in about 4-6 weeks.   I have provided my direct contact information and welcome them to contact me if their needs as discussed above change. They were appreciative for the call.

## 2024-10-21 NOTE — TELEPHONE ENCOUNTER
Genetic Test Result Note    Summary:    Result Disclosure:   Today I spoke with Romina over the phone to review the results of her genetic test for hereditary cancer. She underwent genetic testing through our program on 9/26/2024 due to her recent diagnosis of breast cancer.    A separate report of her STAT genetic test results were disclosed to her on 10/9/2024. This result includes new genes and does not change her initial genetic test result.     Test Performed: [facility] BRCAplus STAT Panel (13 genes): HELGA, BARD1, BRCA1, BRCA2, CDH1, CHEK2, NF1, PALB2, PTEN, RAD51C, RAD51D, STK11, TP53 with reflex to [facility] [employer]: Cancer+RNAinsight (59 genes): APC, HELGA, AXIN2, BAP1, BARD1, BMPR1A, BRCA1, BRCA2, BRIP1, CDH1, CDK4, CDKN1B, CDKN2A, CHEK2, CTNNA1, DICER1, EGLN1, EPCAM, FH, FLCN, GREM1, HOXB13, KIF1B, KIT, MAX, MEN1, MET, MITF, MLH1, MSH2, MSH3, MSH6, MUTYH, NF1, NTHL1, PALB2, PDGFRA PMS2, POLD1, POLE, POT1, PTEN, RAD51C, RAD51D, RB1, RET, SDHA, SDHAF2, SDHB, SDHC, SDHD, SMAD4, SMARCA4, STK11, [id number], TP53, TSC1, TSC2, VHL     Result: Negative - No Clinically Significant Variants Detected     Assessment:   A negative result significantly reduces the likelihood that Romina has a hereditary cancer syndrome. However, this testing is unable to completely rule out the presence of hereditary cancer. It remains possible that:  There is a variant in an area of a gene which was not tested or there is a variant not detectable due to technical limitations of this test.     There is a variant in another gene that was not included in this test or in a gene not known to be linked to cancer or tumors.   A family member has a genetic variant that the patient did not inherit.   The cancer in the family is sporadic and is related to non-hereditary factors.     Risks and Testing for Family Members:  Romina was made aware that all of her first-degree relatives are at increased risk to develop breast cancer based on her recent diagnosis  and family history of breast cancer. Her family members may also be at increased risk to develop other cancers in her family history, specifically:    Mother - Colon Cancer age 55   Paternal Aunt - Breast Cancer age 77     We recommend that her first-degree relatives make their healthcare providers aware of the family history and discuss their options for screening and risk-reduction.    At this time we do not recommend testing for Romina 's children based on her negative test result.  Romina 's children still need to consider the history of cancer on the other side of their family when determining their risks.     If Romina has any affected family members with a cancer diagnosis, especially at a young age, they may still consider genetic testing. Relatives who wish to pursue genetic testing can reach out to the Cancer Risk [facility]etics Program at [phone] to schedule an appointment or visit [email or web address] to identify a local genetic counselor.         Plan:     Negative Result: This result does not have surgical implications and surgical options should be discussed with her healthcare provider. Romina's breast surgeon, Dr. Cardarelli will be made aware of her results

## 2024-10-21 NOTE — PROGRESS NOTES
BREAST CANCER MULTIDISCIPLINARY CASE CONFERENCE    DATE: 10/21/2024      PRESENTING DOCTOR: Dr Philippe  OTHER RELEVANT PROVIDERS: Dr Garcia      DIAGNOSIS: Right invasive lobular carcinoma Gr 2 ER >95%, MS 70%, HER2 negative (0). 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 female who was presented at the Breast Multidisciplinary Case Conference today. Discussion included reviewing the patients pathology and imaging reports, discuss treatment plan and     GENETICS:  09/27/2024 [facility] STAT - Negative - No Clinically Significant Variants Detected     GENOMICS: MammaPrint Ultra low risk Luminal A    IMAGING REVIEWED:   09/18/2024 Mammo diagnostic bilateral w 3d & cad/US breast axilla right   10/14/2024 CT Chest abdomen pelvis w contrast  10/14/2024 NM Bone scan whole body    PATHOLOGY REVIEWED: Tissue Exam 09/20/2024  Right breast, 10:00, 7cmfn - Invasive lobular carcinoma Gr 2 ER >95%, MS 70%, HER2 negative (0).  Atypical lobular hyperplasia.   Calcifications associated with LCIS.  Right axillary lymph node - Infiltrating carcinoma, ER negative (<1%), MS negative (<1%), HER2 negative (1+)    PHYSICIAN RECOMMENDED PLAN:  Recommend awaiting MRI results for consideration of neoadjuvant chemotherapy      FOLLOW UP APPOINTMENTS:  Future Appointments   Date Time Provider Department Center   10/23/2024  1:15 PM OW MRI 1 OW MRI [facility]   10/28/2024  1:00 PM Graciela Garcia DO HEM ONC [facility]   11/13/2024  2:30 PM BE HV ECHO 1 BE HV Car [address]   11/26/2024 10:10 AM Alessandro Milton DO [facility]   3/31/2025 11:40 AM Alexandrea Godoy RDH, PHDHP [facility] DENT FOW [facility]          Team agreed to plan.    The final treatment plan will be left to the discretion of the patient and the treating physician.     DISCLAIMERS:  TO THE TREATING PHYSICIAN:  This conference is a meeting of clinicians from various specialty areas who evaluate and  discuss patients for whom a multidisciplinary treatment approach is being considered. Please note that the above opinion was a consensus of the conference attendees and is intended only to assist in quality care of the discussed patient.  The responsibility for follow up on the input given during the conference, along with any final decisions regarding plan of care, is that of the patient and the patient's provider. Accordingly, appointments have only been recommended based on this information and have NOT been scheduled unless otherwise noted.      TO THE PATIENT:  This summary is a brief record of major aspects of your cancer treatment. You may choose to share a copy with any of your doctors or nurses. However, this is not a detailed or comprehensive record of your care.      NCCN guidelines were readily available for review at this discussion

## 2024-10-23 ENCOUNTER — HOSPITAL ENCOUNTER (OUTPATIENT)
Dept: MRI IMAGING | Facility: HOSPITAL | Age: 54
Discharge: HOME | End: 2024-10-23
Payer: COMMERCIAL

## 2024-10-23 DIAGNOSIS — C77.3 CARCINOMA OF RIGHT BREAST METASTATIC TO AXILLARY LYMPH NODE (HCC): ICD-10-CM

## 2024-10-23 DIAGNOSIS — C50.911 CARCINOMA OF RIGHT BREAST METASTATIC TO AXILLARY LYMPH NODE (HCC): ICD-10-CM

## 2024-10-23 PROCEDURE — C8908 MRI W/O FOL W/CONT, BREAST,: HCPCS

## 2024-10-23 PROCEDURE — A9585 GADOBUTROL INJECTION: HCPCS | Performed by: SURGERY

## 2024-10-23 RX ORDER — GADOBUTROL 604.72 MG/ML
7 INJECTION INTRAVENOUS
Status: COMPLETED | OUTPATIENT
Start: 2024-10-23 | End: 2024-10-23

## 2024-10-23 RX ADMIN — GADOBUTROL 7 ML: 604.72 INJECTION INTRAVENOUS at 13:29

## 2024-10-24 DIAGNOSIS — R92.8 ABNORMAL MRI, BREAST: Primary | ICD-10-CM

## 2024-10-27 NOTE — PROGRESS NOTES
Hematology/Oncology Outpatient Office Note  Date of Service: 10/28/2024    [facility] [address] [place] [address]  [phone]    Reason for Consultation:   Chief Complaint   Patient presents with    Follow-up     Referral Physician: No ref. provider found  Primary Care Physician:  Alessandro Milton DO     Assessment/plan:   1.  cT1c cNx cM0 Right breast invasive lobular carcinoma.  Grade 2.  ER positive (>95%), IA positive (70%), HER2 negative by IHC.  MammaPrint: Ultralow risk (luminal A).     2.  cT0 cN1 cM0 Right axillary lymph node biopsy ER negative, IA negative, HER2 negative  3.  BRCA1/2 negative     Romina Cleaning is a 54 y.o. 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 She initially palpated a mass in her right breast which she brought to medical attention.  She underwent ultrasound-guided biopsy which was consistent with an invasive lobular carcinoma, ER positive, IA positive, HER2 negative.   MammaPrint resulted ultralow risk (luminal A). Right axillary lymph node biopsy showed infiltrating carcinoma which was ER negative, IA negative and HER2 negative.  Genetic testing for BRCA1/2. She has been evaluated by breast surgery (Dr. Philippe) and given the 2 different pathologies from the breast mass and axillary lymph node she underwent breast MRI on 10/23/2024 which redemonstrates a mass in the upper outer right breast consistent with the biopsy-proven invasive lobular carcinoma and right axillary lymphadenopathy.  Indeterminant 6 mm mass at the 5 o'clock position right breast for which ultrasound was recommended (if there is no ultrasound correlate MRI guided biopsy would be performed).  CT of the chest, abdomen, pelvis notes enlarged right  axillary and subpectoral lymphadenopathy and no other suspicious lesions.  Bone scan with no evidence of osseous metastasis. She is following with OB/GYN and had Mirena IUD removal.     I discussed with the patient, her sister and son the clinical course leading up to their cancer diagnosis. I reviewed relevant office notes, imaging reports and pathology result as well. I personally reviewed the lab results, the imaging studies, pathology, other specialty/physicians consult notes and recommendations, and outside medical records. I had a lengthy discussion with the patient and shared the work-up findings. We discussed the diagnosis and management plan.  We discussed 2 different pathologies (invasive lobular carcinoma, ER/AL positive, HER2 negative with a ultralow risk MammaPrint results as well as a triple negative carcinoma noted on axillary lymph node biopsy).  Bilateral breast MRI noted spiculated mass in the upper outer right breast consistent with biopsy-proven invasive lobular carcinoma as well as an indeterminate 6 mm mass at the 5 o'clock position for which further characterization with ultrasound was recommended.  If no ultrasound correlate MRI guided biopsy would be performed.  Her case was reviewed in multidisciplinary breast tumor board and recommendations were discussed with patient.  Recommend neoadjuvant chemotherapy for triple negative, node positive carcinoma.  Will consider adjuvant endocrine therapy in the future.    We discussed the diagnosis and management plan. I explained the risks/benefits of the proposed cancer therapy and after discussion including understanding risks of possible life-threatening complications and therapy-related malignancy development, informed consent for blood products and treatment has been signed and obtained.  Patient had a teaching session with RN.    Treatment plan:  Pembrolizumab 200 mg day 1, Paclitaxel 80 mg/m2 day 1, Carboplatin AUC 1.5 on days 1, 8, 15 of a  21-day cycle for 4 cycles.  Then neoadjuvant pembrolizumab 200 mg, cyclophosphamide 600 mg/m2, doxorubicin 60 mg/m2 on day 1 of a 21-day cycle for 4 cycles.   Then adjuvant course of pembrolizumab 200 mg on day 1 of a 21-day cycle for 9 cycles pending final pathology review.     Summary:  -Neoadjuvant chemotherapy for triple negative, lymph node positive breast carcinoma.  Consent obtained.  Chemotherapy to start after further workup of indeterminate mass at the 5 o'clock position noted on breast MRI.  -Chemotherapy plan entered in epic.  Rx for Emla and antiemetics sent to pharmacy.  -Refer to interventional radiology for Chemo-Port placement  -Echocardiogram remains pending  -Breast MRI with indeterminate 6 mm mass at the 5 o'clock position for which characterization with ultrasound is recommended.  There was no ultrasound correlate MRI guided biopsy could be performed.  Patient scheduled for ultrasound in 4 weeks.  Will reach out to breast coordinator to expedite ultrasound or sooner appointment.  -Will continue to monitor clinically while undergoing neoadjuvant chemotherapy.  Office follow-up after first cycle of chemo.       Diagnosis ICD-10-CM Associated Orders   1. Carcinoma of right breast metastatic to axillary lymph node (HCC)  C50.911 Ambulatory Referral to Interventional Radiology    C77.3 lidocaine-prilocaine (EMLA) cream     ondansetron (ZOFRAN) 4 mg tablet      2. Malignant neoplasm of upper-outer quadrant of right breast in female, estrogen receptor positive (HCC)  C50.411 Ambulatory Referral to Interventional Radiology    Z17.0 lidocaine-prilocaine (EMLA) cream     ondansetron (ZOFRAN) 4 mg tablet        Return for Office Visit, Infusion - See Treatment Plan, Labs - See Treatment Plan.    Graciela Garcia DO 10/28/2024   Hematology & Medical Oncology Physician    Oncology history:     Oncology History   Carcinoma of right breast metastatic to axillary lymph node (HCC)   6/11/2024 Initial Diagnosis     Carcinoma of right breast metastatic to axillary lymph node (HCC)     9/20/2024 -  Cancer Staged    Staging form: Breast, AJCC 8th Edition  - Clinical stage from 9/20/2024: cT0, cN1(f), cM0, GX, ER-, CA-, HER2- - Signed by Jodie Philippe MD on 10/7/2024  Stage prefix: Initial diagnosis  Method of lymph node assessment: Core biopsy  Histologic grading system: 3 grade system       11/4/2024 -  Chemotherapy    DOXOrubicin (ADRIAMYCIN), 60 mg/m2, Intravenous, Once, 0 of 4 cycles  alteplase (CATHFLO), 2 mg, Intracatheter, Every 1 Minute as needed, 0 of 17 cycles  pegfilgrastim-apgf (Nyvepria), 6 mg, Subcutaneous, Once, 0 of 4 cycles  cyclophosphamide (CYTOXAN) IVPB, 600 mg/m2, Intravenous, Once, 0 of 4 cycles  fosaprepitant (EMEND) IVPB, 150 mg, Intravenous, Once, 0 of 4 cycles  CARBOplatin (PARAPLATIN) IVPB (GOG AUC DOSING), , Intravenous, Once, 0 of 4 cycles  PACLItaxel (TAXOL) chemo IVPB, 80 mg/m2, Intravenous, Once, 0 of 4 cycles  pembrolizumab (KEYTRUDA) IVPB, 200 mg, Intravenous, Once, 0 of 17 cycles     Malignant neoplasm of upper-outer quadrant of right breast in female, estrogen receptor positive (HCC)   9/20/2024 Biopsy    Right breast ultrasound-guided biopsy  A. 10 o'clock, 7 cm from nipple (MARTHA)  Invasive lobular carcinoma  Grade 2  ER >95, CA 70, HER2 0  Lymphovascular invasion not identified    B. Right axillary lymph node biopsy (MARTHA)  Infiltrating carcinoma  Although no definitive capsule is noted, it is favored to represent lymph node involvement by th e carcinoma  ER <1, CA <1, HER2 1+    Concordant. Malignancy appears unifocal; suspicious masses cover an area up to 1.8 cm. US of right axilla showed multiple enlarged, morphologically abnormal axillary lymph nodes with biopsy confirmed metastatic disease. Left breast clear.     9/20/2024 -  Cancer Staged    Staging form: Breast, AJCC 8th Edition  - Clinical stage from 9/20/2024: Stage Unknown (cT1c, cNX, cM0, G2, ER+, CA+, HER2-) - Signed by Jodie  MD Denae on 10/7/2024  Stage prefix: Initial diagnosis  Histologic grading system: 3 grade system       9/24/2024 Genomic Testing    MammaPrint/BluePrint ordered on breast specimen block A  Ultra-Low risk - luminal A     9/27/2024 Genetic Testing    Genetic testing with RNA analysis ordered  [facility]       Primary Diagnosis:  1.  cT1c cNx cM0 Right breast invasive lobular carcinoma.  Grade 2.  ER positive (>95%), WA positive (70%), HER2 negative by IHC.  MammaPrint: Ultralow risk (luminal A).     2.  cT0 cN1 cM0 Right axillary lymph node biopsy ER negative, WA negative, HER2 negative  3.  BRCA1/2 negative      Previous Hematologic/ Oncologic Treatment:      Current Hematologic/ Oncologic Treatment:     PHYSICIAN RECOMMENDED PLAN:  Recommend awaiting MRI results for consideration of neoadjuvant chemotherapy    History of present illness:   Romina Cleaning is a 54 y.o. postmenopausal female with PMHx significant for HTN who recently felt a mass in her right breast which she brought to medical attention.  She underwent ultrasound-guided biopsy which was consistent with an invasive lobular carcinoma, ER positive, WA positive, HER2 negative.  Right axillary lymph node biopsy showed infiltrating carcinoma which was ER negative, WA negative and HER2 negative.  MammaPrint resulted ultralow risk (luminal A), genetic testing pending.  She has been evaluated by breast surgery (Dr. Philippe) and given the 2 different pathologies from the breast mass and axillary lymph node she has been recommended a breast MRI to evaluate for an occult site.  CT of the chest, abdomen, pelvis notes enlarged right axillary and subpectoral lymphadenopathy and no other suspicious lesions.  Bone scan with no evidence of osseous metastasis.  Patient presents for initial medical oncology consultation.  Patient's case was submitted to be discussed at the multidisciplinary breast tumor board on 10/21/2024.  She is following with OB/GYN and had Mirena IUD  removal. Pt presents for initial medical oncology consultation accompanied by her son and sister for visit.  She denies any chest pain, palpitations, bone pain or other complaints.     OB/GYN history:  G2, P2  Menarche: 15 years old  Menopause: in mid 40's  Age of first pregnancy: 29 years old     Maternal grandmother, maternal uncle and mother with colon cancer. Maternal grandfather with throat cancer (smoker).      Patient declined , instead preferred to have her sister and son translate.    Interval History:   Accompanied by sister, son and mother.  Her case was discussed in the multidisciplinary tumor board in the interim.  Recommendations for neoadjuvant chemotherapy to treat node positive, triple negative disease.  Patient offers no complaints today.  Review of systems:   Review of Systems   Constitutional: Negative. Negative for fever and malaise/fatigue.   HENT: Negative.     Cardiovascular: Negative.  Negative for chest pain, dyspnea on exertion and palpitations.   Respiratory: Negative.  Negative for shortness of breath.    Hematologic/Lymphatic: Negative.  Does not bruise/bleed easily.   Skin: Negative.    Musculoskeletal: Negative.    Gastrointestinal: Negative.  Negative for abdominal pain.   Neurological: Negative.    Psychiatric/Behavioral: Negative.          A 10-point review of system was performed, pertinent positive and negative were detailed as above. Otherwise, the 10-point review of system was negative.    Past Medical History:   Diagnosis Date    GERD (gastroesophageal reflux disease)     Headache     Hematuria     Hypertension     IUD (intrauterine device) in place 02/15/2019    Mirena placed 2/2015 effective through 2/2020      Lateral epicondylitis, right elbow 01/08/2019    Panic attacks     Psychiatric disorder        Past Surgical History:   Procedure Laterality Date    BREAST BIOPSY Right 09/20/2024    BREAST BIOPSY Right 09/20/2024    CYSTOSCOPY  06/08/2018    NO PAST  "SURGERIES      US GUIDED BREAST BIOPSY RIGHT COMPLETE Right 2024    US GUIDED BREAST LYMPH NODE BIOPSY RIGHT Right 2024       Family History   Problem Relation Age of Onset    Hypertension Mother     Colonic polyp Mother     Colon cancer Mother     Arthritis Father     Diabetes Father     Hypertension Father     Hyperlipidemia Father     No Known Problems Sister     No Known Problems Sister     Other Brother         TBI from accident     No Known Problems Son     No Known Problems Daughter     No Known Problems Maternal Aunt     No Known Problems Maternal Aunt     No Known Problems Maternal Aunt     No Known Problems Maternal Aunt     No Known Problems Maternal Aunt     No Known Problems Paternal Aunt     No Known Problems Paternal Aunt     No Known Problems Paternal Aunt     Colonic polyp Maternal Grandmother     Colon cancer Maternal Grandmother     Throat cancer Maternal Grandfather     No Known Problems Paternal Grandmother     No Known Problems Paternal Grandfather        Social History     Socioeconomic History    Marital status: Single     Spouse name: Not on file    Number of children: Not on file    Years of education: Not on file    Highest education level: 12th grade   Occupational History    Occupation: HOUSEWIFE OR HOMEMAKER   Tobacco Use    Smoking status: Former     Current packs/day: 0.00     Average packs/day: 0.3 packs/day for 35.0 years (8.8 ttl pk-yrs)     Types: Cigarettes     Start date: 1988     Quit date: 2024     Years since quittin.0    Smokeless tobacco: Never    Tobacco comments:     ONE HALF PACK A DAY OR LESS, CURRENT SOME DAY SMOKER, LIGHT TOBACCO SMOKER  AS PER [employer]   Vaping Use    Vaping status: Some Days   Substance and Sexual Activity    Alcohol use: No    Drug use: No    Sexual activity: Not Currently     Partners: Male     Birth control/protection: I.U.D.   Other Topics Concern    Not on file   Social History Narrative    ENGAGES IN A HOBBY - \"PLAYS " "DOMINOES\"    LIVES WITH FAMILY    UNEMPLOYED     Social Determinants of Health     Financial Resource Strain: Low Risk  (6/11/2024)    Overall Financial Resource Strain (CARDIA)     Difficulty of Paying Living Expenses: Not hard at all   Food Insecurity: No Food Insecurity (6/11/2024)    Nursing - Inadequate Food Risk Classification     Worried About Running Out of Food in the Last Year: Never true     Ran Out of Food in the Last Year: Never true     Ran Out of Food in the Last Year: Not on file   Transportation Needs: No Transportation Needs (6/11/2024)    PRAPARE - Transportation     Lack of Transportation (Medical): No     Lack of Transportation (Non-Medical): No   Physical Activity: Insufficiently Active (6/11/2024)    Exercise Vital Sign     Days of Exercise per Week: 3 days     Minutes of Exercise per Session: 30 min   Stress: No Stress Concern Present (4/25/2023)    [facility] [facility] [employer] [facility] - Occupational Stress Questionnaire     Feeling of Stress : Not at all   Social Connections: Unknown (6/11/2024)    Social Connection and Isolation Panel [NHANES]     Frequency of Communication with Friends and Family: More than three times a week     Frequency of Social Gatherings with Friends and Family: More than three times a week     Attends Christian Services: 1 to 4 times per year     Active Member of Clubs or Organizations: No     Attends Club or Organization Meetings: Never     Marital Status: Not on file   Intimate Partner Violence: Not At Risk (6/11/2024)    Humiliation, Afraid, Rape, and Kick questionnaire     Fear of Current or Ex-Partner: No     Emotionally Abused: No     Physically Abused: No     Sexually Abused: No   Housing Stability: Low Risk  (6/11/2024)    Housing Stability Vital Sign     Unable to Pay for Housing in the Last Year: No     Number of Times Moved in the Last Year: 1     Homeless in the Last Year: No       No Known Allergies    Current Outpatient Medications   Medication Sig " Dispense Refill    acetaminophen (TYLENOL) 500 mg tablet Take 1 tablet (500 mg total) by mouth every 6 (six) hours as needed for mild pain 30 tablet 0    amitriptyline (ELAVIL) 25 mg tablet Take 1 tablet (25 mg total) by mouth daily at bedtime 30 tablet 2    atorvastatin (LIPITOR) 10 mg tablet TAKE ONE TABLET BY MOUTH DAILY 90 tablet 1    Cholecalciferol (VITAMIN D3 PO) Take by mouth daily      cyclobenzaprine (FLEXERIL) 5 mg tablet Take 1 tablet (5 mg total) by mouth 3 (three) times a day as needed for muscle spasms 30 tablet 0    Diclofenac Sodium (VOLTAREN) 1 % Apply 2 g topically 4 (four) times a day 2 g 0    lidocaine-prilocaine (EMLA) cream Apply topically as needed for mild pain 1 g 0    lisinopril-hydrochlorothiazide (PRINZIDE,ZESTORETIC) 10-12.5 MG per tablet Take 1 tablet by mouth daily 90 tablet 1    ondansetron (ZOFRAN) 4 mg tablet Take 1 tablet (4 mg total) by mouth every 8 (eight) hours as needed for nausea or vomiting 30 tablet 2    levonorgestrel (MIRENA) 20 MCG/24HR IUD 1 each by Intrauterine route once (Patient not taking: Reported on 10/7/2024)      naproxen (Naprosyn) 500 mg tablet Take 1 tablet (500 mg total) by mouth 2 (two) times a day with meals for 15 days 30 tablet 0     No current facility-administered medications for this visit.     I have reviewed the relevant past medical, surgical, social and family history. I have also reviewed allergies and medications for this patient.    Objective:      Vitals:    10/28/24 1303   BP: 132/68   Pulse: 88   Resp: 16   Temp: (!) 97.4 °F (36.3 °C)   SpO2: 100%       Wt Readings from Last 3 Encounters:   10/28/24 63.5 kg (140 lb)   10/16/24 64 kg (141 lb)   10/07/24 63.9 kg (140 lb 12.8 oz)     Performance status: ECOG PS: 0  Physical Exam  Vitals and nursing note reviewed.   Constitutional:       General: She is not in acute distress.     Appearance: She is well-developed.   HENT:      Head: Normocephalic and atraumatic.   Eyes:      Conjunctiva/sclera:  Conjunctivae normal.   Cardiovascular:      Rate and Rhythm: Normal rate.   Pulmonary:      Effort: Pulmonary effort is normal. No respiratory distress.   Musculoskeletal:         General: No swelling.      Cervical back: Neck supple.   Skin:     General: Skin is warm and dry.   Neurological:      General: No focal deficit present.      Mental Status: She is alert and oriented to person, place, and time.      Gait: Gait normal.   Psychiatric:         Mood and Affect: Mood normal.         Data Review:  Pathology Result:  Final Diagnosis   Date Value Ref Range Status   09/20/2024   Final    A. Right breast, 10:00, 7-cm from nipple, ultrasound-guided core biopsy:  - Invasive lobular carcinoma, modified Burk-Cabezas grade II, (tubules=3, nuclear pleomorphism=2, mitoses=1), measuring up to 1.2 cm.   - Atypical lobular hyperplasia.   - Calcifications associated with LCIS.    B. Right axillary lymph node, ultrasound-guided core biopsy:  - Infiltrating carcinoma, measuring up to 1.5 cm. Please see note.      12/23/2019   Final    A. Urine, Clean Catch, :  Negative for high grade urothelial carcinoma (NHGUC) - see comment.  Benign urothelial cells.  Benign squamous cells.  Few neutrophils, lymphocytes and red blood cells.    Satisfactory for Evaluation.    Comment:  The above diagnostic category is from the recently published book, The [employer] System for Reporting Urinary Cytology, and is in keeping with the ongoing effort for utilization of standardized diagnostic terminology in urine cytology.*    *The [employer] System for Reporting Urinary Cytology. Joya Beltre, Charu Malave, Praneeth Ramires; 2016.          05/02/2018   Final    A. Urine, Clean Catch:  Atypical urothelial cells (AUC) - see comment.  Scattered red blood cells.  Benign squamous cells.    Satisfactory for evaluation.    Comment:  The above diagnostic category is from the recently published book, The [employer] System for Reporting Urinary Cytology,  and is in keeping with the ongoing effort for utilization of standardized diagnostic terminology in urine cytology.*    *The [employer] System for Reporting Urinary Cytology. Joya Beltre, Charu Malave, Praneeth Ramires; 2016.              Image Results:  Image result are reviewed and documented in Hematology/Oncology history    9/18/2024 bilateral 3D diagnostic mammogram and right axillary ultrasound tissue is extremely dense, previously noted calcification has nearly resolved, distortion in the upper outer right breast 10:00 with a sonographic correlate measuring 18 mm and multiple suspicious axillary nodes with cortical thickening and replaced fatty mikayla the largest of which measures 24 mm, left side is benign     9/20/2024 postbiopsy mammogram is concordant malignancy in the breast was thought to be unifocal, ultrasound showed multiple enlarged nodes, left side was benign     10/14/2024: CT chest/abdomen/pelvis:  1. Enlarged right axillary and subpectoral lymphadenopathy correlating to known history of breast cancer. No pulmonary nodules  No mediastinal or hilar lymphadenopathy. No contralateral axillary lymphadenopathy. No intra-abdominal metastatic disease. No adrenal or hepatic metastatic lesions. No suspicious osseous lesions   2. Stable right hepatic lobe hemangioma and cyst.   3. Stable prominent central mesenteric lymph nodes unchanged since 2018 unrelated to patient's breast cancer.     10/14/2024: Bone scan:  1. No scintigraphic evidence of osseous metastasis.     10/23/2024: MRI breast bilateral:  RIGHT  1) MASS E: There is a 20 mm x 15 mm x 17 mm irregularly shaped mass with heterogeneous internal enhancement seen in the upper outer quadrant of the right breast at 10 o'clock, 7 cm from the nipple, which is isointense on T2 weighted images.  This is compatible with the biopsy-proven invasive lobular carcinoma.     This is located 5 mm anterior to the underlying pectoralis musculature, and 23 mm  from the lateral skin surface.      2) LYMPH NODE F: There are bulky right axillary lymph nodes to include 1 with a biopsy marker clip in keeping with metastatic axillary node.  The largest node measures 27 x 37 mm (series [id number] image 48).   3) MASS G: There is a 6 mm round mass with circumscribed margins seen in the right breast at 5 o'clock, 4 cm from the nipple, which is hyperintense on T2 weighted images.  This has heterogeneous enhancement and mixed kinetics.  This is best visualized on series [id number] image 120.  Further characterization with targeted ultrasound is recommended.      Left  There are no suspicious enhancing masses or suspicious areas of non-mass enhancement. There is no axillary or internal mammary adenopathy.      1.5 cm T2 hyperintense lesion in the right lobe of the liver in keeping with hemangioma identified on recent CT chest abdomen pelvis.  This appears stable in size from 12/27/2016 right upper quadrant ultrasound.     IMPRESSION:   Redemonstration of a spiculated mass in the upper outer right breast in keeping with biopsy-proven invasive lobular carcinoma and bulky right axillary lymphadenopathy in keeping with metastatic nodes.  Indeterminate 6 mm mass at the 5 o'clock position right breast for which further characterization with targeted ultrasound is recommended.  If there is no ultrasound correlate MRI guided biopsy could be performed.  No suspicious enhancement in the left breast.    Labs:  Lab data are reviewed and documented in [facility] history.     Lab Results   Component Value Date    HGB 12.8 09/20/2021    HCT 39.4 09/20/2021    MCV 97 09/20/2021     09/20/2021    WBC 5.83 09/20/2021    NRBC 0 07/13/2019     Lab Results   Component Value Date     11/01/2015    K 4.9 10/12/2024     10/12/2024    CO2 31 10/12/2024    ANIONGAP 0 (L) 11/01/2015    BUN 12 10/12/2024    CREATININE 0.61 10/12/2024    GLUCOSE 88 09/25/2016    GLUF 105 (H) 10/12/2024    CALCIUM 9.8  10/12/2024    AST 13 10/12/2024    ALT 15 10/12/2024    ALKPHOS 77 10/12/2024    PROT 7.7 11/01/2015    BILITOT 0.48 11/01/2015    EGFR 104 10/12/2024     Disclaimer: This document was prepared using ROSTR Direct technology. If a word or phrase is confusing, or does not make sense, this is likely due to recognition error which was not discovered during this clinician's review. If you believe an error has occurred, please contact me through HemOn[facility] service line for darby?cation.

## 2024-10-28 ENCOUNTER — OFFICE VISIT (OUTPATIENT)
Dept: HEMATOLOGY ONCOLOGY | Facility: CLINIC | Age: 54
End: 2024-10-28
Payer: COMMERCIAL

## 2024-10-28 ENCOUNTER — TELEPHONE (OUTPATIENT)
Dept: HEMATOLOGY ONCOLOGY | Facility: CLINIC | Age: 54
End: 2024-10-28

## 2024-10-28 VITALS
SYSTOLIC BLOOD PRESSURE: 132 MMHG | HEIGHT: 65 IN | RESPIRATION RATE: 16 BRPM | HEART RATE: 88 BPM | TEMPERATURE: 97.4 F | OXYGEN SATURATION: 100 % | WEIGHT: 140 LBS | DIASTOLIC BLOOD PRESSURE: 68 MMHG | BODY MASS INDEX: 23.32 KG/M2

## 2024-10-28 DIAGNOSIS — C77.3 CARCINOMA OF RIGHT BREAST METASTATIC TO AXILLARY LYMPH NODE (HCC): Primary | ICD-10-CM

## 2024-10-28 DIAGNOSIS — C50.911 CARCINOMA OF RIGHT BREAST METASTATIC TO AXILLARY LYMPH NODE (HCC): Primary | ICD-10-CM

## 2024-10-28 DIAGNOSIS — Z17.0 MALIGNANT NEOPLASM OF UPPER-OUTER QUADRANT OF RIGHT BREAST IN FEMALE, ESTROGEN RECEPTOR POSITIVE (HCC): ICD-10-CM

## 2024-10-28 DIAGNOSIS — C50.411 MALIGNANT NEOPLASM OF UPPER-OUTER QUADRANT OF RIGHT BREAST IN FEMALE, ESTROGEN RECEPTOR POSITIVE (HCC): ICD-10-CM

## 2024-10-28 PROCEDURE — 99215 OFFICE O/P EST HI 40 MIN: CPT | Performed by: INTERNAL MEDICINE

## 2024-10-28 RX ORDER — ONDANSETRON 4 MG/1
4 TABLET, FILM COATED ORAL EVERY 8 HOURS PRN
Qty: 30 TABLET | Refills: 2 | Status: SHIPPED | OUTPATIENT
Start: 2024-10-28

## 2024-10-28 RX ORDER — LIDOCAINE/PRILOCAINE 2.5 %-2.5%
CREAM (GRAM) TOPICAL AS NEEDED
Qty: 1 G | Refills: 0 | Status: SHIPPED | OUTPATIENT
Start: 2024-10-28

## 2024-10-28 NOTE — TELEPHONE ENCOUNTER
Let message at breast center to see if they could give patient a sooner appointment for us breast right as per dr patricia.

## 2024-10-29 ENCOUNTER — DOCUMENTATION (OUTPATIENT)
Dept: HEMATOLOGY ONCOLOGY | Facility: CLINIC | Age: 54
End: 2024-10-29

## 2024-10-29 NOTE — PROGRESS NOTES
10/28/24 Reviewed Pembrolizumab, Paclitaxel, Carboplatin, Cyclophosphamide, Doxorubicin handouts with Pt, Dtr, Son and Mom.  Gave them copies of the handouts.  Instructed possible side effects and importance of reporting any symptoms.  Reviewed Zofran rx will be sent to retail pharmacy.  Dtr and Son adults are bilingual and .  Pt has good support system with family.

## 2024-10-31 ENCOUNTER — TELEPHONE (OUTPATIENT)
Dept: INTERVENTIONAL RADIOLOGY/VASCULAR | Facility: HOSPITAL | Age: 54
End: 2024-10-31

## 2024-10-31 RX ORDER — CEFAZOLIN SODIUM 1 G/50ML
1000 SOLUTION INTRAVENOUS ONCE
Status: CANCELLED | OUTPATIENT
Start: 2024-10-31 | End: 2024-10-31

## 2024-10-31 RX ORDER — SODIUM CHLORIDE 9 MG/ML
75 INJECTION, SOLUTION INTRAVENOUS CONTINUOUS
Status: CANCELLED | OUTPATIENT
Start: 2024-10-31

## 2024-11-01 ENCOUNTER — HOSPITAL ENCOUNTER (OUTPATIENT)
Dept: ULTRASOUND IMAGING | Facility: CLINIC | Age: 54
End: 2024-11-01
Payer: COMMERCIAL

## 2024-11-01 ENCOUNTER — HOSPITAL ENCOUNTER (OUTPATIENT)
Dept: MAMMOGRAPHY | Facility: CLINIC | Age: 54
End: 2024-11-01
Payer: COMMERCIAL

## 2024-11-01 VITALS — HEART RATE: 68 BPM | SYSTOLIC BLOOD PRESSURE: 130 MMHG | DIASTOLIC BLOOD PRESSURE: 68 MMHG

## 2024-11-01 DIAGNOSIS — R93.89 ABNORMAL ULTRASOUND: ICD-10-CM

## 2024-11-01 DIAGNOSIS — R92.8 ABNORMAL MRI, BREAST: ICD-10-CM

## 2024-11-01 PROCEDURE — 88360 TUMOR IMMUNOHISTOCHEM/MANUAL: CPT | Performed by: PATHOLOGY

## 2024-11-01 PROCEDURE — 88341 IMHCHEM/IMCYTCHM EA ADD ANTB: CPT | Performed by: PATHOLOGY

## 2024-11-01 PROCEDURE — A4648 IMPLANTABLE TISSUE MARKER: HCPCS

## 2024-11-01 PROCEDURE — 88342 IMHCHEM/IMCYTCHM 1ST ANTB: CPT | Performed by: PATHOLOGY

## 2024-11-01 PROCEDURE — 76642 ULTRASOUND BREAST LIMITED: CPT

## 2024-11-01 PROCEDURE — 19083 BX BREAST 1ST LESION US IMAG: CPT

## 2024-11-01 PROCEDURE — 88305 TISSUE EXAM BY PATHOLOGIST: CPT | Performed by: PATHOLOGY

## 2024-11-01 RX ORDER — LIDOCAINE HYDROCHLORIDE 10 MG/ML
5 INJECTION, SOLUTION EPIDURAL; INFILTRATION; INTRACAUDAL; PERINEURAL ONCE
Status: COMPLETED | OUTPATIENT
Start: 2024-11-01 | End: 2024-11-01

## 2024-11-01 RX ADMIN — LIDOCAINE HYDROCHLORIDE 5 ML: 10 INJECTION, SOLUTION EPIDURAL; INFILTRATION; INTRACAUDAL; PERINEURAL at 13:42

## 2024-11-01 NOTE — PROGRESS NOTES
Ice pack given:    __X___yes _____no    Discharge instructions reviewed and given to patient:    __X___yes _____no    Discharged via:    __X___amulatory    _____wheelchair    _____stretcher    Biopsy site clean and dry with no bleeding on discharge:    __X___yes ____no  Patient is to get results from Jodie Philippe.  Ok to leave a message.

## 2024-11-01 NOTE — PROGRESS NOTES
Met with patient, TY Guaman for translation and Dr. Hayley Rogers regarding recommendation for;    __X___ RIGHT ______LEFT      __X___Ultrasound guided  ______Stereotactic breast biopsy.      __X___Verbalized understanding.    Reviewed clip placement with patient, pt states understanding: Yes: __X__ No: ____  Comments:    Blood thinners:  No: __X___ Yes: ______ What:          Biopsy teaching sheet given:  Yes: ___X___ No: ________ (in [place]ian)    Pt given contact information and adv to call with any questions/needs    Patient staying for same day biopsy

## 2024-11-01 NOTE — PROGRESS NOTES
Procedure type:    __x___ultrasound guided _____stereotactic    Breast:    ___x__Left _____Right    Location: 5 o'clock gahssan     Needle: 12 gauge sarbjit     # of passes: 3     Clip: MARTHA      Performed by: Dr. Hayley Rogers     Pressure held for 5 minutes by: Nia Martinez     Stereulalia Strips:    ___x__yes _____no    Band aid:    ___x__yes_____no    Tape and guaze:    _____yes __x___no    Tolerated procedure:    __x___yes _____no

## 2024-11-04 NOTE — PROGRESS NOTES
Post procedure call completed    Bleeding: _____yes __X___no    Pain: _____yes ___X___no    Redness/Swelling: ______yes ___X___no    Band aid removed: _____yes ___X__no (discussed removing when she showers)    Steri-Strips intact: ___X___yes _____no (discussed with patient to remove steri strips on ... if they have not come off on their own)    Pt with no questions at this time, adv will call when results available, adv to call with any questions or concerns, has name/# for contact

## 2024-11-05 PROCEDURE — 88341 IMHCHEM/IMCYTCHM EA ADD ANTB: CPT | Performed by: PATHOLOGY

## 2024-11-05 PROCEDURE — 88360 TUMOR IMMUNOHISTOCHEM/MANUAL: CPT | Performed by: PATHOLOGY

## 2024-11-05 PROCEDURE — 88342 IMHCHEM/IMCYTCHM 1ST ANTB: CPT | Performed by: PATHOLOGY

## 2024-11-05 PROCEDURE — 88305 TISSUE EXAM BY PATHOLOGIST: CPT | Performed by: PATHOLOGY

## 2024-11-06 ENCOUNTER — HOSPITAL ENCOUNTER (OUTPATIENT)
Dept: INTERVENTIONAL RADIOLOGY/VASCULAR | Facility: HOSPITAL | Age: 54
Discharge: HOME | End: 2024-11-06
Attending: INTERNAL MEDICINE
Payer: COMMERCIAL

## 2024-11-06 ENCOUNTER — TELEPHONE (OUTPATIENT)
Age: 54
End: 2024-11-06

## 2024-11-06 VITALS
OXYGEN SATURATION: 98 % | WEIGHT: 140 LBS | DIASTOLIC BLOOD PRESSURE: 82 MMHG | RESPIRATION RATE: 16 BRPM | BODY MASS INDEX: 23.32 KG/M2 | SYSTOLIC BLOOD PRESSURE: 121 MMHG | HEART RATE: 93 BPM | HEIGHT: 65 IN | TEMPERATURE: 97.9 F

## 2024-11-06 DIAGNOSIS — C50.911 CARCINOMA OF RIGHT BREAST METASTATIC TO AXILLARY LYMPH NODE (HCC): ICD-10-CM

## 2024-11-06 DIAGNOSIS — C77.3 CARCINOMA OF RIGHT BREAST METASTATIC TO AXILLARY LYMPH NODE (HCC): ICD-10-CM

## 2024-11-06 PROCEDURE — 36561 INSERT TUNNELED CV CATH: CPT

## 2024-11-06 PROCEDURE — 76937 US GUIDE VASCULAR ACCESS: CPT | Performed by: RADIOLOGY

## 2024-11-06 PROCEDURE — C1788 PORT, INDWELLING, IMP: HCPCS

## 2024-11-06 PROCEDURE — 99153 MOD SED SAME PHYS/QHP EA: CPT

## 2024-11-06 PROCEDURE — 36561 INSERT TUNNELED CV CATH: CPT | Performed by: RADIOLOGY

## 2024-11-06 PROCEDURE — 99152 MOD SED SAME PHYS/QHP 5/>YRS: CPT

## 2024-11-06 PROCEDURE — C1894 INTRO/SHEATH, NON-LASER: HCPCS

## 2024-11-06 PROCEDURE — 99152 MOD SED SAME PHYS/QHP 5/>YRS: CPT | Performed by: RADIOLOGY

## 2024-11-06 PROCEDURE — 77001 FLUOROGUIDE FOR VEIN DEVICE: CPT | Performed by: RADIOLOGY

## 2024-11-06 PROCEDURE — 76937 US GUIDE VASCULAR ACCESS: CPT

## 2024-11-06 RX ORDER — CEFAZOLIN SODIUM 1 G/50ML
1000 SOLUTION INTRAVENOUS ONCE
Status: COMPLETED | OUTPATIENT
Start: 2024-11-06 | End: 2024-11-06

## 2024-11-06 RX ORDER — MIDAZOLAM HYDROCHLORIDE 2 MG/2ML
INJECTION, SOLUTION INTRAMUSCULAR; INTRAVENOUS AS NEEDED
Status: COMPLETED | OUTPATIENT
Start: 2024-11-06 | End: 2024-11-06

## 2024-11-06 RX ORDER — ACETAMINOPHEN 325 MG/1
650 TABLET ORAL EVERY 4 HOURS PRN
Status: DISCONTINUED | OUTPATIENT
Start: 2024-11-06 | End: 2024-11-07 | Stop reason: HOSPADM

## 2024-11-06 RX ORDER — FENTANYL CITRATE 50 UG/ML
INJECTION, SOLUTION INTRAMUSCULAR; INTRAVENOUS AS NEEDED
Status: COMPLETED | OUTPATIENT
Start: 2024-11-06 | End: 2024-11-06

## 2024-11-06 RX ORDER — SODIUM CHLORIDE 9 MG/ML
75 INJECTION, SOLUTION INTRAVENOUS CONTINUOUS
Status: DISCONTINUED | OUTPATIENT
Start: 2024-11-06 | End: 2024-11-07 | Stop reason: HOSPADM

## 2024-11-06 RX ADMIN — MIDAZOLAM 0.5 MG: 1 INJECTION INTRAMUSCULAR; INTRAVENOUS at 11:33

## 2024-11-06 RX ADMIN — FENTANYL CITRATE 25 MCG: 50 INJECTION, SOLUTION INTRAMUSCULAR; INTRAVENOUS at 11:22

## 2024-11-06 RX ADMIN — MIDAZOLAM 1 MG: 1 INJECTION INTRAMUSCULAR; INTRAVENOUS at 11:13

## 2024-11-06 RX ADMIN — FENTANYL CITRATE 50 MCG: 50 INJECTION, SOLUTION INTRAMUSCULAR; INTRAVENOUS at 11:13

## 2024-11-06 RX ADMIN — CEFAZOLIN SODIUM 1000 MG: 1 SOLUTION INTRAVENOUS at 10:10

## 2024-11-06 RX ADMIN — MIDAZOLAM 0.5 MG: 1 INJECTION INTRAMUSCULAR; INTRAVENOUS at 11:22

## 2024-11-06 RX ADMIN — Medication 20 ML: at 11:29

## 2024-11-06 RX ADMIN — FENTANYL CITRATE 25 MCG: 50 INJECTION, SOLUTION INTRAMUSCULAR; INTRAVENOUS at 11:33

## 2024-11-06 NOTE — H&P
"Interventional Radiology  History and Physical 11/6/2024     Romina Cleaning   1970   [id number]    H&P reviewed. There have been no interval changes since the time the H&P was written.    /65   Pulse 87   Temp (!) 96.9 °F (36.1 °C) (Temporal)   Resp 12   Ht 5' 5\" (1.651 m)   Wt 63.5 kg (140 lb)   LMP  (LMP Unknown) Comment: IUD  SpO2 98%   BMI 23.30 kg/m²     Prior imaging was reviewed.  Presents today for port placement.  Right breast cancer metastatic to the axilla.  Procedure discussed and all questions answered.  Will place port on left side.  She is agreeable.    Informed written consent was obtained.    Bobby Pineda MD   "

## 2024-11-06 NOTE — TELEPHONE ENCOUNTER
Patient calling to inform Dr. Garcia her port was placed today.  She would like to her schedule for chemo treatment.  She will need an . [phone]

## 2024-11-06 NOTE — DISCHARGE INSTRUCTIONS
Procedural Sedation   WHAT YOU NEED TO KNOW:   Procedural sedation is medicine used during procedures to help you feel relaxed and calm. You will remember little to none of the procedure. After sedation you may feel tired, weak, or unsteady on your feet. You may also have trouble concentrating or short-term memory loss. These symptoms should go away in 24 hours or less.   DISCHARGE INSTRUCTIONS:   Call 911 or have someone else call for any of the following:   You have sudden trouble breathing.     You cannot be woken.     Contact Interventional Radiology at [phone]   [facility] PATIENTS: Contact Interventional Radiology at [phone] [facility] PATIENTS: Contact Interventional Radiology at [phone]) if any of the following occur:      You have a severe headache or dizziness.     Your heart is beating faster than usual.    You have a fever or chills.     Your skin is itchy, swollen, or you have a rash.     You have nausea or are vomiting for more than 8 hours after the procedure.      You have questions or concerns about your condition or care.  Self-care:   Have someone stay with you for 24 hours. This person can drive you to errands and help you do things around the house. This person can also watch for problems.      Rest and do quiet activities for 24 hours. Do not exercise, ride a bike, or play sports. Stand up slowly to prevent dizziness and falls. Take short walks around the house with another person. Slowly return to your usual activities the next day.      Do not drive or use dangerous machines or tools for 24 hours. You may injure yourself or others. Examples include a lawnmower, saw, or drill. Do not return to work for 24 hours if you use dangerous machines or tools for work.      Do not make important decisions for 24 hours. For example, do not sign important papers or invest money.      Drink liquids as directed. Liquids help flush the sedation medicine out of your body. Ask how much liquid to drink  each day and which liquids are best for you.      Eat small, frequent meals to prevent nausea and vomiting. Start with clear liquids such as juice or broth. If you do not vomit after clear liquids, you can eat your usual foods.      Do not drink alcohol or take medicines that make you drowsy. This includes medicines that help you sleep and anxiety medicines. Ask your healthcare provider if it is safe for you to take pain medicine.  Follow up with your healthcare provider as directed: Write down your questions so you remember to ask them during your visits. Implanted Venous Access Port     WHAT YOU NEED TO KNOW:   An implanted venous access port is a device used to give treatments and take blood. It may also be called a central venous access device (CVAD). The port is a small container that is placed under your skin, usually in your upper chest. The port is attached to a catheter that enters a large vein.   DISCHARGE INSTRUCTIONS:   Resume your normal diet. Small sips of flat soda will help with mild nausea.  Prevent an infection:   Wash your hands often.  Use soap and water. Clean your hands before and after you care for your port. Remind everyone who cares for your port to wash their hands.   Check your skin for infection every day.  Look for redness, swelling, or fluid oozing from the port site.  Care for your port:   1. You may shower beginning 48 hours after procedure.     2.  Leave glue in place.    3. It is normal for some bruising to occur.    4. Use Tylenol for pain.    5. Limit use of arm on the side that your port was placed. Lift nothing heavier than 5 pounds for 1 week, and then gradually increase activity as tolerated.    6. DO NOT apply ointment, lotion or cream to port site until incision is healed. Allow glue to fall off. DO NOT attempt to peel glue from skin even it it begins to flake.     7. After the port incision is healed you may swim, bathe.  Notify the Interventional Radiologist if you have  any of the followin. Fever above 101 F    2. Increased redness or swelling after 1st day.     3. Increased pain after 1st day.    4. Any sign of infection (drainage from port site, skin separation, hot to touch).    5. Persistent nausea or vomiting.    Contact Interventional Radiology at [phone] ([facility] PATIENTS: Contact Interventional Radiology at [phone]) ([facility] PATIENTS: Contact Interventional Radiology at [phone]).

## 2024-11-06 NOTE — BRIEF OP NOTE (RAD/CATH)
INTERVENTIONAL RADIOLOGY PROCEDURE NOTE    Date: 11/6/2024    Procedure:   Procedure Summary       Date: 11/06/24 Room / Location: [facility] Interventional Radiology    Anesthesia Start:  Anesthesia Stop:     Procedure: IR PORT PLACEMENT Diagnosis:       Carcinoma of right breast metastatic to axillary lymph node (HCC)      (Carcinoma of right breast metastatic to axillary lymph node)    Scheduled Providers:  Responsible Provider:     Anesthesia Type: Not recorded ASA Status: Not recorded            Preoperative diagnosis:   1. Carcinoma of right breast metastatic to axillary lymph node (HCC)         Postoperative diagnosis: Same.    Surgeon: Bobby Pineda MD     Assistant: None. No qualified resident was available.    Blood loss: minimal     Specimens: none     Findings: Right chest port placement with image guidance     Complications: None immediate.    Anesthesia: conscious sedation

## 2024-11-06 NOTE — NURSING NOTE
No distress. Tolerated food/drink. Incisions clean, dry and intact with surgical glue. No redness or drainage.

## 2024-11-07 DIAGNOSIS — C77.3 CARCINOMA OF RIGHT BREAST METASTATIC TO AXILLARY LYMPH NODE (HCC): Primary | ICD-10-CM

## 2024-11-07 DIAGNOSIS — C50.911 CARCINOMA OF RIGHT BREAST METASTATIC TO AXILLARY LYMPH NODE (HCC): Primary | ICD-10-CM

## 2024-11-11 ENCOUNTER — APPOINTMENT (OUTPATIENT)
Dept: LAB | Facility: CLINIC | Age: 54
End: 2024-11-11
Payer: COMMERCIAL

## 2024-11-11 DIAGNOSIS — C77.3 CARCINOMA OF RIGHT BREAST METASTATIC TO AXILLARY LYMPH NODE (HCC): ICD-10-CM

## 2024-11-11 DIAGNOSIS — C50.911 CARCINOMA OF RIGHT BREAST METASTATIC TO AXILLARY LYMPH NODE (HCC): ICD-10-CM

## 2024-11-11 LAB
ALBUMIN SERPL BCG-MCNC: 4.7 G/DL (ref 3.5–5)
ALP SERPL-CCNC: 85 U/L (ref 34–104)
ALT SERPL W P-5'-P-CCNC: 14 U/L (ref 7–52)
ANION GAP SERPL CALCULATED.3IONS-SCNC: 10 MMOL/L (ref 4–13)
AST SERPL W P-5'-P-CCNC: 13 U/L (ref 13–39)
BASOPHILS # BLD AUTO: 0.04 THOUSANDS/ÂΜL (ref 0–0.1)
BASOPHILS NFR BLD AUTO: 1 % (ref 0–1)
BILIRUB SERPL-MCNC: 0.58 MG/DL (ref 0.2–1)
BUN SERPL-MCNC: 10 MG/DL (ref 5–25)
CALCIUM SERPL-MCNC: 9.9 MG/DL (ref 8.4–10.2)
CHLORIDE SERPL-SCNC: 97 MMOL/L (ref 96–108)
CO2 SERPL-SCNC: 30 MMOL/L (ref 21–32)
CREAT SERPL-MCNC: 0.63 MG/DL (ref 0.6–1.3)
EOSINOPHIL # BLD AUTO: 0.31 THOUSAND/ÂΜL (ref 0–0.61)
EOSINOPHIL NFR BLD AUTO: 5 % (ref 0–6)
ERYTHROCYTE [DISTWIDTH] IN BLOOD BY AUTOMATED COUNT: 12.1 % (ref 11.6–15.1)
GFR SERPL CREATININE-BSD FRML MDRD: 103 ML/MIN/1.73SQ M
GLUCOSE P FAST SERPL-MCNC: 111 MG/DL (ref 65–99)
HCT VFR BLD AUTO: 38.2 % (ref 34.8–46.1)
HGB BLD-MCNC: 12.8 G/DL (ref 11.5–15.4)
IMM GRANULOCYTES # BLD AUTO: 0.07 THOUSAND/UL (ref 0–0.2)
IMM GRANULOCYTES NFR BLD AUTO: 1 % (ref 0–2)
LYMPHOCYTES # BLD AUTO: 1.28 THOUSANDS/ÂΜL (ref 0.6–4.47)
LYMPHOCYTES NFR BLD AUTO: 20 % (ref 14–44)
MCH RBC QN AUTO: 31.1 PG (ref 26.8–34.3)
MCHC RBC AUTO-ENTMCNC: 33.5 G/DL (ref 31.4–37.4)
MCV RBC AUTO: 93 FL (ref 82–98)
MONOCYTES # BLD AUTO: 0.29 THOUSAND/ÂΜL (ref 0.17–1.22)
MONOCYTES NFR BLD AUTO: 5 % (ref 4–12)
NEUTROPHILS # BLD AUTO: 4.52 THOUSANDS/ÂΜL (ref 1.85–7.62)
NEUTS SEG NFR BLD AUTO: 68 % (ref 43–75)
NRBC BLD AUTO-RTO: 0 /100 WBCS
PLATELET # BLD AUTO: 245 THOUSANDS/UL (ref 149–390)
PMV BLD AUTO: 10.7 FL (ref 8.9–12.7)
POTASSIUM SERPL-SCNC: 3.7 MMOL/L (ref 3.5–5.3)
PROT SERPL-MCNC: 7.7 G/DL (ref 6.4–8.4)
RBC # BLD AUTO: 4.12 MILLION/UL (ref 3.81–5.12)
SODIUM SERPL-SCNC: 137 MMOL/L (ref 135–147)
T3FREE SERPL-MCNC: 3.8 PG/ML (ref 2.5–3.9)
TSH SERPL DL<=0.05 MIU/L-ACNC: 0.6 UIU/ML (ref 0.45–4.5)
WBC # BLD AUTO: 6.51 THOUSAND/UL (ref 4.31–10.16)

## 2024-11-11 PROCEDURE — 36415 COLL VENOUS BLD VENIPUNCTURE: CPT

## 2024-11-11 PROCEDURE — 84443 ASSAY THYROID STIM HORMONE: CPT

## 2024-11-11 PROCEDURE — 84481 FREE ASSAY (FT-3): CPT

## 2024-11-11 PROCEDURE — 85025 COMPLETE CBC W/AUTO DIFF WBC: CPT

## 2024-11-11 PROCEDURE — 80053 COMPREHEN METABOLIC PANEL: CPT

## 2024-11-12 ENCOUNTER — VBI (OUTPATIENT)
Dept: ADMINISTRATIVE | Facility: OTHER | Age: 54
End: 2024-11-12

## 2024-11-12 RX ORDER — SODIUM CHLORIDE 9 MG/ML
20 INJECTION, SOLUTION INTRAVENOUS ONCE
Status: CANCELLED | OUTPATIENT
Start: 2024-11-14

## 2024-11-12 NOTE — TELEPHONE ENCOUNTER
11/12/24 3:48 PM     Chart reviewed for Mammogram was/were submitted to the patient's insurance.     Leandra Judge MA   [facility]

## 2024-11-13 ENCOUNTER — HOSPITAL ENCOUNTER (OUTPATIENT)
Dept: NON INVASIVE DIAGNOSTICS | Facility: CLINIC | Age: 54
Discharge: HOME | End: 2024-11-13
Payer: COMMERCIAL

## 2024-11-13 VITALS
HEART RATE: 93 BPM | DIASTOLIC BLOOD PRESSURE: 82 MMHG | HEIGHT: 65 IN | BODY MASS INDEX: 23.32 KG/M2 | SYSTOLIC BLOOD PRESSURE: 121 MMHG | WEIGHT: 140 LBS

## 2024-11-13 DIAGNOSIS — C50.911 BREAST CANCER METASTASIZED TO AXILLARY LYMPH NODE, RIGHT (HCC): ICD-10-CM

## 2024-11-13 DIAGNOSIS — C50.411 MALIGNANT NEOPLASM OF UPPER-OUTER QUADRANT OF RIGHT BREAST IN FEMALE, ESTROGEN RECEPTOR POSITIVE (HCC): ICD-10-CM

## 2024-11-13 DIAGNOSIS — C77.3 CARCINOMA OF RIGHT BREAST METASTATIC TO AXILLARY LYMPH NODE (HCC): ICD-10-CM

## 2024-11-13 DIAGNOSIS — Z17.0 MALIGNANT NEOPLASM OF UPPER-OUTER QUADRANT OF RIGHT BREAST IN FEMALE, ESTROGEN RECEPTOR POSITIVE (HCC): ICD-10-CM

## 2024-11-13 DIAGNOSIS — C77.3 BREAST CANCER METASTASIZED TO AXILLARY LYMPH NODE, RIGHT (HCC): ICD-10-CM

## 2024-11-13 DIAGNOSIS — C50.911 CARCINOMA OF RIGHT BREAST METASTATIC TO AXILLARY LYMPH NODE (HCC): ICD-10-CM

## 2024-11-13 LAB
AORTIC ROOT: 2.9 CM
APICAL FOUR CHAMBER EJECTION FRACTION: 68 %
ASCENDING AORTA: 3 CM
BSA FOR ECHO PROCEDURE: 1.7 M2
E WAVE DECELERATION TIME: 175 MS
E/A RATIO: 0.77
FRACTIONAL SHORTENING: 37 (ref 28–44)
GLOBAL LONGITUIDAL STRAIN: -20 %
INTERVENTRICULAR SEPTUM IN DIASTOLE (PARASTERNAL SHORT AXIS VIEW): 0.9 CM
INTERVENTRICULAR SEPTUM: 0.9 CM (ref 0.6–1.1)
LAAS-AP2: 8.4 CM2
LAAS-AP4: 11.5 CM2
LEFT ATRIUM SIZE: 2.9 CM
LEFT ATRIUM VOLUME (MOD BIPLANE): 21 ML
LEFT ATRIUM VOLUME INDEX (MOD BIPLANE): 12.4 ML/M2
LEFT INTERNAL DIMENSION IN SYSTOLE: 2.7 CM (ref 2.1–4)
LEFT VENTRICULAR INTERNAL DIMENSION IN DIASTOLE: 4.3 CM (ref 3.5–6)
LEFT VENTRICULAR POSTERIOR WALL IN END DIASTOLE: 1.1 CM
LEFT VENTRICULAR STROKE VOLUME: 57 ML
LVSV (TEICH): 57 ML
MV E'TISSUE VEL-LAT: 11 CM/S
MV E'TISSUE VEL-SEP: 9 CM/S
MV PEAK A VEL: 0.88 M/S
MV PEAK E VEL: 68 CM/S
MV STENOSIS PRESSURE HALF TIME: 51 MS
MV VALVE AREA P 1/2 METHOD: 4.31
RA PRESSURE ESTIMATED: 3 MMHG
RIGHT ATRIUM AREA SYSTOLE A4C: 7.9 CM2
RIGHT VENTRICLE ID DIMENSION: 2.7 CM
RV PSP: 22 MMHG
SL CV LEFT ATRIUM LENGTH A2C: 3.6 CM
SL CV LV EF: 65
SL CV PED ECHO LEFT VENTRICLE DIASTOLIC VOLUME (MOD BIPLANE) 2D: 84 ML
SL CV PED ECHO LEFT VENTRICLE SYSTOLIC VOLUME (MOD BIPLANE) 2D: 27 ML
TR MAX PG: 19 MMHG
TR PEAK VELOCITY: 2.2 M/S
TRICUSPID ANNULAR PLANE SYSTOLIC EXCURSION: 2 CM
TRICUSPID VALVE PEAK REGURGITATION VELOCITY: 2.16 M/S

## 2024-11-13 PROCEDURE — 93356 MYOCRD STRAIN IMG SPCKL TRCK: CPT

## 2024-11-13 PROCEDURE — 93306 TTE W/DOPPLER COMPLETE: CPT

## 2024-11-13 PROCEDURE — 93306 TTE W/DOPPLER COMPLETE: CPT | Performed by: INTERNAL MEDICINE

## 2024-11-13 PROCEDURE — 93356 MYOCRD STRAIN IMG SPCKL TRCK: CPT | Performed by: INTERNAL MEDICINE

## 2024-11-14 ENCOUNTER — HOSPITAL ENCOUNTER (OUTPATIENT)
Dept: INFUSION CENTER | Facility: CLINIC | Age: 54
Discharge: HOME | End: 2024-11-14
Payer: COMMERCIAL

## 2024-11-14 VITALS
WEIGHT: 135.5 LBS | HEART RATE: 85 BPM | TEMPERATURE: 97.2 F | DIASTOLIC BLOOD PRESSURE: 74 MMHG | BODY MASS INDEX: 22.57 KG/M2 | OXYGEN SATURATION: 97 % | SYSTOLIC BLOOD PRESSURE: 107 MMHG | HEIGHT: 65 IN

## 2024-11-14 DIAGNOSIS — C77.3 CARCINOMA OF RIGHT BREAST METASTATIC TO AXILLARY LYMPH NODE (HCC): Primary | ICD-10-CM

## 2024-11-14 DIAGNOSIS — C50.911 CARCINOMA OF RIGHT BREAST METASTATIC TO AXILLARY LYMPH NODE (HCC): Primary | ICD-10-CM

## 2024-11-14 PROCEDURE — 96367 TX/PROPH/DG ADDL SEQ IV INF: CPT

## 2024-11-14 PROCEDURE — 96413 CHEMO IV INFUSION 1 HR: CPT

## 2024-11-14 PROCEDURE — 96417 CHEMO IV INFUS EACH ADDL SEQ: CPT

## 2024-11-14 RX ORDER — SODIUM CHLORIDE 9 MG/ML
20 INJECTION, SOLUTION INTRAVENOUS ONCE
Status: COMPLETED | OUTPATIENT
Start: 2024-11-14 | End: 2024-11-14

## 2024-11-14 RX ADMIN — FAMOTIDINE 20 MG: 10 INJECTION INTRAVENOUS at 10:55

## 2024-11-14 RX ADMIN — SODIUM CHLORIDE 500 ML: 0.9 INJECTION, SOLUTION INTRAVENOUS at 13:10

## 2024-11-14 RX ADMIN — PACLITAXEL 136.8 MG: 6 INJECTION, SOLUTION INTRAVENOUS at 11:24

## 2024-11-14 RX ADMIN — SODIUM CHLORIDE 20 ML/HR: 9 INJECTION, SOLUTION INTRAVENOUS at 09:00

## 2024-11-14 RX ADMIN — CARBOPLATIN 180 MG: 600 INJECTION, SOLUTION INTRAVENOUS at 12:25

## 2024-11-14 RX ADMIN — SODIUM CHLORIDE 200 MG: 9 INJECTION, SOLUTION INTRAVENOUS at 09:31

## 2024-11-14 RX ADMIN — DEXAMETHASONE SODIUM PHOSPHATE: 100 INJECTION INTRAMUSCULAR; INTRAVENOUS at 10:16

## 2024-11-14 RX ADMIN — DIPHENHYDRAMINE HYDROCHLORIDE 25 MG: 50 INJECTION, SOLUTION INTRAMUSCULAR; INTRAVENOUS at 10:36

## 2024-11-14 NOTE — PROGRESS NOTES
Pt here for keytruda, taxol, carbo. Pt reported no issues with treatment until 10 mins were remaining on the taxol infusion. She stated she felt warm in her cheeks and had abdominal pain, denied chest pain/tightness, SOB, or back pain. Likely not a rx d/t lack of symptom progression and timing. Taxol completed. Carbo was hung, no changes noted. Then 10 mins into carbo infusion pt began vomiting. Pt reports abdominal cramping is normal for her after taking dulcolax which she took this AM, but the vomiting is abnormal. Pt reports a normal size and consistency BM this AM but not passing any gas. Bowel sounds present x4 quadrants. No other complaints at this time. Spoke with Charmaine Manning RN, treatment was completed and 500ml bolus given. Pt reports starting to feel a little better after vomiting. Pt advised to call the office or go to the ER for evaluation if symptoms continue or worsen. On completion of bolus, pt's flushing resolved and reports feeling much better.     Pt next appt 11/21 at 0830 at [facility].

## 2024-11-18 ENCOUNTER — APPOINTMENT (OUTPATIENT)
Dept: LAB | Facility: CLINIC | Age: 54
End: 2024-11-18
Payer: COMMERCIAL

## 2024-11-18 DIAGNOSIS — C50.911 CARCINOMA OF RIGHT BREAST METASTATIC TO AXILLARY LYMPH NODE (HCC): ICD-10-CM

## 2024-11-18 DIAGNOSIS — C77.3 CARCINOMA OF RIGHT BREAST METASTATIC TO AXILLARY LYMPH NODE (HCC): ICD-10-CM

## 2024-11-18 LAB
ALBUMIN SERPL BCG-MCNC: 4.5 G/DL (ref 3.5–5)
ALP SERPL-CCNC: 72 U/L (ref 34–104)
ALT SERPL W P-5'-P-CCNC: 18 U/L (ref 7–52)
ANION GAP SERPL CALCULATED.3IONS-SCNC: 5 MMOL/L (ref 4–13)
AST SERPL W P-5'-P-CCNC: 15 U/L (ref 13–39)
BASOPHILS # BLD AUTO: 0.04 THOUSANDS/ÂΜL (ref 0–0.1)
BASOPHILS NFR BLD AUTO: 1 % (ref 0–1)
BILIRUB SERPL-MCNC: 0.49 MG/DL (ref 0.2–1)
BUN SERPL-MCNC: 15 MG/DL (ref 5–25)
CALCIUM SERPL-MCNC: 9.8 MG/DL (ref 8.4–10.2)
CHLORIDE SERPL-SCNC: 103 MMOL/L (ref 96–108)
CO2 SERPL-SCNC: 33 MMOL/L (ref 21–32)
CREAT SERPL-MCNC: 0.64 MG/DL (ref 0.6–1.3)
EOSINOPHIL # BLD AUTO: 0.43 THOUSAND/ÂΜL (ref 0–0.61)
EOSINOPHIL NFR BLD AUTO: 8 % (ref 0–6)
ERYTHROCYTE [DISTWIDTH] IN BLOOD BY AUTOMATED COUNT: 12.2 % (ref 11.6–15.1)
GFR SERPL CREATININE-BSD FRML MDRD: 102 ML/MIN/1.73SQ M
GLUCOSE P FAST SERPL-MCNC: 98 MG/DL (ref 65–99)
HCT VFR BLD AUTO: 39.1 % (ref 34.8–46.1)
HGB BLD-MCNC: 12.6 G/DL (ref 11.5–15.4)
IMM GRANULOCYTES # BLD AUTO: 0.01 THOUSAND/UL (ref 0–0.2)
IMM GRANULOCYTES NFR BLD AUTO: 0 % (ref 0–2)
LYMPHOCYTES # BLD AUTO: 1.51 THOUSANDS/ÂΜL (ref 0.6–4.47)
LYMPHOCYTES NFR BLD AUTO: 27 % (ref 14–44)
MCH RBC QN AUTO: 30.4 PG (ref 26.8–34.3)
MCHC RBC AUTO-ENTMCNC: 32.2 G/DL (ref 31.4–37.4)
MCV RBC AUTO: 94 FL (ref 82–98)
MONOCYTES # BLD AUTO: 0.18 THOUSAND/ÂΜL (ref 0.17–1.22)
MONOCYTES NFR BLD AUTO: 3 % (ref 4–12)
NEUTROPHILS # BLD AUTO: 3.46 THOUSANDS/ÂΜL (ref 1.85–7.62)
NEUTS SEG NFR BLD AUTO: 61 % (ref 43–75)
NRBC BLD AUTO-RTO: 0 /100 WBCS
PLATELET # BLD AUTO: 245 THOUSANDS/UL (ref 149–390)
PMV BLD AUTO: 11.6 FL (ref 8.9–12.7)
POTASSIUM SERPL-SCNC: 4.3 MMOL/L (ref 3.5–5.3)
PROT SERPL-MCNC: 7.2 G/DL (ref 6.4–8.4)
RBC # BLD AUTO: 4.15 MILLION/UL (ref 3.81–5.12)
SODIUM SERPL-SCNC: 141 MMOL/L (ref 135–147)
WBC # BLD AUTO: 5.63 THOUSAND/UL (ref 4.31–10.16)

## 2024-11-18 PROCEDURE — 80053 COMPREHEN METABOLIC PANEL: CPT

## 2024-11-18 PROCEDURE — 36415 COLL VENOUS BLD VENIPUNCTURE: CPT

## 2024-11-18 PROCEDURE — 85025 COMPLETE CBC W/AUTO DIFF WBC: CPT

## 2024-11-19 RX ORDER — SODIUM CHLORIDE 9 MG/ML
20 INJECTION, SOLUTION INTRAVENOUS ONCE
Status: CANCELLED | OUTPATIENT
Start: 2024-11-21

## 2024-11-21 ENCOUNTER — HOSPITAL ENCOUNTER (OUTPATIENT)
Dept: INFUSION CENTER | Facility: CLINIC | Age: 54
Discharge: HOME | End: 2024-11-21
Payer: COMMERCIAL

## 2024-11-21 ENCOUNTER — OFFICE VISIT (OUTPATIENT)
Dept: HEMATOLOGY ONCOLOGY | Facility: CLINIC | Age: 54
End: 2024-11-21
Payer: COMMERCIAL

## 2024-11-21 VITALS
HEART RATE: 105 BPM | RESPIRATION RATE: 18 BRPM | DIASTOLIC BLOOD PRESSURE: 68 MMHG | WEIGHT: 139 LBS | HEIGHT: 65 IN | SYSTOLIC BLOOD PRESSURE: 134 MMHG | BODY MASS INDEX: 23.16 KG/M2 | OXYGEN SATURATION: 98 % | TEMPERATURE: 99 F

## 2024-11-21 VITALS
TEMPERATURE: 97.8 F | SYSTOLIC BLOOD PRESSURE: 158 MMHG | BODY MASS INDEX: 22.91 KG/M2 | WEIGHT: 137.5 LBS | HEART RATE: 73 BPM | HEIGHT: 65 IN | RESPIRATION RATE: 18 BRPM | OXYGEN SATURATION: 96 % | DIASTOLIC BLOOD PRESSURE: 89 MMHG

## 2024-11-21 DIAGNOSIS — T45.1X5A ADVERSE EFFECT OF CHEMOTHERAPY, INITIAL ENCOUNTER: ICD-10-CM

## 2024-11-21 DIAGNOSIS — Z17.0 MALIGNANT NEOPLASM OF UPPER-OUTER QUADRANT OF RIGHT BREAST IN FEMALE, ESTROGEN RECEPTOR POSITIVE (HCC): ICD-10-CM

## 2024-11-21 DIAGNOSIS — C77.3 CARCINOMA OF RIGHT BREAST METASTATIC TO AXILLARY LYMPH NODE (HCC): Primary | ICD-10-CM

## 2024-11-21 DIAGNOSIS — C50.911 CARCINOMA OF RIGHT BREAST METASTATIC TO AXILLARY LYMPH NODE (HCC): Primary | ICD-10-CM

## 2024-11-21 DIAGNOSIS — C50.411 MALIGNANT NEOPLASM OF UPPER-OUTER QUADRANT OF RIGHT BREAST IN FEMALE, ESTROGEN RECEPTOR POSITIVE (HCC): ICD-10-CM

## 2024-11-21 PROCEDURE — 99215 OFFICE O/P EST HI 40 MIN: CPT | Performed by: INTERNAL MEDICINE

## 2024-11-21 RX ORDER — DIPHENHYDRAMINE HYDROCHLORIDE 50 MG/ML
25 INJECTION INTRAMUSCULAR; INTRAVENOUS
Status: ACTIVE | OUTPATIENT
Start: 2024-11-21 | End: 2024-11-21

## 2024-11-21 RX ORDER — SODIUM CHLORIDE 9 MG/ML
20 INJECTION, SOLUTION INTRAVENOUS ONCE
Status: COMPLETED | OUTPATIENT
Start: 2024-11-21 | End: 2024-11-21

## 2024-11-21 RX ORDER — FAMOTIDINE 10 MG/ML
20 INJECTION, SOLUTION INTRAVENOUS ONCE
Status: COMPLETED | OUTPATIENT
Start: 2024-11-21 | End: 2024-11-21

## 2024-11-21 RX ORDER — HYDROCORTISONE SODIUM SUCCINATE 100 MG/2ML
50 INJECTION INTRAMUSCULAR; INTRAVENOUS
Status: COMPLETED | OUTPATIENT
Start: 2024-11-21 | End: 2024-11-21

## 2024-11-21 RX ADMIN — PACLITAXEL 136.8 MG: 6 INJECTION, SOLUTION INTRAVENOUS at 10:21

## 2024-11-21 RX ADMIN — FAMOTIDINE 20 MG: 10 INJECTION INTRAVENOUS at 09:55

## 2024-11-21 RX ADMIN — HYDROCORTISONE SODIUM SUCCINATE 50 MG: 100 INJECTION, POWDER, FOR SOLUTION INTRAMUSCULAR; INTRAVENOUS at 10:52

## 2024-11-21 RX ADMIN — HYDROCORTISONE SODIUM SUCCINATE 50 MG: 100 INJECTION, POWDER, FOR SOLUTION INTRAMUSCULAR; INTRAVENOUS at 10:56

## 2024-11-21 RX ADMIN — DIPHENHYDRAMINE HYDROCHLORIDE 25 MG: 50 INJECTION, SOLUTION INTRAMUSCULAR; INTRAVENOUS at 10:50

## 2024-11-21 RX ADMIN — SODIUM CHLORIDE 500 ML: 0.9 INJECTION, SOLUTION INTRAVENOUS at 10:55

## 2024-11-21 RX ADMIN — SODIUM CHLORIDE 20 ML/HR: 0.9 INJECTION, SOLUTION INTRAVENOUS at 09:14

## 2024-11-21 RX ADMIN — DIPHENHYDRAMINE HYDROCHLORIDE 25 MG: 50 INJECTION, SOLUTION INTRAMUSCULAR; INTRAVENOUS at 09:34

## 2024-11-21 RX ADMIN — CARBOPLATIN 180 MG: 600 INJECTION, SOLUTION INTRAVENOUS at 12:05

## 2024-11-21 RX ADMIN — FAMOTIDINE 20 MG: 10 INJECTION, SOLUTION INTRAVENOUS at 10:53

## 2024-11-21 RX ADMIN — DEXAMETHASONE SODIUM PHOSPHATE: 100 INJECTION INTRAMUSCULAR; INTRAVENOUS at 09:12

## 2024-11-21 NOTE — PROGRESS NOTES
Patient tolerated Carboplatin infusion without incident. Port flushed and de-accessed as per protocol. Patient to follow up with Dr. Garcia today in [place] office. Patient and patient's daughter agreeable. [facility] with [place] office address given to patient.

## 2024-11-21 NOTE — PROGRESS NOTES
Hematology/Oncology Outpatient Office Note  Date of Service: 11/21/2024    [facility] HEMATOLOGY ONCOLOGY SPECIALISTS [address] [place] [address]  [phone]    Reason for Consultation:   Chief Complaint   Patient presents with    Follow-up     Referral Physician: No ref. provider found  Primary Care Physician:  Alessandro Milton DO     Assessment/plan:   1.  cT1c cNx cM0 Right breast invasive lobular carcinoma.  Grade 2.  ER positive (>95%), AK positive (70%), HER2 negative by IHC.  MammaPrint: Ultralow risk (luminal A).     2.  cT0 cN1 cM0 Right axillary lymph node biopsy ER negative, AK negative, HER2 negative  3.  BRCA1/2 negative     Romina Cleaning is a 54 y.o. 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 She initially palpated a mass in her right breast which she brought to medical attention.  She underwent ultrasound-guided biopsy which was consistent with an invasive lobular carcinoma, ER positive, AK positive, HER2 negative.   MammaPrint resulted ultralow risk (luminal A). Right axillary lymph node biopsy showed infiltrating carcinoma which was ER negative, AK negative and HER2 negative.  Genetic testing for BRCA1/2. She has been evaluated by breast surgery (Dr. Philippe) and given the 2 different pathologies from the breast mass and axillary lymph node she underwent breast MRI on 10/23/2024 which redemonstrates a mass in the upper outer right breast consistent with the biopsy-proven invasive lobular carcinoma and right axillary lymphadenopathy.  Indeterminant 6 mm mass at the 5 o'clock position right breast for which ultrasound was recommended (if there is no ultrasound correlate MRI guided biopsy would be performed).  CT of the chest, abdomen, pelvis notes enlarged right  axillary and subpectoral lymphadenopathy and no other suspicious lesions.  Bone scan with no evidence of osseous metastasis. She is following with OB/GYN and had Mirena IUD removal.     She has 2 different pathologies (invasive lobular carcinoma, ER/IL positive, HER2 negative with a ultralow risk MammaPrint results as well as a triple negative carcinoma noted on axillary lymph node biopsy).  Bilateral breast MRI noted spiculated mass in the upper outer right breast consistent with biopsy-proven invasive lobular carcinoma as well as an indeterminate 6 mm mass at the 5 o'clock position for which further characterization with ultrasound was recommended.  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    She started neoadjuvant chemotherapy for triple negative, node positive carcinoma on 11/14/2024.  Unfortunately, patient with reaction to Taxol.  Lengthy discussion with the patient and her brother.  Discussed option of switching to Adriamycin/cyclophosphamide and proceeding with the second half of treatment versus switching Taxol to Abraxane.  Care plan adjusted.  Patient agreeable to switching to Abraxane.  Informed consent obtained.  Patient had a teaching session with RN.    Treatment plan:  Pembrolizumab 200 mg day 1, Paclitaxel 80 mg/m2 day 1, Carboplatin AUC 1.5 on days 1, 8, 15 of a 21-day cycle for 4 cycles.  Then neoadjuvant pembrolizumab 200 mg, cyclophosphamide 600 mg/m2, doxorubicin 60 mg/m2 on day 1 of a 21-day cycle for 4 cycles.   Then adjuvant course of pembrolizumab 200 mg on day 1 of a 21-day cycle for 9 cycles pending final pathology review.   >> Switching paclitaxel to Abraxane 100 mg/m2 D1,8,15 going forward.     Diagnosis ICD-10-CM Associated Orders   1. Carcinoma of right breast metastatic to axillary lymph node (HCC)  C50.911     C77.3       2. Malignant neoplasm of upper-outer quadrant of right breast in female, estrogen receptor positive (HCC)   C50.411     Z17.0       3. Adverse effect of chemotherapy, initial encounter  T45.1X5A         Return in about 3 weeks (around 12/12/2024) for Office Visit, Infusion - See Treatment Plan, On tx visit, Labs - See Treatment Plan.    Graciela Garcia  11/21/2024   Hematology & Medical Oncology Physician    Oncology history:     Oncology History   Carcinoma of right breast metastatic to axillary lymph node (HCC)   6/11/2024 Initial Diagnosis    Carcinoma of right breast metastatic to axillary lymph node (HCC)     9/20/2024 -  Cancer Staged    Staging form: Breast, AJCC 8th Edition  - Clinical stage from 9/20/2024: cT0, cN1(f), cM0, GX, ER-, MS-, HER2- - Signed by Jodie Philippe MD on 10/7/2024  Stage prefix: Initial diagnosis  Method of lymph node assessment: Core biopsy  Histologic grading system: 3 grade system       11/14/2024 -  Chemotherapy    DOXOrubicin (ADRIAMYCIN), 60 mg/m2 = 103 mg, Intravenous, Once, 0 of 4 cycles  alteplase (CATHFLO), 2 mg, Intracatheter, Every 1 Minute as needed, 1 of 17 cycles  pegfilgrastim-apgf (Nyvepria), 6 mg, Subcutaneous, Once, 0 of 4 cycles  sodium chloride, 500 mL (100 % of original dose 500 mL), Intravenous, Once, 1 of 1 cycle  Dose modification: 500 mL (original dose 500 mL, Cycle 1)  Administration: 500 mL (11/14/2024)  cyclophosphamide (CYTOXAN) IVPB, 600 mg/m2 = 1,026 mg, Intravenous, Once, 0 of 4 cycles  fosaprepitant (EMEND) IVPB, 150 mg, Intravenous, Once, 0 of 4 cycles  CARBOplatin (PARAPLATIN) IVPB (GOG AUC DOSING), 180 mg, Intravenous, Once, 1 of 4 cycles  Administration: 180 mg (11/14/2024), 180 mg (11/21/2024)  PACLItaxel (TAXOL) chemo IVPB, 80 mg/m2 = 136.8 mg, Intravenous, Once, 1 of 4 cycles  Administration: 136.8 mg (11/14/2024), 136.8 mg (11/21/2024)  pembrolizumab (KEYTRUDA) IVPB, 200 mg, Intravenous, Once, 1 of 17 cycles  Administration: 200 mg (11/14/2024)     Malignant neoplasm of upper-outer quadrant of right breast in female, estrogen receptor positive (HCC)    9/20/2024 Biopsy    Right breast ultrasound-guided biopsy  A. 10 o'clock, 7 cm from nipple (MARTHA)  Invasive lobular carcinoma  Grade 2  ER >95, WY 70, HER2 0  Lymphovascular invasion not identified    B. Right axillary lymph node biopsy (MARTHA)  Infiltrating carcinoma  Although no definitive capsule is noted, it is favored to represent lymph node involvement by th e carcinoma  ER <1, WY <1, HER2 1+    Concordant. Malignancy appears unifocal; suspicious masses cover an area up to 1.8 cm. US of right axilla showed multiple enlarged, morphologically abnormal axillary lymph nodes with biopsy confirmed metastatic disease. Left breast clear.     9/20/2024 -  Cancer Staged    Staging form: Breast, AJCC 8th Edition  - Clinical stage from 9/20/2024: Stage Unknown (cT1c, cNX, cM0, G2, ER+, WY+, HER2-) - Signed by Jodie Philippe MD on 10/7/2024  Stage prefix: Initial diagnosis  Histologic grading system: 3 grade system       9/24/2024 Genomic Testing    MammaPrint/BluePrint ordered on breast specimen block A  Ultra-Low risk - luminal A     9/27/2024 Genetic Testing    Genetic testing with RNA analysis ordered  [facility]       Primary Diagnosis:  1.  cT1c cNx cM0 Right breast invasive lobular carcinoma.  Grade 2.  ER positive (>95%), WY positive (70%), HER2 negative by IHC.  MammaPrint: Ultralow risk (luminal A).     2.  cT0 cN1 cM0 Right axillary lymph node biopsy ER negative, WY negative, HER2 negative  3.  BRCA1/2 negative      Previous Hematologic/ Oncologic Treatment:      Current Hematologic/ Oncologic Treatment:     PHYSICIAN RECOMMENDED PLAN:  Recommend awaiting MRI results for consideration of neoadjuvant chemotherapy    History of present illness:   Romina Cleaning is a 54 y.o. postmenopausal female with PMHx significant for HTN who recently felt a mass in her right breast which she brought to medical attention.  She underwent ultrasound-guided biopsy which was consistent with an invasive lobular carcinoma, ER  positive, WY positive, HER2 negative.  Right axillary lymph node biopsy showed infiltrating carcinoma which was ER negative, WY negative and HER2 negative.  MammaPrint resulted ultralow risk (luminal A), genetic testing pending.  She has been evaluated by breast surgery (Dr. Philippe) and given the 2 different pathologies from the breast mass and axillary lymph node she has been recommended a breast MRI to evaluate for an occult site.  CT of the chest, abdomen, pelvis notes enlarged right axillary and subpectoral lymphadenopathy and no other suspicious lesions.  Bone scan with no evidence of osseous metastasis.  Patient presents for initial medical oncology consultation.  Patient's case was submitted to be discussed at the multidisciplinary breast tumor board on 10/21/2024.  She is following with OB/GYN and had Mirena IUD removal. Pt presents for initial medical oncology consultation accompanied by her son and sister for visit.  She denies any chest pain, palpitations, bone pain or other complaints.     OB/GYN history:  G2, P2  Menarche: 15 years old  Menopause: in mid 40's  Age of first pregnancy: 29 years old     Maternal grandmother, maternal uncle and mother with colon cancer. Maternal grandfather with throat cancer (smoker).      Patient declined , instead preferred to have her sister and son translate.    Interval History:   Accompanied by sister, son and mother.  Her case was discussed in the multidisciplinary tumor board in the interim.  Recommendations for neoadjuvant chemotherapy to treat node positive, triple negative disease.  Patient offers no complaints today.    Interval History 11/21/24:   Patient presents for urgent visit.  She started treatment on 11/14/2024, but had a reaction to Taxol on day 1 and 8 requiring further treatment to be held.  Patient noted experiencing facial flushing, abdominal cramping (9/10) and Taxol infusion stopped requiring hypersensitivity protocol.  Patient  currently denies any chest pain, palpitations, respiratory symptoms.  Review of systems:   Review of Systems   Constitutional: Negative. Negative for fever and malaise/fatigue.   HENT: Negative.     Cardiovascular: Negative.  Negative for chest pain, dyspnea on exertion and palpitations.   Respiratory: Negative.  Negative for shortness of breath.    Hematologic/Lymphatic: Negative.  Does not bruise/bleed easily.   Skin: Negative.    Musculoskeletal: Negative.    Gastrointestinal: Negative.  Negative for abdominal pain.   Neurological: Negative.    Psychiatric/Behavioral: Negative.          A 10-point review of system was performed, pertinent positive and negative were detailed as above. Otherwise, the 10-point review of system was negative.    Past Medical History:   Diagnosis Date    GERD (gastroesophageal reflux disease)     Headache     Hematuria     Hypertension     IUD (intrauterine device) in place 02/15/2019    Mirena placed 2/2015 effective through 2/2020      Lateral epicondylitis, right elbow 01/08/2019    Panic attacks     Psychiatric disorder        Past Surgical History:   Procedure Laterality Date    BREAST BIOPSY Right 09/20/2024    BREAST BIOPSY Right 09/20/2024    BREAST BIOPSY Right 11/01/2024    CYSTOSCOPY  06/08/2018    IR PORT PLACEMENT  11/6/2024    NO PAST SURGERIES      US GUIDED BREAST BIOPSY RIGHT COMPLETE Right 09/20/2024    US GUIDED BREAST BIOPSY RIGHT COMPLETE Right 11/1/2024    US GUIDED BREAST LYMPH NODE BIOPSY RIGHT Right 09/20/2024       Family History   Problem Relation Age of Onset    Hypertension Mother     Colonic polyp Mother     Colon cancer Mother     Arthritis Father     Diabetes Father     Hypertension Father     Hyperlipidemia Father     No Known Problems Sister     No Known Problems Sister     Other Brother         TBI from accident     No Known Problems Son     No Known Problems Daughter     No Known Problems Maternal Aunt     No Known Problems Maternal Aunt     No Known  "Problems Maternal Aunt     No Known Problems Maternal Aunt     No Known Problems Maternal Aunt     No Known Problems Paternal Aunt     No Known Problems Paternal Aunt     No Known Problems Paternal Aunt     Colonic polyp Maternal Grandmother     Colon cancer Maternal Grandmother     Throat cancer Maternal Grandfather     No Known Problems Paternal Grandmother     No Known Problems Paternal Grandfather        Social History     Socioeconomic History    Marital status: Single     Spouse name: Not on file    Number of children: Not on file    Years of education: Not on file    Highest education level: 12th grade   Occupational History    Occupation: HOUSEWIFE OR HOMEMAKER   Tobacco Use    Smoking status: Former     Current packs/day: 0.00     Average packs/day: 0.3 packs/day for 35.0 years (8.8 ttl pk-yrs)     Types: Cigarettes     Start date: 1988     Quit date: 2024     Years since quittin.1    Smokeless tobacco: Never    Tobacco comments:     ONE HALF PACK A DAY OR LESS, CURRENT SOME DAY SMOKER, LIGHT TOBACCO SMOKER  AS PER [employer]CRIPTS   Vaping Use    Vaping status: Some Days   Substance and Sexual Activity    Alcohol use: No    Drug use: No    Sexual activity: Not Currently     Partners: Male     Birth control/protection: I.U.D.   Other Topics Concern    Not on file   Social History Narrative    ENGAGES IN A HOBBY - \"PLAYS DOMAkohaES\"    LIVES WITH FAMILY    UNEMPLOYED     Social Drivers of Health     Financial Resource Strain: Low Risk  (2024)    Overall Financial Resource Strain (CARDIA)     Difficulty of Paying Living Expenses: Not hard at all   Food Insecurity: No Food Insecurity (2024)    Nursing - Inadequate Food Risk Classification     Worried About Running Out of Food in the Last Year: Never true     Ran Out of Food in the Last Year: Never true     Ran Out of Food in the Last Year: Not on file   Transportation Needs: No Transportation Needs (2024)    PRAPARE - Transportation     " Lack of Transportation (Medical): No     Lack of Transportation (Non-Medical): No   Physical Activity: Insufficiently Active (6/11/2024)    Exercise Vital Sign     Days of Exercise per Week: 3 days     Minutes of Exercise per Session: 30 min   Stress: No Stress Concern Present (4/25/2023)    [employer] [place] [employer] - Occupational Stress Questionnaire     Feeling of Stress : Not at all   Social Connections: Unknown (6/11/2024)    Social Connection and Isolation Panel [NHANES]     Frequency of Communication with Friends and Family: More than three times a week     Frequency of Social Gatherings with Friends and Family: More than three times a week     Attends Faith Services: 1 to 4 times per year     Active Member of Clubs or Organizations: No     Attends Club or Organization Meetings: Never     Marital Status: Not on file   Intimate Partner Violence: Not At Risk (6/11/2024)    Humiliation, Afraid, Rape, and Kick questionnaire     Fear of Current or Ex-Partner: No     Emotionally Abused: No     Physically Abused: No     Sexually Abused: No   Housing Stability: Low Risk  (6/11/2024)    Housing Stability Vital Sign     Unable to Pay for Housing in the Last Year: No     Number of Times Moved in the Last Year: 1     Homeless in the Last Year: No       No Known Allergies    Current Outpatient Medications   Medication Sig Dispense Refill    acetaminophen (TYLENOL) 500 mg tablet Take 1 tablet (500 mg total) by mouth every 6 (six) hours as needed for mild pain 30 tablet 0    atorvastatin (LIPITOR) 10 mg tablet TAKE ONE TABLET BY MOUTH DAILY 90 tablet 1    Cholecalciferol (VITAMIN D3 PO) Take by mouth daily      cyclobenzaprine (FLEXERIL) 5 mg tablet Take 1 tablet (5 mg total) by mouth 3 (three) times a day as needed for muscle spasms 30 tablet 0    Diclofenac Sodium (VOLTAREN) 1 % Apply 2 g topically 4 (four) times a day 2 g 0    levonorgestrel (MIRENA) 20 MCG/24HR IUD 1 each by Intrauterine route once  (Patient not taking: Reported on 10/7/2024)      lidocaine-prilocaine (EMLA) cream Apply topically as needed for mild pain 1 g 0    lisinopril-hydrochlorothiazide (PRINZIDE,ZESTORETIC) 10-12.5 MG per tablet Take 1 tablet by mouth daily 90 tablet 1    naproxen (Naprosyn) 500 mg tablet Take 1 tablet (500 mg total) by mouth 2 (two) times a day with meals for 15 days 30 tablet 0    ondansetron (ZOFRAN) 4 mg tablet Take 1 tablet (4 mg total) by mouth every 8 (eight) hours as needed for nausea or vomiting 30 tablet 2     No current facility-administered medications for this visit.     Facility-Administered Medications Ordered in Other Visits   Medication Dose Route Frequency Provider Last Rate Last Admin    alteplase (CATHFLO) injection 2 mg  2 mg Intracatheter Q1MIN PRN Graciela Garcia DO         I have reviewed the relevant past medical, surgical, social and family history. I have also reviewed allergies and medications for this patient.    Objective:      Vitals:    11/21/24 1507   BP: 134/68   Pulse: 105   Resp: 18   Temp: 99 °F (37.2 °C)   SpO2: 98%       Wt Readings from Last 3 Encounters:   11/21/24 63 kg (139 lb)   11/21/24 62.4 kg (137 lb 8 oz)   11/14/24 61.5 kg (135 lb 8 oz)     Performance status: ECOG PS: 0  Physical Exam  Vitals and nursing note reviewed.   Constitutional:       General: She is not in acute distress.     Appearance: She is well-developed.   HENT:      Head: Normocephalic and atraumatic.   Eyes:      Conjunctiva/sclera: Conjunctivae normal.   Cardiovascular:      Rate and Rhythm: Normal rate.   Pulmonary:      Effort: Pulmonary effort is normal. No respiratory distress.   Chest:          Comments: Right breast: Palpable mass at 10:00.  No skin erythema/thickening.  No nipple inversion.  Palpable right axillary lymphadenopathy (mobile).  No palpable supra/infraclavicular lymphadenopathy.     Left breast: Negative exam    Musculoskeletal:         General: No swelling.      Cervical back: Neck  "supple.   Skin:     General: Skin is warm and dry.   Neurological:      General: No focal deficit present.      Mental Status: She is alert and oriented to person, place, and time.      Gait: Gait normal.   Psychiatric:         Mood and Affect: Mood normal.         Data Review:  Pathology Result:  Final Diagnosis   Date Value Ref Range Status   11/01/2024   Final    A. 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 Right breast, 10:00, 7-cm from nipple, ultrasound-guided core biopsy:  - Invasive lobular carcinoma, modified Burk-Cabezas grade II, (tubules=3, nuclear pleomorphism=2, mitoses=1), measuring up to 1.2 cm.   - Atypical lobular hyperplasia.   - Calcifications associated with LCIS.    B. Right axillary lymph node, ultrasound-guided core biopsy:  - Infiltrating carcinoma, measuring up to 1.5 cm. Please see note.      12/23/2019   Final    A. Urine, Clean Catch, :  Negative for high grade urothelial carcinoma (NHGUC) - see comment.  Benign urothelial cells.  Benign squamous cells.  Few neutrophils, lymphocytes and red blood cells.    Satisfactory for Evaluation.    Comment:  The above diagnostic category is from the recently published book, The [employer] System for Reporting Urinary Cytology, and is in keeping with the ongoing effort for utilization of standardized diagnostic terminology in urine cytology.*    *The [employer] System for Reporting Urinary Cytology. Joya Beltre, Charu Malave, Praneeth Ramires; 2016.          05/02/2018   Final    A. Urine, Clean Catch:  Atypical urothelial cells (AUC) - see comment.  Scattered red blood cells.  Benign squamous cells.    Satisfactory for evaluation.    Comment:  The above " diagnostic category is from the recently published book, The [employer] System for Reporting Urinary Cytology, and is in keeping with the ongoing effort for utilization of standardized diagnostic terminology in urine cytology.*    *The [employer] System for Reporting Urinary Cytology. Joya Beltre, Charu Malave, Praneeth Ramires; 2016.              Image Results:  Image result are reviewed and documented in Hematology/Oncology history    9/18/2024 bilateral 3D diagnostic mammogram and right axillary ultrasound tissue is extremely dense, previously noted calcification has nearly resolved, distortion in the upper outer right breast 10:00 with a sonographic correlate measuring 18 mm and multiple suspicious axillary nodes with cortical thickening and replaced fatty mikayla the largest of which measures 24 mm, left side is benign     9/20/2024 postbiopsy mammogram is concordant malignancy in the breast was thought to be unifocal, ultrasound showed multiple enlarged nodes, left side was benign     10/14/2024: CT chest/abdomen/pelvis:  1. Enlarged right axillary and subpectoral lymphadenopathy correlating to known history of breast cancer. No pulmonary nodules  No mediastinal or hilar lymphadenopathy. No contralateral axillary lymphadenopathy. No intra-abdominal metastatic disease. No adrenal or hepatic metastatic lesions. No suspicious osseous lesions   2. Stable right hepatic lobe hemangioma and cyst.   3. Stable prominent central mesenteric lymph nodes unchanged since 2018 unrelated to patient's breast cancer.     10/14/2024: Bone scan:  1. No scintigraphic evidence of osseous metastasis.     10/23/2024: MRI breast bilateral:  RIGHT  1) MASS E: There is a 20 mm x 15 mm x 17 mm irregularly shaped mass with heterogeneous internal enhancement seen in the upper outer quadrant of the right breast at 10 o'clock, 7 cm from the nipple, which is isointense on T2 weighted images.  This is compatible with the biopsy-proven invasive  lobular carcinoma.     This is located 5 mm anterior to the underlying pectoralis musculature, and 23 mm from the lateral skin surface.      2) LYMPH NODE F: There are bulky right axillary lymph nodes to include 1 with a biopsy marker clip in keeping with metastatic axillary node.  The largest node measures 27 x 37 mm (series [id number] image 48).   3) MASS G: There is a 6 mm round mass with circumscribed margins seen in the right breast at 5 o'clock, 4 cm from the nipple, which is hyperintense on T2 weighted images.  This has heterogeneous enhancement and mixed kinetics.  This is best visualized on series [id number] image 120.  Further characterization with targeted ultrasound is recommended.      Left  There are no suspicious enhancing masses or suspicious areas of non-mass enhancement. There is no axillary or internal mammary adenopathy.      1.5 cm T2 hyperintense lesion in the right lobe of the liver in keeping with hemangioma identified on recent CT chest abdomen pelvis.  This appears stable in size from 12/27/2016 right upper quadrant ultrasound.     IMPRESSION:   Redemonstration of a spiculated mass in the upper outer right breast in keeping with biopsy-proven invasive lobular carcinoma and bulky right axillary lymphadenopathy in keeping with metastatic nodes.  Indeterminate 6 mm mass at the 5 o'clock position right breast for which further characterization with targeted ultrasound is recommended.  If there is no ultrasound correlate MRI guided biopsy could be performed.  No suspicious enhancement in the left breast.    Labs:  Lab data are reviewed and documented in [facility] history.     Lab Results   Component Value Date    HGB 12.6 11/18/2024    HCT 39.1 11/18/2024    MCV 94 11/18/2024     11/18/2024    WBC 5.63 11/18/2024    NRBC 0 11/18/2024     Lab Results   Component Value Date     11/01/2015    K 4.3 11/18/2024     11/18/2024    CO2 33 (H) 11/18/2024    ANIONGAP 0 (L) 11/01/2015    BUN 15  11/18/2024    CREATININE 0.64 11/18/2024    GLUCOSE 88 09/25/2016    GLUF 98 11/18/2024    CALCIUM 9.8 11/18/2024    AST 15 11/18/2024    ALT 18 11/18/2024    ALKPHOS 72 11/18/2024    PROT 7.7 11/01/2015    BILITOT 0.48 11/01/2015    EGFR 102 11/18/2024     Disclaimer: This document was prepared using [employer] technology. If a word or phrase is confusing, or does not make sense, this is likely due to recognition error which was not discovered during this clinician's review. If you believe an error has occurred, please contact me through [facility] service line for darby?cation.

## 2024-11-21 NOTE — PROGRESS NOTES
Called to patient treatment area at 1049 by patient's daughter, patient complaining of  facial flushing and 9/10 cramping abdominal pain. Taxol infusion stopped. Approximately 30 minutes into Taxol infusion. Hypersensitivity protocol initiated, 25 mg IV bendadryl, 100 mg hydrocortisone and 20 mg IV pepicid given  with complete resolution of symptoms at 1115. Charmaine VEGA RN in Dr. Garcia's office notified. Per Dr. Garcia, Taxol infusion not to be restarted. Will proceed with carboplatin infusion.

## 2024-11-25 ENCOUNTER — PATIENT OUTREACH (OUTPATIENT)
Dept: HEMATOLOGY ONCOLOGY | Facility: CLINIC | Age: 54
End: 2024-11-25

## 2024-11-25 NOTE — PROGRESS NOTES
I reached out and spoke with Romina with the assistance of the Interpretation Line Herman # [phone] to follow up and to review for any changes in barriers to care and offer supportive services as needed.    Barriers noted previously; co-morbidities.     Current barriers and interventions provided: co-morbidities.     Bases on individual needs I will follow up with them in 4-6 weeks. I have provided my direct contact information and welcome them to contact me if their needs as discussed above change. They were appreciative for the call.

## 2024-11-26 ENCOUNTER — OFFICE VISIT (OUTPATIENT)
Dept: FAMILY MEDICINE CLINIC | Facility: CLINIC | Age: 54
End: 2024-11-26

## 2024-11-26 VITALS
BODY MASS INDEX: 22.16 KG/M2 | HEART RATE: 92 BPM | WEIGHT: 133 LBS | DIASTOLIC BLOOD PRESSURE: 80 MMHG | HEIGHT: 65 IN | OXYGEN SATURATION: 98 % | TEMPERATURE: 97.8 F | SYSTOLIC BLOOD PRESSURE: 118 MMHG

## 2024-11-26 DIAGNOSIS — Z17.0 MALIGNANT NEOPLASM OF UPPER-OUTER QUADRANT OF RIGHT BREAST IN FEMALE, ESTROGEN RECEPTOR POSITIVE (HCC): Primary | ICD-10-CM

## 2024-11-26 DIAGNOSIS — E78.2 MIXED HYPERLIPIDEMIA: ICD-10-CM

## 2024-11-26 DIAGNOSIS — C50.411 MALIGNANT NEOPLASM OF UPPER-OUTER QUADRANT OF RIGHT BREAST IN FEMALE, ESTROGEN RECEPTOR POSITIVE (HCC): Primary | ICD-10-CM

## 2024-11-26 PROCEDURE — 99213 OFFICE O/P EST LOW 20 MIN: CPT

## 2024-11-26 PROCEDURE — 3079F DIAST BP 80-89 MM HG: CPT

## 2024-11-26 PROCEDURE — 3074F SYST BP LT 130 MM HG: CPT

## 2024-11-26 RX ORDER — ATORVASTATIN CALCIUM 10 MG/1
10 TABLET, FILM COATED ORAL DAILY
Qty: 90 TABLET | Refills: 1 | Status: SHIPPED | OUTPATIENT
Start: 2024-11-26

## 2024-11-26 NOTE — ASSESSMENT & PLAN NOTE
Refilled statin     Orders:    atorvastatin (LIPITOR) 10 mg tablet; Take 1 tablet (10 mg total) by mouth daily

## 2024-11-26 NOTE — PROGRESS NOTES
Name: Romina Cleaning      : 1970      MRN: [id number]  Encounter Provider: Alessandro Milton DO  Encounter Date: 2024   Encounter department: [facility] [place]  :  Assessment & Plan  Malignant neoplasm of upper-outer quadrant of right breast in female, estrogen receptor positive (HCC)  - Had 2 chemotherapy treatment so far, patient patient reports significant side effect with the chemo agent.  She is planned to undergo another chemo agent infusions on this Friday, she will be starting a new agent.   -She lost 3 pounds since last visit.   -Doing well otherwise has good family support.  -Inquired about nutrition consult to educate herself on what her diet should be going through cancer treatment   Plan  -Continue to follow in 3 months provide support  -Nutrition consult placed    Orders:    Ambulatory Referral to Nutrition Services; Future    Mixed hyperlipidemia  Refilled statin     Orders:    atorvastatin (LIPITOR) 10 mg tablet; Take 1 tablet (10 mg total) by mouth daily           History of Present Illness     HPI  54 y.o F. Recently dx with ER+ breast cancer; undergoing chemotherapy treatment. Pt was seen today for overall check up. She is doing well, have day that she felt depressed but not persistent. Had question about nutrition. Plan for another chemotherapy treatment.   Review of Systems   Constitutional:  Negative for chills and fever.   HENT:  Negative for ear pain and sore throat.    Eyes:  Negative for pain and visual disturbance.   Respiratory:  Negative for cough and shortness of breath.    Cardiovascular:  Negative for chest pain and palpitations.   Gastrointestinal:  Negative for abdominal pain and vomiting.   Genitourinary:  Negative for dysuria and hematuria.   Musculoskeletal:  Negative for arthralgias and back pain.   Skin:  Negative for color change and rash.   Neurological:  Negative for seizures and syncope.   All other systems reviewed and are  "negative.         Objective   /80 (BP Location: Left arm, Patient Position: Sitting, Cuff Size: Standard)   Pulse 92   Temp 97.8 °F (36.6 °C) (Temporal)   Ht 5' 5\" (1.651 m)   Wt 60.3 kg (133 lb)   LMP  (LMP Unknown) Comment: IUD  SpO2 98%   BMI 22.13 kg/m²      Physical Exam  Vitals and nursing note reviewed.   Constitutional:       General: She is not in acute distress.     Appearance: She is well-developed.   HENT:      Head: Normocephalic and atraumatic.   Eyes:      Conjunctiva/sclera: Conjunctivae normal.   Cardiovascular:      Rate and Rhythm: Normal rate and regular rhythm.      Heart sounds: No murmur heard.  Pulmonary:      Effort: Pulmonary effort is normal. No respiratory distress.      Breath sounds: Normal breath sounds.   Abdominal:      Palpations: Abdomen is soft.      Tenderness: There is no abdominal tenderness.   Musculoskeletal:         General: No swelling.      Cervical back: Neck supple.   Skin:     General: Skin is warm and dry.      Capillary Refill: Capillary refill takes less than 2 seconds.   Neurological:      Mental Status: She is alert.   Psychiatric:         Mood and Affect: Mood normal.         "

## 2024-11-26 NOTE — ASSESSMENT & PLAN NOTE
- Had 2 chemotherapy treatment so far, patient patient reports significant side effect with the chemo agent.  She is planned to undergo another chemo agent infusions on this Friday, she will be starting a new agent.   -She lost 3 pounds since last visit.   -Doing well otherwise has good family support.  -Inquired about nutrition consult to educate herself on what her diet should be going through cancer treatment   Plan  -Continue to follow in 3 months provide support  -Nutrition consult placed    Orders:    Ambulatory Referral to Nutrition Services; Future

## 2024-11-29 ENCOUNTER — HOSPITAL ENCOUNTER (OUTPATIENT)
Dept: INFUSION CENTER | Facility: CLINIC | Age: 54
Discharge: HOME | End: 2024-11-29

## 2024-11-29 DIAGNOSIS — C50.911 BREAST CANCER METASTASIZED TO AXILLARY LYMPH NODE, RIGHT (HCC): Primary | ICD-10-CM

## 2024-11-29 DIAGNOSIS — C77.3 BREAST CANCER METASTASIZED TO AXILLARY LYMPH NODE, RIGHT (HCC): Primary | ICD-10-CM

## 2024-11-29 DIAGNOSIS — Z17.0 MALIGNANT NEOPLASM OF RIGHT BREAST IN FEMALE, ESTROGEN RECEPTOR POSITIVE, UNSPECIFIED SITE OF BREAST (HCC): ICD-10-CM

## 2024-11-29 DIAGNOSIS — C50.911 CARCINOMA OF RIGHT BREAST METASTATIC TO AXILLARY LYMPH NODE (HCC): ICD-10-CM

## 2024-11-29 DIAGNOSIS — C50.411 MALIGNANT NEOPLASM OF UPPER-OUTER QUADRANT OF RIGHT BREAST IN FEMALE, ESTROGEN RECEPTOR POSITIVE (HCC): ICD-10-CM

## 2024-11-29 DIAGNOSIS — C77.3 CARCINOMA OF RIGHT BREAST METASTATIC TO AXILLARY LYMPH NODE (HCC): ICD-10-CM

## 2024-11-29 DIAGNOSIS — Z17.0 MALIGNANT NEOPLASM OF UPPER-OUTER QUADRANT OF RIGHT BREAST IN FEMALE, ESTROGEN RECEPTOR POSITIVE (HCC): ICD-10-CM

## 2024-11-29 DIAGNOSIS — C50.911 MALIGNANT NEOPLASM OF RIGHT BREAST IN FEMALE, ESTROGEN RECEPTOR POSITIVE, UNSPECIFIED SITE OF BREAST (HCC): ICD-10-CM

## 2024-11-29 NOTE — PROGRESS NOTES
Patient scheduled for infusion D15C1 treatment today. Patient's orders not signed and patient did not have labs done. Patient of Dr. Garcia, Dr. Nguyen covering today, per Dr. Nguyen he is uncomfortable signing treatment plan orders and instructed infusion to defer patient's treatment to next scheduled appointment 12/5/24 for D1C2. Informed patient and her daughter of situation that the doctor is unable to sign her orders without labs and we need her have labs drawn prior to her infusions. Per patient she did not go to another facility for labs and attempted to go yesterday but lab was closed due to Thanksgiving holiday. Provided calendar to patient with her infusion appointments scheduled Thursdays, educated her and daughter it would be best to do her labs Tuesdays so the doctor can review her results and address her orders for treatment. Patient aware next infusion scheduled for 12/5 and to have labs done 12/3.

## 2024-12-03 ENCOUNTER — APPOINTMENT (OUTPATIENT)
Dept: LAB | Facility: CLINIC | Age: 54
End: 2024-12-03
Payer: COMMERCIAL

## 2024-12-03 DIAGNOSIS — C50.911 CARCINOMA OF RIGHT BREAST METASTATIC TO AXILLARY LYMPH NODE (HCC): ICD-10-CM

## 2024-12-03 DIAGNOSIS — C77.3 CARCINOMA OF RIGHT BREAST METASTATIC TO AXILLARY LYMPH NODE (HCC): ICD-10-CM

## 2024-12-03 LAB
ALBUMIN SERPL BCG-MCNC: 4.4 G/DL (ref 3.5–5)
ALP SERPL-CCNC: 70 U/L (ref 34–104)
ALT SERPL W P-5'-P-CCNC: 18 U/L (ref 7–52)
ANION GAP SERPL CALCULATED.3IONS-SCNC: 10 MMOL/L (ref 4–13)
AST SERPL W P-5'-P-CCNC: 15 U/L (ref 13–39)
BASOPHILS # BLD AUTO: 0.04 THOUSANDS/ΜL (ref 0–0.1)
BASOPHILS NFR BLD AUTO: 1 % (ref 0–1)
BILIRUB SERPL-MCNC: 0.36 MG/DL (ref 0.2–1)
BUN SERPL-MCNC: 13 MG/DL (ref 5–25)
CALCIUM SERPL-MCNC: 9.2 MG/DL (ref 8.4–10.2)
CHLORIDE SERPL-SCNC: 101 MMOL/L (ref 96–108)
CO2 SERPL-SCNC: 28 MMOL/L (ref 21–32)
CREAT SERPL-MCNC: 0.58 MG/DL (ref 0.6–1.3)
EOSINOPHIL # BLD AUTO: 0.33 THOUSAND/ΜL (ref 0–0.61)
EOSINOPHIL NFR BLD AUTO: 6 % (ref 0–6)
ERYTHROCYTE [DISTWIDTH] IN BLOOD BY AUTOMATED COUNT: 13 % (ref 11.6–15.1)
GFR SERPL CREATININE-BSD FRML MDRD: 106 ML/MIN/1.73SQ M
GLUCOSE P FAST SERPL-MCNC: 97 MG/DL (ref 65–99)
HCT VFR BLD AUTO: 34 % (ref 34.8–46.1)
HGB BLD-MCNC: 11.4 G/DL (ref 11.5–15.4)
IMM GRANULOCYTES # BLD AUTO: 0.02 THOUSAND/UL (ref 0–0.2)
IMM GRANULOCYTES NFR BLD AUTO: 0 % (ref 0–2)
LYMPHOCYTES # BLD AUTO: 1.75 THOUSANDS/ΜL (ref 0.6–4.47)
LYMPHOCYTES NFR BLD AUTO: 32 % (ref 14–44)
MCH RBC QN AUTO: 31 PG (ref 26.8–34.3)
MCHC RBC AUTO-ENTMCNC: 33.5 G/DL (ref 31.4–37.4)
MCV RBC AUTO: 92 FL (ref 82–98)
MONOCYTES # BLD AUTO: 0.2 THOUSAND/ΜL (ref 0.17–1.22)
MONOCYTES NFR BLD AUTO: 4 % (ref 4–12)
NEUTROPHILS # BLD AUTO: 3.1 THOUSANDS/ΜL (ref 1.85–7.62)
NEUTS SEG NFR BLD AUTO: 57 % (ref 43–75)
NRBC BLD AUTO-RTO: 0 /100 WBCS
PLATELET # BLD AUTO: 255 THOUSANDS/UL (ref 149–390)
PMV BLD AUTO: 10.3 FL (ref 8.9–12.7)
POTASSIUM SERPL-SCNC: 4 MMOL/L (ref 3.5–5.3)
PROT SERPL-MCNC: 7 G/DL (ref 6.4–8.4)
RBC # BLD AUTO: 3.68 MILLION/UL (ref 3.81–5.12)
SODIUM SERPL-SCNC: 139 MMOL/L (ref 135–147)
WBC # BLD AUTO: 5.44 THOUSAND/UL (ref 4.31–10.16)

## 2024-12-03 PROCEDURE — 80053 COMPREHEN METABOLIC PANEL: CPT

## 2024-12-03 PROCEDURE — 36415 COLL VENOUS BLD VENIPUNCTURE: CPT

## 2024-12-03 PROCEDURE — 85025 COMPLETE CBC W/AUTO DIFF WBC: CPT

## 2024-12-03 RX ORDER — SODIUM CHLORIDE 9 MG/ML
20 INJECTION, SOLUTION INTRAVENOUS ONCE
Status: CANCELLED | OUTPATIENT
Start: 2024-12-05

## 2024-12-05 ENCOUNTER — HOSPITAL ENCOUNTER (OUTPATIENT)
Dept: INFUSION CENTER | Facility: CLINIC | Age: 54
Discharge: HOME | End: 2024-12-05
Payer: COMMERCIAL

## 2024-12-05 VITALS
HEART RATE: 83 BPM | SYSTOLIC BLOOD PRESSURE: 113 MMHG | TEMPERATURE: 99 F | HEIGHT: 65 IN | WEIGHT: 137.5 LBS | OXYGEN SATURATION: 96 % | DIASTOLIC BLOOD PRESSURE: 75 MMHG | BODY MASS INDEX: 22.91 KG/M2

## 2024-12-05 DIAGNOSIS — C50.911 CARCINOMA OF RIGHT BREAST METASTATIC TO AXILLARY LYMPH NODE (HCC): Primary | ICD-10-CM

## 2024-12-05 DIAGNOSIS — C77.3 CARCINOMA OF RIGHT BREAST METASTATIC TO AXILLARY LYMPH NODE (HCC): Primary | ICD-10-CM

## 2024-12-05 PROCEDURE — 96367 TX/PROPH/DG ADDL SEQ IV INF: CPT

## 2024-12-05 PROCEDURE — 96413 CHEMO IV INFUSION 1 HR: CPT

## 2024-12-05 PROCEDURE — 96417 CHEMO IV INFUS EACH ADDL SEQ: CPT

## 2024-12-05 RX ORDER — SODIUM CHLORIDE 9 MG/ML
20 INJECTION, SOLUTION INTRAVENOUS ONCE
Status: COMPLETED | OUTPATIENT
Start: 2024-12-05 | End: 2024-12-05

## 2024-12-05 RX ADMIN — Medication 171 MG: at 12:35

## 2024-12-05 RX ADMIN — CARBOPLATIN 180 MG: 600 INJECTION, SOLUTION INTRAVENOUS at 14:01

## 2024-12-05 RX ADMIN — DEXAMETHASONE SODIUM PHOSPHATE: 10 INJECTION, SOLUTION INTRAMUSCULAR; INTRAVENOUS at 10:58

## 2024-12-05 RX ADMIN — SODIUM CHLORIDE 20 ML/HR: 0.9 INJECTION, SOLUTION INTRAVENOUS at 10:57

## 2024-12-05 RX ADMIN — DIPHENHYDRAMINE HYDROCHLORIDE 25 MG: 50 INJECTION, SOLUTION INTRAMUSCULAR; INTRAVENOUS at 11:22

## 2024-12-05 RX ADMIN — FAMOTIDINE 20 MG: 10 INJECTION, SOLUTION INTRAVENOUS at 11:46

## 2024-12-05 NOTE — PROGRESS NOTES
Pt at [facility] for Abraxane and carboplatin tx. Today is pt's day 15 cycle 1 of tx, office notified and agree to proceed with tx plan. Labs reviewed from 12/3 and meet parameters to tx.  Port flushed smoothly and good blood return noted. Pt infusing per mar. Pt has no further questions at this time. Call bell within reach.

## 2024-12-05 NOTE — PROGRESS NOTES
Pt tolerated tx well with no complaints. Calendar printed of pts to February. Pt reminded to get labs done 2 days prior to tx and written note on printed calendar. Pt is aware of appts and verbalized understanding. Next appt scheduled for 12/12 at [address].

## 2024-12-10 ENCOUNTER — APPOINTMENT (OUTPATIENT)
Dept: LAB | Facility: CLINIC | Age: 54
End: 2024-12-10
Payer: COMMERCIAL

## 2024-12-10 DIAGNOSIS — C50.911 CARCINOMA OF RIGHT BREAST METASTATIC TO AXILLARY LYMPH NODE (HCC): ICD-10-CM

## 2024-12-10 DIAGNOSIS — C77.3 CARCINOMA OF RIGHT BREAST METASTATIC TO AXILLARY LYMPH NODE (HCC): ICD-10-CM

## 2024-12-10 LAB
ALBUMIN SERPL BCG-MCNC: 4.8 G/DL (ref 3.5–5)
ALP SERPL-CCNC: 72 U/L (ref 34–104)
ALT SERPL W P-5'-P-CCNC: 21 U/L (ref 7–52)
ANION GAP SERPL CALCULATED.3IONS-SCNC: 9 MMOL/L (ref 4–13)
AST SERPL W P-5'-P-CCNC: 17 U/L (ref 13–39)
BASOPHILS # BLD AUTO: 0.04 THOUSANDS/ÂΜL (ref 0–0.1)
BASOPHILS NFR BLD AUTO: 1 % (ref 0–1)
BILIRUB SERPL-MCNC: 0.84 MG/DL (ref 0.2–1)
BUN SERPL-MCNC: 16 MG/DL (ref 5–25)
CALCIUM SERPL-MCNC: 9.7 MG/DL (ref 8.4–10.2)
CHLORIDE SERPL-SCNC: 100 MMOL/L (ref 96–108)
CO2 SERPL-SCNC: 28 MMOL/L (ref 21–32)
CREAT SERPL-MCNC: 0.63 MG/DL (ref 0.6–1.3)
EOSINOPHIL # BLD AUTO: 0.48 THOUSAND/ÂΜL (ref 0–0.61)
EOSINOPHIL NFR BLD AUTO: 8 % (ref 0–6)
ERYTHROCYTE [DISTWIDTH] IN BLOOD BY AUTOMATED COUNT: 13.2 % (ref 11.6–15.1)
GFR SERPL CREATININE-BSD FRML MDRD: 103 ML/MIN/1.73SQ M
GLUCOSE P FAST SERPL-MCNC: 98 MG/DL (ref 65–99)
HCT VFR BLD AUTO: 35.3 % (ref 34.8–46.1)
HGB BLD-MCNC: 11.8 G/DL (ref 11.5–15.4)
IMM GRANULOCYTES # BLD AUTO: 0.01 THOUSAND/UL (ref 0–0.2)
IMM GRANULOCYTES NFR BLD AUTO: 0 % (ref 0–2)
LYMPHOCYTES # BLD AUTO: 1.96 THOUSANDS/ÂΜL (ref 0.6–4.47)
LYMPHOCYTES NFR BLD AUTO: 33 % (ref 14–44)
MCH RBC QN AUTO: 31 PG (ref 26.8–34.3)
MCHC RBC AUTO-ENTMCNC: 33.4 G/DL (ref 31.4–37.4)
MCV RBC AUTO: 93 FL (ref 82–98)
MONOCYTES # BLD AUTO: 0.16 THOUSAND/ÂΜL (ref 0.17–1.22)
MONOCYTES NFR BLD AUTO: 3 % (ref 4–12)
NEUTROPHILS # BLD AUTO: 3.37 THOUSANDS/ÂΜL (ref 1.85–7.62)
NEUTS SEG NFR BLD AUTO: 55 % (ref 43–75)
NRBC BLD AUTO-RTO: 0 /100 WBCS
PLATELET # BLD AUTO: 258 THOUSANDS/UL (ref 149–390)
PMV BLD AUTO: 10.4 FL (ref 8.9–12.7)
POTASSIUM SERPL-SCNC: 3.9 MMOL/L (ref 3.5–5.3)
PROT SERPL-MCNC: 7.3 G/DL (ref 6.4–8.4)
RBC # BLD AUTO: 3.81 MILLION/UL (ref 3.81–5.12)
SODIUM SERPL-SCNC: 137 MMOL/L (ref 135–147)
T3FREE SERPL-MCNC: 3.6 PG/ML (ref 2.5–3.9)
T4 FREE SERPL-MCNC: 1.22 NG/DL (ref 0.61–1.12)
TSH SERPL DL<=0.05 MIU/L-ACNC: 0.42 UIU/ML (ref 0.45–4.5)
WBC # BLD AUTO: 6.02 THOUSAND/UL (ref 4.31–10.16)

## 2024-12-10 PROCEDURE — 84439 ASSAY OF FREE THYROXINE: CPT

## 2024-12-10 PROCEDURE — 80053 COMPREHEN METABOLIC PANEL: CPT

## 2024-12-10 PROCEDURE — 36415 COLL VENOUS BLD VENIPUNCTURE: CPT

## 2024-12-10 PROCEDURE — 84481 FREE ASSAY (FT-3): CPT

## 2024-12-10 PROCEDURE — 85025 COMPLETE CBC W/AUTO DIFF WBC: CPT

## 2024-12-10 PROCEDURE — 84443 ASSAY THYROID STIM HORMONE: CPT

## 2024-12-12 ENCOUNTER — HOSPITAL ENCOUNTER (OUTPATIENT)
Dept: INFUSION CENTER | Facility: CLINIC | Age: 54
Discharge: HOME | End: 2024-12-12
Payer: COMMERCIAL

## 2024-12-12 VITALS
HEIGHT: 65 IN | OXYGEN SATURATION: 98 % | WEIGHT: 132 LBS | HEART RATE: 85 BPM | SYSTOLIC BLOOD PRESSURE: 107 MMHG | DIASTOLIC BLOOD PRESSURE: 75 MMHG | BODY MASS INDEX: 21.99 KG/M2 | TEMPERATURE: 99 F

## 2024-12-12 DIAGNOSIS — C77.3 CARCINOMA OF RIGHT BREAST METASTATIC TO AXILLARY LYMPH NODE (HCC): Primary | ICD-10-CM

## 2024-12-12 DIAGNOSIS — C50.911 CARCINOMA OF RIGHT BREAST METASTATIC TO AXILLARY LYMPH NODE (HCC): Primary | ICD-10-CM

## 2024-12-12 PROCEDURE — 96367 TX/PROPH/DG ADDL SEQ IV INF: CPT

## 2024-12-12 PROCEDURE — 96417 CHEMO IV INFUS EACH ADDL SEQ: CPT

## 2024-12-12 PROCEDURE — 96413 CHEMO IV INFUSION 1 HR: CPT

## 2024-12-12 RX ORDER — SODIUM CHLORIDE 9 MG/ML
20 INJECTION, SOLUTION INTRAVENOUS ONCE
Status: CANCELLED | OUTPATIENT
Start: 2024-12-12

## 2024-12-12 RX ORDER — SODIUM CHLORIDE 9 MG/ML
20 INJECTION, SOLUTION INTRAVENOUS ONCE
Status: COMPLETED | OUTPATIENT
Start: 2024-12-12 | End: 2024-12-12

## 2024-12-12 RX ADMIN — SODIUM CHLORIDE 200 MG: 9 INJECTION, SOLUTION INTRAVENOUS at 10:57

## 2024-12-12 RX ADMIN — FAMOTIDINE 20 MG: 10 INJECTION INTRAVENOUS at 12:25

## 2024-12-12 RX ADMIN — CARBOPLATIN 180 MG: 600 INJECTION, SOLUTION INTRAVENOUS at 14:18

## 2024-12-12 RX ADMIN — Medication 171 MG: at 13:04

## 2024-12-12 RX ADMIN — DIPHENHYDRAMINE HYDROCHLORIDE 25 MG: 50 INJECTION, SOLUTION INTRAMUSCULAR; INTRAVENOUS at 12:03

## 2024-12-12 RX ADMIN — SODIUM CHLORIDE 20 ML/HR: 0.9 INJECTION, SOLUTION INTRAVENOUS at 10:53

## 2024-12-12 RX ADMIN — DEXAMETHASONE SODIUM PHOSPHATE: 10 INJECTION, SOLUTION INTRAMUSCULAR; INTRAVENOUS at 11:42

## 2024-12-12 NOTE — PROGRESS NOTES
Pt to clinic for keytruda, abraxane, carbo infusion. Pt offers no complaints at this time. Call bell in reach

## 2024-12-12 NOTE — PROGRESS NOTES
Patient tolerated treatment well without incident. Declined AVS. Confirmed next appointment at the [facility] for 12/19/2024 @ 1130. Patient ambulatory at discharge.

## 2024-12-17 ENCOUNTER — APPOINTMENT (OUTPATIENT)
Dept: LAB | Facility: CLINIC | Age: 54
End: 2024-12-17
Payer: COMMERCIAL

## 2024-12-17 ENCOUNTER — TELEPHONE (OUTPATIENT)
Dept: NUTRITION | Facility: CLINIC | Age: 54
End: 2024-12-17

## 2024-12-17 DIAGNOSIS — C77.3 BREAST CANCER METASTASIZED TO AXILLARY LYMPH NODE, RIGHT (HCC): Primary | ICD-10-CM

## 2024-12-17 DIAGNOSIS — C50.911 CARCINOMA OF RIGHT BREAST METASTATIC TO AXILLARY LYMPH NODE (HCC): ICD-10-CM

## 2024-12-17 DIAGNOSIS — C77.3 CARCINOMA OF RIGHT BREAST METASTATIC TO AXILLARY LYMPH NODE (HCC): ICD-10-CM

## 2024-12-17 DIAGNOSIS — C50.911 BREAST CANCER METASTASIZED TO AXILLARY LYMPH NODE, RIGHT (HCC): Primary | ICD-10-CM

## 2024-12-17 LAB
ALBUMIN SERPL BCG-MCNC: 4.9 G/DL (ref 3.5–5)
ALP SERPL-CCNC: 80 U/L (ref 34–104)
ALT SERPL W P-5'-P-CCNC: 20 U/L (ref 7–52)
ANION GAP SERPL CALCULATED.3IONS-SCNC: 10 MMOL/L (ref 4–13)
AST SERPL W P-5'-P-CCNC: 14 U/L (ref 13–39)
BASOPHILS # BLD AUTO: 0.04 THOUSANDS/ÂΜL (ref 0–0.1)
BASOPHILS NFR BLD AUTO: 1 % (ref 0–1)
BILIRUB SERPL-MCNC: 0.94 MG/DL (ref 0.2–1)
BUN SERPL-MCNC: 18 MG/DL (ref 5–25)
CALCIUM SERPL-MCNC: 10 MG/DL (ref 8.4–10.2)
CHLORIDE SERPL-SCNC: 99 MMOL/L (ref 96–108)
CO2 SERPL-SCNC: 28 MMOL/L (ref 21–32)
CREAT SERPL-MCNC: 0.62 MG/DL (ref 0.6–1.3)
EOSINOPHIL # BLD AUTO: 0.27 THOUSAND/ÂΜL (ref 0–0.61)
EOSINOPHIL NFR BLD AUTO: 6 % (ref 0–6)
ERYTHROCYTE [DISTWIDTH] IN BLOOD BY AUTOMATED COUNT: 13.2 % (ref 11.6–15.1)
GFR SERPL CREATININE-BSD FRML MDRD: 104 ML/MIN/1.73SQ M
GLUCOSE P FAST SERPL-MCNC: 105 MG/DL (ref 65–99)
HCT VFR BLD AUTO: 36.2 % (ref 34.8–46.1)
HGB BLD-MCNC: 12.1 G/DL (ref 11.5–15.4)
IMM GRANULOCYTES # BLD AUTO: 0.02 THOUSAND/UL (ref 0–0.2)
IMM GRANULOCYTES NFR BLD AUTO: 0 % (ref 0–2)
LYMPHOCYTES # BLD AUTO: 1.6 THOUSANDS/ÂΜL (ref 0.6–4.47)
LYMPHOCYTES NFR BLD AUTO: 35 % (ref 14–44)
MCH RBC QN AUTO: 31.3 PG (ref 26.8–34.3)
MCHC RBC AUTO-ENTMCNC: 33.4 G/DL (ref 31.4–37.4)
MCV RBC AUTO: 94 FL (ref 82–98)
MONOCYTES # BLD AUTO: 0.14 THOUSAND/ÂΜL (ref 0.17–1.22)
MONOCYTES NFR BLD AUTO: 3 % (ref 4–12)
NEUTROPHILS # BLD AUTO: 2.5 THOUSANDS/ÂΜL (ref 1.85–7.62)
NEUTS SEG NFR BLD AUTO: 55 % (ref 43–75)
NRBC BLD AUTO-RTO: 0 /100 WBCS
PLATELET # BLD AUTO: 196 THOUSANDS/UL (ref 149–390)
PMV BLD AUTO: 10.9 FL (ref 8.9–12.7)
POTASSIUM SERPL-SCNC: 4.1 MMOL/L (ref 3.5–5.3)
PROT SERPL-MCNC: 7.6 G/DL (ref 6.4–8.4)
RBC # BLD AUTO: 3.86 MILLION/UL (ref 3.81–5.12)
SODIUM SERPL-SCNC: 137 MMOL/L (ref 135–147)
T3FREE SERPL-MCNC: 3.64 PG/ML (ref 2.5–3.9)
TSH SERPL DL<=0.05 MIU/L-ACNC: 0.63 UIU/ML (ref 0.45–4.5)
WBC # BLD AUTO: 4.57 THOUSAND/UL (ref 4.31–10.16)

## 2024-12-17 PROCEDURE — 80053 COMPREHEN METABOLIC PANEL: CPT

## 2024-12-17 PROCEDURE — 84481 FREE ASSAY (FT-3): CPT

## 2024-12-17 PROCEDURE — 85025 COMPLETE CBC W/AUTO DIFF WBC: CPT

## 2024-12-17 PROCEDURE — 84443 ASSAY THYROID STIM HORMONE: CPT

## 2024-12-17 PROCEDURE — 36415 COLL VENOUS BLD VENIPUNCTURE: CPT

## 2024-12-17 NOTE — TELEPHONE ENCOUNTER
Received notification by  Dr. 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     phone services used to contact pt: Eamon #[phone]    Contacted Romina today to establish care.  No answer.  Left voice message with the reason for today's call and this RD’s contact information asking that Romina call back as able/desired.

## 2024-12-18 RX ORDER — SODIUM CHLORIDE 9 MG/ML
20 INJECTION, SOLUTION INTRAVENOUS ONCE
Status: CANCELLED | OUTPATIENT
Start: 2024-12-19

## 2024-12-19 ENCOUNTER — HOSPITAL ENCOUNTER (OUTPATIENT)
Dept: INFUSION CENTER | Facility: CLINIC | Age: 54
Discharge: HOME | End: 2024-12-19
Payer: COMMERCIAL

## 2024-12-19 VITALS
RESPIRATION RATE: 18 BRPM | HEART RATE: 89 BPM | BODY MASS INDEX: 22.08 KG/M2 | HEIGHT: 65 IN | DIASTOLIC BLOOD PRESSURE: 80 MMHG | SYSTOLIC BLOOD PRESSURE: 120 MMHG | OXYGEN SATURATION: 99 % | WEIGHT: 132.5 LBS | TEMPERATURE: 98.1 F

## 2024-12-19 DIAGNOSIS — C77.3 CARCINOMA OF RIGHT BREAST METASTATIC TO AXILLARY LYMPH NODE (HCC): Primary | ICD-10-CM

## 2024-12-19 DIAGNOSIS — C50.911 CARCINOMA OF RIGHT BREAST METASTATIC TO AXILLARY LYMPH NODE (HCC): Primary | ICD-10-CM

## 2024-12-19 PROCEDURE — 96367 TX/PROPH/DG ADDL SEQ IV INF: CPT

## 2024-12-19 PROCEDURE — 96413 CHEMO IV INFUSION 1 HR: CPT

## 2024-12-19 PROCEDURE — 96417 CHEMO IV INFUS EACH ADDL SEQ: CPT

## 2024-12-19 RX ORDER — SODIUM CHLORIDE 9 MG/ML
20 INJECTION, SOLUTION INTRAVENOUS ONCE
Status: COMPLETED | OUTPATIENT
Start: 2024-12-19 | End: 2024-12-19

## 2024-12-19 RX ADMIN — Medication 171 MG: at 12:53

## 2024-12-19 RX ADMIN — DEXAMETHASONE SODIUM PHOSPHATE: 10 INJECTION, SOLUTION INTRAMUSCULAR; INTRAVENOUS at 11:41

## 2024-12-19 RX ADMIN — DIPHENHYDRAMINE HYDROCHLORIDE 25 MG: 50 INJECTION, SOLUTION INTRAMUSCULAR; INTRAVENOUS at 12:01

## 2024-12-19 RX ADMIN — SODIUM CHLORIDE 20 ML/HR: 0.9 INJECTION, SOLUTION INTRAVENOUS at 11:42

## 2024-12-19 RX ADMIN — FAMOTIDINE 20 MG: 10 INJECTION INTRAVENOUS at 12:27

## 2024-12-19 RX ADMIN — CARBOPLATIN 180 MG: 600 INJECTION, SOLUTION INTRAVENOUS at 13:52

## 2024-12-19 NOTE — PROGRESS NOTES
Pt arrives to infusion center for Abraxane and Carboplatin. Offers no complaints. Labs from 12/17 are within tx parameters. PAC accessed without issue, brisk blood return, flushed, and infusing. Pt sitting comfortably in chair, wheels locked, call bell within reach.

## 2024-12-19 NOTE — LETTER
[facility]  [address]  [address]  [place] [address]  Dept: [phone]    December 22, 2024     Patient: Romina Cleaning   YOB: 1970   Date of Visit: 12/19/2024       To Whom it May Concern:    Romina Cleaning is under my professional care. She was seen in the hospital from 12/19/2024 to 12/19/24. She {Return to school/sport/work:30730}    If you have any questions or concerns, please don't hesitate to call.         Sincerely,          Denae Arevalo RN

## 2024-12-19 NOTE — LETTER
[facility]  [address]  [address]  [place] [address]  Dept: [phone]    December 19, 2024     Patient: Romina Cleaning   YOB: 1970   Date of Visit: 12/19/2024       To Whom it May Concern:    Romina Cleaning is under my professional care. She was seen in the hospital from 12/19/2024 to 12/19/24. She may return to school on 12/20/24 without limitations. Her daughter accompanies to translate.     If you have any questions or concerns, please don't hesitate to call.         Sincerely,          Malorie Mederos RN

## 2024-12-20 NOTE — TELEPHONE ENCOUNTER
Second attempt made today to reach Romina to establish care and discuss her nutrition.  No answer.  Left a voice message requesting a call back at Romina's convenience.      phone line used: Arina #[phone]

## 2024-12-24 ENCOUNTER — APPOINTMENT (OUTPATIENT)
Dept: LAB | Facility: CLINIC | Age: 54
DRG: 720 | End: 2024-12-24
Payer: COMMERCIAL

## 2024-12-24 DIAGNOSIS — C77.3 CARCINOMA OF RIGHT BREAST METASTATIC TO AXILLARY LYMPH NODE (HCC): ICD-10-CM

## 2024-12-24 DIAGNOSIS — C50.911 CARCINOMA OF RIGHT BREAST METASTATIC TO AXILLARY LYMPH NODE (HCC): ICD-10-CM

## 2024-12-24 LAB
ALBUMIN SERPL BCG-MCNC: 4.7 G/DL (ref 3.5–5)
ALP SERPL-CCNC: 81 U/L (ref 34–104)
ALT SERPL W P-5'-P-CCNC: 39 U/L (ref 7–52)
ANION GAP SERPL CALCULATED.3IONS-SCNC: 10 MMOL/L (ref 4–13)
AST SERPL W P-5'-P-CCNC: 19 U/L (ref 13–39)
BASOPHILS # BLD AUTO: 0.04 THOUSANDS/ÂΜL (ref 0–0.1)
BASOPHILS NFR BLD AUTO: 1 % (ref 0–1)
BILIRUB SERPL-MCNC: 0.79 MG/DL (ref 0.2–1)
BUN SERPL-MCNC: 11 MG/DL (ref 5–25)
CALCIUM SERPL-MCNC: 9.7 MG/DL (ref 8.4–10.2)
CHLORIDE SERPL-SCNC: 100 MMOL/L (ref 96–108)
CO2 SERPL-SCNC: 29 MMOL/L (ref 21–32)
CREAT SERPL-MCNC: 0.53 MG/DL (ref 0.6–1.3)
EOSINOPHIL # BLD AUTO: 0.15 THOUSAND/ÂΜL (ref 0–0.61)
EOSINOPHIL NFR BLD AUTO: 4 % (ref 0–6)
ERYTHROCYTE [DISTWIDTH] IN BLOOD BY AUTOMATED COUNT: 13.2 % (ref 11.6–15.1)
GFR SERPL CREATININE-BSD FRML MDRD: 109 ML/MIN/1.73SQ M
GLUCOSE P FAST SERPL-MCNC: 99 MG/DL (ref 65–99)
HCT VFR BLD AUTO: 32.9 % (ref 34.8–46.1)
HGB BLD-MCNC: 11.3 G/DL (ref 11.5–15.4)
IMM GRANULOCYTES # BLD AUTO: 0.01 THOUSAND/UL (ref 0–0.2)
IMM GRANULOCYTES NFR BLD AUTO: 0 % (ref 0–2)
LYMPHOCYTES # BLD AUTO: 1.66 THOUSANDS/ÂΜL (ref 0.6–4.47)
LYMPHOCYTES NFR BLD AUTO: 46 % (ref 14–44)
MCH RBC QN AUTO: 31.8 PG (ref 26.8–34.3)
MCHC RBC AUTO-ENTMCNC: 34.3 G/DL (ref 31.4–37.4)
MCV RBC AUTO: 93 FL (ref 82–98)
MONOCYTES # BLD AUTO: 0.11 THOUSAND/ÂΜL (ref 0.17–1.22)
MONOCYTES NFR BLD AUTO: 3 % (ref 4–12)
NEUTROPHILS # BLD AUTO: 1.68 THOUSANDS/ÂΜL (ref 1.85–7.62)
NEUTS SEG NFR BLD AUTO: 46 % (ref 43–75)
NRBC BLD AUTO-RTO: 0 /100 WBCS
PLATELET # BLD AUTO: 202 THOUSANDS/UL (ref 149–390)
PMV BLD AUTO: 10.1 FL (ref 8.9–12.7)
POTASSIUM SERPL-SCNC: 3.6 MMOL/L (ref 3.5–5.3)
PROT SERPL-MCNC: 7.3 G/DL (ref 6.4–8.4)
RBC # BLD AUTO: 3.55 MILLION/UL (ref 3.81–5.12)
SODIUM SERPL-SCNC: 139 MMOL/L (ref 135–147)
T3FREE SERPL-MCNC: 4.04 PG/ML (ref 2.5–3.9)
T4 FREE SERPL-MCNC: 1.07 NG/DL (ref 0.61–1.12)
TSH SERPL DL<=0.05 MIU/L-ACNC: 0.37 UIU/ML (ref 0.45–4.5)
WBC # BLD AUTO: 3.65 THOUSAND/UL (ref 4.31–10.16)

## 2024-12-24 PROCEDURE — 80053 COMPREHEN METABOLIC PANEL: CPT

## 2024-12-24 PROCEDURE — 84439 ASSAY OF FREE THYROXINE: CPT

## 2024-12-24 PROCEDURE — 84481 FREE ASSAY (FT-3): CPT

## 2024-12-24 PROCEDURE — 36415 COLL VENOUS BLD VENIPUNCTURE: CPT

## 2024-12-24 PROCEDURE — 85025 COMPLETE CBC W/AUTO DIFF WBC: CPT

## 2024-12-24 PROCEDURE — 84443 ASSAY THYROID STIM HORMONE: CPT

## 2024-12-24 RX ORDER — SODIUM CHLORIDE 9 MG/ML
20 INJECTION, SOLUTION INTRAVENOUS ONCE
OUTPATIENT
Start: 2024-12-26

## 2024-12-25 ENCOUNTER — HOSPITAL ENCOUNTER (INPATIENT)
Facility: HOSPITAL | Age: 54
LOS: 2 days | Discharge: HOME | DRG: 720 | End: 2024-12-27
Attending: EMERGENCY MEDICINE
Payer: COMMERCIAL

## 2024-12-25 ENCOUNTER — APPOINTMENT (EMERGENCY)
Dept: RADIOLOGY | Facility: HOSPITAL | Age: 54
DRG: 720 | End: 2024-12-25
Payer: COMMERCIAL

## 2024-12-25 DIAGNOSIS — A41.9 SEPSIS (HCC): ICD-10-CM

## 2024-12-25 DIAGNOSIS — R50.9 FEVER: ICD-10-CM

## 2024-12-25 DIAGNOSIS — E87.6 HYPOKALEMIA: ICD-10-CM

## 2024-12-25 DIAGNOSIS — D72.819 LEUKOPENIA: ICD-10-CM

## 2024-12-25 DIAGNOSIS — U07.1 COVID: Primary | ICD-10-CM

## 2024-12-25 PROBLEM — R50.81 NEUTROPENIC FEVER  (HCC): Status: ACTIVE | Noted: 2024-12-25

## 2024-12-25 PROBLEM — D70.9 NEUTROPENIC FEVER  (HCC): Status: ACTIVE | Noted: 2024-12-25

## 2024-12-25 LAB
2HR DELTA HS TROPONIN: -1 NG/L
ALBUMIN SERPL BCG-MCNC: 4.4 G/DL (ref 3.5–5)
ALP SERPL-CCNC: 82 U/L (ref 34–104)
ALT SERPL W P-5'-P-CCNC: 36 U/L (ref 7–52)
ANION GAP SERPL CALCULATED.3IONS-SCNC: 10 MMOL/L (ref 4–13)
APTT PPP: 27 SECONDS (ref 23–34)
AST SERPL W P-5'-P-CCNC: 22 U/L (ref 13–39)
BACTERIA UR QL AUTO: ABNORMAL /HPF
BASOPHILS # BLD AUTO: 0.01 THOUSANDS/ÂΜL (ref 0–0.1)
BASOPHILS NFR BLD AUTO: 0 % (ref 0–1)
BILIRUB SERPL-MCNC: 0.48 MG/DL (ref 0.2–1)
BILIRUB UR QL STRIP: NEGATIVE
BUN SERPL-MCNC: 10 MG/DL (ref 5–25)
CALCIUM SERPL-MCNC: 9.5 MG/DL (ref 8.4–10.2)
CARDIAC TROPONIN I PNL SERPL HS: 5 NG/L (ref ?–50)
CARDIAC TROPONIN I PNL SERPL HS: 6 NG/L (ref ?–50)
CHLORIDE SERPL-SCNC: 96 MMOL/L (ref 96–108)
CLARITY UR: CLEAR
CO2 SERPL-SCNC: 28 MMOL/L (ref 21–32)
COLOR UR: COLORLESS
CREAT SERPL-MCNC: 0.65 MG/DL (ref 0.6–1.3)
EOSINOPHIL # BLD AUTO: 0.07 THOUSAND/ÂΜL (ref 0–0.61)
EOSINOPHIL NFR BLD AUTO: 3 % (ref 0–6)
ERYTHROCYTE [DISTWIDTH] IN BLOOD BY AUTOMATED COUNT: 13.2 % (ref 11.6–15.1)
FLUAV RNA RESP QL NAA+PROBE: NEGATIVE
FLUBV RNA RESP QL NAA+PROBE: NEGATIVE
GFR SERPL CREATININE-BSD FRML MDRD: 102 ML/MIN/1.73SQ M
GLUCOSE SERPL-MCNC: 111 MG/DL (ref 65–140)
GLUCOSE UR STRIP-MCNC: NEGATIVE MG/DL
HCT VFR BLD AUTO: 28.7 % (ref 34.8–46.1)
HGB BLD-MCNC: 10.1 G/DL (ref 11.5–15.4)
HGB UR QL STRIP.AUTO: ABNORMAL
IMM GRANULOCYTES # BLD AUTO: 0.02 THOUSAND/UL (ref 0–0.2)
IMM GRANULOCYTES NFR BLD AUTO: 1 % (ref 0–2)
INR PPP: 1.19 (ref 0.85–1.19)
KETONES UR STRIP-MCNC: NEGATIVE MG/DL
LACTATE SERPL-SCNC: 1.4 MMOL/L (ref 0.5–2)
LEUKOCYTE ESTERASE UR QL STRIP: ABNORMAL
LYMPHOCYTES # BLD AUTO: 0.56 THOUSANDS/ÂΜL (ref 0.6–4.47)
LYMPHOCYTES NFR BLD AUTO: 21 % (ref 14–44)
MCH RBC QN AUTO: 32.1 PG (ref 26.8–34.3)
MCHC RBC AUTO-ENTMCNC: 35.2 G/DL (ref 31.4–37.4)
MCV RBC AUTO: 91 FL (ref 82–98)
MONOCYTES # BLD AUTO: 0.12 THOUSAND/ÂΜL (ref 0.17–1.22)
MONOCYTES NFR BLD AUTO: 5 % (ref 4–12)
NEUTROPHILS # BLD AUTO: 1.87 THOUSANDS/ÂΜL (ref 1.85–7.62)
NEUTS SEG NFR BLD AUTO: 70 % (ref 43–75)
NITRITE UR QL STRIP: NEGATIVE
NON-SQ EPI CELLS URNS QL MICRO: ABNORMAL /HPF
NRBC BLD AUTO-RTO: 0 /100 WBCS
PH UR STRIP.AUTO: 5.5 [PH]
PLATELET # BLD AUTO: 163 THOUSANDS/UL (ref 149–390)
PMV BLD AUTO: 9.9 FL (ref 8.9–12.7)
POTASSIUM SERPL-SCNC: 2.6 MMOL/L (ref 3.5–5.3)
PROCALCITONIN SERPL-MCNC: 0.09 NG/ML
PROT SERPL-MCNC: 7 G/DL (ref 6.4–8.4)
PROT UR STRIP-MCNC: NEGATIVE MG/DL
PROTHROMBIN TIME: 15.4 SECONDS (ref 12.3–15)
RBC # BLD AUTO: 3.15 MILLION/UL (ref 3.81–5.12)
RBC #/AREA URNS AUTO: ABNORMAL /HPF
RSV RNA RESP QL NAA+PROBE: NEGATIVE
SARS-COV-2 RNA RESP QL NAA+PROBE: POSITIVE
SODIUM SERPL-SCNC: 134 MMOL/L (ref 135–147)
SP GR UR STRIP.AUTO: 1.01 (ref 1–1.03)
UROBILINOGEN UR STRIP-ACNC: <2 MG/DL
WBC # BLD AUTO: 2.65 THOUSAND/UL (ref 4.31–10.16)
WBC #/AREA URNS AUTO: ABNORMAL /HPF

## 2024-12-25 PROCEDURE — 85025 COMPLETE CBC W/AUTO DIFF WBC: CPT

## 2024-12-25 PROCEDURE — 93005 ELECTROCARDIOGRAM TRACING: CPT

## 2024-12-25 PROCEDURE — 99223 1ST HOSP IP/OBS HIGH 75: CPT

## 2024-12-25 PROCEDURE — 96366 THER/PROPH/DIAG IV INF ADDON: CPT

## 2024-12-25 PROCEDURE — 99285 EMERGENCY DEPT VISIT HI MDM: CPT

## 2024-12-25 PROCEDURE — 96365 THER/PROPH/DIAG IV INF INIT: CPT

## 2024-12-25 PROCEDURE — 87040 BLOOD CULTURE FOR BACTERIA: CPT

## 2024-12-25 PROCEDURE — 99291 CRITICAL CARE FIRST HOUR: CPT | Performed by: EMERGENCY MEDICINE

## 2024-12-25 PROCEDURE — 83605 ASSAY OF LACTIC ACID: CPT

## 2024-12-25 PROCEDURE — 84484 ASSAY OF TROPONIN QUANT: CPT

## 2024-12-25 PROCEDURE — 84145 PROCALCITONIN (PCT): CPT

## 2024-12-25 PROCEDURE — 96375 TX/PRO/DX INJ NEW DRUG ADDON: CPT

## 2024-12-25 PROCEDURE — 0241U HB NFCT DS VIR RESP RNA 4 TRGT: CPT

## 2024-12-25 PROCEDURE — 36415 COLL VENOUS BLD VENIPUNCTURE: CPT

## 2024-12-25 PROCEDURE — 71046 X-RAY EXAM CHEST 2 VIEWS: CPT

## 2024-12-25 PROCEDURE — 85730 THROMBOPLASTIN TIME PARTIAL: CPT

## 2024-12-25 PROCEDURE — 85610 PROTHROMBIN TIME: CPT

## 2024-12-25 PROCEDURE — 80053 COMPREHEN METABOLIC PANEL: CPT

## 2024-12-25 PROCEDURE — 96367 TX/PROPH/DG ADDL SEQ IV INF: CPT

## 2024-12-25 PROCEDURE — 81001 URINALYSIS AUTO W/SCOPE: CPT

## 2024-12-25 RX ORDER — SODIUM CHLORIDE 9 MG/ML
3 INJECTION INTRAVENOUS EVERY 8 HOURS SCHEDULED
Status: DISCONTINUED | OUTPATIENT
Start: 2024-12-25 | End: 2024-12-27 | Stop reason: HOSPADM

## 2024-12-25 RX ORDER — ENOXAPARIN SODIUM 100 MG/ML
40 INJECTION SUBCUTANEOUS DAILY
Status: DISCONTINUED | OUTPATIENT
Start: 2024-12-26 | End: 2024-12-27 | Stop reason: HOSPADM

## 2024-12-25 RX ORDER — ATORVASTATIN CALCIUM 10 MG/1
10 TABLET, FILM COATED ORAL DAILY
Status: DISCONTINUED | OUTPATIENT
Start: 2024-12-26 | End: 2024-12-27 | Stop reason: HOSPADM

## 2024-12-25 RX ORDER — POTASSIUM CHLORIDE 1500 MG/1
40 TABLET, EXTENDED RELEASE ORAL ONCE
Status: COMPLETED | OUTPATIENT
Start: 2024-12-25 | End: 2024-12-25

## 2024-12-25 RX ORDER — CYCLOBENZAPRINE HCL 10 MG
5 TABLET ORAL 3 TIMES DAILY PRN
Status: DISCONTINUED | OUTPATIENT
Start: 2024-12-25 | End: 2024-12-27 | Stop reason: HOSPADM

## 2024-12-25 RX ORDER — POTASSIUM CHLORIDE 14.9 MG/ML
20 INJECTION INTRAVENOUS ONCE
Status: COMPLETED | OUTPATIENT
Start: 2024-12-25 | End: 2024-12-26

## 2024-12-25 RX ORDER — VANCOMYCIN HYDROCHLORIDE 1 G/200ML
15 INJECTION, SOLUTION INTRAVENOUS ONCE
Status: COMPLETED | OUTPATIENT
Start: 2024-12-25 | End: 2024-12-26

## 2024-12-25 RX ORDER — POTASSIUM CHLORIDE 14.9 MG/ML
20 INJECTION INTRAVENOUS
Status: DISPENSED | OUTPATIENT
Start: 2024-12-25 | End: 2024-12-25

## 2024-12-25 RX ORDER — ACETAMINOPHEN 325 MG/1
650 TABLET ORAL EVERY 6 HOURS PRN
Status: DISCONTINUED | OUTPATIENT
Start: 2024-12-25 | End: 2024-12-27 | Stop reason: HOSPADM

## 2024-12-25 RX ORDER — SODIUM CHLORIDE, SODIUM GLUCONATE, SODIUM ACETATE, POTASSIUM CHLORIDE, MAGNESIUM CHLORIDE, SODIUM PHOSPHATE, DIBASIC, AND POTASSIUM PHOSPHATE .53; .5; .37; .037; .03; .012; .00082 G/100ML; G/100ML; G/100ML; G/100ML; G/100ML; G/100ML; G/100ML
1000 INJECTION, SOLUTION INTRAVENOUS ONCE
Status: COMPLETED | OUTPATIENT
Start: 2024-12-25 | End: 2024-12-25

## 2024-12-25 RX ADMIN — SODIUM CHLORIDE, PRESERVATIVE FREE 3 ML: 5 INJECTION INTRAVENOUS at 19:55

## 2024-12-25 RX ADMIN — POTASSIUM CHLORIDE 20 MEQ: 14.9 INJECTION, SOLUTION INTRAVENOUS at 22:46

## 2024-12-25 RX ADMIN — SODIUM CHLORIDE, SODIUM GLUCONATE, SODIUM ACETATE, POTASSIUM CHLORIDE, MAGNESIUM CHLORIDE, SODIUM PHOSPHATE, DIBASIC, AND POTASSIUM PHOSPHATE 1000 ML: .53; .5; .37; .037; .03; .012; .00082 INJECTION, SOLUTION INTRAVENOUS at 17:37

## 2024-12-25 RX ADMIN — SODIUM CHLORIDE, SODIUM GLUCONATE, SODIUM ACETATE, POTASSIUM CHLORIDE, MAGNESIUM CHLORIDE, SODIUM PHOSPHATE, DIBASIC, AND POTASSIUM PHOSPHATE 1000 ML: .53; .5; .37; .037; .03; .012; .00082 INJECTION, SOLUTION INTRAVENOUS at 19:23

## 2024-12-25 RX ADMIN — CEFEPIME 2000 MG: 2 INJECTION, POWDER, FOR SOLUTION INTRAVENOUS at 18:20

## 2024-12-25 RX ADMIN — VANCOMYCIN HYDROCHLORIDE 1000 MG: 1 INJECTION, SOLUTION INTRAVENOUS at 19:14

## 2024-12-25 RX ADMIN — ACETAMINOPHEN 650 MG: 325 TABLET, FILM COATED ORAL at 22:46

## 2024-12-25 RX ADMIN — POTASSIUM CHLORIDE 40 MEQ: 1500 TABLET, EXTENDED RELEASE ORAL at 22:47

## 2024-12-25 NOTE — LETTER
[facility]  [address]  [place] [address]  Dept: [phone]    December 27, 2024     Patient: Romina Cleaning   YOB: 1970   Date of Visit: 12/25/2024       To Whom it May Concern:    Romina Cleaning is under my professional care. She was seen in the hospital from 12/25/2024 to 12/27/24. She may return to work on 01/02/2025 without limitations.    If you have any questions or concerns, please don't hesitate to call.         Sincerely,          Leighton Pineda

## 2024-12-25 NOTE — ED PROVIDER NOTES
Time reflects when diagnosis was documented in both MDM as applicable and the Disposition within this note       Time User Action Codes Description Comment    12/25/2024  8:02 PM June Greene [U07.1] COVID     12/25/2024  8:02 PM June Greene Add [R50.9] Fever     12/25/2024  8:02 PM June Greene Add [A41.9] Sepsis (HCC)     12/25/2024  8:02 PM June Greene Add [D72.819] Leukopenia     12/25/2024  8:03 PM June Greene Add [E87.6] Hypokalemia           ED Disposition       ED Disposition   Admit    Condition   Stable    Date/Time   Wed Dec 25, 2024  8:01 PM    Comment   Admitted to [facility].             Assessment & Plan       Medical Decision Making  Patient is a 54 y.o. female  who presents to the ED with fever.    Vital signs tachycardic, hypotensive. Exam as listed above.    Differential diagnosis includes but is not limited to sepsis, severe sepsis, septic shock, UTI, pneumonia, soft tissue infection, viral URI, medication reaction, neutropenic fever.     Plan   CBC to evaluate for anemia, evaluate for leukocytosis as sign of infectious source of symptoms.  CMP to evaluate for electrolyte derangement, assess renal and hepatic function.  Lactic acid and procalcitonin to evaluate for sepsis.  PT/INR/APTT to evaluate for coagulopathy.  Blood cultures to evaluate for bacteremia.  COVID/flu/RSV to evaluate for cause of respiratory symptoms.  ECG to evaluate for arrhythmia, heart block, ST changes to rule out STEMI, pericarditis.  CXR to evaluate for pneumonia, pneumothorax, pleural effusion, signs of fluid overload.    View ED course below for further discussion on patient workup.     On review of previous records patient is currently undergoing treatment for ER+, MA+, HER2- invasive lobular carcinoma of the right breast, with triple negative right axillary lymph node.    All labs reviewed and utilized in the medical decision making process  All radiology studies independently viewed by  me and interpreted by the radiologist.  I reviewed all testing with the patient.     Upon re-evaluation admitted to [facility] in stable condition for hypokalemia and sepsis, suspected secondary to COVID.      Amount and/or Complexity of Data Reviewed  Labs: ordered. Decision-making details documented in ED Course.  Radiology: ordered.    Risk  Prescription drug management.  Decision regarding hospitalization.        ED Course as of 12/25/24 2003   Wed Dec 25, 2024   1730 Procedure Note: EKG  Date/Time: 12/25/24 5:30 PM   Indications / Diagnosis: CP  ECG reviewed by me, the ED Provider: yes   The EKG demonstrates:  Rhythm: sinus tachycardia, rate 115  Intervals: normal intervals  Axis: normal axis  QRS/Blocks: normal QRS  ST Changes: No acute ST Changes, no STD/TREVON.     1751 WBC(!): 2.65   1751 Hemoglobin(!): 10.1  11.3 yesterday   1821 Potassium(!): 2.6  3.6 yesterday   1849 SARS-COV-2(!): Positive       Medications   sodium chloride (PF) 0.9 % injection 3 mL (3 mL Intravenous Given 12/25/24 1955)   vancomycin (VANCOCIN) IVPB (premix in dextrose) 1,000 mg 200 mL (1,000 mg Intravenous New Bag 12/25/24 1914)   potassium chloride 20 mEq IVPB (premix) (has no administration in time range)   multi-electrolyte (PLASMALYTE-A/ISOLYTE-S PH 7.4) IV solution 1,000 mL (0 mL Intravenous Stopped 12/25/24 1922)     Followed by   multi-electrolyte (PLASMALYTE-A/ISOLYTE-S PH 7.4) IV solution 1,000 mL (1,000 mL Intravenous New Bag 12/25/24 1923)   cefepime (MAXIPIME) 2 g/50 mL dextrose IVPB (0 mg Intravenous Stopped 12/25/24 1900)       ED Risk Strat Scores                          SBIRT 20yo+      Flowsheet Row Most Recent Value   Initial Alcohol Screen: US AUDIT-C     1. How often do you have a drink containing alcohol? 0 Filed at: 12/25/2024 1656   2. How many drinks containing alcohol do you have on a typical day you are drinking?  0 Filed at: 12/25/2024 1656   3b. FEMALE Any Age, or MALE 65+: How often do you have 4 or  more drinks on one occassion? 0 Filed at: 12/25/2024 1659   Audit-C Score 0 Filed at: 12/25/2024 8476   DAVID: How many times in the past year have you...    Used an illegal drug or used a prescription medication for non-medical reasons? Never Filed at: 12/25/2024 4421                            History of Present Illness       Chief Complaint   Patient presents with    Fever     Per pt started to have a fever a couple hours ago with chest pain. Pt took tylenol 1500. Pt is being treated for breast cancer.       Past Medical History:   Diagnosis Date    GERD (gastroesophageal reflux disease)     Headache     Hematuria     Hypertension     IUD (intrauterine device) in place 02/15/2019    Mirena placed 2/2015 effective through 2/2020      Lateral epicondylitis, right elbow 01/08/2019    Panic attacks     Psychiatric disorder       Past Surgical History:   Procedure Laterality Date    BREAST BIOPSY Right 09/20/2024    BREAST BIOPSY Right 09/20/2024    BREAST BIOPSY Right 11/01/2024    CYSTOSCOPY  06/08/2018    IR PORT PLACEMENT  11/6/2024    NO PAST SURGERIES      US GUIDED BREAST BIOPSY RIGHT COMPLETE Right 09/20/2024    US GUIDED BREAST BIOPSY RIGHT COMPLETE Right 11/1/2024    US GUIDED BREAST LYMPH NODE BIOPSY RIGHT Right 09/20/2024      Family History   Problem Relation Age of Onset    Hypertension Mother     Colonic polyp Mother     Colon cancer Mother     Arthritis Father     Diabetes Father     Hypertension Father     Hyperlipidemia Father     No Known Problems Sister     No Known Problems Sister     Other Brother         TBI from accident     No Known Problems Son     No Known Problems Daughter     No Known Problems Maternal Aunt     No Known Problems Maternal Aunt     No Known Problems Maternal Aunt     No Known Problems Maternal Aunt     No Known Problems Maternal Aunt     No Known Problems Paternal Aunt     No Known Problems Paternal Aunt     No Known Problems Paternal Aunt     Colonic polyp Maternal  Grandmother     Colon cancer Maternal Grandmother     Throat cancer Maternal Grandfather     No Known Problems Paternal Grandmother     No Known Problems Paternal Grandfather       Social History     Tobacco Use    Smoking status: Former     Current packs/day: 0.00     Average packs/day: 0.3 packs/day for 35.0 years (8.8 ttl pk-yrs)     Types: Cigarettes     Start date: 1988     Quit date: 2024     Years since quittin.2    Smokeless tobacco: Never    Tobacco comments:     ONE HALF PACK A DAY OR LESS, CURRENT SOME DAY SMOKER, LIGHT TOBACCO SMOKER  AS PER [employer]   Vaping Use    Vaping status: Some Days   Substance Use Topics    Alcohol use: No    Drug use: No      E-Cigarette/Vaping    E-Cigarette Use Current Some Day User       E-Cigarette/Vaping Substances    Nicotine No     THC No     CBD No     Flavoring No     Other No     Unknown No       I have reviewed and agree with the history as documented.     Patient is a 54-year-old female, currently undergoing treatment for breast cancer, other past medical history including hypertension, GERD.  Patient presents today with fever that first measured 107 on the tympanic thermometer at home, when rechecked at home it was between 102, 103.  Fever started around 2 PM.  Took 1000 mg Tylenol at 3 PM.  Onset of fever was also accompanied by congestion, rhinorrhea, chest pain.  Denies cough, sore throat.  Denies abdominal pain.  No nausea vomiting diarrhea.  Some shortness of breath, no increased pain with deep breath.  Patient denies pain or swelling in her lower extremities.  Daughter sick with upper respiratory symptoms.  Patient did not receive flu vaccination this year due to chemotherapy.      History provided by:  Patient and relative  Fever      Review of Systems        Objective       ED Triage Vitals   Temperature Pulse Blood Pressure Respirations SpO2 Patient Position - Orthostatic VS   24 1652 24 1653 24 1653 24 1653 24  1653 12/25/24 1653   99.5 °F (37.5 °C) (!) 121 120/59 20 99 % Sitting      Temp src Heart Rate Source BP Location FiO2 (%) Pain Score    -- 12/25/24 1653 12/25/24 1653 -- 12/25/24 1653     Monitor Left arm  8      Vitals      Date and Time Temp Pulse SpO2 Resp BP Pain Score FACES Pain Rating User   12/25/24 1654 -- -- 98 % -- -- -- -- AM   12/25/24 1653 -- 121 99 % 20 120/59 8 -- AM   12/25/24 1652 99.5 °F (37.5 °C) -- -- -- -- -- -- AM            Physical Exam  Vitals and nursing note reviewed.   Constitutional:       General: She is not in acute distress.     Appearance: She is well-developed. She is not ill-appearing or diaphoretic.      Comments: Chronically ill appearing   HENT:      Head: Normocephalic and atraumatic.      Nose: Congestion and rhinorrhea present.      Mouth/Throat:      Mouth: Mucous membranes are moist.   Eyes:      Conjunctiva/sclera: Conjunctivae normal.   Cardiovascular:      Rate and Rhythm: Normal rate and regular rhythm.      Heart sounds: No murmur heard.  Pulmonary:      Effort: Pulmonary effort is normal. No respiratory distress.      Breath sounds: Normal breath sounds. No wheezing, rhonchi or rales.   Abdominal:      General: Abdomen is flat. There is no distension.      Palpations: Abdomen is soft.      Tenderness: There is no abdominal tenderness. There is no right CVA tenderness or left CVA tenderness.   Musculoskeletal:         General: No swelling or tenderness.      Cervical back: Neck supple.      Right lower leg: No edema.      Left lower leg: No edema.   Skin:     General: Skin is dry.      Capillary Refill: Capillary refill takes less than 2 seconds.      Coloration: Skin is not jaundiced or pale.      Findings: No bruising or erythema.      Comments: Hot to touch   Neurological:      Mental Status: She is alert. Mental status is at baseline.   Psychiatric:         Mood and Affect: Mood normal.       Results Reviewed       Procedure Component Value Units Date/Time     Blood culture #1 [[id number]] Collected: 12/25/24 1733    Lab Status: Preliminary result Specimen: Blood from Arm, Left Updated: 12/25/24 2001     Blood Culture Received in Microbiology Lab. Culture in Progress.    Blood culture #2 [[id number]] Collected: 12/25/24 1733    Lab Status: Preliminary result Specimen: Blood from Arm, Right Updated: 12/25/24 2001     Blood Culture Received in Microbiology Lab. Culture in Progress.    HS Troponin I 2hr [[id number]] Collected: 12/25/24 1955    Lab Status: In process Specimen: Blood from Arm, Left Updated: 12/25/24 2000    HS Troponin I 4hr [[id number]]     Lab Status: No result Specimen: Blood     FLU/RSV/COVID - if FLU/RSV clinically relevant [[id number]]  (Abnormal) Collected: 12/25/24 1733    Lab Status: Final result Specimen: Nares from Nose Updated: 12/25/24 1822     SARS-CoV-2 Positive     INFLUENZA A PCR Negative     INFLUENZA B PCR Negative     RSV PCR Negative    Narrative:      This test has been performed using the CoV-2/Flu/RSV plus assay on the [employer] GeneXpert platform. This test has been validated by the  and verified by the performing laboratory.     This test is designed to amplify and detect the following: nucleocapsid (N), envelope (E), and RNA-dependent RNA polymerase (RdRP) genes of the SARS-CoV-2 genome; matrix (M), basic polymerase (PB2), and acidic protein (PA) segments of the influenza A genome; matrix (M) and non-structural protein (NS) segments of the influenza B genome, and the nucleocapsid genes of RSV A and RSV B.     Positive results are indicative of the presence of Flu A, Flu B, RSV, and/or SARS-CoV-2 RNA. Positive results for SARS-CoV-2 or suspected novel influenza should be reported to state, local, or federal health departments according to local reporting requirements.      All results should be assessed in conjunction with clinical presentation and other laboratory markers for clinical management.     FOR PEDIATRIC PATIENTS -  copy/paste COVID Guidelines URL to browser: [email or web address]       Comprehensive metabolic panel [[id number]]  (Abnormal) Collected: 12/25/24 1733    Lab Status: Final result Specimen: Blood from Arm, Left Updated: 12/25/24 1816     Sodium 134 mmol/L      Potassium 2.6 mmol/L      Chloride 96 mmol/L      CO2 28 mmol/L      ANION GAP 10 mmol/L      BUN 10 mg/dL      Creatinine 0.65 mg/dL      Glucose 111 mg/dL      Calcium 9.5 mg/dL      AST 22 U/L      ALT 36 U/L      Alkaline Phosphatase 82 U/L      Total Protein 7.0 g/dL      Albumin 4.4 g/dL      Total Bilirubin 0.48 mg/dL      eGFR 102 ml/min/1.73sq m     Narrative:      National Kid[employer] Disease Foundation guidelines for Chronic Kidney Disease (CKD):     Stage 1 with normal or high GFR (GFR > 90 mL/min/1.73 square meters)    Stage 2 Mild CKD (GFR = 60-89 mL/min/1.73 square meters)    Stage 3A Moderate CKD (GFR = 45-59 mL/min/1.73 square meters)    Stage 3B Moderate CKD (GFR = 30-44 mL/min/1.73 square meters)    Stage 4 Severe CKD (GFR = 15-29 mL/min/1.73 square meters)    Stage 5 End Stage CKD (GFR <15 mL/min/1.73 square meters)  Note: GFR calculation is accurate only with a steady state creatinine    Procalcitonin [[id number]]  (Normal) Collected: 12/25/24 1733    Lab Status: Final result Specimen: Blood from Arm, Left Updated: 12/25/24 1816     Procalcitonin 0.09 ng/ml     HS Troponin 0hr (reflex protocol) [[id number]]  (Normal) Collected: 12/25/24 1733    Lab Status: Final result Specimen: Blood from Arm, Left Updated: 12/25/24 1811     hs TnI 0hr 6 ng/L     Lactic acid [[id number]]  (Normal) Collected: 12/25/24 1733    Lab Status: Final result Specimen: Blood from Arm, Left Updated: 12/25/24 1806     LACTIC ACID 1.4 mmol/L     Narrative:      Result may be elevated if tourniquet was used during collection.    Urine Microscopic [[id number]]  (Abnormal) Collected: 12/25/24 8101    Lab Status: Final result  Specimen: Urine, Clean Catch Updated: 12/25/24 1802     RBC, UA None Seen /hpf      WBC, UA 4-10 /hpf      Epithelial Cells Occasional /hpf      Bacteria, UA None Seen /hpf     Protime-INR [[id number]]  (Abnormal) Collected: 12/25/24 1733    Lab Status: Final result Specimen: Blood from Arm, Left Updated: 12/25/24 1800     Protime 15.4 seconds      INR 1.19    Narrative:      INR Therapeutic Range    Indication                                             INR Range      Atrial Fibrillation                                               2.0-3.0  Hypercoagulable State                                    2.0.2.3  Left Ventricular Asist Device                            2.0-3.0  Mechanical Heart Valve                                  -    Aortic(with afib, MI, embolism, HF, LA enlargement,    and/or coagulopathy)                                     2.0-3.0 (2.5-3.5)     Mitral                                                             2.5-3.5  Prosthetic/Bioprosthetic Heart Valve               2.0-3.0  Venous thromboembolism (VTE: VT, PE        2.0-3.0    APTT [[id number]]  (Normal) Collected: 12/25/24 1733    Lab Status: Final result Specimen: Blood from Arm, Left Updated: 12/25/24 1800     PTT 27 seconds     UA w Reflex to Microscopic w Reflex to Culture [[id number]]  (Abnormal) Collected: 12/25/24 1749    Lab Status: Final result Specimen: Urine, Clean Catch Updated: 12/25/24 1800     Color, UA Colorless     Clarity, UA Clear     Specific Gravity, UA 1.007     pH, UA 5.5     Leukocytes, UA Moderate     Nitrite, UA Negative     Protein, UA Negative mg/dl      Glucose, UA Negative mg/dl      Ketones, UA Negative mg/dl      Urobilinogen, UA <2.0 mg/dl      Bilirubin, UA Negative     Occult Blood, UA Small    CBC and differential [[id number]]  (Abnormal) Collected: 12/25/24 1733    Lab Status: Final result Specimen: Blood from Arm, Left Updated: 12/25/24 1744     WBC 2.65 Thousand/uL      RBC 3.15 Million/uL      Hemoglobin  10.1 g/dL      Hematocrit 28.7 %      MCV 91 fL      MCH 32.1 pg      MCHC 35.2 g/dL      RDW 13.2 %      MPV 9.9 fL      Platelets 163 Thousands/uL      nRBC 0 /100 WBCs      Segmented % 70 %      Immature Grans % 1 %      Lymphocytes % 21 %      Monocytes % 5 %      Eosinophils Relative 3 %      Basophils Relative 0 %      Absolute Neutrophils 1.87 Thousands/µL      Absolute Immature Grans 0.02 Thousand/uL      Absolute Lymphocytes 0.56 Thousands/µL      Absolute Monocytes 0.12 Thousand/µL      Eosinophils Absolute 0.07 Thousand/µL      Basophils Absolute 0.01 Thousands/µL             XR chest pa & lateral    (Results Pending)       Procedures    ED Medication and Procedure Management   Prior to Admission Medications   Prescriptions Last Dose Informant Patient Reported? Taking?   Cholecalciferol (VITAMIN D3 PO)  Self Yes No   Sig: Take by mouth daily   Diclofenac Sodium (VOLTAREN) 1 %  Self No No   Sig: Apply 2 g topically 4 (four) times a day   acetaminophen (TYLENOL) 500 mg tablet  Self No No   Sig: Take 1 tablet (500 mg total) by mouth every 6 (six) hours as needed for mild pain   atorvastatin (LIPITOR) 10 mg tablet   No No   Sig: Take 1 tablet (10 mg total) by mouth daily   cyclobenzaprine (FLEXERIL) 5 mg tablet  Self No No   Sig: Take 1 tablet (5 mg total) by mouth 3 (three) times a day as needed for muscle spasms   lidocaine-prilocaine (EMLA) cream   No No   Sig: Apply topically as needed for mild pain   lisinopril-hydrochlorothiazide (PRINZIDE,ZESTORETIC) 10-12.5 MG per tablet  Self No No   Sig: Take 1 tablet by mouth daily   naproxen (Naprosyn) 500 mg tablet  Self No No   Sig: Take 1 tablet (500 mg total) by mouth 2 (two) times a day with meals for 15 days   ondansetron (ZOFRAN) 4 mg tablet   No No   Sig: Take 1 tablet (4 mg total) by mouth every 8 (eight) hours as needed for nausea or vomiting      Facility-Administered Medications: None     Patient's Medications   Discharge Prescriptions    No  medications on file     No discharge procedures on file.  ED SEPSIS DOCUMENTATION   Time reflects when diagnosis was documented in both MDM as applicable and the Disposition within this note       Time User Action Codes Description Comment    12/25/2024  8:02 PM June Greene [U07.1] COVID     12/25/2024  8:02 PM June Greene [R50.9] Fever     12/25/2024  8:02 PM Jnue Greene [A41.9] Sepsis (HCC)     12/25/2024  8:02 PM June Greene [D72.819] Leukopenia     12/25/2024  8:03 PM June Greene [E87.6] Hypokalemia            Initial Sepsis Screening       Row Name 12/25/24 1752 12/25/24 1714             Is the patient's history suggestive of a new or worsening infection? -- Yes (Proceed)  -VS       Suspected source of infection suspect infection, source unknown  -VS suspect infection, source unknown  -VS       Indicate SIRS criteria Leukocytosis (WBC > 80910 IJL) OR Leukopenia (WBC <4000 IJL) OR Bandemia (WBC >10% bands);Tachycardia > 90 bpm  -VS --       Are two or more of the above signs & symptoms of infection both present and new to the patient? Yes (Proceed)  -VS --       Assess for evidence of organ dysfunction: Are any of the below criteria present within 6 hours of suspected infection and SIRS criteria that are NOT considered to be chronic conditions? -- --                 User Key  (r) = Recorded By, (t) = Taken By, (c) = Cosigned By      Initials Name Provider Type    VS June Greene DO Resident                       June Greene DO  12/25/24 7602

## 2024-12-25 NOTE — ED ATTENDING ATTESTATION
12/25/2024  I, Karina Fonseca MD, saw and evaluated the patient. I have discussed the patient with the resident/non-physician practitioner and agree with the resident's/non-physician practitioner's findings, Plan of Care, and MDM as documented in the resident's/non-physician practitioner's note, except where noted. All available labs and Radiology studies were reviewed.  I was present for key portions of any procedure(s) performed by the resident/non-physician practitioner and I was immediately available to provide assistance.       At this point I agree with the current assessment done in the Emergency Department.  I have conducted an independent evaluation of this patient a history and physical is as follows:  This is a 54-year-old woman presenting with fever, cough, congestion, and headache.  Patient has history of breast cancer, on chemotherapy.  Patient had been feeling well and doing well with her chemo until earlier today.  Patient started with fever around 2:00.  About bifrontal headache which was slow in onset, chest discomfort which is a heaviness in her mid chest and nasal congestion and facial congestion.  No rashes.  No abdominal pain.  No vomiting, diarrhea, or nausea.  Patient does not have flank pain or urinary symptoms.  Her review of systems is otherwise negative in 12 systems reviewed.  On exam the patient is ill-appearing.  She is hypotensive, tachycardic, and slightly tachypneic.  On HEENT exam, she has mild pallor.  Her neck is supple and nontender.  Her mucous membranes are moist.  She has no stigmata of embolic phenomenon.  Her port is in her left chest, and does not have signs of fluctuance.  Her heart is regular tachycardic without murmurs.  Her lungs are essentially clear bilaterally with good air movement.  Her abdomen is soft and nontender with no masses, rebound, or guarding.  Her extremities are intact.  She does not of lateralizing edema.  She does not of any rashes.  She is  neurologically nonfocal with normal skin and back exam.MEDICAL DECISION MAKING    Number and Complexity of Problems  Differential diagnosis: Sepsis in the setting of chemotherapy, hypotensive here    Medical Decision Making Data  External documents reviewed:   My EKG interpretation:   My CT interpretation:   My X-ray interpretation:   My ultrasound interpretation:     XR chest pa & lateral   Final Result      No acute cardiopulmonary disease.            Workstation performed: [id number]             Labs Reviewed   COVID19, INFLUENZA A/B, RSV PCR, [facility] - Abnormal       Result Value Ref Range Status    SARS-CoV-2 Positive (*) Negative Final    Comment:      INFLUENZA A PCR Negative  Negative Final    Comment:      INFLUENZA B PCR Negative  Negative Final    Comment:      RSV PCR Negative  Negative Final    Comment:      Narrative:     This test has been performed using the CoV-2/Flu/RSV plus assay on the [employer] platform. This test has been validated by the  and verified by the performing laboratory.     This test is designed to amplify and detect the following: nucleocapsid (N), envelope (E), and RNA-dependent RNA polymerase (RdRP) genes of the SARS-CoV-2 genome; matrix (M), basic polymerase (PB2), and acidic protein (PA) segments of the influenza A genome; matrix (M) and non-structural protein (NS) segments of the influenza B genome, and the nucleocapsid genes of RSV A and RSV B.     Positive results are indicative of the presence of Flu A, Flu B, RSV, and/or SARS-CoV-2 RNA. Positive results for SARS-CoV-2 or suspected novel influenza should be reported to state, local, or federal health departments according to local reporting requirements.      All results should be assessed in conjunction with clinical presentation and other laboratory markers for clinical management.     FOR PEDIATRIC PATIENTS - copy/paste COVID Guidelines URL to browser:  [email or web address]      CBC AND DIFFERENTIAL - Abnormal    WBC 2.65 (*) 4.31 - 10.16 Thousand/uL Final    RBC 3.15 (*) 3.81 - 5.12 Million/uL Final    Hemoglobin 10.1 (*) 11.5 - 15.4 g/dL Final    Hematocrit 28.7 (*) 34.8 - 46.1 % Final    MCV 91  82 - 98 fL Final    MCH 32.1  26.8 - 34.3 pg Final    MCHC 35.2  31.4 - 37.4 g/dL Final    RDW 13.2  11.6 - 15.1 % Final    MPV 9.9  8.9 - 12.7 fL Final    Platelets 163  149 - 390 Thousands/uL Final    nRBC 0  /100 WBCs Final    Segmented % 70  43 - 75 % Final    Immature Grans % 1  0 - 2 % Final    Lymphocytes % 21  14 - 44 % Final    Monocytes % 5  4 - 12 % Final    Eosinophils Relative 3  0 - 6 % Final    Basophils Relative 0  0 - 1 % Final    Absolute Neutrophils 1.87  1.85 - 7.62 Thousands/µL Final    Absolute Immature Grans 0.02  0.00 - 0.20 Thousand/uL Final    Absolute Lymphocytes 0.56 (*) 0.60 - 4.47 Thousands/µL Final    Absolute Monocytes 0.12 (*) 0.17 - 1.22 Thousand/µL Final    Eosinophils Absolute 0.07  0.00 - 0.61 Thousand/µL Final    Basophils Absolute 0.01  0.00 - 0.10 Thousands/µL Final   COMPREHENSIVE METABOLIC PANEL - Abnormal    Sodium 134 (*) 135 - 147 mmol/L Final    Potassium 2.6 (*) 3.5 - 5.3 mmol/L Final    Chloride 96  96 - 108 mmol/L Final    CO2 28  21 - 32 mmol/L Final    ANION GAP 10  4 - 13 mmol/L Final    BUN 10  5 - 25 mg/dL Final    Creatinine 0.65  0.60 - 1.30 mg/dL Final    Comment: Standardized to IDMS reference method    Glucose 111  65 - 140 mg/dL Final    Comment: If the patient is fasting, the ADA then defines impaired fasting glucose as > 100 mg/dL and diabetes as > or equal to 123 mg/dL.    Calcium 9.5  8.4 - 10.2 mg/dL Final    AST 22  13 - 39 U/L Final    ALT 36  7 - 52 U/L Final    Comment: Specimen collection should occur prior to Sulfasalazine administration due to the potential for falsely depressed results.     Alkaline Phosphatase 82  34 - 104 U/L Final    Total  Protein 7.0  6.4 - 8.4 g/dL Final    Albumin 4.4  3.5 - 5.0 g/dL Final    Total Bilirubin 0.48  0.20 - 1.00 mg/dL Final    Comment: Use of this assay is not recommended for patients undergoing treatment with eltrombopag due to the potential for falsely elevated results.  N-acetyl-p-benzoquinone imine (metabolite of Acetaminophen) will generate erroneously low results in samples for patients that have taken an overdose of Acetaminophen.    eGFR 102  ml/min/1.73sq m Final    Narrative:     National Kid[employer] guidelines for Chronic Kidney Disease (CKD):     Stage 1 with normal or high GFR (GFR > 90 mL/min/1.73 square meters)    Stage 2 Mild CKD (GFR = 60-89 mL/min/1.73 square meters)    Stage 3A Moderate CKD (GFR = 45-59 mL/min/1.73 square meters)    Stage 3B Moderate CKD (GFR = 30-44 mL/min/1.73 square meters)    Stage 4 Severe CKD (GFR = 15-29 mL/min/1.73 square meters)    Stage 5 End Stage CKD (GFR <15 mL/min/1.73 square meters)  Note: GFR calculation is accurate only with a steady state creatinine   PROTIME-INR - Abnormal    Protime 15.4 (*) 12.3 - 15.0 seconds Final    INR 1.19  0.85 - 1.19 Final    Narrative:     INR Therapeutic Range    Indication                                             INR Range      Atrial Fibrillation                                               2.0-3.0  Hypercoagulable State                                    2.0.2.3  Left Ventricular Asist Device                            2.0-3.0  Mechanical Heart Valve                                  -    Aortic(with afib, MI, embolism, HF, LA enlargement,    and/or coagulopathy)                                     2.0-3.0 (2.5-3.5)     Mitral                                                             2.5-3.5  Prosthetic/Bioprosthetic Heart Valve               2.0-3.0  Venous thromboembolism (VTE: VT, PE        2.0-3.0   UA W REFLEX TO MICROSCOPIC WITH REFLEX TO CULTURE - Abnormal    Color, UA Colorless   Final    Clarity, UA Clear    Final    Specific Gravity, UA 1.007  1.003 - 1.030 Final    pH, UA 5.5  4.5, 5.0, 5.5, 6.0, 6.5, 7.0, 7.5, 8.0 Final    Leukocytes, UA Moderate (*) Negative Final    Nitrite, UA Negative  Negative Final    Protein, UA Negative  Negative mg/dl Final    Glucose, UA Negative  Negative mg/dl Final    Ketones, UA Negative  Negative mg/dl Final    Urobilinogen, UA <2.0  <2.0 mg/dl mg/dl Final    Bilirubin, UA Negative  Negative Final    Occult Blood, UA Small (*) Negative Final   URINE MICROSCOPIC - Abnormal    RBC, UA None Seen  None Seen, 1-2 /hpf Final    WBC, UA 4-10 (*) None Seen, 1-2 /hpf Final    Epithelial Cells Occasional  None Seen, Occasional /hpf Final    Bacteria, UA None Seen  None Seen, Occasional /hpf Final   LACTIC ACID, PLASMA (W/REFLEX IF RESULT > 2.0) - Normal    LACTIC ACID 1.4  0.5 - 2.0 mmol/L Final    Narrative:     Result may be elevated if tourniquet was used during collection.   PROCALCITONIN TEST - Normal    Procalcitonin 0.09  <=0.25 ng/ml Final    Comment: Suspected Lower Respiratory Tract Infection (LRTI):  - LESS than or EQUAL to 0.25 ng/mL:   low likelihood for bacterial LRTI; antibiotics DISCOURAGED.  - GREATER than 0.25 ng/mL:   increased likelihood for bacterial LRTI; antibiotics ENCOURAGED.    Suspected Sepsis:  - Strongly consider initiating antibiotics in ALL UNSTABLE patients.  - LESS than or EQUAL to 0.5 ng/mL:   low likelihood for bacterial sepsis; antibiotics DISCOURAGED.  - GREATER than 0.5 ng/mL:   increased likelihood for bacterial sepsis; antibiotics ENCOURAGED.  - GREATER than 2 ng/mL:   high risk for severe sepsis / septic shock; antibiotics strongly ENCOURAGED.    Decisions on antibiotic use should not be based solely on Procalcitonin (PCT) levels. If PCT is low but uncertainty exists with stopping antibiotics, repeat PCT in 6-24 hours to confirm the low level. If antibiotics are administered (regardless if initial PCT was high or low), repeat PCT every 1-2 days to  "consider early antibiotic cessation (when GREATER than 80% decrease from the peak OR when PCT drops below designated cutoffs, whichever comes first), so long as the infection is NOT one that typically requires prolonged treatment durations (e.g., bone/joint infections, endocarditis, Staph. aureus bacteremia).    Situations of FALSE-POSITIVE Procalcitonin values:  1) Newborns < 72 hours old  2) Massive stress from severe trauma / burns, major surgery, acute pancreatitis, cardiogenic / hemorrhagic shock, sickle cell crisis, or other organ perfusion abnormalities  3) Malaria and some Candidal infections  4) Treatment with agents that stimulate cytokines (e.g., OKT3, anti-lymphocyte globulins, alemtuzumab, IL-2, granulocyte transfusion [NOT GCSFs])  5) Chronic renal disease causes elevated baseline levels (consider GREATER than 0.75 ng/mL as an abnormal cut-off); initiating HD/CRRT may cause transient decreases  6) Paraneoplastic syndromes from medullary thyroid or SCLC, some forms of vasculitis, and acute iccvl-nk-vces disease    Situations of FALSE-NEGATIVE Procalcitonin values:  1) Too early in clinical course for PCT to have reached its peak (may repeat in 6-24 hours to confirm low level)  2) Localized infection WITHOUT systemic (SIRS / sepsis) response (e.g., an abscess, osteomyelitis, cystitis)  3) Mycobacteria (e.g., Tuberculosis, MAC)  4) Cystic fibrosis exacerbations     APTT - Normal    PTT 27  23 - 34 seconds Final    Comment: Therapeutic Heparin Range =  60-90 seconds   HS TROPONIN I 0HR - Normal    hs TnI 0hr 6  \"Refer to ACS Flowchart\"- see link ng/L Final    Comment:                                              Initial (time 0) result  If >=50 ng/L, Myocardial injury suggested ;  Type of myocardial injury and treatment strategy  to be determined.  If 5-49 ng/L, a delta result at 2 hours and or 4 hours will be needed to further evaluate.  If <4 ng/L, and chest pain has been >3 hours since onset, patient " may qualify for discharge based on the HEART score in the ED.  If <5 ng/L and <3hours since onset of chest pain, a delta result at 2 hours will be needed to further evaluate.    HS Troponin 99th Percentile URL of a Health Population=12 ng/L with a 95% Confidence Interval of 8-18 ng/L.    Second Troponin (time 2 hours)  If calculated delta >= 20 ng/L,  Myocardial injury suggested ; Type of myocardial injury and treatment strategy to be determined.  If 5-49 ng/L and the calculated delta is 5-19 ng/L, consult medical service for evaluation.  Continue evaluation for ischemia on ecg and other possible etiology and repeat hs troponin at 4 hours.  If delta is <5 ng/L at 2 hours, consider discharge based on risk stratification via the HEART score (if in ED), or SCARLETT risk score in IP/Observation.    HS Troponin 99th Percentile URL of a Health Population=12 ng/L with a 95% Confidence Interval of 8-18 ng/L.   BLOOD CULTURE    Blood Culture Received in Microbiology Lab. Culture in Progress.   Preliminary   BLOOD CULTURE    Blood Culture Received in Microbiology Lab. Culture in Progress.   Preliminary   HS TROPONIN I 4HR       Labs reviewed by me are significant for:     Clinical decision rules/scores are significant for:     Discussed case with:   Considered admission for:     Treatment and Disposition  ED course: Patient seen and examined.  Will do sepsis evaluation, anticipate admission to the hospital  Shared decision making:   Code status:      ED Course         Critical Care Time  CriticalCare Time    Date/Time: 12/25/2024 5:58 PM    Performed by: Karina Fonseca MD  Authorized by: Karina Fonseca MD    Critical care provider statement:     Critical care time (minutes):  33    Critical care time was exclusive of:  Separately billable procedures and treating other patients and teaching time    Critical care was necessary to treat or prevent imminent or life-threatening deterioration of the following conditions:   Sepsis    Critical care was time spent personally by me on the following activities:  Blood draw for specimens, obtaining history from patient or surrogate, development of treatment plan with patient or surrogate, discussions with consultants, discussions with primary provider, evaluation of patient's response to treatment, examination of patient, review of old charts, re-evaluation of patient's condition, ordering and review of radiographic studies, ordering and review of laboratory studies and ordering and performing treatments and interventions

## 2024-12-26 ENCOUNTER — TELEPHONE (OUTPATIENT)
Dept: INFUSION CENTER | Facility: CLINIC | Age: 54
End: 2024-12-26

## 2024-12-26 ENCOUNTER — HOSPITAL ENCOUNTER (OUTPATIENT)
Dept: INFUSION CENTER | Facility: CLINIC | Age: 54
Discharge: HOME | End: 2024-12-26

## 2024-12-26 LAB
4HR DELTA HS TROPONIN: -2 NG/L
ANION GAP SERPL CALCULATED.3IONS-SCNC: 7 MMOL/L (ref 4–13)
ATRIAL RATE: 101 BPM
ATRIAL RATE: 115 BPM
BASOPHILS # BLD AUTO: 0.01 THOUSANDS/ÂΜL (ref 0–0.1)
BASOPHILS NFR BLD AUTO: 0 % (ref 0–1)
BUN SERPL-MCNC: 8 MG/DL (ref 5–25)
CALCIUM SERPL-MCNC: 8.8 MG/DL (ref 8.4–10.2)
CARDIAC TROPONIN I PNL SERPL HS: 4 NG/L (ref ?–50)
CHLORIDE SERPL-SCNC: 102 MMOL/L (ref 96–108)
CO2 SERPL-SCNC: 28 MMOL/L (ref 21–32)
CREAT SERPL-MCNC: 0.6 MG/DL (ref 0.6–1.3)
EOSINOPHIL # BLD AUTO: 0.03 THOUSAND/ÂΜL (ref 0–0.61)
EOSINOPHIL NFR BLD AUTO: 1 % (ref 0–6)
ERYTHROCYTE [DISTWIDTH] IN BLOOD BY AUTOMATED COUNT: 13.5 % (ref 11.6–15.1)
GFR SERPL CREATININE-BSD FRML MDRD: 105 ML/MIN/1.73SQ M
GLUCOSE SERPL-MCNC: 99 MG/DL (ref 65–140)
HCT VFR BLD AUTO: 26.7 % (ref 34.8–46.1)
HGB BLD-MCNC: 9 G/DL (ref 11.5–15.4)
IMM GRANULOCYTES # BLD AUTO: 0.01 THOUSAND/UL (ref 0–0.2)
IMM GRANULOCYTES NFR BLD AUTO: 0 % (ref 0–2)
LYMPHOCYTES # BLD AUTO: 0.56 THOUSANDS/ÂΜL (ref 0.6–4.47)
LYMPHOCYTES NFR BLD AUTO: 22 % (ref 14–44)
MCH RBC QN AUTO: 32 PG (ref 26.8–34.3)
MCHC RBC AUTO-ENTMCNC: 33.7 G/DL (ref 31.4–37.4)
MCV RBC AUTO: 95 FL (ref 82–98)
MONOCYTES # BLD AUTO: 0.14 THOUSAND/ÂΜL (ref 0.17–1.22)
MONOCYTES NFR BLD AUTO: 5 % (ref 4–12)
NEUTROPHILS # BLD AUTO: 1.83 THOUSANDS/ÂΜL (ref 1.85–7.62)
NEUTS SEG NFR BLD AUTO: 72 % (ref 43–75)
NRBC BLD AUTO-RTO: 0 /100 WBCS
P AXIS: 53 DEGREES
P AXIS: 65 DEGREES
PLATELET # BLD AUTO: 147 THOUSANDS/UL (ref 149–390)
PMV BLD AUTO: 9.9 FL (ref 8.9–12.7)
POTASSIUM SERPL-SCNC: 4 MMOL/L (ref 3.5–5.3)
PR INTERVAL: 166 MS
PR INTERVAL: 172 MS
QRS AXIS: 10 DEGREES
QRS AXIS: 28 DEGREES
QRSD INTERVAL: 80 MS
QRSD INTERVAL: 84 MS
QT INTERVAL: 326 MS
QT INTERVAL: 366 MS
QTC INTERVAL: 451 MS
QTC INTERVAL: 474 MS
RBC # BLD AUTO: 2.81 MILLION/UL (ref 3.81–5.12)
SODIUM SERPL-SCNC: 137 MMOL/L (ref 135–147)
T WAVE AXIS: 67 DEGREES
T WAVE AXIS: 76 DEGREES
VENTRICULAR RATE: 101 BPM
VENTRICULAR RATE: 115 BPM
WBC # BLD AUTO: 2.58 THOUSAND/UL (ref 4.31–10.16)

## 2024-12-26 PROCEDURE — 99232 SBSQ HOSP IP/OBS MODERATE 35: CPT

## 2024-12-26 PROCEDURE — 80048 BASIC METABOLIC PNL TOTAL CA: CPT

## 2024-12-26 PROCEDURE — 93010 ELECTROCARDIOGRAM REPORT: CPT | Performed by: INTERNAL MEDICINE

## 2024-12-26 PROCEDURE — 85025 COMPLETE CBC W/AUTO DIFF WBC: CPT

## 2024-12-26 PROCEDURE — 84484 ASSAY OF TROPONIN QUANT: CPT

## 2024-12-26 PROCEDURE — 99223 1ST HOSP IP/OBS HIGH 75: CPT | Performed by: INTERNAL MEDICINE

## 2024-12-26 RX ADMIN — ACETAMINOPHEN 650 MG: 325 TABLET, FILM COATED ORAL at 17:12

## 2024-12-26 RX ADMIN — NIRMATRELVIR AND RITONAVIR 3 TABLET: KIT at 00:12

## 2024-12-26 RX ADMIN — SODIUM CHLORIDE, PRESERVATIVE FREE 3 ML: 5 INJECTION INTRAVENOUS at 13:57

## 2024-12-26 RX ADMIN — ENOXAPARIN SODIUM 40 MG: 40 INJECTION SUBCUTANEOUS at 08:45

## 2024-12-26 RX ADMIN — SODIUM CHLORIDE, PRESERVATIVE FREE 3 ML: 5 INJECTION INTRAVENOUS at 00:13

## 2024-12-26 RX ADMIN — CEFEPIME 2000 MG: 2 INJECTION, POWDER, FOR SOLUTION INTRAVENOUS at 11:06

## 2024-12-26 RX ADMIN — SODIUM CHLORIDE, PRESERVATIVE FREE 3 ML: 5 INJECTION INTRAVENOUS at 22:50

## 2024-12-26 RX ADMIN — NIRMATRELVIR AND RITONAVIR 3 TABLET: KIT at 08:46

## 2024-12-26 RX ADMIN — SODIUM CHLORIDE, PRESERVATIVE FREE 3 ML: 5 INJECTION INTRAVENOUS at 05:32

## 2024-12-26 RX ADMIN — NIRMATRELVIR AND RITONAVIR 3 TABLET: KIT at 17:12

## 2024-12-26 RX ADMIN — ACETAMINOPHEN 650 MG: 325 TABLET, FILM COATED ORAL at 07:52

## 2024-12-26 RX ADMIN — ATORVASTATIN CALCIUM 10 MG: 10 TABLET, FILM COATED ORAL at 08:45

## 2024-12-26 RX ADMIN — POTASSIUM CHLORIDE 20 MEQ: 14.9 INJECTION, SOLUTION INTRAVENOUS at 00:50

## 2024-12-26 RX ADMIN — CEFEPIME 2000 MG: 2 INJECTION, POWDER, FOR SOLUTION INTRAVENOUS at 03:09

## 2024-12-26 NOTE — ASSESSMENT & PLAN NOTE
Patient neutropenic in setting of active chemotherapy and found to be COVID-19 positive Patient in room air but patient is high risk. Per [facility] covid guidelines patient is Paxlovid candidate.     Plan:  5 day course paxlovid ordered per [facility] guidelines  Contact precautions  Currently on room air  Oxygen PRN to maintain sats >90%  ID consult

## 2024-12-26 NOTE — ASSESSMENT & PLAN NOTE
Patient has history of breast cancer on weekly chemotherapy with pembrolizumab +paclitaxel/carboplatin + doxorubacin cyclophosphamide, last dose Thursday 12/19/24. Patient found to be leukopenic on admission with wbc count 2.65 however ANC 1.85. patient reported fever to 103 at home. She did take tylenol prior to presentation in the ED    Plan:  Trend CBCw/ diff  Careful monitoring of absolute neutrophil count  Monitor for fevers  Lovenox for dvt Ppx as patient is high risk DVT  Neutropenic precautions

## 2024-12-26 NOTE — PLAN OF CARE
Problem: PAIN - ADULT  Goal: Verbalizes/displays adequate comfort level or baseline comfort level  Description: Interventions:  - Encourage patient to monitor pain and request assistance  - Assess pain using appropriate pain scale  - Administer analgesics based on type and severity of pain and evaluate response  - Implement non-pharmacological measures as appropriate and evaluate response  - Consider cultural and social influences on pain and pain management  - Notify physician/advanced practitioner if interventions unsuccessful or patient reports new pain  Outcome: Progressing     Problem: INFECTION - ADULT  Goal: Absence or prevention of progression during hospitalization  Description: INTERVENTIONS:  - Assess and monitor for signs and symptoms of infection  - Monitor lab/diagnostic results  - Monitor all insertion sites, i.e. indwelling lines, tubes, and drains  - Monitor endotracheal if appropriate and nasal secretions for changes in amount and color  - [place] appropriate cooling/warming therapies per order  - Administer medications as ordered  - Instruct and encourage patient and family to use good hand hygiene technique  - Identify and instruct in appropriate isolation precautions for identified infection/condition  Outcome: Progressing  Goal: Absence of fever/infection during neutropenic period  Description: INTERVENTIONS:  - Monitor WBC    Outcome: Progressing     Problem: SAFETY ADULT  Goal: Patient will remain free of falls  Description: INTERVENTIONS:  - Educate patient/family on patient safety including physical limitations  - Instruct patient to call for assistance with activity   - Consult OT/PT to assist with strengthening/mobility   - Keep Call bell within reach  - Keep bed low and locked with side rails adjusted as appropriate  - Keep care items and personal belongings within reach  - Initiate and maintain comfort rounds  - Make Fall Risk Sign visible to staff  - Offer Toileting every 2 Hours,  in advance of need  - Initiate/Maintain  - Obtain necessary fall risk management equipment:   - Apply yellow socks and bracelet for high fall risk patients  - Consider moving patient to room near nurses station  Outcome: Progressing  Goal: Maintain or return to baseline ADL function  Description: INTERVENTIONS:  -  Assess patient's ability to carry out ADLs; assess patient's baseline for ADL function and identify physical deficits which impact ability to perform ADLs (bathing, care of mouth/teeth, toileting, grooming, dressing, etc.)  - Assess/evaluate cause of self-care deficits   - Assess range of motion  - Assess patient's mobility; develop plan if impaired  - Assess patient's need for assistive devices and provide as appropriate  - Encourage maximum independence but intervene and supervise when necessary  - Involve family in performance of ADLs  - Assess for home care needs following discharge   - Consider OT consult to assist with ADL evaluation and planning for discharge  - Provide patient education as appropriate  Outcome: Progressing  Goal: Maintains/Returns to pre admission functional level  Description: INTERVENTIONS:  - Perform AM-PAC 6 Click Basic Mobility/ Daily Activity assessment daily.  - Set and communicate daily mobility goal to care team and patient/family/caregiver.   - Collaborate with rehabilitation services on mobility goals if consulted  - Perform Range of Motion 3 times a day.  - Reposition patient every 2 hours.  - Dangle patient 3 times a day  - Stand patient 3 times a day  - Ambulate patient 3 times a day  - Out of bed to chair 3 times a day   - Out of bed for meals 3 times a day  - Out of bed for toileting  - Record patient progress and toleration of activity level   Outcome: Progressing

## 2024-12-26 NOTE — SEPSIS NOTE
Sepsis Note   Romina Cleaning 54 y.o. female MRN: [id number]  Unit/Bed#: -01 Encounter: [id number]       Initial Sepsis Screening       Row Name 12/25/24 1752 12/25/24 1718             Is the patient's history suggestive of a new or worsening infection? -- Yes (Proceed)  -VS       Suspected source of infection suspect infection, source unknown  -VS suspect infection, source unknown  -VS       Indicate SIRS criteria Leukocytosis (WBC > 29112 IJL) OR Leukopenia (WBC <4000 IJL) OR Bandemia (WBC >10% bands);Tachycardia > 90 bpm  -VS --       Are two or more of the above signs & symptoms of infection both present and new to the patient? Yes (Proceed)  -VS --       Assess for evidence of organ dysfunction: Are any of the below criteria present within 6 hours of suspected infection and SIRS criteria that are NOT considered to be chronic conditions? -- --                 User Key  (r) = Recorded By, (t) = Taken By, (c) = Cosigned By      Initials Name Provider Type    VS June Greene DO Resident                        Body mass index is 21.78 kg/m².  Wt Readings from Last 1 Encounters:   12/25/24 61.2 kg (134 lb 14.7 oz)     IBW (Ideal Body Weight): 59.3 kg    Ideal body weight: 59.3 kg (130 lb 11.7 oz)  Adjusted ideal body weight: 60.1 kg (132 lb 6.5 oz)

## 2024-12-26 NOTE — H&P
H&P - Family Medicine Residency, Rober  Romina Cleaning 1970, 54 y.o. female.  MRN: [id number]  Unit/Bed#: -01 Encounter: [id number]  Primary Care Provider: Alessandro Milton DO      Admission Date: 12/25/2024 5261    Assessments & Plans:   Plans discussed with [facility] team and finalization is pending attending physician attestation.    * Sepsis without acute organ dysfunction (HCC)  Assessment & Plan  Patient is 54-year-old female currently undergoing weekly chemotherapy with pembrolizumab +paclitaxel/carboplatin + doxorubacin cyclophosphamide for breast cancer here for 1 day of fever, cough, shortness of breath.  On presentation to the ED patient tachycardic to 121, white blood cell count low at 2.65 meeting sepsis criteria. Lactic acid negative. In ED patient received bolus of 2 L fluids, blood cultures collected and patient started on cefepime and vancomycin.  Patient also found to be COVID-19 positive. Patient in room air but patient is high risk. Per [facility] covid guidelines patient is Paxlovid candidate.     Plan:  Paxlovid 5 day course ordered per [facility] guidelines   Monitor oxygen saturation   Oxygen PRN to maintain saturation > 90%  Recheck AM labs  Consider heme/onc consult  S/p 2 L fluid resuscitation in ED  Regular diet    COVID-19  Assessment & Plan  Patient neutropenic in setting of active chemotherapy and found to be COVID-19 positive Patient in room air but patient is high risk. Per [facility] covid guidelines patient is Paxlovid candidate.     Plan:  5 day course paxlovid ordered per [facility] guidelines  Contact precautions  Currently on room air  Oxygen PRN to maintain sats >90%    Hypokalemia  Assessment & Plan  Patient hypokalemic to 2.6 on admission in the setting of acute COVID-19 infection meeting sepsis criteria.  Patient given 20 meq IV potassium in ED x2. EKG in ED showing sinus tachycardia.     Plan:  Plan to give 40 meq oral Kdur  20 meq IV potassium x2 ordered again  Telemetry monitoring  for potassium less than 3 as patient is high risk for arrhythmia  Recheck potassium in am  If patient unable to move to room with telemetry will check BMP after K repletion    Malignant neoplasm of upper-outer quadrant of right breast in female, estrogen receptor positive (HCC)  Assessment & Plan  Patient has history of breast cancer on weekly chemotherapy with pembrolizumab +paclitaxel/carboplatin + doxorubacin cyclophosphamide, last dose Thursday 12/19/24. Patient found to be leukopenic on admission with wbc count 2.65 however ANC 1.85. patient reported fever to 103 at home. She did take tylenol prior to presentation in the ED    Plan:  Trend CBCw/ diff  Careful monitoring of absolute neutrophil count  Monitor for fevers  Consider heme/onc consult  Lovenox for dvt Ppx as patient is high risk DVT  Neutropenic precautions    Lumbar back pain  Assessment & Plan  Patient has history of lumbar back pain uses home medications of baclofen TID PRN. Will continue inpatient    Hypertension  Assessment & Plan  Patient has history of hypertension currently on lisinopril hctz 10-12.5 mg at home. Will hold inpatient in setting of hypotension.    Plan:  Holding home medications for now  Resume when hemodynamics improve        Patient Active Problem List   Diagnosis    Neck pain    Trapezius muscle spasm    Abnormal CT scan, pelvis    Anxiety    Dysuria    Microscopic hematuria    Smoker    Episode of recurrent major depressive disorder (HCC)    Hypertension    Mitral regurgitation    Foster care (status)    Lumbar back pain    Mixed hyperlipidemia    Encounter for tobacco use cessation counseling    Carcinoma of right breast metastatic to axillary lymph node (HCC)    Malignant neoplasm of upper-outer quadrant of right breast in female, estrogen receptor positive (HCC)    Hypokalemia    COVID-19    Sepsis without acute organ dysfunction (HCC)       Diet: Diet Regular; Regular House  VTE Pharm PPX: Enoxaparin (Lovenox)  VTE [facility]  PPX: sequential compression device    Code Status:  Level 1 - Full Code    Advance Directive and Living Will:      Power of :    POLST:    Emergency contact:  Primary Emergency Contact: Anuj Gregory   Extended Emergency Contact Information  Primary Emergency Contact: Anuj Gregory  Address: [address] [place] [address] [facility]  Mobile Phone: [phone]  Relation: Son  Secondary Emergency Contact: Renetta Lubin   [facility]  Home Phone: [phone]  Relation: Mother    Disposition: Admit to Inpatient under Med-surg for COVID-19 infection with sepsis and hypokalemia.    Consult: None    Chief Complaints:   Fever (Per pt started to have a fever a couple hours ago with chest pain. Pt took tylenol 1500. Pt is being treated for breast cancer.)      History of Presenting Illness:   Patient is a 54-year-old female currently undergoing weekly chemotherapy with pembrolizumab +paclitaxel/carboplatin + doxorubacin cyclophosphamide for breast cancer here for 1 day of fever, cough, shortness of breath.  Patient states her symptoms started earlier today, her daughter sick at home with a similar illness.  Patient states her fever was up to 103 at home and she took tylenol and then presented to the ED.  She has not eaten much today and states she is not feeling well.  Patient denies any history of asthma COPD and states she was a previous smoker but discontinued cigarettes when she was diagnosed with cancer.  Patient denies any history of heart problems, she had a recent echo 11/13/2024 showing EF 65%.  Patient denies any hematuria, dysuria or change in urinary frequency at this time.      ED course: Patient afebrile but tachycardic to 121.  CBC collected shows white blood cell count low at 2.65, hemoglobin 10.1.  Troponins negative, lactic acid negative.  Potassium low at 2.6, patient given 20 IV potassium x2  in ED.  UA showing moderate leukocytes and small  blood, chest x-ray completed which appears clear per my read.  Patient received 2 L IV fluids and blood cultures collected.  Patient started on cefepime and vancomycin.  Patient found to be COVID-19 positive.  ED Management:     ED Triage Vitals   Temperature Pulse Respirations Blood Pressure SpO2   12/25/24 1652 12/25/24 1653 12/25/24 1653 12/25/24 1653 12/25/24 1653   99.5 °F (37.5 °C) (!) 121 20 120/59 99 %      Temp src Heart Rate Source Patient Position - Orthostatic VS BP Location FiO2 (%)   -- 12/25/24 1653 12/25/24 1653 12/25/24 1653 --    Monitor Sitting Left arm       Pain Score       12/25/24 1653       8         Medications   sodium chloride (PF) 0.9 % injection 3 mL (3 mL Intravenous Given 12/25/24 1955)   potassium chloride 20 mEq IVPB (premix) (20 mEq Intravenous Not Given 12/25/24 2100)   enoxaparin (LOVENOX) subcutaneous injection 40 mg (has no administration in time range)   acetaminophen (TYLENOL) tablet 650 mg (has no administration in time range)   potassium chloride (Klor-Con M20) CR tablet 40 mEq (has no administration in time range)   potassium chloride 20 mEq IVPB (premix) (has no administration in time range)     Followed by   potassium chloride 20 mEq IVPB (premix) (has no administration in time range)   nirmatrelvir 300 mg (150 mg x 2) and ritonavir 100 mg x 1 (Paxlovid) therapy pack (has no administration in time range)   ceFEPime (MAXIPIME) 2,000 mg in dextrose 5 % 50 mL IVPB (has no administration in time range)   multi-electrolyte (PLASMALYTE-A/ISOLYTE-S PH 7.4) IV solution 1,000 mL (0 mL Intravenous Stopped 12/25/24 1922)     Followed by   multi-electrolyte (PLASMALYTE-A/ISOLYTE-S PH 7.4) IV solution 1,000 mL (0 mL Intravenous Stopped 12/25/24 2114)   cefepime (MAXIPIME) 2 g/50 mL dextrose IVPB (0 mg Intravenous Stopped 12/25/24 1900)   vancomycin (VANCOCIN) IVPB (premix in dextrose) 1,000 mg 200 mL (1,000 mg Intravenous New Bag 12/25/24 1914)       Review of System:   Review of  Systems   Constitutional:  Positive for chills and fever.   HENT:  Positive for rhinorrhea. Negative for postnasal drip.    Respiratory:  Positive for cough and chest tightness. Negative for shortness of breath.    Gastrointestinal:  Negative for constipation, diarrhea and nausea.   Genitourinary:  Negative for dysuria, flank pain, frequency and hematuria.   Musculoskeletal:  Positive for back pain.   Skin:  Negative for rash.   Neurological:  Negative for dizziness and headaches.       History:     Past Medical History:   Diagnosis Date    GERD (gastroesophageal reflux disease)     Headache     Hematuria     Hypertension     IUD (intrauterine device) in place 02/15/2019    Mirena placed 2015 effective through 2020      Lateral epicondylitis, right elbow 2019    Panic attacks     Psychiatric disorder      Past Surgical History:   Procedure Laterality Date    BREAST BIOPSY Right 2024    BREAST BIOPSY Right 2024    BREAST BIOPSY Right 2024    CYSTOSCOPY  2018    IR PORT PLACEMENT  2024    NO PAST SURGERIES      US GUIDED BREAST BIOPSY RIGHT COMPLETE Right 2024    US GUIDED BREAST BIOPSY RIGHT COMPLETE Right 2024    US GUIDED BREAST LYMPH NODE BIOPSY RIGHT Right 2024     Social History     Socioeconomic History    Marital status: Single     Spouse name: None    Number of children: None    Years of education: None    Highest education level: 12th grade   Occupational History    Occupation: HOUSEWIFE OR HOMEMAKER   Tobacco Use    Smoking status: Former     Current packs/day: 0.00     Average packs/day: 0.3 packs/day for 35.0 years (8.8 ttl pk-yrs)     Types: Cigarettes     Start date: 1988     Quit date: 2024     Years since quittin.2    Smokeless tobacco: Never    Tobacco comments:     ONE HALF PACK A DAY OR LESS, CURRENT SOME DAY SMOKER, LIGHT TOBACCO SMOKER  AS PER [employer]   Vaping Use    Vaping status: Some Days   Substance and Sexual Activity     "Alcohol use: No    Drug use: No    Sexual activity: Not Currently     Partners: Male     Birth control/protection: I.U.D.   Other Topics Concern    None   Social History Narrative    ENGAGES IN A HOBBY - \"[employer]\"    LIVES WITH FAMILY    UNEMPLOYED     Social Drivers of Health     Financial Resource Strain: Low Risk  (6/11/2024)    Overall Financial Resource Strain (CARDIA)     Difficulty of Paying Living Expenses: Not hard at all   Food Insecurity: No Food Insecurity (6/11/2024)    Nursing - Inadequate Food Risk Classification     Worried About Running Out of Food in the Last Year: Never true     Ran Out of Food in the Last Year: Never true     Ran Out of Food in the Last Year: Not on file   Transportation Needs: No Transportation Needs (6/11/2024)    PRAPARE - Transportation     Lack of Transportation (Medical): No     Lack of Transportation (Non-Medical): No   Physical Activity: Insufficiently Active (6/11/2024)    Exercise Vital Sign     Days of Exercise per Week: 3 days     Minutes of Exercise per Session: 30 min   Stress: No Stress Concern Present (4/25/2023)    [employer] [place] [employer] - Occupational Stress Questionnaire     Feeling of Stress : Not at all   Social Connections: Unknown (6/11/2024)    Social Connection and Isolation Panel [NHANES]     Frequency of Communication with Friends and Family: More than three times a week     Frequency of Social Gatherings with Friends and Family: More than three times a week     Attends Zoroastrian Services: 1 to 4 times per year     Active Member of Clubs or Organizations: No     Attends Club or Organization Meetings: Never     Marital Status: Not on file   Intimate Partner Violence: Not At Risk (6/11/2024)    Humiliation, Afraid, Rape, and Kick questionnaire     Fear of Current or Ex-Partner: No     Emotionally Abused: No     Physically Abused: No     Sexually Abused: No   Housing Stability: Low Risk  (6/11/2024)    Housing Stability Vital Sign "     Unable to Pay for Housing in the Last Year: No     Number of Times Moved in the Last Year: 1     Homeless in the Last Year: No     Family History   Problem Relation Age of Onset    Hypertension Mother     Colonic polyp Mother     Colon cancer Mother     Arthritis Father     Diabetes Father     Hypertension Father     Hyperlipidemia Father     No Known Problems Sister     No Known Problems Sister     Other Brother         TBI from accident     No Known Problems Son     No Known Problems Daughter     No Known Problems Maternal Aunt     No Known Problems Maternal Aunt     No Known Problems Maternal Aunt     No Known Problems Maternal Aunt     No Known Problems Maternal Aunt     No Known Problems Paternal Aunt     No Known Problems Paternal Aunt     No Known Problems Paternal Aunt     Colonic polyp Maternal Grandmother     Colon cancer Maternal Grandmother     Throat cancer Maternal Grandfather     No Known Problems Paternal Grandmother     No Known Problems Paternal Grandfather        Medications & Allergies:   all medications and allergies reviewed  No Known Allergies    PTA Medications:  No current facility-administered medications on file prior to encounter.     Current Outpatient Medications on File Prior to Encounter   Medication Sig Dispense Refill    acetaminophen (TYLENOL) 500 mg tablet Take 1 tablet (500 mg total) by mouth every 6 (six) hours as needed for mild pain 30 tablet 0    atorvastatin (LIPITOR) 10 mg tablet Take 1 tablet (10 mg total) by mouth daily 90 tablet 1    Cholecalciferol (VITAMIN D3 PO) Take by mouth daily      cyclobenzaprine (FLEXERIL) 5 mg tablet Take 1 tablet (5 mg total) by mouth 3 (three) times a day as needed for muscle spasms 30 tablet 0    Diclofenac Sodium (VOLTAREN) 1 % Apply 2 g topically 4 (four) times a day 2 g 0    lidocaine-prilocaine (EMLA) cream Apply topically as needed for mild pain 1 g 0    lisinopril-hydrochlorothiazide (PRINZIDE,ZESTORETIC) 10-12.5 MG per tablet  "Take 1 tablet by mouth daily 90 tablet 1    naproxen (Naprosyn) 500 mg tablet Take 1 tablet (500 mg total) by mouth 2 (two) times a day with meals for 15 days 30 tablet 0    ondansetron (ZOFRAN) 4 mg tablet Take 1 tablet (4 mg total) by mouth every 8 (eight) hours as needed for nausea or vomiting 30 tablet 2         Objective & Vitals:     Vitals: /60   Pulse (!) 107   Temp 98.5 °F (36.9 °C)   Resp (!) 28   Ht 5' 6\" (1.676 m)   Wt 61.2 kg (134 lb 14.7 oz)   LMP  (LMP Unknown) Comment: IUD  SpO2 97%   BMI 21.78 kg/m²       Intake/Output Summary (Last 24 hours) at 12/25/2024 2206  Last data filed at 12/25/2024 1900  Gross per 24 hour   Intake 50 ml   Output --   Net 50 ml       Invasive Devices       Central Venous Catheter Line  Duration             Port A Cath 11/06/24 Left Chest 49 days              Peripheral Intravenous Line  Duration             Peripheral IV 12/25/24 Left;Ventral (anterior) Forearm <1 day    Peripheral IV 12/25/24 Proximal;Right;Ventral (anterior) Forearm <1 day                      Labs:     Recent Results (from the past 24 hours)   ECG 12 lead    Collection Time: 12/25/24  4:55 PM   Result Value Ref Range    Ventricular Rate 115 BPM    Atrial Rate 115 BPM    WA Interval 166 ms    QRSD Interval 80 ms    QT Interval 326 ms    QTC Interval 451 ms    P Axis 65 degrees    QRS Axis 10 degrees    T Wave Axis 76 degrees   CBC and differential    Collection Time: 12/25/24  5:33 PM   Result Value Ref Range    WBC 2.65 (L) 4.31 - 10.16 Thousand/uL    RBC 3.15 (L) 3.81 - 5.12 Million/uL    Hemoglobin 10.1 (L) 11.5 - 15.4 g/dL    Hematocrit 28.7 (L) 34.8 - 46.1 %    MCV 91 82 - 98 fL    MCH 32.1 26.8 - 34.3 pg    MCHC 35.2 31.4 - 37.4 g/dL    RDW 13.2 11.6 - 15.1 %    MPV 9.9 8.9 - 12.7 fL    Platelets 163 149 - 390 Thousands/uL    nRBC 0 /100 WBCs    Segmented % 70 43 - 75 %    Immature Grans % 1 0 - 2 %    Lymphocytes % 21 14 - 44 %    Monocytes % 5 4 - 12 %    Eosinophils Relative 3 0 - 6 " %    Basophils Relative 0 0 - 1 %    Absolute Neutrophils 1.87 1.85 - 7.62 Thousands/µL    Absolute Immature Grans 0.02 0.00 - 0.20 Thousand/uL    Absolute Lymphocytes 0.56 (L) 0.60 - 4.47 Thousands/µL    Absolute Monocytes 0.12 (L) 0.17 - 1.22 Thousand/µL    Eosinophils Absolute 0.07 0.00 - 0.61 Thousand/µL    Basophils Absolute 0.01 0.00 - 0.10 Thousands/µL   Comprehensive metabolic panel    Collection Time: 12/25/24  5:33 PM   Result Value Ref Range    Sodium 134 (L) 135 - 147 mmol/L    Potassium 2.6 (L) 3.5 - 5.3 mmol/L    Chloride 96 96 - 108 mmol/L    CO2 28 21 - 32 mmol/L    ANION GAP 10 4 - 13 mmol/L    BUN 10 5 - 25 mg/dL    Creatinine 0.65 0.60 - 1.30 mg/dL    Glucose 111 65 - 140 mg/dL    Calcium 9.5 8.4 - 10.2 mg/dL    AST 22 13 - 39 U/L    ALT 36 7 - 52 U/L    Alkaline Phosphatase 82 34 - 104 U/L    Total Protein 7.0 6.4 - 8.4 g/dL    Albumin 4.4 3.5 - 5.0 g/dL    Total Bilirubin 0.48 0.20 - 1.00 mg/dL    eGFR 102 ml/min/1.73sq m   Lactic acid    Collection Time: 12/25/24  5:33 PM   Result Value Ref Range    LACTIC ACID 1.4 0.5 - 2.0 mmol/L   Procalcitonin    Collection Time: 12/25/24  5:33 PM   Result Value Ref Range    Procalcitonin 0.09 <=0.25 ng/ml   Protime-INR    Collection Time: 12/25/24  5:33 PM   Result Value Ref Range    Protime 15.4 (H) 12.3 - 15.0 seconds    INR 1.19 0.85 - 1.19   APTT    Collection Time: 12/25/24  5:33 PM   Result Value Ref Range    PTT 27 23 - 34 seconds   Blood culture #1    Collection Time: 12/25/24  5:33 PM    Specimen: Arm, Left; Blood   Result Value Ref Range    Blood Culture Received in Microbiology Lab. Culture in Progress.    Blood culture #2    Collection Time: 12/25/24  5:33 PM    Specimen: Arm, Right; Blood   Result Value Ref Range    Blood Culture Received in Microbiology Lab. Culture in Progress.    FLU/RSV/COVID - if FLU/RSV clinically relevant    Collection Time: 12/25/24  5:33 PM    Specimen: Nose; Nares   Result Value Ref Range    SARS-CoV-2 Positive (A)  "Negative    INFLUENZA A PCR Negative Negative    INFLUENZA B PCR Negative Negative    RSV PCR Negative Negative   HS Troponin 0hr (reflex protocol)    Collection Time: 12/25/24  5:33 PM   Result Value Ref Range    hs TnI 0hr 6 \"Refer to ACS Flowchart\"- see link ng/L   UA w Reflex to Microscopic w Reflex to Culture    Collection Time: 12/25/24  5:49 PM    Specimen: Urine, Clean Catch   Result Value Ref Range    Color, UA Colorless     Clarity, UA Clear     Specific Gravity, UA 1.007 1.003 - 1.030    pH, UA 5.5 4.5, 5.0, 5.5, 6.0, 6.5, 7.0, 7.5, 8.0    Leukocytes, UA Moderate (A) Negative    Nitrite, UA Negative Negative    Protein, UA Negative Negative mg/dl    Glucose, UA Negative Negative mg/dl    Ketones, UA Negative Negative mg/dl    Urobilinogen, UA <2.0 <2.0 mg/dl mg/dl    Bilirubin, UA Negative Negative    Occult Blood, UA Small (A) Negative   Urine Microscopic    Collection Time: 12/25/24  5:49 PM   Result Value Ref Range    RBC, UA None Seen None Seen, 1-2 /hpf    WBC, UA 4-10 (A) None Seen, 1-2 /hpf    Epithelial Cells Occasional None Seen, Occasional /hpf    Bacteria, UA None Seen None Seen, Occasional /hpf   HS Troponin I 2hr    Collection Time: 12/25/24  7:55 PM   Result Value Ref Range    hs TnI 2hr 5 \"Refer to ACS Flowchart\"- see link ng/L    Delta 2hr hsTnI -1 <20 ng/L       Imaging:     CXR: No acute cardiopulmonary abnormality per my read.  No results found.    EKG, Pathology, and Other Studies:   EKG on admission showing sinus tachycardia with ventricular rate 115    Physical Exam:   Physical Exam  Constitutional:       Comments: Pale and diaphoretic appearing   HENT:      Head: Normocephalic and atraumatic.      Right Ear: External ear normal.      Left Ear: External ear normal.      Mouth/Throat:      Mouth: Mucous membranes are moist.   Eyes:      Extraocular Movements: Extraocular movements intact.      Pupils: Pupils are equal, round, and reactive to light.   Cardiovascular:      Rate and " Rhythm: Regular rhythm. Tachycardia present.      Pulses: Normal pulses.      Heart sounds: Normal heart sounds.      Comments: Tachycardic to low 100s  Pulmonary:      Effort: Pulmonary effort is normal.      Breath sounds: Normal breath sounds. No wheezing, rhonchi or rales.   Abdominal:      General: Abdomen is flat.      Palpations: Abdomen is soft.      Tenderness: There is no abdominal tenderness.   Musculoskeletal:         General: Normal range of motion.      Cervical back: Normal range of motion and neck supple.      Right lower leg: No edema.      Left lower leg: No edema.   Skin:     Comments: Port in place on left side of chest   Neurological:      General: No focal deficit present.      Mental Status: She is alert and oriented to person, place, and time.               Sasha Rogers MD  PGY-2, Family Medicine  12/25/24  10:06 PM

## 2024-12-26 NOTE — PLAN OF CARE
Problem: PAIN - ADULT  Goal: Verbalizes/displays adequate comfort level or baseline comfort level  Description: Interventions:  - Encourage patient to monitor pain and request assistance  - Assess pain using appropriate pain scale  - Administer analgesics based on type and severity of pain and evaluate response  - Implement non-pharmacological measures as appropriate and evaluate response  - Consider cultural and social influences on pain and pain management  - Notify physician/advanced practitioner if interventions unsuccessful or patient reports new pain  Outcome: Progressing     Problem: INFECTION - ADULT  Goal: Absence or prevention of progression during hospitalization  Description: INTERVENTIONS:  - Assess and monitor for signs and symptoms of infection  - Monitor lab/diagnostic results  - Monitor all insertion sites, i.e. indwelling lines, tubes, and drains  - Monitor endotracheal if appropriate and nasal secretions for changes in amount and color  - Bath appropriate cooling/warming therapies per order  - Administer medications as ordered  - Instruct and encourage patient and family to use good hand hygiene technique  - Identify and instruct in appropriate isolation precautions for identified infection/condition  Outcome: Progressing     Problem: SAFETY ADULT  Goal: Patient will remain free of falls  Description: INTERVENTIONS:  - Educate patient/family on patient safety including physical limitations  - Instruct patient to call for assistance with activity   - Consult OT/PT to assist with strengthening/mobility   - Keep Call bell within reach  - Keep bed low and locked with side rails adjusted as appropriate  - Keep care items and personal belongings within reach  - Initiate and maintain comfort rounds  - Make Fall Risk Sign visible to staff  - Offer Toileting every 2 Hours, in advance of need  - Apply yellow socks and bracelet for high fall risk patients  - Consider moving patient to room near nurses  station  Outcome: Progressing     Problem: CARDIOVASCULAR - ADULT  Goal: Maintains optimal cardiac output and hemodynamic stability  Description: INTERVENTIONS:  - Monitor I/O, vital signs and rhythm  - Monitor for S/S and trends of decreased cardiac output  - Administer and titrate ordered vasoactive medications to optimize hemodynamic stability  - Assess quality of pulses, skin color and temperature  - Assess for signs of decreased coronary artery perfusion  - Instruct patient to report change in severity of symptoms  Outcome: Progressing     Problem: RESPIRATORY - ADULT  Goal: Achieves optimal ventilation and oxygenation  Description: INTERVENTIONS:  - Assess for changes in respiratory status  - Assess for changes in mentation and behavior  - Position to facilitate oxygenation and minimize respiratory effort  - Oxygen administered by appropriate delivery if ordered  - Initiate smoking cessation education as indicated  - Encourage broncho-pulmonary hygiene including cough, deep breathe, Incentive Spirometry  - Assess the need for suctioning and aspirate as needed  - Assess and instruct to report SOB or any respiratory difficulty  - Respiratory Therapy support as indicated  Outcome: Progressing

## 2024-12-26 NOTE — UTILIZATION REVIEW
Initial Clinical Review    Admission: Date/Time/Statement:   Admission Orders (From admission, onward)       Ordered        12/25/24 2001  INPATIENT ADMISSION  Once                          Orders Placed This Encounter   Procedures    INPATIENT ADMISSION     Standing Status:   Standing     Number of Occurrences:   1     Level of Care:   Med Surg [16]     Estimated length of stay:   More than 2 Midnights     Certification:   I certify that inpatient services are medically necessary for this patient for a duration of greater than two midnights. See H&P and MD Progress Notes for additional information about the patient's course of treatment.     ED Arrival Information       Expected   -    Arrival   12/25/2024 16:50    Acuity   Emergent              Means of arrival   Walk-In    Escorted by   Family Member    Service   Family Medicine    Admission type   Emergency              Arrival complaint   Fever             Chief Complaint   Patient presents with    Fever     Per pt started to have a fever a couple hours ago with chest pain. Pt took tylenol 1500. Pt is being treated for breast cancer.       Initial Presentation: 54 y.o. female to ED as a walk-in with 1 day ofd fever, cough and sob. Pt is currently undergoing weekly chemotherapy with pembrolizumab +paclitaxel/carboplatin + doxorubacin cyclophosphamide for breast cancer. On presentation tachycardic, WBC 2.65. Lactic acid neg. K 2.6 Covid-19 positive.  Given 2L IVFs, blood cxs collected and started on cefepime and vancomycin, 20 meq IV potassium in ED. PMHx: HTN and lumbar back pain.   Plan: Admit Inpatient to MS unit with Sepsis, Covid-19.  Paxlovid 5 day course ordered. Monitor O2 sats to keep >90%, currently on RA. Reg diet.  K-dur 40 mEq po, and 20 mEq IV x2. Telemetry. Recheck labs in AM. Lovenox and SCDs for DVT ppx. Hold home BP meds currently.     Date: 12/26   Day 2:  Pt c/o headache, which improved on tylenol. Otherwise denies any complaints. Pt is  concerned regarding her chemotherapy as she is due for one as per her schedule. WBC 2.58<<2.65.  Reached out to heme/onc over epic chat -  as pt due for chemotherapy today; recommended to reschedule outpt, no inpt chemotherapy in setting of active infection. Continue with Cefepime; f/u blood cx, will d/c if blood cx negative.  ID consult ; whether fever due to COVID v/s febrile neutropenia. Continue po meds, supportive care    ID consult -- Source of sepsis is most likely COVID. No further need for antibiotic. Will d/c cefepime. F/u blood cxs. Continue Paxlovid. Monitor respiratory symptoms and O2 sat. Hold this week's chemo dose for now. If pt improves well in the next few days, she should be able to resume chemotherapy next week. Monitor WBC/ANC.        ED Treatment-Medication Administration from 12/25/2024 1649 to 12/25/2024 2030         Date/Time Order Dose Route Action     12/25/2024 1955 sodium chloride (PF) 0.9 % injection 3 mL 3 mL Intravenous Given     12/25/2024 1737 multi-electrolyte (PLASMALYTE-A/ISOLYTE-S PH 7.4) IV solution 1,000 mL 1,000 mL Intravenous New Bag     12/25/2024 1923 multi-electrolyte (PLASMALYTE-A/ISOLYTE-S PH 7.4) IV solution 1,000 mL 1,000 mL Intravenous New Bag     12/25/2024 1820 cefepime (MAXIPIME) 2 g/50 mL dextrose IVPB 2,000 mg Intravenous New Bag     12/25/2024 1914 vancomycin (VANCOCIN) IVPB (premix in dextrose) 1,000 mg 200 mL 1,000 mg Intravenous New Bag         Scheduled Medications:  atorvastatin, 10 mg, Oral, Daily  cefepime, 2,000 mg, Intravenous, Q8H  enoxaparin, 40 mg, Subcutaneous, Daily  nirmatrelvir & ritonavir, 3 tablet, Oral, BID  sodium chloride (PF), 3 mL, Intravenous, Q8H AMARJIT      potassium chloride (Klor-Con M20) CR tablet 40 mEq  Dose: 40 mEq  Freq: Once Route: PO  Start: 12/25/24 2115 End: 12/25/24 2247       potassium chloride 20 mEq IVPB (premix)  Dose: 20 mEq  Freq: Once Route: IV  Last Dose: Stopped (12/26/24 0046)  Start: 12/25/24 2118 End: 12/26/24 0046       Followed by   potassium chloride 20 mEq IVPB (premix)  Dose: 20 mEq  Freq: Once Route: IV  Last Dose: Stopped (12/26/24 0250)  Start: 12/25/24 2130 End: 12/26/24 0250      potassium chloride 20 mEq IVPB (premix)  Dose: 20 mEq  Freq: Every 2 hours Route: IV  Start: 12/25/24 1930 End: 12/25/24 2329        Continuous IV Infusions: none     PRN Meds:  acetaminophen, 650 mg, Oral, Q6H PRN 12/25 x1, 12/26 x1  cyclobenzaprine, 5 mg, Oral, TID PRN      ED Triage Vitals   Temperature Pulse Respirations Blood Pressure SpO2 Pain Score   12/25/24 1652 12/25/24 1653 12/25/24 1653 12/25/24 1653 12/25/24 1653 12/25/24 1653   99.5 °F (37.5 °C) (!) 121 20 120/59 99 % 8     Weight (last 2 days)       Date/Time Weight    12/25/24 20:39:14 61.2 (134.92)    12/25/24 1653 61.2 (135)            Vital Signs (last 3 days)       Date/Time Temp Pulse Resp BP MAP (mmHg) SpO2 O2 Device Patient Position - Orthostatic VS Pain    12/26/24 0752 -- -- -- -- -- -- -- -- 3    12/26/24 07:06:36 102.4 °F (39.1 °C) 100 18 106/68 81 99 % -- -- --    12/26/24 0500 -- 93 -- -- -- -- -- -- --    12/26/24 0315 -- 97 -- 106/66 79 99 % -- -- --    12/25/24 2300 100.7 °F (38.2 °C) -- -- -- -- 98 % -- -- --    12/25/24 2246 -- 102 20 102/60 -- 97 % -- -- No Pain    12/25/24 2128 -- -- -- -- -- -- None (Room air) -- --    12/25/24 2058 -- -- -- -- -- -- -- -- 4    12/25/24 20:39:14 98.5 °F (36.9 °C) 99 28 100/60 73 97 % -- -- --    12/25/24 1654 -- -- -- -- -- 98 % -- -- --    12/25/24 1653 -- 121 20 120/59 80 99 % None (Room air) Sitting 8    12/25/24 1652 99.5 °F (37.5 °C) -- -- -- -- -- -- -- --              Pertinent Labs/Diagnostic Test Results:   Radiology:  XR chest pa & lateral   Final Interpretation by Praneeth Alvarez MD (12/25 2103)      No acute cardiopulmonary disease.            Workstation performed: [id number]           Cardiology:  ECG 12 lead    by Interface, Ris Results In (12/26 0858)      ECG 12 lead    by Interface, Ris Results In (12/25  4125)        GI:  No orders to display       Results from last 7 days   Lab Units 12/25/24  1733   SARS-COV-2  Positive*     Results from last 7 days   Lab Units 12/26/24  0549 12/25/24  1733 12/24/24  0924   WBC Thousand/uL 2.58* 2.65* 3.65*   HEMOGLOBIN g/dL 9.0* 10.1* 11.3*   HEMATOCRIT % 26.7* 28.7* 32.9*   PLATELETS Thousands/uL 147* 163 202   TOTAL NEUT ABS Thousands/µL 1.83* 1.87 1.68*     Results from last 7 days   Lab Units 12/26/24  0549 12/25/24 1733 12/24/24  0924   SODIUM mmol/L 137 134* 139   POTASSIUM mmol/L 4.0 2.6* 3.6   CHLORIDE mmol/L 102 96 100   CO2 mmol/L 28 28 29   ANION GAP mmol/L 7 10 10   BUN mg/dL 8 10 11   CREATININE mg/dL 0.60 0.65 0.53*   EGFR ml/min/1.73sq m 105 102 109   CALCIUM mg/dL 8.8 9.5 9.7     Results from last 7 days   Lab Units 12/25/24 1733 12/24/24  0924   AST U/L 22 19   ALT U/L 36 39   ALK PHOS U/L 82 81   TOTAL PROTEIN g/dL 7.0 7.3   ALBUMIN g/dL 4.4 4.7   TOTAL BILIRUBIN mg/dL 0.48 0.79     Results from last 7 days   Lab Units 12/26/24  0549 12/25/24  1733   GLUCOSE RANDOM mg/dL 99 111       Results from last 7 days   Lab Units 12/26/24  0552 12/25/24 1955 12/25/24  1733   HS TNI 0HR ng/L  --   --  6   HS TNI 2HR ng/L  --  5  --    HSTNI D2 ng/L  --  -1  --    HS TNI 4HR ng/L 4  --   --    HSTNI D4 ng/L -2  --   --      Results from last 7 days   Lab Units 12/25/24  1733   PROTIME seconds 15.4*   INR  1.19   PTT seconds 27     Results from last 7 days   Lab Units 12/24/24  0924   TSH 3RD GENERATON uIU/mL 0.367*     Results from last 7 days   Lab Units 12/25/24  1733   PROCALCITONIN ng/ml 0.09     Results from last 7 days   Lab Units 12/25/24  1733   LACTIC ACID mmol/L 1.4       Results from last 7 days   Lab Units 12/25/24  1749   CLARITY UA  Clear   COLOR UA  Colorless   SPEC GRAV UA  1.007   PH UA  5.5   GLUCOSE UA mg/dl Negative   KETONES UA mg/dl Negative   BLOOD UA  Small*   PROTEIN UA mg/dl Negative   NITRITE UA  Negative   BILIRUBIN UA  Negative   UROBILINOGEN  UA (BE) mg/dl <2.0   LEUKOCYTES UA  Moderate*   WBC UA /hpf 4-10*   RBC UA /hpf None Seen   BACTERIA UA /hpf None Seen   EPITHELIAL CELLS WET PREP /hpf Occasional     Results from last 7 days   Lab Units 12/25/24  1733   INFLUENZA A PCR  Negative   INFLUENZA B PCR  Negative   RSV PCR  Negative       Results from last 7 days   Lab Units 12/25/24  1733   BLOOD CULTURE  Received in Microbiology Lab. Culture in Progress.  Received in Microbiology Lab. Culture in Progress.         Past Medical History:   Diagnosis Date    GERD (gastroesophageal reflux disease)     Headache     Hematuria     Hypertension     IUD (intrauterine device) in place 02/15/2019    Mirena placed 2/2015 effective through 2/2020      Lateral epicondylitis, right elbow 01/08/2019    Panic attacks     Psychiatric disorder      Present on Admission:   Hypertension   Lumbar back pain      Admitting Diagnosis: Hypokalemia [E87.6]  Leukopenia [D72.819]  Fever [R50.9]  Sepsis (HCC) [A41.9]  COVID [U07.1]  Age/Sex: 54 y.o. female    Network Utilization Review Department  ATTENTION: Please call with any questions or concerns to [phone] and carefully listen to the prompts so that you are directed to the right person. All voicemails are confidential.   For Discharge needs, contact Care Management DC Support Team at [phone] opt. 2  Send all requests for admission clinical reviews, approved or denied determinations and any other requests to dedicated fax number below belonging to the campus where the patient is receiving treatment. List of dedicated fax numbers for the Facilities:  FACILITY NAME UR FAX NUMBER   ADMISSION DENIALS (Administrative/Medical Necessity) [phone]   DISCHARGE SUPPORT TEAM (NETWORK) [phone]   PARENT CHILD HEALTH (Maternity/NICU/Pediatrics) [phone]   General acute hospital [phone]   [facility] [phone]   Critical access hospital [phone]   [facility]  [facility] [phone]   [facility] [phone]   [facility] [phone]   [facility] [phone]   [facility] [phone]   [facility] [phone]   [facility] [phone]   [facility] [phone]   [facility] [phone]

## 2024-12-26 NOTE — ASSESSMENT & PLAN NOTE
Patient hypokalemic to 2.6 on admission in the setting of acute COVID-19 infection meeting sepsis criteria.  Patient given 20 meq IV potassium in ED x2. EKG in ED showing sinus tachycardia.     K improved today to 4.0     Plan:  Telemetry monitoring for potassium less than 3 as patient is high risk for arrhythmia  Recheck potassium in am  If patient unable to move to room with telemetry will check BMP after K repletion  Counseled regarding improving food intake/ hydration

## 2024-12-26 NOTE — TELEPHONE ENCOUNTER
Patient scheduled for treatment today. She is admitted to the hospital. Please lt scheduling know how to proceed with future appointments.

## 2024-12-26 NOTE — ASSESSMENT & PLAN NOTE
Patient is getting chemotherapy weekly, last dose Thursday last week next dose should be today.  Patient is leukopenic but not neutropenic.  Given active infection, we will postpone chemotherapy for now.  Hold this week's chemotherapy dose for now.  If patient improves well in the next few days, she should be able to resume chemotherapy next week.  Monitor WBC/ANC.    Outpatient records reviewed in detail.  Discussed with patient and her daughter in detail regarding the above plan.

## 2024-12-26 NOTE — PROGRESS NOTES
Progress Note - Family Medicine   Name: Romina Cleaning 54 y.o. female I MRN: [id number]  Unit/Bed#: [facility] 402-01 I Date of Admission: 12/25/2024   Date of Service: 12/26/2024 I Hospital Day: 1     Assessment & Plan  Sepsis without acute organ dysfunction (HCC)  Patient is 54-year-old female currently undergoing weekly chemotherapy with pembrolizumab +paclitaxel/carboplatin + doxorubacin cyclophosphamide for breast cancer here for 1 day of fever, cough, shortness of breath.  On presentation to the ED patient tachycardic to 121, white blood cell count low at 2.65 meeting sepsis criteria. Lactic acid negative. In ED patient received bolus of 2 L fluids, blood cultures collected and patient started on cefepime and vancomycin.  Patient also found to be COVID-19 positive. Patient in room air but patient is high risk. Per [facility] covid guidelines patient is Paxlovid candidate.       12/26 - WBC 2.58<<2.65    Plan:  Paxlovid 1/5 day course   Monitor oxygen saturation   Oxygen PRN to maintain saturation > 90%  Recheck AM labs   Reached out to heme/onc over epic chat -  as pt due for chemotherapy today; recommended to reschedule outpt, no inpt chemotherapy in setting of active infection.   F/U blood culture   Continue with Cefepime; will D/C if blood culture negative.   ID consult ; whether fever due to COVID v/s febrile neutropenia.   Regular diet  Hypertension  Patient has history of hypertension currently on lisinopril hctz 10-12.5 mg at home. Will hold inpatient in setting of hypotension.    Plan:  Holding home medications for now  Resume when hemodynamics improve  Lumbar back pain  Patient has history of lumbar back pain uses home medications of baclofen TID PRN. Will continue inpatient  Malignant neoplasm of upper-outer quadrant of right breast in female, estrogen receptor positive (HCC)  Patient has history of breast cancer on weekly chemotherapy with pembrolizumab +paclitaxel/carboplatin + doxorubacin  cyclophosphamide, last dose Thursday 12/19/24. Patient found to be leukopenic on admission with wbc count 2.65 however ANC 1.85. patient reported fever to 103 at home. She did take tylenol prior to presentation in the ED    Plan:  Trend CBCw/ diff  Careful monitoring of absolute neutrophil count  Monitor for fevers  Lovenox for dvt Ppx as patient is high risk DVT  Neutropenic precautions  Hypokalemia  Patient hypokalemic to 2.6 on admission in the setting of acute COVID-19 infection meeting sepsis criteria.  Patient given 20 meq IV potassium in ED x2. EKG in ED showing sinus tachycardia.     K improved today to 4.0     Plan:  Telemetry monitoring for potassium less than 3 as patient is high risk for arrhythmia  Recheck potassium in am  If patient unable to move to room with telemetry will check BMP after K repletion  Counseled regarding improving food intake/ hydration  COVID-19  Patient neutropenic in setting of active chemotherapy and found to be COVID-19 positive Patient in room air but patient is high risk. Per [facility] covid guidelines patient is Paxlovid candidate.     Plan:  5 day course paxlovid ordered per [facility] guidelines  Contact precautions  Currently on room air  Oxygen PRN to maintain sats >90%  ID consult         Subjective   Patient seen and examined. No acute events overnight. Pt complaints of headache, which improved on tylenol. Otherwise denies any complaints. Pt is concerned regarding her chemotherapy as she is due for one as per her schedule. Pt was updated regarding it, to reschedule it outpt.     Objective :  Temp:  [98.4 °F (36.9 °C)-102.4 °F (39.1 °C)] 98.4 °F (36.9 °C)  HR:  [] 100  BP: (100-120)/(59-68) 106/68  Resp:  [18-28] 18  SpO2:  [97 %-99 %] 99 %  O2 Device: None (Room air)    I/O         12/24 0701 12/25 0700 12/25 0701 12/26 0700 12/26 0701 12/27 0700    P.O.  900     IV Piggyback  50     Total Intake(mL/kg)  950 (15.5)     Urine (mL/kg/hr)  0     Total Output  0     Net   +950            Unmeasured Urine Occurrence  2 x 1 x          Weights:   IBW (Ideal Body Weight): 59.3 kg    Body mass index is 21.78 kg/m².  Weight (last 2 days)       Date/Time Weight    12/25/24 20:39:14 61.2 (134.92)    12/25/24 1653 61.2 (135)            Physical Exam  Vitals and nursing note reviewed.   Constitutional:       General: She is not in acute distress.     Appearance: She is well-developed.   HENT:      Head: Normocephalic and atraumatic.   Eyes:      Conjunctiva/sclera: Conjunctivae normal.   Cardiovascular:      Rate and Rhythm: Normal rate and regular rhythm.      Pulses: Normal pulses.      Heart sounds: Normal heart sounds. No murmur heard.  Pulmonary:      Effort: Pulmonary effort is normal. No respiratory distress.      Breath sounds: Normal breath sounds.   Abdominal:      Palpations: Abdomen is soft.      Tenderness: There is no abdominal tenderness.   Musculoskeletal:         General: No swelling.      Cervical back: Neck supple.   Skin:     General: Skin is warm and dry.      Capillary Refill: Capillary refill takes less than 2 seconds.   Neurological:      General: No focal deficit present.      Mental Status: She is alert and oriented to person, place, and time.   Psychiatric:         Mood and Affect: Mood normal.         Lab Results: I have reviewed the following results:  Recent Labs     12/25/24  1733 12/25/24  1955 12/26/24  0549   WBC 2.65*  --  2.58*   HGB 10.1*  --  9.0*   HCT 28.7*  --  26.7*     --  147*   SODIUM 134*  --  137   K 2.6*  --  4.0   CL 96  --  102   CO2 28  --  28   BUN 10  --  8   CREATININE 0.65  --  0.60   GLUC 111  --  99   AST 22  --   --    ALT 36  --   --    ALB 4.4  --   --    TBILI 0.48  --   --    ALKPHOS 82  --   --    PTT 27  --   --    INR 1.19  --   --    HSTNI0 6  --   --    HSTNI2  --  5  --    LACTICACID 1.4  --   --              Currently Ordered Meds:   Current Facility-Administered Medications:     acetaminophen (TYLENOL) tablet 650 mg,  Q6H PRN    atorvastatin (LIPITOR) tablet 10 mg, Daily    ceFEPime (MAXIPIME) 2,000 mg in dextrose 5 % 50 mL IVPB, Q8H, Last Rate: 2,000 mg (12/26/24 0309)    cyclobenzaprine (FLEXERIL) tablet 5 mg, TID PRN    enoxaparin (LOVENOX) subcutaneous injection 40 mg, Daily    nirmatrelvir 300 mg (150 mg x 2) and ritonavir 100 mg x 1 (Paxlovid) therapy pack, BID    Insert peripheral IV, Once **AND** sodium chloride (PF) 0.9 % injection 3 mL, Q8H AMARJIT  VTE Pharmacologic Prophylaxis: Enoxaparin (Lovenox)  VTE Mechanical Prophylaxis: sequential compression device    Leighton Pineda MD  PGY-1, Family Medicine  [facility]      Administrative Statements     Portions of the record may have been created with voice recognition software.

## 2024-12-26 NOTE — UTILIZATION REVIEW
"NOTIFICATION OF INPATIENT ADMISSION   AUTHORIZATION REQUEST   SERVICING FACILITY:   Pending sale to [facility]  Address: [address] [place] [address]  Tax ID: [id number]  NPI: [id number] ATTENDING PROVIDER:  Attending Name and NPI#: Alessandro Milton Do [[id number]]  Address: [address] [place] [address]  Phone: [phone]   ADMISSION INFORMATION:  Place of Service: Inpatient [facility]  Place of Service Code: 21  Inpatient Admission Date/Time: 12/25/24  8:01 PM  Discharge Date/Time: No discharge date for patient encounter.  Admitting Diagnosis Code/Description:  Hypokalemia [E87.6]  Leukopenia [D72.819]  Fever [R50.9]  Sepsis (HCC) [A41.9]  COVID [U07.1]     UTILIZATION REVIEW CONTACT:  An Best"Millie\" Benigno Utilization   Network Utilization Review Department  Phone: [phone]  Fax: [phone]  Email: [email or web address]  Contact for approvals/pending authorizations, clinical reviews, and discharge.     PHYSICIAN ADVISORY SERVICES:  Medical Necessity Denial & Lasr-hm-Cpwq Review  Phone: [phone]  Fax: [phone]  Email: [email or web address]     DISCHARGE SUPPORT TEAM:  For Patients Discharge Needs & Updates  Phone: [phone] opt. 2 Fax: [phone]  Email: [email or web address]     "

## 2024-12-26 NOTE — CONSULTS
Consultation - Infectious Disease   Name: Romina Cleaning 54 y.o. female I MRN: [id number]  Unit/Bed#: [facility] 402-01 I Date of Admission: 12/25/2024   Date of Service: 12/26/2024 I Hospital Day: 1   Inpatient consult to Infectious Diseases  Consult performed by: Hans Milton MD  Consult ordered by: Leighton Pineda        Physician Requesting Evaluation: Alessandro Milton DO   Reason for Evaluation / Principal Problem: Fever.  COVID.    Assessment & Plan  Sepsis without acute organ dysfunction (HCC)  Patient presented with fever and tachycardia/tachypnea.  Source of sepsis is most likely COVID.  There is no obvious active bacterial infection.  Patient is leukopenic, secondary to her chemotherapy, but she is not neutropenic.  Procalcitonin is normal.  Despite sepsis, she is systemically well, without toxicity and hemodynamically stable, without hypotension.  Admission blood cultures have no growth thus far.  Antiviral plan as below.  No further need for antibiotic.  Will discontinue cefepime.  Monitor temperature/WBC.  Follow-up on admission blood cultures.  COVID-19  Patient has mild COVID, with dyspnea but no hypoxia not requiring O2 supplement.  CXR she is quite benign.  Given high risk for progression, agree with treating patient with Paxlovid.  No evidence of secondary bacterial pneumonia.  Procalcitonin is normal.  With COVID infection now, patient no longer needs COVID vaccination.  She still should get influenza vaccination.  I discussed with the patient and her daughter.  Not only there is no contraindication to getting influenza vaccination because of her chemotherapy, she actually needs influenza vaccination more than anyone else.  Continue Paxlovid.  No further need for cefepime, as above.  Monitor respiratory symptoms and O2 saturation.  Recommend influenza vaccination.  Malignant neoplasm of upper-outer quadrant of right breast in female, estrogen receptor positive (HCC)  Patient is getting  chemotherapy weekly, last dose Thursday last week next dose should be today.  Patient is leukopenic but not neutropenic.  Given active infection, we will postpone chemotherapy for now.  Hold this week's chemotherapy dose for now.  If patient improves well in the next few days, she should be able to resume chemotherapy next week.  Monitor WBC/ANC.    Outpatient records reviewed in detail.  Discussed with patient and her daughter in detail regarding the above plan.  I have discussed with Dr. Pineda from primary service regarding the above plan to continue Paxlovid for COVID but discontinue antibiotic.  Team agrees with the plan.    Antibiotics:  Paxlovid/cefepime No. 2    History of Present Illness   Romina Cleaning is a 54 y.o. year old female, with breast cancer, currently on chemotherapy, who presented to ER on 12/25 with acute fever/chills with dyspnea and nonproductive cough.  On presentation, patient was febrile.  She was leukopenic.  COVID PCR was positive.  CXR without infiltrate.  Patient was admitted.  She was started on Paxlovid.  She was also started on cefepime.  We are asked to the patient.    Today, patient states that she feels a little better.  She is still dyspneic but improved.  Cough is stable and remains nonproductive.  No further chills.    Patient states that she had COVID vaccination a few years ago but none this year.  She also did not receive influenza vaccination.  She states that she was told (not sure about home) that because she is on chemotherapy, she should not get vaccinations.    With regards to patient's breast cancer, she is getting weekly chemotherapy on Thursday.  Today is supposed to be her chemotherapy neck, which has been held.    A complete review of systems is negative other than that noted in the HPI.    I have reviewed the patient's PMH, PSH, Social History, Family History, Meds, and Allergies    Objective :  Temp:  [98.4 °F (36.9 °C)-102.4 °F (39.1 °C)] 98.4 °F (36.9  °C)  HR:  [] 94  BP: (100-120)/(59-71) 107/71  Resp:  [18-28] 18  SpO2:  [97 %-99 %] 98 %  O2 Device: None (Room air)    General:  No acute distress  Psychiatric:  Awake and alert  Pulmonary:  Normal respiratory excursion without accessory muscle use  Abdomen:  Soft, nontender  Extremities:  No edema  Skin:  No rashes      Lab Results: I have reviewed the following results:  Results from last 7 days   Lab Units 12/26/24  0549 12/25/24  1733 12/24/24  0924   WBC Thousand/uL 2.58* 2.65* 3.65*   HEMOGLOBIN g/dL 9.0* 10.1* 11.3*   PLATELETS Thousands/uL 147* 163 202     Results from last 7 days   Lab Units 12/26/24  0549 12/25/24 1733 12/24/24  0924   SODIUM mmol/L 137 134* 139   POTASSIUM mmol/L 4.0 2.6* 3.6   CHLORIDE mmol/L 102 96 100   CO2 mmol/L 28 28 29   BUN mg/dL 8 10 11   CREATININE mg/dL 0.60 0.65 0.53*   EGFR ml/min/1.73sq m 105 102 109   CALCIUM mg/dL 8.8 9.5 9.7   AST U/L  --  22 19   ALT U/L  --  36 39   ALK PHOS U/L  --  82 81   ALBUMIN g/dL  --  4.4 4.7     Results from last 7 days   Lab Units 12/25/24  1733   BLOOD CULTURE  Received in Microbiology Lab. Culture in Progress.  Received in Microbiology Lab. Culture in Progress.     Results from last 7 days   Lab Units 12/25/24  1733   PROCALCITONIN ng/ml 0.09                   Imaging Results Review: I personally reviewed the following image studies in PACS and associated radiology reports: chest xray. My interpretation of the radiology images/reports is: CXR without consolidation..

## 2024-12-26 NOTE — ASSESSMENT & PLAN NOTE
Patient has history of lumbar back pain uses home medications of baclofen TID PRN. Will continue inpatient

## 2024-12-26 NOTE — ASSESSMENT & PLAN NOTE
Patient has history of hypertension currently on lisinopril hctz 10-12.5 mg at home. Will hold inpatient in setting of hypotension.    Plan:  Holding home medications for now  Resume when hemodynamics improve

## 2024-12-26 NOTE — ASSESSMENT & PLAN NOTE
Patient has mild COVID, with dyspnea but no hypoxia not requiring O2 supplement.  CXR she is quite benign.  Given high risk for progression, agree with treating patient with Paxlovid.  No evidence of secondary bacterial pneumonia.  Procalcitonin is normal.  With COVID infection now, patient no longer needs COVID vaccination.  She still should get influenza vaccination.  I discussed with the patient and her daughter.  Not only there is no contraindication to getting influenza vaccination because of her chemotherapy, she actually needs influenza vaccination more than anyone else.  Continue Paxlovid.  No further need for cefepime, as above.  Monitor respiratory symptoms and O2 saturation.  Recommend influenza vaccination.

## 2024-12-26 NOTE — ASSESSMENT & PLAN NOTE
Patient currently on chemotherapy with pembrolizumab +paclitaxel/carboplatin + doxorubacin cyclophosphamide weekly and presenting with fever of 103 at home. Patient reports she took tyelnol and then presented to ED. Patient found to be leukopenic

## 2024-12-26 NOTE — ASSESSMENT & PLAN NOTE
Patient presented with fever and tachycardia/tachypnea.  Source of sepsis is most likely COVID.  There is no obvious active bacterial infection.  Patient is leukopenic, secondary to her chemotherapy, but she is not neutropenic.  Procalcitonin is normal.  Despite sepsis, she is systemically well, without toxicity and hemodynamically stable, without hypotension.  Admission blood cultures have no growth thus far.  Antiviral plan as below.  No further need for antibiotic.  Will discontinue cefepime.  Monitor temperature/WBC.  Follow-up on admission blood cultures.

## 2024-12-27 VITALS
SYSTOLIC BLOOD PRESSURE: 102 MMHG | TEMPERATURE: 97.9 F | RESPIRATION RATE: 18 BRPM | WEIGHT: 134.92 LBS | OXYGEN SATURATION: 95 % | HEIGHT: 66 IN | BODY MASS INDEX: 21.68 KG/M2 | HEART RATE: 85 BPM | DIASTOLIC BLOOD PRESSURE: 70 MMHG

## 2024-12-27 LAB
ANION GAP SERPL CALCULATED.3IONS-SCNC: 7 MMOL/L (ref 4–13)
BASOPHILS # BLD AUTO: 0.02 THOUSANDS/ÂΜL (ref 0–0.1)
BASOPHILS NFR BLD AUTO: 1 % (ref 0–1)
BUN SERPL-MCNC: 8 MG/DL (ref 5–25)
CALCIUM SERPL-MCNC: 8.6 MG/DL (ref 8.4–10.2)
CHLORIDE SERPL-SCNC: 100 MMOL/L (ref 96–108)
CO2 SERPL-SCNC: 29 MMOL/L (ref 21–32)
CREAT SERPL-MCNC: 0.54 MG/DL (ref 0.6–1.3)
EOSINOPHIL # BLD AUTO: 0.13 THOUSAND/ÂΜL (ref 0–0.61)
EOSINOPHIL NFR BLD AUTO: 5 % (ref 0–6)
ERYTHROCYTE [DISTWIDTH] IN BLOOD BY AUTOMATED COUNT: 14 % (ref 11.6–15.1)
GFR SERPL CREATININE-BSD FRML MDRD: 108 ML/MIN/1.73SQ M
GLUCOSE SERPL-MCNC: 93 MG/DL (ref 65–140)
HCT VFR BLD AUTO: 25.1 % (ref 34.8–46.1)
HGB BLD-MCNC: 8.6 G/DL (ref 11.5–15.4)
IMM GRANULOCYTES # BLD AUTO: 0.01 THOUSAND/UL (ref 0–0.2)
IMM GRANULOCYTES NFR BLD AUTO: 0 % (ref 0–2)
LYMPHOCYTES # BLD AUTO: 0.84 THOUSANDS/ÂΜL (ref 0.6–4.47)
LYMPHOCYTES NFR BLD AUTO: 34 % (ref 14–44)
MCH RBC QN AUTO: 31.7 PG (ref 26.8–34.3)
MCHC RBC AUTO-ENTMCNC: 34.3 G/DL (ref 31.4–37.4)
MCV RBC AUTO: 93 FL (ref 82–98)
MONOCYTES # BLD AUTO: 0.17 THOUSAND/ÂΜL (ref 0.17–1.22)
MONOCYTES NFR BLD AUTO: 7 % (ref 4–12)
NEUTROPHILS # BLD AUTO: 1.29 THOUSANDS/ÂΜL (ref 1.85–7.62)
NEUTS SEG NFR BLD AUTO: 53 % (ref 43–75)
NRBC BLD AUTO-RTO: 0 /100 WBCS
PLATELET # BLD AUTO: 153 THOUSANDS/UL (ref 149–390)
PMV BLD AUTO: 10.3 FL (ref 8.9–12.7)
POTASSIUM SERPL-SCNC: 3.5 MMOL/L (ref 3.5–5.3)
RBC # BLD AUTO: 2.71 MILLION/UL (ref 3.81–5.12)
SODIUM SERPL-SCNC: 136 MMOL/L (ref 135–147)
WBC # BLD AUTO: 2.46 THOUSAND/UL (ref 4.31–10.16)

## 2024-12-27 PROCEDURE — 80048 BASIC METABOLIC PNL TOTAL CA: CPT

## 2024-12-27 PROCEDURE — 99238 HOSP IP/OBS DSCHRG MGMT 30/<: CPT

## 2024-12-27 PROCEDURE — 99233 SBSQ HOSP IP/OBS HIGH 50: CPT | Performed by: INTERNAL MEDICINE

## 2024-12-27 PROCEDURE — 85025 COMPLETE CBC W/AUTO DIFF WBC: CPT

## 2024-12-27 RX ORDER — SODIUM CHLORIDE, SODIUM GLUCONATE, SODIUM ACETATE, POTASSIUM CHLORIDE, MAGNESIUM CHLORIDE, SODIUM PHOSPHATE, DIBASIC, AND POTASSIUM PHOSPHATE .53; .5; .37; .037; .03; .012; .00082 G/100ML; G/100ML; G/100ML; G/100ML; G/100ML; G/100ML; G/100ML
75 INJECTION, SOLUTION INTRAVENOUS CONTINUOUS
Status: DISCONTINUED | OUTPATIENT
Start: 2024-12-27 | End: 2024-12-27 | Stop reason: HOSPADM

## 2024-12-27 RX ADMIN — ENOXAPARIN SODIUM 40 MG: 40 INJECTION SUBCUTANEOUS at 08:31

## 2024-12-27 RX ADMIN — SODIUM CHLORIDE, SODIUM GLUCONATE, SODIUM ACETATE, POTASSIUM CHLORIDE, MAGNESIUM CHLORIDE, SODIUM PHOSPHATE, DIBASIC, AND POTASSIUM PHOSPHATE 75 ML/HR: .53; .5; .37; .037; .03; .012; .00082 INJECTION, SOLUTION INTRAVENOUS at 10:43

## 2024-12-27 RX ADMIN — NIRMATRELVIR AND RITONAVIR 3 TABLET: KIT at 08:31

## 2024-12-27 RX ADMIN — SODIUM CHLORIDE, PRESERVATIVE FREE 3 ML: 5 INJECTION INTRAVENOUS at 05:18

## 2024-12-27 RX ADMIN — ATORVASTATIN CALCIUM 10 MG: 10 TABLET, FILM COATED ORAL at 08:31

## 2024-12-27 NOTE — PROGRESS NOTES
Progress Note - Infectious Disease   Name: Romina Cleaning 54 y.o. female I MRN: [id number]  Unit/Bed#: [facility] 402-01 I Date of Admission: 12/25/2024   Date of Service: 12/27/2024 I Hospital Day: 2    Assessment & Plan  Sepsis without acute organ dysfunction (HCC)  Patient presented with fever and tachycardia/tachypnea.  Source of sepsis is most likely COVID.  There is no obvious active bacterial infection.  Patient is leukopenic, secondary to her chemotherapy, but she is not neutropenic.  Procalcitonin is normal.  Despite sepsis, she is systemically well, without toxicity and hemodynamically stable, without hypotension.  Admission blood cultures have no growth thus far.  Patient had been on IV cefepime but this was discontinued.  She remains clinically well off antibiotic.  Patient was hypotensive overnight but remained clinically well and comfortable.  SBP is normal again today  Antiviral plan as below.  Observe off further antibiotic.  Monitor temperature/WBC.  Follow-up on admission blood cultures.  COVID-19  Patient has mild COVID, with dyspnea but no hypoxia not requiring O2 supplement.  CXR she is quite benign.  Given high risk for progression, agree with treating patient with Paxlovid.  No evidence of secondary bacterial pneumonia.  Procalcitonin is normal.  Patient is clinically improved.  Respiratory symptoms also improved.  With COVID infection now, patient no longer needs COVID vaccination.  She still should get influenza vaccination.  I discussed with the patient and her daughter.  Not only there is no contraindication to getting influenza vaccination because of her chemotherapy, she actually needs influenza vaccination more than anyone else.  Continue Paxlovid x 5-day course.  No further need for cefepime, as above.  Monitor respiratory symptoms and O2 saturation.  Recommend influenza vaccination.  Malignant neoplasm of upper-outer quadrant of right breast in female, estrogen receptor positive  (HCC)  Patient is getting chemotherapy weekly, last dose Thursday last week next dose should be today.  Patient is leukopenic but not neutropenic.  Given active infection, we will postpone chemotherapy for now.  Hold this week's chemotherapy dose for now.  If patient improves well in the next few days, she should be able to resume chemotherapy next week.  Monitor WBC/ANC.    Discussed with patient and her daughter in detail regarding the above plan.      Antibiotics:  Paxlovid # 2  Off antibiotic    Subjective   Patient feels better.  Dyspnea much improved.  Cough persist, nonproductive.  Temperature is down.  No chills.  No diarrhea.    Objective :  Temp:  [97.9 °F (36.6 °C)-102.5 °F (39.2 °C)] 97.9 °F (36.6 °C)  HR:  [75-94] 85  BP: ()/(55-74) 102/70  Resp:  [18] 18  SpO2:  [93 %-98 %] 95 %  O2 Device: None (Room air)    General:  No acute distress  Psychiatric:  Awake and alert  Pulmonary:  Normal respiratory excursion without accessory muscle use  Abdomen:  Soft, nontender  Extremities:  No edema  Skin:  No rashes      Lab Results: I have reviewed the following results:  Results from last 7 days   Lab Units 12/27/24  0435 12/26/24  0549 12/25/24  1733   WBC Thousand/uL 2.46* 2.58* 2.65*   HEMOGLOBIN g/dL 8.6* 9.0* 10.1*   PLATELETS Thousands/uL 153 147* 163     Results from last 7 days   Lab Units 12/27/24  0435 12/26/24  0549 12/25/24  1733 12/24/24  0924   SODIUM mmol/L 136 137 134* 139   POTASSIUM mmol/L 3.5 4.0 2.6* 3.6   CHLORIDE mmol/L 100 102 96 100   CO2 mmol/L 29 28 28 29   BUN mg/dL 8 8 10 11   CREATININE mg/dL 0.54* 0.60 0.65 0.53*   EGFR ml/min/1.73sq m 108 105 102 109   CALCIUM mg/dL 8.6 8.8 9.5 9.7   AST U/L  --   --  22 19   ALT U/L  --   --  36 39   ALK PHOS U/L  --   --  82 81   ALBUMIN g/dL  --   --  4.4 4.7     Results from last 7 days   Lab Units 12/25/24  1737   BLOOD CULTURE  No Growth at 24 hrs.  No Growth at 24 hrs.     Results from last 7 days   Lab Units 12/25/24  1730    PROCALCITONIN ng/ml 0.09

## 2024-12-27 NOTE — DISCHARGE INSTR - AVS FIRST PAGE
Continue Paxlovid for 3 more days starting tonight; Twice a day  Follow up with PCP within 1 week.  Encourage fluid intake and vitamin C supplements    Return to ED:  - if fever doesn't subside with antipyretic  -If worsening or persistence of cough, fatigue.    Kindly reschedule chemotherapy treatment.  .

## 2024-12-27 NOTE — ASSESSMENT & PLAN NOTE
Patient is getting chemotherapy weekly, last dose Thursday last week next dose should be today.  Patient is leukopenic but not neutropenic.  Given active infection, we will postpone chemotherapy for now.  Hold this week's chemotherapy dose for now.  If patient improves well in the next few days, she should be able to resume chemotherapy next week.  Monitor WBC/ANC.    Discussed with patient and her daughter in detail regarding the above plan.

## 2024-12-27 NOTE — PLAN OF CARE
Problem: PAIN - ADULT  Goal: Verbalizes/displays adequate comfort level or baseline comfort level  Description: Interventions:  - Encourage patient to monitor pain and request assistance  - Assess pain using appropriate pain scale  - Administer analgesics based on type and severity of pain and evaluate response  - Implement non-pharmacological measures as appropriate and evaluate response  - Consider cultural and social influences on pain and pain management  - Notify physician/advanced practitioner if interventions unsuccessful or patient reports new pain  Outcome: Progressing     Problem: INFECTION - ADULT  Goal: Absence or prevention of progression during hospitalization  Description: INTERVENTIONS:  - Assess and monitor for signs and symptoms of infection  - Monitor lab/diagnostic results  - Monitor all insertion sites, i.e. indwelling lines, tubes, and drains  - Monitor endotracheal if appropriate and nasal secretions for changes in amount and color  - [place] appropriate cooling/warming therapies per order  - Administer medications as ordered  - Instruct and encourage patient and family to use good hand hygiene technique  - Identify and instruct in appropriate isolation precautions for identified infection/condition  Outcome: Progressing  Goal: Absence of fever/infection during neutropenic period  Description: INTERVENTIONS:  - Monitor WBC    Outcome: Progressing     Problem: SAFETY ADULT  Goal: Patient will remain free of falls  Description: INTERVENTIONS:  - Educate patient/family on patient safety including physical limitations  - Instruct patient to call for assistance with activity   - Consult OT/PT to assist with strengthening/mobility   - Keep Call bell within reach  - Keep bed low and locked with side rails adjusted as appropriate  - Keep care items and personal belongings within reach  - Initiate and maintain comfort rounds  - Make Fall Risk Sign visible to staff  - Offer Toileting every   Hours,  in advance of need  - Initiate/Maintain  alarm  - Obtain necessary fall risk management equipment:    - Apply yellow socks and bracelet for high fall risk patients  - Consider moving patient to room near nurses station  Outcome: Progressing  Goal: Maintain or return to baseline ADL function  Description: INTERVENTIONS:  -  Assess patient's ability to carry out ADLs; assess patient's baseline for ADL function and identify physical deficits which impact ability to perform ADLs (bathing, care of mouth/teeth, toileting, grooming, dressing, etc.)  - Assess/evaluate cause of self-care deficits   - Assess range of motion  - Assess patient's mobility; develop plan if impaired  - Assess patient's need for assistive devices and provide as appropriate  - Encourage maximum independence but intervene and supervise when necessary  - Involve family in performance of ADLs  - Assess for home care needs following discharge   - Consider OT consult to assist with ADL evaluation and planning for discharge  - Provide patient education as appropriate  Outcome: Progressing  Goal: Maintains/Returns to pre admission functional level  Description: INTERVENTIONS:  - Perform AM-PAC 6 Click Basic Mobility/ Daily Activity assessment daily.  - Set and communicate daily mobility goal to care team and patient/family/caregiver.   - Collaborate with rehabilitation services on mobility goals if consulted  - Perform Range of Motion   times a day.  - Reposition patient every   hours.  - Dangle patient   times a day  - Stand patient   times a day  - Ambulate patient   times a day  - Out of bed to chair   times a day   - Out of bed for meals   times a day  - Out of bed for toileting  - Record patient progress and toleration of activity level   Outcome: Progressing     Problem: Knowledge Deficit  Goal: Patient/family/caregiver demonstrates understanding of disease process, treatment plan, medications, and discharge instructions  Description: Complete  learning assessment and assess knowledge base.  Interventions:  - Provide teaching at level of understanding  - Provide teaching via preferred learning methods  Outcome: Progressing     Problem: CARDIOVASCULAR - ADULT  Goal: Maintains optimal cardiac output and hemodynamic stability  Description: INTERVENTIONS:  - Monitor I/O, vital signs and rhythm  - Monitor for S/S and trends of decreased cardiac output  - Administer and titrate ordered vasoactive medications to optimize hemodynamic stability  - Assess quality of pulses, skin color and temperature  - Assess for signs of decreased coronary artery perfusion  - Instruct patient to report change in severity of symptoms  Outcome: Progressing  Goal: Absence of cardiac dysrhythmias or at baseline rhythm  Description: INTERVENTIONS:  - Continuous cardiac monitoring, vital signs, obtain 12 lead EKG if ordered  - Administer antiarrhythmic and heart rate control medications as ordered  - Monitor electrolytes and administer replacement therapy as ordered  Outcome: Progressing     Problem: RESPIRATORY - ADULT  Goal: Achieves optimal ventilation and oxygenation  Description: INTERVENTIONS:  - Assess for changes in respiratory status  - Assess for changes in mentation and behavior  - Position to facilitate oxygenation and minimize respiratory effort  - Oxygen administered by appropriate delivery if ordered  - Initiate smoking cessation education as indicated  - Encourage broncho-pulmonary hygiene including cough, deep breathe, Incentive Spirometry  - Assess the need for suctioning and aspirate as needed  - Assess and instruct to report SOB or any respiratory difficulty  - Respiratory Therapy support as indicated  Outcome: Progressing

## 2024-12-27 NOTE — ASSESSMENT & PLAN NOTE
Patient has mild COVID, with dyspnea but no hypoxia not requiring O2 supplement.  CXR she is quite benign.  Given high risk for progression, agree with treating patient with Paxlovid.  No evidence of secondary bacterial pneumonia.  Procalcitonin is normal.  Patient is clinically improved.  Respiratory symptoms also improved.  With COVID infection now, patient no longer needs COVID vaccination.  She still should get influenza vaccination.  I discussed with the patient and her daughter.  Not only there is no contraindication to getting influenza vaccination because of her chemotherapy, she actually needs influenza vaccination more than anyone else.  Continue Paxlovid x 5-day course.  No further need for cefepime, as above.  Monitor respiratory symptoms and O2 saturation.  Recommend influenza vaccination.

## 2024-12-27 NOTE — ASSESSMENT & PLAN NOTE
Patient presented with fever and tachycardia/tachypnea.  Source of sepsis is most likely COVID.  There is no obvious active bacterial infection.  Patient is leukopenic, secondary to her chemotherapy, but she is not neutropenic.  Procalcitonin is normal.  Despite sepsis, she is systemically well, without toxicity and hemodynamically stable, without hypotension.  Admission blood cultures have no growth thus far.  Patient had been on IV cefepime but this was discontinued.  She remains clinically well off antibiotic.  Patient was hypotensive overnight but remained clinically well and comfortable.  SBP is normal again today  Antiviral plan as below.  Observe off further antibiotic.  Monitor temperature/WBC.  Follow-up on admission blood cultures.

## 2024-12-27 NOTE — QUICK NOTE
FM team was chart reviewing patient. Noticed nurse documented BP's of 86/59 and 87/59 at 1917. FM team was never made aware of these vitals. FM team immediately messaged nurse for a recheck at 2012 upon seeing vitals. They stated patient's BP was 86/55 with manual of 92/62. FM provider immediately went to bedside. Once in the room patient's BP improved to 100/66. Patient denies any new nor worsening symptoms of nausea, light-headedness, weakness, nausea, chest pain nor SOB. She remains on room air and afebrile at this time. Encouraged patient to increase her po intake of fluids, she verbalized understanding. Asked nurse to please recheck vitals in 15 min and update FM team. Will continue to closely monitor.       Addendum: Nurse messaged FM team to state that a PCA charted the initial vitals and did not notify nurse of the abnormal vitals at the time.        Mariella Langston MD   PGY-2, Family Medicine  [facility]

## 2024-12-27 NOTE — DISCHARGE SUMMARY
DISCHARGE SUMMARY - Family Medicine Residency, Rober Cleaning 1970, 54 y.o. female.  MRN: [id number]    Unit/Bed#: [facility] 402-01 Encounter: [id number]  Primary Care Provider: Alessandro Milton DO      Admission Date: 12/25/2024  Discharge Date: 12/27/24  Length of Stay: 2 days  Diagnosis:   Principal Problem:    Sepsis without acute organ dysfunction (HCC)  Active Problems:    Hypertension    Lumbar back pain    Malignant neoplasm of upper-outer quadrant of right breast in female, estrogen receptor positive (HCC)    Hypokalemia    COVID-19  Resolved Problems:    * No resolved hospital problems. *        HPI: (per admission H&P note on )      Patient is a 54-year-old female currently undergoing weekly chemotherapy with pembrolizumab +paclitaxel/carboplatin + doxorubacin cyclophosphamide for breast cancer here for 1 day of fever, cough, shortness of breath.  Patient states her symptoms started earlier today, her daughter sick at home with a similar illness.  Patient states her fever was up to 103 at home and she took tylenol and then presented to the ED.  She has not eaten much today and states she is not feeling well.  Patient denies any history of asthma COPD and states she was a previous smoker but discontinued cigarettes when she was diagnosed with cancer.  Patient denies any history of heart problems, she had a recent echo 11/13/2024 showing EF 65%.  Patient denies any hematuria, dysuria or change in urinary frequency at this time.        ED course: Patient afebrile but tachycardic to 121.  CBC collected shows white blood cell count low at 2.65, hemoglobin 10.1.  Troponins negative, lactic acid negative.  Potassium low at 2.6, patient given 20 IV potassium x2  in ED.  UA showing moderate leukocytes and small blood, chest x-ray completed which appears clear per my read.  Patient received 2 L IV fluids and blood cultures collected.  Patient started on cefepime and vancomycin.  Patient found to be  COVID-19 positive.      HOSPITAL COURSE:   Please see progress note on discharge day for detailed list of diagnoses and related plan for additional information.    Hospital Course:   54 y.o. female admitted on 12/25/2024  for fever and tachycardia/tachypnea as she met SIRS criteria in setting of COVID positive. Initially, pt was continued on Cefepime. Pt was evaluated by ID, they recommended to continue with Paxlovid for total 5 days and discontinued cefepime as no obvious bacterial infection and negative blood culture. Potassium improved with additional KCL supplements.   Pt was stable on RA, afebrile but had an episode of hypotension OVN which resolved own its on. Pt was encouraged to increase fluid and food intake.     On 12/27/24, HD# 2, pt remains stable, afebrile and is medically optimized for discharge. Patient will need to make close follow-up appointment with PCP and additional providers listed on AVS.    Patient is informed of and is aware of current health status and understand the plan of treatment and outpatient follow-up. The patient understood and agreed with the plan. All pertinent lab results, imaging studies, procedures, and/or any incidental findings have been disclosed to the patient. All pertinent questions are answered to patient's satisfaction.    Complications: NONE     Condition at Discharge: stable       PROCEDURES:     Procedures Performed:     Orders Placed This Encounter   Procedures   • Critical Care         SIGNIFICANT FINDINGS / ABNORMAL RESULTS:     Significant Findings/Abnormal Results with this admission:    XR chest pa & lateral  Result Date: 12/25/2024  Narrative: XR CHEST PA AND LATERAL INDICATION: rule out pneumonia. COMPARISON: Chest radiograph April 6, 2023 FINDINGS: Left chest wall port with catheter tip at the superior cavoatrial junction Clear lungs. No pneumothorax or pleural effusion. Normal cardiomediastinal silhouette. Bones are unremarkable for age. Normal upper  "abdomen.     Impression: No acute cardiopulmonary disease. Workstation performed: [id number]         VITALS ON DISCHARGE DATE:     Vitals  Blood Pressure: 102/70 (12/27/24 0722)  Temperature: 97.9 °F (36.6 °C) (12/27/24 0722)  Temp Source: Temporal (12/27/24 0722)  Pulse: 85 (12/27/24 0722)  Respirations: 18 (12/27/24 0722)  SpO2: 95 % (12/27/24 0722)  Height: 5' 6\" (167.6 cm) (12/25/24 2039)  Weight - Scale: 61.2 kg (134 lb 14.7 oz) (12/25/24 2039)    Temp:  [97.9 °F (36.6 °C)-102.5 °F (39.2 °C)] 97.9 °F (36.6 °C)  HR:  [75-92] 85  BP: ()/(55-74) 102/70  Resp:  [18] 18  SpO2:  [93 %-98 %] 95 %  O2 Device: None (Room air)    Weight (last 2 days)       Date/Time Weight    12/25/24 20:39:14 61.2 (134.92)    12/25/24 1653 61.2 (135)              Intake/Output Summary (Last 24 hours) at 12/27/2024 1330  Last data filed at 12/27/2024 0900  Gross per 24 hour   Intake 240 ml   Output 0 ml   Net 240 ml       Invasive Devices       Central Venous Catheter Line  Duration             Port A Cath 11/06/24 Left Chest 51 days              Peripheral Intravenous Line  Duration             Peripheral IV 12/25/24 Left;Ventral (anterior) Forearm 1 day    Peripheral IV 12/25/24 Proximal;Right;Ventral (anterior) Forearm 1 day                      PHYSICAL EXAM ON DAY OF DISCHARGE:     Physical Exam  Constitutional:       General: She is not in acute distress.     Appearance: She is not ill-appearing.   HENT:      Head: Normocephalic and atraumatic.      Right Ear: External ear normal.      Left Ear: External ear normal.   Eyes:      Extraocular Movements: Extraocular movements intact.      Conjunctiva/sclera: Conjunctivae normal.   Cardiovascular:      Rate and Rhythm: Normal rate and regular rhythm.      Pulses: Normal pulses.      Heart sounds: Normal heart sounds.   Pulmonary:      Effort: Pulmonary effort is normal.      Breath sounds: Normal breath sounds.   Abdominal:      General: Bowel sounds are normal.      Palpations: " Abdomen is soft.      Tenderness: There is no abdominal tenderness.   Musculoskeletal:         General: Normal range of motion.      Cervical back: Normal range of motion. No rigidity.      Right lower leg: No edema.      Left lower leg: No edema.   Skin:     General: Skin is warm.   Neurological:      General: No focal deficit present.      Mental Status: She is alert and oriented to person, place, and time.   Psychiatric:         Mood and Affect: Mood normal.           DISCHARGE MEDICATIONS:     Discharge Medications:  See after visit summary (AVS) for detailed reconciled discharge medications, which was provided to patient and family. Summary of medication changes made with this admission:    START:  Paxlovid for 7 more doses; starting tonight. Twice a day.    STOP:  none    CHANGE:  none    RESUME:  All other home medications       FOLLOW-UP APPOINTMENTS / INSTRUCTION :     Important Physician Related Follow Up:     Alessandro Milton DO  [address] [place] [address]  [phone]    Call in 1 week(s)          Comfirmed future (up-coming) appointments:  Future Appointments   Date Time Provider Department Center   1/2/2025  1:30 PM AN INF CHAIR 16 AN Infusion [facility]   1/9/2025  1:30 PM AN INF CHAIR 8 [facility]fusion [facility]   1/13/2025 11:20 AM Graciela Garcia DO HEM ONC [facility]   1/16/2025 11:00 AM AN INF CHAIR 10 [facility]fusion [facility]   1/23/2025 12:30 PM AN INF CHAIR 8 [facility] Infusion [facility]   1/30/2025 12:00 PM AN INF CHAIR 10 [facility] Infusion [facility]   2/6/2025  9:00 AM AN INF CHAIR 11 AN Infusion [facility]   2/25/2025 10:10 AM Alessandro Milton DO [facility] FP BE [facility]   3/31/2025 11:40 AM Alexandrea Godoy RDH, PHDHP formerly [facility] DENT FOW [facility]       Discharge instructions/Information to patient and family:   See after visit summary (AVS) for information provided to patient and family.      Provisions for Follow-Up Care:  See after visit summary for information related to follow-up care and any pertinent home health orders.         DISPOSITION:     Disposition: Home    Discharge Statement   I spent 30  minutes discharging the patient. This time was spent on the day of discharge. I had direct contact with the patient on the day of discharge. Additional documentation is required if more than 30 minutes were spent on discharge.     Planned Readmission: Concepcion Pineda  PGY-1, Family Medicine  12/27/24  1:30 PM

## 2024-12-28 DIAGNOSIS — U07.1 COVID-19: Primary | ICD-10-CM

## 2024-12-28 RX ORDER — NIRMATRELVIR AND RITONAVIR 300-100 MG
3 KIT ORAL 2 TIMES DAILY
Qty: 30 TABLET | Refills: 0 | Status: SHIPPED | OUTPATIENT
Start: 2024-12-28 | End: 2025-01-02

## 2024-12-29 LAB
BACTERIA BLD CULT: NORMAL
BACTERIA BLD CULT: NORMAL

## 2024-12-30 ENCOUNTER — TRANSITIONAL CARE MANAGEMENT (OUTPATIENT)
Dept: FAMILY MEDICINE CLINIC | Facility: CLINIC | Age: 54
End: 2024-12-30

## 2024-12-30 LAB
BACTERIA BLD CULT: NORMAL
BACTERIA BLD CULT: NORMAL

## 2024-12-31 ENCOUNTER — APPOINTMENT (OUTPATIENT)
Dept: LAB | Facility: CLINIC | Age: 54
End: 2024-12-31
Payer: COMMERCIAL

## 2024-12-31 ENCOUNTER — TELEPHONE (OUTPATIENT)
Dept: HEMATOLOGY ONCOLOGY | Facility: CLINIC | Age: 54
End: 2024-12-31

## 2024-12-31 DIAGNOSIS — C77.3 CARCINOMA OF RIGHT BREAST METASTATIC TO AXILLARY LYMPH NODE (HCC): ICD-10-CM

## 2024-12-31 DIAGNOSIS — C50.911 CARCINOMA OF RIGHT BREAST METASTATIC TO AXILLARY LYMPH NODE (HCC): ICD-10-CM

## 2024-12-31 LAB
ALBUMIN SERPL BCG-MCNC: 4.3 G/DL (ref 3.5–5)
ALP SERPL-CCNC: 86 U/L (ref 34–104)
ALT SERPL W P-5'-P-CCNC: 39 U/L (ref 7–52)
ANION GAP SERPL CALCULATED.3IONS-SCNC: 8 MMOL/L (ref 4–13)
AST SERPL W P-5'-P-CCNC: 22 U/L (ref 13–39)
BASOPHILS # BLD AUTO: 0.02 THOUSANDS/ΜL (ref 0–0.1)
BASOPHILS NFR BLD AUTO: 1 % (ref 0–1)
BILIRUB SERPL-MCNC: 0.4 MG/DL (ref 0.2–1)
BUN SERPL-MCNC: 10 MG/DL (ref 5–25)
CALCIUM SERPL-MCNC: 9.6 MG/DL (ref 8.4–10.2)
CHLORIDE SERPL-SCNC: 103 MMOL/L (ref 96–108)
CO2 SERPL-SCNC: 30 MMOL/L (ref 21–32)
CREAT SERPL-MCNC: 0.52 MG/DL (ref 0.6–1.3)
EOSINOPHIL # BLD AUTO: 0.24 THOUSAND/ΜL (ref 0–0.61)
EOSINOPHIL NFR BLD AUTO: 6 % (ref 0–6)
ERYTHROCYTE [DISTWIDTH] IN BLOOD BY AUTOMATED COUNT: 14 % (ref 11.6–15.1)
GFR SERPL CREATININE-BSD FRML MDRD: 110 ML/MIN/1.73SQ M
GLUCOSE P FAST SERPL-MCNC: 94 MG/DL (ref 65–99)
HCT VFR BLD AUTO: 30.5 % (ref 34.8–46.1)
HGB BLD-MCNC: 10.1 G/DL (ref 11.5–15.4)
IMM GRANULOCYTES # BLD AUTO: 0.01 THOUSAND/UL (ref 0–0.2)
IMM GRANULOCYTES NFR BLD AUTO: 0 % (ref 0–2)
LYMPHOCYTES # BLD AUTO: 1.93 THOUSANDS/ΜL (ref 0.6–4.47)
LYMPHOCYTES NFR BLD AUTO: 48 % (ref 14–44)
MCH RBC QN AUTO: 31.8 PG (ref 26.8–34.3)
MCHC RBC AUTO-ENTMCNC: 33.1 G/DL (ref 31.4–37.4)
MCV RBC AUTO: 96 FL (ref 82–98)
MONOCYTES # BLD AUTO: 0.17 THOUSAND/ΜL (ref 0.17–1.22)
MONOCYTES NFR BLD AUTO: 4 % (ref 4–12)
NEUTROPHILS # BLD AUTO: 1.65 THOUSANDS/ΜL (ref 1.85–7.62)
NEUTS SEG NFR BLD AUTO: 41 % (ref 43–75)
NRBC BLD AUTO-RTO: 0 /100 WBCS
PLATELET # BLD AUTO: 224 THOUSANDS/UL (ref 149–390)
PMV BLD AUTO: 10.1 FL (ref 8.9–12.7)
POTASSIUM SERPL-SCNC: 3.9 MMOL/L (ref 3.5–5.3)
PROT SERPL-MCNC: 7.1 G/DL (ref 6.4–8.4)
RBC # BLD AUTO: 3.18 MILLION/UL (ref 3.81–5.12)
SODIUM SERPL-SCNC: 141 MMOL/L (ref 135–147)
WBC # BLD AUTO: 4.02 THOUSAND/UL (ref 4.31–10.16)

## 2024-12-31 PROCEDURE — 80053 COMPREHEN METABOLIC PANEL: CPT

## 2024-12-31 PROCEDURE — 36415 COLL VENOUS BLD VENIPUNCTURE: CPT

## 2024-12-31 PROCEDURE — 85025 COMPLETE CBC W/AUTO DIFF WBC: CPT

## 2024-12-31 NOTE — TELEPHONE ENCOUNTER
Telephone call spoke with Pt. Using [employer]  [place] #[id number].  She is feeling ok and will be done taking Paxlovid on 1/2/25.  Tx will be deferred this week.   Dr Garcia is out of the office until Monday 1/6.  I will discuss with Dr Garcia if ok to restart next week .  Pt has a fu appt with Dr Garcia 1/13/25.

## 2025-01-02 ENCOUNTER — TELEPHONE (OUTPATIENT)
Dept: FAMILY MEDICINE CLINIC | Facility: CLINIC | Age: 55
End: 2025-01-02

## 2025-01-02 ENCOUNTER — HOSPITAL ENCOUNTER (OUTPATIENT)
Dept: INFUSION CENTER | Facility: CLINIC | Age: 55
Discharge: HOME | End: 2025-01-02

## 2025-01-02 NOTE — TELEPHONE ENCOUNTER
Pharmacy called about medication clarification. Previous script was written Take 3 tablets by mouth 2 (two) times a day for 5 days but it needs to be take 3 tablets by mouth 3 times a day for 7 days per pharmacist.

## 2025-01-07 ENCOUNTER — APPOINTMENT (OUTPATIENT)
Dept: LAB | Facility: CLINIC | Age: 55
End: 2025-01-07
Payer: COMMERCIAL

## 2025-01-07 ENCOUNTER — TELEPHONE (OUTPATIENT)
Age: 55
End: 2025-01-07

## 2025-01-07 DIAGNOSIS — T45.1X5A ADVERSE EFFECT OF CHEMOTHERAPY, INITIAL ENCOUNTER: ICD-10-CM

## 2025-01-07 DIAGNOSIS — C77.3 CARCINOMA OF RIGHT BREAST METASTATIC TO AXILLARY LYMPH NODE (HCC): ICD-10-CM

## 2025-01-07 DIAGNOSIS — C77.3 CARCINOMA OF RIGHT BREAST METASTATIC TO AXILLARY LYMPH NODE (HCC): Primary | ICD-10-CM

## 2025-01-07 DIAGNOSIS — Z17.0 MALIGNANT NEOPLASM OF RIGHT BREAST IN FEMALE, ESTROGEN RECEPTOR POSITIVE, UNSPECIFIED SITE OF BREAST (HCC): ICD-10-CM

## 2025-01-07 DIAGNOSIS — C50.911 CARCINOMA OF RIGHT BREAST METASTATIC TO AXILLARY LYMPH NODE (HCC): Primary | ICD-10-CM

## 2025-01-07 DIAGNOSIS — C50.911 BREAST CANCER METASTASIZED TO AXILLARY LYMPH NODE, RIGHT (HCC): ICD-10-CM

## 2025-01-07 DIAGNOSIS — C50.411 MALIGNANT NEOPLASM OF UPPER-OUTER QUADRANT OF RIGHT BREAST IN FEMALE, ESTROGEN RECEPTOR POSITIVE (HCC): ICD-10-CM

## 2025-01-07 DIAGNOSIS — Z17.0 MALIGNANT NEOPLASM OF UPPER-OUTER QUADRANT OF RIGHT BREAST IN FEMALE, ESTROGEN RECEPTOR POSITIVE (HCC): ICD-10-CM

## 2025-01-07 DIAGNOSIS — C50.911 CARCINOMA OF RIGHT BREAST METASTATIC TO AXILLARY LYMPH NODE (HCC): ICD-10-CM

## 2025-01-07 DIAGNOSIS — C77.3 BREAST CANCER METASTASIZED TO AXILLARY LYMPH NODE, RIGHT (HCC): ICD-10-CM

## 2025-01-07 DIAGNOSIS — C50.911 MALIGNANT NEOPLASM OF RIGHT BREAST IN FEMALE, ESTROGEN RECEPTOR POSITIVE, UNSPECIFIED SITE OF BREAST (HCC): ICD-10-CM

## 2025-01-07 LAB
ALBUMIN SERPL BCG-MCNC: 4.3 G/DL (ref 3.5–5)
ALP SERPL-CCNC: 83 U/L (ref 34–104)
ALT SERPL W P-5'-P-CCNC: 23 U/L (ref 7–52)
ANION GAP SERPL CALCULATED.3IONS-SCNC: 6 MMOL/L (ref 4–13)
AST SERPL W P-5'-P-CCNC: 16 U/L (ref 13–39)
BASOPHILS # BLD AUTO: 0.04 THOUSANDS/ΜL (ref 0–0.1)
BASOPHILS NFR BLD AUTO: 1 % (ref 0–1)
BILIRUB SERPL-MCNC: 0.5 MG/DL (ref 0.2–1)
BUN SERPL-MCNC: 9 MG/DL (ref 5–25)
CALCIUM SERPL-MCNC: 9.3 MG/DL (ref 8.4–10.2)
CHLORIDE SERPL-SCNC: 102 MMOL/L (ref 96–108)
CO2 SERPL-SCNC: 32 MMOL/L (ref 21–32)
CREAT SERPL-MCNC: 0.55 MG/DL (ref 0.6–1.3)
EOSINOPHIL # BLD AUTO: 0.42 THOUSAND/ΜL (ref 0–0.61)
EOSINOPHIL NFR BLD AUTO: 7 % (ref 0–6)
ERYTHROCYTE [DISTWIDTH] IN BLOOD BY AUTOMATED COUNT: 15.3 % (ref 11.6–15.1)
GFR SERPL CREATININE-BSD FRML MDRD: 108 ML/MIN/1.73SQ M
GLUCOSE P FAST SERPL-MCNC: 89 MG/DL (ref 65–99)
HCT VFR BLD AUTO: 31.9 % (ref 34.8–46.1)
HGB BLD-MCNC: 10.3 G/DL (ref 11.5–15.4)
IMM GRANULOCYTES # BLD AUTO: 0.01 THOUSAND/UL (ref 0–0.2)
IMM GRANULOCYTES NFR BLD AUTO: 0 % (ref 0–2)
LYMPHOCYTES # BLD AUTO: 1.6 THOUSANDS/ΜL (ref 0.6–4.47)
LYMPHOCYTES NFR BLD AUTO: 27 % (ref 14–44)
MCH RBC QN AUTO: 31.7 PG (ref 26.8–34.3)
MCHC RBC AUTO-ENTMCNC: 32.3 G/DL (ref 31.4–37.4)
MCV RBC AUTO: 98 FL (ref 82–98)
MONOCYTES # BLD AUTO: 0.3 THOUSAND/ΜL (ref 0.17–1.22)
MONOCYTES NFR BLD AUTO: 5 % (ref 4–12)
NEUTROPHILS # BLD AUTO: 3.58 THOUSANDS/ΜL (ref 1.85–7.62)
NEUTS SEG NFR BLD AUTO: 60 % (ref 43–75)
NRBC BLD AUTO-RTO: 0 /100 WBCS
PLATELET # BLD AUTO: 250 THOUSANDS/UL (ref 149–390)
PMV BLD AUTO: 10.3 FL (ref 8.9–12.7)
POTASSIUM SERPL-SCNC: 4 MMOL/L (ref 3.5–5.3)
PROT SERPL-MCNC: 6.8 G/DL (ref 6.4–8.4)
RBC # BLD AUTO: 3.25 MILLION/UL (ref 3.81–5.12)
SODIUM SERPL-SCNC: 140 MMOL/L (ref 135–147)
TSH SERPL DL<=0.05 MIU/L-ACNC: 0.53 UIU/ML (ref 0.45–4.5)
WBC # BLD AUTO: 5.95 THOUSAND/UL (ref 4.31–10.16)

## 2025-01-07 PROCEDURE — 80053 COMPREHEN METABOLIC PANEL: CPT

## 2025-01-07 PROCEDURE — 36415 COLL VENOUS BLD VENIPUNCTURE: CPT

## 2025-01-07 PROCEDURE — 84443 ASSAY THYROID STIM HORMONE: CPT

## 2025-01-07 PROCEDURE — 85025 COMPLETE CBC W/AUTO DIFF WBC: CPT

## 2025-01-08 RX ORDER — SODIUM CHLORIDE 9 MG/ML
20 INJECTION, SOLUTION INTRAVENOUS ONCE
Status: CANCELLED | OUTPATIENT
Start: 2025-01-09

## 2025-01-09 ENCOUNTER — HOSPITAL ENCOUNTER (OUTPATIENT)
Dept: INFUSION CENTER | Facility: CLINIC | Age: 55
Discharge: HOME | End: 2025-01-09
Payer: COMMERCIAL

## 2025-01-09 VITALS
DIASTOLIC BLOOD PRESSURE: 68 MMHG | HEART RATE: 80 BPM | BODY MASS INDEX: 21.86 KG/M2 | RESPIRATION RATE: 18 BRPM | HEIGHT: 66 IN | WEIGHT: 136 LBS | TEMPERATURE: 98.4 F | OXYGEN SATURATION: 96 % | SYSTOLIC BLOOD PRESSURE: 102 MMHG

## 2025-01-09 DIAGNOSIS — C50.911 CARCINOMA OF RIGHT BREAST METASTATIC TO AXILLARY LYMPH NODE (HCC): Primary | ICD-10-CM

## 2025-01-09 DIAGNOSIS — C77.3 CARCINOMA OF RIGHT BREAST METASTATIC TO AXILLARY LYMPH NODE (HCC): Primary | ICD-10-CM

## 2025-01-09 PROCEDURE — 96417 CHEMO IV INFUS EACH ADDL SEQ: CPT

## 2025-01-09 PROCEDURE — 96367 TX/PROPH/DG ADDL SEQ IV INF: CPT

## 2025-01-09 PROCEDURE — 96413 CHEMO IV INFUSION 1 HR: CPT

## 2025-01-09 RX ORDER — SODIUM CHLORIDE 9 MG/ML
20 INJECTION, SOLUTION INTRAVENOUS ONCE
Status: COMPLETED | OUTPATIENT
Start: 2025-01-09 | End: 2025-01-09

## 2025-01-09 RX ADMIN — DIPHENHYDRAMINE HYDROCHLORIDE 25 MG: 50 INJECTION, SOLUTION INTRAMUSCULAR; INTRAVENOUS at 14:51

## 2025-01-09 RX ADMIN — SODIUM CHLORIDE 20 ML/HR: 9 INJECTION, SOLUTION INTRAVENOUS at 13:09

## 2025-01-09 RX ADMIN — Medication 171 MG: at 15:43

## 2025-01-09 RX ADMIN — DEXAMETHASONE SODIUM PHOSPHATE: 10 INJECTION, SOLUTION INTRAMUSCULAR; INTRAVENOUS at 14:28

## 2025-01-09 RX ADMIN — FAMOTIDINE 20 MG: 10 INJECTION INTRAVENOUS at 15:15

## 2025-01-09 RX ADMIN — SODIUM CHLORIDE 200 MG: 9 INJECTION, SOLUTION INTRAVENOUS at 13:46

## 2025-01-09 RX ADMIN — CARBOPLATIN 180 MG: 10 INJECTION, SOLUTION INTRAVENOUS at 16:41

## 2025-01-09 NOTE — LETTER
[facility]  [address]  [address]  [place] [address]  Dept: [phone]    January 9, 2025     Patient: Romina Cleaning   YOB: 1970   Date of Visit: 1/9/2025       To Whom it May Concern:    Romina Cleaning is under my professional care. She was seen in the hospital from 1/9/2025 to 01/09/25. Her daughter was with her as a support person.    If you have any questions or concerns, please don't hesitate to call.         Sincerely,          Denae Arevalo RN

## 2025-01-09 NOTE — PROGRESS NOTES
Pt tolerated tx without issue. PAC with brisk blood return noted, flushed, and deaccessed. Pt confirmed next appointment on 1/16 @1100 AN. AVS declined.

## 2025-01-13 ENCOUNTER — OFFICE VISIT (OUTPATIENT)
Dept: HEMATOLOGY ONCOLOGY | Facility: CLINIC | Age: 55
End: 2025-01-13
Payer: COMMERCIAL

## 2025-01-13 VITALS
SYSTOLIC BLOOD PRESSURE: 118 MMHG | RESPIRATION RATE: 18 BRPM | TEMPERATURE: 98.2 F | HEART RATE: 115 BPM | DIASTOLIC BLOOD PRESSURE: 80 MMHG | BODY MASS INDEX: 22.02 KG/M2 | WEIGHT: 137 LBS | HEIGHT: 66 IN | OXYGEN SATURATION: 99 %

## 2025-01-13 DIAGNOSIS — C77.3 BREAST CANCER METASTASIZED TO AXILLARY LYMPH NODE, RIGHT (HCC): ICD-10-CM

## 2025-01-13 DIAGNOSIS — C50.411 MALIGNANT NEOPLASM OF UPPER-OUTER QUADRANT OF RIGHT BREAST IN FEMALE, ESTROGEN RECEPTOR POSITIVE (HCC): ICD-10-CM

## 2025-01-13 DIAGNOSIS — C50.911 MALIGNANT NEOPLASM OF RIGHT BREAST IN FEMALE, ESTROGEN RECEPTOR POSITIVE, UNSPECIFIED SITE OF BREAST (HCC): ICD-10-CM

## 2025-01-13 DIAGNOSIS — Z17.0 MALIGNANT NEOPLASM OF RIGHT BREAST IN FEMALE, ESTROGEN RECEPTOR POSITIVE, UNSPECIFIED SITE OF BREAST (HCC): ICD-10-CM

## 2025-01-13 DIAGNOSIS — C50.911 BREAST CANCER METASTASIZED TO AXILLARY LYMPH NODE, RIGHT (HCC): ICD-10-CM

## 2025-01-13 DIAGNOSIS — T45.1X5A ADVERSE EFFECT OF CHEMOTHERAPY, INITIAL ENCOUNTER: ICD-10-CM

## 2025-01-13 DIAGNOSIS — C77.3 CARCINOMA OF RIGHT BREAST METASTATIC TO AXILLARY LYMPH NODE (HCC): Primary | ICD-10-CM

## 2025-01-13 DIAGNOSIS — Z17.0 MALIGNANT NEOPLASM OF UPPER-OUTER QUADRANT OF RIGHT BREAST IN FEMALE, ESTROGEN RECEPTOR POSITIVE (HCC): ICD-10-CM

## 2025-01-13 DIAGNOSIS — C50.911 CARCINOMA OF RIGHT BREAST METASTATIC TO AXILLARY LYMPH NODE (HCC): Primary | ICD-10-CM

## 2025-01-13 PROCEDURE — 99214 OFFICE O/P EST MOD 30 MIN: CPT | Performed by: INTERNAL MEDICINE

## 2025-01-13 NOTE — ASSESSMENT & PLAN NOTE
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 (HCC)  Staging form: Breast, AJCC 8th Edition  - Clinical stage from 9/20/2024: cT0, cN1(f), cM0, GX, ER-, WI-, HER2- - Signed by Jodie Philippe MD on 10/7/2024  Stage prefix: Initial diagnosis  Method of lymph node assessment: Core biopsy  Histologic grading system: 3 grade system    Malignant neoplasm of upper-outer quadrant of right breast in female, estrogen receptor positive (HCC)  Staging form: Breast, AJCC 8th Edition  - Clinical stage from 9/20/2024: Stage Unknown (cT1c, cNX, cM0, G2, ER+, WI+, HER2-) - Signed by Jodie Philippe MD on 10/7/2024  Stage prefix: Initial diagnosis  Histologic grading system: 3 grade system    1.  cT1c cNx cM0 Right breast invasive lobular carcinoma.  Grade 2.  ER positive (>95%), WI positive (70%), HER2 negative by IHC.  MammaPrint: Ultralow risk (luminal A).     2.  cT0 cN1 cM0 Right axillary lymph node biopsy ER negative, WI negative, HER2 negative  3.  BRCA1/2 negative     Romina Cleaning is a 54 y.o. 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 She initially palpated a mass in her right breast which she brought to medical attention.  She underwent ultrasound-guided biopsy which was consistent with an invasive lobular carcinoma, ER positive, WI positive, HER2 negative.   MammaPrint resulted ultralow risk (luminal A). Right axillary lymph node biopsy showed infiltrating carcinoma which was ER negative, WI negative and HER2 negative.  Genetic testing for BRCA1/2. She has been evaluated by breast surgery (Dr. Philippe) and given the 2 different pathologies from the breast mass and axillary lymph node she underwent breast MRI on 10/23/2024 which redemonstrates a mass in the upper outer right breast consistent with  the biopsy-proven invasive lobular carcinoma and right axillary lymphadenopathy.  Indeterminant 6 mm mass at the 5 o'clock position right breast for which ultrasound was recommended (if there is no ultrasound correlate MRI guided biopsy would be performed).  CT of the chest, abdomen, pelvis notes enlarged right axillary and subpectoral lymphadenopathy and no other suspicious lesions.  Bone scan with no evidence of osseous metastasis. She is following with OB/GYN and had Mirena IUD removal.     She has 2 different pathologies (invasive lobular carcinoma, ER/OR positive, HER2 negative with a ultralow risk MammaPrint results as well as a triple negative carcinoma noted on axillary lymph node biopsy).  Bilateral breast MRI noted spiculated mass in the upper outer right breast consistent with biopsy-proven invasive lobular carcinoma as well as an indeterminate 6 mm mass at the 5 o'clock position for which further characterization with ultrasound was recommended.  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    She started neoadjuvant chemotherapy for triple negative, node positive carcinoma on 11/14/2024.  Unfortunately, patient with reaction to Taxol.  Lengthy discussion with the patient and her brother.  Discussed option of switching to Adriamycin/cyclophosphamide and proceeding with the second half of treatment versus switching Taxol to Abraxane.  Care plan adjusted.  Patient agreeable to switching to Abraxane.  Informed consent obtained.  Patient had a teaching session with RN.    Treatment plan:  Keynote 522  Neoadjuvant Pembrolizumab 200 mg day 1, Paclitaxel 80 mg/m2 day 1, Carboplatin AUC 1.5 on days 1, 8, 15 of a 21-day cycle for 4 cycles.  >> Switched paclitaxel to Abraxane 100 mg/m2 D1,8,15 after C1D8 given reaction to paclitaxel.  >> C2D15 was cancelled due to COVID-19 infection, her subsequent infusion was labeled C3D1, as such she may need one additional  infusion of paclitaxel and carboplatin prior to starting the cyclophosphamide/doxorubicin arm  Surgery  Neoadjuvant pembrolizumab 200 mg, cyclophosphamide 600 mg/m2, doxorubicin 60 mg/m2 on day of a 21-day cycle for 4 cycles.   Adjuvant course of pembrolizumab 200 mg on day 1 of a 21-day cycle for 9 cycles pending final pathology review.     She may benefit from hormone therapy in the adjuvant setting for her ER/NE+ carcinoma.  We will visit this decision after surgery.

## 2025-01-13 NOTE — PROGRESS NOTES
Name: Romina Cleaning      : 1970      MRN: [id number]  Encounter Provider: Graciela Garcia DO  Encounter Date: 2025   Encounter department: [facility]  :  Assessment & Plan  Malignant neoplasm of right breast in female, estrogen receptor positive, unspecified site of breast (HCC)  See above       Malignant neoplasm of upper-outer quadrant of right breast in female, estrogen receptor positive (HCC)  See above       Carcinoma of right breast metastatic to axillary lymph node (HCC)   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 (HCC)  Staging form: Breast, AJCC 8th Edition  - Clinical stage from 2024: cT0, cN1(f), cM0, GX, ER-, UT-, HER2- - Signed by Jodie Philippe MD on 10/7/2024  Stage prefix: Initial diagnosis  Method of lymph node assessment: Core biopsy  Histologic grading system: 3 grade system    Malignant neoplasm of upper-outer quadrant of right breast in female, estrogen receptor positive (HCC)  Staging form: Breast, AJCC 8th Edition  - Clinical stage from 2024: Stage Unknown (cT1c, cNX, cM0, G2, ER+, UT+, HER2-) - Signed by Jodie Philippe MD on 10/7/2024  Stage prefix: Initial diagnosis  Histologic grading system: 3 grade system    1.  cT1c cNx cM0 Right breast invasive lobular carcinoma.  Grade 2.  ER positive (>95%), UT positive (70%), HER2 negative by IHC.  MammaPrint: Ultralow risk (luminal A).     2.  cT0 cN1 cM0 Right axillary lymph node biopsy ER negative, UT negative, HER2 negative  3.  BRCA1/2 negative     Romina Cleaning is a 54 y.o. 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 She initially palpated a mass in her right breast which she brought to medical attention.  She underwent ultrasound-guided  biopsy which was consistent with an invasive lobular carcinoma, ER positive, NE positive, HER2 negative.   MammaPrint resulted ultralow risk (luminal A). Right axillary lymph node biopsy showed infiltrating carcinoma which was ER negative, NE negative and HER2 negative.  Genetic testing for BRCA1/2. She has been evaluated by breast surgery (Dr. Philippe) and given the 2 different pathologies from the breast mass and axillary lymph node she underwent breast MRI on 10/23/2024 which redemonstrates a mass in the upper outer right breast consistent with the biopsy-proven invasive lobular carcinoma and right axillary lymphadenopathy.  Indeterminant 6 mm mass at the 5 o'clock position right breast for which ultrasound was recommended (if there is no ultrasound correlate MRI guided biopsy would be performed).  CT of the chest, abdomen, pelvis notes enlarged right axillary and subpectoral lymphadenopathy and no other suspicious lesions.  Bone scan with no evidence of osseous metastasis. She is following with OB/GYN and had Mirena IUD removal.     She has 2 different pathologies (invasive lobular carcinoma, ER/NE positive, HER2 negative with a ultralow risk MammaPrint results as well as a triple negative carcinoma noted on axillary lymph node biopsy).  Bilateral breast MRI noted spiculated mass in the upper outer right breast consistent with biopsy-proven invasive lobular carcinoma as well as an indeterminate 6 mm mass at the 5 o'clock position for which further characterization with ultrasound was recommended.  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    She started neoadjuvant chemotherapy for triple negative, node positive carcinoma on 11/14/2024.  Unfortunately, patient with reaction to Taxol.  Lengthy discussion with the patient and her brother.  Discussed option of switching to Adriamycin/cyclophosphamide and proceeding with the second half of treatment versus  switching Taxol to Abraxane.  Care plan adjusted.  Patient agreeable to switching to Abraxane.  Informed consent obtained.  Patient had a teaching session with RN.    Treatment plan:  Keynote 522  Neoadjuvant Pembrolizumab 200 mg day 1, Paclitaxel 80 mg/m2 day 1, Carboplatin AUC 1.5 on days 1, 8, 15 of a 21-day cycle for 4 cycles.  >> Switched paclitaxel to Abraxane 100 mg/m2 D1,8,15 after C1D8 given reaction to paclitaxel.  >> C2D15 was cancelled due to COVID-19 infection, her subsequent infusion was labeled C3D1, as such she may need one additional infusion of paclitaxel and carboplatin prior to starting the cyclophosphamide/doxorubicin arm  Surgery  Neoadjuvant pembrolizumab 200 mg, cyclophosphamide 600 mg/m2, doxorubicin 60 mg/m2 on day of a 21-day cycle for 4 cycles.   Adjuvant course of pembrolizumab 200 mg on day 1 of a 21-day cycle for 9 cycles pending final pathology review.     She may benefit from hormone therapy in the adjuvant setting for her ER/CT+ carcinoma.  We will visit this decision after surgery.       Adverse effect of chemotherapy, initial encounter  Resolved       Additional recommendations to follow per attending, Dr. Garcia.    Nico Gunter DO, PGY4  Hematology/Oncology Fellow      History of Present Illness   Chief Complaint   Patient presents with    Follow-up     Romina Cleaning is a 54 y.o. postmenopausal female with PMHx significant for HTN who recently felt a mass in her right breast which she brought to medical attention.  She underwent ultrasound-guided biopsy which was consistent with an invasive lobular carcinoma, ER positive, CT positive, HER2 negative.  Right axillary lymph node biopsy showed infiltrating carcinoma which was ER negative, CT negative and HER2 negative.  MammaPrint resulted ultralow risk (luminal A), genetic testing pending.  She has been evaluated by breast surgery (Dr. Philippe) and given the 2 different pathologies from the breast mass and axillary lymph node  she has been recommended a breast MRI to evaluate for an occult site.  CT of the chest, abdomen, pelvis notes enlarged right axillary and subpectoral lymphadenopathy and no other suspicious lesions.  Bone scan with no evidence of osseous metastasis.  Patient presents for initial medical oncology consultation.  Patient's case was submitted to be discussed at the multidisciplinary breast tumor board on 10/21/2024.  She is following with OB/GYN and had Mirena IUD removal. Pt presents for initial medical oncology consultation accompanied by her son and sister for visit.  She denies any chest pain, palpitations, bone pain or other complaints.    OB/GYN history: G2, P2  Menarche: 15 years old  Menopause: in mid 40's  Age of first pregnancy: 29 years old     Maternal grandmother, maternal uncle and mother with colon cancer. Maternal grandfather with throat cancer (smoker).  Patient declined , instead preferred to have her sister and son translate.    Interval History:   Accompanied by sister, son and mother.  Her case was discussed in the multidisciplinary tumor board in the interim.  Recommendations for neoadjuvant chemotherapy to treat node positive, triple negative disease.  Patient offers no complaints today.    Interval History 11/21/24:   Patient presents for urgent visit.  She started treatment on 11/14/2024, but had a reaction to Taxol on day 1 and 8 requiring further treatment to be held.  Patient noted experiencing facial flushing, abdominal cramping (9/10) and Taxol infusion stopped requiring hypersensitivity protocol.  Patient currently denies any chest pain, palpitations, respiratory symptoms.    Interval History 1/13/25:   Patient presents for two month follow up.  She continues on Keynote 522 regimen with change from paclitaxel to Abraxane after C1D8 due to a reaction to paclitaxel.  She has tolerated this change well and has not had any significant complications.  C2D15 was cancelled due to  COVID+ for which she was admitted (12/25-12/27) and treated with supportive care and Paxlovid.  She endorsed good appetite.  Most recent labs (1/7/2025) show Hb 10.3, WBC 6.0, Plt 250, SCr 0.55, TSH 0.53, and all other aspects of CMP wnl.  There is no new relevant imaging.    Oncology History   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 (HCC)  Staging form: Breast, AJCC 8th Edition  - Clinical stage from 9/20/2024: cT0, cN1(f), cM0, GX, ER-, NC-, HER2- - Signed by Jodie Philippe MD on 10/7/2024  Stage prefix: Initial diagnosis  Method of lymph node assessment: Core biopsy  Histologic grading system: 3 grade system    Malignant neoplasm of upper-outer quadrant of right breast in female, estrogen receptor positive (HCC)  Staging form: Breast, AJCC 8th Edition  - Clinical stage from 9/20/2024: Stage Unknown (cT1c, cNX, cM0, G2, ER+, NC+, HER2-) - Signed by Jodie Philippe MD on 10/7/2024  Stage prefix: Initial diagnosis  Histologic grading system: 3 grade system  Oncology History   Carcinoma of right breast metastatic to axillary lymph node (HCC)   6/11/2024 Initial Diagnosis    Carcinoma of right breast metastatic to axillary lymph node (HCC)     9/20/2024 -  Cancer Staged    Staging form: Breast, AJCC 8th Edition  - Clinical stage from 9/20/2024: cT0, cN1(f), cM0, GX, ER-, NC-, HER2- - Signed by Jodie Philippe MD on 10/7/2024  Stage prefix: Initial diagnosis  Method of lymph node assessment: Core biopsy  Histologic grading system: 3 grade system       11/14/2024 -  Chemotherapy    DOXOrubicin (ADRIAMYCIN), 60 mg/m2 = 103 mg, Intravenous, Once, 0 of 4 cycles  alteplase (CATHFLO), 2 mg, Intracatheter, Every 1 Minute as needed, 3 of 17 cycles  pegfilgrastim-apgf (Nyvepria), 6 mg, Subcutaneous, Once, 0 of 4 cycles  sodium chloride, 500 mL (100 % of original dose 500 mL), Intravenous, Once, 1 of 1 cycle  Dose modification: 500 mL (original dose 500 mL, Cycle 1)  Administration: 500 mL  (11/14/2024)  cyclophosphamide (CYTOXAN) IVPB, 600 mg/m2 = 1,026 mg, Intravenous, Once, 0 of 4 cycles  fosaprepitant (EMEND) IVPB, 150 mg, Intravenous, Once, 0 of 4 cycles  paclitaxel protein-bound (ABRAXANE) IVPB, 100 mg/m2 = 171 mg (original dose ), Intravenous, Once, 3 of 4 cycles  Dose modification: 100 mg/m2 (original dose 100 mg/m2, Cycle 1)  Administration: 171 mg (12/5/2024), 171 mg (12/12/2024), 171 mg (12/19/2024), 171 mg (1/9/2025)  CARBOplatin (PARAPLATIN) IVPB (GO AUC DOSING), 180 mg, Intravenous, Once, 3 of 4 cycles  Administration: 180 mg (11/14/2024), 180 mg (11/21/2024), 180 mg (12/5/2024), 180 mg (12/12/2024), 180 mg (12/19/2024), 180 mg (1/9/2025)  PACLItaxel (TAXOL) chemo IVPB, 80 mg/m2 = 136.8 mg, Intravenous, Once, 1 of 1 cycle  Administration: 136.8 mg (11/14/2024), 136.8 mg (11/21/2024)  pembrolizumab (KEYTRUDA) IVPB, 200 mg, Intravenous, Once, 3 of 17 cycles  Administration: 200 mg (11/14/2024), 200 mg (12/12/2024), 200 mg (1/9/2025)     Malignant neoplasm of upper-outer quadrant of right breast in female, estrogen receptor positive (HCC)   9/20/2024 Biopsy    Right breast ultrasound-guided biopsy  A. 10 o'clock, 7 cm from nipple (MARTHA)  Invasive lobular carcinoma  Grade 2  ER >95, NJ 70, HER2 0  Lymphovascular invasion not identified    B. Right axillary lymph node biopsy (MARTHA)  Infiltrating carcinoma  Although no definitive capsule is noted, it is favored to represent lymph node involvement by th e carcinoma  ER <1, NJ <1, HER2 1+    Concordant. Malignancy appears unifocal; suspicious masses cover an area up to 1.8 cm. US of right axilla showed multiple enlarged, morphologically abnormal axillary lymph nodes with biopsy confirmed metastatic disease. Left breast clear.     9/20/2024 -  Cancer Staged    Staging form: Breast, AJCC 8th Edition  - Clinical stage from 9/20/2024: Stage Unknown (cT1c, cNX, cM0, G2, ER+, NJ+, HER2-) - Signed by Jodie Philippe MD on 10/7/2024  Stage prefix: Initial  diagnosis  Histologic grading system: 3 grade system       9/24/2024 Genomic Testing    MammaPrint/BluePrint ordered on breast specimen block A  Ultra-Low risk - luminal A     9/27/2024 Genetic Testing    Genetic testing with RNA analysis ordered  [facility]        Pertinent Medical History   01/13/25: See above    Review of Systems   Constitutional: Negative.  Negative for fever.   HENT: Negative.     Respiratory: Negative.  Negative for shortness of breath.    Cardiovascular: Negative.  Negative for chest pain and palpitations.   Gastrointestinal: Negative.  Negative for abdominal pain.   Musculoskeletal: Negative.    Skin: Negative.    Neurological: Negative.    Hematological: Negative.  Does not bruise/bleed easily.   Psychiatric/Behavioral: Negative.       Pertinent Medical History     Medical History Reviewed by provider this encounter:     .  Past Medical History   Past Medical History:   Diagnosis Date    GERD (gastroesophageal reflux disease)     Headache     Hematuria     Hypertension     IUD (intrauterine device) in place 02/15/2019    Mirena placed 2/2015 effective through 2/2020      Lateral epicondylitis, right elbow 01/08/2019    Panic attacks     Psychiatric disorder      Past Surgical History:   Procedure Laterality Date    BREAST BIOPSY Right 09/20/2024    BREAST BIOPSY Right 09/20/2024    BREAST BIOPSY Right 11/01/2024    CYSTOSCOPY  06/08/2018    IR PORT PLACEMENT  11/6/2024    NO PAST SURGERIES      US GUIDED BREAST BIOPSY RIGHT COMPLETE Right 09/20/2024    US GUIDED BREAST BIOPSY RIGHT COMPLETE Right 11/1/2024    US GUIDED BREAST LYMPH NODE BIOPSY RIGHT Right 09/20/2024     Family History   Problem Relation Age of Onset    Hypertension Mother     Colonic polyp Mother     Colon cancer Mother     Arthritis Father     Diabetes Father     Hypertension Father     Hyperlipidemia Father     No Known Problems Sister     No Known Problems Sister     Other Brother         TBI from accident     No Known  Problems Son     No Known Problems Daughter     No Known Problems Maternal Aunt     No Known Problems Maternal Aunt     No Known Problems Maternal Aunt     No Known Problems Maternal Aunt     No Known Problems Maternal Aunt     No Known Problems Paternal Aunt     No Known Problems Paternal Aunt     No Known Problems Paternal Aunt     Colonic polyp Maternal Grandmother     Colon cancer Maternal Grandmother     Throat cancer Maternal Grandfather     No Known Problems Paternal Grandmother     No Known Problems Paternal Grandfather       reports that she quit smoking about 3 months ago. Her smoking use included cigarettes. She started smoking about 36 years ago. She has a 8.8 pack-year smoking history. She has never used smokeless tobacco. She reports that she does not drink alcohol and does not use drugs.  Current Outpatient Medications on File Prior to Visit   Medication Sig Dispense Refill    acetaminophen (TYLENOL) 500 mg tablet Take 1 tablet (500 mg total) by mouth every 6 (six) hours as needed for mild pain 30 tablet 0    atorvastatin (LIPITOR) 10 mg tablet Take 1 tablet (10 mg total) by mouth daily 90 tablet 1    Cholecalciferol (VITAMIN D3 PO) Take by mouth daily      cyclobenzaprine (FLEXERIL) 5 mg tablet Take 1 tablet (5 mg total) by mouth 3 (three) times a day as needed for muscle spasms 30 tablet 0    Diclofenac Sodium (VOLTAREN) 1 % Apply 2 g topically 4 (four) times a day 2 g 0    lidocaine-prilocaine (EMLA) cream Apply topically as needed for mild pain 1 g 0    lisinopril-hydrochlorothiazide (PRINZIDE,ZESTORETIC) 10-12.5 MG per tablet Take 1 tablet by mouth daily 90 tablet 1    ondansetron (ZOFRAN) 4 mg tablet Take 1 tablet (4 mg total) by mouth every 8 (eight) hours as needed for nausea or vomiting 30 tablet 2    naproxen (Naprosyn) 500 mg tablet Take 1 tablet (500 mg total) by mouth 2 (two) times a day with meals for 15 days 30 tablet 0     No current facility-administered medications on file prior to  "visit.   No Known Allergies   Current Outpatient Medications on File Prior to Visit   Medication Sig Dispense Refill    acetaminophen (TYLENOL) 500 mg tablet Take 1 tablet (500 mg total) by mouth every 6 (six) hours as needed for mild pain 30 tablet 0    atorvastatin (LIPITOR) 10 mg tablet Take 1 tablet (10 mg total) by mouth daily 90 tablet 1    Cholecalciferol (VITAMIN D3 PO) Take by mouth daily      cyclobenzaprine (FLEXERIL) 5 mg tablet Take 1 tablet (5 mg total) by mouth 3 (three) times a day as needed for muscle spasms 30 tablet 0    Diclofenac Sodium (VOLTAREN) 1 % Apply 2 g topically 4 (four) times a day 2 g 0    lidocaine-prilocaine (EMLA) cream Apply topically as needed for mild pain 1 g 0    lisinopril-hydrochlorothiazide (PRINZIDE,ZESTORETIC) 10-12.5 MG per tablet Take 1 tablet by mouth daily 90 tablet 1    ondansetron (ZOFRAN) 4 mg tablet Take 1 tablet (4 mg total) by mouth every 8 (eight) hours as needed for nausea or vomiting 30 tablet 2    naproxen (Naprosyn) 500 mg tablet Take 1 tablet (500 mg total) by mouth 2 (two) times a day with meals for 15 days 30 tablet 0     No current facility-administered medications on file prior to visit.      Social History     Tobacco Use    Smoking status: Former     Current packs/day: 0.00     Average packs/day: 0.3 packs/day for 35.0 years (8.8 ttl pk-yrs)     Types: Cigarettes     Start date: 1988     Quit date: 2024     Years since quittin.3    Smokeless tobacco: Never    Tobacco comments:     ONE HALF PACK A DAY OR LESS, CURRENT SOME DAY SMOKER, LIGHT TOBACCO SMOKER  AS PER [employer]   Vaping Use    Vaping status: Some Days   Substance and Sexual Activity    Alcohol use: No    Drug use: No    Sexual activity: Not Currently     Partners: Male     Birth control/protection: I.U.D.         Objective   /80 (Patient Position: Sitting, Cuff Size: Adult)   Pulse (!) 115   Temp 98.2 °F (36.8 °C) (Temporal)   Resp 18   Ht 5' 6\" (1.676 m)   Wt 62.1 " kg (137 lb)   LMP  (LMP Unknown) Comment: IUD  SpO2 99%   BMI 22.11 kg/m²     Pain Screening:  Pain Score: 0-No pain    ECOG   0    Physical Exam  Vitals and nursing note reviewed.   Constitutional:       General: She is not in acute distress.     Appearance: She is well-developed.   HENT:      Head: Normocephalic and atraumatic.   Eyes:      Conjunctiva/sclera: Conjunctivae normal.   Cardiovascular:      Rate and Rhythm: Normal rate.   Pulmonary:      Effort: Pulmonary effort is normal. No respiratory distress.   Chest:          Comments: Right breast: Palpable mass at 10:00.  No skin erythema/thickening.  No nipple inversion.  Palpable right axillary lymphadenopathy (mobile).  No palpable supra/infraclavicular lymphadenopathy.     Left breast: Negative exam    Musculoskeletal:         General: No swelling.      Cervical back: Neck supple.   Skin:     General: Skin is warm and dry.   Neurological:      General: No focal deficit present.      Mental Status: She is alert and oriented to person, place, and time.      Gait: Gait normal.   Psychiatric:         Mood and Affect: Mood normal.       Labs: I have reviewed the following labs:  Lab Results   Component Value Date/Time    WBC 5.95 01/07/2025 10:08 AM    RBC 3.25 (L) 01/07/2025 10:08 AM    Hemoglobin 10.3 (L) 01/07/2025 10:08 AM    Hematocrit 31.9 (L) 01/07/2025 10:08 AM    MCV 98 01/07/2025 10:08 AM    MCH 31.7 01/07/2025 10:08 AM    RDW 15.3 (H) 01/07/2025 10:08 AM    Platelets 250 01/07/2025 10:08 AM    Segmented % 60 01/07/2025 10:08 AM    Lymphocytes % 27 01/07/2025 10:08 AM    Monocytes % 5 01/07/2025 10:08 AM    Eosinophils Relative 7 (H) 01/07/2025 10:08 AM    Basophils Relative 1 01/07/2025 10:08 AM    Immature Grans % 0 01/07/2025 10:08 AM    Absolute Neutrophils 3.58 01/07/2025 10:08 AM     Lab Results   Component Value Date/Time    Potassium 4.0 01/07/2025 10:08 AM    Chloride 102 01/07/2025 10:08 AM    CO2 32 01/07/2025 10:08 AM    BUN 9  01/07/2025 10:08 AM    Creatinine 0.55 (L) 01/07/2025 10:08 AM    Glucose, Fasting 89 01/07/2025 10:08 AM    Calcium 9.3 01/07/2025 10:08 AM    AST 16 01/07/2025 10:08 AM    ALT 23 01/07/2025 10:08 AM    Alkaline Phosphatase 83 01/07/2025 10:08 AM    Total Protein 6.8 01/07/2025 10:08 AM    Albumin 4.3 01/07/2025 10:08 AM    Total Bilirubin 0.50 01/07/2025 10:08 AM    eGFR 108 01/07/2025 10:08 AM     Lab Results   Component Value Date/Time    WBC 5.95 01/07/2025 10:08 AM    RBC 3.25 (L) 01/07/2025 10:08 AM    Hemoglobin 10.3 (L) 01/07/2025 10:08 AM    Hematocrit 31.9 (L) 01/07/2025 10:08 AM    MCV 98 01/07/2025 10:08 AM    MCH 31.7 01/07/2025 10:08 AM    RDW 15.3 (H) 01/07/2025 10:08 AM    Platelets 250 01/07/2025 10:08 AM    Segmented % 60 01/07/2025 10:08 AM    Lymphocytes % 27 01/07/2025 10:08 AM    Monocytes % 5 01/07/2025 10:08 AM    Eosinophils Relative 7 (H) 01/07/2025 10:08 AM    Basophils Relative 1 01/07/2025 10:08 AM    Immature Grans % 0 01/07/2025 10:08 AM    Absolute Neutrophils 3.58 01/07/2025 10:08 AM      Lab Results   Component Value Date/Time    Sodium 140 01/07/2025 10:08 AM    Potassium 4.0 01/07/2025 10:08 AM    Chloride 102 01/07/2025 10:08 AM    CO2 32 01/07/2025 10:08 AM    ANION GAP 6 01/07/2025 10:08 AM    BUN 9 01/07/2025 10:08 AM    Creatinine 0.55 (L) 01/07/2025 10:08 AM    Glucose 93 12/27/2024 04:35 AM    Glucose, Fasting 89 01/07/2025 10:08 AM    Calcium 9.3 01/07/2025 10:08 AM    AST 16 01/07/2025 10:08 AM    ALT 23 01/07/2025 10:08 AM    Alkaline Phosphatase 83 01/07/2025 10:08 AM    Total Protein 6.8 01/07/2025 10:08 AM    Albumin 4.3 01/07/2025 10:08 AM    Total Bilirubin 0.50 01/07/2025 10:08 AM    eGFR 108 01/07/2025 10:08 AM      Radiology Results Review : No pertinent imaging studies reviewed.    Administrative Statements   I have spent a total time of 30 minutes in caring for this patient on the day of the visit/encounter including Diagnostic results, Prognosis, Risks and  "benefits of tx options, Instructions for management, Patient and family education, Importance of tx compliance, Risk factor reductions, Impressions, Counseling / Coordination of care, Documenting in the medical record, Reviewing / ordering tests, medicine, procedures  , Obtaining or reviewing history  , and Communicating with other healthcare professionals . Topics discussed with the patient / family include symptom assessment and management.    Pathology Result:  Final Diagnosis   Date Value Ref Range Status   11/01/2024   Final    A. 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 Right breast, 10:00, 7-cm from nipple, ultrasound-guided core biopsy:  - Invasive lobular carcinoma, modified Burk-Cabezas grade II, (tubules=3, nuclear pleomorphism=2, mitoses=1), measuring up to 1.2 cm.   - Atypical lobular hyperplasia.   - Calcifications associated with LCIS.    B. Right axillary lymph node, ultrasound-guided core biopsy:  - Infiltrating carcinoma, measuring up to 1.5 cm. Please see note.      12/23/2019   Final    A. Urine, Clean Catch, :  Negative for high grade urothelial carcinoma ([facility]) - see comment.  Benign urothelial cells.  Benign squamous cells.  Few neutrophils, lymphocytes and red blood cells.    Satisfactory for Evaluation.    Comment:  The above diagnostic category is from the recently published book, The [employer] System for Reporting Urinary Cytology, and is in keeping with the ongoing effort for utilization of standardized diagnostic terminology in urine cytology.*    *The [employer] System for Reporting Urinary Cytology. Joya Beltre, Charu Malave, Praneeth Ramires; 2016.          05/02/2018   Final    A. Urine, " Clean Catch:  Atypical urothelial cells (AUC) - see comment.  Scattered red blood cells.  Benign squamous cells.    Satisfactory for evaluation.    Comment:  The above diagnostic category is from the recently published book, The [employer] System for Reporting Urinary Cytology, and is in keeping with the ongoing effort for utilization of standardized diagnostic terminology in urine cytology.*    *The [employer] System for Reporting Urinary Cytology. Joya Beltre, Charu Malave, Praneeth Ramires; 2016.            Image Results:  Image result are reviewed and documented in Hematology/Oncology history    9/18/2024 bilateral 3D diagnostic mammogram and right axillary ultrasound tissue is extremely dense, previously noted calcification has nearly resolved, distortion in the upper outer right breast 10:00 with a sonographic correlate measuring 18 mm and multiple suspicious axillary nodes with cortical thickening and replaced fatty mikayla the largest of which measures 24 mm, left side is benign     9/20/2024 postbiopsy mammogram is concordant malignancy in the breast was thought to be unifocal, ultrasound showed multiple enlarged nodes, left side was benign     10/14/2024: CT chest/abdomen/pelvis:  1. Enlarged right axillary and subpectoral lymphadenopathy correlating to known history of breast cancer. No pulmonary nodules  No mediastinal or hilar lymphadenopathy. No contralateral axillary lymphadenopathy. No intra-abdominal metastatic disease. No adrenal or hepatic metastatic lesions. No suspicious osseous lesions   2. Stable right hepatic lobe hemangioma and cyst.   3. Stable prominent central mesenteric lymph nodes unchanged since 2018 unrelated to patient's breast cancer.     10/14/2024: Bone scan:  1. No scintigraphic evidence of osseous metastasis.     10/23/2024: MRI breast bilateral:  RIGHT  1) MASS E: There is a 20 mm x 15 mm x 17 mm irregularly shaped mass with heterogeneous internal enhancement seen in the upper  outer quadrant of the right breast at 10 o'clock, 7 cm from the nipple, which is isointense on T2 weighted images.  This is compatible with the biopsy-proven invasive lobular carcinoma.     This is located 5 mm anterior to the underlying pectoralis musculature, and 23 mm from the lateral skin surface.      2) LYMPH NODE F: There are bulky right axillary lymph nodes to include 1 with a biopsy marker clip in keeping with metastatic axillary node.  The largest node measures 27 x 37 mm (series [id number] image 48).   3) MASS G: There is a 6 mm round mass with circumscribed margins seen in the right breast at 5 o'clock, 4 cm from the nipple, which is hyperintense on T2 weighted images.  This has heterogeneous enhancement and mixed kinetics.  This is best visualized on series [id number] image 120.  Further characterization with targeted ultrasound is recommended.      Left  There are no suspicious enhancing masses or suspicious areas of non-mass enhancement. There is no axillary or internal mammary adenopathy.      1.5 cm T2 hyperintense lesion in the right lobe of the liver in keeping with hemangioma identified on recent CT chest abdomen pelvis.  This appears stable in size from 12/27/2016 right upper quadrant ultrasound.     IMPRESSION:   Redemonstration of a spiculated mass in the upper outer right breast in keeping with biopsy-proven invasive lobular carcinoma and bulky right axillary lymphadenopathy in keeping with metastatic nodes.  Indeterminate 6 mm mass at the 5 o'clock position right breast for which further characterization with targeted ultrasound is recommended.  If there is no ultrasound correlate MRI guided biopsy could be performed.  No suspicious enhancement in the left breast.      Disclaimer: This document was prepared using [employer] Direct technology. If a word or phrase is confusing, or does not make sense, this is likely due to recognition error which was not discovered during this clinician's review.  If you believe an error has occurred, please contact me through [employer] service line for darby?cation.

## 2025-01-14 ENCOUNTER — APPOINTMENT (OUTPATIENT)
Dept: LAB | Facility: CLINIC | Age: 55
End: 2025-01-14
Payer: COMMERCIAL

## 2025-01-14 DIAGNOSIS — C77.3 CARCINOMA OF RIGHT BREAST METASTATIC TO AXILLARY LYMPH NODE (HCC): ICD-10-CM

## 2025-01-14 DIAGNOSIS — C50.911 CARCINOMA OF RIGHT BREAST METASTATIC TO AXILLARY LYMPH NODE (HCC): ICD-10-CM

## 2025-01-14 LAB
ALBUMIN SERPL BCG-MCNC: 4.4 G/DL (ref 3.5–5)
ALP SERPL-CCNC: 75 U/L (ref 34–104)
ALT SERPL W P-5'-P-CCNC: 22 U/L (ref 7–52)
ANION GAP SERPL CALCULATED.3IONS-SCNC: 9 MMOL/L (ref 4–13)
AST SERPL W P-5'-P-CCNC: 14 U/L (ref 13–39)
BASOPHILS # BLD AUTO: 0.02 THOUSANDS/ΜL (ref 0–0.1)
BASOPHILS NFR BLD AUTO: 1 % (ref 0–1)
BILIRUB SERPL-MCNC: 0.54 MG/DL (ref 0.2–1)
BUN SERPL-MCNC: 10 MG/DL (ref 5–25)
CALCIUM SERPL-MCNC: 9.7 MG/DL (ref 8.4–10.2)
CHLORIDE SERPL-SCNC: 102 MMOL/L (ref 96–108)
CO2 SERPL-SCNC: 30 MMOL/L (ref 21–32)
CREAT SERPL-MCNC: 0.59 MG/DL (ref 0.6–1.3)
EOSINOPHIL # BLD AUTO: 0.51 THOUSAND/ΜL (ref 0–0.61)
EOSINOPHIL NFR BLD AUTO: 13 % (ref 0–6)
ERYTHROCYTE [DISTWIDTH] IN BLOOD BY AUTOMATED COUNT: 14.9 % (ref 11.6–15.1)
GFR SERPL CREATININE-BSD FRML MDRD: 105 ML/MIN/1.73SQ M
GLUCOSE P FAST SERPL-MCNC: 94 MG/DL (ref 65–99)
HCT VFR BLD AUTO: 31.4 % (ref 34.8–46.1)
HGB BLD-MCNC: 10.5 G/DL (ref 11.5–15.4)
IMM GRANULOCYTES # BLD AUTO: 0 THOUSAND/UL (ref 0–0.2)
IMM GRANULOCYTES NFR BLD AUTO: 0 % (ref 0–2)
LYMPHOCYTES # BLD AUTO: 1.63 THOUSANDS/ΜL (ref 0.6–4.47)
LYMPHOCYTES NFR BLD AUTO: 42 % (ref 14–44)
MCH RBC QN AUTO: 32.2 PG (ref 26.8–34.3)
MCHC RBC AUTO-ENTMCNC: 33.4 G/DL (ref 31.4–37.4)
MCV RBC AUTO: 96 FL (ref 82–98)
MONOCYTES # BLD AUTO: 0.11 THOUSAND/ΜL (ref 0.17–1.22)
MONOCYTES NFR BLD AUTO: 3 % (ref 4–12)
NEUTROPHILS # BLD AUTO: 1.65 THOUSANDS/ΜL (ref 1.85–7.62)
NEUTS SEG NFR BLD AUTO: 41 % (ref 43–75)
NRBC BLD AUTO-RTO: 0 /100 WBCS
PLATELET # BLD AUTO: 162 THOUSANDS/UL (ref 149–390)
PMV BLD AUTO: 10.8 FL (ref 8.9–12.7)
POTASSIUM SERPL-SCNC: 4 MMOL/L (ref 3.5–5.3)
PROT SERPL-MCNC: 7.2 G/DL (ref 6.4–8.4)
RBC # BLD AUTO: 3.26 MILLION/UL (ref 3.81–5.12)
SODIUM SERPL-SCNC: 141 MMOL/L (ref 135–147)
WBC # BLD AUTO: 3.92 THOUSAND/UL (ref 4.31–10.16)

## 2025-01-14 PROCEDURE — 36415 COLL VENOUS BLD VENIPUNCTURE: CPT

## 2025-01-14 PROCEDURE — 80053 COMPREHEN METABOLIC PANEL: CPT

## 2025-01-14 PROCEDURE — 85025 COMPLETE CBC W/AUTO DIFF WBC: CPT

## 2025-01-16 ENCOUNTER — HOSPITAL ENCOUNTER (OUTPATIENT)
Dept: INFUSION CENTER | Facility: CLINIC | Age: 55
Discharge: HOME | End: 2025-01-16
Payer: COMMERCIAL

## 2025-01-16 VITALS
WEIGHT: 138 LBS | SYSTOLIC BLOOD PRESSURE: 115 MMHG | OXYGEN SATURATION: 99 % | HEART RATE: 93 BPM | TEMPERATURE: 98.1 F | DIASTOLIC BLOOD PRESSURE: 74 MMHG | BODY MASS INDEX: 22.18 KG/M2 | HEIGHT: 66 IN

## 2025-01-16 DIAGNOSIS — C50.911 CARCINOMA OF RIGHT BREAST METASTATIC TO AXILLARY LYMPH NODE (HCC): Primary | ICD-10-CM

## 2025-01-16 DIAGNOSIS — C77.3 CARCINOMA OF RIGHT BREAST METASTATIC TO AXILLARY LYMPH NODE (HCC): Primary | ICD-10-CM

## 2025-01-16 PROCEDURE — 96367 TX/PROPH/DG ADDL SEQ IV INF: CPT

## 2025-01-16 PROCEDURE — 96417 CHEMO IV INFUS EACH ADDL SEQ: CPT

## 2025-01-16 PROCEDURE — 96413 CHEMO IV INFUSION 1 HR: CPT

## 2025-01-16 RX ORDER — SODIUM CHLORIDE 9 MG/ML
20 INJECTION, SOLUTION INTRAVENOUS ONCE
Status: COMPLETED | OUTPATIENT
Start: 2025-01-16 | End: 2025-01-16

## 2025-01-16 RX ORDER — SODIUM CHLORIDE 9 MG/ML
20 INJECTION, SOLUTION INTRAVENOUS ONCE
Status: CANCELLED | OUTPATIENT
Start: 2025-01-16

## 2025-01-16 RX ADMIN — DIPHENHYDRAMINE HYDROCHLORIDE 25 MG: 50 INJECTION, SOLUTION INTRAMUSCULAR; INTRAVENOUS at 12:02

## 2025-01-16 RX ADMIN — FAMOTIDINE 20 MG: 10 INJECTION INTRAVENOUS at 12:23

## 2025-01-16 RX ADMIN — Medication 171 MG: at 13:10

## 2025-01-16 RX ADMIN — CARBOPLATIN 180 MG: 10 INJECTION INTRAVENOUS at 14:14

## 2025-01-16 RX ADMIN — DEXAMETHASONE SODIUM PHOSPHATE: 10 INJECTION, SOLUTION INTRAMUSCULAR; INTRAVENOUS at 11:41

## 2025-01-16 RX ADMIN — SODIUM CHLORIDE 20 ML/HR: 0.9 INJECTION, SOLUTION INTRAVENOUS at 11:17

## 2025-01-16 NOTE — PROGRESS NOTES
Patient is here for D8C3 of abraxane and carboplatin. Patient offers no complaints at this time. Labs reviewed and meet parameters

## 2025-01-16 NOTE — PROGRESS NOTES
Patient tolerated her treatment without any adverse reactions. Next appointment confirmed 1/23/25 at 1230 at [place]. Copy of her schedule given

## 2025-01-17 ENCOUNTER — TELEPHONE (OUTPATIENT)
Dept: HEMATOLOGY ONCOLOGY | Facility: CLINIC | Age: 55
End: 2025-01-17

## 2025-01-17 DIAGNOSIS — C50.911 CARCINOMA OF RIGHT BREAST METASTATIC TO AXILLARY LYMPH NODE (HCC): Primary | ICD-10-CM

## 2025-01-17 DIAGNOSIS — C77.3 CARCINOMA OF RIGHT BREAST METASTATIC TO AXILLARY LYMPH NODE (HCC): Primary | ICD-10-CM

## 2025-01-17 NOTE — TELEPHONE ENCOUNTER
Scheduled additional appt based on schedule, waited to schedule new treatment cycles as they differ in schedule compared to existing appts. Left message to schedule these with the patient.

## 2025-01-17 NOTE — TELEPHONE ENCOUNTER
"Per Dr Garcia: \"Patient's C2D15 was cancelled due to COVID-19 infection, her subsequent infusion was labeled C3D1, as such she will need one additional infusion of paclitaxel and carboplatin prior to starting the cyclophosphamide/doxorubicin. \"    Please adjust schedule accordingly.  "

## 2025-01-21 ENCOUNTER — APPOINTMENT (OUTPATIENT)
Dept: LAB | Facility: CLINIC | Age: 55
End: 2025-01-21
Payer: COMMERCIAL

## 2025-01-21 DIAGNOSIS — C77.3 CARCINOMA OF RIGHT BREAST METASTATIC TO AXILLARY LYMPH NODE (HCC): ICD-10-CM

## 2025-01-21 DIAGNOSIS — C50.911 CARCINOMA OF RIGHT BREAST METASTATIC TO AXILLARY LYMPH NODE (HCC): ICD-10-CM

## 2025-01-21 LAB — T3FREE SERPL-MCNC: 2.76 PG/ML (ref 2.5–3.9)

## 2025-01-21 PROCEDURE — 84481 FREE ASSAY (FT-3): CPT

## 2025-01-22 RX ORDER — SODIUM CHLORIDE 9 MG/ML
20 INJECTION, SOLUTION INTRAVENOUS ONCE
Status: CANCELLED | OUTPATIENT
Start: 2025-01-23

## 2025-01-23 ENCOUNTER — HOSPITAL ENCOUNTER (OUTPATIENT)
Dept: INFUSION CENTER | Facility: CLINIC | Age: 55
Discharge: HOME | End: 2025-01-23
Payer: COMMERCIAL

## 2025-01-23 VITALS — HEIGHT: 65 IN | BODY MASS INDEX: 22.74 KG/M2 | WEIGHT: 136.5 LBS

## 2025-01-23 DIAGNOSIS — C77.3 CARCINOMA OF RIGHT BREAST METASTATIC TO AXILLARY LYMPH NODE (HCC): Primary | ICD-10-CM

## 2025-01-23 DIAGNOSIS — C50.911 CARCINOMA OF RIGHT BREAST METASTATIC TO AXILLARY LYMPH NODE (HCC): Primary | ICD-10-CM

## 2025-01-23 PROCEDURE — 96417 CHEMO IV INFUS EACH ADDL SEQ: CPT

## 2025-01-23 PROCEDURE — 96367 TX/PROPH/DG ADDL SEQ IV INF: CPT

## 2025-01-23 PROCEDURE — 96413 CHEMO IV INFUSION 1 HR: CPT

## 2025-01-23 RX ORDER — SODIUM CHLORIDE 9 MG/ML
20 INJECTION, SOLUTION INTRAVENOUS ONCE
Status: COMPLETED | OUTPATIENT
Start: 2025-01-23 | End: 2025-01-23

## 2025-01-23 RX ADMIN — FAMOTIDINE 20 MG: 10 INJECTION INTRAVENOUS at 13:30

## 2025-01-23 RX ADMIN — DIPHENHYDRAMINE HYDROCHLORIDE 25 MG: 50 INJECTION, SOLUTION INTRAMUSCULAR; INTRAVENOUS at 13:03

## 2025-01-23 RX ADMIN — DEXAMETHASONE SODIUM PHOSPHATE: 10 INJECTION, SOLUTION INTRAMUSCULAR; INTRAVENOUS at 12:39

## 2025-01-23 RX ADMIN — CARBOPLATIN 180 MG: 10 INJECTION INTRAVENOUS at 14:59

## 2025-01-23 RX ADMIN — SODIUM CHLORIDE 20 ML/HR: 0.9 INJECTION, SOLUTION INTRAVENOUS at 12:36

## 2025-01-23 RX ADMIN — Medication 171 MG: at 14:08

## 2025-01-23 NOTE — PROGRESS NOTES
Patient tolerated infusion without complications. Port flushed, good blood return noted. Patient aware of next appointment 1/30 at 1200. Patient declined AVS.

## 2025-01-28 ENCOUNTER — APPOINTMENT (OUTPATIENT)
Dept: LAB | Facility: CLINIC | Age: 55
End: 2025-01-28
Payer: COMMERCIAL

## 2025-01-28 DIAGNOSIS — C50.911 CARCINOMA OF RIGHT BREAST METASTATIC TO AXILLARY LYMPH NODE (HCC): ICD-10-CM

## 2025-01-28 DIAGNOSIS — C77.3 CARCINOMA OF RIGHT BREAST METASTATIC TO AXILLARY LYMPH NODE (HCC): ICD-10-CM

## 2025-01-28 LAB
ALBUMIN SERPL BCG-MCNC: 4.6 G/DL (ref 3.5–5)
ALP SERPL-CCNC: 85 U/L (ref 34–104)
ALT SERPL W P-5'-P-CCNC: 19 U/L (ref 7–52)
ANION GAP SERPL CALCULATED.3IONS-SCNC: 6 MMOL/L (ref 4–13)
AST SERPL W P-5'-P-CCNC: 13 U/L (ref 13–39)
BASOPHILS # BLD AUTO: 0.02 THOUSANDS/ΜL (ref 0–0.1)
BASOPHILS NFR BLD AUTO: 1 % (ref 0–1)
BILIRUB SERPL-MCNC: 0.57 MG/DL (ref 0.2–1)
BUN SERPL-MCNC: 12 MG/DL (ref 5–25)
CALCIUM SERPL-MCNC: 9.6 MG/DL (ref 8.4–10.2)
CHLORIDE SERPL-SCNC: 100 MMOL/L (ref 96–108)
CO2 SERPL-SCNC: 32 MMOL/L (ref 21–32)
CREAT SERPL-MCNC: 0.55 MG/DL (ref 0.6–1.3)
EOSINOPHIL # BLD AUTO: 0.12 THOUSAND/ΜL (ref 0–0.61)
EOSINOPHIL NFR BLD AUTO: 4 % (ref 0–6)
ERYTHROCYTE [DISTWIDTH] IN BLOOD BY AUTOMATED COUNT: 14.6 % (ref 11.6–15.1)
GFR SERPL CREATININE-BSD FRML MDRD: 108 ML/MIN/1.73SQ M
GLUCOSE SERPL-MCNC: 92 MG/DL (ref 65–140)
HCT VFR BLD AUTO: 30.4 % (ref 34.8–46.1)
HGB BLD-MCNC: 10.2 G/DL (ref 11.5–15.4)
IMM GRANULOCYTES # BLD AUTO: 0.01 THOUSAND/UL (ref 0–0.2)
IMM GRANULOCYTES NFR BLD AUTO: 0 % (ref 0–2)
LYMPHOCYTES # BLD AUTO: 1.52 THOUSANDS/ΜL (ref 0.6–4.47)
LYMPHOCYTES NFR BLD AUTO: 46 % (ref 14–44)
MCH RBC QN AUTO: 32.4 PG (ref 26.8–34.3)
MCHC RBC AUTO-ENTMCNC: 33.6 G/DL (ref 31.4–37.4)
MCV RBC AUTO: 97 FL (ref 82–98)
MONOCYTES # BLD AUTO: 0.14 THOUSAND/ΜL (ref 0.17–1.22)
MONOCYTES NFR BLD AUTO: 4 % (ref 4–12)
NEUTROPHILS # BLD AUTO: 1.48 THOUSANDS/ΜL (ref 1.85–7.62)
NEUTS SEG NFR BLD AUTO: 45 % (ref 43–75)
NRBC BLD AUTO-RTO: 0 /100 WBCS
PLATELET # BLD AUTO: 290 THOUSANDS/UL (ref 149–390)
PMV BLD AUTO: 10.4 FL (ref 8.9–12.7)
POTASSIUM SERPL-SCNC: 3.8 MMOL/L (ref 3.5–5.3)
PROT SERPL-MCNC: 7.2 G/DL (ref 6.4–8.4)
RBC # BLD AUTO: 3.15 MILLION/UL (ref 3.81–5.12)
SODIUM SERPL-SCNC: 138 MMOL/L (ref 135–147)
T3FREE SERPL-MCNC: 2.86 PG/ML (ref 2.5–3.9)
T4 FREE SERPL-MCNC: 1.04 NG/DL (ref 0.61–1.12)
TSH SERPL DL<=0.05 MIU/L-ACNC: 0.43 UIU/ML (ref 0.45–4.5)
WBC # BLD AUTO: 3.29 THOUSAND/UL (ref 4.31–10.16)

## 2025-01-28 PROCEDURE — 84443 ASSAY THYROID STIM HORMONE: CPT

## 2025-01-28 PROCEDURE — 85025 COMPLETE CBC W/AUTO DIFF WBC: CPT

## 2025-01-28 PROCEDURE — 36415 COLL VENOUS BLD VENIPUNCTURE: CPT

## 2025-01-28 PROCEDURE — 80053 COMPREHEN METABOLIC PANEL: CPT

## 2025-01-28 PROCEDURE — 84439 ASSAY OF FREE THYROXINE: CPT

## 2025-01-28 PROCEDURE — 84481 FREE ASSAY (FT-3): CPT

## 2025-01-29 RX ORDER — SODIUM CHLORIDE 9 MG/ML
20 INJECTION, SOLUTION INTRAVENOUS ONCE
Status: CANCELLED | OUTPATIENT
Start: 2025-01-30

## 2025-01-30 ENCOUNTER — HOSPITAL ENCOUNTER (OUTPATIENT)
Dept: INFUSION CENTER | Facility: CLINIC | Age: 55
Discharge: HOME | End: 2025-01-30
Payer: COMMERCIAL

## 2025-01-30 VITALS
WEIGHT: 137.5 LBS | SYSTOLIC BLOOD PRESSURE: 111 MMHG | HEART RATE: 88 BPM | TEMPERATURE: 98.6 F | BODY MASS INDEX: 22.91 KG/M2 | OXYGEN SATURATION: 97 % | HEIGHT: 65 IN | DIASTOLIC BLOOD PRESSURE: 76 MMHG

## 2025-01-30 DIAGNOSIS — C50.911 CARCINOMA OF RIGHT BREAST METASTATIC TO AXILLARY LYMPH NODE (HCC): Primary | ICD-10-CM

## 2025-01-30 DIAGNOSIS — C77.3 CARCINOMA OF RIGHT BREAST METASTATIC TO AXILLARY LYMPH NODE (HCC): Primary | ICD-10-CM

## 2025-01-30 PROCEDURE — 96367 TX/PROPH/DG ADDL SEQ IV INF: CPT

## 2025-01-30 PROCEDURE — 96413 CHEMO IV INFUSION 1 HR: CPT

## 2025-01-30 PROCEDURE — 96417 CHEMO IV INFUS EACH ADDL SEQ: CPT

## 2025-01-30 RX ORDER — SODIUM CHLORIDE 9 MG/ML
20 INJECTION, SOLUTION INTRAVENOUS ONCE
Status: COMPLETED | OUTPATIENT
Start: 2025-01-30 | End: 2025-01-30

## 2025-01-30 RX ADMIN — SODIUM CHLORIDE 200 MG: 9 INJECTION, SOLUTION INTRAVENOUS at 12:54

## 2025-01-30 RX ADMIN — CARBOPLATIN 180 MG: 10 INJECTION INTRAVENOUS at 16:10

## 2025-01-30 RX ADMIN — Medication 171 MG: at 15:03

## 2025-01-30 RX ADMIN — DEXAMETHASONE SODIUM PHOSPHATE: 10 INJECTION, SOLUTION INTRAMUSCULAR; INTRAVENOUS at 13:45

## 2025-01-30 RX ADMIN — DIPHENHYDRAMINE HYDROCHLORIDE 25 MG: 50 INJECTION, SOLUTION INTRAMUSCULAR; INTRAVENOUS at 14:08

## 2025-01-30 RX ADMIN — SODIUM CHLORIDE 20 ML/HR: 0.9 INJECTION, SOLUTION INTRAVENOUS at 12:52

## 2025-01-30 RX ADMIN — FAMOTIDINE 20 MG: 10 INJECTION INTRAVENOUS at 14:28

## 2025-01-30 NOTE — PROGRESS NOTES
Patient to infusion for Keytruda, Abraxane, carboplatin.  She offers no complaints.  Lab reviewed from 1/28/25, OK to treat order for ANC 1.48 placed in plan. Port accessed, good blood return noted.  Call bell within reach.

## 2025-02-04 ENCOUNTER — APPOINTMENT (OUTPATIENT)
Dept: LAB | Facility: CLINIC | Age: 55
End: 2025-02-04
Payer: COMMERCIAL

## 2025-02-04 ENCOUNTER — DOCUMENTATION (OUTPATIENT)
Dept: HEMATOLOGY ONCOLOGY | Facility: CLINIC | Age: 55
End: 2025-02-04

## 2025-02-04 ENCOUNTER — OFFICE VISIT (OUTPATIENT)
Dept: HEMATOLOGY ONCOLOGY | Facility: CLINIC | Age: 55
End: 2025-02-04
Payer: COMMERCIAL

## 2025-02-04 VITALS
WEIGHT: 142 LBS | SYSTOLIC BLOOD PRESSURE: 130 MMHG | RESPIRATION RATE: 17 BRPM | BODY MASS INDEX: 23.66 KG/M2 | HEART RATE: 111 BPM | HEIGHT: 65 IN | OXYGEN SATURATION: 99 % | DIASTOLIC BLOOD PRESSURE: 88 MMHG | TEMPERATURE: 98.9 F

## 2025-02-04 DIAGNOSIS — C77.3 CARCINOMA OF RIGHT BREAST METASTATIC TO AXILLARY LYMPH NODE (HCC): Primary | ICD-10-CM

## 2025-02-04 DIAGNOSIS — C77.3 CARCINOMA OF RIGHT BREAST METASTATIC TO AXILLARY LYMPH NODE (HCC): ICD-10-CM

## 2025-02-04 DIAGNOSIS — C50.411 MALIGNANT NEOPLASM OF UPPER-OUTER QUADRANT OF RIGHT BREAST IN FEMALE, ESTROGEN RECEPTOR POSITIVE (HCC): Primary | ICD-10-CM

## 2025-02-04 DIAGNOSIS — C50.911 CARCINOMA OF RIGHT BREAST METASTATIC TO AXILLARY LYMPH NODE (HCC): Primary | ICD-10-CM

## 2025-02-04 DIAGNOSIS — Z17.0 MALIGNANT NEOPLASM OF UPPER-OUTER QUADRANT OF RIGHT BREAST IN FEMALE, ESTROGEN RECEPTOR POSITIVE (HCC): Primary | ICD-10-CM

## 2025-02-04 DIAGNOSIS — C50.911 CARCINOMA OF RIGHT BREAST METASTATIC TO AXILLARY LYMPH NODE (HCC): ICD-10-CM

## 2025-02-04 LAB
ALBUMIN SERPL BCG-MCNC: 4.2 G/DL (ref 3.5–5)
ALP SERPL-CCNC: 69 U/L (ref 34–104)
ALT SERPL W P-5'-P-CCNC: 19 U/L (ref 7–52)
ANION GAP SERPL CALCULATED.3IONS-SCNC: 8 MMOL/L (ref 4–13)
AST SERPL W P-5'-P-CCNC: 16 U/L (ref 13–39)
BASOPHILS # BLD AUTO: 0.03 THOUSANDS/ΜL (ref 0–0.1)
BASOPHILS NFR BLD AUTO: 1 % (ref 0–1)
BILIRUB SERPL-MCNC: 0.34 MG/DL (ref 0.2–1)
BUN SERPL-MCNC: 12 MG/DL (ref 5–25)
CALCIUM SERPL-MCNC: 9.5 MG/DL (ref 8.4–10.2)
CHLORIDE SERPL-SCNC: 105 MMOL/L (ref 96–108)
CO2 SERPL-SCNC: 29 MMOL/L (ref 21–32)
CREAT SERPL-MCNC: 0.52 MG/DL (ref 0.6–1.3)
EOSINOPHIL # BLD AUTO: 0.16 THOUSAND/ΜL (ref 0–0.61)
EOSINOPHIL NFR BLD AUTO: 5 % (ref 0–6)
ERYTHROCYTE [DISTWIDTH] IN BLOOD BY AUTOMATED COUNT: 14.9 % (ref 11.6–15.1)
GFR SERPL CREATININE-BSD FRML MDRD: 110 ML/MIN/1.73SQ M
GLUCOSE SERPL-MCNC: 93 MG/DL (ref 65–140)
HCT VFR BLD AUTO: 28.6 % (ref 34.8–46.1)
HGB BLD-MCNC: 9.4 G/DL (ref 11.5–15.4)
IMM GRANULOCYTES # BLD AUTO: 0.01 THOUSAND/UL (ref 0–0.2)
IMM GRANULOCYTES NFR BLD AUTO: 0 % (ref 0–2)
LYMPHOCYTES # BLD AUTO: 1.3 THOUSANDS/ΜL (ref 0.6–4.47)
LYMPHOCYTES NFR BLD AUTO: 38 % (ref 14–44)
MCH RBC QN AUTO: 32.5 PG (ref 26.8–34.3)
MCHC RBC AUTO-ENTMCNC: 32.9 G/DL (ref 31.4–37.4)
MCV RBC AUTO: 99 FL (ref 82–98)
MONOCYTES # BLD AUTO: 0.09 THOUSAND/ΜL (ref 0.17–1.22)
MONOCYTES NFR BLD AUTO: 3 % (ref 4–12)
NEUTROPHILS # BLD AUTO: 1.84 THOUSANDS/ΜL (ref 1.85–7.62)
NEUTS SEG NFR BLD AUTO: 53 % (ref 43–75)
NRBC BLD AUTO-RTO: 0 /100 WBCS
PLATELET # BLD AUTO: 227 THOUSANDS/UL (ref 149–390)
PMV BLD AUTO: 10.5 FL (ref 8.9–12.7)
POTASSIUM SERPL-SCNC: 4.1 MMOL/L (ref 3.5–5.3)
PROT SERPL-MCNC: 6.3 G/DL (ref 6.4–8.4)
RBC # BLD AUTO: 2.89 MILLION/UL (ref 3.81–5.12)
SODIUM SERPL-SCNC: 142 MMOL/L (ref 135–147)
T3FREE SERPL-MCNC: 3.11 PG/ML (ref 2.5–3.9)
TSH SERPL DL<=0.05 MIU/L-ACNC: 0.6 UIU/ML (ref 0.45–4.5)
WBC # BLD AUTO: 3.43 THOUSAND/UL (ref 4.31–10.16)

## 2025-02-04 PROCEDURE — 84443 ASSAY THYROID STIM HORMONE: CPT

## 2025-02-04 PROCEDURE — 84481 FREE ASSAY (FT-3): CPT

## 2025-02-04 PROCEDURE — 80053 COMPREHEN METABOLIC PANEL: CPT

## 2025-02-04 PROCEDURE — 36415 COLL VENOUS BLD VENIPUNCTURE: CPT

## 2025-02-04 PROCEDURE — 99214 OFFICE O/P EST MOD 30 MIN: CPT | Performed by: INTERNAL MEDICINE

## 2025-02-04 PROCEDURE — 85025 COMPLETE CBC W/AUTO DIFF WBC: CPT

## 2025-02-04 RX ORDER — SODIUM CHLORIDE 9 MG/ML
20 INJECTION, SOLUTION INTRAVENOUS ONCE
Status: CANCELLED | OUTPATIENT
Start: 2025-02-07

## 2025-02-04 NOTE — PROGRESS NOTES
A Day 22 has been added to cycle 4,  Pt had COVID and did not get D15 of cycle 2.  Spoke with Mamta at [facility] and she will reschedule appts and print out a new appt calendar at her tx appt this week.  Telephone call using [employer]  #[phone], spoke with Pt.  Updated the above information.  She stated she understands.

## 2025-02-04 NOTE — PROGRESS NOTES
Name: Romina Cleaning      : 1970      MRN: [id number]  Encounter Provider: Graciela Garcia DO  Encounter Date: 2025   Encounter department: [facility]  :  Assessment & Plan  Malignant neoplasm of upper-outer quadrant of right breast in female, estrogen receptor positive (HCC)   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 (HCC)  Staging form: Breast, AJCC 8th Edition  - Clinical stage from 2024: cT0, cN1(f), cM0, GX, ER-, ND-, HER2- - Signed by Jodie Philippe MD on 10/7/2024  Stage prefix: Initial diagnosis  Method of lymph node assessment: Core biopsy  Histologic grading system: 3 grade system    Malignant neoplasm of upper-outer quadrant of right breast in female, estrogen receptor positive (HCC)  Staging form: Breast, AJCC 8th Edition  - Clinical stage from 2024: Stage Unknown (cT1c, cNX, cM0, G2, ER+, ND+, HER2-) - Signed by Jodie Philippe MD on 10/7/2024  Stage prefix: Initial diagnosis  Histologic grading system: 3 grade system    1.  cT1c cNx cM0 Right breast invasive lobular carcinoma.  Grade 2.  ER positive (>95%), ND positive (70%), HER2 negative by IHC.  MammaPrint: Ultralow risk (luminal A).     2.  cT0 cN1 cM0 Right axillary lymph node biopsy ER negative, ND negative, HER2 negative  3.  BRCA1/2 negative     Romina Cleaning is a 54 y.o. 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 She initially palpated a mass in her right breast which she brought to medical attention.  She underwent ultrasound-guided biopsy which was consistent with an invasive lobular carcinoma, ER positive, ND positive, HER2 negative.   MammaPrint resulted ultralow risk (luminal A). Right axillary lymph node biopsy showed infiltrating  carcinoma which was ER negative, NE negative and HER2 negative.  Genetic testing for BRCA1/2. She has been evaluated by breast surgery (Dr. Philippe) and given the 2 different pathologies from the breast mass and axillary lymph node she underwent breast MRI on 10/23/2024 which redemonstrates a mass in the upper outer right breast consistent with the biopsy-proven invasive lobular carcinoma and right axillary lymphadenopathy.  Indeterminant 6 mm mass at the 5 o'clock position right breast for which ultrasound was recommended (if there is no ultrasound correlate MRI guided biopsy would be performed).  CT of the chest, abdomen, pelvis notes enlarged right axillary and subpectoral lymphadenopathy and no other suspicious lesions.  Bone scan with no evidence of osseous metastasis. She is following with OB/GYN and had Mirena IUD removal.     She has 2 different pathologies (invasive lobular carcinoma, ER/NE positive, HER2 negative with a ultralow risk MammaPrint results as well as a triple negative carcinoma noted on axillary lymph node biopsy).  Bilateral breast MRI noted spiculated mass in the upper outer right breast consistent with biopsy-proven invasive lobular carcinoma as well as an indeterminate 6 mm mass at the 5 o'clock position for which further characterization with ultrasound was recommended.  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     She started neoadjuvant chemotherapy for triple negative, node positive carcinoma on 11/14/2024.  Unfortunately, patient with reaction to Taxol and subsequent treatment switched to Abraxane.    She may benefit from hormone therapy in the adjuvant setting for her ER/NE+ carcinoma. We will visit this decision after surgery.     Treatment plan: Keynote 522 regimen  Neoadjuvant pembrolizumab 200 mg day 1, Paclitaxel 80 mg/m2 day 1, Carboplatin AUC 1.5 on days 1, 8, 15 of a 21-day cycle for 4 cycles.  >> Switched paclitaxel to  Abraxane 100 mg/m2 D1,8,15 after C1D8 given reaction to paclitaxel.   Then neoadjuvant pembrolizumab 200 mg, cyclophosphamide 600 mg/m2, doxorubicin 60 mg/m2 on day 1 of a 21-day cycle for 4 cycles.   Then adjuvant course of pembrolizumab 200 mg on day 1 of a 21-day cycle for 9 cycles pending final pathology review.     Summary:  Continue neoadjuvant chemotherapy.  Follow-up in 3 weeks.       Carcinoma of right breast metastatic to axillary lymph node (HCC)             History of Present Illness   Chief Complaint   Patient presents with    Follow-up     Oncology History   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 (HCC)  Staging form: Breast, AJCC 8th Edition  - Clinical stage from 9/20/2024: cT0, cN1(f), cM0, GX, ER-, MO-, HER2- - Signed by Jodie Philippe MD on 10/7/2024  Stage prefix: Initial diagnosis  Method of lymph node assessment: Core biopsy  Histologic grading system: 3 grade system    Malignant neoplasm of upper-outer quadrant of right breast in female, estrogen receptor positive (HCC)  Staging form: Breast, AJCC 8th Edition  - Clinical stage from 9/20/2024: Stage Unknown (cT1c, cNX, cM0, G2, ER+, MO+, HER2-) - Signed by Jodie Philippe MD on 10/7/2024  Stage prefix: Initial diagnosis  Histologic grading system: 3 grade system  Oncology History   Carcinoma of right breast metastatic to axillary lymph node (HCC)   6/11/2024 Initial Diagnosis    Carcinoma of right breast metastatic to axillary lymph node (HCC)     9/20/2024 -  Cancer Staged    Staging form: Breast, AJCC 8th Edition  - Clinical stage from 9/20/2024: cT0, cN1(f), cM0, GX, ER-, MO-, HER2- - Signed by Jodie Philippe MD on 10/7/2024  Stage prefix: Initial diagnosis  Method of lymph node assessment: Core biopsy  Histologic grading system: 3 grade system       11/14/2024 -  Chemotherapy    DOXOrubicin (ADRIAMYCIN), 60 mg/m2 = 103 mg, Intravenous, Once, 0 of 4 cycles  alteplase (CATHFLO), 2 mg, Intracatheter, Every 1  Minute as needed, 4 of 18 cycles  pegfilgrastim-apgf (Nyvepria), 6 mg, Subcutaneous, Once, 0 of 4 cycles  sodium chloride, 500 mL (100 % of original dose 500 mL), Intravenous, Once, 1 of 1 cycle  Dose modification: 500 mL (original dose 500 mL, Cycle 1)  Administration: 500 mL (11/14/2024)  cyclophosphamide (CYTOXAN) IVPB, 600 mg/m2 = 1,026 mg, Intravenous, Once, 0 of 4 cycles  fosaprepitant (EMEND) IVPB, 150 mg, Intravenous, Once, 0 of 4 cycles  paclitaxel protein-bound (ABRAXANE) IVPB, 100 mg/m2 = 171 mg (original dose ), Intravenous, Once, 4 of 5 cycles  Dose modification: 100 mg/m2 (original dose 100 mg/m2, Cycle 1)  Administration: 171 mg (12/5/2024), 171 mg (12/12/2024), 171 mg (12/19/2024), 171 mg (1/9/2025), 171 mg (1/16/2025), 171 mg (1/23/2025), 171 mg (1/30/2025)  CARBOplatin (PARAPLATIN) IVPB (GOG AUC DOSING), 180 mg, Intravenous, Once, 4 of 5 cycles  Administration: 180 mg (11/14/2024), 180 mg (11/21/2024), 180 mg (12/5/2024), 180 mg (12/12/2024), 180 mg (12/19/2024), 180 mg (1/9/2025), 180 mg (1/16/2025), 180 mg (1/23/2025), 180 mg (1/30/2025)  PACLItaxel (TAXOL) chemo IVPB, 80 mg/m2 = 136.8 mg, Intravenous, Once, 1 of 1 cycle  Administration: 136.8 mg (11/14/2024), 136.8 mg (11/21/2024)  pembrolizumab (KEYTRUDA) IVPB, 200 mg, Intravenous, Once, 4 of 18 cycles  Administration: 200 mg (11/14/2024), 200 mg (12/12/2024), 200 mg (1/9/2025), 200 mg (1/30/2025)     Malignant neoplasm of upper-outer quadrant of right breast in female, estrogen receptor positive (HCC)   9/20/2024 Biopsy    Right breast ultrasound-guided biopsy  A. 10 o'clock, 7 cm from nipple (MARTHA)  Invasive lobular carcinoma  Grade 2  ER >95, KY 70, HER2 0  Lymphovascular invasion not identified    B. Right axillary lymph node biopsy (MARTHA)  Infiltrating carcinoma  Although no definitive capsule is noted, it is favored to represent lymph node involvement by th e carcinoma  ER <1, KY <1, HER2 1+    Concordant. Malignancy appears unifocal;  suspicious masses cover an area up to 1.8 cm. US of right axilla showed multiple enlarged, morphologically abnormal axillary lymph nodes with biopsy confirmed metastatic disease. Left breast clear.     9/20/2024 -  Cancer Staged    Staging form: Breast, AJCC 8th Edition  - Clinical stage from 9/20/2024: Stage Unknown (cT1c, cNX, cM0, G2, ER+, MT+, HER2-) - Signed by Jodie Philippe MD on 10/7/2024  Stage prefix: Initial diagnosis  Histologic grading system: 3 grade system       9/24/2024 Genomic Testing    MammaPrint/BluePrint ordered on breast specimen block A  Ultra-Low risk - luminal A     9/27/2024 Genetic Testing    Genetic testing with RNA analysis ordered  [facility]        Primary Diagnosis:  1.  cT1c cNx cM0 Right breast invasive lobular carcinoma.  Grade 2.  ER positive (>95%), MT positive (70%), HER2 negative by IHC.  MammaPrint: Ultralow risk (luminal A).     2.  cT0 cN1 cM0 Right axillary lymph node biopsy ER negative, MT negative, HER2 negative  3.  BRCA1/2 negative      Previous Hematologic/ Oncologic Treatment:      Current Hematologic/ Oncologic Treatment:      PHYSICIAN RECOMMENDED PLAN:  Recommend awaiting MRI results for consideration of neoadjuvant chemotherapy    Pertinent Medical History   Romina Cleaning is a 54 y.o. postmenopausal female with PMHx significant for HTN who recently felt a mass in her right breast which she brought to medical attention.  She underwent ultrasound-guided biopsy which was consistent with an invasive lobular carcinoma, ER positive, MT positive, HER2 negative.  Right axillary lymph node biopsy showed infiltrating carcinoma which was ER negative, MT negative and HER2 negative.  MammaPrint resulted ultralow risk (luminal A), genetic testing pending.  She has been evaluated by breast surgery (Dr. Philippe) and given the 2 different pathologies from the breast mass and axillary lymph node she has been recommended a breast MRI to evaluate for an occult site.  CT of the chest,  abdomen, pelvis notes enlarged right axillary and subpectoral lymphadenopathy and no other suspicious lesions.  Bone scan with no evidence of osseous metastasis.  Patient presents for initial medical oncology consultation.  Patient's case was submitted to be discussed at the multidisciplinary breast tumor board on 10/21/2024.  She is following with OB/GYN and had Mirena IUD removal. Pt presents for initial medical oncology consultation accompanied by her son and sister for visit.  She denies any chest pain, palpitations, bone pain or other complaints.     OB/GYN history:  G2, P2  Menarche: 15 years old  Menopause: in mid 40's  Age of first pregnancy: 29 years old     Maternal grandmother, maternal uncle and mother with colon cancer. Maternal grandfather with throat cancer (smoker).      Patient declined , instead preferred to have her sister and son translate.     Interval History:   Accompanied by sister, son and mother.  Her case was discussed in the multidisciplinary tumor board in the interim.  Recommendations for neoadjuvant chemotherapy to treat node positive, triple negative disease.  Patient offers no complaints today.     Interval History 11/21/24:   Patient presents for urgent visit.  She started treatment on 11/14/2024, but had a reaction to Taxol on day 1 and 8 requiring further treatment to be held.  Patient noted experiencing facial flushing, abdominal cramping (9/10) and Taxol infusion stopped requiring hypersensitivity protocol.  Patient currently denies any chest pain, palpitations, respiratory symptoms.    02/04/25:   Accompanied by mother for visit today.  Continues to receive neoadjuvant chemotherapy which she is tolerating well.  Denies any respiratory symptoms or bone pain.  Notes improvement in her breast mass.  Denies neuropathy.     Review of Systems   Constitutional:  Negative for chills and fever.   Respiratory:  Negative for cough and shortness of breath.    Cardiovascular:   "Negative for chest pain and palpitations.   Gastrointestinal:  Negative for abdominal pain.   Genitourinary:  Negative for hematuria.   Skin:  Negative for rash.   Hematological:  Does not bruise/bleed easily.   All other systems reviewed and are negative.          Objective   /88 (BP Location: Left arm, Patient Position: Sitting, Cuff Size: Standard)   Pulse (!) 111   Temp 98.9 °F (37.2 °C) (Temporal)   Resp 17   Ht 5' 5\" (1.651 m)   Wt 64.4 kg (142 lb)   LMP  (LMP Unknown) Comment: IUD  SpO2 99%   BMI 23.63 kg/m²     Pain Screening:  Pain Score: 0-No pain  ECOG   0  Physical Exam  Vitals reviewed.   Constitutional:       General: She is not in acute distress.     Appearance: Normal appearance.   HENT:      Head: Normocephalic and atraumatic.      Mouth/Throat:      Mouth: Mucous membranes are moist.   Eyes:      Extraocular Movements: Extraocular movements intact.      Conjunctiva/sclera: Conjunctivae normal.   Cardiovascular:      Rate and Rhythm: Normal rate.   Pulmonary:      Effort: Pulmonary effort is normal. No respiratory distress.      Breath sounds: No wheezing, rhonchi or rales.   Chest:          Comments: Right breast: Palpable mass at 10:00.  No skin erythema/thickening.  No nipple inversion.  Palpable right axillary lymphadenopathy (mobile).  No palpable supra/infraclavicular lymphadenopathy.     Left breast: Negative exam    Abdominal:      General: Abdomen is flat. There is no distension.      Palpations: Abdomen is soft.   Musculoskeletal:      Cervical back: Normal range of motion and neck supple.      Right lower leg: No edema.      Left lower leg: No edema.   Skin:     General: Skin is warm.      Coloration: Skin is not pale.   Neurological:      Mental Status: She is alert and oriented to person, place, and time. Mental status is at baseline.      Cranial Nerves: No cranial nerve deficit.   Psychiatric:         Thought Content: Thought content normal.         Labs: I have reviewed " the following labs:  Lab Results   Component Value Date/Time    WBC 3.43 (L) 02/04/2025 09:17 AM    RBC 2.89 (L) 02/04/2025 09:17 AM    Hemoglobin 9.4 (L) 02/04/2025 09:17 AM    Hematocrit 28.6 (L) 02/04/2025 09:17 AM    MCV 99 (H) 02/04/2025 09:17 AM    MCH 32.5 02/04/2025 09:17 AM    RDW 14.9 02/04/2025 09:17 AM    Platelets 227 02/04/2025 09:17 AM    Segmented % 53 02/04/2025 09:17 AM    Lymphocytes % 38 02/04/2025 09:17 AM    Monocytes % 3 (L) 02/04/2025 09:17 AM    Eosinophils Relative 5 02/04/2025 09:17 AM    Basophils Relative 1 02/04/2025 09:17 AM    Immature Grans % 0 02/04/2025 09:17 AM    Absolute Neutrophils 1.84 (L) 02/04/2025 09:17 AM     Lab Results   Component Value Date/Time    Potassium 3.8 01/28/2025 09:20 AM    Chloride 100 01/28/2025 09:20 AM    CO2 32 01/28/2025 09:20 AM    BUN 12 01/28/2025 09:20 AM    Creatinine 0.55 (L) 01/28/2025 09:20 AM    Glucose, Fasting 95 01/21/2025 09:45 AM    Calcium 9.6 01/28/2025 09:20 AM    Corrected Calcium 9.9 01/21/2025 09:45 AM    AST 13 01/28/2025 09:20 AM    ALT 19 01/28/2025 09:20 AM    Alkaline Phosphatase 85 01/28/2025 09:20 AM    Total Protein 7.2 01/28/2025 09:20 AM    Albumin 4.6 01/28/2025 09:20 AM    Total Bilirubin 0.57 01/28/2025 09:20 AM    eGFR 108 01/28/2025 09:20 AM           9/18/2024 bilateral 3D diagnostic mammogram and right axillary ultrasound tissue is extremely dense, previously noted calcification has nearly resolved, distortion in the upper outer right breast 10:00 with a sonographic correlate measuring 18 mm and multiple suspicious axillary nodes with cortical thickening and replaced fatty mikayla the largest of which measures 24 mm, left side is benign     9/20/2024 postbiopsy mammogram is concordant malignancy in the breast was thought to be unifocal, ultrasound showed multiple enlarged nodes, left side was benign     10/14/2024: CT chest/abdomen/pelvis:  1. Enlarged right axillary and subpectoral lymphadenopathy correlating to known  history of breast cancer. No pulmonary nodules  No mediastinal or hilar lymphadenopathy. No contralateral axillary lymphadenopathy. No intra-abdominal metastatic disease. No adrenal or hepatic metastatic lesions. No suspicious osseous lesions   2. Stable right hepatic lobe hemangioma and cyst.   3. Stable prominent central mesenteric lymph nodes unchanged since 2018 unrelated to patient's breast cancer.     10/14/2024: Bone scan:  1. No scintigraphic evidence of osseous metastasis.      10/23/2024: MRI breast bilateral:  RIGHT  1) MASS E: There is a 20 mm x 15 mm x 17 mm irregularly shaped mass with heterogeneous internal enhancement seen in the upper outer quadrant of the right breast at 10 o'clock, 7 cm from the nipple, which is isointense on T2 weighted images.  This is compatible with the biopsy-proven invasive lobular carcinoma.      This is located 5 mm anterior to the underlying pectoralis musculature, and 23 mm from the lateral skin surface.      2) LYMPH NODE F: There are bulky right axillary lymph nodes to include 1 with a biopsy marker clip in keeping with metastatic axillary node.  The largest node measures 27 x 37 mm (series [id number] image 48).   3) MASS G: There is a 6 mm round mass with circumscribed margins seen in the right breast at 5 o'clock, 4 cm from the nipple, which is hyperintense on T2 weighted images.  This has heterogeneous enhancement and mixed kinetics.  This is best visualized on series [id number] image 120.  Further characterization with targeted ultrasound is recommended.      Left  There are no suspicious enhancing masses or suspicious areas of non-mass enhancement. There is no axillary or internal mammary adenopathy.      1.5 cm T2 hyperintense lesion in the right lobe of the liver in keeping with hemangioma identified on recent CT chest abdomen pelvis.  This appears stable in size from 12/27/2016 right upper quadrant ultrasound.     IMPRESSION:   Redemonstration of a spiculated mass in the  upper outer right breast in keeping with biopsy-proven invasive lobular carcinoma and bulky right axillary lymphadenopathy in keeping with metastatic nodes.  Indeterminate 6 mm mass at the 5 o'clock position right breast for which further characterization with targeted ultrasound is recommended.  If there is no ultrasound correlate MRI guided biopsy could be performed.  No suspicious enhancement in the left breast.

## 2025-02-04 NOTE — ASSESSMENT & PLAN NOTE
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 (HCC)  Staging form: Breast, AJCC 8th Edition  - Clinical stage from 9/20/2024: cT0, cN1(f), cM0, GX, ER-, CA-, HER2- - Signed by Jodie Philippe MD on 10/7/2024  Stage prefix: Initial diagnosis  Method of lymph node assessment: Core biopsy  Histologic grading system: 3 grade system    Malignant neoplasm of upper-outer quadrant of right breast in female, estrogen receptor positive (HCC)  Staging form: Breast, AJCC 8th Edition  - Clinical stage from 9/20/2024: Stage Unknown (cT1c, cNX, cM0, G2, ER+, CA+, HER2-) - Signed by Jodie Philippe MD on 10/7/2024  Stage prefix: Initial diagnosis  Histologic grading system: 3 grade system    1.  cT1c cNx cM0 Right breast invasive lobular carcinoma.  Grade 2.  ER positive (>95%), CA positive (70%), HER2 negative by IHC.  MammaPrint: Ultralow risk (luminal A).     2.  cT0 cN1 cM0 Right axillary lymph node biopsy ER negative, CA negative, HER2 negative  3.  BRCA1/2 negative     Romina Cleaning is a 54 y.o. 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 She initially palpated a mass in her right breast which she brought to medical attention.  She underwent ultrasound-guided biopsy which was consistent with an invasive lobular carcinoma, ER positive, CA positive, HER2 negative.   MammaPrint resulted ultralow risk (luminal A). Right axillary lymph node biopsy showed infiltrating carcinoma which was ER negative, CA negative and HER2 negative.  Genetic testing for BRCA1/2. She has been evaluated by breast surgery (Dr. Philippe) and given the 2 different pathologies from the breast mass and axillary lymph node she underwent breast MRI on 10/23/2024 which redemonstrates a mass in the upper outer right breast consistent with  the biopsy-proven invasive lobular carcinoma and right axillary lymphadenopathy.  Indeterminant 6 mm mass at the 5 o'clock position right breast for which ultrasound was recommended (if there is no ultrasound correlate MRI guided biopsy would be performed).  CT of the chest, abdomen, pelvis notes enlarged right axillary and subpectoral lymphadenopathy and no other suspicious lesions.  Bone scan with no evidence of osseous metastasis. She is following with OB/GYN and had Mirena IUD removal.     She has 2 different pathologies (invasive lobular carcinoma, ER/CA positive, HER2 negative with a ultralow risk MammaPrint results as well as a triple negative carcinoma noted on axillary lymph node biopsy).  Bilateral breast MRI noted spiculated mass in the upper outer right breast consistent with biopsy-proven invasive lobular carcinoma as well as an indeterminate 6 mm mass at the 5 o'clock position for which further characterization with ultrasound was recommended.  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     She started neoadjuvant chemotherapy for triple negative, node positive carcinoma on 11/14/2024.  Unfortunately, patient with reaction to Taxol and subsequent treatment switched to Abraxane.    She may benefit from hormone therapy in the adjuvant setting for her ER/CA+ carcinoma. We will visit this decision after surgery.     Treatment plan: Keynote 522 regimen  Neoadjuvant pembrolizumab 200 mg day 1, Paclitaxel 80 mg/m2 day 1, Carboplatin AUC 1.5 on days 1, 8, 15 of a 21-day cycle for 4 cycles.  >> Switched paclitaxel to Abraxane 100 mg/m2 D1,8,15 after C1D8 given reaction to paclitaxel.   Then neoadjuvant pembrolizumab 200 mg, cyclophosphamide 600 mg/m2, doxorubicin 60 mg/m2 on day 1 of a 21-day cycle for 4 cycles.   Then adjuvant course of pembrolizumab 200 mg on day 1 of a 21-day cycle for 9 cycles pending final pathology review.     Summary:  Continue  neoadjuvant chemotherapy.  Follow-up in 3 weeks.

## 2025-02-06 ENCOUNTER — HOSPITAL ENCOUNTER (OUTPATIENT)
Dept: INFUSION CENTER | Facility: CLINIC | Age: 55
End: 2025-02-06

## 2025-02-07 ENCOUNTER — HOSPITAL ENCOUNTER (OUTPATIENT)
Dept: INFUSION CENTER | Facility: CLINIC | Age: 55
End: 2025-02-07
Payer: COMMERCIAL

## 2025-02-07 VITALS
RESPIRATION RATE: 18 BRPM | HEART RATE: 89 BPM | WEIGHT: 141 LBS | SYSTOLIC BLOOD PRESSURE: 107 MMHG | OXYGEN SATURATION: 98 % | TEMPERATURE: 98.9 F | HEIGHT: 65 IN | BODY MASS INDEX: 23.49 KG/M2 | DIASTOLIC BLOOD PRESSURE: 72 MMHG

## 2025-02-07 DIAGNOSIS — C77.3 CARCINOMA OF RIGHT BREAST METASTATIC TO AXILLARY LYMPH NODE (HCC): Primary | ICD-10-CM

## 2025-02-07 DIAGNOSIS — C50.911 CARCINOMA OF RIGHT BREAST METASTATIC TO AXILLARY LYMPH NODE (HCC): Primary | ICD-10-CM

## 2025-02-07 PROCEDURE — 96367 TX/PROPH/DG ADDL SEQ IV INF: CPT

## 2025-02-07 PROCEDURE — 96417 CHEMO IV INFUS EACH ADDL SEQ: CPT

## 2025-02-07 PROCEDURE — 96413 CHEMO IV INFUSION 1 HR: CPT

## 2025-02-07 RX ORDER — SODIUM CHLORIDE 9 MG/ML
20 INJECTION, SOLUTION INTRAVENOUS ONCE
Status: COMPLETED | OUTPATIENT
Start: 2025-02-07 | End: 2025-02-07

## 2025-02-07 RX ADMIN — CARBOPLATIN 180 MG: 10 INJECTION INTRAVENOUS at 16:12

## 2025-02-07 RX ADMIN — Medication 171 MG: at 15:15

## 2025-02-07 RX ADMIN — SODIUM CHLORIDE 20 ML/HR: 0.9 INJECTION, SOLUTION INTRAVENOUS at 13:30

## 2025-02-07 RX ADMIN — DIPHENHYDRAMINE HYDROCHLORIDE 25 MG: 50 INJECTION, SOLUTION INTRAMUSCULAR; INTRAVENOUS at 14:18

## 2025-02-07 RX ADMIN — DEXAMETHASONE SODIUM PHOSPHATE: 10 INJECTION, SOLUTION INTRAMUSCULAR; INTRAVENOUS at 13:32

## 2025-02-07 RX ADMIN — FAMOTIDINE 20 MG: 10 INJECTION INTRAVENOUS at 14:40

## 2025-02-11 ENCOUNTER — TELEPHONE (OUTPATIENT)
Age: 55
End: 2025-02-11

## 2025-02-11 ENCOUNTER — APPOINTMENT (OUTPATIENT)
Dept: LAB | Facility: CLINIC | Age: 55
End: 2025-02-11
Payer: COMMERCIAL

## 2025-02-11 DIAGNOSIS — C50.411 MALIGNANT NEOPLASM OF UPPER-OUTER QUADRANT OF RIGHT FEMALE BREAST, UNSPECIFIED ESTROGEN RECEPTOR STATUS (HCC): ICD-10-CM

## 2025-02-11 DIAGNOSIS — C50.411 MALIGNANT NEOPLASM OF UPPER-OUTER QUADRANT OF RIGHT FEMALE BREAST, UNSPECIFIED ESTROGEN RECEPTOR STATUS (HCC): Primary | ICD-10-CM

## 2025-02-11 LAB
ALBUMIN SERPL BCG-MCNC: 4.3 G/DL (ref 3.5–5)
ALP SERPL-CCNC: 77 U/L (ref 34–104)
ALT SERPL W P-5'-P-CCNC: 32 U/L (ref 7–52)
ANION GAP SERPL CALCULATED.3IONS-SCNC: 9 MMOL/L (ref 4–13)
AST SERPL W P-5'-P-CCNC: 19 U/L (ref 13–39)
BASOPHILS # BLD AUTO: 0.03 THOUSANDS/ΜL (ref 0–0.1)
BASOPHILS NFR BLD AUTO: 1 % (ref 0–1)
BILIRUB SERPL-MCNC: 0.74 MG/DL (ref 0.2–1)
BUN SERPL-MCNC: 11 MG/DL (ref 5–25)
CALCIUM SERPL-MCNC: 9.3 MG/DL (ref 8.4–10.2)
CHLORIDE SERPL-SCNC: 103 MMOL/L (ref 96–108)
CO2 SERPL-SCNC: 28 MMOL/L (ref 21–32)
CREAT SERPL-MCNC: 0.53 MG/DL (ref 0.6–1.3)
EOSINOPHIL # BLD AUTO: 0.27 THOUSAND/ΜL (ref 0–0.61)
EOSINOPHIL NFR BLD AUTO: 8 % (ref 0–6)
ERYTHROCYTE [DISTWIDTH] IN BLOOD BY AUTOMATED COUNT: 14.8 % (ref 11.6–15.1)
GFR SERPL CREATININE-BSD FRML MDRD: 109 ML/MIN/1.73SQ M
GLUCOSE P FAST SERPL-MCNC: 99 MG/DL (ref 65–99)
HCT VFR BLD AUTO: 31.6 % (ref 34.8–46.1)
HGB BLD-MCNC: 10.1 G/DL (ref 11.5–15.4)
IMM GRANULOCYTES # BLD AUTO: 0.01 THOUSAND/UL (ref 0–0.2)
IMM GRANULOCYTES NFR BLD AUTO: 0 % (ref 0–2)
LYMPHOCYTES # BLD AUTO: 1.31 THOUSANDS/ΜL (ref 0.6–4.47)
LYMPHOCYTES NFR BLD AUTO: 39 % (ref 14–44)
MCH RBC QN AUTO: 31.7 PG (ref 26.8–34.3)
MCHC RBC AUTO-ENTMCNC: 32 G/DL (ref 31.4–37.4)
MCV RBC AUTO: 99 FL (ref 82–98)
MONOCYTES # BLD AUTO: 0.12 THOUSAND/ΜL (ref 0.17–1.22)
MONOCYTES NFR BLD AUTO: 4 % (ref 4–12)
NEUTROPHILS # BLD AUTO: 1.65 THOUSANDS/ΜL (ref 1.85–7.62)
NEUTS SEG NFR BLD AUTO: 48 % (ref 43–75)
NRBC BLD AUTO-RTO: 0 /100 WBCS
PLATELET # BLD AUTO: 171 THOUSANDS/UL (ref 149–390)
PMV BLD AUTO: 11 FL (ref 8.9–12.7)
POTASSIUM SERPL-SCNC: 3.9 MMOL/L (ref 3.5–5.3)
PROT SERPL-MCNC: 6.7 G/DL (ref 6.4–8.4)
RBC # BLD AUTO: 3.19 MILLION/UL (ref 3.81–5.12)
SODIUM SERPL-SCNC: 140 MMOL/L (ref 135–147)
WBC # BLD AUTO: 3.39 THOUSAND/UL (ref 4.31–10.16)

## 2025-02-11 PROCEDURE — 85025 COMPLETE CBC W/AUTO DIFF WBC: CPT

## 2025-02-11 PROCEDURE — 36415 COLL VENOUS BLD VENIPUNCTURE: CPT

## 2025-02-11 PROCEDURE — 80053 COMPREHEN METABOLIC PANEL: CPT

## 2025-02-11 NOTE — TELEPHONE ENCOUNTER
Call from Lillian, stating that orders for a CBC and CPMP need to be ordered for this patient. Done.

## 2025-02-12 RX ORDER — SODIUM CHLORIDE 9 MG/ML
20 INJECTION, SOLUTION INTRAVENOUS ONCE
Status: CANCELLED | OUTPATIENT
Start: 2025-02-13

## 2025-02-13 ENCOUNTER — HOSPITAL ENCOUNTER (OUTPATIENT)
Dept: INFUSION CENTER | Facility: CLINIC | Age: 55
Discharge: HOME | End: 2025-02-13
Payer: COMMERCIAL

## 2025-02-13 VITALS
RESPIRATION RATE: 18 BRPM | OXYGEN SATURATION: 99 % | HEIGHT: 65 IN | TEMPERATURE: 98.3 F | BODY MASS INDEX: 23.91 KG/M2 | SYSTOLIC BLOOD PRESSURE: 126 MMHG | DIASTOLIC BLOOD PRESSURE: 70 MMHG | WEIGHT: 143.5 LBS | HEART RATE: 101 BPM

## 2025-02-13 DIAGNOSIS — C50.911 CARCINOMA OF RIGHT BREAST METASTATIC TO AXILLARY LYMPH NODE (HCC): Primary | ICD-10-CM

## 2025-02-13 DIAGNOSIS — C77.3 CARCINOMA OF RIGHT BREAST METASTATIC TO AXILLARY LYMPH NODE (HCC): Primary | ICD-10-CM

## 2025-02-13 PROCEDURE — 96413 CHEMO IV INFUSION 1 HR: CPT

## 2025-02-13 PROCEDURE — 96367 TX/PROPH/DG ADDL SEQ IV INF: CPT

## 2025-02-13 PROCEDURE — 96417 CHEMO IV INFUS EACH ADDL SEQ: CPT

## 2025-02-13 RX ORDER — SODIUM CHLORIDE 9 MG/ML
20 INJECTION, SOLUTION INTRAVENOUS ONCE
Status: COMPLETED | OUTPATIENT
Start: 2025-02-13 | End: 2025-02-13

## 2025-02-13 RX ADMIN — DEXAMETHASONE SODIUM PHOSPHATE: 10 INJECTION, SOLUTION INTRAMUSCULAR; INTRAVENOUS at 13:22

## 2025-02-13 RX ADMIN — FAMOTIDINE 20 MG: 10 INJECTION INTRAVENOUS at 14:07

## 2025-02-13 RX ADMIN — DIPHENHYDRAMINE HYDROCHLORIDE 25 MG: 50 INJECTION, SOLUTION INTRAMUSCULAR; INTRAVENOUS at 13:44

## 2025-02-13 RX ADMIN — SODIUM CHLORIDE 20 ML/HR: 0.9 INJECTION, SOLUTION INTRAVENOUS at 13:20

## 2025-02-13 RX ADMIN — Medication 171 MG: at 14:47

## 2025-02-13 RX ADMIN — CARBOPLATIN 180 MG: 10 INJECTION INTRAVENOUS at 15:51

## 2025-02-13 NOTE — PROGRESS NOTES
[facility] for the treatment of Abraxane / Carboplatin. Labs from 02/11/2025 reviewed and within parameters for treatment. She offers no concerns at this time. Port accessed with good blood return. Patient is resting comfortably in the chair, call bell within reach.

## 2025-02-13 NOTE — PROGRESS NOTES
Patient tolerated treatment well with no adverse reactions. Declined AVS. Confirmed next appointment at the [facility] for 02/20/2025 @ 1300. Patient ambulatory at discharge.

## 2025-02-18 ENCOUNTER — APPOINTMENT (OUTPATIENT)
Dept: LAB | Facility: CLINIC | Age: 55
End: 2025-02-18
Payer: COMMERCIAL

## 2025-02-18 DIAGNOSIS — C50.911 CARCINOMA OF RIGHT BREAST METASTATIC TO AXILLARY LYMPH NODE (HCC): ICD-10-CM

## 2025-02-18 DIAGNOSIS — C77.3 CARCINOMA OF RIGHT BREAST METASTATIC TO AXILLARY LYMPH NODE (HCC): ICD-10-CM

## 2025-02-18 LAB
ALBUMIN SERPL BCG-MCNC: 4.2 G/DL (ref 3.5–5)
ALP SERPL-CCNC: 98 U/L (ref 34–104)
ALT SERPL W P-5'-P-CCNC: 57 U/L (ref 7–52)
ANION GAP SERPL CALCULATED.3IONS-SCNC: 8 MMOL/L (ref 4–13)
AST SERPL W P-5'-P-CCNC: 29 U/L (ref 13–39)
BASOPHILS # BLD AUTO: 0.04 THOUSANDS/ΜL (ref 0–0.1)
BASOPHILS NFR BLD AUTO: 1 % (ref 0–1)
BILIRUB SERPL-MCNC: 0.75 MG/DL (ref 0.2–1)
BUN SERPL-MCNC: 10 MG/DL (ref 5–25)
CALCIUM SERPL-MCNC: 9.1 MG/DL (ref 8.4–10.2)
CHLORIDE SERPL-SCNC: 103 MMOL/L (ref 96–108)
CO2 SERPL-SCNC: 29 MMOL/L (ref 21–32)
CREAT SERPL-MCNC: 0.51 MG/DL (ref 0.6–1.3)
EOSINOPHIL # BLD AUTO: 0.16 THOUSAND/ΜL (ref 0–0.61)
EOSINOPHIL NFR BLD AUTO: 5 % (ref 0–6)
ERYTHROCYTE [DISTWIDTH] IN BLOOD BY AUTOMATED COUNT: 14.6 % (ref 11.6–15.1)
GFR SERPL CREATININE-BSD FRML MDRD: 110 ML/MIN/1.73SQ M
GLUCOSE P FAST SERPL-MCNC: 105 MG/DL (ref 65–99)
HCT VFR BLD AUTO: 29.1 % (ref 34.8–46.1)
HGB BLD-MCNC: 9.5 G/DL (ref 11.5–15.4)
IMM GRANULOCYTES # BLD AUTO: 0.01 THOUSAND/UL (ref 0–0.2)
IMM GRANULOCYTES NFR BLD AUTO: 0 % (ref 0–2)
LYMPHOCYTES # BLD AUTO: 1.24 THOUSANDS/ΜL (ref 0.6–4.47)
LYMPHOCYTES NFR BLD AUTO: 40 % (ref 14–44)
MCH RBC QN AUTO: 32.6 PG (ref 26.8–34.3)
MCHC RBC AUTO-ENTMCNC: 32.6 G/DL (ref 31.4–37.4)
MCV RBC AUTO: 100 FL (ref 82–98)
MONOCYTES # BLD AUTO: 0.1 THOUSAND/ΜL (ref 0.17–1.22)
MONOCYTES NFR BLD AUTO: 3 % (ref 4–12)
NEUTROPHILS # BLD AUTO: 1.54 THOUSANDS/ΜL (ref 1.85–7.62)
NEUTS SEG NFR BLD AUTO: 51 % (ref 43–75)
NRBC BLD AUTO-RTO: 0 /100 WBCS
PLATELET # BLD AUTO: 142 THOUSANDS/UL (ref 149–390)
PMV BLD AUTO: 10.7 FL (ref 8.9–12.7)
POTASSIUM SERPL-SCNC: 4.3 MMOL/L (ref 3.5–5.3)
PROT SERPL-MCNC: 6.7 G/DL (ref 6.4–8.4)
RBC # BLD AUTO: 2.91 MILLION/UL (ref 3.81–5.12)
SODIUM SERPL-SCNC: 140 MMOL/L (ref 135–147)
WBC # BLD AUTO: 3.09 THOUSAND/UL (ref 4.31–10.16)

## 2025-02-18 PROCEDURE — 80053 COMPREHEN METABOLIC PANEL: CPT

## 2025-02-18 PROCEDURE — 85025 COMPLETE CBC W/AUTO DIFF WBC: CPT

## 2025-02-18 PROCEDURE — 36415 COLL VENOUS BLD VENIPUNCTURE: CPT

## 2025-02-18 RX ORDER — SODIUM CHLORIDE 9 MG/ML
20 INJECTION, SOLUTION INTRAVENOUS ONCE
Status: CANCELLED | OUTPATIENT
Start: 2025-02-20

## 2025-02-20 ENCOUNTER — HOSPITAL ENCOUNTER (OUTPATIENT)
Dept: INFUSION CENTER | Facility: CLINIC | Age: 55
Discharge: HOME | End: 2025-02-20
Payer: COMMERCIAL

## 2025-02-20 ENCOUNTER — TELEPHONE (OUTPATIENT)
Dept: HEMATOLOGY ONCOLOGY | Facility: CLINIC | Age: 55
End: 2025-02-20

## 2025-02-20 VITALS
DIASTOLIC BLOOD PRESSURE: 75 MMHG | HEART RATE: 87 BPM | OXYGEN SATURATION: 98 % | WEIGHT: 143 LBS | TEMPERATURE: 98.9 F | BODY MASS INDEX: 23.82 KG/M2 | SYSTOLIC BLOOD PRESSURE: 111 MMHG | HEIGHT: 65 IN

## 2025-02-20 DIAGNOSIS — C77.3 CARCINOMA OF RIGHT BREAST METASTATIC TO AXILLARY LYMPH NODE (HCC): Primary | ICD-10-CM

## 2025-02-20 DIAGNOSIS — C50.911 CARCINOMA OF RIGHT BREAST METASTATIC TO AXILLARY LYMPH NODE (HCC): Primary | ICD-10-CM

## 2025-02-20 PROCEDURE — 96413 CHEMO IV INFUSION 1 HR: CPT

## 2025-02-20 PROCEDURE — 96417 CHEMO IV INFUS EACH ADDL SEQ: CPT

## 2025-02-20 PROCEDURE — 96367 TX/PROPH/DG ADDL SEQ IV INF: CPT

## 2025-02-20 RX ORDER — SODIUM CHLORIDE 9 MG/ML
20 INJECTION, SOLUTION INTRAVENOUS ONCE
Status: COMPLETED | OUTPATIENT
Start: 2025-02-20 | End: 2025-02-20

## 2025-02-20 RX ADMIN — CARBOPLATIN 180 MG: 10 INJECTION, SOLUTION INTRAVENOUS at 15:39

## 2025-02-20 RX ADMIN — SODIUM CHLORIDE 20 ML/HR: 9 INJECTION, SOLUTION INTRAVENOUS at 13:13

## 2025-02-20 RX ADMIN — Medication 171 MG: at 14:38

## 2025-02-20 RX ADMIN — DEXAMETHASONE SODIUM PHOSPHATE: 10 INJECTION, SOLUTION INTRAMUSCULAR; INTRAVENOUS at 13:13

## 2025-02-20 RX ADMIN — DIPHENHYDRAMINE HYDROCHLORIDE 25 MG: 50 INJECTION INTRAMUSCULAR; INTRAVENOUS at 13:40

## 2025-02-20 RX ADMIN — FAMOTIDINE 20 MG: 10 INJECTION INTRAVENOUS at 14:00

## 2025-02-20 NOTE — PROGRESS NOTES
Pt here for abraxane and carboplatin, pt offered no acute complaints today. Port accessed brisk blood return noted. Labs 2/18/24 reviewed within parameter for tx today.

## 2025-02-20 NOTE — PROGRESS NOTES
Patient tolerated treatment today.  Next appointment scheduled for 3/13/25.  In basket sent to Honey Funes confirm this is correct.  Pt aware and our schedulers will contact her to correct if needed.

## 2025-02-25 ENCOUNTER — OFFICE VISIT (OUTPATIENT)
Dept: HEMATOLOGY ONCOLOGY | Facility: CLINIC | Age: 55
End: 2025-02-25
Payer: COMMERCIAL

## 2025-02-25 ENCOUNTER — APPOINTMENT (OUTPATIENT)
Dept: LAB | Facility: CLINIC | Age: 55
End: 2025-02-25
Payer: COMMERCIAL

## 2025-02-25 ENCOUNTER — TELEPHONE (OUTPATIENT)
Age: 55
End: 2025-02-25

## 2025-02-25 ENCOUNTER — TELEPHONE (OUTPATIENT)
Dept: INFUSION CENTER | Facility: CLINIC | Age: 55
End: 2025-02-25

## 2025-02-25 ENCOUNTER — OFFICE VISIT (OUTPATIENT)
Dept: FAMILY MEDICINE CLINIC | Facility: CLINIC | Age: 55
End: 2025-02-25

## 2025-02-25 VITALS
HEIGHT: 65 IN | BODY MASS INDEX: 23.66 KG/M2 | DIASTOLIC BLOOD PRESSURE: 82 MMHG | RESPIRATION RATE: 18 BRPM | SYSTOLIC BLOOD PRESSURE: 128 MMHG | TEMPERATURE: 98.5 F | OXYGEN SATURATION: 99 % | HEART RATE: 78 BPM | WEIGHT: 142 LBS

## 2025-02-25 VITALS
SYSTOLIC BLOOD PRESSURE: 117 MMHG | DIASTOLIC BLOOD PRESSURE: 79 MMHG | HEART RATE: 100 BPM | BODY MASS INDEX: 23.49 KG/M2 | WEIGHT: 141 LBS | OXYGEN SATURATION: 100 % | HEIGHT: 65 IN | TEMPERATURE: 97.7 F

## 2025-02-25 DIAGNOSIS — C77.3 CARCINOMA OF RIGHT BREAST METASTATIC TO AXILLARY LYMPH NODE (HCC): Primary | ICD-10-CM

## 2025-02-25 DIAGNOSIS — C77.3 CARCINOMA OF RIGHT BREAST METASTATIC TO AXILLARY LYMPH NODE (HCC): ICD-10-CM

## 2025-02-25 DIAGNOSIS — C50.911 CARCINOMA OF RIGHT BREAST METASTATIC TO AXILLARY LYMPH NODE (HCC): Primary | ICD-10-CM

## 2025-02-25 DIAGNOSIS — C50.411 MALIGNANT NEOPLASM OF UPPER-OUTER QUADRANT OF RIGHT BREAST IN FEMALE, ESTROGEN RECEPTOR POSITIVE (HCC): Primary | ICD-10-CM

## 2025-02-25 DIAGNOSIS — T23.099D: Primary | ICD-10-CM

## 2025-02-25 DIAGNOSIS — Z17.0 MALIGNANT NEOPLASM OF UPPER-OUTER QUADRANT OF RIGHT BREAST IN FEMALE, ESTROGEN RECEPTOR POSITIVE (HCC): Primary | ICD-10-CM

## 2025-02-25 DIAGNOSIS — C50.911 CARCINOMA OF RIGHT BREAST METASTATIC TO AXILLARY LYMPH NODE (HCC): ICD-10-CM

## 2025-02-25 LAB
ALBUMIN SERPL BCG-MCNC: 4.4 G/DL (ref 3.5–5)
ALP SERPL-CCNC: 91 U/L (ref 34–104)
ALT SERPL W P-5'-P-CCNC: 30 U/L (ref 7–52)
ANION GAP SERPL CALCULATED.3IONS-SCNC: 9 MMOL/L (ref 4–13)
AST SERPL W P-5'-P-CCNC: 16 U/L (ref 13–39)
BASOPHILS # BLD AUTO: 0.02 THOUSANDS/ÂΜL (ref 0–0.1)
BASOPHILS NFR BLD AUTO: 1 % (ref 0–1)
BILIRUB SERPL-MCNC: 0.72 MG/DL (ref 0.2–1)
BUN SERPL-MCNC: 16 MG/DL (ref 5–25)
CALCIUM SERPL-MCNC: 9.5 MG/DL (ref 8.4–10.2)
CHLORIDE SERPL-SCNC: 103 MMOL/L (ref 96–108)
CO2 SERPL-SCNC: 28 MMOL/L (ref 21–32)
CREAT SERPL-MCNC: 0.58 MG/DL (ref 0.6–1.3)
EOSINOPHIL # BLD AUTO: 0.14 THOUSAND/ÂΜL (ref 0–0.61)
EOSINOPHIL NFR BLD AUTO: 4 % (ref 0–6)
ERYTHROCYTE [DISTWIDTH] IN BLOOD BY AUTOMATED COUNT: 14.7 % (ref 11.6–15.1)
GFR SERPL CREATININE-BSD FRML MDRD: 106 ML/MIN/1.73SQ M
GLUCOSE P FAST SERPL-MCNC: 104 MG/DL (ref 65–99)
HCT VFR BLD AUTO: 30 % (ref 34.8–46.1)
HGB BLD-MCNC: 9.9 G/DL (ref 11.5–15.4)
IMM GRANULOCYTES # BLD AUTO: 0.02 THOUSAND/UL (ref 0–0.2)
IMM GRANULOCYTES NFR BLD AUTO: 1 % (ref 0–2)
LYMPHOCYTES # BLD AUTO: 1.48 THOUSANDS/ÂΜL (ref 0.6–4.47)
LYMPHOCYTES NFR BLD AUTO: 46 % (ref 14–44)
MCH RBC QN AUTO: 32.7 PG (ref 26.8–34.3)
MCHC RBC AUTO-ENTMCNC: 33 G/DL (ref 31.4–37.4)
MCV RBC AUTO: 99 FL (ref 82–98)
MONOCYTES # BLD AUTO: 0.09 THOUSAND/ÂΜL (ref 0.17–1.22)
MONOCYTES NFR BLD AUTO: 3 % (ref 4–12)
NEUTROPHILS # BLD AUTO: 1.44 THOUSANDS/ÂΜL (ref 1.85–7.62)
NEUTS SEG NFR BLD AUTO: 45 % (ref 43–75)
NRBC BLD AUTO-RTO: 0 /100 WBCS
PLATELET # BLD AUTO: 165 THOUSANDS/UL (ref 149–390)
PMV BLD AUTO: 10.3 FL (ref 8.9–12.7)
POTASSIUM SERPL-SCNC: 4.3 MMOL/L (ref 3.5–5.3)
PROT SERPL-MCNC: 7.3 G/DL (ref 6.4–8.4)
RBC # BLD AUTO: 3.03 MILLION/UL (ref 3.81–5.12)
SODIUM SERPL-SCNC: 140 MMOL/L (ref 135–147)
T3FREE SERPL-MCNC: 3.36 PG/ML (ref 2.5–3.9)
TSH SERPL DL<=0.05 MIU/L-ACNC: 0.69 UIU/ML (ref 0.45–4.5)
WBC # BLD AUTO: 3.19 THOUSAND/UL (ref 4.31–10.16)

## 2025-02-25 PROCEDURE — 3074F SYST BP LT 130 MM HG: CPT | Performed by: FAMILY MEDICINE

## 2025-02-25 PROCEDURE — 80053 COMPREHEN METABOLIC PANEL: CPT

## 2025-02-25 PROCEDURE — 36415 COLL VENOUS BLD VENIPUNCTURE: CPT

## 2025-02-25 PROCEDURE — 3078F DIAST BP <80 MM HG: CPT | Performed by: FAMILY MEDICINE

## 2025-02-25 PROCEDURE — 84443 ASSAY THYROID STIM HORMONE: CPT

## 2025-02-25 PROCEDURE — 85025 COMPLETE CBC W/AUTO DIFF WBC: CPT

## 2025-02-25 PROCEDURE — 99215 OFFICE O/P EST HI 40 MIN: CPT | Performed by: INTERNAL MEDICINE

## 2025-02-25 PROCEDURE — 84481 FREE ASSAY (FT-3): CPT

## 2025-02-25 PROCEDURE — 99213 OFFICE O/P EST LOW 20 MIN: CPT | Performed by: FAMILY MEDICINE

## 2025-02-25 RX ORDER — MUPIROCIN 20 MG/G
OINTMENT TOPICAL 2 TIMES DAILY
Qty: 30 G | Refills: 0 | Status: SHIPPED | OUTPATIENT
Start: 2025-02-25 | End: 2025-03-05

## 2025-02-25 NOTE — PROGRESS NOTES
Name: Romina Cleaning      : 1970      MRN: [id number]  Encounter Provider: Mariella Langston MD  Encounter Date: 2025   Encounter department: [facility] [place]  :  Assessment & Plan  Burn of multiple sites of hand, unspecified burn degree, unspecified laterality, subsequent encounter  -Patient was cooking last evening when she burned herself on multiple fingers on her hands bilaterally  -She ran her fingers under cold water and applied aloe vera to the area which she states provided some relief  -Patient states she no longer has pain unless she touches the area  -Patient states she was at her heme-onc office this morning when they expressed concerns about the burn wounds possibly being infected.  She states they recommended she see her PCP for further evaluation.  -  Per my evaluation, patient's burn blisters do not appear infected at this time.  As she is immunocompromised we will provide topical antibiotic treatment for 1 week to prevent infection.  Patient instructed to closely monitor for any signs of infection such as worsening or spreading redness, fever, weakness/numbness of fingers, foul odor/discoloration of drainage. Patient instructed to not popl the blisters and to let them heal on their own to prevent infection.    Plan  - Apply Mupirocin BID x 7 days   - F/U in one week to re-examine blisters to monitor for healing. Patient to present to office sooner if she notices signs of infection      Orders:    mupirocin (BACTROBAN) 2 % ointment; Apply topically 2 (two) times a day for 7 days           History of Present Illness   Patient is a 54-year-old female presenting after recent hand burns.  Patient states she was cooking corn meal yesterday and when she went to transfer the corn meal she burned her midle and ring finger of her right hand and pinky finger of left hand. She states it hurt when it first happened but no longer hurts unless touched. She put cold  "water on the area and aloe vera on the area and states it helped somewhat. She has chemotherapy tomorrow for breast cancer and saw her Heme Onc doctor this morning who was concerned about receiving chemo with the wounds. Patient states she presented today to ensure that that the wounds were not infected.       Review of Systems   Constitutional:  Negative for activity change, appetite change and fever.   Respiratory:  Negative for shortness of breath.    Skin:  Positive for wound (Burn Wounds).   Neurological:  Negative for dizziness and headaches.   Psychiatric/Behavioral:  Negative for confusion.        Objective   /79 (BP Location: Left arm, Patient Position: Sitting, Cuff Size: Standard)   Pulse 100   Temp 97.7 °F (36.5 °C) (Temporal)   Ht 5' 5\" (1.651 m)   Wt 64 kg (141 lb)   LMP  (LMP Unknown) Comment: IUD  SpO2 100%   BMI 23.46 kg/m²      Physical Exam  Cardiovascular:      Rate and Rhythm: Normal rate.   Pulmonary:      Effort: Pulmonary effort is normal.   Skin:     General: Skin is dry.      Comments: Large burn blister noted on right middle finger, non draining & surrounded by mild erythema. Smaller burn blister noted on right index finger. Small burn blister noted on left pinky finger. All blisters are non-draining and not surrounded by significant erythema.    Neurological:      General: No focal deficit present.      Mental Status: She is alert and oriented to person, place, and time.   Psychiatric:         Mood and Affect: Mood normal.         Behavior: Behavior normal.             Mariella Langston MD   PGY-2, Family Medicine  [facility]     "

## 2025-02-25 NOTE — ASSESSMENT & PLAN NOTE
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 (HCC)  Staging form: Breast, AJCC 8th Edition  - Clinical stage from 9/20/2024: cT0, cN1(f), cM0, GX, ER-, NV-, HER2- - Signed by Jodie Philippe MD on 10/7/2024  Stage prefix: Initial diagnosis  Method of lymph node assessment: Core biopsy  Histologic grading system: 3 grade system    Malignant neoplasm of upper-outer quadrant of right breast in female, estrogen receptor positive (HCC)  Staging form: Breast, AJCC 8th Edition  - Clinical stage from 9/20/2024: Stage Unknown (cT1c, cNX, cM0, G2, ER+, NV+, HER2-) - Signed by Jodie Philippe MD on 10/7/2024  Stage prefix: Initial diagnosis  Histologic grading system: 3 grade system    1.  cT1c cNx cM0 Right breast invasive lobular carcinoma.  Grade 2.  ER positive (>95%), NV positive (70%), HER2 negative by IHC.  MammaPrint: Ultralow risk (luminal A).     2.  cT0 cN1 cM0 Right axillary lymph node biopsy ER negative, NV negative, HER2 negative  3.  BRCA1/2 negative     Romina Cleaning is a 54 y.o. 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 She initially palpated a mass in her right breast which she brought to medical attention.  She underwent ultrasound-guided biopsy which was consistent with an invasive lobular carcinoma, ER positive, NV positive, HER2 negative.   MammaPrint resulted ultralow risk (luminal A). Right axillary lymph node biopsy showed infiltrating carcinoma which was ER negative, NV negative and HER2 negative.  Genetic testing for BRCA1/2. She has been evaluated by breast surgery (Dr. Philippe) and given the 2 different pathologies from the breast mass and axillary lymph node she underwent breast MRI on 10/23/2024 which redemonstrates a mass in the upper outer right breast consistent with the  biopsy-proven invasive lobular carcinoma and right axillary lymphadenopathy.  Indeterminant 6 mm mass at the 5 o'clock position right breast for which ultrasound was recommended (if there is no ultrasound correlate MRI guided biopsy would be performed).  CT of the chest, abdomen, pelvis notes enlarged right axillary and subpectoral lymphadenopathy and no other suspicious lesions.  Bone scan with no evidence of osseous metastasis. She is following with OB/GYN and had Mirena IUD removal.     She has 2 different pathologies (invasive lobular carcinoma, ER/[place] positive, HER2 negative with a ultralow risk MammaPrint results as well as a triple negative carcinoma noted on axillary lymph node biopsy).  Bilateral breast MRI noted spiculated mass in the upper outer right breast consistent with biopsy-proven invasive lobular carcinoma as well as an indeterminate 6 mm mass at the 5 o'clock position for which further characterization with ultrasound was recommended.  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     She started neoadjuvant chemotherapy for triple negative, node positive carcinoma on 11/14/2024.  Unfortunately, patient with reaction to Taxol and subsequent treatment switched to Abraxane.     She may benefit from hormone therapy in the adjuvant setting for her ER/[place]+ carcinoma. We will visit this decision after surgery.      Treatment plan: Keynote 522 regimen  Neoadjuvant pembrolizumab 200 mg day 1, Paclitaxel 80 mg/m2 day 1, Carboplatin AUC 1.5 on days 1, 8, 15 of a 21-day cycle for 4 cycles (11/14/2024-2/20/2025)  >> Switched paclitaxel to Abraxane 100 mg/m2 D1,8,15 after C1D8 given reaction to paclitaxel.   Then neoadjuvant pembrolizumab 200 mg, cyclophosphamide 600 mg/m2, doxorubicin 60 mg/m2 on day 1 of a 21-day cycle for 4 cycles.   Then adjuvant course of pembrolizumab 200 mg on day 1 of a 21-day cycle for 9 cycles pending final pathology review.       Summary:  -Will postpone chemotherapy to next week to allow healing from recent burn to multiple sites of her hand.  She will follow-up with her PCP to discuss treatment.  She will start treatment with doxorubicin, cyclophosphamide and continue with pembrolizumab.  Clinical response noted.  -Follow-up in 3 weeks.

## 2025-02-25 NOTE — TELEPHONE ENCOUNTER
Patient's mother Renetta calling to report patient has a burn on right hand yesterday then went to her PCP at [facility] to see if there is an infection.  She was informed that there is no infection but was given an antibiotic for 7 days.  She was told that was told she can proceed with infusion tomorrow and Dr. Langston a message to Dr. Garcia as well.  She is asking for a call back from hem onc to confirm that she is ok to received chemotherapy treatment tomorrow.

## 2025-02-25 NOTE — TELEPHONE ENCOUNTER
Cancelled appointments. Attempted to reach out to patient, left message regarding rescheduling. Placed hold for patient on 3/5 for potential date following being deferred.
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
## 2025-02-25 NOTE — TELEPHONE ENCOUNTER
Reviewed with Dr. Garcia, due to initiation of antibiotics patient treatment to be deferred.    Treatment plan updated and patient notified.

## 2025-02-25 NOTE — TELEPHONE ENCOUNTER
Farnaz from 's office called to make us aware that Romina will not be getting treatment tomorrow and they want to defer for one week.

## 2025-02-26 ENCOUNTER — HOSPITAL ENCOUNTER (OUTPATIENT)
Dept: INFUSION CENTER | Facility: CLINIC | Age: 55
Discharge: HOME | End: 2025-02-26

## 2025-02-27 ENCOUNTER — HOSPITAL ENCOUNTER (OUTPATIENT)
Dept: INFUSION CENTER | Facility: CLINIC | Age: 55
End: 2025-02-27

## 2025-03-02 ENCOUNTER — HOSPITAL ENCOUNTER (EMERGENCY)
Facility: HOSPITAL | Age: 55
Discharge: HOME | End: 2025-03-02
Attending: EMERGENCY MEDICINE | Admitting: EMERGENCY MEDICINE
Payer: COMMERCIAL

## 2025-03-02 ENCOUNTER — APPOINTMENT (EMERGENCY)
Dept: RADIOLOGY | Facility: HOSPITAL | Age: 55
End: 2025-03-02
Payer: COMMERCIAL

## 2025-03-02 VITALS
TEMPERATURE: 101.6 F | OXYGEN SATURATION: 98 % | SYSTOLIC BLOOD PRESSURE: 108 MMHG | RESPIRATION RATE: 20 BRPM | DIASTOLIC BLOOD PRESSURE: 67 MMHG | HEART RATE: 88 BPM

## 2025-03-02 DIAGNOSIS — J06.9 URI (UPPER RESPIRATORY INFECTION): Primary | ICD-10-CM

## 2025-03-02 LAB
ALBUMIN SERPL BCG-MCNC: 4 G/DL (ref 3.5–5)
ALP SERPL-CCNC: 75 U/L (ref 34–104)
ALT SERPL W P-5'-P-CCNC: 15 U/L (ref 7–52)
ANION GAP SERPL CALCULATED.3IONS-SCNC: 8 MMOL/L (ref 4–13)
APTT PPP: 27 SECONDS (ref 23–34)
AST SERPL W P-5'-P-CCNC: 13 U/L (ref 13–39)
BACTERIA UR QL AUTO: NORMAL /HPF
BASOPHILS # BLD AUTO: 0.01 THOUSANDS/ÂΜL (ref 0–0.1)
BASOPHILS NFR BLD AUTO: 1 % (ref 0–1)
BILIRUB SERPL-MCNC: 0.48 MG/DL (ref 0.2–1)
BILIRUB UR QL STRIP: NEGATIVE
BUN SERPL-MCNC: 9 MG/DL (ref 5–25)
CALCIUM SERPL-MCNC: 9.1 MG/DL (ref 8.4–10.2)
CHLORIDE SERPL-SCNC: 104 MMOL/L (ref 96–108)
CLARITY UR: CLEAR
CO2 SERPL-SCNC: 25 MMOL/L (ref 21–32)
COLOR UR: COLORLESS
CREAT SERPL-MCNC: 0.56 MG/DL (ref 0.6–1.3)
EOSINOPHIL # BLD AUTO: 0.05 THOUSAND/ÂΜL (ref 0–0.61)
EOSINOPHIL NFR BLD AUTO: 3 % (ref 0–6)
ERYTHROCYTE [DISTWIDTH] IN BLOOD BY AUTOMATED COUNT: 15.6 % (ref 11.6–15.1)
FLUAV RNA RESP QL NAA+PROBE: NEGATIVE
FLUBV RNA RESP QL NAA+PROBE: NEGATIVE
GFR SERPL CREATININE-BSD FRML MDRD: 107 ML/MIN/1.73SQ M
GLUCOSE SERPL-MCNC: 102 MG/DL (ref 65–140)
GLUCOSE UR STRIP-MCNC: NEGATIVE MG/DL
HCT VFR BLD AUTO: 25.5 % (ref 34.8–46.1)
HGB BLD-MCNC: 8.5 G/DL (ref 11.5–15.4)
HGB UR QL STRIP.AUTO: ABNORMAL
IMM GRANULOCYTES # BLD AUTO: 0.01 THOUSAND/UL (ref 0–0.2)
IMM GRANULOCYTES NFR BLD AUTO: 1 % (ref 0–2)
INR PPP: 1.17 (ref 0.85–1.19)
KETONES UR STRIP-MCNC: NEGATIVE MG/DL
LACTATE SERPL-SCNC: 0.7 MMOL/L (ref 0.5–2)
LEUKOCYTE ESTERASE UR QL STRIP: NEGATIVE
LYMPHOCYTES # BLD AUTO: 0.51 THOUSANDS/ÂΜL (ref 0.6–4.47)
LYMPHOCYTES NFR BLD AUTO: 26 % (ref 14–44)
MCH RBC QN AUTO: 33.5 PG (ref 26.8–34.3)
MCHC RBC AUTO-ENTMCNC: 33.3 G/DL (ref 31.4–37.4)
MCV RBC AUTO: 100 FL (ref 82–98)
MONOCYTES # BLD AUTO: 0.13 THOUSAND/ÂΜL (ref 0.17–1.22)
MONOCYTES NFR BLD AUTO: 7 % (ref 4–12)
NEUTROPHILS # BLD AUTO: 1.24 THOUSANDS/ÂΜL (ref 1.85–7.62)
NEUTS SEG NFR BLD AUTO: 62 % (ref 43–75)
NITRITE UR QL STRIP: NEGATIVE
NON-SQ EPI CELLS URNS QL MICRO: NORMAL /HPF
NRBC BLD AUTO-RTO: 0 /100 WBCS
PH UR STRIP.AUTO: 5.5 [PH]
PLATELET # BLD AUTO: 156 THOUSANDS/UL (ref 149–390)
PMV BLD AUTO: 9.8 FL (ref 8.9–12.7)
POTASSIUM SERPL-SCNC: 3.1 MMOL/L (ref 3.5–5.3)
PROCALCITONIN SERPL-MCNC: <0.05 NG/ML
PROT SERPL-MCNC: 6.7 G/DL (ref 6.4–8.4)
PROT UR STRIP-MCNC: NEGATIVE MG/DL
PROTHROMBIN TIME: 15.1 SECONDS (ref 12.3–15)
RBC # BLD AUTO: 2.54 MILLION/UL (ref 3.81–5.12)
RBC #/AREA URNS AUTO: NORMAL /HPF
RSV RNA RESP QL NAA+PROBE: NEGATIVE
SARS-COV-2 RNA RESP QL NAA+PROBE: NEGATIVE
SODIUM SERPL-SCNC: 137 MMOL/L (ref 135–147)
SP GR UR STRIP.AUTO: 1.01 (ref 1–1.03)
UROBILINOGEN UR STRIP-ACNC: <2 MG/DL
WBC # BLD AUTO: 1.95 THOUSAND/UL (ref 4.31–10.16)
WBC #/AREA URNS AUTO: NORMAL /HPF

## 2025-03-02 PROCEDURE — 0241U HB NFCT DS VIR RESP RNA 4 TRGT: CPT | Performed by: STUDENT IN AN ORGANIZED HEALTH CARE EDUCATION/TRAINING PROGRAM

## 2025-03-02 PROCEDURE — 83605 ASSAY OF LACTIC ACID: CPT | Performed by: STUDENT IN AN ORGANIZED HEALTH CARE EDUCATION/TRAINING PROGRAM

## 2025-03-02 PROCEDURE — 93005 ELECTROCARDIOGRAM TRACING: CPT

## 2025-03-02 PROCEDURE — 99283 EMERGENCY DEPT VISIT LOW MDM: CPT

## 2025-03-02 PROCEDURE — 71046 X-RAY EXAM CHEST 2 VIEWS: CPT

## 2025-03-02 PROCEDURE — 85730 THROMBOPLASTIN TIME PARTIAL: CPT | Performed by: STUDENT IN AN ORGANIZED HEALTH CARE EDUCATION/TRAINING PROGRAM

## 2025-03-02 PROCEDURE — 81001 URINALYSIS AUTO W/SCOPE: CPT | Performed by: STUDENT IN AN ORGANIZED HEALTH CARE EDUCATION/TRAINING PROGRAM

## 2025-03-02 PROCEDURE — 84145 PROCALCITONIN (PCT): CPT | Performed by: STUDENT IN AN ORGANIZED HEALTH CARE EDUCATION/TRAINING PROGRAM

## 2025-03-02 PROCEDURE — 85025 COMPLETE CBC W/AUTO DIFF WBC: CPT | Performed by: STUDENT IN AN ORGANIZED HEALTH CARE EDUCATION/TRAINING PROGRAM

## 2025-03-02 PROCEDURE — 87040 BLOOD CULTURE FOR BACTERIA: CPT | Performed by: STUDENT IN AN ORGANIZED HEALTH CARE EDUCATION/TRAINING PROGRAM

## 2025-03-02 PROCEDURE — 85610 PROTHROMBIN TIME: CPT | Performed by: STUDENT IN AN ORGANIZED HEALTH CARE EDUCATION/TRAINING PROGRAM

## 2025-03-02 PROCEDURE — 80053 COMPREHEN METABOLIC PANEL: CPT | Performed by: STUDENT IN AN ORGANIZED HEALTH CARE EDUCATION/TRAINING PROGRAM

## 2025-03-02 PROCEDURE — 99285 EMERGENCY DEPT VISIT HI MDM: CPT | Performed by: EMERGENCY MEDICINE

## 2025-03-02 PROCEDURE — 36415 COLL VENOUS BLD VENIPUNCTURE: CPT | Performed by: STUDENT IN AN ORGANIZED HEALTH CARE EDUCATION/TRAINING PROGRAM

## 2025-03-02 RX ORDER — POTASSIUM CHLORIDE 1500 MG/1
40 TABLET, EXTENDED RELEASE ORAL ONCE
Status: COMPLETED | OUTPATIENT
Start: 2025-03-02 | End: 2025-03-02

## 2025-03-02 RX ORDER — IBUPROFEN 600 MG/1
600 TABLET, FILM COATED ORAL ONCE
Status: COMPLETED | OUTPATIENT
Start: 2025-03-02 | End: 2025-03-02

## 2025-03-02 RX ADMIN — POTASSIUM CHLORIDE 40 MEQ: 1500 TABLET, EXTENDED RELEASE ORAL at 20:34

## 2025-03-02 RX ADMIN — IBUPROFEN 600 MG: 600 TABLET, FILM COATED ORAL at 20:34

## 2025-03-02 NOTE — ED PROVIDER NOTES
Time reflects when diagnosis was documented in both MDM as applicable and the Disposition within this note       Time User Action Codes Description Comment    3/2/2025  7:58 PM Judy Guille Add [J06.9] URI (upper respiratory infection)           ED Disposition       ED Disposition   Discharge    Condition   Stable    Date/Time   Sun Mar 2, 2025  8:34 PM    Comment   Romina Cleaning discharge to home/self care.                   Assessment & Plan       Medical Decision Making  Sxs likely 2/2 to viral URI, however pt is actively receiving chemo. Will order infectious work-up. Further plans pending workup.     Pt does not meet criteria for neutropenic fever, no UTI or PNA. Care plan discussed, pt understands and is agreeable. Pt will continue supportive care at home and follow up with her PCP. Pt will supplement with K over the next week and will discuss repeat lab work with her doctor. All questions answered, return precautions discussed, pt stable for discharge     *  used for all communication     Amount and/or Complexity of Data Reviewed  Labs: ordered.  Radiology: ordered and independent interpretation performed.    Risk  Prescription drug management.             Medications   potassium chloride (Klor-Con M20) CR tablet 40 mEq (40 mEq Oral Given 3/2/25 2034)   ibuprofen (MOTRIN) tablet 600 mg (600 mg Oral Given 3/2/25 2034)       ED Risk Strat Scores            Oncology History   Carcinoma of right breast metastatic to axillary lymph node (HCC)   6/11/2024 Initial Diagnosis    Carcinoma of right breast metastatic to axillary lymph node (HCC)     9/20/2024 -  Cancer Staged    Staging form: Breast, AJCC 8th Edition  - Clinical stage from 9/20/2024: cT0, cN1(f), cM0, GX, ER-, [place]-, HER2- - Signed by Jodie Philippe MD on 10/7/2024  Stage prefix: Initial diagnosis  Method of lymph node assessment: Core biopsy  Histologic grading system: 3 grade system       11/14/2024 -  Chemotherapy    DOXOrubicin  (ADRIAMYCIN), 60 mg/m2 = 103 mg, Intravenous, Once, 1 of 4 cycles  alteplase (CATHFLO), 2 mg, Intracatheter, Every 1 Minute as needed, 5 of 17 cycles  pegfilgrastim-apgf (Nyvepria), 6 mg, Subcutaneous, Once, 1 of 4 cycles  sodium chloride, 500 mL (100 % of original dose 500 mL), Intravenous, Once, 1 of 1 cycle  Dose modification: 500 mL (original dose 500 mL, Cycle 1)  Administration: 500 mL (11/14/2024)  cyclophosphamide (CYTOXAN) IVPB, 600 mg/m2 = 1,026 mg, Intravenous, Once, 1 of 4 cycles  fosaprepitant (EMEND) IVPB, 150 mg, Intravenous, Once, 1 of 4 cycles  paclitaxel protein-bound (ABRAXANE) IVPB, 100 mg/m2 = 171 mg (original dose ), Intravenous, Once, 4 of 4 cycles  Dose modification: 100 mg/m2 (original dose 100 mg/m2, Cycle 1)  Administration: 171 mg (12/5/2024), 171 mg (12/12/2024), 171 mg (12/19/2024), 171 mg (1/9/2025), 171 mg (1/16/2025), 171 mg (1/23/2025), 171 mg (1/30/2025), 171 mg (2/7/2025), 171 mg (2/13/2025), 171 mg (2/20/2025)  CARBOplatin (PARAPLATIN) IVPB (GOG AUC DOSING), 180 mg, Intravenous, Once, 4 of 4 cycles  Administration: 180 mg (11/14/2024), 180 mg (11/21/2024), 180 mg (12/5/2024), 180 mg (12/12/2024), 180 mg (12/19/2024), 180 mg (1/9/2025), 180 mg (1/16/2025), 180 mg (1/23/2025), 180 mg (1/30/2025), 180 mg (2/7/2025), 180 mg (2/13/2025), 180 mg (2/20/2025)  PACLItaxel (TAXOL) chemo IVPB, 80 mg/m2 = 136.8 mg, Intravenous, Once, 1 of 1 cycle  Administration: 136.8 mg (11/14/2024), 136.8 mg (11/21/2024)  pembrolizumab (KEYTRUDA) IVPB, 200 mg, Intravenous, Once, 5 of 17 cycles  Administration: 200 mg (11/14/2024), 200 mg (12/12/2024), 200 mg (1/9/2025), 200 mg (1/30/2025)     Malignant neoplasm of upper-outer quadrant of right breast in female, estrogen receptor positive (HCC)   9/20/2024 Biopsy    Right breast ultrasound-guided biopsy  A. 10 o'clock, 7 cm from nipple (MARTHA)  Invasive lobular carcinoma  Grade 2  ER >95, AZ 70, HER2 0  Lymphovascular invasion not identified    B. Right  axillary lymph node biopsy (MARTHA)  Infiltrating carcinoma  Although no definitive capsule is noted, it is favored to represent lymph node involvement by th e carcinoma  ER <1, OH <1, HER2 1+    Concordant. Malignancy appears unifocal; suspicious masses cover an area up to 1.8 cm. US of right axilla showed multiple enlarged, morphologically abnormal axillary lymph nodes with biopsy confirmed metastatic disease. Left breast clear.     9/20/2024 -  Cancer Staged    Staging form: Breast, AJCC 8th Edition  - Clinical stage from 9/20/2024: Stage Unknown (cT1c, cNX, cM0, G2, ER+, [place]+, HER2-) - Signed by Jodie Philippe MD on 10/7/2024  Stage prefix: Initial diagnosis  Histologic grading system: 3 grade system       9/24/2024 Genomic Testing    MammaPrint/BluePrint ordered on breast specimen block A  Ultra-Low risk - luminal A     9/27/2024 Genetic Testing    Genetic testing with RNA analysis ordered  [facility]                        SBIRT 22yo+      Flowsheet Row Most Recent Value   Initial Alcohol Screen: US AUDIT-C     1. How often do you have a drink containing alcohol? 0 Filed at: 03/02/2025 1802   2. How many drinks containing alcohol do you have on a typical day you are drinking?  0 Filed at: 03/02/2025 1802   3a. Male UNDER 65: How often do you have five or more drinks on one occasion? 0 Filed at: 03/02/2025 1802   3b. FEMALE Any Age, or MALE 65+: How often do you have 4 or more drinks on one occassion? 0 Filed at: 03/02/2025 1802   Audit-C Score 0 Filed at: 03/02/2025 1802   DAVID: How many times in the past year have you...    Used an illegal drug or used a prescription medication for non-medical reasons? Never Filed at: 03/02/2025 1802                            History of Present Illness       Chief Complaint   Patient presents with    URI     Pt has c/o cough, congestion, and fever since today.  Pt states her son is sick with the same symptoms.       Past Medical History:   Diagnosis Date    Breast cancer (HCC)      GERD (gastroesophageal reflux disease)     Headache     Hematuria     Hypertension     IUD (intrauterine device) in place 02/15/2019    Mirena placed 2015 effective through 2020      Lateral epicondylitis, right elbow 2019    Panic attacks     Psychiatric disorder       Past Surgical History:   Procedure Laterality Date    BREAST BIOPSY Right 2024    BREAST BIOPSY Right 2024    BREAST BIOPSY Right 2024    CYSTOSCOPY  2018    IR PORT PLACEMENT  2024    NO PAST SURGERIES      US GUIDED BREAST BIOPSY RIGHT COMPLETE Right 2024    US GUIDED BREAST BIOPSY RIGHT COMPLETE Right 2024    US GUIDED BREAST LYMPH NODE BIOPSY RIGHT Right 2024      Family History   Problem Relation Age of Onset    Hypertension Mother     Colonic polyp Mother     Colon cancer Mother     Arthritis Father     Diabetes Father     Hypertension Father     Hyperlipidemia Father     No Known Problems Sister     No Known Problems Sister     Other Brother         TBI from accident     No Known Problems Son     No Known Problems Daughter     No Known Problems Maternal Aunt     No Known Problems Maternal Aunt     No Known Problems Maternal Aunt     No Known Problems Maternal Aunt     No Known Problems Maternal Aunt     No Known Problems Paternal Aunt     No Known Problems Paternal Aunt     No Known Problems Paternal Aunt     Colonic polyp Maternal Grandmother     Colon cancer Maternal Grandmother     Throat cancer Maternal Grandfather     No Known Problems Paternal Grandmother     No Known Problems Paternal Grandfather       Social History     Tobacco Use    Smoking status: Former     Current packs/day: 0.00     Average packs/day: 0.3 packs/day for 35.0 years (8.8 ttl pk-yrs)     Types: Cigarettes     Start date: 1988     Quit date: 2024     Years since quittin.4    Smokeless tobacco: Never    Tobacco comments:     ONE HALF PACK A DAY OR LESS, CURRENT SOME DAY SMOKER, LIGHT TOBACCO SMOKER   AS PER [employer]; PER PT NOT SMOKING 2/25/2025   Vaping Use    Vaping status: Some Days   Substance Use Topics    Alcohol use: No    Drug use: No      E-Cigarette/Vaping    E-Cigarette Use Current Some Day User       E-Cigarette/Vaping Substances    Nicotine No     THC No     CBD No     Flavoring No     Other No     Unknown No       I have reviewed and agree with the history as documented.     53 yo F currently on chemo for breast cancer, presents for evaluation of sore throat, fevers, malaise, and abdominal pain.  Most recent chemoinfusion approximately 10 days ago.  Patient states symptoms began this morning, she took Tylenol which helped.  Patient's son sick with similar symptoms.  She also began experiencing lower abdominal pain.  No chest pain, shortness of breath, nausea/vomiting.        Review of Systems   Constitutional:  Positive for chills and fever.   Gastrointestinal:  Positive for abdominal pain.           Objective       ED Triage Vitals [03/02/25 1759]   Temperature Pulse Blood Pressure Respirations SpO2 Patient Position - Orthostatic VS   (!) 101.6 °F (38.7 °C) (!) 106 124/79 20 98 % Sitting      Temp Source Heart Rate Source BP Location FiO2 (%) Pain Score    Temporal Monitor Right arm -- No Pain      Vitals      Date and Time Temp Pulse SpO2 Resp BP Pain Score FACES Pain Rating User   03/02/25 1939 -- 99 97 % -- 120/71 -- -- JT   03/02/25 1759 101.6 °F (38.7 °C) 106 98 % 20 124/79 No Pain -- KRR            Physical Exam  Vitals reviewed.   Constitutional:       General: She is not in acute distress.     Appearance: She is normal weight.   HENT:      Head: Normocephalic and atraumatic.      Right Ear: External ear normal.      Left Ear: External ear normal.      Nose: Congestion present.      Mouth/Throat:      Mouth: Mucous membranes are moist.      Pharynx: Oropharynx is clear.   Eyes:      Extraocular Movements: Extraocular movements intact.      Conjunctiva/sclera: Conjunctivae normal.       Pupils: Pupils are equal, round, and reactive to light.   Cardiovascular:      Rate and Rhythm: Regular rhythm. Tachycardia present.      Pulses: Normal pulses.      Heart sounds: Normal heart sounds.   Pulmonary:      Effort: Pulmonary effort is normal.      Breath sounds: Normal breath sounds.   Abdominal:      Palpations: Abdomen is soft.      Tenderness: There is abdominal tenderness.   Musculoskeletal:         General: Normal range of motion.      Cervical back: Normal range of motion.   Skin:     General: Skin is warm and dry.      Capillary Refill: Capillary refill takes less than 2 seconds.   Neurological:      General: No focal deficit present.      Mental Status: She is alert and oriented to person, place, and time.         Results Reviewed       Procedure Component Value Units Date/Time    FLU/RSV/COVID - if FLU/RSV clinically relevant (2hr TAT) [[id number]]  (Normal) Collected: 03/02/25 1919    Lab Status: Final result Specimen: Nares from Nose Updated: 03/02/25 2004     SARS-CoV-2 Negative     INFLUENZA A PCR Negative     INFLUENZA B PCR Negative     RSV PCR Negative    Narrative:      This test has been performed using the CoV-2/Flu/RSV plus assay on the [employer] GeneXpert platform. This test has been validated by the  and verified by the performing laboratory.     This test is designed to amplify and detect the following: nucleocapsid (N), envelope (E), and RNA-dependent RNA polymerase (RdRP) genes of the SARS-CoV-2 genome; matrix (M), basic polymerase (PB2), and acidic protein (PA) segments of the influenza A genome; matrix (M) and non-structural protein (NS) segments of the influenza B genome, and the nucleocapsid genes of RSV A and RSV B.     Positive results are indicative of the presence of Flu A, Flu B, RSV, and/or SARS-CoV-2 RNA. Positive results for SARS-CoV-2 or suspected novel influenza should be reported to state, local, or federal health departments according to local reporting  requirements.      All results should be assessed in conjunction with clinical presentation and other laboratory markers for clinical management.     FOR PEDIATRIC PATIENTS - copy/paste COVID Guidelines URL to browser: [email or web address]       Procalcitonin [[id number]]  (Normal) Collected: 03/02/25 1919    Lab Status: Final result Specimen: Blood from Arm, Right Updated: 03/02/25 1954     Procalcitonin <0.05 ng/ml     Comprehensive metabolic panel [[id number]]  (Abnormal) Collected: 03/02/25 1919    Lab Status: Final result Specimen: Blood from Arm, Right Updated: 03/02/25 1946     Sodium 137 mmol/L      Potassium 3.1 mmol/L      Chloride 104 mmol/L      CO2 25 mmol/L      ANION GAP 8 mmol/L      BUN 9 mg/dL      Creatinine 0.56 mg/dL      Glucose 102 mg/dL      Calcium 9.1 mg/dL      AST 13 U/L      ALT 15 U/L      Alkaline Phosphatase 75 U/L      Total Protein 6.7 g/dL      Albumin 4.0 g/dL      Total Bilirubin 0.48 mg/dL      eGFR 107 ml/min/1.73sq m     Narrative:      National Kid[employer] Foundation guidelines for Chronic Kidney Disease (CKD):     Stage 1 with normal or high GFR (GFR > 90 mL/min/1.73 square meters)    Stage 2 Mild CKD (GFR = 60-89 mL/min/1.73 square meters)    Stage 3A Moderate CKD (GFR = 45-59 mL/min/1.73 square meters)    Stage 3B Moderate CKD (GFR = 30-44 mL/min/1.73 square meters)    Stage 4 Severe CKD (GFR = 15-29 mL/min/1.73 square meters)    Stage 5 End Stage CKD (GFR <15 mL/min/1.73 square meters)  Note: GFR calculation is accurate only with a steady state creatinine    Lactic acid [[id number]]  (Normal) Collected: 03/02/25 1919    Lab Status: Final result Specimen: Blood from Arm, Right Updated: 03/02/25 1945     LACTIC ACID 0.7 mmol/L     Narrative:      Result may be elevated if tourniquet was used during collection.    Protime-INR [[id number]]  (Abnormal) Collected: 03/02/25 1919    Lab Status: Final result Specimen: Blood from  Arm, Right Updated: 03/02/25 1940     Protime 15.1 seconds      INR 1.17    Narrative:      INR Therapeutic Range    Indication                                             INR Range      Atrial Fibrillation                                               2.0-3.0  Hypercoagulable State                                    2.0.2.3  Left Ventricular Asist Device                            2.0-3.0  Mechanical Heart Valve                                  -    Aortic(with afib, MI, embolism, HF, LA enlargement,    and/or coagulopathy)                                     2.0-3.0 (2.5-3.5)     Mitral                                                             2.5-3.5  Prosthetic/Bioprosthetic Heart Valve               2.0-3.0  Venous thromboembolism (VTE: VT, PE        2.0-3.0    APTT [[id number]]  (Normal) Collected: 03/02/25 1919    Lab Status: Final result Specimen: Blood from Arm, Right Updated: 03/02/25 1940     PTT 27 seconds     Urine Microscopic [[id number]]  (Normal) Collected: 03/02/25 1926    Lab Status: Final result Specimen: Urine, Clean Catch Updated: 03/02/25 1934     RBC, UA 1-2 /hpf      WBC, UA None Seen /hpf      Epithelial Cells None Seen /hpf      Bacteria, UA None Seen /hpf     UA w Reflex to Microscopic w Reflex to Culture [[id number]]  (Abnormal) Collected: 03/02/25 1926    Lab Status: Final result Specimen: Urine, Clean Catch Updated: 03/02/25 1933     Color, UA Colorless     Clarity, UA Clear     Specific Gravity, UA 1.006     pH, UA 5.5     Leukocytes, UA Negative     Nitrite, UA Negative     Protein, UA Negative mg/dl      Glucose, UA Negative mg/dl      Ketones, UA Negative mg/dl      Urobilinogen, UA <2.0 mg/dl      Bilirubin, UA Negative     Occult Blood, UA Small    CBC and differential [[id number]]  (Abnormal) Collected: 03/02/25 1919    Lab Status: Final result Specimen: Blood from Arm, Right Updated: 03/02/25 1929     WBC 1.95 Thousand/uL      RBC 2.54 Million/uL      Hemoglobin 8.5 g/dL       Hematocrit 25.5 %       fL      MCH 33.5 pg      MCHC 33.3 g/dL      RDW 15.6 %      MPV 9.8 fL      Platelets 156 Thousands/uL      nRBC 0 /100 WBCs      Segmented % 62 %      Immature Grans % 1 %      Lymphocytes % 26 %      Monocytes % 7 %      Eosinophils Relative 3 %      Basophils Relative 1 %      Absolute Neutrophils 1.24 Thousands/µL      Absolute Immature Grans 0.01 Thousand/uL      Absolute Lymphocytes 0.51 Thousands/µL      Absolute Monocytes 0.13 Thousand/µL      Eosinophils Absolute 0.05 Thousand/µL      Basophils Absolute 0.01 Thousands/µL     Blood culture #1 [[id number]] Collected: 03/02/25 1919    Lab Status: In process Specimen: Blood from Arm, Right Updated: 03/02/25 1924    Blood culture #2 [[id number]] Collected: 03/02/25 1919    Lab Status: In process Specimen: Blood from Arm, Left Updated: 03/02/25 1924            XR chest 2 views   ED Interpretation by Kei Schuler DO (03/02 1953)   2 view chest x-ray interpreted me shows no infiltrate, no effusion, no acute cardiopulmonary disease          Procedures    ED Medication and Procedure Management   Prior to Admission Medications   Prescriptions Last Dose Informant Patient Reported? Taking?   Cholecalciferol (VITAMIN D3 PO)  Self, Family Member Yes No   Sig: Take by mouth daily   Diclofenac Sodium (VOLTAREN) 1 %  Self, Family Member No No   Sig: Apply 2 g topically 4 (four) times a day   acetaminophen (TYLENOL) 500 mg tablet  Self, Family Member No No   Sig: Take 1 tablet (500 mg total) by mouth every 6 (six) hours as needed for mild pain   atorvastatin (LIPITOR) 10 mg tablet  Self, Family Member No No   Sig: Take 1 tablet (10 mg total) by mouth daily   cyclobenzaprine (FLEXERIL) 5 mg tablet  Self, Family Member No No   Sig: Take 1 tablet (5 mg total) by mouth 3 (three) times a day as needed for muscle spasms   lidocaine-prilocaine (EMLA) cream  Self, Family Member No No   Sig: Apply topically as needed for mild pain    lisinopril-hydrochlorothiazide (PRINZIDE,ZESTORETIC) 10-12.5 MG per tablet  Self, Family Member No No   Sig: Take 1 tablet by mouth daily   mupirocin (BACTROBAN) 2 % ointment   No No   Sig: Apply topically 2 (two) times a day for 7 days   naproxen (Naprosyn) 500 mg tablet  Self No No   Sig: Take 1 tablet (500 mg total) by mouth 2 (two) times a day with meals for 15 days   ondansetron (ZOFRAN) 4 mg tablet  Self, Family Member No No   Sig: Take 1 tablet (4 mg total) by mouth every 8 (eight) hours as needed for nausea or vomiting      Facility-Administered Medications: None     Patient's Medications   Discharge Prescriptions    No medications on file     No discharge procedures on file.  ED SEPSIS DOCUMENTATION   Time reflects when diagnosis was documented in both MDM as applicable and the Disposition within this note       Time User Action Codes Description Comment    3/2/2025  7:58 PM Guille Kim Add [J06.9] URI (upper respiratory infection)                  Guille Kim MD  03/02/25 2047

## 2025-03-02 NOTE — ED ATTENDING ATTESTATION
3/2/2025  I, Kei Schuler DO, saw and evaluated the patient. I have discussed the patient with the resident/non-physician practitioner and agree with the resident's/non-physician practitioner's findings, Plan of Care, and MDM as documented in the resident's/non-physician practitioner's note, except where noted. All available labs and Radiology studies were reviewed.  I was present for key portions of any procedure(s) performed by the resident/non-physician practitioner and I was immediately available to provide assistance.       At this point I agree with the current assessment done in the Emergency Department.  I have conducted an independent evaluation of this patient a history and physical is as follows:    Patient is a 34-year-old female with a history of hypertension, panic attacks, GERD, breast cancer currently on chemotherapy, last chemotherapy was February 20, 2025, accompanied by her daughter.  The patient says she woke up this morning with some mild fever, some slight nonproductive cough, runny nose and slight sore throat.  No vomiting, no abdominal pain, no dysuria or hematuria, no diarrhea, no known sick contacts.  No medication taken prior to arrival.  Her son is sick with similar symptoms at home.  Her daughter indicates she is feeling okay    General:  Patient is well-appearing  Head:  Atraumatic  Eyes:  Conjunctiva pink  ENT: Patient has no facial erythema or facial swelling, she has been clear rhinorrhea, no sinus tenderness.Mucous membranes moist. No swelling of the posterior pharynx. No tonsillar enlargement, exudate, lesions. No swelling in the floor of the mouth. No uvula deviation. No trismus.  No oropharyngeal lesions  Neck:  Supple  Cardiac:  S1-S2, without murmurs  Lungs:  Clear to auscultation bilaterally  Abdomen:  Soft, nontender, normal bowel sounds, no CVA tenderness, no tympany, no rigidity, no guarding  Extremities:  Normal range of motion  Neurologic:  Awake, fluent speech,  normal comprehension. AAOx3.   Skin:  Pink warm and dry  Psychiatric:  Alert, pleasant, cooperative          ED Course  ED Course as of 03/02/25 1848   Sun Mar 02, 2025   1837 ECG performed at 1803 hrs., interpreted by me, sinus rhythm rate of 105, no acute ischemic or acute infarctive changes, no acute change from December 25, 2024     XR chest 2 views   ED Interpretation   2 view chest x-ray interpreted me shows no infiltrate, no effusion, no acute cardiopulmonary disease        Labs Reviewed   CBC AND DIFFERENTIAL - Abnormal       Result Value Ref Range Status    WBC 1.95 (*) 4.31 - 10.16 Thousand/uL Final    RBC 2.54 (*) 3.81 - 5.12 Million/uL Final    Hemoglobin 8.5 (*) 11.5 - 15.4 g/dL Final    Hematocrit 25.5 (*) 34.8 - 46.1 % Final     (*) 82 - 98 fL Final    MCH 33.5  26.8 - 34.3 pg Final    MCHC 33.3  31.4 - 37.4 g/dL Final    RDW 15.6 (*) 11.6 - 15.1 % Final    MPV 9.8  8.9 - 12.7 fL Final    Platelets 156  149 - 390 Thousands/uL Final    nRBC 0  /100 WBCs Final    Segmented % 62  43 - 75 % Final    Immature Grans % 1  0 - 2 % Final    Lymphocytes % 26  14 - 44 % Final    Monocytes % 7  4 - 12 % Final    Eosinophils Relative 3  0 - 6 % Final    Basophils Relative 1  0 - 1 % Final    Absolute Neutrophils 1.24 (*) 1.85 - 7.62 Thousands/µL Final    Absolute Immature Grans 0.01  0.00 - 0.20 Thousand/uL Final    Absolute Lymphocytes 0.51 (*) 0.60 - 4.47 Thousands/µL Final    Absolute Monocytes 0.13 (*) 0.17 - 1.22 Thousand/µL Final    Eosinophils Absolute 0.05  0.00 - 0.61 Thousand/µL Final    Basophils Absolute 0.01  0.00 - 0.10 Thousands/µL Final   COMPREHENSIVE METABOLIC PANEL - Abnormal    Sodium 137  135 - 147 mmol/L Final    Potassium 3.1 (*) 3.5 - 5.3 mmol/L Final    Chloride 104  96 - 108 mmol/L Final    CO2 25  21 - 32 mmol/L Final    ANION GAP 8  4 - 13 mmol/L Final    BUN 9  5 - 25 mg/dL Final    Creatinine 0.56 (*) 0.60 - 1.30 mg/dL Final    Comment: Standardized to IDMS reference method     Glucose 102  65 - 140 mg/dL Final    Comment: If the patient is fasting, the ADA then defines impaired fasting glucose as > 100 mg/dL and diabetes as > or equal to 123 mg/dL.    Calcium 9.1  8.4 - 10.2 mg/dL Final    AST 13  13 - 39 U/L Final    ALT 15  7 - 52 U/L Final    Comment: Specimen collection should occur prior to Sulfasalazine administration due to the potential for falsely depressed results.     Alkaline Phosphatase 75  34 - 104 U/L Final    Total Protein 6.7  6.4 - 8.4 g/dL Final    Albumin 4.0  3.5 - 5.0 g/dL Final    Total Bilirubin 0.48  0.20 - 1.00 mg/dL Final    Comment: Use of this assay is not recommended for patients undergoing treatment with eltrombopag due to the potential for falsely elevated results.  N-acetyl-p-benzoquinone imine (metabolite of Acetaminophen) will generate erroneously low results in samples for patients that have taken an overdose of Acetaminophen.    eGFR 107  ml/min/1.73sq m Final    Narrative:     [employer] guidelines for Chronic Kidney Disease (CKD):     Stage 1 with normal or high GFR (GFR > 90 mL/min/1.73 square meters)    Stage 2 Mild CKD (GFR = 60-89 mL/min/1.73 square meters)    Stage 3A Moderate CKD (GFR = 45-59 mL/min/1.73 square meters)    Stage 3B Moderate CKD (GFR = 30-44 mL/min/1.73 square meters)    Stage 4 Severe CKD (GFR = 15-29 mL/min/1.73 square meters)    Stage 5 End Stage CKD (GFR <15 mL/min/1.73 square meters)  Note: GFR calculation is accurate only with a steady state creatinine   PROTIME-INR - Abnormal    Protime 15.1 (*) 12.3 - 15.0 seconds Final    INR 1.17  0.85 - 1.19 Final    Narrative:     INR Therapeutic Range    Indication                                             INR Range      Atrial Fibrillation                                               2.0-3.0  Hypercoagulable State                                    2.0.2.3  Left Ventricular Asist Device                            2.0-3.0  Mechanical Heart Valve                                   -    Aortic(with afib, MI, embolism, HF, LA enlargement,    and/or coagulopathy)                                     2.0-3.0 (2.5-3.5)     Mitral                                                             2.5-3.5  Prosthetic/Bioprosthetic Heart Valve               2.0-3.0  Venous thromboembolism (VTE: VT, PE        2.0-3.0   UA W REFLEX TO MICROSCOPIC WITH REFLEX TO CULTURE - Abnormal    Color, UA Colorless   Final    Clarity, UA Clear   Final    Specific Gravity, UA 1.006  1.003 - 1.030 Final    pH, UA 5.5  4.5, 5.0, 5.5, 6.0, 6.5, 7.0, 7.5, 8.0 Final    Leukocytes, UA Negative  Negative Final    Nitrite, UA Negative  Negative Final    Protein, UA Negative  Negative mg/dl Final    Glucose, UA Negative  Negative mg/dl Final    Ketones, UA Negative  Negative mg/dl Final    Urobilinogen, UA <2.0  <2.0 mg/dl mg/dl Final    Bilirubin, UA Negative  Negative Final    Occult Blood, UA Small (*) Negative Final   COVID19, INFLUENZA A/B, RSV PCR, [facility] - Normal    SARS-CoV-2 Negative  Negative Final    Comment:      INFLUENZA A PCR Negative  Negative Final    Comment:      INFLUENZA B PCR Negative  Negative Final    Comment:      RSV PCR Negative  Negative Final    Comment:      Narrative:     This test has been performed using the CoV-2/Flu/RSV plus assay on the [employer] GeneXpert platform. This test has been validated by the  and verified by the performing laboratory.     This test is designed to amplify and detect the following: nucleocapsid (N), envelope (E), and RNA-dependent RNA polymerase (RdRP) genes of the SARS-CoV-2 genome; matrix (M), basic polymerase (PB2), and acidic protein (PA) segments of the influenza A genome; matrix (M) and non-structural protein (NS) segments of the influenza B genome, and the nucleocapsid genes of RSV A and RSV B.     Positive results are indicative of the presence of Flu A, Flu B, RSV, and/or SARS-CoV-2 RNA. Positive results for SARS-CoV-2 or suspected  novel influenza should be reported to state, local, or federal health departments according to local reporting requirements.      All results should be assessed in conjunction with clinical presentation and other laboratory markers for clinical management.     FOR PEDIATRIC PATIENTS - copy/paste COVID Guidelines URL to browser: [email or web address]      LACTIC ACID, PLASMA (W/REFLEX IF RESULT > 2.0) - Normal    LACTIC ACID 0.7  0.5 - 2.0 mmol/L Final    Narrative:     Result may be elevated if tourniquet was used during collection.   PROCALCITONIN TEST - Normal    Procalcitonin <0.05  <=0.25 ng/ml Final    Comment: Suspected Lower Respiratory Tract Infection (LRTI):  - LESS than or EQUAL to 0.25 ng/mL:   low likelihood for bacterial LRTI; antibiotics DISCOURAGED.  - GREATER than 0.25 ng/mL:   increased likelihood for bacterial LRTI; antibiotics ENCOURAGED.    Suspected Sepsis:  - Strongly consider initiating antibiotics in ALL UNSTABLE patients.  - LESS than or EQUAL to 0.5 ng/mL:   low likelihood for bacterial sepsis; antibiotics DISCOURAGED.  - GREATER than 0.5 ng/mL:   increased likelihood for bacterial sepsis; antibiotics ENCOURAGED.  - GREATER than 2 ng/mL:   high risk for severe sepsis / septic shock; antibiotics strongly ENCOURAGED.    Decisions on antibiotic use should not be based solely on Procalcitonin (PCT) levels. If PCT is low but uncertainty exists with stopping antibiotics, repeat PCT in 6-24 hours to confirm the low level. If antibiotics are administered (regardless if initial PCT was high or low), repeat PCT every 1-2 days to consider early antibiotic cessation (when GREATER than 80% decrease from the peak OR when PCT drops below designated cutoffs, whichever comes first), so long as the infection is NOT one that typically requires prolonged treatment durations (e.g., bone/joint infections, endocarditis, Staph. aureus bacteremia).    Situations  of FALSE-POSITIVE Procalcitonin values:  1) Newborns < 72 hours old  2) Massive stress from severe trauma / burns, major surgery, acute pancreatitis, cardiogenic / hemorrhagic shock, sickle cell crisis, or other organ perfusion abnormalities  3) Malaria and some Candidal infections  4) Treatment with agents that stimulate cytokines (e.g., OKT3, anti-lymphocyte globulins, alemtuzumab, IL-2, granulocyte transfusion [NOT GCSFs])  5) Chronic renal disease causes elevated baseline levels (consider GREATER than 0.75 ng/mL as an abnormal cut-off); initiating HD/CRRT may cause transient decreases  6) Paraneoplastic syndromes from medullary thyroid or SCLC, some forms of vasculitis, and acute [id number] disease    Situations of FALSE-NEGATIVE Procalcitonin values:  1) Too early in clinical course for PCT to have reached its peak (may repeat in 6-24 hours to confirm low level)  2) Localized infection WITHOUT systemic (SIRS / sepsis) response (e.g., an abscess, osteomyelitis, cystitis)  3) Mycobacteria (e.g., Tuberculosis, MAC)  4) Cystic fibrosis exacerbations     APTT - Normal    PTT 27  23 - 34 seconds Final    Comment: Therapeutic Heparin Range =  60-90 seconds   URINE MICROSCOPIC - Normal    RBC, UA 1-2  None Seen, 1-2 /hpf Final    WBC, UA None Seen  None Seen, 1-2 /hpf Final    Epithelial Cells None Seen  None Seen, Occasional /hpf Final    Bacteria, UA None Seen  None Seen, Occasional /hpf Final   BLOOD CULTURE   BLOOD CULTURE     On reassessment there was no change to the above findings and patient was feeling comfortable.  At this point I suspect the patient was close a viral illness.  She is slightly neutropenic but not below the threshold where she would require broad-spectrum antibiotics or treatment for neutropenic fever.  No evidence of influenza, no evidence of urinary tract infection.  No evidence of life-threatening sepsis or metabolic abnormality.  Chest x-ray shows no evidence of pneumonia.  Oropharynx  examination is unremarkable, no sign of pharyngitis.While the cause of the patient's complaints is most likely benign, it is possible that this is the early presentation of a more serious condition. This diagnostic uncertainty was discussed with the patient, as was the importance of follow up care, as well as the need to return to immediately return to the closest emergency department for any fever, severe headache, vomiting, the signs/symptoms in the discharge instruction sheets, or they were otherwise concerned about their medical condition. The patient stated they were aware of this diagnostic uncertainty, understood the importance of follow up and were comfortable being discharged.  Supportive care, importance of follow-up and return precautions were discussed with the patient, who expressed understanding.      DIAGNOSIS:  Acute febrile illness, chronic anemia, chronic neutropenia    MEDICAL DECISION MAKING CODING    COLLECTION AND INTERPRETATION OF DATA  I reviewed prior external notes, including February 20, 2025 outpatient oncology infusion note    I ordered each unique test  Tests reviewed personally by me:  Labs: See above  Imaging: I independently interpreted the x-ray as noted above.            Critical Care Time  Procedures

## 2025-03-03 ENCOUNTER — TELEPHONE (OUTPATIENT)
Age: 55
End: 2025-03-03

## 2025-03-03 NOTE — DISCHARGE INSTRUCTIONS
Please follow up with your PCP. Drink plenty of fluids and continue taking your medications as prescribed.     Call your doctor or return to the ER if you experience worsening symptoms, persistent nausea/vomiting, can no longer tolerate fluids, or any other concerning symptoms.    Never smoker
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
## 2025-03-03 NOTE — TELEPHONE ENCOUNTER
Returned telephone call left voice message on Anuj's voice mail.  Reviewed Dr Garcia's message.  Instructed him to call back with any questions or send message via [employer].

## 2025-03-04 ENCOUNTER — TELEPHONE (OUTPATIENT)
Dept: FAMILY MEDICINE CLINIC | Facility: CLINIC | Age: 55
End: 2025-03-04

## 2025-03-04 ENCOUNTER — APPOINTMENT (EMERGENCY)
Dept: RADIOLOGY | Facility: HOSPITAL | Age: 55
End: 2025-03-04
Payer: COMMERCIAL

## 2025-03-04 ENCOUNTER — HOSPITAL ENCOUNTER (EMERGENCY)
Facility: HOSPITAL | Age: 55
Discharge: HOME | End: 2025-03-04
Attending: EMERGENCY MEDICINE
Payer: COMMERCIAL

## 2025-03-04 VITALS
DIASTOLIC BLOOD PRESSURE: 55 MMHG | HEIGHT: 66 IN | RESPIRATION RATE: 20 BRPM | SYSTOLIC BLOOD PRESSURE: 93 MMHG | TEMPERATURE: 100.3 F | WEIGHT: 142 LBS | OXYGEN SATURATION: 97 % | HEART RATE: 104 BPM | BODY MASS INDEX: 22.82 KG/M2

## 2025-03-04 DIAGNOSIS — C77.3 CARCINOMA OF RIGHT BREAST METASTATIC TO AXILLARY LYMPH NODE (HCC): ICD-10-CM

## 2025-03-04 DIAGNOSIS — C50.911 CARCINOMA OF RIGHT BREAST METASTATIC TO AXILLARY LYMPH NODE (HCC): Primary | ICD-10-CM

## 2025-03-04 DIAGNOSIS — C50.411 MALIGNANT NEOPLASM OF UPPER-OUTER QUADRANT OF RIGHT BREAST IN FEMALE, ESTROGEN RECEPTOR POSITIVE (HCC): ICD-10-CM

## 2025-03-04 DIAGNOSIS — Z17.0 MALIGNANT NEOPLASM OF UPPER-OUTER QUADRANT OF RIGHT BREAST IN FEMALE, ESTROGEN RECEPTOR POSITIVE (HCC): ICD-10-CM

## 2025-03-04 DIAGNOSIS — R50.9 FEVER: ICD-10-CM

## 2025-03-04 DIAGNOSIS — C77.3 CARCINOMA OF RIGHT BREAST METASTATIC TO AXILLARY LYMPH NODE (HCC): Primary | ICD-10-CM

## 2025-03-04 DIAGNOSIS — J10.1 INFLUENZA A: ICD-10-CM

## 2025-03-04 DIAGNOSIS — D72.819 LEUKOPENIA: ICD-10-CM

## 2025-03-04 DIAGNOSIS — C50.911 CARCINOMA OF RIGHT BREAST METASTATIC TO AXILLARY LYMPH NODE (HCC): ICD-10-CM

## 2025-03-04 DIAGNOSIS — R05.9 COUGH: Primary | ICD-10-CM

## 2025-03-04 LAB
ALBUMIN SERPL BCG-MCNC: 4 G/DL (ref 3.5–5)
ALP SERPL-CCNC: 78 U/L (ref 34–104)
ALT SERPL W P-5'-P-CCNC: 25 U/L (ref 7–52)
ANION GAP SERPL CALCULATED.3IONS-SCNC: 8 MMOL/L (ref 4–13)
APTT PPP: 28 SECONDS (ref 23–34)
AST SERPL W P-5'-P-CCNC: 24 U/L (ref 13–39)
ATRIAL RATE: 105 BPM
ATRIAL RATE: 113 BPM
ATRIAL RATE: 97 BPM
BACTERIA UR QL AUTO: NORMAL /HPF
BASOPHILS # BLD AUTO: 0 THOUSANDS/ÂΜL (ref 0–0.1)
BASOPHILS NFR BLD AUTO: 0 % (ref 0–1)
BILIRUB SERPL-MCNC: 0.35 MG/DL (ref 0.2–1)
BILIRUB UR QL STRIP: NEGATIVE
BUN SERPL-MCNC: 6 MG/DL (ref 5–25)
CALCIUM SERPL-MCNC: 8.6 MG/DL (ref 8.4–10.2)
CHLORIDE SERPL-SCNC: 104 MMOL/L (ref 96–108)
CLARITY UR: CLEAR
CO2 SERPL-SCNC: 23 MMOL/L (ref 21–32)
COLOR UR: ABNORMAL
CREAT SERPL-MCNC: 0.61 MG/DL (ref 0.6–1.3)
EOSINOPHIL # BLD AUTO: 0.04 THOUSAND/ÂΜL (ref 0–0.61)
EOSINOPHIL NFR BLD AUTO: 4 % (ref 0–6)
ERYTHROCYTE [DISTWIDTH] IN BLOOD BY AUTOMATED COUNT: 15.9 % (ref 11.6–15.1)
FLUAV AG UPPER RESP QL IA.RAPID: POSITIVE
FLUBV AG UPPER RESP QL IA.RAPID: NEGATIVE
GFR SERPL CREATININE-BSD FRML MDRD: 104 ML/MIN/1.73SQ M
GLUCOSE SERPL-MCNC: 108 MG/DL (ref 65–140)
GLUCOSE UR STRIP-MCNC: NEGATIVE MG/DL
HCT VFR BLD AUTO: 27 % (ref 34.8–46.1)
HGB BLD-MCNC: 8.9 G/DL (ref 11.5–15.4)
HGB UR QL STRIP.AUTO: ABNORMAL
IMM GRANULOCYTES # BLD AUTO: 0.01 THOUSAND/UL (ref 0–0.2)
IMM GRANULOCYTES NFR BLD AUTO: 1 % (ref 0–2)
INR PPP: 1.15 (ref 0.85–1.19)
KETONES UR STRIP-MCNC: NEGATIVE MG/DL
LACTATE SERPL-SCNC: 1.3 MMOL/L (ref 0.5–2)
LEUKOCYTE ESTERASE UR QL STRIP: NEGATIVE
LYMPHOCYTES # BLD AUTO: 0.21 THOUSANDS/ÂΜL (ref 0.6–4.47)
LYMPHOCYTES NFR BLD AUTO: 18 % (ref 14–44)
MCH RBC QN AUTO: 34.1 PG (ref 26.8–34.3)
MCHC RBC AUTO-ENTMCNC: 33 G/DL (ref 31.4–37.4)
MCV RBC AUTO: 103 FL (ref 82–98)
MONOCYTES # BLD AUTO: 0.08 THOUSAND/ÂΜL (ref 0.17–1.22)
MONOCYTES NFR BLD AUTO: 7 % (ref 4–12)
NEUTROPHILS # BLD AUTO: 0.8 THOUSANDS/ÂΜL (ref 1.85–7.62)
NEUTS SEG NFR BLD AUTO: 70 % (ref 43–75)
NITRITE UR QL STRIP: NEGATIVE
NON-SQ EPI CELLS URNS QL MICRO: NORMAL /HPF
NRBC BLD AUTO-RTO: 0 /100 WBCS
P AXIS: 36 DEGREES
P AXIS: 71 DEGREES
P AXIS: 75 DEGREES
PH UR STRIP.AUTO: 6 [PH]
PLATELET # BLD AUTO: 155 THOUSANDS/UL (ref 149–390)
PMV BLD AUTO: 9.5 FL (ref 8.9–12.7)
POTASSIUM SERPL-SCNC: 3.8 MMOL/L (ref 3.5–5.3)
PR INTERVAL: 156 MS
PR INTERVAL: 174 MS
PR INTERVAL: 180 MS
PROCALCITONIN SERPL-MCNC: 0.08 NG/ML
PROT SERPL-MCNC: 6.6 G/DL (ref 6.4–8.4)
PROT UR STRIP-MCNC: NEGATIVE MG/DL
PROTHROMBIN TIME: 15 SECONDS (ref 12.3–15)
QRS AXIS: 12 DEGREES
QRS AXIS: 57 DEGREES
QRS AXIS: 57 DEGREES
QRSD INTERVAL: 74 MS
QRSD INTERVAL: 74 MS
QRSD INTERVAL: 76 MS
QT INTERVAL: 296 MS
QT INTERVAL: 348 MS
QT INTERVAL: 358 MS
QTC INTERVAL: 406 MS
QTC INTERVAL: 454 MS
QTC INTERVAL: 460 MS
RBC # BLD AUTO: 2.61 MILLION/UL (ref 3.81–5.12)
RBC #/AREA URNS AUTO: NORMAL /HPF
SARS-COV+SARS-COV-2 AG RESP QL IA.RAPID: NEGATIVE
SODIUM SERPL-SCNC: 135 MMOL/L (ref 135–147)
SP GR UR STRIP.AUTO: 1.01 (ref 1–1.03)
T WAVE AXIS: 74 DEGREES
T WAVE AXIS: 8 DEGREES
T WAVE AXIS: 83 DEGREES
UROBILINOGEN UR STRIP-ACNC: <2 MG/DL
VENTRICULAR RATE: 105 BPM
VENTRICULAR RATE: 113 BPM
VENTRICULAR RATE: 97 BPM
WBC # BLD AUTO: 1.14 THOUSAND/UL (ref 4.31–10.16)
WBC #/AREA URNS AUTO: NORMAL /HPF

## 2025-03-04 PROCEDURE — 93005 ELECTROCARDIOGRAM TRACING: CPT

## 2025-03-04 PROCEDURE — 71046 X-RAY EXAM CHEST 2 VIEWS: CPT

## 2025-03-04 PROCEDURE — 87804 INFLUENZA ASSAY W/OPTIC: CPT

## 2025-03-04 PROCEDURE — 85025 COMPLETE CBC W/AUTO DIFF WBC: CPT

## 2025-03-04 PROCEDURE — 87811 SARS-COV-2 COVID19 W/OPTIC: CPT

## 2025-03-04 PROCEDURE — 36415 COLL VENOUS BLD VENIPUNCTURE: CPT

## 2025-03-04 PROCEDURE — 93010 ELECTROCARDIOGRAM REPORT: CPT | Performed by: INTERNAL MEDICINE

## 2025-03-04 PROCEDURE — 80053 COMPREHEN METABOLIC PANEL: CPT

## 2025-03-04 PROCEDURE — 99283 EMERGENCY DEPT VISIT LOW MDM: CPT

## 2025-03-04 PROCEDURE — 84145 PROCALCITONIN (PCT): CPT

## 2025-03-04 PROCEDURE — 85730 THROMBOPLASTIN TIME PARTIAL: CPT

## 2025-03-04 PROCEDURE — 83605 ASSAY OF LACTIC ACID: CPT

## 2025-03-04 PROCEDURE — 81001 URINALYSIS AUTO W/SCOPE: CPT

## 2025-03-04 PROCEDURE — 96361 HYDRATE IV INFUSION ADD-ON: CPT

## 2025-03-04 PROCEDURE — 85610 PROTHROMBIN TIME: CPT

## 2025-03-04 PROCEDURE — 87040 BLOOD CULTURE FOR BACTERIA: CPT

## 2025-03-04 PROCEDURE — 99285 EMERGENCY DEPT VISIT HI MDM: CPT | Performed by: EMERGENCY MEDICINE

## 2025-03-04 PROCEDURE — 96374 THER/PROPH/DIAG INJ IV PUSH: CPT

## 2025-03-04 PROCEDURE — 76604 US EXAM CHEST: CPT | Performed by: EMERGENCY MEDICINE

## 2025-03-04 RX ORDER — ACETAMINOPHEN 325 MG/1
975 TABLET ORAL ONCE
Status: COMPLETED | OUTPATIENT
Start: 2025-03-04 | End: 2025-03-04

## 2025-03-04 RX ORDER — ONDANSETRON 4 MG/1
4 TABLET, FILM COATED ORAL EVERY 6 HOURS
Qty: 12 TABLET | Refills: 0 | Status: SHIPPED | OUTPATIENT
Start: 2025-03-04

## 2025-03-04 RX ORDER — KETOROLAC TROMETHAMINE 30 MG/ML
15 INJECTION, SOLUTION INTRAMUSCULAR; INTRAVENOUS ONCE
Status: COMPLETED | OUTPATIENT
Start: 2025-03-04 | End: 2025-03-04

## 2025-03-04 RX ADMIN — ACETAMINOPHEN 975 MG: 325 TABLET, FILM COATED ORAL at 08:39

## 2025-03-04 RX ADMIN — KETOROLAC TROMETHAMINE 15 MG: 30 INJECTION, SOLUTION INTRAMUSCULAR; INTRAVENOUS at 08:39

## 2025-03-04 RX ADMIN — SODIUM CHLORIDE 1000 ML: 0.9 INJECTION, SOLUTION INTRAVENOUS at 08:47

## 2025-03-04 NOTE — ED ATTENDING ATTESTATION
Final Diagnoses:     1. Cough    2. Fever    3. Carcinoma of right breast metastatic to axillary lymph node (HCC)    4. Malignant neoplasm of upper-outer quadrant of right breast in female, estrogen receptor positive (HCC)    5. Leukopenia    6. Influenza A      ED Course as of 03/04/25 1053   Tue Mar 04, 2025   0835 POCUS Cardiac/IVC+Lung:  - TTE was performed at bedside by myself   - Image Quality: GOOD  - Technically difficult due to: positioning  - Viewed obtained/attempted: Apical, PS, and SX  - Main purpose of this study was to r/o obvious pathology requiring emergent intervention   - Other images obtained for educational reasons.   - Findings:    There was no obvious pericardial effusion, just epicardial fat.    LV     Function: grossly normal to hyperdynamic appearing.    LVOT VTI 21 (Normal)    LVOT Diameter: 21    CO: 8.29     SVR = 80 x (MAP-CVP) / (CO) = 85 x (85-3) / 8.29 = 840 (normal)    EPSS 0 (Normal)    MAPSE 14 (Normal)   RV     Function grossly normal    TAPSE 17 (Normal)   IVC: IVC < 2.1cm; >50% compression w/ sniff likely RAP 3 mmHg    IVC Max: 0.5    CI: 100%   Lungs:    There was no obvious pleural effusion.     B-lines were not present anteriorly bilaterally at upper BLUE-point     Bilateral anterior lung sliding bilaterally present at upper BLUE-point (no PTX)   Mitral    No obvious MR.     No obvious MS.    Aortic    No obvious AR.     No obvious AS.    Tricuspid    No obvious regurg    LA    No obvious LA dilation.   RA    No obvious RA dilation.   Summary:   - Likely fluid tolerant based on IVC.     - There were no obvious signs of fluid overload (B-lines, plump IVC)  - There was no obvious anterior pneumothorax  - There was no large pericardial effusion   0858 WBC(!): 1.14  Worsening leukopenia with neutropenia   0901 MCV(!): 103  Macrocytic anemia, chronic   1013 Influenza A Rapid Antigen(!): Positive   1013 Temperature: 100.3 °F (37.9 °C)   1013 Pulse: 104       I, Ilir Alice  MD, saw and evaluated the patient. All available labs and X-rays were ordered by me or the resident / non-physician and have been reviewed by myself. I discussed the patient with the resident / non-physician and agree with the resident's / non-physician practitioner's findings and plan as documented in the resident's / non-physician practicitioner's note, except where noted.   At this point, I agree with the current assessment done in the ED.   I was present during key portions of all procedures performed unless otherwise stated.     HPI:  NURSING TRIAGE:    This is a 54 y.o. female presenting for evaluation of likely viral infection, the son has influenza A.  The patient starting a few days ago started to have cough sore throat fevers chills not feeling well general malaise.  Educated in a couple days ago for evaluation.  At that time she had labs as well as viral testing that was negative.  She was told she likely has a viral infection and was sent home.  Since going home increased weakness, not feeling well so came back in for reevaluation.  No falls no trauma.  Poor oral intake.  She is on chemotherapy and supposed to go in tomorrow for chemo but because of how she feels and the fact that she had a high fever likely is going to cancel.  She is on chemotherapy for breast cancer with metastases to lymph nodes.  She is on multiple medications including Keytruda Paraplatin Adriamycin.    Oncology History   Carcinoma of right breast metastatic to axillary lymph node (HCC)   6/11/2024 Initial Diagnosis    Carcinoma of right breast metastatic to axillary lymph node (HCC)     9/20/2024 -  Cancer Staged    Staging form: Breast, AJCC 8th Edition  - Clinical stage from 9/20/2024: cT0, cN1(f), cM0, GX, ER-, MT-, HER2- - Signed by Jodie Philippe MD on 10/7/2024  Stage prefix: Initial diagnosis  Method of lymph node assessment: Core biopsy  Histologic grading system: 3 grade system       11/14/2024 -  Chemotherapy     DOXOrubicin (ADRIAMYCIN), 60 mg/m2 = 103 mg, Intravenous, Once, 1 of 4 cycles  alteplase (CATHFLO), 2 mg, Intracatheter, Every 1 Minute as needed, 5 of 17 cycles  pegfilgrastim-apgf (Nyvepria), 6 mg, Subcutaneous, Once, 1 of 4 cycles  sodium chloride, 500 mL (100 % of original dose 500 mL), Intravenous, Once, 1 of 1 cycle  Dose modification: 500 mL (original dose 500 mL, Cycle 1)  Administration: 500 mL (11/14/2024)  cyclophosphamide (CYTOXAN) IVPB, 600 mg/m2 = 1,026 mg, Intravenous, Once, 1 of 4 cycles  fosaprepitant (EMEND) IVPB, 150 mg, Intravenous, Once, 1 of 4 cycles  paclitaxel protein-bound (ABRAXANE) IVPB, 100 mg/m2 = 171 mg (original dose ), Intravenous, Once, 4 of 4 cycles  Dose modification: 100 mg/m2 (original dose 100 mg/m2, Cycle 1)  Administration: 171 mg (12/5/2024), 171 mg (12/12/2024), 171 mg (12/19/2024), 171 mg (1/9/2025), 171 mg (1/16/2025), 171 mg (1/23/2025), 171 mg (1/30/2025), 171 mg (2/7/2025), 171 mg (2/13/2025), 171 mg (2/20/2025)  CARBOplatin (PARAPLATIN) IVPB (GOG AUC DOSING), 180 mg, Intravenous, Once, 4 of 4 cycles  Administration: 180 mg (11/14/2024), 180 mg (11/21/2024), 180 mg (12/5/2024), 180 mg (12/12/2024), 180 mg (12/19/2024), 180 mg (1/9/2025), 180 mg (1/16/2025), 180 mg (1/23/2025), 180 mg (1/30/2025), 180 mg (2/7/2025), 180 mg (2/13/2025), 180 mg (2/20/2025)  PACLItaxel (TAXOL) chemo IVPB, 80 mg/m2 = 136.8 mg, Intravenous, Once, 1 of 1 cycle  Administration: 136.8 mg (11/14/2024), 136.8 mg (11/21/2024)  pembrolizumab (KEYTRUDA) IVPB, 200 mg, Intravenous, Once, 5 of 17 cycles  Administration: 200 mg (11/14/2024), 200 mg (12/12/2024), 200 mg (1/9/2025), 200 mg (1/30/2025)     Malignant neoplasm of upper-outer quadrant of right breast in female, estrogen receptor positive (HCC)   9/20/2024 Biopsy    Right breast ultrasound-guided biopsy  A. 10 o'clock, 7 cm from nipple (MARTHA)  Invasive lobular carcinoma  Grade 2  ER >95, IL 70, HER2 0  Lymphovascular invasion not  identified    B. Right axillary lymph node biopsy (MARTHA)  Infiltrating carcinoma  Although no definitive capsule is noted, it is favored to represent lymph node involvement by th e carcinoma  ER <1, NM <1, HER2 1+    Concordant. Malignancy appears unifocal; suspicious masses cover an area up to 1.8 cm. US of right axilla showed multiple enlarged, morphologically abnormal axillary lymph nodes with biopsy confirmed metastatic disease. Left breast clear.     9/20/2024 -  Cancer Staged    Staging form: Breast, AJCC 8th Edition  - Clinical stage from 9/20/2024: Stage Unknown (cT1c, cNX, cM0, G2, ER+, NM+, HER2-) - Signed by Jodie Philippe MD on 10/7/2024  Stage prefix: Initial diagnosis  Histologic grading system: 3 grade system       9/24/2024 Genomic Testing    MammaPrint/BluePrint ordered on breast specimen block A  Ultra-Low risk - luminal A     9/27/2024 Genetic Testing    Genetic testing with RNA analysis ordered  [facility]      Chief Complaint   Patient presents with    Fever     Per pt is having fever, sore throat, cough, and chills. Pt was seen two days ago with similar symptoms but not getting better. Pt also states has been taking tylenol and ibuprofen but has not decrease fever. Currently receiving chemo for breast cancer.      PHYSICAL: ASSESSMENT + PLAN:   Pertinent: appears unwell.     General: VS reviewed  awake, alert.   Speaking normally in full sentences.   Head: Normocephalic, atraumatic  Eyes: EOM-I. No diplopia.   No hyphema.   No subconjunctival hemorrhages.  Symmetrical lids.   ENT: Atraumatic external nose and ears.    Dry MM  No malocclusion. No stridor. Normal phonation. No drooling. Normal swallowing.   Neck: No JVD.  CV: No pallor noted  +tachycardia HR 120s  Lungs:   No tachypnea  No respiratory distress  Abd: soft nt nd no rebound/guarding  MSK:   FROM spontaneously  Skin: Dry, intact.   Neuro: Awake, alert, GCS15, CN II-XII grossly intact.   Motor grossly intact.  Psychiatric/Behavioral:  "interacting normally; appropriate mood/affect.    Exam: deferred    Vitals:    03/04/25 0801 03/04/25 0803 03/04/25 0908   BP: 103/76  93/55   BP Location: Left arm  Right arm   Pulse: (S) (!) 120  104   Resp: 20     Temp: (S) (!) 103 °F (39.4 °C)  100.3 °F (37.9 °C)   TempSrc: Temporal  Oral   SpO2: 96% 96% 97%   Weight: 64.4 kg (142 lb)     Height: 5' 6\" (1.676 m)      Given age risk factors and chemotherapy will do a septic workup.  Likely viral in origin given sick contact with influenza A however has significant immunosuppression.  Chest x-ray for pneumonia.  Urine for infection.  Likely admission.  IV hydration.  POCUS for pericardial effusion pleural effusion.     There are no obvious limitations to social determinants of care.   Nursing note reviewed.   Vitals reviewed.   Orders placed by myself and/or advanced practitioner / resident.    Previous chart was reviewed  History obtained from: Family and Patient  Language barrier: Syriac, family who is fluent translating  Limitations to the history obtained: None Critical Care Time:      Past Medical: Past Surgical:    has a past medical history of Breast cancer (HCC), GERD (gastroesophageal reflux disease), Headache, Hematuria, Hypertension, IUD (intrauterine device) in place (02/15/2019), Lateral epicondylitis, right elbow (01/08/2019), Panic attacks, and Psychiatric disorder.  has a past surgical history that includes No past surgeries; Cystoscopy (06/08/2018); Breast biopsy (Right, 09/20/2024); Breast biopsy (Right, 09/20/2024); US guided breast biopsy right complete (Right, 09/20/2024); US guided breast lymph node biopsy right (Right, 09/20/2024); Breast biopsy (Right, 11/01/2024); US guided breast biopsy right complete (Right, 11/1/2024); and IR port placement (11/6/2024).   Social: Cardiac (Echo/Cath)   Social History     Substance and Sexual Activity   Alcohol Use No     Social History     Tobacco Use   Smoking Status Former    Current packs/day: 0.00 "    Average packs/day: 0.3 packs/day for 35.0 years (8.8 ttl pk-yrs)    Types: Cigarettes    Start date: 1988    Quit date: 2024    Years since quittin.4   Smokeless Tobacco Never   Tobacco Comments    ONE HALF PACK A DAY OR LESS, CURRENT SOME DAY SMOKER, LIGHT TOBACCO SMOKER  AS PER [facility]; PER PT NOT SMOKING 2025     Social History     Substance and Sexual Activity   Drug Use No    Results for orders placed during the hospital encounter of 17    Echo complete with contrast if indicated    Narrative  [address] [address]  [phone]    Transthoracic Echocardiogram  2D, M-mode, Doppler, and Color Doppler    Study date:  2017    Patient: JOVANY LUI  MR number: [id number]  Account number: [id number]  : 1970  Age: 46 years  Gender: Female  Status: Outpatient  Location: Echo lab  Height: 66 in  Weight: 128 lb  BP: 110/ 80 mmHg    Indications: Murmur.    Diagnoses: R01.1 - Cardiac murmur, unspecified    Sonographer:  PAOLA Tapia  Primary Physician:  Sarika Franz MD  Referring Physician:  Sarika Franz MD  Group:  [facility]  Interpreting Physician:  Colt Rebolledo MD    IMPRESSIONS:  There was no significant valvular heart disease.    SUMMARY    LEFT VENTRICLE:  Systolic function was normal. Ejection fraction was estimated to be 59 %.  There were no regional wall motion abnormalities.    MITRAL VALVE:  There was mild regurgitation.    TRICUSPID VALVE:  There was trace regurgitation.    HISTORY: PRIOR HISTORY: Hypertension; Tobacco use    PROCEDURE: The procedure was performed in the echo lab. This was a routine study. The transthoracic approach was used. The study included complete 2D imaging, M-mode, complete spectral Doppler, and color Doppler. The heart rate was 94 bpm,  at the start of the study. Images were obtained from the parasternal, apical, subcostal, and suprasternal  notch acoustic windows. Image quality was adequate.    LEFT VENTRICLE: Size was normal. Systolic function was normal. Ejection fraction was estimated to be 59 %. There were no regional wall motion abnormalities. Wall thickness was normal. There was no evidence of concentric hypertrophy.  DOPPLER: Left ventricular diastolic function parameters were normal.    RIGHT VENTRICLE: The size was normal. Systolic function was normal. Wall thickness was normal.    LEFT ATRIUM: Size was normal.    RIGHT ATRIUM: Size was normal.    MITRAL VALVE: Valve structure was normal. There was normal leaflet separation. DOPPLER: The transmitral velocity was within the normal range. There was no evidence for stenosis. There was mild regurgitation.    AORTIC VALVE: The valve was trileaflet. Leaflets exhibited normal thickness and normal cuspal separation. DOPPLER: Transaortic velocity was within the normal range. There was no evidence for stenosis. There was no regurgitation.    TRICUSPID VALVE: The valve structure was normal. There was normal leaflet separation. DOPPLER: The transtricuspid velocity was within the normal range. There was no evidence for stenosis. There was trace regurgitation.    PULMONIC VALVE: Leaflets exhibited normal thickness, no calcification, and normal cuspal separation. DOPPLER: The transpulmonic velocity was within the normal range. There was no regurgitation.    PERICARDIUM: There was no pericardial effusion. The pericardium was normal in appearance.    AORTA: The root exhibited normal size.    SYSTEM MEASUREMENT TABLES    2D  %FS: 40.45 %  Ao Diam: 2.82 cm  EDV(Teich): 62.59 ml  EF Biplane: 58.93 %  EF(Cube): 78.88 %  EF(Teich): 71.9 %  ESV(Cube): 11.74 ml  ESV(Teich): 17.59 ml  IVSd: 1.14 cm  LA Area: 13.5 cm2  LA Diam: 3.44 cm  LVEDV MOD A2C: 54.99 ml  LVEDV MOD A4C: 53.16 ml  LVEDV MOD BP: 55.54 ml  LVEF MOD A2C: 61.64 %  LVEF MOD A4C: 56.38 %  LVESV MOD A2C: 21.09 ml  LVESV MOD A4C: 23.19 ml  LVESV MOD BP:  22.81 ml  LVIDd: 3.82 cm  LVIDs: 2.27 cm  LVLd A2C: 6.96 cm  LVLd A4C: 6.58 cm  LVLs A2C: 5.4 cm  LVLs A4C: 5.03 cm  LVPWd: 0.94 cm  RA Area: 10.35 cm2  SV MOD A2C: 33.9 ml  SV MOD A4C: 29.97 ml  SV(Cube): 43.84 ml  SV(Teich): 45 ml  rv diam: 3.06 cm    CF  MR Als.Benjamin: 0.36 m/s  MR Flow: 39.68 ml/s  MR Rad: 0.41 cm    CW  AV Vmax: 1.06 m/s  AV maxP.52 mmHg    MM  TAPSE: 2.73 cm    PW  E': 0.12 m/s  E/E': 10.13  LVOT Vmax: 1.08 m/s  LVOT maxP.66 mmHg  MV A Benjamin: 1.17 m/s  MV Dec Don[place]han: 10.35 m/s2  MV DecT: 114.48 ms  MV E Benjamin: 1.18 m/s  MV E/A Ratio: 1.02    [facility] Accredited Echocardiography Laboratory    Prepared and electronically signed by    Colt Rebolledo MD  Signed 2017 17:25:17    No results found for this or any previous visit.    No results found for this or any previous visit.     Labs: Imaging:   Labs Reviewed   COVID-19/INFLUENZA A/B RAPID ANTIGEN (30 MIN.TAT) - Abnormal       Result Value Ref Range Status    SARS COV Rapid Antigen Negative  Negative Final    Influenza A Rapid Antigen Positive (*) Negative Final    Influenza B Rapid Antigen Negative  Negative Final    Narrative:     This test has been performed using the Quidel Cecilia 2 FLU+SARS Antigen test under the Emergency Use Authorization (EUA). This test has been validated by the  and verified by the performing laboratory. The Cecilia uses lateral flow immunofluorescent sandwich assay to detect SARS-COV, Influenza A and Influenza B Antigen.     The Quidel Cecilia 2 SARS Antigen test does not differentiate between SARS-CoV and SARS-CoV-2.     Negative results are presumptive and may be confirmed with a molecular assay, if necessary, for patient management. Negative results do not rule out SARS-CoV-2 or influenza infection and should not be used as the sole basis for treatment or patient management decisions. A negative test result may occur if the level of antigen in a sample is below the limit of detection  of this test.     Positive results are indicative of the presence of viral antigens, but do not rule out bacterial infection or co-infection with other viruses.     All test results should be used as an adjunct to clinical observations and other information available to the provider.    FOR PEDIATRIC PATIENTS - copy/paste COVID Guidelines URL to browser: [email or web address]   CBC AND DIFFERENTIAL - Abnormal    WBC 1.14 (*) 4.31 - 10.16 Thousand/uL Final    RBC 2.61 (*) 3.81 - 5.12 Million/uL Final    Hemoglobin 8.9 (*) 11.5 - 15.4 g/dL Final    Hematocrit 27.0 (*) 34.8 - 46.1 % Final     (*) 82 - 98 fL Final    MCH 34.1  26.8 - 34.3 pg Final    MCHC 33.0  31.4 - 37.4 g/dL Final    RDW 15.9 (*) 11.6 - 15.1 % Final    MPV 9.5  8.9 - 12.7 fL Final    Platelets 155  149 - 390 Thousands/uL Final    nRBC 0  /100 WBCs Final    Segmented % 70  43 - 75 % Final    Immature Grans % 1  0 - 2 % Final    Lymphocytes % 18  14 - 44 % Final    Monocytes % 7  4 - 12 % Final    Eosinophils Relative 4  0 - 6 % Final    Basophils Relative 0  0 - 1 % Final    Absolute Neutrophils 0.80 (*) 1.85 - 7.62 Thousands/µL Final    Absolute Immature Grans 0.01  0.00 - 0.20 Thousand/uL Final    Absolute Lymphocytes 0.21 (*) 0.60 - 4.47 Thousands/µL Final    Absolute Monocytes 0.08 (*) 0.17 - 1.22 Thousand/µL Final    Eosinophils Absolute 0.04  0.00 - 0.61 Thousand/µL Final    Basophils Absolute 0.00  0.00 - 0.10 Thousands/µL Final   UA W REFLEX TO MICROSCOPIC WITH REFLEX TO CULTURE - Abnormal    Color, UA Light Yellow   Final    Clarity, UA Clear   Final    Specific Gravity, UA 1.007  1.003 - 1.030 Final    pH, UA 6.0  4.5, 5.0, 5.5, 6.0, 6.5, 7.0, 7.5, 8.0 Final    Leukocytes, UA Negative  Negative Final    Nitrite, UA Negative  Negative Final    Protein, UA Negative  Negative mg/dl Final    Glucose, UA Negative  Negative mg/dl Final    Ketones, UA Negative  Negative mg/dl Final     Urobilinogen, UA <2.0  <2.0 mg/dl mg/dl Final    Bilirubin, UA Negative  Negative Final    Occult Blood, UA Moderate (*) Negative Final   LACTIC ACID, PLASMA (W/REFLEX IF RESULT > 2.0) - Normal    LACTIC ACID 1.3  0.5 - 2.0 mmol/L Final    Narrative:     Result may be elevated if tourniquet was used during collection.   PROCALCITONIN TEST - Normal    Procalcitonin 0.08  <=0.25 ng/ml Final    Comment: Suspected Lower Respiratory Tract Infection (LRTI):  - LESS than or EQUAL to 0.25 ng/mL:   low likelihood for bacterial LRTI; antibiotics DISCOURAGED.  - GREATER than 0.25 ng/mL:   increased likelihood for bacterial LRTI; antibiotics ENCOURAGED.    Suspected Sepsis:  - Strongly consider initiating antibiotics in ALL UNSTABLE patients.  - LESS than or EQUAL to 0.5 ng/mL:   low likelihood for bacterial sepsis; antibiotics DISCOURAGED.  - GREATER than 0.5 ng/mL:   increased likelihood for bacterial sepsis; antibiotics ENCOURAGED.  - GREATER than 2 ng/mL:   high risk for severe sepsis / septic shock; antibiotics strongly ENCOURAGED.    Decisions on antibiotic use should not be based solely on Procalcitonin (PCT) levels. If PCT is low but uncertainty exists with stopping antibiotics, repeat PCT in 6-24 hours to confirm the low level. If antibiotics are administered (regardless if initial PCT was high or low), repeat PCT every 1-2 days to consider early antibiotic cessation (when GREATER than 80% decrease from the peak OR when PCT drops below designated cutoffs, whichever comes first), so long as the infection is NOT one that typically requires prolonged treatment durations (e.g., bone/joint infections, endocarditis, Staph. aureus bacteremia).    Situations of FALSE-POSITIVE Procalcitonin values:  1) Newborns < 72 hours old  2) Massive stress from severe trauma / burns, major surgery, acute pancreatitis, cardiogenic / hemorrhagic shock, sickle cell crisis, or other organ perfusion abnormalities  3) Malaria and some Candidal  infections  4) Treatment with agents that stimulate cytokines (e.g., OKT3, anti-lymphocyte globulins, alemtuzumab, IL-2, granulocyte transfusion [NOT GCSFs])  5) Chronic renal disease causes elevated baseline levels (consider GREATER than 0.75 ng/mL as an abnormal cut-off); initiating HD/CRRT may cause transient decreases  6) Paraneoplastic syndromes from medullary thyroid or SCLC, some forms of vasculitis, and acute [id number] disease    Situations of FALSE-NEGATIVE Procalcitonin values:  1) Too early in clinical course for PCT to have reached its peak (may repeat in 6-24 hours to confirm low level)  2) Localized infection WITHOUT systemic (SIRS / sepsis) response (e.g., an abscess, osteomyelitis, cystitis)  3) Mycobacteria (e.g., Tuberculosis, MAC)  4) Cystic fibrosis exacerbations     PROTIME-INR - Normal    Protime 15.0  12.3 - 15.0 seconds Final    INR 1.15  0.85 - 1.19 Final    Narrative:     INR Therapeutic Range    Indication                                             INR Range      Atrial Fibrillation                                               2.0-3.0  Hypercoagulable State                                    2.0.2.3  Left Ventricular Asist Device                            2.0-3.0  Mechanical Heart Valve                                  -    Aortic(with afib, MI, embolism, HF, LA enlargement,    and/or coagulopathy)                                     2.0-3.0 (2.5-3.5)     Mitral                                                             2.5-3.5  Prosthetic/Bioprosthetic Heart Valve               2.0-3.0  Venous thromboembolism (VTE: VT, PE        2.0-3.0   APTT - Normal    PTT 28  23 - 34 seconds Final    Comment: Therapeutic Heparin Range =  60-90 seconds   URINE MICROSCOPIC - Normal    RBC, UA 1-2  None Seen, 1-2 /hpf Final    WBC, UA 1-2  None Seen, 1-2 /hpf Final    Epithelial Cells Occasional  None Seen, Occasional /hpf Final    Bacteria, UA None Seen  None Seen, Occasional /hpf Final   BLOOD  CULTURE   BLOOD CULTURE   COMPREHENSIVE METABOLIC PANEL    Sodium 135  135 - 147 mmol/L Final    Potassium 3.8  3.5 - 5.3 mmol/L Final    Chloride 104  96 - 108 mmol/L Final    CO2 23  21 - 32 mmol/L Final    ANION GAP 8  4 - 13 mmol/L Final    BUN 6  5 - 25 mg/dL Final    Creatinine 0.61  0.60 - 1.30 mg/dL Final    Comment: Standardized to IDMS reference method    Glucose 108  65 - 140 mg/dL Final    Comment: If the patient is fasting, the ADA then defines impaired fasting glucose as > 100 mg/dL and diabetes as > or equal to 123 mg/dL.    Calcium 8.6  8.4 - 10.2 mg/dL Final    AST 24  13 - 39 U/L Final    ALT 25  7 - 52 U/L Final    Comment: Specimen collection should occur prior to Sulfasalazine administration due to the potential for falsely depressed results.     Alkaline Phosphatase 78  34 - 104 U/L Final    Total Protein 6.6  6.4 - 8.4 g/dL Final    Albumin 4.0  3.5 - 5.0 g/dL Final    Total Bilirubin 0.35  0.20 - 1.00 mg/dL Final    Comment: Use of this assay is not recommended for patients undergoing treatment with eltrombopag due to the potential for falsely elevated results.  N-acetyl-p-benzoquinone imine (metabolite of Acetaminophen) will generate erroneously low results in samples for patients that have taken an overdose of Acetaminophen.    eGFR 104  ml/min/1.73sq m Final    Narrative:     National Kidney Disease Foundation guidelines for Chronic Kidney Disease (CKD):     Stage 1 with normal or high GFR (GFR > 90 mL/min/1.73 square meters)    Stage 2 Mild CKD (GFR = 60-89 mL/min/1.73 square meters)    Stage 3A Moderate CKD (GFR = 45-59 mL/min/1.73 square meters)    Stage 3B Moderate CKD (GFR = 30-44 mL/min/1.73 square meters)    Stage 4 Severe CKD (GFR = 15-29 mL/min/1.73 square meters)    Stage 5 End Stage CKD (GFR <15 mL/min/1.73 square meters)  Note: GFR calculation is accurate only with a steady state creatinine    XR chest 2 views   ED Interpretation   Wet read-no effusions consolidations or  infiltrates         Medications: Code Status:   Medications   sodium chloride 0.9 % bolus 1,000 mL (1,000 mL Intravenous New Bag 3/4/25 0847)   ketorolac (TORADOL) injection 15 mg (15 mg Intravenous Given 3/4/25 0839)   acetaminophen (TYLENOL) tablet 975 mg (975 mg Oral Given 3/4/25 0839)    Code Status: Prior  Advance Directive and Living Will:      Power of :    POLST:       Orders Placed This Encounter   Procedures    Blood culture #1    Blood culture #2    FLU/COVID Rapid Antigen (30 min. TAT) - Preferred screening test in ED    XR chest 2 views    CBC and differential    Comprehensive metabolic panel    Lactic acid    Procalcitonin    Protime-INR    APTT    UA w Reflex to Microscopic w Reflex to Culture    Urine Microscopic    Insert peripheral IV    ECG 12 lead     Time reflects when diagnosis was documented in both MDM as applicable and the Disposition within this note       Time User Action Codes Description Comment    3/4/2025 10:49 AM Guerrero Mcmahon [R05.9] Cough     3/4/2025 10:49 AM Guerrero Mcmahon [R50.9] Fever     3/4/2025 10:49 AM Guerrero Mcmahon [C50.911,  C77.3] 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                   Follow-up Information       Follow up With Specialties Details Why Contact Info Additional Information    Alessandro Milton DO Family Medicine In 1 week  [address] [place] [address]  [phone]       [facility] Emergency Department Emergency Medicine  "Go to  As needed, If symptoms worsen [address] [address] [phone] [facility] Emergency Department, [address], [place], [address] [phone]          Patient's Medications   Discharge Prescriptions    No medications on file     No discharge procedures on file.  Prior to Admission Medications   Prescriptions Last Dose Informant Patient Reported? Taking?   Cholecalciferol (VITAMIN D3 PO)  Self, Family Member Yes No   Sig: Take by mouth daily   Diclofenac Sodium (VOLTAREN) 1 %  Self, Family Member No No   Sig: Apply 2 g topically 4 (four) times a day   acetaminophen (TYLENOL) 500 mg tablet  Self, Family Member No No   Sig: Take 1 tablet (500 mg total) by mouth every 6 (six) hours as needed for mild pain   atorvastatin (LIPITOR) 10 mg tablet  Self, Family Member No No   Sig: Take 1 tablet (10 mg total) by mouth daily   cyclobenzaprine (FLEXERIL) 5 mg tablet  Self, Family Member No No   Sig: Take 1 tablet (5 mg total) by mouth 3 (three) times a day as needed for muscle spasms   lidocaine-prilocaine (EMLA) cream  Self, Family Member No No   Sig: Apply topically as needed for mild pain   lisinopril-hydrochlorothiazide (PRINZIDE,ZESTORETIC) 10-12.5 MG per tablet  Self, Family Member No No   Sig: Take 1 tablet by mouth daily   mupirocin (BACTROBAN) 2 % ointment   No No   Sig: Apply topically 2 (two) times a day for 7 days   naproxen (Naprosyn) 500 mg tablet  Self No No   Sig: Take 1 tablet (500 mg total) by mouth 2 (two) times a day with meals for 15 days   ondansetron (ZOFRAN) 4 mg tablet  Self, Family Member No No   Sig: Take 1 tablet (4 mg total) by mouth every 8 (eight) hours as needed for nausea or vomiting      Facility-Administered Medications: None                        Portions of the record may have been created with voice recognition software. Occasional wrong word or \"sound a like\" substitutions may have occurred due to the inherent " limitations of voice recognition software. Read the chart carefully and recognize, using context, where substitutions have occurred.    Electronically signed by:  Ilir Jenkins

## 2025-03-04 NOTE — TELEPHONE ENCOUNTER
Scheduled missed appointment   3/5/25 10:40 am with Dr. Franz        Patient can be reached at [phone]

## 2025-03-04 NOTE — ED PROVIDER NOTES
ED Disposition       None          Assessment & Plan   {Hyperlinks  Risk Stratification - NIHSS - HEART SCORE - Fill out sepsis note and make sure you call [id number] if severe or septic shock:[id number]}    Medical Decision Making  Amount and/or Complexity of Data Reviewed  Labs: ordered.  Radiology: ordered and independent interpretation performed.    Risk  OTC drugs.  Prescription drug management.             Medications   sodium chloride 0.9 % bolus 1,000 mL (1,000 mL Intravenous New Bag 3/4/25 0847)   ketorolac (TORADOL) injection 15 mg (15 mg Intravenous Given 3/4/25 0839)   acetaminophen (TYLENOL) tablet 975 mg (975 mg Oral Given 3/4/25 0839)       ED Risk Strat Scores                            SBIRT 20yo+      Flowsheet Row Most Recent Value   Initial Alcohol Screen: US AUDIT-C     1. How often do you have a drink containing alcohol? 0 Filed at: 03/04/2025 0804   2. How many drinks containing alcohol do you have on a typical day you are drinking?  0 Filed at: 03/04/2025 0804   3a. Male UNDER 65: How often do you have five or more drinks on one occasion? 0 Filed at: 03/04/2025 0804   Audit-C Score 0 Filed at: 03/04/2025 0804   DAVID: How many times in the past year have you...    Used an illegal drug or used a prescription medication for non-medical reasons? Never Filed at: 03/04/2025 0804                            History of Present Illness   {Hyperlinks  History (Med, Surg, Fam, Social) - Current Medications - Allergies  :[id number]}    Chief Complaint   Patient presents with    Fever     Per pt is having fever, sore throat, cough, and chills. Pt was seen two days ago with similar symptoms but not getting better. Pt also states has been taking tylenol and ibuprofen but has not decrease fever. Currently receiving chemo for breast cancer.       Past Medical History:   Diagnosis Date    Breast cancer (HCC)     GERD (gastroesophageal reflux disease)     Headache     Hematuria     Hypertension     IUD  (intrauterine device) in place 02/15/2019    Mirena placed 2015 effective through 2020      Lateral epicondylitis, right elbow 2019    Panic attacks     Psychiatric disorder       Past Surgical History:   Procedure Laterality Date    BREAST BIOPSY Right 2024    BREAST BIOPSY Right 2024    BREAST BIOPSY Right 2024    CYSTOSCOPY  2018    IR PORT PLACEMENT  2024    NO PAST SURGERIES      US GUIDED BREAST BIOPSY RIGHT COMPLETE Right 2024    US GUIDED BREAST BIOPSY RIGHT COMPLETE Right 2024    US GUIDED BREAST LYMPH NODE BIOPSY RIGHT Right 2024      Family History   Problem Relation Age of Onset    Hypertension Mother     Colonic polyp Mother     Colon cancer Mother     Arthritis Father     Diabetes Father     Hypertension Father     Hyperlipidemia Father     No Known Problems Sister     No Known Problems Sister     Other Brother         TBI from accident     No Known Problems Son     No Known Problems Daughter     No Known Problems Maternal Aunt     No Known Problems Maternal Aunt     No Known Problems Maternal Aunt     No Known Problems Maternal Aunt     No Known Problems Maternal Aunt     No Known Problems Paternal Aunt     No Known Problems Paternal Aunt     No Known Problems Paternal Aunt     Colonic polyp Maternal Grandmother     Colon cancer Maternal Grandmother     Throat cancer Maternal Grandfather     No Known Problems Paternal Grandmother     No Known Problems Paternal Grandfather       Social History     Tobacco Use    Smoking status: Former     Current packs/day: 0.00     Average packs/day: 0.3 packs/day for 35.0 years (8.8 ttl pk-yrs)     Types: Cigarettes     Start date: 1988     Quit date: 2024     Years since quittin.4    Smokeless tobacco: Never    Tobacco comments:     ONE HALF PACK A DAY OR LESS, CURRENT SOME DAY SMOKER, LIGHT TOBACCO SMOKER  AS PER [employer]; PER PT NOT SMOKING 2025   Vaping Use    Vaping status: Some Days    Substance Use Topics    Alcohol use: No    Drug use: No      E-Cigarette/Vaping    E-Cigarette Use Current Some Day User       E-Cigarette/Vaping Substances    Nicotine No     THC No     CBD No     Flavoring No     Other No     Unknown No       I have reviewed and agree with the history as documented.     HPI    Review of Systems        Objective   {Hyperlinks  Historical Vitals - Historical Labs - Chart Review/Microbiology - Last Echo - Code Status  :[id number]}    ED Triage Vitals [03/04/25 0801]   Temperature Pulse Blood Pressure Respirations SpO2 Patient Position - Orthostatic VS   (S) (!) 103 °F (39.4 °C) (S) (!) 120 103/76 20 96 % Sitting      Temp Source Heart Rate Source BP Location FiO2 (%) Pain Score    Temporal Monitor Left arm -- 9      Vitals      Date and Time Temp Pulse SpO2 Resp BP Pain Score FACES Pain Rating User   03/04/25 0908 100.3 °F (37.9 °C) 104 97 % -- 93/55 -- -- ST   03/04/25 0839 -- -- -- -- -- 9 -- ST   03/04/25 0803 -- -- 96 % -- -- -- -- AM   03/04/25 0801  103 °F (39.4 °C)  120 96 % 20 103/76 9 -- AM            Physical Exam    Results Reviewed       Procedure Component Value Units Date/Time    Urine Microscopic [[id number]]  (Normal) Collected: 03/04/25 0904    Lab Status: Final result Specimen: Urine, Clean Catch Updated: 03/04/25 0924     RBC, UA 1-2 /hpf      WBC, UA 1-2 /hpf      Epithelial Cells Occasional /hpf      Bacteria, UA None Seen /hpf     UA w Reflex to Microscopic w Reflex to Culture [[id number]]  (Abnormal) Collected: 03/04/25 0904    Lab Status: Final result Specimen: Urine, Clean Catch Updated: 03/04/25 0923     Color, UA Light Yellow     Clarity, UA Clear     Specific Gravity, UA 1.007     pH, UA 6.0     Leukocytes, UA Negative     Nitrite, UA Negative     Protein, UA Negative mg/dl      Glucose, UA Negative mg/dl      Ketones, UA Negative mg/dl      Urobilinogen, UA <2.0 mg/dl      Bilirubin, UA Negative     Occult Blood, UA Moderate    Procalcitonin [[id number]]   (Normal) Collected: 03/04/25 0833    Lab Status: Final result Specimen: Blood from Arm, Right Updated: 03/04/25 0917     Procalcitonin 0.08 ng/ml     Comprehensive metabolic panel [[id number]] Collected: 03/04/25 0833    Lab Status: Final result Specimen: Blood from Arm, Right Updated: 03/04/25 0912     Sodium 135 mmol/L      Potassium 3.8 mmol/L      Chloride 104 mmol/L      CO2 23 mmol/L      ANION GAP 8 mmol/L      BUN 6 mg/dL      Creatinine 0.61 mg/dL      Glucose 108 mg/dL      Calcium 8.6 mg/dL      AST 24 U/L      ALT 25 U/L      Alkaline Phosphatase 78 U/L      Total Protein 6.6 g/dL      Albumin 4.0 g/dL      Total Bilirubin 0.35 mg/dL      eGFR 104 ml/min/1.73sq m     Narrative:      National Kidney Disease Foundation guidelines for Chronic Kidney Disease (CKD):     Stage 1 with normal or high GFR (GFR > 90 mL/min/1.73 square meters)    Stage 2 Mild CKD (GFR = 60-89 mL/min/1.73 square meters)    Stage 3A Moderate CKD (GFR = 45-59 mL/min/1.73 square meters)    Stage 3B Moderate CKD (GFR = 30-44 mL/min/1.73 square meters)    Stage 4 Severe CKD (GFR = 15-29 mL/min/1.73 square meters)    Stage 5 End Stage CKD (GFR <15 mL/min/1.73 square meters)  Note: GFR calculation is accurate only with a steady state creatinine    Lactic acid [[id number]]  (Normal) Collected: 03/04/25 0833    Lab Status: Final result Specimen: Blood from Arm, Right Updated: 03/04/25 0912     LACTIC ACID 1.3 mmol/L     Narrative:      Result may be elevated if tourniquet was used during collection.    Protime-INR [[id number]]  (Normal) Collected: 03/04/25 0833    Lab Status: Final result Specimen: Blood from Arm, Right Updated: 03/04/25 0909     Protime 15.0 seconds      INR 1.15    Narrative:      INR Therapeutic Range    Indication                                             INR Range      Atrial Fibrillation                                               2.0-3.0  Hypercoagulable State                                    2.0.2.3  Left  Ventricular Asist Device                            2.0-3.0  Mechanical Heart Valve                                  -    Aortic(with afib, MI, embolism, HF, LA enlargement,    and/or coagulopathy)                                     2.0-3.0 (2.5-3.5)     Mitral                                                             2.5-3.5  Prosthetic/Bioprosthetic Heart Valve               2.0-3.0  Venous thromboembolism (VTE: VT, PE        2.0-3.0    APTT [[id number]]  (Normal) Collected: 03/04/25 0833    Lab Status: Final result Specimen: Blood from Arm, Right Updated: 03/04/25 0909     PTT 28 seconds     FLU/COVID Rapid Antigen (30 min. TAT) - Preferred screening test in ED [[id number]]  (Abnormal) Collected: 03/04/25 0833    Lab Status: Final result Specimen: Nares from Nose Updated: 03/04/25 0905     SARS COV Rapid Antigen Negative     Influenza A Rapid Antigen Positive     Influenza B Rapid Antigen Negative    Narrative:      This test has been performed using the [email or web address] Cecilia 2 FLU+SARS Antigen test under the Emergency Use Authorization (EUA). This test has been validated by the  and verified by the performing laboratory. The Cecilia uses lateral flow immunofluorescent sandwich assay to detect SARS-COV, Influenza A and Influenza B Antigen.     The [employer] Cecilia 2 SARS Antigen test does not differentiate between SARS-CoV and SARS-CoV-2.     Negative results are presumptive and may be confirmed with a molecular assay, if necessary, for patient management. Negative results do not rule out SARS-CoV-2 or influenza infection and should not be used as the sole basis for treatment or patient management decisions. A negative test result may occur if the level of antigen in a sample is below the limit of detection of this test.     Positive results are indicative of the presence of viral antigens, but do not rule out bacterial infection or co-infection with other viruses.     All test results should be used as an adjunct  to clinical observations and other information available to the provider.    FOR PEDIATRIC PATIENTS - copy/paste COVID Guidelines URL to browser: [email or web address]    CBC and differential [[id number]]  (Abnormal) Collected: 03/04/25 0833    Lab Status: Final result Specimen: Blood from Arm, Right Updated: 03/04/25 0850     WBC 1.14 Thousand/uL      RBC 2.61 Million/uL      Hemoglobin 8.9 g/dL      Hematocrit 27.0 %       fL      MCH 34.1 pg      MCHC 33.0 g/dL      RDW 15.9 %      MPV 9.5 fL      Platelets 155 Thousands/uL      nRBC 0 /100 WBCs      Segmented % 70 %      Immature Grans % 1 %      Lymphocytes % 18 %      Monocytes % 7 %      Eosinophils Relative 4 %      Basophils Relative 0 %      Absolute Neutrophils 0.80 Thousands/µL      Absolute Immature Grans 0.01 Thousand/uL      Absolute Lymphocytes 0.21 Thousands/µL      Absolute Monocytes 0.08 Thousand/µL      Eosinophils Absolute 0.04 Thousand/µL      Basophils Absolute 0.00 Thousands/µL     Blood culture #1 [[id number]] Collected: 03/04/25 0833    Lab Status: In process Specimen: Blood from Arm, Right Updated: 03/04/25 0847    Blood culture #2 [[id number]] Collected: 03/04/25 0833    Lab Status: In process Specimen: Blood from Arm, Right Updated: 03/04/25 0846            XR chest 2 views   ED Interpretation by Guerrero Mcmahon DO (03/04 0934)   Wet read-no effusions consolidations or infiltrates          Procedures    ED Medication and Procedure Management   Prior to Admission Medications   Prescriptions Last Dose Informant Patient Reported? Taking?   Cholecalciferol (VITAMIN D3 PO)  Self, Family Member Yes No   Sig: Take by mouth daily   Diclofenac Sodium (VOLTAREN) 1 %  Self, Family Member No No   Sig: Apply 2 g topically 4 (four) times a day   acetaminophen (TYLENOL) 500 mg tablet  Self, Family Member No No   Sig: Take 1 tablet (500 mg total) by mouth every 6 (six) hours as needed for mild  pain   atorvastatin (LIPITOR) 10 mg tablet  Self, Family Member No No   Sig: Take 1 tablet (10 mg total) by mouth daily   cyclobenzaprine (FLEXERIL) 5 mg tablet  Self, Family Member No No   Sig: Take 1 tablet (5 mg total) by mouth 3 (three) times a day as needed for muscle spasms   lidocaine-prilocaine (EMLA) cream  Self, Family Member No No   Sig: Apply topically as needed for mild pain   lisinopril-hydrochlorothiazide (PRINZIDE,ZESTORETIC) 10-12.5 MG per tablet  Self, Family Member No No   Sig: Take 1 tablet by mouth daily   mupirocin (BACTROBAN) 2 % ointment   No No   Sig: Apply topically 2 (two) times a day for 7 days   naproxen (Naprosyn) 500 mg tablet  Self No No   Sig: Take 1 tablet (500 mg total) by mouth 2 (two) times a day with meals for 15 days   ondansetron (ZOFRAN) 4 mg tablet  Self, Family Member No No   Sig: Take 1 tablet (4 mg total) by mouth every 8 (eight) hours as needed for nausea or vomiting      Facility-Administered Medications: None     Patient's Medications   Discharge Prescriptions    No medications on file     No discharge procedures on file.  ED SEPSIS DOCUMENTATION          not rule out bacterial infection or co-infection with other viruses.     All test results should be used as an adjunct to clinical observations and other information available to the provider.    FOR PEDIATRIC PATIENTS - copy/paste COVID Guidelines URL to browser: [email or web address]    CBC and differential [[id number]]  (Abnormal) Collected: 03/04/25 0833    Lab Status: Final result Specimen: Blood from Arm, Right Updated: 03/04/25 0850     WBC 1.14 Thousand/uL      RBC 2.61 Million/uL      Hemoglobin 8.9 g/dL      Hematocrit 27.0 %       fL      MCH 34.1 pg      MCHC 33.0 g/dL      RDW 15.9 %      MPV 9.5 fL      Platelets 155 Thousands/uL      nRBC 0 /100 WBCs      Segmented % 70 %      Immature Grans % 1 %      Lymphocytes % 18 %      Monocytes % 7 %      Eosinophils Relative 4 %      Basophils Relative 0 %      Absolute Neutrophils 0.80 Thousands/µL      Absolute Immature Grans 0.01 Thousand/uL      Absolute Lymphocytes 0.21 Thousands/µL      Absolute Monocytes 0.08 Thousand/µL      Eosinophils Absolute 0.04 Thousand/µL      Basophils Absolute 0.00 Thousands/µL             XR chest 2 views   ED Interpretation by Guerrero Mcmahon DO (03/04 0934)   Wet read-no effusions consolidations or infiltrates      Final Interpretation by Cuco Potter MD (03/04 1056)      No acute cardiopulmonary disease.            Workstation performed: [id number]             Procedures    ED Medication and Procedure Management   Prior to Admission Medications   Prescriptions Last Dose Informant Patient Reported? Taking?   Cholecalciferol (VITAMIN D3 PO)  Self, Family Member Yes No   Sig: Take by mouth daily   Diclofenac Sodium (VOLTAREN) 1 %  Self, Family Member No No   Sig: Apply 2 g topically 4 (four) times a day   acetaminophen (TYLENOL) 500 mg tablet  Self, Family Member No No   Sig: Take 1 tablet (500 mg total) by mouth every 6 (six) hours as needed for mild  pain   atorvastatin (LIPITOR) 10 mg tablet  Self, Family Member No No   Sig: Take 1 tablet (10 mg total) by mouth daily   cyclobenzaprine (FLEXERIL) 5 mg tablet  Self, Family Member No No   Sig: Take 1 tablet (5 mg total) by mouth 3 (three) times a day as needed for muscle spasms   lidocaine-prilocaine (EMLA) cream  Self, Family Member No No   Sig: Apply topically as needed for mild pain   lisinopril-hydrochlorothiazide (PRINZIDE,ZESTORETIC) 10-12.5 MG per tablet  Self, Family Member No No   Sig: Take 1 tablet by mouth daily   mupirocin (BACTROBAN) 2 % ointment   No No   Sig: Apply topically 2 (two) times a day for 7 days   naproxen (Naprosyn) 500 mg tablet  Self No No   Sig: Take 1 tablet (500 mg total) by mouth 2 (two) times a day with meals for 15 days   ondansetron (ZOFRAN) 4 mg tablet  Self, Family Member No No   Sig: Take 1 tablet (4 mg total) by mouth every 8 (eight) hours as needed for nausea or vomiting      Facility-Administered Medications: None     Discharge Medication List as of 3/4/2025 10:50 AM        CONTINUE these medications which have NOT CHANGED    Details   acetaminophen (TYLENOL) 500 mg tablet Take 1 tablet (500 mg total) by mouth every 6 (six) hours as needed for mild pain, Starting u 7/16/2020, Normal      atorvastatin (LIPITOR) 10 mg tablet Take 1 tablet (10 mg total) by mouth daily, Starting Tue 11/26/2024, Normal      Cholecalciferol (VITAMIN D3 PO) Take by mouth daily, Historical Med      cyclobenzaprine (FLEXERIL) 5 mg tablet Take 1 tablet (5 mg total) by mouth 3 (three) times a day as needed for muscle spasms, Starting Tue 6/11/2024, Normal      Diclofenac Sodium (VOLTAREN) 1 % Apply 2 g topically 4 (four) times a day, Starting Tue 7/12/2022, Normal      lidocaine-prilocaine (EMLA) cream Apply topically as needed for mild pain, Starting Mon 10/28/2024, Normal      lisinopril-hydrochlorothiazide (PRINZIDE,ZESTORETIC) 10-12.5 MG per tablet Take 1 tablet by mouth daily, Starting Fri  9/27/2024, Normal      mupirocin (BACTROBAN) 2 % ointment Apply topically 2 (two) times a day for 7 days, Starting Tue 2/25/2025, Until Tue 3/4/2025, Normal      naproxen (Naprosyn) 500 mg tablet Take 1 tablet (500 mg total) by mouth 2 (two) times a day with meals for 15 days, Starting Fri 2/2/2024, Until Tue 2/4/2025, Normal      ondansetron (ZOFRAN) 4 mg tablet Take 1 tablet (4 mg total) by mouth every 8 (eight) hours as needed for nausea or vomiting, Starting Mon 10/28/2024, Normal           No discharge procedures on file.  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

## 2025-03-04 NOTE — TELEPHONE ENCOUNTER
Call received by Anuj.     Patient is currently in ED. Per Anuj patient still has a fever and coughing. Per Anuj they will be completing test today for patient. Patient has treatment scheduled for tomorrow and is unsure when she will be discharged.     Thanks!

## 2025-03-05 ENCOUNTER — HOSPITAL ENCOUNTER (OUTPATIENT)
Dept: INFUSION CENTER | Facility: CLINIC | Age: 55
End: 2025-03-05

## 2025-03-08 LAB
BACTERIA BLD CULT: NORMAL
BACTERIA BLD CULT: NORMAL

## 2025-03-09 LAB
BACTERIA BLD CULT: NORMAL
BACTERIA BLD CULT: NORMAL

## 2025-03-13 ENCOUNTER — OFFICE VISIT (OUTPATIENT)
Dept: FAMILY MEDICINE CLINIC | Facility: CLINIC | Age: 55
End: 2025-03-13

## 2025-03-13 VITALS
OXYGEN SATURATION: 99 % | WEIGHT: 142.2 LBS | SYSTOLIC BLOOD PRESSURE: 107 MMHG | HEIGHT: 66 IN | BODY MASS INDEX: 22.85 KG/M2 | TEMPERATURE: 98 F | RESPIRATION RATE: 20 BRPM | HEART RATE: 98 BPM | DIASTOLIC BLOOD PRESSURE: 73 MMHG

## 2025-03-13 DIAGNOSIS — C50.411 MALIGNANT NEOPLASM OF UPPER-OUTER QUADRANT OF RIGHT BREAST IN FEMALE, ESTROGEN RECEPTOR POSITIVE (HCC): Primary | ICD-10-CM

## 2025-03-13 DIAGNOSIS — Z17.0 MALIGNANT NEOPLASM OF UPPER-OUTER QUADRANT OF RIGHT BREAST IN FEMALE, ESTROGEN RECEPTOR POSITIVE (HCC): Primary | ICD-10-CM

## 2025-03-13 DIAGNOSIS — E78.2 MIXED HYPERLIPIDEMIA: ICD-10-CM

## 2025-03-13 DIAGNOSIS — I10 HYPERTENSION: ICD-10-CM

## 2025-03-13 PROCEDURE — 99213 OFFICE O/P EST LOW 20 MIN: CPT

## 2025-03-13 PROCEDURE — 3074F SYST BP LT 130 MM HG: CPT

## 2025-03-13 PROCEDURE — 3078F DIAST BP <80 MM HG: CPT

## 2025-03-13 RX ORDER — ATORVASTATIN CALCIUM 10 MG/1
10 TABLET, FILM COATED ORAL DAILY
Qty: 90 TABLET | Refills: 1 | Status: SHIPPED | OUTPATIENT
Start: 2025-03-13

## 2025-03-13 RX ORDER — LISINOPRIL AND HYDROCHLOROTHIAZIDE 10; 12.5 MG/1; MG/1
1 TABLET ORAL DAILY
Qty: 90 TABLET | Refills: 1 | Status: SHIPPED | OUTPATIENT
Start: 2025-03-13

## 2025-03-13 NOTE — ASSESSMENT & PLAN NOTE
Control with Prinzide   Orders:    lisinopril-hydrochlorothiazide (PRINZIDE,ZESTORETIC) 10-12.5 MG per tablet; Take 1 tablet by mouth daily    Ambulatory Referral to Complex Care Management Program; Future

## 2025-03-13 NOTE — ASSESSMENT & PLAN NOTE
Lipitor refilled   Orders:    atorvastatin (LIPITOR) 10 mg tablet; Take 1 tablet (10 mg total) by mouth daily
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
## 2025-03-14 ENCOUNTER — PATIENT OUTREACH (OUTPATIENT)
Dept: HEMATOLOGY ONCOLOGY | Facility: CLINIC | Age: 55
End: 2025-03-14

## 2025-03-14 NOTE — PROGRESS NOTES
I was made aware that patient needs transportation to and from her oncology appointments.    Called and spoke with patient mentioning the above, patient mentioned that YES she does need assistance with transportation. Mentioned to patient that there is a couple of terms of service forms that I need to go over with patient. She mentioned that her english is not the best but that she understands enough, mentioned to patient that I could go over the forms with her in english or could get an  on the line. Patient mentioned that she would prefer if I called her back later in the day. Mentioned to patient that I could call her back on Monday. Patient was agreeable for me to call her back on Monday to go over the terms of service then. Patient was thankful for my call.

## 2025-03-17 ENCOUNTER — PATIENT OUTREACH (OUTPATIENT)
Dept: HEMATOLOGY ONCOLOGY | Facility: CLINIC | Age: 55
End: 2025-03-17

## 2025-03-17 NOTE — PROGRESS NOTES
Called and LVM for patient mentioning that I am her patient navigator.  Mentioned that we spoke on Friday about getting her set up with transportation.  Patient aware that there are forms that I needed to go over with prior to me being able to set her up with transportation. Mentioned to patient that she should give me a call back so that we can go over this paper work. I will send patient a my chart message as well.     *Patient does not qualify for [facility] as she has [employer].*    Due to this patient needs to get set up with [facility]. I reached out to Johanne Gill with [employer] and asked her and her staff to please reach out to patient to get her set up with the [facility] services.

## 2025-03-18 ENCOUNTER — APPOINTMENT (OUTPATIENT)
Dept: LAB | Facility: CLINIC | Age: 55
End: 2025-03-18
Payer: COMMERCIAL

## 2025-03-18 ENCOUNTER — OFFICE VISIT (OUTPATIENT)
Dept: HEMATOLOGY ONCOLOGY | Facility: CLINIC | Age: 55
End: 2025-03-18
Payer: COMMERCIAL

## 2025-03-18 VITALS
RESPIRATION RATE: 20 BRPM | HEIGHT: 66 IN | SYSTOLIC BLOOD PRESSURE: 146 MMHG | BODY MASS INDEX: 22.5 KG/M2 | WEIGHT: 140 LBS | TEMPERATURE: 98.6 F | HEART RATE: 98 BPM | OXYGEN SATURATION: 99 % | DIASTOLIC BLOOD PRESSURE: 88 MMHG

## 2025-03-18 DIAGNOSIS — C50.911 BREAST CANCER METASTASIZED TO AXILLARY LYMPH NODE, RIGHT (HCC): ICD-10-CM

## 2025-03-18 DIAGNOSIS — C77.3 BREAST CANCER METASTASIZED TO AXILLARY LYMPH NODE, RIGHT (HCC): ICD-10-CM

## 2025-03-18 DIAGNOSIS — C77.3 CARCINOMA OF RIGHT BREAST METASTATIC TO AXILLARY LYMPH NODE (HCC): ICD-10-CM

## 2025-03-18 DIAGNOSIS — C50.411 MALIGNANT NEOPLASM OF UPPER-OUTER QUADRANT OF RIGHT BREAST IN FEMALE, ESTROGEN RECEPTOR POSITIVE (HCC): Primary | ICD-10-CM

## 2025-03-18 DIAGNOSIS — Z17.0 MALIGNANT NEOPLASM OF UPPER-OUTER QUADRANT OF RIGHT BREAST IN FEMALE, ESTROGEN RECEPTOR POSITIVE (HCC): Primary | ICD-10-CM

## 2025-03-18 DIAGNOSIS — C50.911 CARCINOMA OF RIGHT BREAST METASTATIC TO AXILLARY LYMPH NODE (HCC): ICD-10-CM

## 2025-03-18 LAB
ALBUMIN SERPL BCG-MCNC: 4.4 G/DL (ref 3.5–5)
ALP SERPL-CCNC: 83 U/L (ref 34–104)
ALT SERPL W P-5'-P-CCNC: 20 U/L (ref 7–52)
ANION GAP SERPL CALCULATED.3IONS-SCNC: 11 MMOL/L (ref 4–13)
AST SERPL W P-5'-P-CCNC: 16 U/L (ref 13–39)
BASOPHILS # BLD AUTO: 0.03 THOUSANDS/ÂΜL (ref 0–0.1)
BASOPHILS NFR BLD AUTO: 1 % (ref 0–1)
BILIRUB SERPL-MCNC: 0.4 MG/DL (ref 0.2–1)
BUN SERPL-MCNC: 10 MG/DL (ref 5–25)
CALCIUM SERPL-MCNC: 9.7 MG/DL (ref 8.4–10.2)
CHLORIDE SERPL-SCNC: 105 MMOL/L (ref 96–108)
CO2 SERPL-SCNC: 28 MMOL/L (ref 21–32)
CREAT SERPL-MCNC: 0.57 MG/DL (ref 0.6–1.3)
EOSINOPHIL # BLD AUTO: 0.34 THOUSAND/ÂΜL (ref 0–0.61)
EOSINOPHIL NFR BLD AUTO: 8 % (ref 0–6)
ERYTHROCYTE [DISTWIDTH] IN BLOOD BY AUTOMATED COUNT: 16 % (ref 11.6–15.1)
GFR SERPL CREATININE-BSD FRML MDRD: 106 ML/MIN/1.73SQ M
GLUCOSE P FAST SERPL-MCNC: 110 MG/DL (ref 65–99)
HCT VFR BLD AUTO: 31.4 % (ref 34.8–46.1)
HGB BLD-MCNC: 10.2 G/DL (ref 11.5–15.4)
IMM GRANULOCYTES # BLD AUTO: 0.01 THOUSAND/UL (ref 0–0.2)
IMM GRANULOCYTES NFR BLD AUTO: 0 % (ref 0–2)
LYMPHOCYTES # BLD AUTO: 1.57 THOUSANDS/ÂΜL (ref 0.6–4.47)
LYMPHOCYTES NFR BLD AUTO: 35 % (ref 14–44)
MCH RBC QN AUTO: 33.3 PG (ref 26.8–34.3)
MCHC RBC AUTO-ENTMCNC: 32.5 G/DL (ref 31.4–37.4)
MCV RBC AUTO: 103 FL (ref 82–98)
MONOCYTES # BLD AUTO: 0.24 THOUSAND/ÂΜL (ref 0.17–1.22)
MONOCYTES NFR BLD AUTO: 5 % (ref 4–12)
NEUTROPHILS # BLD AUTO: 2.28 THOUSANDS/ÂΜL (ref 1.85–7.62)
NEUTS SEG NFR BLD AUTO: 51 % (ref 43–75)
NRBC BLD AUTO-RTO: 0 /100 WBCS
PLATELET # BLD AUTO: 392 THOUSANDS/UL (ref 149–390)
PMV BLD AUTO: 9.9 FL (ref 8.9–12.7)
POTASSIUM SERPL-SCNC: 3.9 MMOL/L (ref 3.5–5.3)
PROT SERPL-MCNC: 7.1 G/DL (ref 6.4–8.4)
RBC # BLD AUTO: 3.06 MILLION/UL (ref 3.81–5.12)
SODIUM SERPL-SCNC: 144 MMOL/L (ref 135–147)
T3FREE SERPL-MCNC: 3.25 PG/ML (ref 2.5–3.9)
TSH SERPL DL<=0.05 MIU/L-ACNC: 0.74 UIU/ML (ref 0.45–4.5)
WBC # BLD AUTO: 4.47 THOUSAND/UL (ref 4.31–10.16)

## 2025-03-18 PROCEDURE — 84481 FREE ASSAY (FT-3): CPT

## 2025-03-18 PROCEDURE — 85025 COMPLETE CBC W/AUTO DIFF WBC: CPT

## 2025-03-18 PROCEDURE — 84443 ASSAY THYROID STIM HORMONE: CPT

## 2025-03-18 PROCEDURE — 80053 COMPREHEN METABOLIC PANEL: CPT

## 2025-03-18 PROCEDURE — 36415 COLL VENOUS BLD VENIPUNCTURE: CPT

## 2025-03-18 PROCEDURE — 99214 OFFICE O/P EST MOD 30 MIN: CPT | Performed by: INTERNAL MEDICINE

## 2025-03-18 RX ORDER — SODIUM CHLORIDE 9 MG/ML
20 INJECTION, SOLUTION INTRAVENOUS ONCE
Status: CANCELLED | OUTPATIENT
Start: 2025-03-19

## 2025-03-18 RX ORDER — DOXORUBICIN HYDROCHLORIDE 2 MG/ML
60 INJECTION, SOLUTION INTRAVENOUS ONCE
Status: CANCELLED | OUTPATIENT
Start: 2025-03-19

## 2025-03-18 NOTE — ASSESSMENT & PLAN NOTE
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 (HCC)  Staging form: Breast, AJCC 8th Edition  - Clinical stage from 9/20/2024: cT0, cN1(f), cM0, GX, ER-, KY-, HER2- - Signed by Jodie Philippe MD on 10/7/2024  Stage prefix: Initial diagnosis  Method of lymph node assessment: Core biopsy  Histologic grading system: 3 grade system    Malignant neoplasm of upper-outer quadrant of right breast in female, estrogen receptor positive (HCC)  Staging form: Breast, AJCC 8th Edition  - Clinical stage from 9/20/2024: Stage Unknown (cT1c, cNX, cM0, G2, ER+, KY+, HER2-) - Signed by Jodie Philippe MD on 10/7/2024  Stage prefix: Initial diagnosis  Histologic grading system: 3 grade system    1.  cT1c cNx cM0 Right breast invasive lobular carcinoma.  Grade 2.  ER positive (>95%), KY positive (70%), HER2 negative by IHC.  MammaPrint: Ultralow risk (luminal A).     2.  cT0 cN1 cM0 Right axillary lymph node biopsy ER negative, KY negative, HER2 negative  3.  BRCA1/2 negative     Romina Cleaning is a 54 y.o. 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 She initially palpated a mass in her right breast which she brought to medical attention.  She underwent ultrasound-guided biopsy which was consistent with an invasive lobular carcinoma, ER positive, KY positive, HER2 negative.   MammaPrint resulted ultralow risk (luminal A). Right axillary lymph node biopsy showed infiltrating carcinoma which was ER negative, KY negative and HER2 negative.  Genetic testing for BRCA1/2. She has been evaluated by breast surgery (Dr. Philippe) and given the 2 different pathologies from the breast mass and axillary lymph node she underwent breast MRI on 10/23/2024 which redemonstrates a mass in the upper outer right breast consistent with the  biopsy-proven invasive lobular carcinoma and right axillary lymphadenopathy.  Indeterminant 6 mm mass at the 5 o'clock position right breast for which ultrasound was recommended (if there is no ultrasound correlate MRI guided biopsy would be performed).  CT of the chest, abdomen, pelvis notes enlarged right axillary and subpectoral lymphadenopathy and no other suspicious lesions.  Bone scan with no evidence of osseous metastasis. She is following with OB/GYN and had Mirena IUD removal.     She has 2 different pathologies (invasive lobular carcinoma, ER/IA positive, HER2 negative with a ultralow risk MammaPrint results as well as a triple negative carcinoma noted on axillary lymph node biopsy).  Bilateral breast MRI noted spiculated mass in the upper outer right breast consistent with biopsy-proven invasive lobular carcinoma as well as an indeterminate 6 mm mass at the 5 o'clock position for which further characterization with ultrasound was recommended.  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     She started neoadjuvant chemotherapy for triple negative, node positive carcinoma on 11/14/2024.  Unfortunately, patient with reaction to Taxol and subsequent treatment switched to Abraxane.     She may benefit from hormone therapy in the adjuvant setting for her ER/IA+ carcinoma. We will visit this decision after surgery.      Treatment plan: Keynote 522 regimen  Neoadjuvant pembrolizumab 200 mg day 1, Paclitaxel 80 mg/m2 day 1, Carboplatin AUC 1.5 on days 1, 8, 15 of a 21-day cycle for 4 cycles (11/14/2024-2/20/2025)  >> Switched paclitaxel to Abraxane 100 mg/m2 D1,8,15 after C1D8 given reaction to paclitaxel.   Then neoadjuvant pembrolizumab 200 mg, cyclophosphamide 600 mg/m2, doxorubicin 60 mg/m2 on day 1 of a 21-day cycle for 4 cycles.   Then adjuvant course of pembrolizumab 200 mg on day 1 of a 21-day cycle for 9 cycles pending final pathology review.       Summary:  Patient will continue neoadjuvant chemotherapy for triple negative, node positive carcinoma.  She has completed 4 cycles of treatment with pembrolizumab, paclitaxel and carboplatin.  She is noted to have a decrease in breast mass size.  Unfortunately, her chemotherapy had been postponed due to recent influenza A infection.  She is currently scheduled to start pembrolizumab, cyclophosphamide and doxorubicin on 3/19/2025 pending CBC and CMP.  Will continue as previously planned.  Follow-up in 3 weeks.  Following with breast surgery.  Patient and her sister had opportunity to ask questions to their satisfaction.

## 2025-03-18 NOTE — PROGRESS NOTES
Name: Romina Cleaning      : 1970      MRN: [id number]  Encounter Provider: Graciela Garcia DO  Encounter Date: 3/18/2025   Encounter department: [facility]  :  Assessment & Plan  Malignant neoplasm of upper-outer quadrant of right breast in female, estrogen receptor positive (HCC)   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 (HCC)  Staging form: Breast, AJCC 8th Edition  - Clinical stage from 2024: cT0, cN1(f), cM0, GX, ER-, LA-, HER2- - Signed by Jodie Philippe MD on 10/7/2024  Stage prefix: Initial diagnosis  Method of lymph node assessment: Core biopsy  Histologic grading system: 3 grade system    Malignant neoplasm of upper-outer quadrant of right breast in female, estrogen receptor positive (HCC)  Staging form: Breast, AJCC 8th Edition  - Clinical stage from 2024: Stage Unknown (cT1c, cNX, cM0, G2, ER+, LA+, HER2-) - Signed by Jodie Philippe MD on 10/7/2024  Stage prefix: Initial diagnosis  Histologic grading system: 3 grade system    1.  cT1c cNx cM0 Right breast invasive lobular carcinoma.  Grade 2.  ER positive (>95%), LA positive (70%), HER2 negative by IHC.  MammaPrint: Ultralow risk (luminal A).     2.  cT0 cN1 cM0 Right axillary lymph node biopsy ER negative, LA negative, HER2 negative  3.  BRCA1/2 negative     Romina Cleaning is a 54 y.o. 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 She initially palpated a mass in her right breast which she brought to medical attention.  She underwent ultrasound-guided biopsy which was consistent with an invasive lobular carcinoma, ER positive, LA positive, HER2 negative.   MammaPrint resulted ultralow risk (luminal A). Right axillary lymph node biopsy showed infiltrating carcinoma which was  ER negative, AL negative and HER2 negative.  Genetic testing for BRCA1/2. She has been evaluated by breast surgery (Dr. Philippe) and given the 2 different pathologies from the breast mass and axillary lymph node she underwent breast MRI on 10/23/2024 which redemonstrates a mass in the upper outer right breast consistent with the biopsy-proven invasive lobular carcinoma and right axillary lymphadenopathy.  Indeterminant 6 mm mass at the 5 o'clock position right breast for which ultrasound was recommended (if there is no ultrasound correlate MRI guided biopsy would be performed).  CT of the chest, abdomen, pelvis notes enlarged right axillary and subpectoral lymphadenopathy and no other suspicious lesions.  Bone scan with no evidence of osseous metastasis. She is following with OB/GYN and had Mirena IUD removal.     She has 2 different pathologies (invasive lobular carcinoma, ER/AL positive, HER2 negative with a ultralow risk MammaPrint results as well as a triple negative carcinoma noted on axillary lymph node biopsy).  Bilateral breast MRI noted spiculated mass in the upper outer right breast consistent with biopsy-proven invasive lobular carcinoma as well as an indeterminate 6 mm mass at the 5 o'clock position for which further characterization with ultrasound was recommended.  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     She started neoadjuvant chemotherapy for triple negative, node positive carcinoma on 11/14/2024.  Unfortunately, patient with reaction to Taxol and subsequent treatment switched to Abraxane.     She may benefit from hormone therapy in the adjuvant setting for her ER/AL+ carcinoma. We will visit this decision after surgery.      Treatment plan: Keynote 522 regimen  Neoadjuvant pembrolizumab 200 mg day 1, Paclitaxel 80 mg/m2 day 1, Carboplatin AUC 1.5 on days 1, 8, 15 of a 21-day cycle for 4 cycles (11/14/2024-2/20/2025)  >> Switched paclitaxel to  Abraxane 100 mg/m2 D1,8,15 after C1D8 given reaction to paclitaxel.   Then neoadjuvant pembrolizumab 200 mg, cyclophosphamide 600 mg/m2, doxorubicin 60 mg/m2 on day 1 of a 21-day cycle for 4 cycles.   Then adjuvant course of pembrolizumab 200 mg on day 1 of a 21-day cycle for 9 cycles pending final pathology review.      Summary:  Patient will continue neoadjuvant chemotherapy for triple negative, node positive carcinoma.  She has completed 4 cycles of treatment with pembrolizumab, paclitaxel and carboplatin.  She is noted to have a decrease in breast mass size.  Unfortunately, her chemotherapy had been postponed due to recent influenza A infection.  She is currently scheduled to start pembrolizumab, cyclophosphamide and doxorubicin on 3/19/2025 pending CBC and CMP.  Will continue as previously planned.  Follow-up in 3 weeks.  Following with breast surgery.  Patient and her sister had opportunity to ask questions to their satisfaction.         Breast cancer metastasized to axillary lymph node, right (HCC)  As above           Return in about 3 weeks (around 4/8/2025) for Office Visit, On tx visit, Infusion - See Treatment Plan, Labs - See Treatment Plan.    History of Present Illness   Chief Complaint   Patient presents with    Follow-up     Oncology History   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 (HCC)  Staging form: Breast, AJCC 8th Edition  - Clinical stage from 9/20/2024: cT0, cN1(f), cM0, GX, ER-, HI-, HER2- - Signed by Jodie Philippe MD on 10/7/2024  Stage prefix: Initial diagnosis  Method of lymph node assessment: Core biopsy  Histologic grading system: 3 grade system    Malignant neoplasm of upper-outer quadrant of right breast in female, estrogen receptor positive (HCC)  Staging form: Breast, AJCC 8th Edition  - Clinical stage from 9/20/2024: Stage Unknown (cT1c, cNX, cM0, G2, ER+, HI+, HER2-) - Signed by Jodie Philippe MD on 10/7/2024  Stage prefix: Initial  diagnosis  Histologic grading system: 3 grade system  Oncology History   Carcinoma of right breast metastatic to axillary lymph node (HCC)   6/11/2024 Initial Diagnosis    Carcinoma of right breast metastatic to axillary lymph node (HCC)     9/20/2024 -  Cancer Staged    Staging form: Breast, AJCC 8th Edition  - Clinical stage from 9/20/2024: cT0, cN1(f), cM0, GX, ER-, ME-, HER2- - Signed by Jodie Philippe MD on 10/7/2024  Stage prefix: Initial diagnosis  Method of lymph node assessment: Core biopsy  Histologic grading system: 3 grade system       11/14/2024 -  Chemotherapy    DOXOrubicin (ADRIAMYCIN), 60 mg/m2 = 103 mg, Intravenous, Once, 1 of 4 cycles  alteplase (CATHFLO), 2 mg, Intracatheter, Every 1 Minute as needed, 5 of 17 cycles  pegfilgrastim-apgf ([employer]), 6 mg, Subcutaneous, Once, 1 of 4 cycles  sodium chloride, 500 mL (100 % of original dose 500 mL), Intravenous, Once, 1 of 1 cycle  Dose modification: 500 mL (original dose 500 mL, Cycle 1)  Administration: 500 mL (11/14/2024)  cyclophosphamide (CYTOXAN) IVPB, 600 mg/m2 = 1,026 mg, Intravenous, Once, 1 of 4 cycles  fosaprepitant (EMEND) IVPB, 150 mg, Intravenous, Once, 1 of 4 cycles  paclitaxel protein-bound (ABRAXANE) IVPB, 100 mg/m2 = 171 mg (original dose ), Intravenous, Once, 4 of 4 cycles  Dose modification: 100 mg/m2 (original dose 100 mg/m2, Cycle 1)  Administration: 171 mg (12/5/2024), 171 mg (12/12/2024), 171 mg (12/19/2024), 171 mg (1/9/2025), 171 mg (1/16/2025), 171 mg (1/23/2025), 171 mg (1/30/2025), 171 mg (2/7/2025), 171 mg (2/13/2025), 171 mg (2/20/2025)  CARBOplatin (PARAPLATIN) IVPB (GOG AUC DOSING), 180 mg, Intravenous, Once, 4 of 4 cycles  Administration: 180 mg (11/14/2024), 180 mg (11/21/2024), 180 mg (12/5/2024), 180 mg (12/12/2024), 180 mg (12/19/2024), 180 mg (1/9/2025), 180 mg (1/16/2025), 180 mg (1/23/2025), 180 mg (1/30/2025), 180 mg (2/7/2025), 180 mg (2/13/2025), 180 mg (2/20/2025)  PACLItaxel (TAXOL) chemo IVPB, 80 mg/m2 =  136.8 mg, Intravenous, Once, 1 of 1 cycle  Administration: 136.8 mg (11/14/2024), 136.8 mg (11/21/2024)  pembrolizumab (KEYTRUDA) IVPB, 200 mg, Intravenous, Once, 5 of 17 cycles  Administration: 200 mg (11/14/2024), 200 mg (12/12/2024), 200 mg (1/9/2025), 200 mg (1/30/2025)     Malignant neoplasm of upper-outer quadrant of right breast in female, estrogen receptor positive (HCC)   9/20/2024 Biopsy    Right breast ultrasound-guided biopsy  A. 10 o'clock, 7 cm from nipple (MARTHA)  Invasive lobular carcinoma  Grade 2  ER >95, WA 70, HER2 0  Lymphovascular invasion not identified    B. Right axillary lymph node biopsy (MARTHA)  Infiltrating carcinoma  Although no definitive capsule is noted, it is favored to represent lymph node involvement by th e carcinoma  ER <1, WA <1, HER2 1+    Concordant. Malignancy appears unifocal; suspicious masses cover an area up to 1.8 cm. US of right axilla showed multiple enlarged, morphologically abnormal axillary lymph nodes with biopsy confirmed metastatic disease. Left breast clear.     9/20/2024 -  Cancer Staged    Staging form: Breast, AJCC 8th Edition  - Clinical stage from 9/20/2024: Stage Unknown (cT1c, cNX, cM0, G2, ER+, WA+, HER2-) - Signed by Jodie Philippe MD on 10/7/2024  Stage prefix: Initial diagnosis  Histologic grading system: 3 grade system       9/24/2024 Genomic Testing    MammaPrint/BluePrint ordered on breast specimen block A  Ultra-Low risk - luminal A     9/27/2024 Genetic Testing    Genetic testing with RNA analysis ordered  Amb        Pertinent Medical History   Romina Cleaning is a 54 y.o. postmenopausal female with PMHx significant for HTN who recently felt a mass in her right breast which she brought to medical attention.  She underwent ultrasound-guided biopsy which was consistent with an invasive lobular carcinoma, ER positive, WA positive, HER2 negative.  Right axillary lymph node biopsy showed infiltrating carcinoma which was ER negative, WA negative  and HER2 negative.  MammaPrint resulted ultralow risk (luminal A), genetic testing pending.  She has been evaluated by breast surgery (Dr. Philippe) and given the 2 different pathologies from the breast mass and axillary lymph node she has been recommended a breast MRI to evaluate for an occult site.  CT of the chest, abdomen, pelvis notes enlarged right axillary and subpectoral lymphadenopathy and no other suspicious lesions.  Bone scan with no evidence of osseous metastasis.  Patient presents for initial medical oncology consultation.  Patient's case was submitted to be discussed at the multidisciplinary breast tumor board on 10/21/2024.  She is following with OB/GYN and had Mirena IUD removal. Pt presents for initial medical oncology consultation accompanied by her son and sister for visit.  She denies any chest pain, palpitations, bone pain or other complaints.     OB/GYN history:  G2, P2  Menarche: 15 years old  Menopause: in mid 40's  Age of first pregnancy: 29 years old     Maternal grandmother, maternal uncle and mother with colon cancer. Maternal grandfather with throat cancer (smoker).      Patient declined , instead preferred to have her sister and son translate.     Interval History:   Accompanied by sister, son and mother.  Her case was discussed in the multidisciplinary tumor board in the interim.  Recommendations for neoadjuvant chemotherapy to treat node positive, triple negative disease.  Patient offers no complaints today.     Interval History 11/21/24:   Patient presents for urgent visit.  She started treatment on 11/14/2024, but had a reaction to Taxol on day 1 and 8 requiring further treatment to be held.  Patient noted experiencing facial flushing, abdominal cramping (9/10) and Taxol infusion stopped requiring hypersensitivity protocol.  Patient currently denies any chest pain, palpitations, respiratory symptoms.    02/04/25:   Accompanied by mother for visit today.  Continues to  "receive neoadjuvant chemotherapy which she is tolerating well.  Denies any respiratory symptoms or bone pain.  Notes improvement in her breast mass.  Denies neuropathy.    02/25/25:     Burned finger yesterday (3rd digit of right hand) while cooking farina. Now with blister.     03/18/25:   Patient presents for follow-up visit accompanied by her daughter. Recent infection with influenza A.  She is recovering well and notices intermittent nonproductive cough.  Offers no new breast complaints.     Review of Systems   Constitutional:  Negative for chills and fever.   Respiratory:  Positive for cough. Negative for shortness of breath.    Cardiovascular:  Negative for chest pain and palpitations.   Gastrointestinal:  Negative for abdominal pain.   Genitourinary:  Negative for hematuria.   Skin:  Negative for rash.   Hematological:  Does not bruise/bleed easily.   All other systems reviewed and are negative.        Objective   /88 (Patient Position: Sitting, Cuff Size: Large) Comment: did not take bp med today  Pulse 98   Temp 98.6 °F (37 °C) (Temporal)   Resp 20   Ht 5' 6\" (1.676 m)   Wt 63.5 kg (140 lb)   LMP  (LMP Unknown) Comment: IUD  SpO2 99%   BMI 22.60 kg/m²     Pain Screening:  Pain Score: 0-No pain  ECOG   0  Physical Exam  Vitals reviewed.   Constitutional:       General: She is not in acute distress.     Appearance: Normal appearance.   HENT:      Head: Normocephalic and atraumatic.      Mouth/Throat:      Mouth: Mucous membranes are moist.   Eyes:      Extraocular Movements: Extraocular movements intact.      Conjunctiva/sclera: Conjunctivae normal.   Cardiovascular:      Rate and Rhythm: Normal rate.   Pulmonary:      Effort: Pulmonary effort is normal. No respiratory distress.      Breath sounds: Normal breath sounds. No wheezing, rhonchi or rales.   Chest:          Comments: Right breast: Decreased mass. No skin erythema/thickening.  No nipple inversion.       Left breast: Negative exam   "     Abdominal:      General: Abdomen is flat. There is no distension.      Palpations: Abdomen is soft.   Musculoskeletal:      Cervical back: Normal range of motion and neck supple.      Right lower leg: No edema.      Left lower leg: No edema.   Skin:     General: Skin is warm.      Coloration: Skin is not pale.   Neurological:      Mental Status: She is alert and oriented to person, place, and time. Mental status is at baseline.      Cranial Nerves: No cranial nerve deficit.   Psychiatric:         Thought Content: Thought content normal.         Labs: I have reviewed the following labs:  Lab Results   Component Value Date/Time    WBC 1.14 (L) 03/04/2025 08:33 AM    RBC 2.61 (L) 03/04/2025 08:33 AM    Hemoglobin 8.9 (L) 03/04/2025 08:33 AM    Hematocrit 27.0 (L) 03/04/2025 08:33 AM     (H) 03/04/2025 08:33 AM    MCH 34.1 03/04/2025 08:33 AM    RDW 15.9 (H) 03/04/2025 08:33 AM    Platelets 155 03/04/2025 08:33 AM    Segmented % 70 03/04/2025 08:33 AM    Lymphocytes % 18 03/04/2025 08:33 AM    Monocytes % 7 03/04/2025 08:33 AM    Eosinophils Relative 4 03/04/2025 08:33 AM    Basophils Relative 0 03/04/2025 08:33 AM    Immature Grans % 1 03/04/2025 08:33 AM    Absolute Neutrophils 0.80 (L) 03/04/2025 08:33 AM     Lab Results   Component Value Date/Time    Potassium 3.8 03/04/2025 08:33 AM    Chloride 104 03/04/2025 08:33 AM    CO2 23 03/04/2025 08:33 AM    BUN 6 03/04/2025 08:33 AM    Creatinine 0.61 03/04/2025 08:33 AM    Glucose, Fasting 104 (H) 02/25/2025 09:26 AM    Calcium 8.6 03/04/2025 08:33 AM    Corrected Calcium 9.9 01/21/2025 09:45 AM    AST 24 03/04/2025 08:33 AM    ALT 25 03/04/2025 08:33 AM    Alkaline Phosphatase 78 03/04/2025 08:33 AM    Total Protein 6.6 03/04/2025 08:33 AM    Albumin 4.0 03/04/2025 08:33 AM    Total Bilirubin 0.35 03/04/2025 08:33 AM    eGFR 104 03/04/2025 08:33 AM         9/18/2024 bilateral 3D diagnostic mammogram and right axillary ultrasound tissue is extremely dense,  previously noted calcification has nearly resolved, distortion in the upper outer right breast 10:00 with a sonographic correlate measuring 18 mm and multiple suspicious axillary nodes with cortical thickening and replaced fatty mikayla the largest of which measures 24 mm, left side is benign     9/20/2024 postbiopsy mammogram is concordant malignancy in the breast was thought to be unifocal, ultrasound showed multiple enlarged nodes, left side was benign     10/14/2024: CT chest/abdomen/pelvis:  1. Enlarged right axillary and subpectoral lymphadenopathy correlating to known history of breast cancer. No pulmonary nodules  No mediastinal or hilar lymphadenopathy. No contralateral axillary lymphadenopathy. No intra-abdominal metastatic disease. No adrenal or hepatic metastatic lesions. No suspicious osseous lesions   2. Stable right hepatic lobe hemangioma and cyst.   3. Stable prominent central mesenteric lymph nodes unchanged since 2018 unrelated to patient's breast cancer.     10/14/2024: Bone scan:  1. No scintigraphic evidence of osseous metastasis.      10/23/2024: MRI breast bilateral:  RIGHT  1) MASS E: There is a 20 mm x 15 mm x 17 mm irregularly shaped mass with heterogeneous internal enhancement seen in the upper outer quadrant of the right breast at 10 o'clock, 7 cm from the nipple, which is isointense on T2 weighted images.  This is compatible with the biopsy-proven invasive lobular carcinoma.      This is located 5 mm anterior to the underlying pectoralis musculature, and 23 mm from the lateral skin surface.      2) LYMPH NODE F: There are bulky right axillary lymph nodes to include 1 with a biopsy marker clip in keeping with metastatic axillary node.  The largest node measures 27 x 37 mm (series [id number] image 48).   3) MASS G: There is a 6 mm round mass with circumscribed margins seen in the right breast at 5 o'clock, 4 cm from the nipple, which is hyperintense on T2 weighted images.  This has heterogeneous  enhancement and mixed kinetics.  This is best visualized on series [id number] image 120.  Further characterization with targeted ultrasound is recommended.      Left  There are no suspicious enhancing masses or suspicious areas of non-mass enhancement. There is no axillary or internal mammary adenopathy.      1.5 cm T2 hyperintense lesion in the right lobe of the liver in keeping with hemangioma identified on recent CT chest abdomen pelvis.  This appears stable in size from 12/27/2016 right upper quadrant ultrasound.     IMPRESSION:   Redemonstration of a spiculated mass in the upper outer right breast in keeping with biopsy-proven invasive lobular carcinoma and bulky right axillary lymphadenopathy in keeping with metastatic nodes.  Indeterminate 6 mm mass at the 5 o'clock position right breast for which further characterization with targeted ultrasound is recommended.  If there is no ultrasound correlate MRI guided biopsy could be performed.  No suspicious enhancement in the left breast.     Administrative Statements   I have spent a total time of 32 minutes in caring for this patient on the day of the visit/encounter including Diagnostic results, Risks and benefits of tx options, Instructions for management, Patient and family education, Documenting in the medical record, and Obtaining or reviewing history  .

## 2025-03-19 ENCOUNTER — HOSPITAL ENCOUNTER (OUTPATIENT)
Dept: INFUSION CENTER | Facility: CLINIC | Age: 55
Discharge: HOME | End: 2025-03-19
Payer: COMMERCIAL

## 2025-03-19 VITALS
DIASTOLIC BLOOD PRESSURE: 63 MMHG | HEIGHT: 66 IN | BODY MASS INDEX: 22.34 KG/M2 | OXYGEN SATURATION: 98 % | SYSTOLIC BLOOD PRESSURE: 100 MMHG | HEART RATE: 77 BPM | WEIGHT: 139 LBS | TEMPERATURE: 98 F | RESPIRATION RATE: 18 BRPM

## 2025-03-19 DIAGNOSIS — C77.3 CARCINOMA OF RIGHT BREAST METASTATIC TO AXILLARY LYMPH NODE (HCC): Primary | ICD-10-CM

## 2025-03-19 DIAGNOSIS — C50.911 CARCINOMA OF RIGHT BREAST METASTATIC TO AXILLARY LYMPH NODE (HCC): Primary | ICD-10-CM

## 2025-03-19 RX ORDER — SODIUM CHLORIDE 9 MG/ML
20 INJECTION, SOLUTION INTRAVENOUS ONCE
Status: COMPLETED | OUTPATIENT
Start: 2025-03-19 | End: 2025-03-19

## 2025-03-19 RX ORDER — DOXORUBICIN HYDROCHLORIDE 2 MG/ML
60 INJECTION, SOLUTION INTRAVENOUS ONCE
Status: COMPLETED | OUTPATIENT
Start: 2025-03-19 | End: 2025-03-19

## 2025-03-19 RX ADMIN — SODIUM CHLORIDE 20 ML/HR: 0.9 INJECTION, SOLUTION INTRAVENOUS at 12:27

## 2025-03-19 RX ADMIN — DOXORUBICIN HYDROCHLORIDE 103 MG: 2 INJECTION, SOLUTION INTRAVENOUS at 14:57

## 2025-03-19 RX ADMIN — FOSAPREPITANT 150 MG: 150 INJECTION, POWDER, LYOPHILIZED, FOR SOLUTION INTRAVENOUS at 12:47

## 2025-03-19 RX ADMIN — CYCLOPHOSPHAMIDE 1000 MG: 1 INJECTION, POWDER, LYOPHILIZED, FOR SOLUTION INTRAVENOUS at 14:20

## 2025-03-19 RX ADMIN — SODIUM CHLORIDE 200 MG: 9 INJECTION, SOLUTION INTRAVENOUS at 13:31

## 2025-03-19 RX ADMIN — DEXAMETHASONE SODIUM PHOSPHATE: 10 INJECTION, SOLUTION INTRAMUSCULAR; INTRAVENOUS at 12:27

## 2025-03-19 NOTE — PATIENT INSTRUCTIONS
March 2025 Sunday Monday Tuesday Wednesday Thursday Friday Saturday                                 1                2    Admission   6:16 PM   Kei Schuler DO   formerly [facility] Emergency Department   (Discharge: 3/2/2025)    X-Ray General   7:35 PM   (15 min.)   BE XR ED 1   [facility] 3     4    Admission   8:04 AM   Ilir Jenkins MD   formerly [facility] Emergency Department   (Discharge: 3/4/2025)    X-Ray General   8:35 AM   (15 min.)   BE XR ED 1   [facility] 5     6     7     8                9     10     11     12     13     14     15                16     17     18    Laboratory Walk In   9:15 AM   (5 min.)   BE OP LAB PHLEB CHAIR 1   [facility]    OFFICE VISIT  11:05 AM   (20 min.)   Graciela Garcia DO   [facility] [place] 19    Oncology Treatment  12:00 PM   (210 min.)   AN INF BED 23   [facility] [place] 20    Infusion Therapy Plan   3:30 PM   (30 min.)   AN INF FAST TRACK 1   [facility] 21     22          Cycle 5, Day 1 Cycle 5, Day 2     23     24     25     26     27     28     29                30     31                                                Treatment Details         3/19/2025 - Cycle 5, Day 1      Chemotherapy: ONCBCN PROVIDER COMMUNICATION, PEMBROLIZUMAB IVPB, ONCBCN PROVIDER COMMUNICATION7, CYCLOPHOSPHAMIDE IVPB, ONCBCN PROVIDER COMMUNICATION7, DOXOrubicin (ADRIAMYCIN)    3/20/2025 - Cycle 5, Day 2      Supportive Care: APPT 17, ONCBCN PROVIDER COMMUNICATION4, pegfilgrastim-apgf ([facility])        April 2025 Sunday Monday Tuesday Wednesday Thursday Friday Saturday             1     2     3     4     5                6     7     8    OFFICE VISIT  11:05 AM   (20 min.)   Graciela Garcia DO   [facility] [place]  9    Oncology Treatment  12:00 PM   (210 min.)   AN INF CHAIR 9   [facility] 10    Infusion Therapy Plan   3:30 PM   (30 min.)   AN INF FAST TRACK 1   [facility] 11     12          Cycle 6, Day 1 Cycle 6, Day 2     13     14     15     16     17     18     19                20     21     22     23     24     25     26                27     28     29    OFFICE VISIT  11:05 AM   (20 min.)   Graciela Garcia DO   [facility] [place] 30    Oncology Treatment  10:00 AM   (210 min.)   AN INF CHAIR 15   [facility]                     Cycle 7, Day 1             Treatment Details         4/9/2025 - Cycle 6, Day 1      Chemotherapy: ONCBCN PROVIDER COMMUNICATION, PEMBROLIZUMAB IVPB, ONCBCN PROVIDER COMMUNICATION7, CYCLOPHOSPHAMIDE IVPB, ONCBCN PROVIDER COMMUNICATION7, DOXOrubicin (ADRIAMYCIN)    4/10/2025 - Cycle 6, Day 2      Supportive Care: APPT 17, ONCBCN PROVIDER COMMUNICATION4, pegfilgrastim-apgf ([facility])    4/30/2025 - Cycle 7, Day 1      Chemotherapy: ONCBCN PROVIDER COMMUNICATION, PEMBROLIZUMAB IVPB, ONCBCN PROVIDER COMMUNICATION7, CYCLOPHOSPHAMIDE IVPB, ONCBCN PROVIDER COMMUNICATION7, DOXOrubicin (ADRIAMYCIN)

## 2025-03-19 NOTE — PROGRESS NOTES
Pt arrives to infusion center for Keytruda, Cytoxan, Adriamycin. Offers no complaints. Labs from 3/18 are within tx parameters. PAC accessed without issue, brisk blood return, flushed, and infusing. Pt sitting comfortably in chair, wheels locked, call bell within reach.

## 2025-03-19 NOTE — PROGRESS NOTES
Patient tolerated all treatment today without incident, she was feeling good on discharge and was without c/o.  She is aware to RTO to [facility] center at 1530 for nyvePAia tomorrow.

## 2025-03-20 ENCOUNTER — HOSPITAL ENCOUNTER (OUTPATIENT)
Dept: INFUSION CENTER | Facility: CLINIC | Age: 55
Discharge: HOME | End: 2025-03-20
Payer: COMMERCIAL

## 2025-03-20 DIAGNOSIS — C77.3 CARCINOMA OF RIGHT BREAST METASTATIC TO AXILLARY LYMPH NODE (HCC): Primary | ICD-10-CM

## 2025-03-20 DIAGNOSIS — D70.1 CHEMOTHERAPY INDUCED NEUTROPENIA (HCC): ICD-10-CM

## 2025-03-20 DIAGNOSIS — T45.1X5A CHEMOTHERAPY INDUCED NEUTROPENIA (HCC): ICD-10-CM

## 2025-03-20 DIAGNOSIS — C50.911 CARCINOMA OF RIGHT BREAST METASTATIC TO AXILLARY LYMPH NODE (HCC): Primary | ICD-10-CM

## 2025-03-20 PROCEDURE — 96372 THER/PROPH/DIAG INJ SC/IM: CPT

## 2025-03-20 RX ADMIN — PEGFILGRASTIM-APGF 6 MG: 6 INJECTION, SOLUTION SUBCUTANEOUS at 15:35

## 2025-03-20 NOTE — PROGRESS NOTES
Pt. offers no complaints.  [facility] admin. SQ in JIM without incident.  AVS declined.  Next appt. confirmed for 4/9 @ 12:00..

## 2025-04-02 PROBLEM — D70.1 CHEMOTHERAPY INDUCED NEUTROPENIA (HCC): Status: ACTIVE | Noted: 2025-04-02

## 2025-04-02 PROBLEM — T45.1X5A CHEMOTHERAPY INDUCED NEUTROPENIA (HCC): Status: ACTIVE | Noted: 2025-04-02

## 2025-04-02 NOTE — ADDENDUM NOTE
Encounter addended by: Graciela Garcia DO on: 4/2/2025 5:19 PM   Actions taken: Visit diagnoses modified, Problem List modified

## 2025-04-08 ENCOUNTER — TELEPHONE (OUTPATIENT)
Age: 55
End: 2025-04-08

## 2025-04-08 ENCOUNTER — OFFICE VISIT (OUTPATIENT)
Dept: HEMATOLOGY ONCOLOGY | Facility: CLINIC | Age: 55
End: 2025-04-08
Payer: COMMERCIAL

## 2025-04-08 ENCOUNTER — VBI (OUTPATIENT)
Dept: ADMINISTRATIVE | Facility: OTHER | Age: 55
End: 2025-04-08

## 2025-04-08 ENCOUNTER — APPOINTMENT (OUTPATIENT)
Dept: LAB | Facility: CLINIC | Age: 55
End: 2025-04-08
Payer: COMMERCIAL

## 2025-04-08 VITALS
OXYGEN SATURATION: 98 % | TEMPERATURE: 97.3 F | BODY MASS INDEX: 22.1 KG/M2 | RESPIRATION RATE: 16 BRPM | WEIGHT: 137.5 LBS | HEIGHT: 66 IN | SYSTOLIC BLOOD PRESSURE: 114 MMHG | DIASTOLIC BLOOD PRESSURE: 68 MMHG | HEART RATE: 63 BPM

## 2025-04-08 DIAGNOSIS — C50.411 MALIGNANT NEOPLASM OF UPPER-OUTER QUADRANT OF RIGHT BREAST IN FEMALE, ESTROGEN RECEPTOR POSITIVE (HCC): Primary | ICD-10-CM

## 2025-04-08 DIAGNOSIS — C50.911 CARCINOMA OF RIGHT BREAST METASTATIC TO AXILLARY LYMPH NODE (HCC): ICD-10-CM

## 2025-04-08 DIAGNOSIS — Z17.0 MALIGNANT NEOPLASM OF UPPER-OUTER QUADRANT OF RIGHT BREAST IN FEMALE, ESTROGEN RECEPTOR POSITIVE (HCC): Primary | ICD-10-CM

## 2025-04-08 DIAGNOSIS — Z17.0 MALIGNANT NEOPLASM OF UPPER-OUTER QUADRANT OF RIGHT BREAST IN FEMALE, ESTROGEN RECEPTOR POSITIVE (HCC): ICD-10-CM

## 2025-04-08 DIAGNOSIS — C50.411 MALIGNANT NEOPLASM OF UPPER-OUTER QUADRANT OF RIGHT BREAST IN FEMALE, ESTROGEN RECEPTOR POSITIVE (HCC): ICD-10-CM

## 2025-04-08 DIAGNOSIS — T45.1X5A ADVERSE EFFECT OF CHEMOTHERAPY, INITIAL ENCOUNTER: ICD-10-CM

## 2025-04-08 DIAGNOSIS — C77.3 CARCINOMA OF RIGHT BREAST METASTATIC TO AXILLARY LYMPH NODE (HCC): ICD-10-CM

## 2025-04-08 DIAGNOSIS — C50.411 MALIGNANT NEOPLASM OF UPPER-OUTER QUADRANT OF RIGHT FEMALE BREAST, UNSPECIFIED ESTROGEN RECEPTOR STATUS (HCC): ICD-10-CM

## 2025-04-08 DIAGNOSIS — C50.911 BREAST CANCER METASTASIZED TO AXILLARY LYMPH NODE, RIGHT (HCC): ICD-10-CM

## 2025-04-08 DIAGNOSIS — C77.3 BREAST CANCER METASTASIZED TO AXILLARY LYMPH NODE, RIGHT (HCC): ICD-10-CM

## 2025-04-08 LAB
ALBUMIN SERPL BCG-MCNC: 4.5 G/DL (ref 3.5–5)
ALP SERPL-CCNC: 74 U/L (ref 34–104)
ALT SERPL W P-5'-P-CCNC: 20 U/L (ref 7–52)
ANION GAP SERPL CALCULATED.3IONS-SCNC: 11 MMOL/L (ref 4–13)
AST SERPL W P-5'-P-CCNC: 17 U/L (ref 13–39)
BASOPHILS # BLD AUTO: 0.06 THOUSANDS/ÂΜL (ref 0–0.1)
BASOPHILS NFR BLD AUTO: 1 % (ref 0–1)
BILIRUB SERPL-MCNC: 0.27 MG/DL (ref 0.2–1)
BUN SERPL-MCNC: 10 MG/DL (ref 5–25)
CALCIUM SERPL-MCNC: 9.5 MG/DL (ref 8.4–10.2)
CHLORIDE SERPL-SCNC: 102 MMOL/L (ref 96–108)
CO2 SERPL-SCNC: 28 MMOL/L (ref 21–32)
CREAT SERPL-MCNC: 0.56 MG/DL (ref 0.6–1.3)
EOSINOPHIL # BLD AUTO: 0.16 THOUSAND/ÂΜL (ref 0–0.61)
EOSINOPHIL NFR BLD AUTO: 3 % (ref 0–6)
ERYTHROCYTE [DISTWIDTH] IN BLOOD BY AUTOMATED COUNT: 14.6 % (ref 11.6–15.1)
GFR SERPL CREATININE-BSD FRML MDRD: 107 ML/MIN/1.73SQ M
GLUCOSE SERPL-MCNC: 100 MG/DL (ref 65–140)
HCT VFR BLD AUTO: 32.1 % (ref 34.8–46.1)
HGB BLD-MCNC: 10.4 G/DL (ref 11.5–15.4)
IMM GRANULOCYTES # BLD AUTO: 0.05 THOUSAND/UL (ref 0–0.2)
IMM GRANULOCYTES NFR BLD AUTO: 1 % (ref 0–2)
LYMPHOCYTES # BLD AUTO: 1.34 THOUSANDS/ÂΜL (ref 0.6–4.47)
LYMPHOCYTES NFR BLD AUTO: 23 % (ref 14–44)
MCH RBC QN AUTO: 33.2 PG (ref 26.8–34.3)
MCHC RBC AUTO-ENTMCNC: 32.4 G/DL (ref 31.4–37.4)
MCV RBC AUTO: 103 FL (ref 82–98)
MONOCYTES # BLD AUTO: 0.35 THOUSAND/ÂΜL (ref 0.17–1.22)
MONOCYTES NFR BLD AUTO: 6 % (ref 4–12)
NEUTROPHILS # BLD AUTO: 4 THOUSANDS/ÂΜL (ref 1.85–7.62)
NEUTS SEG NFR BLD AUTO: 66 % (ref 43–75)
NRBC BLD AUTO-RTO: 0 /100 WBCS
PLATELET # BLD AUTO: 383 THOUSANDS/UL (ref 149–390)
PMV BLD AUTO: 10.6 FL (ref 8.9–12.7)
POTASSIUM SERPL-SCNC: 3.6 MMOL/L (ref 3.5–5.3)
PROT SERPL-MCNC: 7 G/DL (ref 6.4–8.4)
RBC # BLD AUTO: 3.13 MILLION/UL (ref 3.81–5.12)
SODIUM SERPL-SCNC: 141 MMOL/L (ref 135–147)
T3FREE SERPL-MCNC: 3.1 PG/ML (ref 2.5–3.9)
TSH SERPL DL<=0.05 MIU/L-ACNC: 0.5 UIU/ML (ref 0.45–4.5)
WBC # BLD AUTO: 5.96 THOUSAND/UL (ref 4.31–10.16)

## 2025-04-08 PROCEDURE — 99214 OFFICE O/P EST MOD 30 MIN: CPT | Performed by: INTERNAL MEDICINE

## 2025-04-08 PROCEDURE — 36415 COLL VENOUS BLD VENIPUNCTURE: CPT

## 2025-04-08 PROCEDURE — 80053 COMPREHEN METABOLIC PANEL: CPT

## 2025-04-08 PROCEDURE — 84481 FREE ASSAY (FT-3): CPT

## 2025-04-08 PROCEDURE — 84443 ASSAY THYROID STIM HORMONE: CPT

## 2025-04-08 PROCEDURE — 85025 COMPLETE CBC W/AUTO DIFF WBC: CPT

## 2025-04-08 RX ORDER — DOXORUBICIN HYDROCHLORIDE 2 MG/ML
60 INJECTION, SOLUTION INTRAVENOUS ONCE
Status: CANCELLED | OUTPATIENT
Start: 2025-04-09

## 2025-04-08 RX ORDER — SODIUM CHLORIDE 9 MG/ML
20 INJECTION, SOLUTION INTRAVENOUS ONCE
Status: CANCELLED | OUTPATIENT
Start: 2025-04-09

## 2025-04-08 NOTE — TELEPHONE ENCOUNTER
Received call from Shelly at the outpatient lab. Patient at the lab and she is unable to release orders for upcoming chemo, as the expected date states 6/10/2025. I was unable to adjust these dates, so I entered new lab orders as indicated in the treatment plan.

## 2025-04-08 NOTE — PROGRESS NOTES
Name: Romina Cleaning      : 1970      MRN: [id number]  Encounter Provider: Graciela Garcia DO  Encounter Date: 2025   Encounter department: [facility]  :  Assessment & Plan  Malignant neoplasm of upper-outer quadrant of right breast in female, estrogen receptor positive (HCC)   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 (HCC)  Staging form: Breast, AJCC 8th Edition  - Clinical stage from 2024: cT0, cN1(f), cM0, GX, ER-, AL-, HER2- - Signed by Jodie Philippe MD on 10/7/2024  Stage prefix: Initial diagnosis  Method of lymph node assessment: Core biopsy  Histologic grading system: 3 grade system    Malignant neoplasm of upper-outer quadrant of right breast in female, estrogen receptor positive (HCC)  Staging form: Breast, AJCC 8th Edition  - Clinical stage from 2024: Stage Unknown (cT1c, cNX, cM0, G2, ER+, AL+, HER2-) - Signed by Jodei Philippe MD on 10/7/2024  Stage prefix: Initial diagnosis  Histologic grading system: 3 grade system    1.  cT1c cNx cM0 Right breast invasive lobular carcinoma.  Grade 2.  ER positive (>95%), AL positive (70%), HER2 negative by IHC.  MammaPrint: Ultralow risk (luminal A).     2.  cT0 cN1 cM0 Right axillary lymph node biopsy ER negative, AL negative, HER2 negative  3.  BRCA1/2 negative     Romina Cleaning is a 54 y.o. 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 She initially palpated a mass in her right breast which she brought to medical attention.  She underwent ultrasound-guided biopsy which was consistent with an invasive lobular carcinoma, ER positive, AL positive, HER2 negative.   MammaPrint resulted ultralow risk (luminal A). Right axillary lymph node biopsy showed infiltrating carcinoma which was  ER negative, [place] negative and HER2 negative.  Genetic testing for BRCA1/2. She has been evaluated by breast surgery (Dr. Philippe) and given the 2 different pathologies from the breast mass and axillary lymph node she underwent breast MRI on 10/23/2024 which redemonstrates a mass in the upper outer right breast consistent with the biopsy-proven invasive lobular carcinoma and right axillary lymphadenopathy.  Indeterminant 6 mm mass at the 5 o'clock position right breast for which ultrasound was recommended (if there is no ultrasound correlate MRI guided biopsy would be performed).  CT of the chest, abdomen, pelvis notes enlarged right axillary and subpectoral lymphadenopathy and no other suspicious lesions.  Bone scan with no evidence of osseous metastasis. She is following with OB/GYN and had Mirena IUD removal.     She has 2 different pathologies (invasive lobular carcinoma, ER/[place] positive, HER2 negative with a ultralow risk MammaPrint results as well as a triple negative carcinoma noted on axillary lymph node biopsy).  Bilateral breast MRI noted spiculated mass in the upper outer right breast consistent with biopsy-proven invasive lobular carcinoma as well as an indeterminate 6 mm mass at the 5 o'clock position for which further characterization with ultrasound was recommended.  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     She started neoadjuvant chemotherapy for triple negative, node positive carcinoma on 11/14/2024.  Unfortunately, patient with reaction to Taxol and subsequent treatment switched to Abraxane.     She may benefit from hormone therapy in the adjuvant setting for her ER/[place]J+ carcinoma. We will visit this decision after surgery.      Treatment plan: Keynote 522 regimen  Neoadjuvant pembrolizumab 200 mg day 1, Paclitaxel 80 mg/m2 day 1, Carboplatin AUC 1.5 on days 1, 8, 15 of a 21-day cycle for 4 cycles (11/14/2024-2/20/2025)  >> Switched paclitaxel to  Abraxane 100 mg/m2 D1,8,15 after C1D8 given reaction to paclitaxel.   Then neoadjuvant pembrolizumab 200 mg, cyclophosphamide 600 mg/m2, doxorubicin 60 mg/m2 on day 1 of a 21-day cycle for 4 cycles.   Then adjuvant course of pembrolizumab 200 mg on day 1 of a 21-day cycle for 9 cycles pending final pathology review.      Summary:  Patient will continue neoadjuvant chemotherapy for triple negative, node positive carcinoma.  She has completed 4 cycles of treatment with pembrolizumab, paclitaxel and carboplatin.    She is currently receiving pembrolizumab, cyclophosphamide and doxorubicin (3/19/2025-present). Due for cycle 2 on 4/9/25. CBC reviewed, CMP pending. Last neoadjuvant chemotherapy due on 5/21/2025.  She has had a great clinical response.  Follow-up in 3 weeks.  Following with breast surgery.  Patient and her mother had opportunity to ask questions to their satisfaction.       Breast cancer metastasized to axillary lymph node, right (HCC)  As above           Return in about 3 weeks (around 4/29/2025) for Office Visit, On tx visit, Infusion - See Treatment Plan, Labs - See Treatment Plan.    History of Present Illness   Chief Complaint   Patient presents with    Follow-up     Oncology History   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 (HCC)  Staging form: Breast, AJCC 8th Edition  - Clinical stage from 9/20/2024: cT0, cN1(f), cM0, GX, ER-, MS-, HER2- - Signed by Jodie Philippe MD on 10/7/2024  Stage prefix: Initial diagnosis  Method of lymph node assessment: Core biopsy  Histologic grading system: 3 grade system    Malignant neoplasm of upper-outer quadrant of right breast in female, estrogen receptor positive (HCC)  Staging form: Breast, AJCC 8th Edition  - Clinical stage from 9/20/2024: Stage Unknown (cT1c, cNX, cM0, G2, ER+, MS+, HER2-) - Signed by Jodie Philippe MD on 10/7/2024  Stage prefix: Initial diagnosis  Histologic grading system: 3 grade system  Oncology History    Carcinoma of right breast metastatic to axillary lymph node (HCC)   6/11/2024 Initial Diagnosis    Carcinoma of right breast metastatic to axillary lymph node (HCC)     9/20/2024 -  Cancer Staged    Staging form: Breast, AJCC 8th Edition  - Clinical stage from 9/20/2024: cT0, cN1(f), cM0, GX, ER-, NV-, HER2- - Signed by Jodie Philippe MD on 10/7/2024  Stage prefix: Initial diagnosis  Method of lymph node assessment: Core biopsy  Histologic grading system: 3 grade system       11/14/2024 -  Chemotherapy    DOXOrubicin (ADRIAMYCIN), 60 mg/m2 = 103 mg, 1 of 4 cycles  Administration: 103 mg (3/19/2025)  cyclophosphamide (CYTOXAN) IVPB, 600 mg/m2 = 1,026 mg, 1 of 4 cycles  Administration: 1,000 mg (3/19/2025)  paclitaxel protein-bound (ABRAXANE) IVPB, 100 mg/m2 = 171 mg (original dose ), 4 of 4 cycles  Dose modification: 100 mg/m2 (original dose 100 mg/m2, Cycle 1)  Administration: 171 mg (12/5/2024), 171 mg (12/12/2024), 171 mg (12/19/2024), 171 mg (1/9/2025), 171 mg (1/16/2025), 171 mg (1/23/2025), 171 mg (1/30/2025), 171 mg (2/7/2025), 171 mg (2/13/2025), 171 mg (2/20/2025)  CARBOplatin (PARAPLATIN) IVPB (GOG AUC DOSING), 180 mg, 4 of 4 cycles  Administration: 180 mg (11/14/2024), 180 mg (11/21/2024), 180 mg (12/5/2024), 180 mg (12/12/2024), 180 mg (12/19/2024), 180 mg (1/9/2025), 180 mg (1/16/2025), 180 mg (1/23/2025), 180 mg (1/30/2025), 180 mg (2/7/2025), 180 mg (2/13/2025), 180 mg (2/20/2025)  PACLItaxel (TAXOL) chemo IVPB, 80 mg/m2 = 136.8 mg, 1 of 1 cycle  Administration: 136.8 mg (11/14/2024), 136.8 mg (11/21/2024)  pembrolizumab (KEYTRUDA) IVPB, 200 mg, 5 of 17 cycles  Administration: 200 mg (11/14/2024), 200 mg (3/19/2025), 200 mg (12/12/2024), 200 mg (1/9/2025), 200 mg (1/30/2025)     Malignant neoplasm of upper-outer quadrant of right breast in female, estrogen receptor positive (HCC)   9/20/2024 Biopsy    Right breast ultrasound-guided biopsy  A. 10 o'clock, 7 cm from nipple (MARTHA)  Invasive lobular  carcinoma  Grade 2  ER >95, MS 70, HER2 0  Lymphovascular invasion not identified    B. Right axillary lymph node biopsy (MARTHA)  Infiltrating carcinoma  Although no definitive capsule is noted, it is favored to represent lymph node involvement by th e carcinoma  ER <1, MS <1, HER2 1+    Concordant. Malignancy appears unifocal; suspicious masses cover an area up to 1.8 cm. US of right axilla showed multiple enlarged, morphologically abnormal axillary lymph nodes with biopsy confirmed metastatic disease. Left breast clear.     9/20/2024 -  Cancer Staged    Staging form: Breast, AJCC 8th Edition  - Clinical stage from 9/20/2024: Stage Unknown (cT1c, cNX, cM0, G2, ER+, MS+, HER2-) - Signed by Jodie Philippe MD on 10/7/2024  Stage prefix: Initial diagnosis  Histologic grading system: 3 grade system       9/24/2024 Genomic Testing    MammaPrint/BluePrint ordered on breast specimen block A  Ultra-Low risk - luminal A     9/27/2024 Genetic Testing    Genetic testing with RNA analysis ordered  [place]        Pertinent Medical History   Romina Cleaning is a 54 y.o. postmenopausal female with PMHx significant for HTN who recently felt a mass in her right breast which she brought to medical attention.  She underwent ultrasound-guided biopsy which was consistent with an invasive lobular carcinoma, ER positive, MS positive, HER2 negative.  Right axillary lymph node biopsy showed infiltrating carcinoma which was ER negative, MS negative and HER2 negative.  MammaPrint resulted ultralow risk (luminal A), genetic testing pending.  She has been evaluated by breast surgery (Dr. Philippe) and given the 2 different pathologies from the breast mass and axillary lymph node she has been recommended a breast MRI to evaluate for an occult site.  CT of the chest, abdomen, pelvis notes enlarged right axillary and subpectoral lymphadenopathy and no other suspicious lesions.  Bone scan with no evidence of osseous metastasis.  Patient presents for  initial medical oncology consultation.  Patient's case was submitted to be discussed at the multidisciplinary breast tumor board on 10/21/2024.  She is following with OB/GYN and had Mirena IUD removal. Pt presents for initial medical oncology consultation accompanied by her son and sister for visit.  She denies any chest pain, palpitations, bone pain or other complaints.     OB/GYN history:  G2, P2  Menarche: 15 years old  Menopause: in mid 40's  Age of first pregnancy: 29 years old     Maternal grandmother, maternal uncle and mother with colon cancer. Maternal grandfather with throat cancer (smoker).      Patient declined , instead preferred to have her sister and son translate.     Interval History:   Accompanied by sister, son and mother.  Her case was discussed in the multidisciplinary tumor board in the interim.  Recommendations for neoadjuvant chemotherapy to treat node positive, triple negative disease.  Patient offers no complaints today.     Interval History 11/21/24:   Patient presents for urgent visit.  She started treatment on 11/14/2024, but had a reaction to Taxol on day 1 and 8 requiring further treatment to be held.  Patient noted experiencing facial flushing, abdominal cramping (9/10) and Taxol infusion stopped requiring hypersensitivity protocol.  Patient currently denies any chest pain, palpitations, respiratory symptoms.    02/04/25:   Accompanied by mother for visit today.  Continues to receive neoadjuvant chemotherapy which she is tolerating well.  Denies any respiratory symptoms or bone pain.  Notes improvement in her breast mass.  Denies neuropathy.    02/25/25:     Burned finger yesterday (3rd digit of right hand) while cooking farina. Now with blister.     03/18/25:   Patient presents for follow-up visit accompanied by her daughter. Recent infection with influenza A.  She is recovering well and notices intermittent nonproductive cough.  Offers no new breast  "complaints.    04/08/25:   Patient presents for follow-up visit accompanied by her mother.     Review of Systems        Objective   /68 (BP Location: Left arm, Patient Position: Sitting, Cuff Size: Adult)   Pulse 63   Temp (!) 97.3 °F (36.3 °C) (Temporal)   Resp 16   Ht 5' 6\" (1.676 m)   Wt 62.4 kg (137 lb 8 oz)   LMP  (LMP Unknown) Comment: IUD  SpO2 98%   BMI 22.19 kg/m²     Pain Screening:  Pain Score: 0-No pain  ECOG   0  Physical Exam  Vitals reviewed.   Constitutional:       General: She is not in acute distress.     Appearance: Normal appearance.   HENT:      Head: Normocephalic and atraumatic.      Mouth/Throat:      Mouth: Mucous membranes are moist.   Eyes:      Extraocular Movements: Extraocular movements intact.      Conjunctiva/sclera: Conjunctivae normal.   Cardiovascular:      Rate and Rhythm: Normal rate.   Pulmonary:      Effort: Pulmonary effort is normal. No respiratory distress.      Breath sounds: Normal breath sounds.   Chest:          Comments: Right breast: Decreased mass. No skin erythema/thickening.  No nipple inversion.       Left breast: Negative exam    Abdominal:      General: Abdomen is flat. There is no distension.      Palpations: Abdomen is soft.   Musculoskeletal:      Cervical back: Normal range of motion and neck supple.      Right lower leg: No edema.      Left lower leg: No edema.   Skin:     General: Skin is warm.      Coloration: Skin is not pale.   Neurological:      Mental Status: She is alert and oriented to person, place, and time. Mental status is at baseline.   Psychiatric:         Thought Content: Thought content normal.         Labs: I have reviewed the following labs:  Lab Results   Component Value Date/Time    WBC 5.96 04/08/2025 09:29 AM    RBC 3.13 (L) 04/08/2025 09:29 AM    Hemoglobin 10.4 (L) 04/08/2025 09:29 AM    Hematocrit 32.1 (L) 04/08/2025 09:29 AM     (H) 04/08/2025 09:29 AM    MCH 33.2 04/08/2025 09:29 AM    RDW 14.6 04/08/2025 09:29 " AM    Platelets 383 04/08/2025 09:29 AM    Segmented % 66 04/08/2025 09:29 AM    Lymphocytes % 23 04/08/2025 09:29 AM    Monocytes % 6 04/08/2025 09:29 AM    Eosinophils Relative 3 04/08/2025 09:29 AM    Basophils Relative 1 04/08/2025 09:29 AM    Immature Grans % 1 04/08/2025 09:29 AM    Absolute Neutrophils 4.00 04/08/2025 09:29 AM     Lab Results   Component Value Date/Time    Potassium 3.9 03/18/2025 09:18 AM    Chloride 105 03/18/2025 09:18 AM    CO2 28 03/18/2025 09:18 AM    BUN 10 03/18/2025 09:18 AM    Creatinine 0.57 (L) 03/18/2025 09:18 AM    Glucose, Fasting 110 (H) 03/18/2025 09:18 AM    Calcium 9.7 03/18/2025 09:18 AM    Corrected Calcium 9.9 01/21/2025 09:45 AM    AST 16 03/18/2025 09:18 AM    ALT 20 03/18/2025 09:18 AM    Alkaline Phosphatase 83 03/18/2025 09:18 AM    Total Protein 7.1 03/18/2025 09:18 AM    Albumin 4.4 03/18/2025 09:18 AM    Total Bilirubin 0.40 03/18/2025 09:18 AM    eGFR 106 03/18/2025 09:18 AM         9/18/2024 bilateral 3D diagnostic mammogram and right axillary ultrasound tissue is extremely dense, previously noted calcification has nearly resolved, distortion in the upper outer right breast 10:00 with a sonographic correlate measuring 18 mm and multiple suspicious axillary nodes with cortical thickening and replaced fatty mikayla the largest of which measures 24 mm, left side is benign     9/20/2024 postbiopsy mammogram is concordant malignancy in the breast was thought to be unifocal, ultrasound showed multiple enlarged nodes, left side was benign     10/14/2024: CT chest/abdomen/pelvis:  1. Enlarged right axillary and subpectoral lymphadenopathy correlating to known history of breast cancer. No pulmonary nodules  No mediastinal or hilar lymphadenopathy. No contralateral axillary lymphadenopathy. No intra-abdominal metastatic disease. No adrenal or hepatic metastatic lesions. No suspicious osseous lesions   2. Stable right hepatic lobe hemangioma and cyst.   3. Stable prominent  central mesenteric lymph nodes unchanged since 2018 unrelated to patient's breast cancer.     10/14/2024: Bone scan:  1. No scintigraphic evidence of osseous metastasis.      10/23/2024: MRI breast bilateral:  RIGHT  1) MASS E: There is a 20 mm x 15 mm x 17 mm irregularly shaped mass with heterogeneous internal enhancement seen in the upper outer quadrant of the right breast at 10 o'clock, 7 cm from the nipple, which is isointense on T2 weighted images.  This is compatible with the biopsy-proven invasive lobular carcinoma.      This is located 5 mm anterior to the underlying pectoralis musculature, and 23 mm from the lateral skin surface.      2) LYMPH NODE F: There are bulky right axillary lymph nodes to include 1 with a biopsy marker clip in keeping with metastatic axillary node.  The largest node measures 27 x 37 mm (series [id number] image 48).   3) MASS G: There is a 6 mm round mass with circumscribed margins seen in the right breast at 5 o'clock, 4 cm from the nipple, which is hyperintense on T2 weighted images.  This has heterogeneous enhancement and mixed kinetics.  This is best visualized on series [id number] image 120.  Further characterization with targeted ultrasound is recommended.      Left  There are no suspicious enhancing masses or suspicious areas of non-mass enhancement. There is no axillary or internal mammary adenopathy.      1.5 cm T2 hyperintense lesion in the right lobe of the liver in keeping with hemangioma identified on recent CT chest abdomen pelvis.  This appears stable in size from 12/27/2016 right upper quadrant ultrasound.     IMPRESSION:   Redemonstration of a spiculated mass in the upper outer right breast in keeping with biopsy-proven invasive lobular carcinoma and bulky right axillary lymphadenopathy in keeping with metastatic nodes.  Indeterminate 6 mm mass at the 5 o'clock position right breast for which further characterization with targeted ultrasound is recommended.  If there is no  ultrasound correlate MRI guided biopsy could be performed.  No suspicious enhancement in the left breast.

## 2025-04-08 NOTE — TELEPHONE ENCOUNTER
04/08/25 1:06 PM     Chart reviewed for Pap Smear (HPV) aka Cervical Cancer Screening ; nothing is submitted to the patient's insurance at this time.     Cy Michelle MA   [facility]

## 2025-04-08 NOTE — ASSESSMENT & PLAN NOTE
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 (HCC)  Staging form: Breast, AJCC 8th Edition  - Clinical stage from 9/20/2024: cT0, cN1(f), cM0, GX, ER-, PA-, HER2- - Signed by Jodie Philippe MD on 10/7/2024  Stage prefix: Initial diagnosis  Method of lymph node assessment: Core biopsy  Histologic grading system: 3 grade system    Malignant neoplasm of upper-outer quadrant of right breast in female, estrogen receptor positive (HCC)  Staging form: Breast, AJCC 8th Edition  - Clinical stage from 9/20/2024: Stage Unknown (cT1c, cNX, cM0, G2, ER+, PA+, HER2-) - Signed by Jodie Philippe MD on 10/7/2024  Stage prefix: Initial diagnosis  Histologic grading system: 3 grade system    1.  cT1c cNx cM0 Right breast invasive lobular carcinoma.  Grade 2.  ER positive (>95%), PA positive (70%), HER2 negative by IHC.  MammaPrint: Ultralow risk (luminal A).     2.  cT0 cN1 cM0 Right axillary lymph node biopsy ER negative, PA negative, HER2 negative  3.  BRCA1/2 negative     Romina Cleaning is a 54 y.o. 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 She initially palpated a mass in her right breast which she brought to medical attention.  She underwent ultrasound-guided biopsy which was consistent with an invasive lobular carcinoma, ER positive, PA positive, HER2 negative.   MammaPrint resulted ultralow risk (luminal A). Right axillary lymph node biopsy showed infiltrating carcinoma which was ER negative, PA negative and HER2 negative.  Genetic testing for BRCA1/2. She has been evaluated by breast surgery (Dr. Philippe) and given the 2 different pathologies from the breast mass and axillary lymph node she underwent breast MRI on 10/23/2024 which redemonstrates a mass in the upper outer right breast consistent with the  biopsy-proven invasive lobular carcinoma and right axillary lymphadenopathy.  Indeterminant 6 mm mass at the 5 o'clock position right breast for which ultrasound was recommended (if there is no ultrasound correlate MRI guided biopsy would be performed).  CT of the chest, abdomen, pelvis notes enlarged right axillary and subpectoral lymphadenopathy and no other suspicious lesions.  Bone scan with no evidence of osseous metastasis. She is following with OB/GYN and had Mirena IUD removal.     She has 2 different pathologies (invasive lobular carcinoma, ER/[place] positive, HER2 negative with a ultralow risk MammaPrint results as well as a triple negative carcinoma noted on axillary lymph node biopsy).  Bilateral breast MRI noted spiculated mass in the upper outer right breast consistent with biopsy-proven invasive lobular carcinoma as well as an indeterminate 6 mm mass at the 5 o'clock position for which further characterization with ultrasound was recommended.  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     She started neoadjuvant chemotherapy for triple negative, node positive carcinoma on 11/14/2024.  Unfortunately, patient with reaction to Taxol and subsequent treatment switched to Abraxane.     She may benefit from hormone therapy in the adjuvant setting for her ER/[place]+ carcinoma. We will visit this decision after surgery.      Treatment plan: Keynote 522 regimen  Neoadjuvant pembrolizumab 200 mg day 1, Paclitaxel 80 mg/m2 day 1, Carboplatin AUC 1.5 on days 1, 8, 15 of a 21-day cycle for 4 cycles (11/14/2024-2/20/2025)  >> Switched paclitaxel to Abraxane 100 mg/m2 D1,8,15 after C1D8 given reaction to paclitaxel.   Then neoadjuvant pembrolizumab 200 mg, cyclophosphamide 600 mg/m2, doxorubicin 60 mg/m2 on day 1 of a 21-day cycle for 4 cycles.   Then adjuvant course of pembrolizumab 200 mg on day 1 of a 21-day cycle for 9 cycles pending final pathology review.       Summary:  Patient will continue neoadjuvant chemotherapy for triple negative, node positive carcinoma.  She has completed 4 cycles of treatment with pembrolizumab, paclitaxel and carboplatin.    She is currently receiving pembrolizumab, cyclophosphamide and doxorubicin (3/19/2025-present). Due for cycle 2 on 4/9/25. CBC reviewed, CMP pending. Last neoadjuvant chemotherapy due on 5/21/2025.  She has had a great clinical response.  Follow-up in 3 weeks.  Following with breast surgery.  Patient and her mother had opportunity to ask questions to their satisfaction.

## 2025-04-09 ENCOUNTER — HOSPITAL ENCOUNTER (OUTPATIENT)
Dept: INFUSION CENTER | Facility: CLINIC | Age: 55
Discharge: HOME | End: 2025-04-09
Payer: COMMERCIAL

## 2025-04-09 VITALS
OXYGEN SATURATION: 93 % | DIASTOLIC BLOOD PRESSURE: 58 MMHG | TEMPERATURE: 97.5 F | SYSTOLIC BLOOD PRESSURE: 92 MMHG | BODY MASS INDEX: 22.02 KG/M2 | HEART RATE: 93 BPM | WEIGHT: 137 LBS | HEIGHT: 66 IN

## 2025-04-09 DIAGNOSIS — C50.911 CARCINOMA OF RIGHT BREAST METASTATIC TO AXILLARY LYMPH NODE (HCC): Primary | ICD-10-CM

## 2025-04-09 DIAGNOSIS — C77.3 CARCINOMA OF RIGHT BREAST METASTATIC TO AXILLARY LYMPH NODE (HCC): Primary | ICD-10-CM

## 2025-04-09 RX ORDER — DOXORUBICIN HYDROCHLORIDE 2 MG/ML
60 INJECTION, SOLUTION INTRAVENOUS ONCE
Status: COMPLETED | OUTPATIENT
Start: 2025-04-09 | End: 2025-04-09

## 2025-04-09 RX ORDER — SODIUM CHLORIDE 9 MG/ML
20 INJECTION, SOLUTION INTRAVENOUS ONCE
Status: COMPLETED | OUTPATIENT
Start: 2025-04-09 | End: 2025-04-09

## 2025-04-09 RX ADMIN — DOXORUBICIN HYDROCHLORIDE 103 MG: 2 INJECTION, SOLUTION INTRAVENOUS at 14:38

## 2025-04-09 RX ADMIN — FOSAPREPITANT 150 MG: 150 INJECTION, POWDER, LYOPHILIZED, FOR SOLUTION INTRAVENOUS at 12:37

## 2025-04-09 RX ADMIN — CYCLOPHOSPHAMIDE 1000 MG: 1 INJECTION, POWDER, LYOPHILIZED, FOR SOLUTION INTRAVENOUS at 14:06

## 2025-04-09 RX ADMIN — SODIUM CHLORIDE 20 ML/HR: 9 INJECTION, SOLUTION INTRAVENOUS at 12:17

## 2025-04-09 RX ADMIN — DEXAMETHASONE SODIUM PHOSPHATE: 10 INJECTION, SOLUTION INTRAMUSCULAR; INTRAVENOUS at 12:16

## 2025-04-09 RX ADMIN — SODIUM CHLORIDE 200 MG: 9 INJECTION, SOLUTION INTRAVENOUS at 13:16

## 2025-04-09 NOTE — PROGRESS NOTES
Pt here for keytruda, cytoxan, and adriamycin. Pt tolerated tx well with no complaints at this time. Next appt 4/10 at 1530 at [facility]. Calendar provided.

## 2025-04-10 ENCOUNTER — HOSPITAL ENCOUNTER (OUTPATIENT)
Dept: INFUSION CENTER | Facility: CLINIC | Age: 55
Discharge: HOME | End: 2025-04-10
Payer: COMMERCIAL

## 2025-04-10 DIAGNOSIS — C50.911 CARCINOMA OF RIGHT BREAST METASTATIC TO AXILLARY LYMPH NODE (HCC): Primary | ICD-10-CM

## 2025-04-10 DIAGNOSIS — C77.3 CARCINOMA OF RIGHT BREAST METASTATIC TO AXILLARY LYMPH NODE (HCC): Primary | ICD-10-CM

## 2025-04-10 PROCEDURE — 96372 THER/PROPH/DIAG INJ SC/IM: CPT

## 2025-04-10 RX ADMIN — PEGFILGRASTIM-APGF 6 MG: 6 INJECTION, SOLUTION SUBCUTANEOUS at 15:01

## 2025-04-10 NOTE — PROGRESS NOTES
Patient here for nyvepria injection, given in RUE. Patient aware of next appointment 4/30 1000, declined AVS.

## 2025-04-28 ENCOUNTER — APPOINTMENT (OUTPATIENT)
Dept: LAB | Facility: CLINIC | Age: 55
End: 2025-04-28
Attending: INTERNAL MEDICINE
Payer: COMMERCIAL

## 2025-04-28 DIAGNOSIS — C77.3 CARCINOMA OF RIGHT BREAST METASTATIC TO AXILLARY LYMPH NODE (HCC): ICD-10-CM

## 2025-04-28 DIAGNOSIS — C50.911 CARCINOMA OF RIGHT BREAST METASTATIC TO AXILLARY LYMPH NODE (HCC): ICD-10-CM

## 2025-04-28 LAB
ALBUMIN SERPL BCG-MCNC: 4.3 G/DL (ref 3.5–5)
ALP SERPL-CCNC: 83 U/L (ref 34–104)
ALT SERPL W P-5'-P-CCNC: 15 U/L (ref 7–52)
ANION GAP SERPL CALCULATED.3IONS-SCNC: 9 MMOL/L (ref 4–13)
AST SERPL W P-5'-P-CCNC: 14 U/L (ref 13–39)
BASOPHILS # BLD AUTO: 0.07 THOUSANDS/ÂΜL (ref 0–0.1)
BASOPHILS NFR BLD AUTO: 1 % (ref 0–1)
BILIRUB SERPL-MCNC: 0.26 MG/DL (ref 0.2–1)
BUN SERPL-MCNC: 8 MG/DL (ref 5–25)
CALCIUM SERPL-MCNC: 9.4 MG/DL (ref 8.4–10.2)
CHLORIDE SERPL-SCNC: 103 MMOL/L (ref 96–108)
CO2 SERPL-SCNC: 31 MMOL/L (ref 21–32)
CREAT SERPL-MCNC: 0.54 MG/DL (ref 0.6–1.3)
EOSINOPHIL # BLD AUTO: 0.11 THOUSAND/ÂΜL (ref 0–0.61)
EOSINOPHIL NFR BLD AUTO: 2 % (ref 0–6)
ERYTHROCYTE [DISTWIDTH] IN BLOOD BY AUTOMATED COUNT: 14.6 % (ref 11.6–15.1)
GFR SERPL CREATININE-BSD FRML MDRD: 108 ML/MIN/1.73SQ M
GLUCOSE SERPL-MCNC: 89 MG/DL (ref 65–140)
HCT VFR BLD AUTO: 31.6 % (ref 34.8–46.1)
HGB BLD-MCNC: 10 G/DL (ref 11.5–15.4)
IMM GRANULOCYTES # BLD AUTO: 0.02 THOUSAND/UL (ref 0–0.2)
IMM GRANULOCYTES NFR BLD AUTO: 0 % (ref 0–2)
LYMPHOCYTES # BLD AUTO: 1.13 THOUSANDS/ÂΜL (ref 0.6–4.47)
LYMPHOCYTES NFR BLD AUTO: 22 % (ref 14–44)
MCH RBC QN AUTO: 33.2 PG (ref 26.8–34.3)
MCHC RBC AUTO-ENTMCNC: 31.6 G/DL (ref 31.4–37.4)
MCV RBC AUTO: 105 FL (ref 82–98)
MONOCYTES # BLD AUTO: 0.3 THOUSAND/ÂΜL (ref 0.17–1.22)
MONOCYTES NFR BLD AUTO: 6 % (ref 4–12)
NEUTROPHILS # BLD AUTO: 3.41 THOUSANDS/ÂΜL (ref 1.85–7.62)
NEUTS SEG NFR BLD AUTO: 69 % (ref 43–75)
NRBC BLD AUTO-RTO: 0 /100 WBCS
PLATELET # BLD AUTO: 327 THOUSANDS/UL (ref 149–390)
PMV BLD AUTO: 10.6 FL (ref 8.9–12.7)
POTASSIUM SERPL-SCNC: 3.6 MMOL/L (ref 3.5–5.3)
PROT SERPL-MCNC: 6.7 G/DL (ref 6.4–8.4)
RBC # BLD AUTO: 3.01 MILLION/UL (ref 3.81–5.12)
SODIUM SERPL-SCNC: 143 MMOL/L (ref 135–147)
TSH SERPL DL<=0.05 MIU/L-ACNC: 0.7 UIU/ML (ref 0.45–4.5)
WBC # BLD AUTO: 5.04 THOUSAND/UL (ref 4.31–10.16)

## 2025-04-28 PROCEDURE — 84443 ASSAY THYROID STIM HORMONE: CPT

## 2025-04-28 PROCEDURE — 36415 COLL VENOUS BLD VENIPUNCTURE: CPT

## 2025-04-28 PROCEDURE — 85025 COMPLETE CBC W/AUTO DIFF WBC: CPT

## 2025-04-28 PROCEDURE — 80053 COMPREHEN METABOLIC PANEL: CPT

## 2025-04-29 ENCOUNTER — OFFICE VISIT (OUTPATIENT)
Dept: HEMATOLOGY ONCOLOGY | Facility: CLINIC | Age: 55
End: 2025-04-29
Payer: COMMERCIAL

## 2025-04-29 VITALS
WEIGHT: 138 LBS | HEIGHT: 66 IN | DIASTOLIC BLOOD PRESSURE: 70 MMHG | RESPIRATION RATE: 18 BRPM | OXYGEN SATURATION: 98 % | TEMPERATURE: 97.6 F | HEART RATE: 90 BPM | SYSTOLIC BLOOD PRESSURE: 110 MMHG | BODY MASS INDEX: 22.18 KG/M2

## 2025-04-29 DIAGNOSIS — C50.911 BREAST CANCER METASTASIZED TO AXILLARY LYMPH NODE, RIGHT (HCC): ICD-10-CM

## 2025-04-29 DIAGNOSIS — C50.411 MALIGNANT NEOPLASM OF UPPER-OUTER QUADRANT OF RIGHT BREAST IN FEMALE, ESTROGEN RECEPTOR POSITIVE (HCC): Primary | ICD-10-CM

## 2025-04-29 DIAGNOSIS — C77.3 BREAST CANCER METASTASIZED TO AXILLARY LYMPH NODE, RIGHT (HCC): ICD-10-CM

## 2025-04-29 DIAGNOSIS — Z17.0 MALIGNANT NEOPLASM OF UPPER-OUTER QUADRANT OF RIGHT BREAST IN FEMALE, ESTROGEN RECEPTOR POSITIVE (HCC): Primary | ICD-10-CM

## 2025-04-29 PROCEDURE — 99215 OFFICE O/P EST HI 40 MIN: CPT | Performed by: INTERNAL MEDICINE

## 2025-04-29 RX ORDER — SODIUM CHLORIDE 9 MG/ML
20 INJECTION, SOLUTION INTRAVENOUS ONCE
Status: CANCELLED | OUTPATIENT
Start: 2025-04-30

## 2025-04-29 RX ORDER — DOXORUBICIN HYDROCHLORIDE 2 MG/ML
60 INJECTION, SOLUTION INTRAVENOUS ONCE
Status: CANCELLED | OUTPATIENT
Start: 2025-04-30

## 2025-04-29 NOTE — PROGRESS NOTES
Name: Romina Cleaning      : 1970      MRN: [id number]  Encounter Provider: Graciela Garcia DO  Encounter Date: 2025   Encounter department: [facility]  :  Assessment & Plan  Malignant neoplasm of upper-outer quadrant of right breast in female, estrogen receptor positive (HCC)    1.  cT1c cNx cM0 Right breast invasive lobular carcinoma.  Grade 2.  ER positive (>95%), DC positive (70%), HER2 negative by IHC.  MammaPrint: Ultralow risk (luminal A).     2.  cT0 cN1 cM0 Right axillary lymph node biopsy ER negative, DC negative, HER2 negative  3.  BRCA1/2 negative     Romina Cleaning is a 54 y.o. 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 She initially palpated a mass in her right breast which she brought to medical attention.  She underwent ultrasound-guided biopsy which was consistent with an invasive lobular carcinoma, ER positive, DC positive, HER2 negative.   MammaPrint resulted ultralow risk (luminal A). Right axillary lymph node biopsy showed infiltrating carcinoma which was ER negative, DC negative and HER2 negative.  Genetic testing for BRCA1/2. She has been evaluated by breast surgery (Dr. Philippe) and given the 2 different pathologies from the breast mass and axillary lymph node she underwent breast MRI on 10/23/2024 which redemonstrates a mass in the upper outer right breast consistent with the biopsy-proven invasive lobular carcinoma and right axillary lymphadenopathy.  Indeterminant 6 mm mass at the 5 o'clock position right breast for which ultrasound was recommended (if there is no ultrasound correlate MRI guided biopsy would be performed).  CT of the chest, abdomen, pelvis notes enlarged right axillary and subpectoral lymphadenopathy and no other suspicious  lesions.  Bone scan with no evidence of osseous metastasis. She is following with OB/GYN and had Mirena IUD removal.     She has 2 different pathologies (invasive lobular carcinoma, ER/IN positive, HER2 negative with a ultralow risk MammaPrint results as well as a triple negative carcinoma noted on axillary lymph node biopsy).  Bilateral breast MRI noted spiculated mass in the upper outer right breast consistent with biopsy-proven invasive lobular carcinoma as well as an indeterminate 6 mm mass at the 5 o'clock position for which further characterization with ultrasound was recommended.  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     She started neoadjuvant chemotherapy for triple negative, node positive carcinoma on 11/14/2024.  Unfortunately, patient with reaction to Taxol and subsequent treatment switched to Abraxane.     She may benefit from hormone therapy in the adjuvant setting for her ER/IN+ carcinoma. We will visit this decision after surgery.      Treatment plan: Keynote 522 regimen  Neoadjuvant pembrolizumab 200 mg day 1, Paclitaxel 80 mg/m2 day 1, Carboplatin AUC 1.5 on days 1, 8, 15 of a 21-day cycle for 4 cycles (11/14/2024-2/20/2025)  >> Switched paclitaxel to Abraxane 100 mg/m2 D1,8,15 after C1D8 given reaction to paclitaxel.   Then neoadjuvant pembrolizumab 200 mg, cyclophosphamide 600 mg/m2, doxorubicin 60 mg/m2 on day 1 of a 21-day cycle for 4 cycles.   Then adjuvant course of pembrolizumab 200 mg on day 1 of a 21-day cycle for 9 cycles pending final pathology review.      Summary:  Patient here for follow-up of cancer, consideration of chemotherapy and to review chemotherapy side effects.  She is undergoing high risk therapy.  Labs reviewed for toxicity assessment. She is undergoing neoadjuvant chemotherapy for triple negative, node positive carcinoma.  She has completed 4 cycles of treatment with pembrolizumab, paclitaxel and carboplatin.     She is currently receiving pembrolizumab, cyclophosphamide and doxorubicin (3/19/2025-present). Due for cycle 7 on 4/30/25. CBC/CMP reviewed. Last neoadjuvant chemotherapy due on 5/21/2025.  She will follow-up with surgery for surgical planning afterwards (message sent to surgery).  She has had a good clinical response.  Follow-up in 3 weeks.         Breast cancer metastasized to axillary lymph node, right (HCC)             Return for Office Visit, On tx visit, Infusion - See Treatment Plan.    History of Present Illness   Chief Complaint   Patient presents with    Follow-up     Oncology History   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 (HCC)  Staging form: Breast, AJCC 8th Edition  - Clinical stage from 9/20/2024: cT0, cN1(f), cM0, GX, ER-, VA-, HER2- - Signed by Jodie Philippe MD on 10/7/2024  Stage prefix: Initial diagnosis  Method of lymph node assessment: Core biopsy  Histologic grading system: 3 grade system    Malignant neoplasm of upper-outer quadrant of right breast in female, estrogen receptor positive (HCC)  Staging form: Breast, AJCC 8th Edition  - Clinical stage from 9/20/2024: Stage Unknown (cT1c, cNX, cM0, G2, ER+, VA+, HER2-) - Signed by Jodie Philippe MD on 10/7/2024  Stage prefix: Initial diagnosis  Histologic grading system: 3 grade system  Oncology History   Carcinoma of right breast metastatic to axillary lymph node (HCC)   6/11/2024 Initial Diagnosis    Carcinoma of right breast metastatic to axillary lymph node (HCC)     9/20/2024 -  Cancer Staged    Staging form: Breast, AJCC 8th Edition  - Clinical stage from 9/20/2024: cT0, cN1(f), cM0, GX, ER-, VA-, HER2- - Signed by Jodie Philippe MD on 10/7/2024  Stage prefix: Initial diagnosis  Method of lymph node assessment: Core biopsy  Histologic grading system: 3 grade system       11/14/2024 -  Chemotherapy    DOXOrubicin (ADRIAMYCIN), 60 mg/m2 = 103 mg, 2 of 4 cycles  Administration: 103 mg (3/19/2025), 103 mg  (4/9/2025)  cyclophosphamide (CYTOXAN) IVPB, 600 mg/m2 = 1,026 mg, 2 of 4 cycles  Administration: 1,000 mg (3/19/2025), 1,000 mg (4/9/2025)  paclitaxel protein-bound (ABRAXANE) IVPB, 100 mg/m2 = 171 mg (original dose ), 4 of 4 cycles  Dose modification: 100 mg/m2 (original dose 100 mg/m2, Cycle 1)  Administration: 171 mg (12/5/2024), 171 mg (12/12/2024), 171 mg (12/19/2024), 171 mg (1/9/2025), 171 mg (1/16/2025), 171 mg (1/23/2025), 171 mg (1/30/2025), 171 mg (2/7/2025), 171 mg (2/13/2025), 171 mg (2/20/2025)  CARBOplatin (PARAPLATIN) IVPB (GOG AUC DOSING), 180 mg, 4 of 4 cycles  Administration: 180 mg (11/14/2024), 180 mg (11/21/2024), 180 mg (12/5/2024), 180 mg (12/12/2024), 180 mg (12/19/2024), 180 mg (1/9/2025), 180 mg (1/16/2025), 180 mg (1/23/2025), 180 mg (1/30/2025), 180 mg (2/7/2025), 180 mg (2/13/2025), 180 mg (2/20/2025)  PACLItaxel (TAXOL) chemo IVPB, 80 mg/m2 = 136.8 mg, 1 of 1 cycle  Administration: 136.8 mg (11/14/2024), 136.8 mg (11/21/2024)  pembrolizumab (KEYTRUDA) IVPB, 200 mg, 6 of 17 cycles  Administration: 200 mg (11/14/2024), 200 mg (3/19/2025), 200 mg (12/12/2024), 200 mg (1/9/2025), 200 mg (1/30/2025), 200 mg (4/9/2025)     Malignant neoplasm of upper-outer quadrant of right breast in female, estrogen receptor positive (HCC)   9/20/2024 Biopsy    Right breast ultrasound-guided biopsy  A. 10 o'clock, 7 cm from nipple (MARTHA)  Invasive lobular carcinoma  Grade 2  ER >95, CO 70, HER2 0  Lymphovascular invasion not identified    B. Right axillary lymph node biopsy (MARTHA)  Infiltrating carcinoma  Although no definitive capsule is noted, it is favored to represent lymph node involvement by th e carcinoma  ER <1, CO <1, HER2 1+    Concordant. Malignancy appears unifocal; suspicious masses cover an area up to 1.8 cm. US of right axilla showed multiple enlarged, morphologically abnormal axillary lymph nodes with biopsy confirmed metastatic disease. Left breast clear.     9/20/2024 -  Cancer Staged     Staging form: Breast, AJCC 8th Edition  - Clinical stage from 9/20/2024: Stage Unknown (cT1c, cNX, cM0, G2, ER+, TX+, HER2-) - Signed by Jodie Philippe MD on 10/7/2024  Stage prefix: Initial diagnosis  Histologic grading system: 3 grade system       9/24/2024 Genomic Testing    MammaPrint/BluePrint ordered on breast specimen block A  Ultra-Low risk - luminal A     9/27/2024 Genetic Testing    Genetic testing with RNA analysis ordered  [place]        Pertinent Medical History   Romina Cleaning is a 54 y.o. postmenopausal female with PMHx significant for HTN who recently felt a mass in her right breast which she brought to medical attention.  She underwent ultrasound-guided biopsy which was consistent with an invasive lobular carcinoma, ER positive, TX positive, HER2 negative.  Right axillary lymph node biopsy showed infiltrating carcinoma which was ER negative, TX negative and HER2 negative.  MammaPrint resulted ultralow risk (luminal A), genetic testing pending.  She has been evaluated by breast surgery (Dr. Philippe) and given the 2 different pathologies from the breast mass and axillary lymph node she has been recommended a breast MRI to evaluate for an occult site.  CT of the chest, abdomen, pelvis notes enlarged right axillary and subpectoral lymphadenopathy and no other suspicious lesions.  Bone scan with no evidence of osseous metastasis.  Patient presents for initial medical oncology consultation.  Patient's case was submitted to be discussed at the multidisciplinary breast tumor board on 10/21/2024.  She is following with OB/GYN and had Mirena IUD removal. Pt presents for initial medical oncology consultation accompanied by her son and sister for visit.  She denies any chest pain, palpitations, bone pain or other complaints.     OB/GYN history:  G2, P2  Menarche: 15 years old  Menopause: in mid 40's  Age of first pregnancy: 29 years old     Maternal grandmother, maternal uncle and mother with colon cancer.  Maternal grandfather with throat cancer (smoker).      Patient declined , instead preferred to have her sister and son translate.     Interval History:   Accompanied by sister, son and mother.  Her case was discussed in the multidisciplinary tumor board in the interim.  Recommendations for neoadjuvant chemotherapy to treat node positive, triple negative disease.  Patient offers no complaints today.     Interval History 11/21/24:   Patient presents for urgent visit.  She started treatment on 11/14/2024, but had a reaction to Taxol on day 1 and 8 requiring further treatment to be held.  Patient noted experiencing facial flushing, abdominal cramping (9/10) and Taxol infusion stopped requiring hypersensitivity protocol.  Patient currently denies any chest pain, palpitations, respiratory symptoms.    02/04/25:   Accompanied by mother for visit today.  Continues to receive neoadjuvant chemotherapy which she is tolerating well.  Denies any respiratory symptoms or bone pain.  Notes improvement in her breast mass.  Denies neuropathy.    02/25/25:     Burned finger yesterday (3rd digit of right hand) while cooking farina. Now with blister.     03/18/25:   Patient presents for follow-up visit accompanied by her daughter. Recent infection with influenza A.  She is recovering well and notices intermittent nonproductive cough.  Offers no new breast complaints.    04/08/25:   Patient presents for follow-up visit accompanied by her mother.    04/29/25:   Patient continues to do well overall.  Tolerating neoadjuvant chemotherapy.  She has had a good clinical response.  Next cycle of chemotherapy is due 4/30/2025.  She is due for her last cycle on 5/21/25.  She will follow-up with breast surgery for surgical planning.     Review of Systems   Constitutional:  Positive for fatigue. Negative for chills and fever.   HENT: Negative.     Respiratory:  Negative for cough and shortness of breath.    Cardiovascular:  Negative for  "chest pain and palpitations.   Gastrointestinal:  Negative for abdominal pain.   Genitourinary:  Negative for hematuria.   Skin:  Negative for rash.   Hematological:  Does not bruise/bleed easily.   All other systems reviewed and are negative.          Objective   /70 (BP Location: Left arm, Patient Position: Sitting, Cuff Size: Adult)   Pulse 90   Temp 97.6 °F (36.4 °C)   Resp 18   Ht 5' 6\" (1.676 m)   Wt 62.6 kg (138 lb)   LMP  (LMP Unknown) Comment: IUD  SpO2 98%   BMI 22.27 kg/m²     Pain Screening:  Pain Score: 0-No pain  ECOG   1  Physical Exam  Vitals reviewed.   Constitutional:       General: She is not in acute distress.     Appearance: Normal appearance.   HENT:      Head: Normocephalic and atraumatic.      Mouth/Throat:      Mouth: Mucous membranes are moist.      Comments: No oral thrush  Eyes:      Extraocular Movements: Extraocular movements intact.      Conjunctiva/sclera: Conjunctivae normal.   Cardiovascular:      Rate and Rhythm: Normal rate.   Pulmonary:      Effort: Pulmonary effort is normal. No respiratory distress.      Breath sounds: Normal breath sounds.   Chest:      Comments: Right breast: Decreased mass. No skin erythema/thickening.  No nipple inversion.       Left breast: Negative exam    Abdominal:      General: Abdomen is flat. There is no distension.      Palpations: Abdomen is soft.   Musculoskeletal:      Cervical back: Normal range of motion and neck supple.      Right lower leg: No edema.      Left lower leg: No edema.   Skin:     General: Skin is warm.      Coloration: Skin is not pale.   Neurological:      Mental Status: She is alert and oriented to person, place, and time. Mental status is at baseline.      Cranial Nerves: No cranial nerve deficit.   Psychiatric:         Thought Content: Thought content normal.         Labs: I have reviewed the following labs:  Lab Results   Component Value Date/Time    WBC 5.04 04/28/2025 09:16 AM    RBC 3.01 (L) 04/28/2025 09:16 " AM    Hemoglobin 10.0 (L) 04/28/2025 09:16 AM    Hematocrit 31.6 (L) 04/28/2025 09:16 AM     (H) 04/28/2025 09:16 AM    MCH 33.2 04/28/2025 09:16 AM    RDW 14.6 04/28/2025 09:16 AM    Platelets 327 04/28/2025 09:16 AM    Segmented % 69 04/28/2025 09:16 AM    Lymphocytes % 22 04/28/2025 09:16 AM    Monocytes % 6 04/28/2025 09:16 AM    Eosinophils Relative 2 04/28/2025 09:16 AM    Basophils Relative 1 04/28/2025 09:16 AM    Immature Grans % 0 04/28/2025 09:16 AM    Absolute Neutrophils 3.41 04/28/2025 09:16 AM     Lab Results   Component Value Date/Time    Potassium 3.6 04/28/2025 09:16 AM    Chloride 103 04/28/2025 09:16 AM    CO2 31 04/28/2025 09:16 AM    BUN 8 04/28/2025 09:16 AM    Creatinine 0.54 (L) 04/28/2025 09:16 AM    Glucose, Fasting 110 (H) 03/18/2025 09:18 AM    Calcium 9.4 04/28/2025 09:16 AM    Corrected Calcium 9.9 01/21/2025 09:45 AM    AST 14 04/28/2025 09:16 AM    ALT 15 04/28/2025 09:16 AM    Alkaline Phosphatase 83 04/28/2025 09:16 AM    Total Protein 6.7 04/28/2025 09:16 AM    Albumin 4.3 04/28/2025 09:16 AM    Total Bilirubin 0.26 04/28/2025 09:16 AM    eGFR 108 04/28/2025 09:16 AM

## 2025-04-29 NOTE — ASSESSMENT & PLAN NOTE
1.  cT1c cNx cM0 Right breast invasive lobular carcinoma.  Grade 2.  ER positive (>95%), DE positive (70%), HER2 negative by IHC.  MammaPrint: Ultralow risk (luminal A).     2.  cT0 cN1 cM0 Right axillary lymph node biopsy ER negative, DE negative, HER2 negative  3.  BRCA1/2 negative     Romina Cleaning is a 54 y.o. 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 She initially palpated a mass in her right breast which she brought to medical attention.  She underwent ultrasound-guided biopsy which was consistent with an invasive lobular carcinoma, ER positive, DE positive, HER2 negative.   MammaPrint resulted ultralow risk (luminal A). Right axillary lymph node biopsy showed infiltrating carcinoma which was ER negative, DE negative and HER2 negative.  Genetic testing for BRCA1/2. She has been evaluated by breast surgery (Dr. Philippe) and given the 2 different pathologies from the breast mass and axillary lymph node she underwent breast MRI on 10/23/2024 which redemonstrates a mass in the upper outer right breast consistent with the biopsy-proven invasive lobular carcinoma and right axillary lymphadenopathy.  Indeterminant 6 mm mass at the 5 o'clock position right breast for which ultrasound was recommended (if there is no ultrasound correlate MRI guided biopsy would be performed).  CT of the chest, abdomen, pelvis notes enlarged right axillary and subpectoral lymphadenopathy and no other suspicious lesions.  Bone scan with no evidence of osseous metastasis. She is following with OB/GYN and had Mirena IUD removal.     She has 2 different pathologies (invasive lobular carcinoma, ER/DE positive, HER2 negative with a ultralow risk MammaPrint results as well as a triple negative carcinoma noted on axillary lymph node biopsy).  Bilateral breast  MRI noted spiculated mass in the upper outer right breast consistent with biopsy-proven invasive lobular carcinoma as well as an indeterminate 6 mm mass at the 5 o'clock position for which further characterization with ultrasound was recommended.  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     She started neoadjuvant chemotherapy for triple negative, node positive carcinoma on 11/14/2024.  Unfortunately, patient with reaction to Taxol and subsequent treatment switched to Abraxane.     She may benefit from hormone therapy in the adjuvant setting for her ER/GA+ carcinoma. We will visit this decision after surgery.      Treatment plan: Keynote 522 regimen  Neoadjuvant pembrolizumab 200 mg day 1, Paclitaxel 80 mg/m2 day 1, Carboplatin AUC 1.5 on days 1, 8, 15 of a 21-day cycle for 4 cycles (11/14/2024-2/20/2025)  >> Switched paclitaxel to Abraxane 100 mg/m2 D1,8,15 after C1D8 given reaction to paclitaxel.   Then neoadjuvant pembrolizumab 200 mg, cyclophosphamide 600 mg/m2, doxorubicin 60 mg/m2 on day 1 of a 21-day cycle for 4 cycles.   Then adjuvant course of pembrolizumab 200 mg on day 1 of a 21-day cycle for 9 cycles pending final pathology review.      Summary:  Patient here for follow-up of cancer, consideration of chemotherapy and to review chemotherapy side effects.  She is undergoing high risk therapy.  Labs reviewed for toxicity assessment. She is undergoing neoadjuvant chemotherapy for triple negative, node positive carcinoma.  She has completed 4 cycles of treatment with pembrolizumab, paclitaxel and carboplatin.    She is currently receiving pembrolizumab, cyclophosphamide and doxorubicin (3/19/2025-present). Due for cycle 7 on 4/30/25. CBC/CMP reviewed. Last neoadjuvant chemotherapy due on 5/21/2025.  She will follow-up with surgery for surgical planning afterwards (message sent to surgery).  She has had a good clinical response.  Follow-up in 3 weeks.

## 2025-04-30 ENCOUNTER — HOSPITAL ENCOUNTER (OUTPATIENT)
Dept: INFUSION CENTER | Facility: CLINIC | Age: 55
Discharge: HOME | End: 2025-04-30
Payer: COMMERCIAL

## 2025-04-30 VITALS
TEMPERATURE: 98.2 F | HEART RATE: 85 BPM | SYSTOLIC BLOOD PRESSURE: 94 MMHG | OXYGEN SATURATION: 98 % | WEIGHT: 140 LBS | DIASTOLIC BLOOD PRESSURE: 63 MMHG | HEIGHT: 66 IN | RESPIRATION RATE: 18 BRPM | BODY MASS INDEX: 22.5 KG/M2

## 2025-04-30 DIAGNOSIS — C50.911 CARCINOMA OF RIGHT BREAST METASTATIC TO AXILLARY LYMPH NODE (HCC): Primary | ICD-10-CM

## 2025-04-30 DIAGNOSIS — C77.3 CARCINOMA OF RIGHT BREAST METASTATIC TO AXILLARY LYMPH NODE (HCC): Primary | ICD-10-CM

## 2025-04-30 PROCEDURE — 96367 TX/PROPH/DG ADDL SEQ IV INF: CPT

## 2025-04-30 PROCEDURE — 96411 CHEMO IV PUSH ADDL DRUG: CPT

## 2025-04-30 PROCEDURE — 96413 CHEMO IV INFUSION 1 HR: CPT

## 2025-04-30 PROCEDURE — 96417 CHEMO IV INFUS EACH ADDL SEQ: CPT

## 2025-04-30 RX ORDER — DOXORUBICIN HYDROCHLORIDE 2 MG/ML
60 INJECTION, SOLUTION INTRAVENOUS ONCE
Status: COMPLETED | OUTPATIENT
Start: 2025-04-30 | End: 2025-04-30

## 2025-04-30 RX ORDER — SODIUM CHLORIDE 9 MG/ML
20 INJECTION, SOLUTION INTRAVENOUS ONCE
Status: COMPLETED | OUTPATIENT
Start: 2025-04-30 | End: 2025-04-30

## 2025-04-30 RX ADMIN — SODIUM CHLORIDE 200 MG: 9 INJECTION, SOLUTION INTRAVENOUS at 11:07

## 2025-04-30 RX ADMIN — DEXAMETHASONE SODIUM PHOSPHATE: 10 INJECTION, SOLUTION INTRAMUSCULAR; INTRAVENOUS at 10:09

## 2025-04-30 RX ADMIN — FOSAPREPITANT 150 MG: 150 INJECTION, POWDER, LYOPHILIZED, FOR SOLUTION INTRAVENOUS at 10:30

## 2025-04-30 RX ADMIN — DOXORUBICIN HYDROCHLORIDE 103 MG: 2 INJECTION, SOLUTION INTRAVENOUS at 12:29

## 2025-04-30 RX ADMIN — SODIUM CHLORIDE 20 ML/HR: 0.9 INJECTION, SOLUTION INTRAVENOUS at 10:09

## 2025-04-30 RX ADMIN — CYCLOPHOSPHAMIDE 1000 MG: 1 INJECTION, POWDER, FOR SOLUTION INTRAVENOUS; ORAL at 11:58

## 2025-04-30 NOTE — PROGRESS NOTES
Patient presents to the Infusion Center for the treatment of Keytruda, Cytoxan, and Adriamycin. She offers no concerns at this time. Labs from 04/28/2025 reviewed and within parameters for treatment. EF at 65% from 11/13/2024. Vital signs stable. Port accessed with good blood return. Patient is resting comfortably in the chair, call bell within reach.

## 2025-05-01 ENCOUNTER — HOSPITAL ENCOUNTER (OUTPATIENT)
Dept: INFUSION CENTER | Facility: CLINIC | Age: 55
Discharge: HOME | End: 2025-05-01
Payer: COMMERCIAL

## 2025-05-01 VITALS — TEMPERATURE: 98.6 F

## 2025-05-01 DIAGNOSIS — C77.3 CARCINOMA OF RIGHT BREAST METASTATIC TO AXILLARY LYMPH NODE (HCC): Primary | ICD-10-CM

## 2025-05-01 DIAGNOSIS — C50.911 CARCINOMA OF RIGHT BREAST METASTATIC TO AXILLARY LYMPH NODE (HCC): Primary | ICD-10-CM

## 2025-05-01 PROCEDURE — 96372 THER/PROPH/DIAG INJ SC/IM: CPT

## 2025-05-01 RX ADMIN — PEGFILGRASTIM-APGF 6 MG: 6 INJECTION, SOLUTION SUBCUTANEOUS at 13:31

## 2025-05-01 NOTE — PROGRESS NOTES
Patient to Infusion Center for [employer] injection. Patient offers no complaints at this time. Patient afebrile. Injection administered into R arm- tolerated without incident. Next appt confirmed with patient for 5/21 at 12:30 at [facility]. AVS declined.

## 2025-05-02 ENCOUNTER — OFFICE VISIT (OUTPATIENT)
Dept: DENTISTRY | Facility: CLINIC | Age: 55
End: 2025-05-02

## 2025-05-02 VITALS — SYSTOLIC BLOOD PRESSURE: 109 MMHG | DIASTOLIC BLOOD PRESSURE: 71 MMHG | HEART RATE: 93 BPM

## 2025-05-02 DIAGNOSIS — Z01.20 ENCOUNTER FOR DENTAL EXAMINATION: Primary | ICD-10-CM

## 2025-05-02 PROCEDURE — D0210 INTRAORAL - COMPLETE SERIES OF RADIOGRAPHIC IMAGES: HCPCS

## 2025-05-02 PROCEDURE — D0603 CARIES RISK ASSESSMENT AND DOCUMENTATION, WITH A FINDING OF HIGH RISK: HCPCS

## 2025-05-04 NOTE — PROGRESS NOTES
.Intra oral full mouth series and started comprehensive Exam  Maltese Interpretor: Samir Max: [id number]  Romina Cleaning 54 y.o. female presents with self to [facility] for comprehensive exam.  PMH reviewed, no changes, ASA III. Significant medical history: smoker, HTN, mitral regurgitation, carcinoma of right breast metastatic to axillary, sepsis with acute organ dysfunction-2024, chemotherapy induced neutropenia, patient is currently undergoing chemotherpy infusions for breast cancer. Significant allergies: none. Significant medications: chemotherapy infusions.  Pain level 0/10    Chief complaint:   Patient is here for a routine checkup-she has been waiting awhile to get this appointment    Consent:  Reviewed procedures involved with comprehensive exam including radiographs, oral exam, and periodontal probing.   Patient understands and consent was given by self via verbal consent.    Radiographs: FMX.    Oral cancer screening: normal.  Extraoral exam: no remarkable findings.  Intraoral exam: significantly sized caries, gingival inflammation, severe bone loss, mobile teeth, missing teeth .    Periodontal exam:  Hygiene - Poor.  Plaque - Moderate.  Horizontal bone loss -  UR: Severe (>33%).  UL: Severe (>33%).  LL: Severe (>33%).  LR: Severe (>33%).  Vertical bone loss - #32 .  Subgingival calculus - Generalized.  Need to perio probe at next apt- patient should be fine due to no heart problems    Caries exam:   Caries detected: #1-D, #6-DL, #9-MIDFL, #14-M, #16-M, #18-M, #32-M  Teeth with elevated chance of needing RCT: None  Likely nonrestorable teeth: #1, 16, 18, 32    Occlusal assessment:  VDO / restorative space - Collapsed.  AP Classification -  Unable to be determined  Overbite/overjet - edge to edge anterior occlusion  Supra-eruptions or drifting - #1, 16, 18, 32 are mesially drifted .  Crossbites - None.  Recommended for orthodontic referral? No.  Recommended for orthodontic consult at [facility]? No.    Other  remarkable findings:  Grade 2 mobility: #1, 32, 8, 18  Grade 1 mobility: #23, 24, 25    Tx plan:  Separate tx planning appt needed due to complexity of pt's needs..    Referral(s): None needed.  Rx: None.  Recommended recall schedule: 3 months.    Case difficulty assessment:   Criteria    Cumulative level   Medical History ASA I ASA II ASA III-V 3   Anesthesia No history of anesthesia problems Vasoconstrictor Intolerance History of difficulty achieving anesthesia 1   Patient Disposition Cooperative and compliant Anxious but cooperative Uncooperatie 2   Ability to Open Mouth No limitation Slight limitation in opening Significant limitation in opening 2   Gag Reflex None Gags occasionally with radiographs/treatment Extreme gag reflex which has compromised past dental treatment 1   Emergency Condition Minimal pain or swelling Moderate pain or swelling Severe pain or swelling 3   Caries Risk 1 to 3 Proximal Cavities 4 to 6 Proximal Cavities >6 Proximal Cavities 3   Missing Teeth No missing teeth 1-3 Non-canine Missing teeth >3 Missing teeth or missing any canine 3   Periodontal Stage Healthy Stage 1 or 2  Stage 3 or 4 3   Periodontal Grade Grade A Grade B Grade C 3   Diagnosis Signs and Symptoms consistent with recognized pulpal and periodontal conditions Extensive differential diagnosis of usual signs and symptoms required Confusing and complex signs and symptoms: difficult diagnosis.  History of chronic oral/facial pain. 1   Radiographic Difficulties Minimal Difficulty Obtaining/Interpreting Radiographs Moderate Difficulty: high floor of mouth, narrow or low palate, matt, etc. Extreme Difficulty: Superimposed anatomic structures, etc. 1   Teeth Position Anterior/Premolar  Slight inclination <10o  Slight Rotation <10o 1st molar   Mod. Inclination 10o-30o  Mod. Rotation 10o-30o 2nd or 3rd molar   Extr. Incliniation >30o  Extr. Rotation <30o 2   Teeth Isolation Routine rubber dam placement  Simple pretreatment  modification for rubber dam placement Extensive pretreatment modification required for rubber dam isolation. 1   Teeth Morphology Normal original crown morphology.  Roots have slight to no curvature (<10o)  Closed apex <1mm in diameter  Canals visible and not reduced in size.  No Root resorption. Full coverage Restorations, Porcelain restorations, Bridge abutments.  Moderate deviation from normal tooth/root form (taurodontism, dens-in-dente, etc.).  Roots have moderate curvature (10-30o).  Crown axis differs moderately from root axis.  Apical opening 1-1.5mm in diameter. Canals and chambers visible but reduced in size, pulp stones.  Minimal apical root resorption. Restorations do not reflect original anatomy/alignment.  Significant deviation from normal tooth/root form (fusion, gemination, letitia cusps, etc.).  Extreme root curvature (>30o) or S-shaped curve.  Anterior tooth or Mandibular Premolar with 2 roots.  Maxillary premolar with 3 roots.  Canal divides in middle or apical 3rd.  Tooth length >25mm.  Open apex >1.5mm.  Indistinct canals or not visible.  Extensive apical, internal, or external resorption. 2   Malocclusion Class I Corrected (dental or skeletal) Class II or III Non-Corrected Class II or III 3   Alveolar Ridge Morphology None to Mild Atrophy Moderate Atrophy Severe Atrophy 2   Occlusal Stability Requirements Stable and equal intensity stops on all teeth in centric relation  Anterior guidance is in harmony with the envelope of function.  All posterior teeth disclude during mandibular protrusive movement.  All posterior teeth disclude on the non-working side during mandibular lateral movement.  All posterior teeth disclude on the working side during mandibular lateral movement. 1 to 2 requirements are compromised 3 to 5 requirements are compromised 3   Post Falls to Occlusal Stability Correct interarch relationships  Correct crown angulation (tip).  Correct crown inclination (torque)  No rotations.  Tight  contact points. 1 to 2 keys are compromised 3 to 5 Keys are compromiseed 3   Case complexity rating = Cumulative level / (60 x 100) = 70 => High    Discussed with patient that teeth 1, 16, 18, 32 need to be extracted due to periodontal hopeless prognosis. I told her that we will have to complete the exam at the next visit and come up with a definitive plan. Would probably be a good idea to hold onto those teeth to keep VDO and do restorative work and then plan to make her interim maxillary and mandibular partial dentures when extracting teeth 1, 16, 18, 32    Patient dismissed ambulatory and alert.    NV: finish comprehensive exam - perio probe    Attending: Dr. Perez checked radiographs .

## 2025-05-14 ENCOUNTER — DOCUMENTATION (OUTPATIENT)
Dept: HEMATOLOGY ONCOLOGY | Facility: CLINIC | Age: 55
End: 2025-05-14

## 2025-05-14 ENCOUNTER — OFFICE VISIT (OUTPATIENT)
Dept: SURGICAL ONCOLOGY | Facility: CLINIC | Age: 55
End: 2025-05-14
Payer: COMMERCIAL

## 2025-05-14 VITALS
HEIGHT: 66 IN | HEART RATE: 94 BPM | SYSTOLIC BLOOD PRESSURE: 112 MMHG | TEMPERATURE: 98 F | OXYGEN SATURATION: 98 % | BODY MASS INDEX: 22.6 KG/M2 | DIASTOLIC BLOOD PRESSURE: 70 MMHG

## 2025-05-14 DIAGNOSIS — C50.811 MALIGNANT NEOPLASM OF OVERLAPPING SITES OF RIGHT BREAST IN FEMALE, ESTROGEN RECEPTOR POSITIVE (HCC): Primary | ICD-10-CM

## 2025-05-14 DIAGNOSIS — C77.3 CARCINOMA OF RIGHT BREAST METASTATIC TO AXILLARY LYMPH NODE (HCC): ICD-10-CM

## 2025-05-14 DIAGNOSIS — Z17.0 MALIGNANT NEOPLASM OF OVERLAPPING SITES OF RIGHT BREAST IN FEMALE, ESTROGEN RECEPTOR POSITIVE (HCC): Primary | ICD-10-CM

## 2025-05-14 DIAGNOSIS — C50.911 CARCINOMA OF RIGHT BREAST METASTATIC TO AXILLARY LYMPH NODE (HCC): ICD-10-CM

## 2025-05-14 PROCEDURE — 99215 OFFICE O/P EST HI 40 MIN: CPT | Performed by: SURGERY

## 2025-05-14 RX ORDER — ENOXAPARIN SODIUM 300 MG/3ML
40 INJECTION INTRAVENOUS; SUBCUTANEOUS ONCE
OUTPATIENT
Start: 2025-05-14 | End: 2025-05-14

## 2025-05-14 NOTE — PROGRESS NOTES
Referral to rad onc received from Dr. Philippe.  Chart reviewed by oncology nurse navigator at this time.      Diagnosis: breast cancer  After review of chart, instructions for scheduling added to referral and sent to be scheduled as advised.

## 2025-05-14 NOTE — PROGRESS NOTES
Name: Romina Cleaning      : 1970      MRN: [id number]  Encounter Provider: Jodie Philippe MD  Encounter Date: 2025   Encounter department: [facility] [place]  :  Assessment & Plan  Carcinoma of right breast metastatic to axillary lymph node (HCC)    Orders:    Case request operating room: RIGHT MODIFIED RADICAL MASTECTOMY; Standing    CBC and differential; Future    Ambulatory Referral to Radiation Oncology; Future    Malignant neoplasm of overlapping sites of right breast in female, estrogen receptor positive (HCC)    Orders:    Ambulatory Referral to Plastic Surgery; Future    Ambulatory referral to PT/OT lymphedema therapy; Future    Case request operating room: RIGHT MODIFIED RADICAL MASTECTOMY; Standing    UA w Reflex to Microscopic w Reflex to Culture; Future    Comprehensive metabolic panel; Future    CBC and differential; Future    Ambulatory Referral to Radiation Oncology; Future    54-year-old female who presented with right breast invasive lobular carcinoma ER/NE positive and HER2 negative.  She had multiple enlarged at least level 1 and 2 axillary nodes which were triple negative in status.  Breast MRI showed another focus in the right breast that was biopsied.  This was also hormone positive.  The initial biopsy was done in the 10:00 axis of the right breast.  The second was at the 5:00 axis of the right breast.  She therefore was counseled on a right mastectomy along with axillary node dissection.  I am referring her to lymphedema therapy preoperatively.  She is interested in reconstruction.  She has successfully quit smoking as of February of this year.  I will therefore refer her to plastic surgery.  She understands that she will also need radiation therapy given the extensive yoseph involvement.  All of her questions were answered.  Consent was signed today in the office.  I will coordinate surgery with plastics pending her consultation with  them.      History of Present Illness   Romina Cleaning is a 54 y.o. year old female who presents to discuss surgical management of her right breast carcinoma.  She is completing neoadjuvant chemotherapy next week.  After my last encounter with her she did have an additional biopsy in the 5:00 axis of the right breast revealing malignancy as well.  She also had staging scans that did not show systemic disease; however, multiple enlarged axillary and subpectoral lymph nodes were identified.  The patient states that she is no longer smoking.    Oncology History   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 (HCC)  Staging form: Breast, AJCC 8th Edition  - Clinical stage from 9/20/2024: cT0, cN1(f), cM0, GX, ER-, CT-, HER2- - Signed by Jodie Philippe MD on 10/7/2024  Stage prefix: Initial diagnosis  Method of lymph node assessment: Core biopsy  Histologic grading system: 3 grade system    Malignant neoplasm of overlapping sites of right breast in female, estrogen receptor positive (HCC)  Staging form: Breast, AJCC 8th Edition  - Clinical stage from 9/20/2024: Stage IB (cT1c, cN1, cM0, G2, ER+, CT+, HER2-) - Signed by Jodie Philippe MD on 5/14/2025  Stage prefix: Initial diagnosis  Histologic grading system: 3 grade system  Oncology History   Carcinoma of right breast metastatic to axillary lymph node (HCC)   6/11/2024 Initial Diagnosis    Carcinoma of right breast metastatic to axillary lymph node (HCC)     9/20/2024 -  Cancer Staged    Staging form: Breast, AJCC 8th Edition  - Clinical stage from 9/20/2024: cT0, cN1(f), cM0, GX, ER-, CT-, HER2- - Signed by Jodie Philippe MD on 10/7/2024  Stage prefix: Initial diagnosis  Method of lymph node assessment: Core biopsy  Histologic grading system: 3 grade system       10/14/2024 Observation    CT CAP:  1. Enlarged right axillary and subpectoral lymphadenopathy correlating to known history of breast cancer. No pulmonary nodules  No  mediastinal or hilar lymphadenopathy. No contralateral axillary lymphadenopathy. No intra-abdominal metastatic disease. No adrenal or hepatic metastatic lesions. No suspicious osseous lesions     2. Stable right hepatic lobe hemangioma and cyst.     3. Stable prominent central mesenteric lymph nodes unchanged since 2018 unrelated to patient's breast cancer.    NM BONE SCAN:    1. No scintigraphic evidence of osseous metastasis.      11/14/2024 -  Chemotherapy    DOXOrubicin (ADRIAMYCIN), 60 mg/m2 = 103 mg, 3 of 4 cycles  Administration: 103 mg (3/19/2025), 103 mg (4/9/2025), 103 mg (4/30/2025)  cyclophosphamide (CYTOXAN) IVPB, 600 mg/m2 = 1,026 mg, 3 of 4 cycles  Administration: 1,000 mg (3/19/2025), 1,000 mg (4/9/2025), 1,000 mg (4/30/2025)  paclitaxel protein-bound (ABRAXANE) IVPB, 100 mg/m2 = 171 mg (original dose ), 4 of 4 cycles  Dose modification: 100 mg/m2 (original dose 100 mg/m2, Cycle 1)  Administration: 171 mg (12/5/2024), 171 mg (12/12/2024), 171 mg (12/19/2024), 171 mg (1/9/2025), 171 mg (1/16/2025), 171 mg (1/23/2025), 171 mg (1/30/2025), 171 mg (2/7/2025), 171 mg (2/13/2025), 171 mg (2/20/2025)  CARBOplatin (PARAPLATIN) IVPB (GO AUC DOSING), 180 mg, 4 of 4 cycles  Administration: 180 mg (11/14/2024), 180 mg (11/21/2024), 180 mg (12/5/2024), 180 mg (12/12/2024), 180 mg (12/19/2024), 180 mg (1/9/2025), 180 mg (1/16/2025), 180 mg (1/23/2025), 180 mg (1/30/2025), 180 mg (2/7/2025), 180 mg (2/13/2025), 180 mg (2/20/2025)  PACLItaxel (TAXOL) chemo IVPB, 80 mg/m2 = 136.8 mg, 1 of 1 cycle  Administration: 136.8 mg (11/14/2024), 136.8 mg (11/21/2024)  pembrolizumab (KEYTRUDA) IVPB, 200 mg, 7 of 17 cycles  Administration: 200 mg (11/14/2024), 200 mg (3/19/2025), 200 mg (12/12/2024), 200 mg (1/9/2025), 200 mg (1/30/2025), 200 mg (4/9/2025), 200 mg (4/30/2025)     Malignant neoplasm of overlapping sites of right breast in female, estrogen receptor positive (HCC)   9/20/2024 Biopsy    Right breast  ultrasound-guided biopsy  A. 10 o'clock, 7 cm from nipple (MARTHA)  Invasive lobular carcinoma  Grade 2  ER >95, HI 70, HER2 0  Lymphovascular invasion not identified    B. Right axillary lymph node biopsy (MARTHA)  Infiltrating carcinoma  Although no definitive capsule is noted, it is favored to represent lymph node involvement by th e carcinoma  ER <1, HI <1, HER2 1+    Concordant. Malignancy appears unifocal; suspicious masses cover an area up to 1.8 cm. US of right axilla showed multiple enlarged, morphologically abnormal axillary lymph nodes with biopsy confirmed metastatic disease. Left breast clear.     9/20/2024 -  Cancer Staged    Staging form: Breast, AJCC 8th Edition  - Clinical stage from 9/20/2024: Stage IB (cT1c, cN1, cM0, G2, ER+, HI+, HER2-) - Signed by Jodie Philippe MD on 5/14/2025  Stage prefix: Initial diagnosis  Histologic grading system: 3 grade system       9/24/2024 Genomic Testing    MammaPrint/BluePrint ordered on breast specimen block A  Ultra-Low risk - luminal A     9/27/2024 Genetic Testing    Genetic testing with RNA analysis ordered  [place]    NEGATIVE     10/14/2024 Observation    CT CAP:  1. Enlarged right axillary and subpectoral lymphadenopathy correlating to known history of breast cancer. No pulmonary nodules  No mediastinal or hilar lymphadenopathy. No contralateral axillary lymphadenopathy. No intra-abdominal metastatic disease. No adrenal or hepatic metastatic lesions. No suspicious osseous lesions     2. Stable right hepatic lobe hemangioma and cyst.     3. Stable prominent central mesenteric lymph nodes unchanged since 2018 unrelated to patient's breast cancer.    NM BONE SCAN:    1. No scintigraphic evidence of osseous metastasis.      10/23/2024 Observation    B/L Breast MRI    IMPRESSION:   Redemonstration of a spiculated mass in the upper outer right breast in keeping with biopsy-proven invasive lobular carcinoma and bulky right axillary lymphadenopathy in keeping with  metastatic nodes.  Indeterminate 6 mm mass at the 5 o'clock position right breast for which further characterization with targeted ultrasound is recommended.  If there is no ultrasound correlate MRI guided biopsy could be performed.  No suspicious enhancement in the left breast.       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 No definitive invasion carcinoma present on examined sections.  ER , ME   8mm        Review of Systems   Constitutional: Negative.  Negative for appetite change, fever and unexpected weight change.   HENT: Negative.  Negative for trouble swallowing.    Eyes: Negative.    Respiratory: Negative.  Negative for cough and shortness of breath.    Cardiovascular: Negative.  Negative for chest pain.   Gastrointestinal: Negative.  Negative for abdominal pain, nausea and vomiting.   Endocrine: Negative.    Genitourinary: Negative.  Negative for dysuria.   Musculoskeletal: Negative.  Negative for arthralgias and myalgias.   Skin: Negative.    Allergic/Immunologic: Negative.    Neurological: Negative.  Negative for headaches.   Hematological: Negative.  Negative for adenopathy. Does not bruise/bleed easily.   Psychiatric/Behavioral: Negative.      A complete review of systems is negative other than that noted above in the HPI.    Past Medical History   Past Medical History:   Diagnosis Date    GERD (gastroesophageal reflux disease)     Headache     Hematuria     Hypertension     IUD (intrauterine device) in place 02/15/2019    Mirena placed 2/2015 effective through 2/2020      Lateral epicondylitis, right elbow 01/08/2019    Panic attacks     Psychiatric disorder      Past Surgical History:   Procedure Laterality Date    BREAST BIOPSY Right 09/20/2024    BREAST BIOPSY Right 09/20/2024    BREAST BIOPSY Right 11/01/2024    CYSTOSCOPY  06/08/2018    IR PORT PLACEMENT  11/6/2024    NO PAST SURGERIES      US  GUIDED BREAST BIOPSY RIGHT COMPLETE Right 09/20/2024    US GUIDED BREAST BIOPSY RIGHT COMPLETE Right 11/1/2024    US GUIDED BREAST LYMPH NODE BIOPSY RIGHT Right 09/20/2024     Family History   Problem Relation Age of Onset    Hypertension Mother     Colonic polyp Mother     Colon cancer Mother     Arthritis Father     Diabetes Father     Hypertension Father     Hyperlipidemia Father     No Known Problems Sister     No Known Problems Sister     No Known Problems Son     No Known Problems Daughter     No Known Problems Maternal Aunt     No Known Problems Maternal Aunt     No Known Problems Maternal Aunt     No Known Problems Maternal Aunt     No Known Problems Maternal Aunt     No Known Problems Paternal Aunt     No Known Problems Paternal Aunt     No Known Problems Paternal Aunt     Colonic polyp Maternal Grandmother     Colon cancer Maternal Grandmother     Throat cancer Maternal Grandfather     No Known Problems Paternal Grandmother     No Known Problems Paternal Grandfather       reports that she quit smoking about 7 months ago. Her smoking use included cigarettes. She started smoking about 37 years ago. She has a 8.8 pack-year smoking history. She has been exposed to tobacco smoke. She has never used smokeless tobacco. She reports that she does not drink alcohol and does not use drugs.  Current Outpatient Medications   Medication Instructions    acetaminophen (TYLENOL) 500 mg, Oral, Every 6 hours PRN    atorvastatin (LIPITOR) 10 mg, Oral, Daily    Cholecalciferol (VITAMIN D3 PO) Daily    cyclobenzaprine (FLEXERIL) 5 mg, Oral, 3 times daily PRN    Diclofenac Sodium (VOLTAREN) 2 g, Topical, 4 times daily    lidocaine-prilocaine (EMLA) cream Topical, As needed    lisinopril-hydrochlorothiazide (PRINZIDE,ZESTORETIC) 10-12.5 MG per tablet 1 tablet, Oral, Daily    naproxen (NAPROSYN) 500 mg, Oral, 2 times daily with meals    ondansetron (ZOFRAN) 4 mg, Oral, Every 8 hours PRN    ondansetron (ZOFRAN) 4 mg, Oral, Every 6  "hours   Allergies[1]        Objective   /70 (BP Location: Right arm, Patient Position: Sitting, Cuff Size: Standard)   Pulse 94   Temp 98 °F (36.7 °C) (Temporal)   Ht 5' 6\" (1.676 m)   LMP  (LMP Unknown) Comment: IUD  SpO2 98%   BMI 22.60 kg/m²     Pain Screening:  Pain Score: 0-No pain  ECOG    Physical Exam  Constitutional:       General: She is not in acute distress.     Appearance: Normal appearance. She is well-developed.   HENT:      Head: Normocephalic and atraumatic.     Cardiovascular:      Heart sounds: Normal heart sounds.   Pulmonary:      Breath sounds: Normal breath sounds.   Chest:   Breasts:     Right: No swelling, bleeding, inverted nipple, mass, nipple discharge, skin change or tenderness.      Left: No swelling, bleeding, inverted nipple, mass, nipple discharge, skin change or tenderness.      Comments: Port left infraclavicular  Abdominal:      Palpations: Abdomen is soft.     Musculoskeletal:      Right lower leg: No edema.      Left lower leg: No edema.   Lymphadenopathy:      Upper Body:      Right upper body: Axillary adenopathy (multiple enlarged but mobile) present. No supraclavicular or pectoral adenopathy.      Left upper body: No supraclavicular, axillary or pectoral adenopathy.     Neurological:      Mental Status: She is alert and oriented to person, place, and time.     Psychiatric:         Mood and Affect: Mood normal.          Labs: I have reviewed pertinent labs.     [facility] genetics were negative    11/1/24 core biopsy right breast 5:00 periareolar revealed a papillary neoplasm ER/UT positive    Appointment on 04/28/2025   Component Date Value Ref Range Status    WBC 04/28/2025 5.04  4.31 - 10.16 Thousand/uL Final    RBC 04/28/2025 3.01 (L)  3.81 - 5.12 Million/uL Final    Hemoglobin 04/28/2025 10.0 (L)  11.5 - 15.4 g/dL Final    Hematocrit 04/28/2025 31.6 (L)  34.8 - 46.1 % Final    MCV 04/28/2025 105 (H)  82 - 98 fL Final    MCH 04/28/2025 33.2  26.8 - 34.3 pg Final    " MCHC 04/28/2025 31.6  31.4 - 37.4 g/dL Final    RDW 04/28/2025 14.6  11.6 - 15.1 % Final    MPV 04/28/2025 10.6  8.9 - 12.7 fL Final    Platelets 04/28/2025 327  149 - 390 Thousands/uL Final    nRBC 04/28/2025 0  /100 WBCs Final    Segmented % 04/28/2025 69  43 - 75 % Final    Immature Grans % 04/28/2025 0  0 - 2 % Final    Lymphocytes % 04/28/2025 22  14 - 44 % Final    Monocytes % 04/28/2025 6  4 - 12 % Final    Eosinophils Relative 04/28/2025 2  0 - 6 % Final    Basophils Relative 04/28/2025 1  0 - 1 % Final    Absolute Neutrophils 04/28/2025 3.41  1.85 - 7.62 Thousands/µL Final    Absolute Immature Grans 04/28/2025 0.02  0.00 - 0.20 Thousand/uL Final    Absolute Lymphocytes 04/28/2025 1.13  0.60 - 4.47 Thousands/µL Final    Absolute Monocytes 04/28/2025 0.30  0.17 - 1.22 Thousand/µL Final    Eosinophils Absolute 04/28/2025 0.11  0.00 - 0.61 Thousand/µL Final    Basophils Absolute 04/28/2025 0.07  0.00 - 0.10 Thousands/µL Final    Sodium 04/28/2025 143  135 - 147 mmol/L Final    Potassium 04/28/2025 3.6  3.5 - 5.3 mmol/L Final    Chloride 04/28/2025 103  96 - 108 mmol/L Final    CO2 04/28/2025 31  21 - 32 mmol/L Final    ANION GAP 04/28/2025 9  4 - 13 mmol/L Final    BUN 04/28/2025 8  5 - 25 mg/dL Final    Creatinine 04/28/2025 0.54 (L)  0.60 - 1.30 mg/dL Final    Standardized to IDMS reference method    Glucose 04/28/2025 89  65 - 140 mg/dL Final    If the patient is fasting, the ADA then defines impaired fasting glucose as > 100 mg/dL and diabetes as > or equal to 123 mg/dL.    Calcium 04/28/2025 9.4  8.4 - 10.2 mg/dL Final    AST 04/28/2025 14  13 - 39 U/L Final    ALT 04/28/2025 15  7 - 52 U/L Final    Specimen collection should occur prior to Sulfasalazine administration due to the potential for falsely depressed results.     Alkaline Phosphatase 04/28/2025 83  34 - 104 U/L Final    Total Protein 04/28/2025 6.7  6.4 - 8.4 g/dL Final    Albumin 04/28/2025 4.3  3.5 - 5.0 g/dL Final    Total Bilirubin  04/28/2025 0.26  0.20 - 1.00 mg/dL Final    Use of this assay is not recommended for patients undergoing treatment with eltrombopag due to the potential for falsely elevated results.  N-acetyl-p-benzoquinone imine (metabolite of Acetaminophen) will generate erroneously low results in samples for patients that have taken an overdose of Acetaminophen.    eGFR 04/28/2025 108  ml/min/1.73sq m Final    TSH 3RD GENERATON 04/28/2025 0.704  0.450 - 4.500 uIU/mL Final    The recommended reference ranges for TSH during pregnancy are as follows:   First trimester 0.100 to 2.500 uIU/mL   Second trimester  0.200 to 3.000 uIU/mL   Third trimester 0.300 to 3.000 uIU/m    Note: Normal ranges may not apply to patients who are transgender, non-binary, or whose legal sex, sex at birth, and gender identity differ.  Adult TSH (3rd generation) reference range follows the recommended guidelines of the American Thy[employer] Association, January, 2020.     Pathology: I have reviewed pathology reports described above.    Radiology Results Review: I personally reviewed the following image studies in PACS and associated radiology reports: 10/23/2024 bilateral breast MRI, 11/1/2024 right breast ultrasound and postbiopsy mammogram. My interpretation of the radiology images/reports is: Additional focus in the right breast at 5:00 which is biopsy-proven as noted above.  Concordance: yes           [1] No Known Allergies

## 2025-05-14 NOTE — ASSESSMENT & PLAN NOTE
Orders:    Ambulatory Referral to Plastic Surgery; Future    Ambulatory referral to PT/OT lymphedema therapy; Future    Case request operating room: RIGHT MODIFIED RADICAL MASTECTOMY; Standing    UA w Reflex to Microscopic w Reflex to Culture; Future    Comprehensive metabolic panel; Future    CBC and differential; Future    Ambulatory Referral to Radiation Oncology; Future

## 2025-05-14 NOTE — ASSESSMENT & PLAN NOTE
Orders:    Case request operating room: RIGHT MODIFIED RADICAL MASTECTOMY; Standing    CBC and differential; Future    Ambulatory Referral to Radiation Oncology; Future

## 2025-05-16 ENCOUNTER — DOCUMENTATION (OUTPATIENT)
Dept: HEMATOLOGY ONCOLOGY | Facility: CLINIC | Age: 55
End: 2025-05-16

## 2025-05-19 ENCOUNTER — APPOINTMENT (OUTPATIENT)
Dept: LAB | Facility: CLINIC | Age: 55
End: 2025-05-19
Attending: SURGERY
Payer: COMMERCIAL

## 2025-05-19 DIAGNOSIS — C50.811 MALIGNANT NEOPLASM OF OVERLAPPING SITES OF RIGHT BREAST IN FEMALE, ESTROGEN RECEPTOR POSITIVE (HCC): ICD-10-CM

## 2025-05-19 DIAGNOSIS — C50.911 CARCINOMA OF RIGHT BREAST METASTATIC TO AXILLARY LYMPH NODE (HCC): ICD-10-CM

## 2025-05-19 DIAGNOSIS — Z17.0 MALIGNANT NEOPLASM OF OVERLAPPING SITES OF RIGHT BREAST IN FEMALE, ESTROGEN RECEPTOR POSITIVE (HCC): ICD-10-CM

## 2025-05-19 DIAGNOSIS — C77.3 CARCINOMA OF RIGHT BREAST METASTATIC TO AXILLARY LYMPH NODE (HCC): ICD-10-CM

## 2025-05-19 LAB
ALBUMIN SERPL BCG-MCNC: 4.2 G/DL (ref 3.5–5)
ALP SERPL-CCNC: 99 U/L (ref 34–104)
ALT SERPL W P-5'-P-CCNC: 17 U/L (ref 7–52)
ANION GAP SERPL CALCULATED.3IONS-SCNC: 8 MMOL/L (ref 4–13)
AST SERPL W P-5'-P-CCNC: 17 U/L (ref 13–39)
BACTERIA UR QL AUTO: ABNORMAL /HPF
BASOPHILS # BLD AUTO: 0.04 THOUSANDS/ÂΜL (ref 0–0.1)
BASOPHILS NFR BLD AUTO: 1 % (ref 0–1)
BILIRUB SERPL-MCNC: 0.26 MG/DL (ref 0.2–1)
BILIRUB UR QL STRIP: NEGATIVE
BUN SERPL-MCNC: 6 MG/DL (ref 5–25)
CALCIUM SERPL-MCNC: 9.2 MG/DL (ref 8.4–10.2)
CHLORIDE SERPL-SCNC: 104 MMOL/L (ref 96–108)
CLARITY UR: CLEAR
CO2 SERPL-SCNC: 32 MMOL/L (ref 21–32)
COLOR UR: COLORLESS
CREAT SERPL-MCNC: 0.53 MG/DL (ref 0.6–1.3)
EOSINOPHIL # BLD AUTO: 0.13 THOUSAND/ÂΜL (ref 0–0.61)
EOSINOPHIL NFR BLD AUTO: 4 % (ref 0–6)
ERYTHROCYTE [DISTWIDTH] IN BLOOD BY AUTOMATED COUNT: 14.3 % (ref 11.6–15.1)
GFR SERPL CREATININE-BSD FRML MDRD: 109 ML/MIN/1.73SQ M
GLUCOSE P FAST SERPL-MCNC: 95 MG/DL (ref 65–99)
GLUCOSE UR STRIP-MCNC: NEGATIVE MG/DL
HCT VFR BLD AUTO: 30.2 % (ref 34.8–46.1)
HGB BLD-MCNC: 9.6 G/DL (ref 11.5–15.4)
HGB UR QL STRIP.AUTO: ABNORMAL
IMM GRANULOCYTES # BLD AUTO: 0.02 THOUSAND/UL (ref 0–0.2)
IMM GRANULOCYTES NFR BLD AUTO: 1 % (ref 0–2)
KETONES UR STRIP-MCNC: NEGATIVE MG/DL
LEUKOCYTE ESTERASE UR QL STRIP: ABNORMAL
LYMPHOCYTES # BLD AUTO: 1.06 THOUSANDS/ÂΜL (ref 0.6–4.47)
LYMPHOCYTES NFR BLD AUTO: 29 % (ref 14–44)
MCH RBC QN AUTO: 33.3 PG (ref 26.8–34.3)
MCHC RBC AUTO-ENTMCNC: 31.8 G/DL (ref 31.4–37.4)
MCV RBC AUTO: 105 FL (ref 82–98)
MONOCYTES # BLD AUTO: 0.3 THOUSAND/ÂΜL (ref 0.17–1.22)
MONOCYTES NFR BLD AUTO: 8 % (ref 4–12)
NEUTROPHILS # BLD AUTO: 2.13 THOUSANDS/ÂΜL (ref 1.85–7.62)
NEUTS SEG NFR BLD AUTO: 57 % (ref 43–75)
NITRITE UR QL STRIP: NEGATIVE
NON-SQ EPI CELLS URNS QL MICRO: ABNORMAL /HPF
NRBC BLD AUTO-RTO: 0 /100 WBCS
PH UR STRIP.AUTO: 7.5 [PH]
PLATELET # BLD AUTO: 307 THOUSANDS/UL (ref 149–390)
PMV BLD AUTO: 10.2 FL (ref 8.9–12.7)
POTASSIUM SERPL-SCNC: 4.1 MMOL/L (ref 3.5–5.3)
PROT SERPL-MCNC: 6.8 G/DL (ref 6.4–8.4)
PROT UR STRIP-MCNC: NEGATIVE MG/DL
RBC # BLD AUTO: 2.88 MILLION/UL (ref 3.81–5.12)
RBC #/AREA URNS AUTO: ABNORMAL /HPF
RENAL EPI CELLS #/AREA URNS HPF: PRESENT /[HPF]
SODIUM SERPL-SCNC: 144 MMOL/L (ref 135–147)
SP GR UR STRIP.AUTO: 1.01 (ref 1–1.03)
TRANS CELLS #/AREA URNS HPF: PRESENT /[HPF]
UROBILINOGEN UR STRIP-ACNC: <2 MG/DL
WBC # BLD AUTO: 3.68 THOUSAND/UL (ref 4.31–10.16)
WBC #/AREA URNS AUTO: ABNORMAL /HPF

## 2025-05-19 PROCEDURE — 85025 COMPLETE CBC W/AUTO DIFF WBC: CPT

## 2025-05-19 PROCEDURE — 81001 URINALYSIS AUTO W/SCOPE: CPT

## 2025-05-19 PROCEDURE — 36415 COLL VENOUS BLD VENIPUNCTURE: CPT

## 2025-05-19 PROCEDURE — 80053 COMPREHEN METABOLIC PANEL: CPT

## 2025-05-20 ENCOUNTER — OFFICE VISIT (OUTPATIENT)
Dept: HEMATOLOGY ONCOLOGY | Facility: CLINIC | Age: 55
End: 2025-05-20
Payer: COMMERCIAL

## 2025-05-20 VITALS
OXYGEN SATURATION: 97 % | BODY MASS INDEX: 22.5 KG/M2 | SYSTOLIC BLOOD PRESSURE: 132 MMHG | TEMPERATURE: 97.8 F | DIASTOLIC BLOOD PRESSURE: 74 MMHG | WEIGHT: 140 LBS | HEIGHT: 66 IN | HEART RATE: 92 BPM | RESPIRATION RATE: 18 BRPM

## 2025-05-20 DIAGNOSIS — C50.411 MALIGNANT NEOPLASM OF UPPER-OUTER QUADRANT OF RIGHT BREAST IN FEMALE, ESTROGEN RECEPTOR POSITIVE (HCC): Primary | ICD-10-CM

## 2025-05-20 DIAGNOSIS — Z17.0 MALIGNANT NEOPLASM OF UPPER-OUTER QUADRANT OF RIGHT BREAST IN FEMALE, ESTROGEN RECEPTOR POSITIVE (HCC): Primary | ICD-10-CM

## 2025-05-20 DIAGNOSIS — C50.911 BREAST CANCER METASTASIZED TO AXILLARY LYMPH NODE, RIGHT (HCC): ICD-10-CM

## 2025-05-20 DIAGNOSIS — C77.3 CARCINOMA OF RIGHT BREAST METASTATIC TO AXILLARY LYMPH NODE (HCC): Primary | ICD-10-CM

## 2025-05-20 DIAGNOSIS — C77.3 BREAST CANCER METASTASIZED TO AXILLARY LYMPH NODE, RIGHT (HCC): ICD-10-CM

## 2025-05-20 DIAGNOSIS — C50.911 CARCINOMA OF RIGHT BREAST METASTATIC TO AXILLARY LYMPH NODE (HCC): Primary | ICD-10-CM

## 2025-05-20 PROCEDURE — 99215 OFFICE O/P EST HI 40 MIN: CPT | Performed by: INTERNAL MEDICINE

## 2025-05-20 RX ORDER — SODIUM CHLORIDE 9 MG/ML
20 INJECTION, SOLUTION INTRAVENOUS ONCE
Status: CANCELLED | OUTPATIENT
Start: 2025-05-21

## 2025-05-20 RX ORDER — DOXORUBICIN HYDROCHLORIDE 2 MG/ML
60 INJECTION, SOLUTION INTRAVENOUS ONCE
Status: CANCELLED | OUTPATIENT
Start: 2025-05-21

## 2025-05-20 NOTE — PROGRESS NOTES
Name: Romina Cleaning      : 1970      MRN: [id number]  Encounter Provider: Graciela Garcia DO  Encounter Date: 2025   Encounter department: [facility]  :  Assessment & Plan  Malignant neoplasm of upper-outer quadrant of right breast in female, estrogen receptor positive (HCC)  1.  cT1c cNx cM0 Right breast invasive lobular carcinoma.  Grade 2.  ER positive (>95%), MN positive (70%), HER2 negative by IHC.  MammaPrint: Ultralow risk (luminal A).     2.  cT0 cN1 cM0 Right axillary lymph node biopsy ER negative, MN negative, HER2 negative  3.  BRCA1/2 negative     Romina Cleaning is a 54 y.o. 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 She initially palpated a mass in her right breast which she brought to medical attention.  She underwent ultrasound-guided biopsy which was consistent with an invasive lobular carcinoma, ER positive, MN positive, HER2 negative.   MammaPrint resulted ultralow risk (luminal A). Right axillary lymph node biopsy showed infiltrating carcinoma which was ER negative, MN negative and HER2 negative.  Genetic testing for BRCA1/2. She has been evaluated by breast surgery (Dr. Philippe) and given the 2 different pathologies from the breast mass and axillary lymph node she underwent breast MRI on 10/23/2024 which redemonstrates a mass in the upper outer right breast consistent with the biopsy-proven invasive lobular carcinoma and right axillary lymphadenopathy.  Indeterminant 6 mm mass at the 5 o'clock position right breast for which ultrasound was recommended (if there is no ultrasound correlate MRI guided biopsy would be performed).  CT of the chest, abdomen, pelvis notes enlarged right axillary and subpectoral lymphadenopathy and no other suspicious lesions.   Bone scan with no evidence of osseous metastasis. She is following with OB/GYN and had Mirena IUD removal.     She has 2 different pathologies (invasive lobular carcinoma, ER/AR positive, HER2 negative with a ultralow risk MammaPrint results as well as a triple negative carcinoma noted on axillary lymph node biopsy).  Bilateral breast MRI noted spiculated mass in the upper outer right breast consistent with biopsy-proven invasive lobular carcinoma as well as an indeterminate 6 mm mass at the 5 o'clock position for which further characterization with ultrasound was recommended.  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     She started neoadjuvant chemotherapy for triple negative, node positive carcinoma on 11/14/2024.  Unfortunately, patient with reaction to Taxol and subsequent treatment switched to Abraxane.     She may benefit from hormone therapy in the adjuvant setting for her ER/AR+ carcinoma. We will visit this decision after surgery.      Treatment plan: Keynote 522 regimen  Neoadjuvant pembrolizumab 200 mg day 1, Paclitaxel 80 mg/m2 day 1, Carboplatin AUC 1.5 on days 1, 8, 15 of a 21-day cycle for 4 cycles (11/14/2024-2/20/2025)  >> Switched paclitaxel to Abraxane 100 mg/m2 D1,8,15 after C1D8 given reaction to paclitaxel.   Then neoadjuvant pembrolizumab 200 mg, cyclophosphamide 600 mg/m2, doxorubicin 60 mg/m2 on day 1 of a 21-day cycle for 4 cycles.   Then adjuvant course of pembrolizumab 200 mg on day 1 of a 21-day cycle for 9 cycles pending final pathology review.      Summary:  Patient here for follow-up of cancer, consideration of chemotherapy and to review chemotherapy side effects.  She is undergoing high risk therapy.  Labs reviewed for toxicity assessment. She is undergoing neoadjuvant chemotherapy for triple negative, node positive carcinoma.  She has completed 4 cycles of treatment with pembrolizumab, paclitaxel and carboplatin.    She is  currently receiving pembrolizumab, cyclophosphamide and doxorubicin (3/19/2025-present). Due for cycle 8 on 5/21/2025. CBC/CMP reviewed. Last neoadjuvant chemotherapy due on 5/21/2025.    She has had a good clinical response.  Follow-up with surgery.   FU after surgery to review adjuvant treatment plan.        Breast cancer metastasized to axillary lymph node, right (HCC)             Return for Office Visit.    History of Present Illness   Chief Complaint   Patient presents with    Follow-up     Oncology History   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 (HCC)  Staging form: Breast, AJCC 8th Edition  - Clinical stage from 9/20/2024: cT0, cN1(f), cM0, GX, ER-, NY-, HER2- - Signed by Jodie Philippe MD on 10/7/2024  Stage prefix: Initial diagnosis  Method of lymph node assessment: Core biopsy  Histologic grading system: 3 grade system    Malignant neoplasm of overlapping sites of right breast in female, estrogen receptor positive (HCC)  Staging form: Breast, AJCC 8th Edition  - Clinical stage from 9/20/2024: Stage IB (cT1c, cN1, cM0, G2, ER+, NY+, HER2-) - Signed by Jodie Philippe MD on 5/14/2025  Stage prefix: Initial diagnosis  Histologic grading system: 3 grade system  Oncology History   Carcinoma of right breast metastatic to axillary lymph node (HCC)   6/11/2024 Initial Diagnosis    Carcinoma of right breast metastatic to axillary lymph node (HCC)     9/20/2024 -  Cancer Staged    Staging form: Breast, AJCC 8th Edition  - Clinical stage from 9/20/2024: cT0, cN1(f), cM0, GX, ER-, NY-, HER2- - Signed by Jodie Philippe MD on 10/7/2024  Stage prefix: Initial diagnosis  Method of lymph node assessment: Core biopsy  Histologic grading system: 3 grade system       10/14/2024 Observation    CT CAP:  1. Enlarged right axillary and subpectoral lymphadenopathy correlating to known history of breast cancer. No pulmonary nodules  No mediastinal or hilar lymphadenopathy. No contralateral  axillary lymphadenopathy. No intra-abdominal metastatic disease. No adrenal or hepatic metastatic lesions. No suspicious osseous lesions     2. Stable right hepatic lobe hemangioma and cyst.     3. Stable prominent central mesenteric lymph nodes unchanged since 2018 unrelated to patient's breast cancer.    NM BONE SCAN:    1. No scintigraphic evidence of osseous metastasis.      11/14/2024 -  Chemotherapy    DOXOrubicin (ADRIAMYCIN), 60 mg/m2 = 103 mg, 3 of 4 cycles  Administration: 103 mg (3/19/2025), 103 mg (4/9/2025), 103 mg (4/30/2025)  cyclophosphamide (CYTOXAN) IVPB, 600 mg/m2 = 1,026 mg, 3 of 4 cycles  Administration: 1,000 mg (3/19/2025), 1,000 mg (4/9/2025), 1,000 mg (4/30/2025)  paclitaxel protein-bound (ABRAXANE) IVPB, 100 mg/m2 = 171 mg (original dose ), 4 of 4 cycles  Dose modification: 100 mg/m2 (original dose 100 mg/m2, Cycle 1)  Administration: 171 mg (12/5/2024), 171 mg (12/12/2024), 171 mg (12/19/2024), 171 mg (1/9/2025), 171 mg (1/16/2025), 171 mg (1/23/2025), 171 mg (1/30/2025), 171 mg (2/7/2025), 171 mg (2/13/2025), 171 mg (2/20/2025)  CARBOplatin (PARAPLATIN) IVPB (GO AUC DOSING), 180 mg, 4 of 4 cycles  Administration: 180 mg (11/14/2024), 180 mg (11/21/2024), 180 mg (12/5/2024), 180 mg (12/12/2024), 180 mg (12/19/2024), 180 mg (1/9/2025), 180 mg (1/16/2025), 180 mg (1/23/2025), 180 mg (1/30/2025), 180 mg (2/7/2025), 180 mg (2/13/2025), 180 mg (2/20/2025)  PACLItaxel (TAXOL) chemo IVPB, 80 mg/m2 = 136.8 mg, 1 of 1 cycle  Administration: 136.8 mg (11/14/2024), 136.8 mg (11/21/2024)  pembrolizumab (KEYTRUDA) IVPB, 200 mg, 7 of 17 cycles  Administration: 200 mg (11/14/2024), 200 mg (3/19/2025), 200 mg (12/12/2024), 200 mg (1/9/2025), 200 mg (1/30/2025), 200 mg (4/9/2025), 200 mg (4/30/2025)     Malignant neoplasm of overlapping sites of right breast in female, estrogen receptor positive (HCC)   9/20/2024 Biopsy    Right breast ultrasound-guided biopsy  A. 10 o'clock, 7 cm from nipple  (MARTHA)  Invasive lobular carcinoma  Grade 2  ER >95, GA 70, HER2 0  Lymphovascular invasion not identified    B. Right axillary lymph node biopsy (MARTHA)  Infiltrating carcinoma  Although no definitive capsule is noted, it is favored to represent lymph node involvement by th e carcinoma  ER <1, GA <1, HER2 1+    Concordant. Malignancy appears unifocal; suspicious masses cover an area up to 1.8 cm. US of right axilla showed multiple enlarged, morphologically abnormal axillary lymph nodes with biopsy confirmed metastatic disease. Left breast clear.     9/20/2024 -  Cancer Staged    Staging form: Breast, AJCC 8th Edition  - Clinical stage from 9/20/2024: Stage IB (cT1c, cN1, cM0, G2, ER+, GA+, HER2-) - Signed by Jodie Philippe MD on 5/14/2025  Stage prefix: Initial diagnosis  Histologic grading system: 3 grade system       9/24/2024 Genomic Testing    MammaPrint/BluePrint ordered on breast specimen block A  Ultra-Low risk - luminal A     9/27/2024 Genetic Testing    Genetic testing with RNA analysis ordered  [place]    NEGATIVE     10/14/2024 Observation    CT CAP:  1. Enlarged right axillary and subpectoral lymphadenopathy correlating to known history of breast cancer. No pulmonary nodules  No mediastinal or hilar lymphadenopathy. No contralateral axillary lymphadenopathy. No intra-abdominal metastatic disease. No adrenal or hepatic metastatic lesions. No suspicious osseous lesions     2. Stable right hepatic lobe hemangioma and cyst.     3. Stable prominent central mesenteric lymph nodes unchanged since 2018 unrelated to patient's breast cancer.    NM BONE SCAN:    1. No scintigraphic evidence of osseous metastasis.      10/23/2024 Observation    B/L Breast MRI    IMPRESSION:   Redemonstration of a spiculated mass in the upper outer right breast in keeping with biopsy-proven invasive lobular carcinoma and bulky right axillary lymphadenopathy in keeping with metastatic nodes.  Indeterminate 6 mm mass at the 5 o'clock  position right breast for which further characterization with targeted ultrasound is recommended.  If there is no ultrasound correlate MRI guided biopsy could be performed.  No suspicious enhancement in the left breast.       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 No definitive invasion carcinoma present on examined sections.  ER , UT   8mm        Pertinent Medical History   Romina Cleaning is a 54 y.o. postmenopausal female with PMHx significant for HTN who recently felt a mass in her right breast which she brought to medical attention.  She underwent ultrasound-guided biopsy which was consistent with an invasive lobular carcinoma, ER positive, UT positive, HER2 negative.  Right axillary lymph node biopsy showed infiltrating carcinoma which was ER negative, UT negative and HER2 negative.  MammaPrint resulted ultralow risk (luminal A), genetic testing pending.  She has been evaluated by breast surgery (Dr. Philippe) and given the 2 different pathologies from the breast mass and axillary lymph node she has been recommended a breast MRI to evaluate for an occult site.  CT of the chest, abdomen, pelvis notes enlarged right axillary and subpectoral lymphadenopathy and no other suspicious lesions.  Bone scan with no evidence of osseous metastasis.  Patient presents for initial medical oncology consultation.  Patient's case was submitted to be discussed at the multidisciplinary breast tumor board on 10/21/2024.  She is following with OB/GYN and had Mirena IUD removal. Pt presents for initial medical oncology consultation accompanied by her son and sister for visit.  She denies any chest pain, palpitations, bone pain or other complaints.     OB/GYN history:  G2, P2  Menarche: 15 years old  Menopause: in mid 40's  Age of first pregnancy: 29 years old     Maternal grandmother, maternal uncle and mother with colon cancer.  Maternal grandfather with throat cancer (smoker).      Patient declined , instead preferred to have her sister and son translate.     Interval History:   Accompanied by sister, son and mother.  Her case was discussed in the multidisciplinary tumor board in the interim.  Recommendations for neoadjuvant chemotherapy to treat node positive, triple negative disease.  Patient offers no complaints today.     Interval History 11/21/24:   Patient presents for urgent visit.  She started treatment on 11/14/2024, but had a reaction to Taxol on day 1 and 8 requiring further treatment to be held.  Patient noted experiencing facial flushing, abdominal cramping (9/10) and Taxol infusion stopped requiring hypersensitivity protocol.  Patient currently denies any chest pain, palpitations, respiratory symptoms.    02/04/25:   Accompanied by mother for visit today.  Continues to receive neoadjuvant chemotherapy which she is tolerating well.  Denies any respiratory symptoms or bone pain.  Notes improvement in her breast mass.  Denies neuropathy.    02/25/25:     Burned finger yesterday (3rd digit of right hand) while cooking farina. Now with blister.     03/18/25:   Patient presents for follow-up visit accompanied by her daughter. Recent infection with influenza A.  She is recovering well and notices intermittent nonproductive cough.  Offers no new breast complaints.    04/08/25:   Patient presents for follow-up visit accompanied by her mother.    04/29/25:   Patient continues to do well overall.  Tolerating neoadjuvant chemotherapy.  She has had a good clinical response.  Next cycle of chemotherapy is due 4/30/2025.  She is due for her last cycle on 5/21/25.  She will follow-up with breast surgery for surgical planning.    05/20/25:   Follow-up visit for breast carcinoma and to review treatment.  Due for her last cycle of chemotherapy on 5/21/2025.  Following with surgery.     Review of Systems   Constitutional:  Negative  "for chills and fever.   Respiratory:  Negative for cough and shortness of breath.    Cardiovascular:  Negative for chest pain and palpitations.   Gastrointestinal:  Negative for abdominal pain.   Genitourinary:  Negative for hematuria.   Skin:  Negative for rash.   Hematological:  Does not bruise/bleed easily.   All other systems reviewed and are negative.          Objective   /74 (BP Location: Right arm, Patient Position: Sitting, Cuff Size: Adult)   Pulse 92   Temp 97.8 °F (36.6 °C) (Temporal)   Resp 18   Ht 5' 6\" (1.676 m)   Wt 63.5 kg (140 lb)   LMP  (LMP Unknown) Comment: IUD  SpO2 97%   BMI 22.60 kg/m²     Pain Screening:  Pain Score: 0-No pain  ECOG     Physical Exam  Vitals reviewed.   Constitutional:       General: She is not in acute distress.     Appearance: Normal appearance.   HENT:      Head: Normocephalic and atraumatic.      Mouth/Throat:      Mouth: Mucous membranes are moist.     Eyes:      Extraocular Movements: Extraocular movements intact.      Conjunctiva/sclera: Conjunctivae normal.       Cardiovascular:      Rate and Rhythm: Normal rate.   Pulmonary:      Effort: Pulmonary effort is normal. No respiratory distress.      Breath sounds: No wheezing, rhonchi or rales.   Chest:      Comments: Right breast: no palpable abn. No palpable axillary LAD.     Left breat: negative exam  Abdominal:      General: Abdomen is flat. There is no distension.      Palpations: Abdomen is soft.     Musculoskeletal:      Cervical back: Normal range of motion and neck supple.      Right lower leg: No edema.      Left lower leg: No edema.     Skin:     General: Skin is warm.      Coloration: Skin is not pale.     Neurological:      Mental Status: She is alert and oriented to person, place, and time. Mental status is at baseline.      Cranial Nerves: No cranial nerve deficit.     Psychiatric:         Thought Content: Thought content normal.         Labs: I have reviewed the following labs:  Lab Results "   Component Value Date/Time    WBC 3.68 (L) 05/19/2025 09:41 AM    RBC 2.88 (L) 05/19/2025 09:41 AM    Hemoglobin 9.6 (L) 05/19/2025 09:41 AM    Hematocrit 30.2 (L) 05/19/2025 09:41 AM     (H) 05/19/2025 09:41 AM    MCH 33.3 05/19/2025 09:41 AM    RDW 14.3 05/19/2025 09:41 AM    Platelets 307 05/19/2025 09:41 AM    Segmented % 57 05/19/2025 09:41 AM    Lymphocytes % 29 05/19/2025 09:41 AM    Monocytes % 8 05/19/2025 09:41 AM    Eosinophils Relative 4 05/19/2025 09:41 AM    Basophils Relative 1 05/19/2025 09:41 AM    Immature Grans % 1 05/19/2025 09:41 AM    Absolute Neutrophils 2.13 05/19/2025 09:41 AM     Lab Results   Component Value Date/Time    Potassium 4.1 05/19/2025 09:41 AM    Chloride 104 05/19/2025 09:41 AM    CO2 32 05/19/2025 09:41 AM    BUN 6 05/19/2025 09:41 AM    Creatinine 0.53 (L) 05/19/2025 09:41 AM    Glucose, Fasting 95 05/19/2025 09:41 AM    Calcium 9.2 05/19/2025 09:41 AM    Corrected Calcium 9.9 01/21/2025 09:45 AM    AST 17 05/19/2025 09:41 AM    ALT 17 05/19/2025 09:41 AM    Alkaline Phosphatase 99 05/19/2025 09:41 AM    Total Protein 6.8 05/19/2025 09:41 AM    Albumin 4.2 05/19/2025 09:41 AM    Total Bilirubin 0.26 05/19/2025 09:41 AM    eGFR 109 05/19/2025 09:41 AM

## 2025-05-21 ENCOUNTER — HOSPITAL ENCOUNTER (OUTPATIENT)
Dept: INFUSION CENTER | Facility: CLINIC | Age: 55
Discharge: HOME | End: 2025-05-21
Attending: INTERNAL MEDICINE
Payer: COMMERCIAL

## 2025-05-21 VITALS — BODY MASS INDEX: 22.58 KG/M2 | WEIGHT: 140.5 LBS | HEIGHT: 66 IN

## 2025-05-21 DIAGNOSIS — C50.911 CARCINOMA OF RIGHT BREAST METASTATIC TO AXILLARY LYMPH NODE (HCC): Primary | ICD-10-CM

## 2025-05-21 DIAGNOSIS — C77.3 CARCINOMA OF RIGHT BREAST METASTATIC TO AXILLARY LYMPH NODE (HCC): Primary | ICD-10-CM

## 2025-05-21 RX ORDER — DOXORUBICIN HYDROCHLORIDE 2 MG/ML
60 INJECTION, SOLUTION INTRAVENOUS ONCE
Status: COMPLETED | OUTPATIENT
Start: 2025-05-21 | End: 2025-05-21

## 2025-05-21 RX ORDER — SODIUM CHLORIDE 9 MG/ML
20 INJECTION, SOLUTION INTRAVENOUS ONCE
Status: COMPLETED | OUTPATIENT
Start: 2025-05-21 | End: 2025-05-21

## 2025-05-21 RX ADMIN — FOSAPREPITANT 150 MG: 150 INJECTION, POWDER, LYOPHILIZED, FOR SOLUTION INTRAVENOUS at 13:00

## 2025-05-21 RX ADMIN — SODIUM CHLORIDE 200 MG: 9 INJECTION, SOLUTION INTRAVENOUS at 13:48

## 2025-05-21 RX ADMIN — SODIUM CHLORIDE 20 ML/HR: 0.9 INJECTION, SOLUTION INTRAVENOUS at 12:36

## 2025-05-21 RX ADMIN — DOXORUBICIN HYDROCHLORIDE 103 MG: 2 INJECTION, SOLUTION INTRAVENOUS at 15:22

## 2025-05-21 RX ADMIN — DEXAMETHASONE SODIUM PHOSPHATE: 10 INJECTION, SOLUTION INTRAMUSCULAR; INTRAVENOUS at 12:36

## 2025-05-21 RX ADMIN — CYCLOPHOSPHAMIDE 1000 MG: 1 INJECTION, POWDER, FOR SOLUTION INTRAVENOUS; ORAL at 14:43

## 2025-05-21 NOTE — PROGRESS NOTES
Pt at [facility] for day 1 Cycle 8 of Keytruda, Cytoxan, and Adriamycin tx. Port flushed smoothly and good blood return noted. Pt labs reviewed from 5/19 and meet parameters. Pt did not have TSH levels drawn on 5/19, office notified and gave the okay to continue tx today. Pt to have thyroid levels drawn prior to next appt. Pt has no further questions at this time. Call bell within reach.

## 2025-05-21 NOTE — PROGRESS NOTES
Pt tolerated tx well with no complaints. Pt's next appt scheduled for 5/22 at 1600, [facility] for Fulphila. Pt does not have appt scheduled for tx's . Pt will see provider in office and next steps to be determined  after surgery. AVS declined.

## 2025-05-22 ENCOUNTER — HOSPITAL ENCOUNTER (OUTPATIENT)
Dept: INFUSION CENTER | Facility: CLINIC | Age: 55
Discharge: HOME | End: 2025-05-22
Payer: COMMERCIAL

## 2025-05-22 DIAGNOSIS — C50.911 CARCINOMA OF RIGHT BREAST METASTATIC TO AXILLARY LYMPH NODE (HCC): Primary | ICD-10-CM

## 2025-05-22 DIAGNOSIS — C77.3 CARCINOMA OF RIGHT BREAST METASTATIC TO AXILLARY LYMPH NODE (HCC): Primary | ICD-10-CM

## 2025-05-22 PROCEDURE — 96372 THER/PROPH/DIAG INJ SC/IM: CPT

## 2025-05-22 RX ADMIN — PEGFILGRASTIM-JMDB 6 MG: 6 INJECTION SUBCUTANEOUS at 16:01

## 2025-05-22 NOTE — PROGRESS NOTES
Pt to clinic for fulphila injection. Pt tolerated in right arm. Next appointment 7/9 at 11:00 for port flush

## 2025-05-29 ENCOUNTER — EVALUATION (OUTPATIENT)
Dept: PHYSICAL THERAPY | Facility: CLINIC | Age: 55
End: 2025-05-29
Attending: SURGERY
Payer: COMMERCIAL

## 2025-05-29 DIAGNOSIS — I89.0 LYMPHEDEMA: Primary | ICD-10-CM

## 2025-05-29 DIAGNOSIS — C50.911 MALIGNANT NEOPLASM OF RIGHT BREAST IN FEMALE, ESTROGEN RECEPTOR POSITIVE, UNSPECIFIED SITE OF BREAST (HCC): ICD-10-CM

## 2025-05-29 DIAGNOSIS — Z17.0 MALIGNANT NEOPLASM OF RIGHT BREAST IN FEMALE, ESTROGEN RECEPTOR POSITIVE, UNSPECIFIED SITE OF BREAST (HCC): ICD-10-CM

## 2025-05-29 PROCEDURE — 97535 SELF CARE MNGMENT TRAINING: CPT | Performed by: PHYSICAL THERAPIST

## 2025-05-29 PROCEDURE — 97162 PT EVAL MOD COMPLEX 30 MIN: CPT | Performed by: PHYSICAL THERAPIST

## 2025-05-29 NOTE — PROGRESS NOTES
Lymphedema Evaluation    Today's Date: 2025  Patient name: Romina Cleaning  : 1970  MRN: [id number]  Referring provider: Jodie Philippe MD  Dx:  Encounter Diagnosis     ICD-10-CM    1. Lymphedema  I89.0 PT plan of care cert/re-cert      2. Malignant neoplasm of right breast in female, estrogen receptor positive, unspecified site of breast (HCC)  C50.911 PT plan of care cert/re-cert    Z17.0           Start Time: 1100  Stop Time: 1155  Total time in clinic (min): 55 minutes       Assessment/Plan    Romina Cleaning is a 54 y.o. year old female with complaints of R arm swelling.  Patient's presentation is consistent with secondary lymphedema due to R breast cancer with the following impairments found on evaluation: swelling, pain, decreased ROM and strength. Relevant medical history includes R breast cancer with neoadjuvant chemotherapy, patient to have mastectomy performed in 2025, possible radiation as well. The listed physical impairments contribute to the following functional limitations: decreased tolerance to reaching, lifting, overhead activity. Romina is a good candidate for physical therapy and would benefit from skilled physical therapy to address the above impairments in order to allow the patient to achieve the goals listed and return to prior level of function. Physical therapy plan of care to include MLD. Compression prescription includes: OTS compression sleeve 20-30 mm Hg. Insurance coverage for compression supplies: TBD    During initial evaluation, education was provided on lymphatic anatomy and physiology, edema differential diagnosis, edema risk reduction behaviors, and healthy habits; decongestion vs maintenance treatment options. Recommendations were made for skin care, elevation of the edematous limb, and muscle pump exercises to address edema and patient consented to these recommendations. Patient also educated on diagnosis, plan of care and prognosis.  Romina is in  agreement with recommended plan of care and goals for therapy, and demonstrates motivation for active participation in proposed plan of care.    STG  - Patient and/or caregiver will understand lymphedema precautions to decrease risk of infection and exacerbation of lymphedema  - Patient will experience 2% decrease in pitting edema which will improve tissue health and decrease risk of infection.   - Patient will perform HEP independently or with minimal assistance to help improve lymphatic flow and venous return    LTG  - Patient will experience increased ROM and functional mobility to aid with ADL’s such as reaching, overhead activity, lifting at time of discharge  - Patient will demonstrate more normalized gait pattern and improved balance at time of discharge.   - Patient will be independent with donning and doffing of compression garments which will enable regular daily garment wear at time of discharge, skin maintenance, and self- MLD   - Patient and/or caregiver will be independent with HEP and lymphedema management to prevent relapse and reduce risk of infection and hospitalizations at time of discharge        Plan  Patient would benefit from: skilled physical therapy  Referral necessary: No  Planned therapy interventions: manual therapy, massage, muscle pump exercises, neuromuscular re-education, therapeutic activities, therapeutic exercise, stretching, strengthening, graded activity, functional ROM exercises, flexibility and compression  Frequency: 2x week  Duration in weeks: 12  Plan of Care beginning date: 05/29/25   Plan of Care expiration date: 8/31/25  Treatment plan discussed with: patient    Subjective/History:  Chief Complaint: R arm pain and swelling  HPI: Romina Cleaning is a 54 y.o. 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 She initially palpated a mass in her right breast which she brought to medical attention. She underwent ultrasound-guided biopsy which was consistent with an invasive lobular carcinoma, ER positive, CA positive, HER2 negative.   Personal history of Cancer? Yes  Cancer history and treatments  Type of CA: cT1c cNx cM0 Right breast invasive lobular carcinoma. Grade 2. ER positive (>95%), CA positive (70%), HER2 negative by IHC. MammaPrint: Ultralow risk (luminal A).   Stage: 1  rdGrdrrdarddrderd:rd rd3rd Date of diagnosis: September 2024  Metastasis: no  Surgical excision: no; to be performed in July 2025  Current Post-op precautions? no  Lymph node dissection? no  Reconstruction: TBD none; skin graft; implants; expanders; Lat Flap; TRAM; DEIP  Radiation: Patient scheduled for radiation oncology consultation in June 2025  Chemotherapy: She started neoadjuvant chemotherapy for triple negative, node positive carcinoma on 11/14/2024. Unfortunately, patient with reaction to Taxol and subsequent treatment switched to Abraxane. Patient reports initially was going every week and then every 3 weeks and just finished.   Side effects of CA/Treatment: alopecia, appetite changes, arthralgias, constipation, fatigue, headaches, hot flashes, nausea, and neuropathies - fingertips  Care team: HemOnc, surgical oncology, radiation oncology    Lymphedema special questions  Feeling of heaviness, fullness, pressure? yes  Resolves with elevation: unsure  Change in fit of shoes/clothes/jewelry?no  History of Cellulitis?  no  History of S/DVT? no      Systems Review:  Cardiovascular hx: Yes; HTN and mitral regurgitation  On diuretics? yes  Thyroid dysfunction: No  Diabetes: No  Kidney disease:No   Liver disease: No  Abdominal surgeries: No  Orthopedic injuries/surgeries: No    Pain: 0/10 at rest, 4/10 at worst in R UE  Function: Independent with ADL's and IADLs  Patient goals: \"get better and strong\"    Objective  Lymphedema Exam: Upper " Extremity  Hand dominance: right  Chest Wall/Breast Edema location and quality:Right    Measurements: (cm)- Not performed today, consider performing post-mastectomy  Circumferential at axilla  Circumferential at nipple level  Circumferential at xiphoid process  Upper extremity Edema location and quality: right  Fingers: No swelling  Dorsum of hand: No swelling  Palm: No swelling  Wrist: No swelling  Forearm: No swelling  Elbow: No swelling  Upper arm: Minimal swelling, soft palpation, no fibrosis at this time.   Shoulder: Minimal swelling, soft palpation, no fibrosis at this time  Lymphedema classification (0 latent, 1 reverse, 2 non-rev, 3 elephantiasis): 1  >> REFER TO FLOW SHEET FOR GIRTH MEASUREMENTS <<  Axillary Web Syndrome? no    Skin Assessment:  Stemmer's sign? no  Fibrosis (0 normal - 4 >severe): 0  Wound present: no  Scar present: no  Surface anatomy changes: No changes at this time  Bony prominences: No changes at this time  Tendon pathways: No changes at this time  Joint creases: No changes at this time    Functional Capacity:  Gait assessment: Independent  Transfer status: Independent  Ability to don/doff clothing/garments: TBD  Upper quarter Sensation: Normal  Upper quarter Range of Motion: Abnormal, Minimal restrictions at end-range flexion  Shldr OH flex: L  degrees, R  degrees  Shldr OH abd: bilateral  degrees  Shldr ER: T1 bilaterally  Shldr IR: T8 bilaterally  Elbow flex/ext: WNL  Wrist flex/ext: WNL  Upper Quarter Strength: Normal  Shldr OH flex: 4/5 bilaterally  Shldr OH abd: 4/5 bilaterally  Shldr ER: 4/5 bilaterally  Shldr IR: 4/5 bilaterally  Elbow flex/ext: 4/5 bilaterally  Wrist flex/ext: 4/5 bilaterally    UPPER EXTREMITY LEFT CALCULATIONS    Flowsheet Row Most Recent Value   Volume UE (mL) 2510   Difference from last visit (mL)  -2510   Difference from first visit (mL)  -2510      UPPER EXTREMITY RIGHT CALCULATIONS    Flowsheet Row Most Recent Value   Volume UE (mL)  2800   Difference from last visit (mL)  -2800   Difference from first visit (mL)  -2800                Precautions: breast cancer, mastectomy to be performed in July 2025, just finished neoadjuvant chemotherapy    Insurance:  AMA/CMS Eval/ Re-eval POC expires FOTO Auth Status Co-Insurance                                                    5/29   2.    3.    4.   5.    6.      7.    8.    9.    10.    11.    12.      13.    14.    15.    16.  17.  18.      19.    20.   21.   22.   23.   24.      25.    26. 27. 28. 29. 30.   31. 32.  33. 34. 35. 36.        Compression Rx 5/29         Discussed OTS 20-30 mm Hg sleeve                 Manual Ther         volumetrics Performed        MLD R UE NV        Compression Bandaging         Wound Care                  Ther Ex         Remedial Exercise                                    Ther Activity & Self Care         Skin care         Garment Fit/Train         Sleeping position                  Pt Education         Edema differential dx         Lymphatic A&P         Compression options

## 2025-06-04 ENCOUNTER — CONSULT (OUTPATIENT)
Dept: RADIATION ONCOLOGY | Facility: HOSPITAL | Age: 55
End: 2025-06-04
Attending: SURGERY
Payer: COMMERCIAL

## 2025-06-04 VITALS
DIASTOLIC BLOOD PRESSURE: 80 MMHG | HEART RATE: 110 BPM | RESPIRATION RATE: 18 BRPM | SYSTOLIC BLOOD PRESSURE: 120 MMHG | TEMPERATURE: 99 F | OXYGEN SATURATION: 98 %

## 2025-06-04 DIAGNOSIS — Z17.0 MALIGNANT NEOPLASM OF OVERLAPPING SITES OF RIGHT BREAST IN FEMALE, ESTROGEN RECEPTOR POSITIVE (HCC): Primary | ICD-10-CM

## 2025-06-04 DIAGNOSIS — C77.3 CARCINOMA OF RIGHT BREAST METASTATIC TO AXILLARY LYMPH NODE (HCC): Primary | ICD-10-CM

## 2025-06-04 DIAGNOSIS — C50.911 CARCINOMA OF RIGHT BREAST METASTATIC TO AXILLARY LYMPH NODE (HCC): ICD-10-CM

## 2025-06-04 DIAGNOSIS — C50.911 CARCINOMA OF RIGHT BREAST METASTATIC TO AXILLARY LYMPH NODE (HCC): Primary | ICD-10-CM

## 2025-06-04 DIAGNOSIS — C77.3 CARCINOMA OF RIGHT BREAST METASTATIC TO AXILLARY LYMPH NODE (HCC): ICD-10-CM

## 2025-06-04 DIAGNOSIS — C50.811 MALIGNANT NEOPLASM OF OVERLAPPING SITES OF RIGHT BREAST IN FEMALE, ESTROGEN RECEPTOR POSITIVE (HCC): Primary | ICD-10-CM

## 2025-06-04 PROCEDURE — 99245 OFF/OP CONSLTJ NEW/EST HI 55: CPT | Performed by: RADIOLOGY

## 2025-06-04 NOTE — PROGRESS NOTES
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 Maternal Aunt      No Known Problems Maternal Aunt      No Known Problems Maternal Aunt      No Known Problems Maternal Aunt      No Known Problems Paternal Aunt      No Known Problems Paternal Aunt      No Known Problems Paternal Aunt      Colonic polyp Maternal Grandmother      Colon cancer Maternal Grandmother      Throat cancer Maternal Grandfather      No Known Problems Paternal Grandmother      No Known Problems Paternal Grandfather     [4]   Social History  Tobacco Use    Smoking status: Former     Current packs/day: 0.00     Average packs/day: 0.3 packs/day for 35.0 years (8.8 ttl pk-yrs)     Types: Cigarettes     Start date: 1988     Quit date: 2024     Years since quittin.6     Passive exposure: Past    Smokeless tobacco: Never    Tobacco comments:     ONE HALF PACK A DAY OR LESS, CURRENT SOME DAY SMOKER, LIGHT TOBACCO SMOKER  AS PER [facility]; PER PT NOT SMOKING 2025   Vaping Use    Vaping status: Some Days   Substance Use Topics    Alcohol use: No    Drug use: No   [5]   Current Outpatient Medications:     acetaminophen (TYLENOL) 500 mg tablet, Take 1 tablet (500 mg total) by mouth every 6 (six) hours as needed for mild pain, Disp: 30 tablet, Rfl: 0    atorvastatin (LIPITOR) 10 mg tablet, Take 1 tablet (10 mg total) by mouth  daily, Disp: 90 tablet, Rfl: 1    Cholecalciferol (VITAMIN D3 PO), Take by mouth in the morning., Disp: , Rfl:     cyclobenzaprine (FLEXERIL) 5 mg tablet, Take 1 tablet (5 mg total) by mouth 3 (three) times a day as needed for muscle spasms, Disp: 30 tablet, Rfl: 0    Diclofenac Sodium (VOLTAREN) 1 %, Apply 2 g topically 4 (four) times a day, Disp: 2 g, Rfl: 0    lidocaine-prilocaine (EMLA) cream, Apply topically as needed for mild pain, Disp: 1 g, Rfl: 0    lisinopril-hydrochlorothiazide (PRINZIDE,ZESTORETIC) 10-12.5 MG per tablet, Take 1 tablet by mouth daily, Disp: 90 tablet, Rfl: 1    ondansetron (ZOFRAN) 4 mg tablet, Take 1 tablet (4 mg total) by mouth every 8 (eight) hours as needed for nausea or vomiting, Disp: 30 tablet, Rfl: 2    ondansetron (ZOFRAN) 4 mg tablet, Take 1 tablet (4 mg total) by mouth every 6 (six) hours, Disp: 12 tablet, Rfl: 0    naproxen (Naprosyn) 500 mg tablet, Take 1 tablet (500 mg total) by mouth 2 (two) times a day with meals for 15 days, Disp: 30 tablet, Rfl: 0  [6] No Known Allergies

## 2025-06-05 ENCOUNTER — PATIENT OUTREACH (OUTPATIENT)
Dept: CASE MANAGEMENT | Facility: OTHER | Age: 55
End: 2025-06-05

## 2025-06-05 NOTE — ASSESSMENT & PLAN NOTE
Ms. Cleaning is a 54-year-old postmenopausal woman who presented with a palpable right axillary mass, workup showing a right breast upper outer quadrant mass and extensive right axillary adenopathy, with no evidence of internal mammary adenopathy.  Biopsy of the right breast mass showed grade 2 invasive lobular carcinoma that is strongly estrogen and progesterone receptor positive and HER2 negative.  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  A separate area in the lower right breast found on MRI showed papillary carcinoma.  Clinical stage cT1c cN1 (vs N2) cM0.  Negative genetics and negative metastatic workup.  Patient has been undergoing neoadjuvant chemotherapy with good tolerance and good clinical response.  There remains a palpable node in the right axilla with a partial clinical response.  She is recommended to undergo right mastectomy and axillary dissection.  She had quit smoking in February 2025 and will be seeing plastic surgery next week for discussion of potential reconstruction.    I explained to the patient and her family that radiation is used postmastectomy to eradicate microscopic deposits of disease which may reside in the breast or regional nodes.  As she presented with multiple suspicious right axillary level 1, 2 and 3 lymph nodes which are biopsy-proven triple negative disease, discordant with the estrogen receptor positive and HER2 negative disease in the breast, these are high risk features for local regional recurrence after mastectomy.  I therefore strongly recommend a course of postmastectomy radiation to the right chest wall and regional nodes.  Radiation would began as soon as feasible after wound healing, typically 4 to 6 weeks postoperatively.  I described the procedure involved in radiation to the right chest wall and regional nodes.  I described the potential acute long-term side effects of postmastectomy radiation.  Acutely she may experience localized  skin irritation, soft tissue swelling, pneumonitis.  She will be at risk for acute and chronic lymphedema of the right arm given the use of both axillary dissection and yoseph radiation.  Late effects can include the possibility of fibrosis in the treated tissues and delays in subsequent reconstruction procedures, depending upon the the technique selected.  I explained that although it is acceptable to place a tissue expander at the time of mastectomy, this should not be overinflated or fully inflated so as to allow for adequate coverage of the radiation target volumes while maintaining dose constraints to organs at risk, in particular the right lung.  Patient and family expressed understanding these recommendations.  She is in agreement with proceeding with right postmastectomy radiation.  A follow-up and CT simulation was scheduled for 1 month postoperatively.

## 2025-06-05 NOTE — PROGRESS NOTES
Consultation Visit   Name: Romina Cleaning      : 1970      MRN: [id number]  Encounter Provider: Radha Mariano MD  Encounter Date: 2025   Encounter department: Counts include 234 beds at the [facility] RADIATION ONCOLOGY  :  Assessment & Plan  Malignant neoplasm of overlapping sites of right breast in female, estrogen receptor positive (HCC)         Carcinoma of right breast metastatic to axillary lymph node (HCC)      Ms. Cleaning is a 54-year-old postmenopausal woman who presented with a palpable right axillary mass, workup showing a right breast upper outer quadrant mass and extensive right axillary adenopathy, with no evidence of internal mammary adenopathy.  Biopsy of the right breast mass showed grade 2 invasive lobular carcinoma that is strongly estrogen and progesterone receptor positive and HER2 negative.  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  A separate area in the lower right breast found on MRI showed papillary carcinoma.  Clinical stage cT1c cN1 (vs N2) cM0.  Negative genetics and negative metastatic workup.  Patient has been undergoing neoadjuvant chemotherapy with good tolerance and good clinical response.  There remains a palpable node in the right axilla with a partial clinical response.  She is recommended to undergo right mastectomy and axillary dissection.  She had quit smoking in 2025 and will be seeing plastic surgery next week for discussion of potential reconstruction.    I explained to the patient and her family that radiation is used postmastectomy to eradicate microscopic deposits of disease which may reside in the breast or regional nodes.  As she presented with multiple suspicious right axillary level 1, 2 and 3 lymph nodes which are biopsy-proven triple negative disease, discordant with the estrogen receptor positive and HER2 negative disease in the breast, these are high risk features for local regional recurrence after mastectomy.  I  therefore strongly recommend a course of postmastectomy radiation to the right chest wall and regional nodes.  Radiation would began as soon as feasible after wound healing, typically 4 to 6 weeks postoperatively.  I described the procedure involved in radiation to the right chest wall and regional nodes.  I described the potential acute long-term side effects of postmastectomy radiation.  Acutely she may experience localized skin irritation, soft tissue swelling, pneumonitis.  She will be at risk for acute and chronic lymphedema of the right arm given the use of both axillary dissection and yoseph radiation.  Late effects can include the possibility of fibrosis in the treated tissues and delays in subsequent reconstruction procedures, depending upon the the technique selected.  I explained that although it is acceptable to place a tissue expander at the time of mastectomy, this should not be overinflated or fully inflated so as to allow for adequate coverage of the radiation target volumes while maintaining dose constraints to organs at risk, in particular the right lung.  Patient and family expressed understanding these recommendations.  She is in agreement with proceeding with right postmastectomy radiation.  A follow-up and CT simulation was scheduled for 1 month postoperatively.         History of Present Illness   Chief Complaint   Patient presents with    Consult     Radiation Oncology   Pertinent Medical History     Ms. Cleaning is a 54-year-old postmenopausal woman who presented with a self palpated mass in the right axilla in September 2024 and underwent bilateral diagnostic mammogram and ultrasound on September 18, 2024.  This study showed extremely dense breast tissue with architectural distortion in the upper outer right breast at 10:00 measuring 1.8 cm, with multiple suspicious right axillary nodes with cortical thickening, measuring up to 2.4 cm.  No suspicious findings seen in the left breast.  On  September 20, 2024 she underwent right breast biopsy at 7 o'clock position showing grade 2 invasive lobular carcinoma, estrogen receptor 95%, progesterone receptor 70%, HER2 negative.  Right axillary lymph node biopsy was positive for metastatic disease which was negative for estrogen and progesterone receptor, HER2 negative.  Bilateral breast MRI on October 23, 2024 demonstrated a 2 cm irregular heterogeneous enhancing mass in the upper outer quadrant of the right breast at 10:00, bulky right axillary adenopathy measuring up to 3.7 cm and a 6 mm mass in the right breast 5 o'clock position with heterogeneous enhancement, no suspicious enhancement in the left breast and no internal mammary adenopathy.  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  Metastatic workup including CT of the chest, abdomen and pelvis on October 14, 2024 which showed right axillary and subpectoral adenopathy with no distant metastatic disease, a stable right hepatic lobe hemangioma, and a bone scan which showed no osseous metastases.  Clinical stage cT1c cN1 cM0.  Genetic testing showed no pathogenic variants.  Because of the two different molecular subtypes found on pathology, she is being treated initially with neoadjuvant chemotherapy for triple negative disease.    She has been undergoing neoadjuvant chemotherapy consisting of carboplatin and paclitaxel with pembrolizumab starting November 14, 2024.  She had a reaction to paclitaxel and was thereafter switched to Abraxane.  This was followed by 4 cycles of cyclophosphamide and doxorubicin with pembrolizumab.  She completed all planned cycles on May 21, 2025.  During chemotherapy she has done well.  She has had clinical response during neoadjuvant chemotherapy.  She was seen by Dr. Philippe 2 weeks ago at which time she recommended a right mastectomy with axillary node dissection.  She has been  referred preoperatively for lymphedema assessment.  She is interested in reconstruction but had not previously met with plastic surgery as she had been smoking; she states that she quit in February 2025.  She has therefore been referred to plastic surgery and will meet with Dr. Fox on June 11, 2025.  She does have surgery date scheduled for July 1, 2025.  She is referred by Dr. Philippe preoperatively for information regarding the role of postmastectomy radiation therapy for right breast node positive disease with a significant burden of axillary adenopathy at presentation.    Oncology History   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 (HCC)  Staging form: Breast, AJCC 8th Edition  - Clinical stage from 9/20/2024: cT0, cN1(f), cM0, GX, ER-, WI-, HER2- - Signed by Jodie Philippe MD on 10/7/2024  Stage prefix: Initial diagnosis  Method of lymph node assessment: Core biopsy  Histologic grading system: 3 grade system    Malignant neoplasm of overlapping sites of right breast in female, estrogen receptor positive (HCC)  Staging form: Breast, AJCC 8th Edition  - Clinical stage from 9/20/2024: Stage IB (cT1c, cN1, cM0, G2, ER+, WI+, HER2-) - Signed by Jodie Philippe MD on 5/14/2025  Stage prefix: Initial diagnosis  Histologic grading system: 3 grade system  Oncology History   Carcinoma of right breast metastatic to axillary lymph node (HCC)   6/11/2024 Initial Diagnosis    Carcinoma of right breast metastatic to axillary lymph node (HCC)     9/20/2024 -  Cancer Staged    Staging form: Breast, AJCC 8th Edition  - Clinical stage from 9/20/2024: cT0, cN1(f), cM0, GX, ER-, WI-, HER2- - Signed by Jodie Philippe MD on 10/7/2024  Stage prefix: Initial diagnosis  Method of lymph node assessment: Core biopsy  Histologic grading system: 3 grade system       10/14/2024 Observation    CT CAP:  1. Enlarged right axillary and subpectoral lymphadenopathy correlating to known history of breast cancer. No  pulmonary nodules  No mediastinal or hilar lymphadenopathy. No contralateral axillary lymphadenopathy. No intra-abdominal metastatic disease. No adrenal or hepatic metastatic lesions. No suspicious osseous lesions     2. Stable right hepatic lobe hemangioma and cyst.     3. Stable prominent central mesenteric lymph nodes unchanged since 2018 unrelated to patient's breast cancer.    NM BONE SCAN:    1. No scintigraphic evidence of osseous metastasis.      11/14/2024 -  Chemotherapy    DOXOrubicin (ADRIAMYCIN), 60 mg/m2 = 103 mg, 4 of 4 cycles  Administration: 103 mg (3/19/2025), 103 mg (4/9/2025), 103 mg (4/30/2025), 103 mg (5/21/2025)  cyclophosphamide (CYTOXAN) IVPB, 600 mg/m2 = 1,026 mg, 4 of 4 cycles  Administration: 1,000 mg (3/19/2025), 1,000 mg (4/9/2025), 1,000 mg (4/30/2025), 1,000 mg (5/21/2025)  paclitaxel protein-bound (ABRAXANE) IVPB, 100 mg/m2 = 171 mg (original dose ), 4 of 4 cycles  Dose modification: 100 mg/m2 (original dose 100 mg/m2, Cycle 1)  Administration: 171 mg (12/5/2024), 171 mg (12/12/2024), 171 mg (12/19/2024), 171 mg (1/9/2025), 171 mg (1/16/2025), 171 mg (1/23/2025), 171 mg (1/30/2025), 171 mg (2/7/2025), 171 mg (2/13/2025), 171 mg (2/20/2025)  CARBOplatin (PARAPLATIN) IVPB (GO AUC DOSING), 180 mg, 4 of 4 cycles  Administration: 180 mg (11/14/2024), 180 mg (11/21/2024), 180 mg (12/5/2024), 180 mg (12/12/2024), 180 mg (12/19/2024), 180 mg (1/9/2025), 180 mg (1/16/2025), 180 mg (1/23/2025), 180 mg (1/30/2025), 180 mg (2/7/2025), 180 mg (2/13/2025), 180 mg (2/20/2025)  PACLItaxel (TAXOL) chemo IVPB, 80 mg/m2 = 136.8 mg, 1 of 1 cycle  Administration: 136.8 mg (11/14/2024), 136.8 mg (11/21/2024)  pembrolizumab (KEYTRUDA) IVPB, 200 mg, 8 of 17 cycles  Administration: 200 mg (11/14/2024), 200 mg (3/19/2025), 200 mg (12/12/2024), 200 mg (1/9/2025), 200 mg (1/30/2025), 200 mg (4/9/2025), 200 mg (4/30/2025), 200 mg (5/21/2025)     Malignant neoplasm of overlapping sites of right breast in female,  estrogen receptor positive (HCC)   9/20/2024 Biopsy    Right breast ultrasound-guided biopsy  A. 10 o'clock, 7 cm from nipple (MARTHA)  Invasive lobular carcinoma  Grade 2  ER >95, MI 70, HER2 0  Lymphovascular invasion not identified    B. Right axillary lymph node biopsy (MARTHA)  Infiltrating carcinoma  Although no definitive capsule is noted, it is favored to represent lymph node involvement by th e carcinoma  ER <1, MI <1, HER2 1+    Concordant. Malignancy appears unifocal; suspicious masses cover an area up to 1.8 cm. US of right axilla showed multiple enlarged, morphologically abnormal axillary lymph nodes with biopsy confirmed metastatic disease. Left breast clear.     9/20/2024 -  Cancer Staged    Staging form: Breast, AJCC 8th Edition  - Clinical stage from 9/20/2024: Stage IB (cT1c, cN1, cM0, G2, ER+, MI+, HER2-) - Signed by Jodie Philippe MD on 5/14/2025  Stage prefix: Initial diagnosis  Histologic grading system: 3 grade system       9/24/2024 Genomic Testing    MammaPrint/BluePrint ordered on breast specimen block A  Ultra-Low risk - luminal A     9/27/2024 Genetic Testing    Genetic testing with RNA analysis ordered  [place]    NEGATIVE     10/14/2024 Observation    CT CAP:  1. Enlarged right axillary and subpectoral lymphadenopathy correlating to known history of breast cancer. No pulmonary nodules  No mediastinal or hilar lymphadenopathy. No contralateral axillary lymphadenopathy. No intra-abdominal metastatic disease. No adrenal or hepatic metastatic lesions. No suspicious osseous lesions     2. Stable right hepatic lobe hemangioma and cyst.     3. Stable prominent central mesenteric lymph nodes unchanged since 2018 unrelated to patient's breast cancer.    NM BONE SCAN:    1. No scintigraphic evidence of osseous metastasis.      10/23/2024 Observation    B/L Breast MRI    IMPRESSION:   Redemonstration of a spiculated mass in the upper outer right breast in keeping with biopsy-proven invasive lobular  carcinoma and bulky right axillary lymphadenopathy in keeping with metastatic nodes.  Indeterminate 6 mm mass at the 5 o'clock position right breast for which further characterization with targeted ultrasound is recommended.  If there is no ultrasound correlate MRI guided biopsy could be performed.  No suspicious enhancement in the left breast.       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 No definitive invasion carcinoma present on examined sections.  ER , [place]   8mm        Review of Systems Refer to nursing note.       Objective   /80 (BP Location: Left arm, Patient Position: Sitting, Cuff Size: Standard)   Pulse (!) 110   Temp 99 °F (37.2 °C) (Temporal)   Resp 18   LMP  (LMP Unknown) Comment: IUD  SpO2 98%     Pain Screening:  Pain Score: 0-No pain  ECOG 0   Physical Exam  Vitals and nursing note reviewed.   Constitutional:       General: She is not in acute distress.     Appearance: Normal appearance.   HENT:      Nose: No congestion.     Eyes:      General: No scleral icterus.     Extraocular Movements: Extraocular movements intact.      Pupils: Pupils are equal, round, and reactive to light.     Pulmonary:      Effort: Pulmonary effort is normal. No respiratory distress.   Chest:   Breasts:     Right: No mass, skin change or tenderness.      Left: Normal.      Comments: Right breast: Biopsy puncture sites noted, no discrete palpable mass but thickening in the upper outer breast, 2 cm palpable right axillary node, mobile, no palpable supraclavicular adenopathy.    Musculoskeletal:         General: No swelling. Normal range of motion.      Cervical back: Normal range of motion.   Lymphadenopathy:      Cervical: No cervical adenopathy.      Upper Body:      Right upper body: Axillary adenopathy present. No supraclavicular adenopathy.      Left upper body: No supraclavicular or axillary adenopathy.      Skin:     General: Skin is warm and dry.     Neurological:      General: No focal deficit present.      Mental Status: She is alert and oriented to person, place, and time.     Psychiatric:         Mood and Affect: Mood normal.         Thought Content: Thought content normal.         Judgment: Judgment normal.          Radiology Results: I have reviewed radiology images/reports described above. Radiology Results Review: I have reviewed radiology reports from 2024-25 including: CT chest, CT abdomen/pelvis, procedure reports, Echocardiogram, Mammogram, Breast Ultrasound, and Breast MRI.  Other Study Results Review : Pathology reports reviewed.    Administrative Statements   I have spent a total time of 60 minutes in caring for this patient on the day of the visit/encounter including Diagnostic results, Prognosis, Risks and benefits of tx options, Instructions for management, Patient and family education, Risk factor reductions, Counseling / Coordination of care, Documenting in the medical record, Reviewing/placing orders in the medical record (including tests, medications, and/or procedures), and Obtaining or reviewing history  .

## 2025-06-10 ENCOUNTER — TELEPHONE (OUTPATIENT)
Age: 55
End: 2025-06-10

## 2025-06-10 NOTE — TELEPHONE ENCOUNTER
Patient returning call I confirmed patients email and she said she can receive it and will be emailed back to us [email or web address]

## 2025-06-10 NOTE — TELEPHONE ENCOUNTER
Patient called stating she is scheduled for a consult with Dr. Fox tomorrow at 4pm and is having issues with transportation but does not want  to miss the appointment is there a way we could offer transportation to her. Call back number [phone]

## 2025-06-10 NOTE — TELEPHONE ENCOUNTER
LVM for pt regarding booking ride share trip to her appt 06/11 with Dr Fox. Informed pt that we will need to have this form emailed and filled out so we can get the ride share set up for patient to make it to her appointment. Please review email with patient so we can send this form to her email. If patient is unable to provide email or send form back through email, please advise pt that she will need to fill out the form when she gets to the office.

## 2025-06-11 ENCOUNTER — OFFICE VISIT (OUTPATIENT)
Dept: PHYSICAL THERAPY | Facility: CLINIC | Age: 55
End: 2025-06-11
Attending: SURGERY
Payer: COMMERCIAL

## 2025-06-11 ENCOUNTER — CONSULT (OUTPATIENT)
Age: 55
End: 2025-06-11
Payer: COMMERCIAL

## 2025-06-11 VITALS
BODY MASS INDEX: 22.98 KG/M2 | SYSTOLIC BLOOD PRESSURE: 122 MMHG | DIASTOLIC BLOOD PRESSURE: 76 MMHG | WEIGHT: 143 LBS | HEIGHT: 66 IN

## 2025-06-11 DIAGNOSIS — Z17.0 MALIGNANT NEOPLASM OF OVERLAPPING SITES OF RIGHT BREAST IN FEMALE, ESTROGEN RECEPTOR POSITIVE (HCC): Primary | ICD-10-CM

## 2025-06-11 DIAGNOSIS — I89.0 LYMPHEDEMA: Primary | ICD-10-CM

## 2025-06-11 DIAGNOSIS — C50.811 MALIGNANT NEOPLASM OF OVERLAPPING SITES OF RIGHT BREAST IN FEMALE, ESTROGEN RECEPTOR POSITIVE (HCC): Primary | ICD-10-CM

## 2025-06-11 DIAGNOSIS — Z17.0 MALIGNANT NEOPLASM OF RIGHT BREAST IN FEMALE, ESTROGEN RECEPTOR POSITIVE, UNSPECIFIED SITE OF BREAST (HCC): ICD-10-CM

## 2025-06-11 DIAGNOSIS — C50.911 MALIGNANT NEOPLASM OF RIGHT BREAST IN FEMALE, ESTROGEN RECEPTOR POSITIVE, UNSPECIFIED SITE OF BREAST (HCC): ICD-10-CM

## 2025-06-11 PROCEDURE — 99205 OFFICE O/P NEW HI 60 MIN: CPT | Performed by: STUDENT IN AN ORGANIZED HEALTH CARE EDUCATION/TRAINING PROGRAM

## 2025-06-11 PROCEDURE — 97530 THERAPEUTIC ACTIVITIES: CPT | Performed by: PHYSICAL THERAPIST

## 2025-06-11 PROCEDURE — 97110 THERAPEUTIC EXERCISES: CPT | Performed by: PHYSICAL THERAPIST

## 2025-06-11 NOTE — TELEPHONE ENCOUNTER
Received call from patient stating she emailed the form for the share ride.    Patient wanted confirmation if we had received it.    I asked the MR's through secure chat and Kamilla BETH responded that it was received and roundtrip was ordered.    I told patient that it was received and the ride was ordered.    Patient then asked what time will they be picking her up?    I told patient that I was told 3:20.    Patient verbalized understanding.

## 2025-06-11 NOTE — PROGRESS NOTES
Daily Note     Today's date: 2025  Patient name: Romina Cleaning  : 1970  MRN: [id number]  Referring provider: Jodie Philippe MD  Dx: No diagnosis found.               Subjective: Patient reports that she has come to PT for strengthening prior to surgery..  She notes no c/o pain      Objective: See treatment diary below      Assessment: Tolerated treatment well. Patient would benefit from continued PT      Plan: Continue per plan of care.      Compression Rx         Discussed OTS 20-30 mm Hg sleeve                 Manual Ther         volumetrics Performed        MLD R UE NV        Compression Bandaging         Wound Care                  Ther Ex         pulley  5'       Bicep curls  4# x 20       Bent over rows  4# x 20       Tricep pushback  4# x 20       bike  6'       Skin care         Garment Fit/Train         Sleeping position         TB Row/Ext  GTB x 20       Pt Education                           Compression options

## 2025-06-12 ENCOUNTER — TELEPHONE (OUTPATIENT)
Dept: SURGICAL ONCOLOGY | Facility: CLINIC | Age: 55
End: 2025-06-12

## 2025-06-12 NOTE — TELEPHONE ENCOUNTER
Called patient and left a message regarding surgery date of 7/1/25 at [facility] with Dr Philippe and plastic surgeon Dr Fox. Pre op instruction and post op appointment given. I stated that I will send written instruction in mail as well and she can call be back at [phone] if she has any questions.   Detail Level: Simple Price (Do Not Change): 0.00 Instructions: This plan will send the code FBSE to the PM system.  DO NOT or CHANGE the price.

## 2025-06-12 NOTE — PROGRESS NOTES
Name: Romina Cleaning      : 1970      MRN: [id number]  Encounter Provider: Mary Anne Fox DO  Encounter Date: 2025   Encounter department: [facility] PLASTIC AND RECONSTRUCTIVE SURGERY [facility]  :  Assessment & Plan  Malignant neoplasm of overlapping sites of right breast in female, estrogen receptor positive (HCC)         We had a long discussion regarding the spectrum of breast reconstruction options ranging from none, aesthetic flat closure, external prosthetic, implants based reconstruction (TE vs direct), and autologous (flap vs fat transfer).  We also discussed the immediate vs delayed pathways.  We discussed the potential need for revisions in the future.  We discussed all the procedures, risks, benefits, alternatives and postoperative instructions and expectations.  I explicitly explained that this is a large, team based approach where oncologic treatment is the ultimate goal and thus the timeline for reconstruction is variable depending on oncologic therapy required.  I also explicitly stated that choosing a mastectomy is an oncologic decision to remove breast tissue - thus, reconstruction is an attempt to provide contour but will never re-establish the patient's native breast tissue or appearance.  We also discussed the purpose of the spectrum as this can be moved throughout due to the patient's choice but also may be necessary due to complications associated with breast reconstruction.    I do think this patient is a candidate for a variety of options based on her tumor, extirpative plan, anatomy and goals.  I believe at this time she is deciding between flat closure and a tissue expander such that treatment is a priority with the understanding that reconstruction could be completed in moreso of a delayed fashion.   Her breasts are rather small to begin with and she is not interested in a significant change in her contour.      I recommend the patient digest our  comprehensive discussion and to prepare questions after speaking with her family/support system.  I will plan to see her back within the next few weeks to finalize our reconstructive plan.  I also provided my contact information should any immediate questions arise.      We will begin to coordinate a date with Dr. Philippe.      History of Present Illness   HPI  Romina Cleaning is a 54 y.o. female who presents with right breast cancer.  She has completed neoadjuvant chemotherapy.  She will require XRT.  She plans to undergo right mastectomy.  She also has stopped using nicotine months ago.  She reports no intention to resume this.  Her weight has been stable.  She is uncertain as to what her goal is for reconstruction at this time.  Interpretive services were utilized for the entirety of the exam and interview.     History obtained from: patient    Review of Systems  Medical History Reviewed by provider this encounter:  Tobacco  Allergies  Meds  Problems  Med Hx  Surg Hx  Fam Hx     .  Past Medical History   Past Medical History[1]  Past Surgical History[2]  Family History[3]   reports that she quit smoking about 8 months ago. Her smoking use included cigarettes. She started smoking about 37 years ago. She has a 8.8 pack-year smoking history. She has been exposed to tobacco smoke. She has never used smokeless tobacco. She reports that she does not drink alcohol and does not use drugs.  Current Outpatient Medications   Medication Instructions    acetaminophen (TYLENOL) 500 mg, Oral, Every 6 hours PRN    atorvastatin (LIPITOR) 10 mg, Oral, Daily    Cholecalciferol (VITAMIN D3 PO) Daily    cyclobenzaprine (FLEXERIL) 5 mg, Oral, 3 times daily PRN    Diclofenac Sodium (VOLTAREN) 2 g, Topical, 4 times daily    lidocaine-prilocaine (EMLA) cream Topical, As needed    lisinopril-hydrochlorothiazide (PRINZIDE,ZESTORETIC) 10-12.5 MG per tablet 1 tablet, Oral, Daily    naproxen (NAPROSYN) 500 mg, Oral, 2 times daily  "with meals    ondansetron (ZOFRAN) 4 mg, Oral, Every 8 hours PRN    ondansetron (ZOFRAN) 4 mg, Oral, Every 6 hours   Allergies[4]   Medications Ordered Prior to Encounter[5]   Social History[6]     Objective   /76   Ht 5' 6\" (1.676 m)   Wt 64.9 kg (143 lb)   LMP  (LMP Unknown) Comment: IUD  BMI 23.08 kg/m²      Physical Exam  General: NAD, well appearing, AAOx3  HEENT: NCAT, EOMI, MMM, supple  Resp: Nonlabored  Heart: RRR  Abdomen: Soft, ND, NT  Extremities/MSK: no LE edema, no obvious deficits in ROM  Neuro: grossly intact with no obvious deficits  Skin: no obvious lesions or rashes  Breast: R palpable mass along anterior axillary/pec border,  grade I ptosis, loss of superior pole fullness, mild tuberous nature of breasts     Administrative Statements   I have spent a total time of 60 minutes in caring for this patient on the day of the visit/encounter including Diagnostic results, Prognosis, Risks and benefits of tx options, Instructions for management, Patient and family education, Importance of tx compliance, Risk factor reductions, Impressions, Counseling / Coordination of care, Documenting in the medical record, Reviewing/placing orders in the medical record (including tests, medications, and/or procedures), and Communicating with other healthcare professionals .       [1]   Past Medical History:  Diagnosis Date    Breast cancer (HCC)     GERD (gastroesophageal reflux disease)     Headache     Hematuria     Hypertension     IUD (intrauterine device) in place 02/15/2019    Mirena placed 2/2015 effective through 2/2020      Lateral epicondylitis, right elbow 01/08/2019    Panic attacks     Psychiatric disorder    [2]   Past Surgical History:  Procedure Laterality Date    BREAST BIOPSY Right 09/20/2024    BREAST BIOPSY Right 09/20/2024    BREAST BIOPSY Right 11/01/2024    CYSTOSCOPY  06/08/2018    IR PORT PLACEMENT  11/6/2024    NO PAST SURGERIES      US GUIDED BREAST BIOPSY RIGHT COMPLETE Right " 09/20/2024    US GUIDED BREAST BIOPSY RIGHT COMPLETE Right 11/1/2024    US GUIDED BREAST LYMPH NODE BIOPSY RIGHT Right 09/20/2024   [3]   Family History  Problem Relation Name Age of Onset    Hypertension Mother Renetta Lira     Colonic polyp Mother Renetta Lira     Colon cancer Mother Renetta Lira     Arthritis Father Abhi Morrowa     Diabetes Father Abhi Cleaning     Hypertension Father Abhi Morrowa     Hyperlipidemia Father Abhi Cleaning     No Known Problems Sister      No Known Problems Sister      No Known Problems Son      No Known Problems Daughter      No Known Problems Maternal Aunt      No Known Problems Maternal Aunt      No Known Problems Maternal Aunt      No Known Problems Maternal Aunt      No Known Problems Maternal Aunt      No Known Problems Paternal Aunt      No Known Problems Paternal Aunt      No Known Problems Paternal Aunt      Colonic polyp Maternal Grandmother      Colon cancer Maternal Grandmother      Throat cancer Maternal Grandfather      No Known Problems Paternal Grandmother      No Known Problems Paternal Grandfather     [4] No Known Allergies  [5]   Current Outpatient Medications on File Prior to Visit   Medication Sig Dispense Refill    acetaminophen (TYLENOL) 500 mg tablet Take 1 tablet (500 mg total) by mouth every 6 (six) hours as needed for mild pain 30 tablet 0    atorvastatin (LIPITOR) 10 mg tablet Take 1 tablet (10 mg total) by mouth daily 90 tablet 1    Cholecalciferol (VITAMIN D3 PO) Take by mouth in the morning.      cyclobenzaprine (FLEXERIL) 5 mg tablet Take 1 tablet (5 mg total) by mouth 3 (three) times a day as needed for muscle spasms 30 tablet 0    Diclofenac Sodium (VOLTAREN) 1 % Apply 2 g topically 4 (four) times a day 2 g 0    lidocaine-prilocaine (EMLA) cream Apply topically as needed for mild pain 1 g 0    lisinopril-hydrochlorothiazide (PRINZIDE,ZESTORETIC) 10-12.5 MG per tablet Take 1 tablet by mouth daily 90 tablet 1    naproxen (Naprosyn) 500 mg  tablet Take 1 tablet (500 mg total) by mouth 2 (two) times a day with meals for 15 days 30 tablet 0    ondansetron (ZOFRAN) 4 mg tablet Take 1 tablet (4 mg total) by mouth every 8 (eight) hours as needed for nausea or vomiting 30 tablet 2    ondansetron (ZOFRAN) 4 mg tablet Take 1 tablet (4 mg total) by mouth every 6 (six) hours 12 tablet 0     No current facility-administered medications on file prior to visit.   [6]   Social History  Tobacco Use    Smoking status: Former     Current packs/day: 0.00     Average packs/day: 0.3 packs/day for 35.0 years (8.8 ttl pk-yrs)     Types: Cigarettes     Start date: 1988     Quit date: 2024     Years since quittin.7     Passive exposure: Past    Smokeless tobacco: Never    Tobacco comments:     ONE HALF PACK A DAY OR LESS, CURRENT SOME DAY SMOKER, LIGHT TOBACCO SMOKER  AS PER [facility]; PER PT NOT SMOKING 2025   Vaping Use    Vaping status: Some Days   Substance and Sexual Activity    Alcohol use: No    Drug use: No    Sexual activity: Not Currently     Partners: Male     Birth control/protection: I.U.D.

## 2025-06-13 ENCOUNTER — PATIENT OUTREACH (OUTPATIENT)
Dept: CASE MANAGEMENT | Facility: OTHER | Age: 55
End: 2025-06-13

## 2025-06-13 NOTE — PROGRESS NOTES
OSW placed initial outreach call to Sra. Cleaning using [employer]  #[id number]. She did not answer, however a message was provided along with return call information. Will continue to follow.

## 2025-06-17 ENCOUNTER — APPOINTMENT (OUTPATIENT)
Dept: LAB | Facility: CLINIC | Age: 55
End: 2025-06-17
Attending: INTERNAL MEDICINE
Payer: COMMERCIAL

## 2025-06-17 ENCOUNTER — TELEPHONE (OUTPATIENT)
Age: 55
End: 2025-06-17

## 2025-06-17 DIAGNOSIS — C50.911 CARCINOMA OF RIGHT BREAST METASTATIC TO AXILLARY LYMPH NODE (HCC): ICD-10-CM

## 2025-06-17 DIAGNOSIS — C77.3 CARCINOMA OF RIGHT BREAST METASTATIC TO AXILLARY LYMPH NODE (HCC): ICD-10-CM

## 2025-06-17 LAB
ALBUMIN SERPL BCG-MCNC: 4.4 G/DL (ref 3.5–5)
ALP SERPL-CCNC: 107 U/L (ref 34–104)
ALT SERPL W P-5'-P-CCNC: 27 U/L (ref 7–52)
ANION GAP SERPL CALCULATED.3IONS-SCNC: 5 MMOL/L (ref 4–13)
AST SERPL W P-5'-P-CCNC: 24 U/L (ref 13–39)
BASOPHILS # BLD AUTO: 0.05 THOUSANDS/ÂΜL (ref 0–0.1)
BASOPHILS NFR BLD AUTO: 1 % (ref 0–1)
BILIRUB SERPL-MCNC: 0.42 MG/DL (ref 0.2–1)
BUN SERPL-MCNC: 9 MG/DL (ref 5–25)
CALCIUM SERPL-MCNC: 9.3 MG/DL (ref 8.4–10.2)
CHLORIDE SERPL-SCNC: 104 MMOL/L (ref 96–108)
CO2 SERPL-SCNC: 31 MMOL/L (ref 21–32)
CREAT SERPL-MCNC: 0.49 MG/DL (ref 0.6–1.3)
EOSINOPHIL # BLD AUTO: 0.68 THOUSAND/ÂΜL (ref 0–0.61)
EOSINOPHIL NFR BLD AUTO: 16 % (ref 0–6)
ERYTHROCYTE [DISTWIDTH] IN BLOOD BY AUTOMATED COUNT: 14.5 % (ref 11.6–15.1)
GFR SERPL CREATININE-BSD FRML MDRD: 112 ML/MIN/1.73SQ M
GLUCOSE SERPL-MCNC: 98 MG/DL (ref 65–140)
HCT VFR BLD AUTO: 32.2 % (ref 34.8–46.1)
HGB BLD-MCNC: 10.1 G/DL (ref 11.5–15.4)
IMM GRANULOCYTES # BLD AUTO: 0 THOUSAND/UL (ref 0–0.2)
IMM GRANULOCYTES NFR BLD AUTO: 0 % (ref 0–2)
LYMPHOCYTES # BLD AUTO: 0.82 THOUSANDS/ÂΜL (ref 0.6–4.47)
LYMPHOCYTES NFR BLD AUTO: 19 % (ref 14–44)
MCH RBC QN AUTO: 32.3 PG (ref 26.8–34.3)
MCHC RBC AUTO-ENTMCNC: 31.4 G/DL (ref 31.4–37.4)
MCV RBC AUTO: 103 FL (ref 82–98)
MONOCYTES # BLD AUTO: 0.28 THOUSAND/ÂΜL (ref 0.17–1.22)
MONOCYTES NFR BLD AUTO: 7 % (ref 4–12)
NEUTROPHILS # BLD AUTO: 2.5 THOUSANDS/ÂΜL (ref 1.85–7.62)
NEUTS SEG NFR BLD AUTO: 57 % (ref 43–75)
NRBC BLD AUTO-RTO: 0 /100 WBCS
PLATELET # BLD AUTO: 285 THOUSANDS/UL (ref 149–390)
PMV BLD AUTO: 10.7 FL (ref 8.9–12.7)
POTASSIUM SERPL-SCNC: 4 MMOL/L (ref 3.5–5.3)
PROT SERPL-MCNC: 7.2 G/DL (ref 6.4–8.4)
RBC # BLD AUTO: 3.13 MILLION/UL (ref 3.81–5.12)
SODIUM SERPL-SCNC: 140 MMOL/L (ref 135–147)
T3FREE SERPL-MCNC: 3.56 PG/ML (ref 2.5–3.9)
TSH SERPL DL<=0.05 MIU/L-ACNC: 1.01 UIU/ML (ref 0.45–4.5)
WBC # BLD AUTO: 4.33 THOUSAND/UL (ref 4.31–10.16)

## 2025-06-17 PROCEDURE — 84481 FREE ASSAY (FT-3): CPT

## 2025-06-17 PROCEDURE — 36415 COLL VENOUS BLD VENIPUNCTURE: CPT

## 2025-06-17 PROCEDURE — 80053 COMPREHEN METABOLIC PANEL: CPT

## 2025-06-17 PROCEDURE — 85025 COMPLETE CBC W/AUTO DIFF WBC: CPT

## 2025-06-17 PROCEDURE — 84443 ASSAY THYROID STIM HORMONE: CPT

## 2025-06-17 NOTE — TELEPHONE ENCOUNTER
The patient is scheduled for an appointment tomorrow at 4 pm and will not have transportation. She asked if we can assist her with transportation for her visit? The patient can be reached back at [phone]     Call assistance provided by Angela  #[phone]

## 2025-06-18 ENCOUNTER — OFFICE VISIT (OUTPATIENT)
Age: 55
End: 2025-06-18
Payer: COMMERCIAL

## 2025-06-18 DIAGNOSIS — Z17.0 MALIGNANT NEOPLASM OF OVERLAPPING SITES OF RIGHT BREAST IN FEMALE, ESTROGEN RECEPTOR POSITIVE (HCC): Primary | ICD-10-CM

## 2025-06-18 DIAGNOSIS — C50.811 MALIGNANT NEOPLASM OF OVERLAPPING SITES OF RIGHT BREAST IN FEMALE, ESTROGEN RECEPTOR POSITIVE (HCC): Primary | ICD-10-CM

## 2025-06-18 PROCEDURE — 99215 OFFICE O/P EST HI 40 MIN: CPT | Performed by: STUDENT IN AN ORGANIZED HEALTH CARE EDUCATION/TRAINING PROGRAM

## 2025-06-18 NOTE — PROGRESS NOTES
Name: Romina Cleaning      : 1970      MRN: [id number]  Encounter Provider: Mary Anne Fox DO  Encounter Date: 2025   Encounter department: [facility] PLASTIC AND RECONSTRUCTIVE SURGERY [place]  :  Assessment & Plan  Malignant neoplasm of overlapping sites of right breast in female, estrogen receptor positive (HCC)         We discussed the procedure, risks, benefits, alternatives and postoperative instructions and expectations.  Informed consent obtained.  Implants ordered.  We discussed her increased risks given her recent neoadjuvant therapy and planned adjuvant therapy as well as her history of nicotine use.  She also understands a contralateral symmetry procedure would be performed at a later date.  All patient's questions were answered and she voiced understanding.       History of Present Illness   HPI  Romina Cleaning is a 54 y.o. female who presents preoperative prior to right mastectomy.  She would like to have TE placed at time of mastectomy.  History obtained from: patient and patient's mother    Review of Systems  Medical History Reviewed by provider this encounter:  Tobacco  Allergies  Meds  Problems  Med Hx  Surg Hx  Fam Hx     .  Past Medical History   Past Medical History[1]  Past Surgical History[2]  Family History[3]   reports that she quit smoking about 8 months ago. Her smoking use included cigarettes. She started smoking about 37 years ago. She has a 8.8 pack-year smoking history. She has been exposed to tobacco smoke. She has never used smokeless tobacco. She reports that she does not drink alcohol and does not use drugs.  Current Outpatient Medications   Medication Instructions    acetaminophen (TYLENOL) 500 mg, Oral, Every 6 hours PRN    atorvastatin (LIPITOR) 10 mg, Oral, Daily    Cholecalciferol (VITAMIN D3 PO) Daily    cyclobenzaprine (FLEXERIL) 5 mg, Oral, 3 times daily PRN    Diclofenac Sodium (VOLTAREN) 2 g, Topical, 4 times daily     lidocaine-prilocaine (EMLA) cream Topical, As needed    lisinopril-hydrochlorothiazide (PRINZIDE,ZESTORETIC) 10-12.5 MG per tablet 1 tablet, Oral, Daily    naproxen (NAPROSYN) 500 mg, Oral, 2 times daily with meals    ondansetron (ZOFRAN) 4 mg, Oral, Every 8 hours PRN    ondansetron (ZOFRAN) 4 mg, Oral, Every 6 hours   Allergies[4]   Medications Ordered Prior to Encounter[5]   Social History[6]     Objective   LMP  (LMP Unknown) Comment: IUD     Physical Exam    Administrative Statements   I have spent a total time of 45 minutes in caring for this patient on the day of the visit/encounter including Prognosis, Risks and benefits of tx options, Instructions for management, Patient and family education, Importance of tx compliance, Risk factor reductions, Impressions, Counseling / Coordination of care, Documenting in the medical record, Communicating with other healthcare professionals , and interpretation.       [1]   Past Medical History:  Diagnosis Date    Breast cancer (HCC)     GERD (gastroesophageal reflux disease)     Headache     Hematuria     Hypertension     IUD (intrauterine device) in place 02/15/2019    Mirena placed 2/2015 effective through 2/2020      Lateral epicondylitis, right elbow 01/08/2019    Panic attacks     Psychiatric disorder    [2]   Past Surgical History:  Procedure Laterality Date    BREAST BIOPSY Right 09/20/2024    BREAST BIOPSY Right 09/20/2024    BREAST BIOPSY Right 11/01/2024    CYSTOSCOPY  06/08/2018    IR PORT PLACEMENT  11/6/2024    NO PAST SURGERIES      US GUIDED BREAST BIOPSY RIGHT COMPLETE Right 09/20/2024    US GUIDED BREAST BIOPSY RIGHT COMPLETE Right 11/1/2024    US GUIDED BREAST LYMPH NODE BIOPSY RIGHT Right 09/20/2024   [3]   Family History  Problem Relation Name Age of Onset    Hypertension Mother Renetta Lira     Colonic polyp Mother Renetta Lira     Colon cancer Mother Renettaarmaan Lira     Arthritis Father Abhi Cleaning     Diabetes Father Abhi Cleaning      Hypertension Father Abhi Cleaning     Hyperlipidemia Father Abhi Cleaning     No Known Problems Sister      No Known Problems Sister      No Known Problems Son      No Known Problems Daughter      No Known Problems Maternal Aunt      No Known Problems Maternal Aunt      No Known Problems Maternal Aunt      No Known Problems Maternal Aunt      No Known Problems Maternal Aunt      No Known Problems Paternal Aunt      No Known Problems Paternal Aunt      No Known Problems Paternal Aunt      Colonic polyp Maternal Grandmother      Colon cancer Maternal Grandmother      Throat cancer Maternal Grandfather      No Known Problems Paternal Grandmother      No Known Problems Paternal Grandfather     [4] No Known Allergies  [5]   Current Outpatient Medications on File Prior to Visit   Medication Sig Dispense Refill    acetaminophen (TYLENOL) 500 mg tablet Take 1 tablet (500 mg total) by mouth every 6 (six) hours as needed for mild pain 30 tablet 0    atorvastatin (LIPITOR) 10 mg tablet Take 1 tablet (10 mg total) by mouth daily 90 tablet 1    Cholecalciferol (VITAMIN D3 PO) Take by mouth in the morning.      cyclobenzaprine (FLEXERIL) 5 mg tablet Take 1 tablet (5 mg total) by mouth 3 (three) times a day as needed for muscle spasms 30 tablet 0    Diclofenac Sodium (VOLTAREN) 1 % Apply 2 g topically 4 (four) times a day 2 g 0    lidocaine-prilocaine (EMLA) cream Apply topically as needed for mild pain 1 g 0    lisinopril-hydrochlorothiazide (PRINZIDE,ZESTORETIC) 10-12.5 MG per tablet Take 1 tablet by mouth daily 90 tablet 1    naproxen (Naprosyn) 500 mg tablet Take 1 tablet (500 mg total) by mouth 2 (two) times a day with meals for 15 days 30 tablet 0    ondansetron (ZOFRAN) 4 mg tablet Take 1 tablet (4 mg total) by mouth every 8 (eight) hours as needed for nausea or vomiting 30 tablet 2    ondansetron (ZOFRAN) 4 mg tablet Take 1 tablet (4 mg total) by mouth every 6 (six) hours 12 tablet 0     No current facility-administered  medications on file prior to visit.   [6]   Social History  Tobacco Use    Smoking status: Former     Current packs/day: 0.00     Average packs/day: 0.3 packs/day for 35.0 years (8.8 ttl pk-yrs)     Types: Cigarettes     Start date: 1988     Quit date: 2024     Years since quittin.7     Passive exposure: Past    Smokeless tobacco: Never    Tobacco comments:     ONE HALF PACK A DAY OR LESS, CURRENT SOME DAY SMOKER, LIGHT TOBACCO SMOKER  AS PER [facility]; PER PT NOT SMOKING 2025   Vaping Use    Vaping status: Some Days   Substance and Sexual Activity    Alcohol use: No    Drug use: No    Sexual activity: Not Currently     Partners: Male     Birth control/protection: I.U.D.

## 2025-06-18 NOTE — TELEPHONE ENCOUNTER
Patient's mother calling concerning lyft not being there yet    They were informed would be picked up by 320 for 4pm appt but nobody has shown

## 2025-06-19 ENCOUNTER — OFFICE VISIT (OUTPATIENT)
Dept: PHYSICAL THERAPY | Facility: CLINIC | Age: 55
End: 2025-06-19
Attending: SURGERY
Payer: COMMERCIAL

## 2025-06-19 ENCOUNTER — TELEPHONE (OUTPATIENT)
Dept: FAMILY MEDICINE CLINIC | Facility: CLINIC | Age: 55
End: 2025-06-19

## 2025-06-19 DIAGNOSIS — Z85.3 HISTORY OF BREAST CANCER: Primary | ICD-10-CM

## 2025-06-19 DIAGNOSIS — I89.0 LYMPHEDEMA: Primary | ICD-10-CM

## 2025-06-19 PROCEDURE — 97110 THERAPEUTIC EXERCISES: CPT | Performed by: PHYSICAL THERAPIST

## 2025-06-19 PROCEDURE — 97530 THERAPEUTIC ACTIVITIES: CPT | Performed by: PHYSICAL THERAPIST

## 2025-06-19 NOTE — PROGRESS NOTES
Daily Note     Today's date: 2025  Patient name: Romina Cleaning  : 1970  MRN: [id number]  Referring provider: Jodie Philippe MD  Dx:   Encounter Diagnosis     ICD-10-CM    1. Lymphedema  I89.0           Start Time: 1100  Stop Time: 1140  Total time in clinic (min): 40 minutes    Subjective: Patient with no complaints following LV      Objective: See treatment diary below      Assessment: Tolerated treatment well. Patient would benefit from continued PT      Plan: Patient to return for one additional visit and then DC due to surgery scheduled on 25.       Compression Rx        Discussed OTS 20-30 mm Hg sleeve                 Manual Ther         volumetrics Performed         R UE NV                                   Ther Ex         pulley  5' 5'      Bicep curls  4# x 20 4# 2 x 10      Bent over rows  4# x 20 4# 2 x 10      Tricep pushback  4# x 20 4# 2 x 10      bike  6' 6'      Leg press   75# 2 x 10               TB ER    RBR x 20      TB Row/Ext  GTB x 20 BTB x 20      Pt Education                           Compression options

## 2025-06-20 ENCOUNTER — PATIENT OUTREACH (OUTPATIENT)
Dept: CASE MANAGEMENT | Facility: OTHER | Age: 55
End: 2025-06-20

## 2025-06-20 NOTE — PRE-PROCEDURE INSTRUCTIONS
Pre-Surgery Instructions:   Medication Instructions    acetaminophen (TYLENOL) 500 mg tablet Uses PRN- OK to take day of surgery    atorvastatin (LIPITOR) 10 mg tablet Take night before surgery    Cholecalciferol (VITAMIN D3 PO) Stop taking 7 days prior to surgery.    cyclobenzaprine (FLEXERIL) 5 mg tablet Uses PRN- OK to take day of surgery    lisinopril-hydrochlorothiazide (PRINZIDE,ZESTORETIC) 10-12.5 MG per tablet Hold day of surgery.    ondansetron (ZOFRAN) 4 mg tablet Uses PRN- OK to take day of surgery    Medication instructions for day of surgery reviewed. Please take all instructed medications with only a sip of water. Please do not take any over the counter (non-prescribed) vitamins or supplements for one week prior to date of surgery.      You will receive a call one business day prior to surgery with an arrival time and hospital directions. If your surgery is scheduled on a Monday, the hospital will be calling you on the Friday prior to your surgery. If you have not heard from anyone by 8pm, please call the hospital supervisor through the hospital  at [phone]. ([place] [phone] or [place] [phone]).    Do not eat or drink anything after midnight the night before your surgery, including candy, mints, lifesavers, or chewing gum. Do not drink alcohol 24hrs before your surgery. Try not to smoke at least 24hrs before your surgery.       Follow the pre surgery showering instructions as listed in the “My Surgical Experience Booklet” or otherwise provided by your surgeon's office. Do not use a blade to shave the surgical area 1 week before surgery. It is okay to use a clean electric clippers up to 24 hours before surgery. Do not apply any lotions, creams, including makeup, cologne, deodorant, or perfumes after showering on the day of your surgery. Do not use dry shampoo, hair spray, hair gel, or any type of hair products.     No contact lenses, eye make-up, or artificial eyelashes. Remove  nail polish, including gel polish, and any artificial, gel, or acrylic nails if possible. Remove all jewelry including rings and body piercing jewelry.     Wear causal clothing that is easy to take on and off. Consider your type of surgery.    Keep any valuables, jewelry, piercings at home. Please bring any specially ordered equipment (sling, braces) if indicated.    Arrange for a responsible person to drive you to and from the hospital on the day of your surgery. Please confirm the visitor policy for the day of your procedure when you receive your phone call with an arrival time.     Call the surgeon's office with any new illnesses, exposures, or additional questions prior to surgery.    Please reference your “My Surgical Experience Booklet” for additional information to prepare for your upcoming surgery.

## 2025-06-20 NOTE — PROGRESS NOTES
Biopsychosocial and Barriers Assessment    Type of Cancer: Breast cancer  Treatment plan:Surgery scheduled on 7/1/25  Noted barriers to care: none  Cultural/Spiritism concerns: none  Hair Loss/ Wig resources needed: none    DT completed: DONE BY PN IN OCTOBER 2024- no referral to OSW seen in orders  DT score: 7/10  Issues noted: none    Marital status/Lives with: Lives with family  Pt's support system: Mother, son, brothers  Mental Health history: None  Substance Abuse: None    POA/LW/HCR: None  Side affect: S/P chemotherapy     Employment/income source: Not employed  Concerns with bills (treatment vs household): Denies any financial needs  Noted issues with home: None identified    Narrative note: OSW placed 2nd outreach call to Ms. Cleaning today. Utilized  #[id number] for translation. Pt answered call and was receptive to speaking with this writer. She stated she is coping emotionally fine with her cancer diagnosis and denied any financial or psychosocial needs. She stated she has excellent support from her mother, her son and her brothers, and has several good friends.   Explained additional support is available to her from OSW team as needed, and she was very appreciative. She expressed thanks for call today. Will close referral.

## 2025-06-25 ENCOUNTER — TELEPHONE (OUTPATIENT)
Age: 55
End: 2025-06-25

## 2025-06-25 ENCOUNTER — OFFICE VISIT (OUTPATIENT)
Dept: PHYSICAL THERAPY | Facility: CLINIC | Age: 55
End: 2025-06-25
Attending: SURGERY
Payer: COMMERCIAL

## 2025-06-25 DIAGNOSIS — Z17.0 MALIGNANT NEOPLASM OF RIGHT BREAST IN FEMALE, ESTROGEN RECEPTOR POSITIVE, UNSPECIFIED SITE OF BREAST (HCC): ICD-10-CM

## 2025-06-25 DIAGNOSIS — C50.911 MALIGNANT NEOPLASM OF RIGHT BREAST IN FEMALE, ESTROGEN RECEPTOR POSITIVE, UNSPECIFIED SITE OF BREAST (HCC): ICD-10-CM

## 2025-06-25 DIAGNOSIS — I89.0 LYMPHEDEMA: Primary | ICD-10-CM

## 2025-06-25 PROCEDURE — 97110 THERAPEUTIC EXERCISES: CPT | Performed by: PHYSICAL THERAPIST

## 2025-06-25 PROCEDURE — 97530 THERAPEUTIC ACTIVITIES: CPT | Performed by: PHYSICAL THERAPIST

## 2025-06-25 NOTE — PROGRESS NOTES
Daily Note     Today's date: 2025  Patient name: Romina Cleaning  : 1970  MRN: [id number]  Referring provider: Jodie Philippe MD  Dx:   Encounter Diagnosis     ICD-10-CM    1. Lymphedema  I89.0       2. Malignant neoplasm of right breast in female, estrogen receptor positive, unspecified site of breast (HCC)  C50.911     Z17.0                      Subjective: Patient reports that she is doing well.  She is prepared for her surgery on .        Objective: See treatment diary below      Assessment: Tolerated treatment well. Patient would benefit from continued PT      Plan: Continue per plan of care.        Compression Rx       Discussed OTS 20-30 mm Hg sleeve                 Manual Ther         volumetrics Performed         R UE NV                                   Ther Ex         pulley  5' 5' 5'     Bicep curls  4# x 20 4# 2 x 10 4# 2 x 10     Bent over rows  4# x 20 4# 2 x 10 4# 2 x 10     Tricep pushback  4# x 20 4# 2 x 10 4# 2 x 10     bike  6' 6' 6'     Leg press   75# 2 x 10 75# 2 x 10              TB ER    RBR x 20 RBR x 20     TB Row/Ext  GTB x 20 BTB x 20 BTB x 20     Pt Education                           Compression options

## 2025-06-25 NOTE — TELEPHONE ENCOUNTER
: Tia [id number]    Patient called stating that at last visit she asked if we can provide a release from work note for her upcoming surgery.     She asked if we can send to   fax# [phone]   Attn: [facility]/Chiquis Armijo

## 2025-06-27 ENCOUNTER — APPOINTMENT (OUTPATIENT)
Dept: LAB | Facility: CLINIC | Age: 55
End: 2025-06-27
Attending: STUDENT IN AN ORGANIZED HEALTH CARE EDUCATION/TRAINING PROGRAM
Payer: COMMERCIAL

## 2025-06-27 ENCOUNTER — TELEPHONE (OUTPATIENT)
Age: 55
End: 2025-06-27

## 2025-06-27 DIAGNOSIS — Z85.3 HISTORY OF BREAST CANCER: Primary | ICD-10-CM

## 2025-06-27 DIAGNOSIS — Z85.3 HISTORY OF BREAST CANCER: ICD-10-CM

## 2025-06-27 PROCEDURE — 80307 DRUG TEST PRSMV CHEM ANLYZR: CPT

## 2025-06-27 PROCEDURE — 80323 ALKALOIDS NOS: CPT

## 2025-06-27 NOTE — TELEPHONE ENCOUNTER
Called and spoke with patient to advise to have nicotine urine test done at her closest [facility]. Patient verbalized understanding and will have the test done today.

## 2025-06-29 ENCOUNTER — ANESTHESIA EVENT (OUTPATIENT)
Dept: PERIOP | Facility: HOSPITAL | Age: 55
End: 2025-06-29
Payer: COMMERCIAL

## 2025-06-30 DIAGNOSIS — C50.811 MALIGNANT NEOPLASM OF OVERLAPPING SITES OF RIGHT BREAST IN FEMALE, ESTROGEN RECEPTOR POSITIVE (HCC): Primary | ICD-10-CM

## 2025-06-30 DIAGNOSIS — Z17.0 MALIGNANT NEOPLASM OF OVERLAPPING SITES OF RIGHT BREAST IN FEMALE, ESTROGEN RECEPTOR POSITIVE (HCC): Primary | ICD-10-CM

## 2025-06-30 RX ORDER — ACETAMINOPHEN 500 MG
500 TABLET ORAL EVERY 6 HOURS
Qty: 20 TABLET | Refills: 0 | Status: SHIPPED | OUTPATIENT
Start: 2025-06-30 | End: 2025-07-05

## 2025-06-30 RX ORDER — IBUPROFEN 800 MG/1
800 TABLET, FILM COATED ORAL EVERY 8 HOURS SCHEDULED
Qty: 15 TABLET | Refills: 0 | Status: SHIPPED | OUTPATIENT
Start: 2025-06-30 | End: 2025-07-09 | Stop reason: SDUPTHER

## 2025-06-30 RX ORDER — CEPHALEXIN 500 MG/1
500 CAPSULE ORAL EVERY 12 HOURS SCHEDULED
Qty: 14 CAPSULE | Refills: 0 | Status: SHIPPED | OUTPATIENT
Start: 2025-06-30 | End: 2025-07-07

## 2025-06-30 RX ORDER — TRAMADOL HYDROCHLORIDE 50 MG/1
50 TABLET ORAL EVERY 6 HOURS PRN
Qty: 10 TABLET | Refills: 0 | Status: SHIPPED | OUTPATIENT
Start: 2025-06-30

## 2025-06-30 RX ORDER — GABAPENTIN 300 MG/1
300 CAPSULE ORAL 3 TIMES DAILY
Qty: 15 CAPSULE | Refills: 0 | Status: SHIPPED | OUTPATIENT
Start: 2025-06-30 | End: 2025-07-09 | Stop reason: SDUPTHER

## 2025-06-30 RX ORDER — OXYCODONE HYDROCHLORIDE 5 MG/1
5 TABLET ORAL EVERY 6 HOURS PRN
Qty: 10 TABLET | Refills: 0 | Status: SHIPPED | OUTPATIENT
Start: 2025-06-30

## 2025-07-01 ENCOUNTER — ANESTHESIA (OUTPATIENT)
Dept: PERIOP | Facility: HOSPITAL | Age: 55
End: 2025-07-01
Payer: COMMERCIAL

## 2025-07-01 ENCOUNTER — HOME HEALTH ADMISSION (OUTPATIENT)
Dept: HOME HEALTH SERVICES | Facility: HOME HEALTHCARE | Age: 55
End: 2025-07-01
Payer: COMMERCIAL

## 2025-07-01 ENCOUNTER — HOSPITAL ENCOUNTER (OUTPATIENT)
Facility: HOSPITAL | Age: 55
Setting detail: OUTPATIENT SURGERY
Discharge: HOME HEALTH | End: 2025-07-02
Attending: SURGERY | Admitting: SURGERY
Payer: COMMERCIAL

## 2025-07-01 DIAGNOSIS — C50.811 MALIGNANT NEOPLASM OF OVERLAPPING SITES OF RIGHT BREAST IN FEMALE, ESTROGEN RECEPTOR POSITIVE (HCC): ICD-10-CM

## 2025-07-01 DIAGNOSIS — C77.3 CARCINOMA OF RIGHT BREAST METASTATIC TO AXILLARY LYMPH NODE (HCC): ICD-10-CM

## 2025-07-01 DIAGNOSIS — Z17.0 MALIGNANT NEOPLASM OF OVERLAPPING SITES OF RIGHT BREAST IN FEMALE, ESTROGEN RECEPTOR POSITIVE (HCC): ICD-10-CM

## 2025-07-01 DIAGNOSIS — C50.911 CARCINOMA OF RIGHT BREAST METASTATIC TO AXILLARY LYMPH NODE (HCC): ICD-10-CM

## 2025-07-01 LAB
COTININE, URINE: NEGATIVE NG/ML
Lab: NORMAL

## 2025-07-01 PROCEDURE — 88309 TISSUE EXAM BY PATHOLOGIST: CPT | Performed by: PATHOLOGY

## 2025-07-01 PROCEDURE — C1781 MESH (IMPLANTABLE): HCPCS | Performed by: SURGERY

## 2025-07-01 PROCEDURE — 19307 MAST MOD RAD: CPT | Performed by: PHYSICIAN ASSISTANT

## 2025-07-01 PROCEDURE — C1789 PROSTHESIS, BREAST, IMP: HCPCS | Performed by: SURGERY

## 2025-07-01 PROCEDURE — 88342 IMHCHEM/IMCYTCHM 1ST ANTB: CPT | Performed by: PATHOLOGY

## 2025-07-01 PROCEDURE — 14000 TIS TRNFR TRUNK 10 SQ CM/<: CPT | Performed by: STUDENT IN AN ORGANIZED HEALTH CARE EDUCATION/TRAINING PROGRAM

## 2025-07-01 PROCEDURE — NC001 PR NO CHARGE: Performed by: SURGERY

## 2025-07-01 PROCEDURE — 88341 IMHCHEM/IMCYTCHM EA ADD ANTB: CPT | Performed by: PATHOLOGY

## 2025-07-01 PROCEDURE — 14001 TIS TRNFR TRUNK 10.1-30SQCM: CPT

## 2025-07-01 PROCEDURE — 15777 ACELLULAR DERM MATRIX IMPLT: CPT

## 2025-07-01 PROCEDURE — 19357 TISS XPNDR PLMT BRST RCNSTJ: CPT

## 2025-07-01 PROCEDURE — 19357 TISS XPNDR PLMT BRST RCNSTJ: CPT | Performed by: STUDENT IN AN ORGANIZED HEALTH CARE EDUCATION/TRAINING PROGRAM

## 2025-07-01 PROCEDURE — 19307 MAST MOD RAD: CPT | Performed by: SURGERY

## 2025-07-01 PROCEDURE — 15777 ACELLULAR DERM MATRIX IMPLT: CPT | Performed by: STUDENT IN AN ORGANIZED HEALTH CARE EDUCATION/TRAINING PROGRAM

## 2025-07-01 DEVICE — BREAST TISSUE EXPANDER, SUTURE TABS, INTEGRAL INJECTION DOME, 350CC
Type: IMPLANTABLE DEVICE | Site: BREAST | Status: FUNCTIONAL
Brand: MENTOR CPX4 PLUS, SMOOTH, LOW HEIGHT

## 2025-07-01 DEVICE — GALAFLEX LITE SCAFFOLD, 19.0 CM, CIRCLE
Type: IMPLANTABLE DEVICE | Site: BREAST | Status: FUNCTIONAL
Brand: GALAFLEX LITE

## 2025-07-01 RX ORDER — ONDANSETRON 2 MG/ML
INJECTION INTRAMUSCULAR; INTRAVENOUS AS NEEDED
Status: DISCONTINUED | OUTPATIENT
Start: 2025-07-01 | End: 2025-07-01

## 2025-07-01 RX ORDER — FENTANYL CITRATE 50 UG/ML
INJECTION, SOLUTION INTRAMUSCULAR; INTRAVENOUS AS NEEDED
Status: DISCONTINUED | OUTPATIENT
Start: 2025-07-01 | End: 2025-07-01

## 2025-07-01 RX ORDER — ACETAMINOPHEN 325 MG/1
975 TABLET ORAL EVERY 8 HOURS
Status: DISCONTINUED | OUTPATIENT
Start: 2025-07-01 | End: 2025-07-02 | Stop reason: HOSPADM

## 2025-07-01 RX ORDER — ONDANSETRON 2 MG/ML
4 INJECTION INTRAMUSCULAR; INTRAVENOUS ONCE AS NEEDED
Status: DISCONTINUED | OUTPATIENT
Start: 2025-07-01 | End: 2025-07-01 | Stop reason: HOSPADM

## 2025-07-01 RX ORDER — DEXAMETHASONE SODIUM PHOSPHATE 10 MG/ML
INJECTION, SOLUTION INTRAMUSCULAR; INTRAVENOUS AS NEEDED
Status: DISCONTINUED | OUTPATIENT
Start: 2025-07-01 | End: 2025-07-01

## 2025-07-01 RX ORDER — ACETAMINOPHEN 10 MG/ML
1000 INJECTION, SOLUTION INTRAVENOUS ONCE
Status: COMPLETED | OUTPATIENT
Start: 2025-07-01 | End: 2025-07-01

## 2025-07-01 RX ORDER — BUPIVACAINE HYDROCHLORIDE 5 MG/ML
INJECTION, SOLUTION EPIDURAL; INTRACAUDAL; PERINEURAL AS NEEDED
Status: DISCONTINUED | OUTPATIENT
Start: 2025-07-01 | End: 2025-07-01 | Stop reason: HOSPADM

## 2025-07-01 RX ORDER — PROPOFOL 10 MG/ML
INJECTION, EMULSION INTRAVENOUS AS NEEDED
Status: DISCONTINUED | OUTPATIENT
Start: 2025-07-01 | End: 2025-07-01

## 2025-07-01 RX ORDER — OXYCODONE HYDROCHLORIDE 5 MG/1
5 TABLET ORAL EVERY 4 HOURS PRN
Status: DISCONTINUED | OUTPATIENT
Start: 2025-07-01 | End: 2025-07-02 | Stop reason: HOSPADM

## 2025-07-01 RX ORDER — CEFAZOLIN SODIUM 2 G/50ML
2000 SOLUTION INTRAVENOUS ONCE
Status: COMPLETED | OUTPATIENT
Start: 2025-07-01 | End: 2025-07-01

## 2025-07-01 RX ORDER — ONDANSETRON 4 MG/1
4 TABLET, ORALLY DISINTEGRATING ORAL EVERY 6 HOURS PRN
Status: DISCONTINUED | OUTPATIENT
Start: 2025-07-01 | End: 2025-07-02 | Stop reason: HOSPADM

## 2025-07-01 RX ORDER — MEPERIDINE HYDROCHLORIDE 25 MG/ML
12.5 INJECTION INTRAMUSCULAR; INTRAVENOUS; SUBCUTANEOUS
Status: DISCONTINUED | OUTPATIENT
Start: 2025-07-01 | End: 2025-07-01 | Stop reason: HOSPADM

## 2025-07-01 RX ORDER — CEFAZOLIN SODIUM 1 G/50ML
1000 SOLUTION INTRAVENOUS EVERY 8 HOURS
Status: COMPLETED | OUTPATIENT
Start: 2025-07-01 | End: 2025-07-01

## 2025-07-01 RX ORDER — DIPHENHYDRAMINE HCL 25 MG
25 TABLET ORAL EVERY 6 HOURS PRN
Status: DISCONTINUED | OUTPATIENT
Start: 2025-07-01 | End: 2025-07-02 | Stop reason: HOSPADM

## 2025-07-01 RX ORDER — SODIUM CHLORIDE, SODIUM LACTATE, POTASSIUM CHLORIDE, CALCIUM CHLORIDE 600; 310; 30; 20 MG/100ML; MG/100ML; MG/100ML; MG/100ML
125 INJECTION, SOLUTION INTRAVENOUS CONTINUOUS
Status: DISCONTINUED | OUTPATIENT
Start: 2025-07-01 | End: 2025-07-01

## 2025-07-01 RX ORDER — HYDROCHLOROTHIAZIDE 12.5 MG/1
12.5 TABLET ORAL DAILY
Status: DISCONTINUED | OUTPATIENT
Start: 2025-07-02 | End: 2025-07-02 | Stop reason: HOSPADM

## 2025-07-01 RX ORDER — HYDROMORPHONE HCL/PF 1 MG/ML
0.5 SYRINGE (ML) INJECTION EVERY 4 HOURS PRN
Status: DISCONTINUED | OUTPATIENT
Start: 2025-07-01 | End: 2025-07-02 | Stop reason: HOSPADM

## 2025-07-01 RX ORDER — FENTANYL CITRATE/PF 50 MCG/ML
25 SYRINGE (ML) INJECTION
Status: DISCONTINUED | OUTPATIENT
Start: 2025-07-01 | End: 2025-07-01 | Stop reason: HOSPADM

## 2025-07-01 RX ORDER — GABAPENTIN 300 MG/1
300 CAPSULE ORAL 3 TIMES DAILY
Status: DISCONTINUED | OUTPATIENT
Start: 2025-07-01 | End: 2025-07-02 | Stop reason: HOSPADM

## 2025-07-01 RX ORDER — MAGNESIUM HYDROXIDE 1200 MG/15ML
LIQUID ORAL AS NEEDED
Status: DISCONTINUED | OUTPATIENT
Start: 2025-07-01 | End: 2025-07-01 | Stop reason: HOSPADM

## 2025-07-01 RX ORDER — LIDOCAINE HYDROCHLORIDE 10 MG/ML
INJECTION, SOLUTION EPIDURAL; INFILTRATION; INTRACAUDAL; PERINEURAL AS NEEDED
Status: DISCONTINUED | OUTPATIENT
Start: 2025-07-01 | End: 2025-07-01

## 2025-07-01 RX ORDER — PROPOFOL 10 MG/ML
INJECTION, EMULSION INTRAVENOUS CONTINUOUS PRN
Status: DISCONTINUED | OUTPATIENT
Start: 2025-07-01 | End: 2025-07-01

## 2025-07-01 RX ORDER — MIDAZOLAM HYDROCHLORIDE 2 MG/2ML
INJECTION, SOLUTION INTRAMUSCULAR; INTRAVENOUS AS NEEDED
Status: DISCONTINUED | OUTPATIENT
Start: 2025-07-01 | End: 2025-07-01

## 2025-07-01 RX ORDER — ROCURONIUM BROMIDE 10 MG/ML
INJECTION, SOLUTION INTRAVENOUS AS NEEDED
Status: DISCONTINUED | OUTPATIENT
Start: 2025-07-01 | End: 2025-07-01

## 2025-07-01 RX ORDER — ATORVASTATIN CALCIUM 10 MG/1
10 TABLET, FILM COATED ORAL
Status: DISCONTINUED | OUTPATIENT
Start: 2025-07-01 | End: 2025-07-02 | Stop reason: HOSPADM

## 2025-07-01 RX ORDER — ENOXAPARIN SODIUM 100 MG/ML
40 INJECTION SUBCUTANEOUS ONCE
Status: COMPLETED | OUTPATIENT
Start: 2025-07-01 | End: 2025-07-01

## 2025-07-01 RX ORDER — LISINOPRIL 10 MG/1
10 TABLET ORAL DAILY
Status: DISCONTINUED | OUTPATIENT
Start: 2025-07-02 | End: 2025-07-02 | Stop reason: HOSPADM

## 2025-07-01 RX ORDER — HYDROMORPHONE HCL/PF 1 MG/ML
0.5 SYRINGE (ML) INJECTION
Status: DISCONTINUED | OUTPATIENT
Start: 2025-07-01 | End: 2025-07-01 | Stop reason: HOSPADM

## 2025-07-01 RX ORDER — OXYCODONE HYDROCHLORIDE 10 MG/1
10 TABLET ORAL EVERY 4 HOURS PRN
Status: DISCONTINUED | OUTPATIENT
Start: 2025-07-01 | End: 2025-07-02 | Stop reason: HOSPADM

## 2025-07-01 RX ORDER — SODIUM CHLORIDE, SODIUM LACTATE, POTASSIUM CHLORIDE, CALCIUM CHLORIDE 600; 310; 30; 20 MG/100ML; MG/100ML; MG/100ML; MG/100ML
75 INJECTION, SOLUTION INTRAVENOUS CONTINUOUS
Status: DISPENSED | OUTPATIENT
Start: 2025-07-01 | End: 2025-07-01

## 2025-07-01 RX ADMIN — SODIUM CHLORIDE, SODIUM LACTATE, POTASSIUM CHLORIDE, AND CALCIUM CHLORIDE: .6; .31; .03; .02 INJECTION, SOLUTION INTRAVENOUS at 10:07

## 2025-07-01 RX ADMIN — ACETAMINOPHEN 975 MG: 325 TABLET ORAL at 12:52

## 2025-07-01 RX ADMIN — FENTANYL CITRATE 25 MCG: 50 INJECTION INTRAMUSCULAR; INTRAVENOUS at 11:28

## 2025-07-01 RX ADMIN — ENOXAPARIN SODIUM 40 MG: 40 INJECTION SUBCUTANEOUS at 06:40

## 2025-07-01 RX ADMIN — SODIUM CHLORIDE, SODIUM LACTATE, POTASSIUM CHLORIDE, AND CALCIUM CHLORIDE 75 ML/HR: .6; .31; .03; .02 INJECTION, SOLUTION INTRAVENOUS at 12:31

## 2025-07-01 RX ADMIN — LIDOCAINE HYDROCHLORIDE 100 MG: 10 INJECTION, SOLUTION EPIDURAL; INFILTRATION; INTRACAUDAL at 07:39

## 2025-07-01 RX ADMIN — OXYCODONE HYDROCHLORIDE 10 MG: 10 TABLET ORAL at 15:37

## 2025-07-01 RX ADMIN — PROPOFOL 100 MCG/KG/MIN: 10 INJECTION, EMULSION INTRAVENOUS at 07:42

## 2025-07-01 RX ADMIN — SODIUM CHLORIDE, SODIUM LACTATE, POTASSIUM CHLORIDE, AND CALCIUM CHLORIDE 125 ML/HR: .6; .31; .03; .02 INJECTION, SOLUTION INTRAVENOUS at 06:40

## 2025-07-01 RX ADMIN — PROPOFOL 150 MG: 10 INJECTION, EMULSION INTRAVENOUS at 07:40

## 2025-07-01 RX ADMIN — FENTANYL CITRATE 25 MCG: 50 INJECTION INTRAMUSCULAR; INTRAVENOUS at 11:23

## 2025-07-01 RX ADMIN — ACETAMINOPHEN 1000 MG: 10 INJECTION INTRAVENOUS at 09:32

## 2025-07-01 RX ADMIN — CEFAZOLIN SODIUM 1000 MG: 1 SOLUTION INTRAVENOUS at 23:01

## 2025-07-01 RX ADMIN — GABAPENTIN 300 MG: 300 CAPSULE ORAL at 15:31

## 2025-07-01 RX ADMIN — FENTANYL CITRATE 50 MCG: 50 INJECTION INTRAMUSCULAR; INTRAVENOUS at 07:42

## 2025-07-01 RX ADMIN — ROCURONIUM 50 MG: 50 INJECTION, SOLUTION INTRAVENOUS at 07:40

## 2025-07-01 RX ADMIN — HYDROMORPHONE HYDROCHLORIDE 0.5 MG: 0.5 INJECTION, SOLUTION INTRAMUSCULAR; INTRAVENOUS; SUBCUTANEOUS at 11:34

## 2025-07-01 RX ADMIN — SUGAMMADEX 200 MG: 100 INJECTION, SOLUTION INTRAVENOUS at 10:24

## 2025-07-01 RX ADMIN — FENTANYL CITRATE 25 MCG: 50 INJECTION INTRAMUSCULAR; INTRAVENOUS at 08:24

## 2025-07-01 RX ADMIN — ACETAMINOPHEN 975 MG: 325 TABLET ORAL at 20:06

## 2025-07-01 RX ADMIN — CEFAZOLIN SODIUM 1000 MG: 1 SOLUTION INTRAVENOUS at 15:31

## 2025-07-01 RX ADMIN — MIDAZOLAM HYDROCHLORIDE 2 MG: 1 INJECTION, SOLUTION INTRAMUSCULAR; INTRAVENOUS at 07:33

## 2025-07-01 RX ADMIN — FENTANYL CITRATE 50 MCG: 50 INJECTION INTRAMUSCULAR; INTRAVENOUS at 07:36

## 2025-07-01 RX ADMIN — ONDANSETRON 4 MG: 2 INJECTION INTRAMUSCULAR; INTRAVENOUS at 09:58

## 2025-07-01 RX ADMIN — FENTANYL CITRATE 25 MCG: 50 INJECTION INTRAMUSCULAR; INTRAVENOUS at 09:35

## 2025-07-01 RX ADMIN — DEXAMETHASONE SODIUM PHOSPHATE 10 MG: 10 INJECTION, SOLUTION INTRAMUSCULAR; INTRAVENOUS at 07:53

## 2025-07-01 RX ADMIN — HYDROMORPHONE HYDROCHLORIDE 0.5 MG: 0.5 INJECTION, SOLUTION INTRAMUSCULAR; INTRAVENOUS; SUBCUTANEOUS at 11:44

## 2025-07-01 RX ADMIN — GABAPENTIN 300 MG: 300 CAPSULE ORAL at 20:06

## 2025-07-01 RX ADMIN — ATORVASTATIN CALCIUM 10 MG: 10 TABLET, FILM COATED ORAL at 15:31

## 2025-07-01 RX ADMIN — PROPOFOL 50 MG: 10 INJECTION, EMULSION INTRAVENOUS at 07:41

## 2025-07-01 RX ADMIN — CEFAZOLIN SODIUM 2000 MG: 2 SOLUTION INTRAVENOUS at 07:32

## 2025-07-01 RX ADMIN — ONDANSETRON 4 MG: 4 TABLET, ORALLY DISINTEGRATING ORAL at 21:30

## 2025-07-01 NOTE — OP NOTE
OPERATIVE REPORT  PATIENT NAME: Romina Cleaning    :  1970  MRN: [id number]  Pt Location: [facility] OR ROOM 01    SURGERY DATE: 2025    Surgeons and Role:  Panel 1:     * Jodie Philippe MD - Primary  Panel 2:     * Mary Anne Fox DO - Primary  ANDRE Cabrera PA-C    Preop Diagnosis:  Carcinoma of right breast metastatic to axillary lymph node (HCC) [C50.911, C77.3]  Malignant neoplasm of overlapping sites of right breast in female, estrogen receptor positive (HCC) [C50.811, Z17.0]    Post-Op Diagnosis Codes:     * Carcinoma of right breast metastatic to axillary lymph node (HCC) [C50.911, C77.3]     * Malignant neoplasm of overlapping sites of right breast in female, estrogen receptor positive (HCC) [C50.811, Z17.0]    Procedure(s):  Right - RIGHT MODIFIED RADICAL MASTECTOMY  Right - Right Breast Reconstruction with tissue expander. galaflex. nico flap    Specimen(s):  ID Type Source Tests Collected by Time Destination   1 : Right breast and axillary contents short stitch superior margin long stitch lateral margin Tissue Breast, Right TISSUE EXAM Jodie Philippe MD 2025 0814        Estimated Blood Loss:   10 ml    Drains:  19 Fr EFRAIN x 1 on the right    Anesthesia Type:   General    Operative Indications:  53 yo female presents for extirpative and first stage reconstruction for her right breast cancer    Operative Findings:  Right Nico flap 5 cm2  Placement of right mentor smooth low height silicone tissue expander 375 cc (12.7 cm)       Complications:   None    Procedure and Technique:  The patient was seen preoperatively.  The procedure, risks, benefits and alternatives were discussed.  Informed consent was obtained.  The patient was site marked preoperatively.  The patient was brought to the operating room where she was positioned supine with all of her pressure points appropriately padded.  Anesthesia commenced.  A timeout was performed and verified.     Dr. Philippe completed the mastectomy portion of  the procedure. The skin flaps were robust and appeared well perfused.  I then began the reconstructive portion of the case on the right.  The patient was reprepped and draped in usual sterile fashion.  The defect was inspected and meticulous hemostasis was obtained.  The defect was copiously irrigated with triple antibiotic solution and irrisept solution.  A pec block was performed using exparel.  The abdominal wall was advanced as a Nico flap superiorly in progressive fashion.  2-0 PDS then used was used to reinforce/reconstruct the inframammary fold with this Nico flap to the rib periosteum.  Next, the galaflex was tacked along the medial border using 2-0 PDS.  The tissue expander was prepared on the back table.  All of the air was aspirated and it was filled with 100 ml of saline.  The tissue expander was sutured to the chest wall using 2-0 prolene on the suture tabs.  The remainder of the galaflex was used to cover the tissue expander anteriorly and was sutured in place using 2-0 PDS along the inframammary fold and lateral breast border.  1, 15 Fr EFRAIN drain was brought out through the inframammary fold and secured in place using 2-0 nylon.  The remaining wound was closed in layers, using 3-0 monocryl for the deep dermis and running 4-0 PDS for the skin.       The area was cleansed and skin glue was applied.  After this was dry, steristrips and a surgical bra was applied.     The instrument, sponge and needle count was correct an verified prior to completion of the case.     The patient emerged from anesthesia and was transferred to the recovery room in stable condition.      Patient Disposition:  PACU     SIGNATURE: Mary Anne Carmen Fox DO  DATE: July 1, 2025  TIME: 10:41 AM    No qualified plastic surgery resident was available for the case.  The CLIFF provided assistance with retraction and suturing.

## 2025-07-01 NOTE — ANESTHESIA PREPROCEDURE EVALUATION
Procedure:  RIGHT MODIFIED RADICAL MASTECTOMY (Right: Breast)  Right Breast Reconstruction with tissue expander, galaflex, [place] flap (Right: Breast)    Relevant Problems   CARDIO   (+) Hypertension   (+) Mitral regurgitation   (+) Mixed hyperlipidemia      GYN   (+) Malignant neoplasm of overlapping sites of right breast in female, estrogen receptor positive (HCC)      MUSCULOSKELETAL   (+) Lumbar back pain      NEURO/PSYCH   (+) Anxiety   (+) Episode of recurrent major depressive disorder (HCC)      Other   (+) Carcinoma of right breast metastatic to axillary lymph node (HCC)        Physical Exam    Airway     Mallampati score: III  TM Distance: >3 FB  Neck ROM: full  Upper bite lip test: I  Mouth opening: >= 4 cm      Cardiovascular  Rhythm: regular, Rate: normalCardiovascular exam normal    Dental        Pulmonary      Neurological      Other Findings  post-pubertal.      Anesthesia Plan  ASA Score- 2     Anesthesia Type- general with ASA Monitors.         Additional Monitors:     Airway Plan: Oral ETT.           Plan Factors-Exercise tolerance (METS): >4 METS.    Chart reviewed.   Existing labs reviewed. Patient summary reviewed.    Patient is not a current smoker.      Obstructive sleep apnea risk education given perioperatively.        Induction- intravenous.    Postoperative Plan- .   Monitoring Plan - Monitoring plan - standard ASA monitoring  Post Operative Pain Plan - multimodal analgesia    Perioperative Resuscitation Plan - Level 1 - Full Code.       Informed Consent- Anesthetic plan and risks discussed with patient and son.        NPO Status:  No vitals data found for the desired time range.

## 2025-07-01 NOTE — OP NOTE
OPERATIVE REPORT  PATIENT NAME: Romina Cleaning    :  1970  MRN: [id number]  Pt Location: [facility]    SURGERY DATE: 2025    Surgeons and Role:  Panel 1:     * Jodie Philippe MD - Primary     * Elen Ryder PA-C - Assisting      Preop Diagnosis:  Carcinoma of right breast metastatic to axillary lymph node (HCC) [C50.911, C77.3]  Malignant neoplasm of overlapping sites of right breast in female, estrogen receptor positive (HCC) [C50.811, Z17.0]    Post-Op Diagnosis Codes:     * Carcinoma of right breast metastatic to axillary lymph node (HCC) [C50.911, C77.3]     * Malignant neoplasm of overlapping sites of right breast in female, estrogen receptor positive (HCC) [C50.811, Z17.0]    Procedure(s):  Right - RIGHT MODIFIED RADICAL MASTECTOMY      Specimen(s):  ID Type Source Tests Collected by Time Destination   1 : Right breast short stitch superior margin long stitch latera margin Tissue Breast, Right TISSUE EXAM Jodie Philippe MD 2025 0814        Estimated Blood Loss:   Minimal    Drains:  * No LDAs found *    Anesthesia Type:   General    Operative Indications:  Carcinoma of right breast metastatic to axillary lymph node (HCC) [C50.911, C77.3]  Malignant neoplasm of overlapping sites of right breast in female, estrogen receptor positive (HCC) [C50.811, Z17.0]      Operative Findings:  N/A       Complications:   None    Procedure and Technique:  Romina is a 54-year-old female who presents for right modified radical mastectomy following neoadjuvant chemotherapy for multifocal carcinoma of the right breast as well as extensive yoseph involvement.  She opted for immediate expander/implant-based reconstruction.  She presented the day of surgery to the operating room.  She had preoperative antibiotics.  She was administered general anesthesia.  She was prepped and draped in the usual standard fashion.  Timeout was performed.  Attention was turned to the right breast.  Half percent Marcaine plain was  injected for local anesthesia.  An elliptical incision was created around the nipple areolar complex.  Electrocautery was used to dissect her flaps to the level of the clavicle superior, sternal border medial, inframammary fold inferior and to the edge of latissimus dorsi muscle lateral.  The clavipectoral fascia was incised to expose the axillary fat pad.  The pectoralis edge was elevated to expose the level 2 yoseph basin.  Dissection was carried out to the axillary vein superior, serratus medial, thoracodorsal bundle deep and latissimus dorsi muscle lateral.  The long thoracic nerve and thoracodorsal bundle were identified and preserved intact throughout.  At least 1 branch of the intercostal brachial nerve was sacrificed as this was coursing throughout the yoseph content.  The specimen was marked with a short stitch superior and a long stitch lateral.  This was submitted to pathology in formalin as right breast and axillary content.  Her wound was irrigated and hemostasis was achieved.  She remained intubated in hemodynamically stable condition for the remaining portion of her procedure which will be dictated by Dr. Fox.  A physician assistant was required during the procedure for retraction, tissue handling, dissection and suturing; no residents were available.     Patient Disposition:  hemodynamically stable    Axillary Lymph Node Dissection for Breast Cancer - Right  Operation performed with curative intent. Yes   Resection was performed within the boundaries of the axillary vein, chest wall (serratus anterior), and latissimus dorsi. Yes   Nerves identified and preserved during dissection (select all that apply). Long thoracic nerve and Thoracodorsal nerve   Level III nodes were removed. No          SIGNATURE: Jodie Philippe MD  DATE: July 1, 2025  TIME: 9:33 AM

## 2025-07-01 NOTE — CASE MANAGEMENT
Case Management Assessment & Discharge Planning Note    Patient name Romina Cleaning  Location [facility] /[facility] -* MRN [id number]  : 1970 Date 2025       Current Admission Date: 2025  Current Admission Diagnosis:Malignant neoplasm of overlapping sites of right breast in female, estrogen receptor positive (HCC)   Patient Active Problem List    Diagnosis Date Noted    Chemotherapy induced neutropenia (HCC) 2025    Hypokalemia 2024    COVID-19 2024    Sepsis without acute organ dysfunction (HCC) 2024    Malignant neoplasm of overlapping sites of right breast in female, estrogen receptor positive (HCC) 10/01/2024    Carcinoma of right breast metastatic to axillary lymph node (HCC) 2024    Encounter for tobacco use cessation counseling 2023    Mixed hyperlipidemia 2022    Lumbar back pain 03/15/2019    Foster care (status) 10/18/2018    Microscopic hematuria 2018    Smoker 2018    Abnormal CT scan, pelvis 2018    Anxiety 2018    Dysuria 2018    Trapezius muscle spasm 2018    Neck pain 2018    Mitral regurgitation 2017    Hypertension 03/10/2015    Episode of recurrent major depressive disorder (HCC) 2015      LOS (days): 0  Geometric Mean LOS (GMLOS) (days):   Days to GMLOS:     OBJECTIVE:              Current admission status: Outpatient Surgery       Preferred Pharmacy:   [facility] - [place], [place] - [address] [facility]  [address] [facility]  [address]  [place] [address]  Phone: [phone] Fax: [phone]    [id number] - [place], [place] - [address]  [address]  [place] [address]  Phone: [phone] Fax: [phone]    Primary Care Provider: Alessandro Milton DO    Primary Insurance: [employer]  Secondary Insurance:     ASSESSMENT:  Active Health Care Proxies       Colt Anuj Health Care Representative - Son   Primary Phone: [phone] (Mobile)                            Readmission Root Cause  30 Day Readmission: No    Patient Information  Admitted from:: Home  Mental Status: Alert  During Assessment patient was accompanied by: Not accompanied during assessment  Assessment information provided by:: Patient  Primary Caregiver: Self  Support Systems: Son, Family members, Friends/neighbors, Self  County of Residence: [place]  What city do you live in?: [place]  Home entry access options. Select all that apply.: No steps to enter home  Type of Current Residence: 2 story home  Upon entering residence, is there a bedroom on the main floor (no further steps)?: No  A bedroom is located on the following floor levels of residence (select all that apply):: 2nd Floor  Upon entering residence, is there a bathroom on the main floor (no further steps)?: Yes  Number of steps to 2nd floor from main floor: One Flight  Living Arrangements: Lives w/ Son, Lives w/ Family members, Lives w/ Parent(s)  Is patient a ?: No    Activities of Daily Living Prior to Admission  Functional Status: Independent  Completes ADLs independently?: Yes  Ambulates independently?: Yes  Does patient use assisted devices?: No  Does patient currently own DME?: No  Does patient have a history of Outpatient Therapy (PT/OT)?: No  Does the patient have a history of Short-Term Rehab?: No  Does patient have a history of HHC?: No  Does patient currently have HHC?: No    Current Home Health Care  Home Health Agency Name:: Other    Patient Information Continued  Income Source: SSI/SSD  Does patient have prescription coverage?: Yes  Can the patient afford their medications and any related supplies (such as glucometers or test strips)?: Yes  Does patient receive dialysis treatments?: No  Does patient have a history of substance abuse?: No  Does patient have a history of Mental Health Diagnosis?: No         Means of Transportation  Means of Transport to Appts:: Family transport          DISCHARGE DETAILS:    Discharge  planning discussed with:: Patient  Freedom of Choice: Yes  Comments - Freedom of Choice: Agreeable to home health for SN for drain care  CM contacted family/caregiver?: No- see comments (declined need at this time)  Were Treatment Team discharge recommendations reviewed with patient/caregiver?: Yes  Did patient/caregiver verbalize understanding of patient care needs?: Yes       Contacts  Patient Contacts: Anuj Gregory  Relationship to Patient:: Family  Contact Method: Phone  Phone Number: [phone]  Reason/Outcome: Emergency Contact    Requested Home Health Care         Is the patient interested in HHC at discharge?: Yes  Home Health Discipline requested:: Nursing  Home Health Agency Name:: Other  Home Health Follow-Up Provider:: Referring Provider  Home Health Services Needed:: Other (comment) (drain care)  Homebound Criteria Met:: Immunosuppressed  Supporting Clincal Findings:: Limited Endurance    DME Referral Provided  Referral made for DME?: No    Other Referral/Resources/Interventions Provided:  Interventions: HHC  Referral Comments: Referral placed for home health via aidin         Treatment Team Recommendation: Home with Home Health Care  Expected Discharge Disposition: Home Health Services                                            Additional Comments: Met with pt at the bedside. Pt reports living in a two story home with her mother, son, and brother. Pt reports a strong support system amongsther family and friends. Pt does have a drain in place which pt is agreeable to having a SN come to the home to provide education on and oversight. Referrals placed via aidin. CM will continue to follow.

## 2025-07-01 NOTE — DISCHARGE INSTR - AVS FIRST PAGE
Surgery Date: 7/1/2025                Patient: Romina Cleaning  Surgeon: Dr. Mary Anne Fox     Postoperative Instructions   Breast Reconstruction     Dressings:  [x] Skin glue was applied to your incision over absorbable sutures.  You may feel small pieces of suture at the ends of your incision.    [x] Please leave tegaderm (clear dressing over your drains) and steristrips in place.  [x] No dressings are required but you may cover the incision with gauze for comfort.  [x] Wear your surgical bra at all times except while showering.  [x] Strip and record your EFRAIN drain output as instructed.  Be sure to bring the output log to your follow up appointment.  [] Other instructions:      Bathing:  [x] Shower 24 hours after surgery.  Allow soap and water to gently wash over the incision.  No scrubbing.  Gently pat dry and apply dressing as needed/instructed above.  [x] No submerging incision in bathtub, pool, hot tub and/or lake.     Activity:  [x] No heavy lifting (> 10lbs).  [x] No strenuous exercise.  [x] Walking is mandatory daily.  [x] Sleeping may be more comfortable with your head elevated.  [x] No driving until off pain medications and you have resumed full range of motion.     Diet and Medication:  [x] Resume diet as tolerated.  High protein diet is important for healing.  Remain well hydrated with water and minimize sodium intake.  [x] Resume preoperative medications.  [x] Pain medications as prescribed.    [x] Finish all antibiotics (Keflex) as prescribed.  [x] Apply ice to area as needed for pain.  Do not place ice directly on skin.  Do not use heat.  [] Other instructions:      It is expected to have some bruising, swelling and mild oozing at the incision site and of the surrounding area.  If there is more than you expect, an enlarging area or you suspect an infection, please call the office.     Some patients may experience a low-grade fever after surgery.  If it is above 100.4, please call the office.     If you  do not have a postoperative office appointment scheduled, please call the office today and let the staff know Dr. Fox/her PA needs to see you in 7 days.     Please call [phone] (Dr. Fox's office) with any questions, concerns or changes.      EFRAIN Drain Output Log     Date Time Drain #1

## 2025-07-01 NOTE — PLAN OF CARE
Problem: PAIN - ADULT  Goal: Verbalizes/displays adequate comfort level or baseline comfort level  Description: Interventions:  - Encourage patient to monitor pain and request assistance  - Assess pain using appropriate pain scale  - Administer analgesics as ordered based on type and severity of pain and evaluate response  - Implement non-pharmacological measures as appropriate and evaluate response  - Consider cultural and social influences on pain and pain management  - Notify physician/advanced practitioner if interventions unsuccessful or patient reports new pain  - Educate patient/family on pain management process including their role and importance of  reporting pain   - Provide non-pharmacologic/complimentary pain relief interventions  Outcome: Progressing     Problem: INFECTION - ADULT  Goal: Absence or prevention of progression during hospitalization  Description: INTERVENTIONS:  - Assess and monitor for signs and symptoms of infection  - Monitor lab/diagnostic results  - Monitor all insertion sites, i.e. indwelling lines, tubes, and drains  - Monitor endotracheal if appropriate and nasal secretions for changes in amount and color  - [place] appropriate cooling/warming therapies per order  - Administer medications as ordered  - Instruct and encourage patient and family to use good hand hygiene technique  - Identify and instruct in appropriate isolation precautions for identified infection/condition  Outcome: Progressing  Goal: Absence of fever/infection during neutropenic period  Description: INTERVENTIONS:  - Monitor WBC  - Perform strict hand hygiene  - Limit to healthy visitors only  - No plants, dried, fresh or silk flowers with pyle in patient room  Outcome: Progressing     Problem: SAFETY ADULT  Goal: Patient will remain free of falls  Description: INTERVENTIONS:  - Educate patient/family on patient safety including physical limitations  - Instruct patient to call for assistance with activity   -  Consider consulting OT/PT to assist with strengthening/mobility based on AM PAC & JH-HLM score  - Consult OT/PT to assist with strengthening/mobility   - Keep Call bell within reach  - Keep bed low and locked with side rails adjusted as appropriate  - Keep care items and personal belongings within reach  - Initiate and maintain comfort rounds  - Make Fall Risk Sign visible to staff  - Offer Toileting every 2  Hours, in advance of need  - Initiate/Maintain bed alarm  - Obtain necessary fall risk management equipment:   - Apply yellow socks and bracelet for high fall risk patients  - Consider moving patient to room near nurses station  Outcome: Progressing  Goal: Maintain or return to baseline ADL function  Description: INTERVENTIONS:  -  Assess patient's ability to carry out ADLs; assess patient's baseline for ADL function and identify physical deficits which impact ability to perform ADLs (bathing, care of mouth/teeth, toileting, grooming, dressing, etc.)  - Assess/evaluate cause of self-care deficits   - Assess range of motion  - Assess patient's mobility; develop plan if impaired  - Assess patient's need for assistive devices and provide as appropriate  - Encourage maximum independence but intervene and supervise when necessary  - Involve family in performance of ADLs  - Assess for home care needs following discharge   - Consider OT consult to assist with ADL evaluation and planning for discharge  - Provide patient education as appropriate  - Monitor functional capacity and physical performance, use of AM PAC & JH-HLM   - Monitor gait, balance and fatigue with ambulation    Outcome: Progressing  Goal: Maintains/Returns to pre admission functional level  Description: INTERVENTIONS:  - Perform AM-PAC 6 Click Basic Mobility/ Daily Activity assessment daily.  - Set and communicate daily mobility goal to care team and patient/family/caregiver.   - Collaborate with rehabilitation services on mobility goals if  consulted  - Perform Range of Motion 3 times a day.  - Reposition patient every 3 hours.  - Dangle patient 3 times a day  - Stand patient 3 times a day  - Ambulate patient 3 times a day  - Out of bed to chair 3 times a day   - Out of bed for meals 3 times a day  - Out of bed for toileting  - Record patient progress and toleration of activity level   Outcome: Progressing     Problem: DISCHARGE PLANNING  Goal: Discharge to home or other facility with appropriate resources  Description: INTERVENTIONS:  - Identify barriers to discharge w/patient and caregiver  - Arrange for needed discharge resources and transportation as appropriate  - Identify discharge learning needs (meds, wound care, etc.)  - Arrange for interpretive services to assist at discharge as needed  - Refer to Case Management Department for coordinating discharge planning if the patient needs post-hospital services based on physician/advanced practitioner order or complex needs related to functional status, cognitive ability, or social support system  Outcome: Progressing     Problem: Knowledge Deficit  Goal: Patient/family/caregiver demonstrates understanding of disease process, treatment plan, medications, and discharge instructions  Description: Complete learning assessment and assess knowledge base.  Interventions:  - Provide teaching at level of understanding  - Provide teaching via preferred learning methods  Outcome: Progressing

## 2025-07-01 NOTE — PROGRESS NOTES
"Progress Note - Oncology-Surgical   Name: Romina Cleaning 54 y.o. female I MRN: [id number]  Unit/Bed#: E2 -01 I Date of Admission: 7/1/2025   Date of Service: 7/1/2025 I Hospital Day: 0     Assessment & Plan  Malignant neoplasm of overlapping sites of right breast in female, estrogen receptor positive (HCC)  54-year-old female POD 1 s/p right modified radical mastectomy.    EFRAIN drain output: 110 cc serosanguineous    Plan:  -Discharge home today  -Follow-up case management for VNA services secondary to drain care and output  -Monitor incision sites  -Record drain output  -Frequent vitals  -Neurovascular checks  -DVT prophylaxis  -Pain and nausea control as needed  -Follow-up morning CBC  -OOB, ambulate  -I-S use    24 Hour Events : No acute overnight events.  Subjective : Patient feeling sleepy this morning without any outward signs of pain.  Patient with intermittent nausea overnight without vomiting.  She describes soreness around incision site, but no focal pain.  She denies any fevers, chills, shortness of breath, or chest pain    Objective :  Visit Vitals  /70 (BP Location: Left arm)   Pulse 75   Temp 97.6 °F (36.4 °C) (Temporal)   Resp 18   Ht 5' 6\" (1.676 m)   Wt 63.6 kg (140 lb 3.4 oz)   LMP  (LMP Unknown)   SpO2 97%   BMI 22.63 kg/m²   OB Status Postmenopausal   Smoking Status Former   BSA 1.72 m²        Physical Exam  Constitutional:       General: She is not in acute distress.     Appearance: Normal appearance. She is not ill-appearing, toxic-appearing or diaphoretic.     Cardiovascular:      Rate and Rhythm: Normal rate and regular rhythm.      Pulses: Normal pulses.      Heart sounds: Normal heart sounds. No murmur heard.     No gallop.   Pulmonary:      Effort: Pulmonary effort is normal. No respiratory distress.      Breath sounds: Normal breath sounds. No wheezing.   Abdominal:      General: Abdomen is flat. There is no distension.      Palpations: Abdomen is soft. There is no mass.      " Tenderness: There is no abdominal tenderness. There is no guarding or rebound.      Hernia: No hernia is present.     Skin:     General: Skin is warm and dry.      Coloration: Skin is not jaundiced.      Findings: No erythema.     Neurological:      General: No focal deficit present.      Mental Status: She is alert and oriented to person, place, and time.     Breast exam: Right breast incision site clean dry and intact without any surrounding erythema, swelling, or discharge.  Minimal tenderness near incision site.  Neurovascularly intact.      Lab Results: I have reviewed the following results:  Recent Labs     07/02/25  0439   WBC 7.37   HGB 10.7*             VTE Pharmacologic Prophylaxis: VTE covered by:    None     VTE Mechanical Prophylaxis: sequential compression device

## 2025-07-01 NOTE — ANESTHESIA POSTPROCEDURE EVALUATION
Post-Op Assessment Note    CV Status:  Stable  Pain Score: 0    Pain management: adequate       Mental Status:  Alert and awake   Hydration Status:  Euvolemic   PONV Controlled:  Controlled   Airway Patency:  Patent  Two or more mitigation strategies used for obstructive sleep apnea   Post Op Vitals Reviewed: Yes    No anethesia notable event occurred.    Staff: Anesthesiologist           Last Filed PACU Vitals:  Vitals Value Taken Time   Temp 97.8 °F (36.6 °C) 07/01/25 12:11   Pulse 89 07/01/25 12:11   /100 07/01/25 12:11   Resp 16 07/01/25 12:11   SpO2 96 % 07/01/25 12:11       Modified South:     Vitals Value Taken Time   Activity 2 07/01/25 11:41   Respiration 2 07/01/25 11:41   Circulation 2 07/01/25 11:41   Consciousness 1 07/01/25 11:41   Oxygen Saturation 1 07/01/25 11:41     Modified South Score: 8

## 2025-07-01 NOTE — QUICK NOTE
Post Op Check Note -Surgery YAMINI Cleaning 54 y.o. female MRN: [id number]  Unit/Bed#: E2 -01 Encounter: [id number]      Subjective:   55 yo POD 0 s/p right modified radical mastectomy. Patient is doing well postop. Pain is well controlled. Denies N/V. No new complaints at this time.     Objective:     Vitals:    07/01/25 1537   BP: (!) 169/107   Pulse: 89   Resp: 18   Temp: (!) 96.9 °F (36.1 °C)   SpO2: 97%     Physical Exam:  General: Alert and oriented x 4  CV: RRR; no murmurs, gallops, or rubs  Resp: No SOB; lungs CTA - no wheezes, rales or rhonchi  Abd: BS x 4 quadrants, soft and nontender  Breast exam: Incision sites are clean dry and intact without any surrounding erythema, swelling or discharge.  Surgical bra for support.  EFRAIN drain output adequate and characterized as serosanguineous.  Minimal tenderness near incision site.  Bilateral upper extremity neurovascularly intact.    Labs:  0   Lab Value Date/Time    HCT 32.2 (L) 06/17/2025 0924    HCT 30.2 (L) 05/19/2025 0941    HCT 31.6 (L) 04/28/2025 0916    HCT 44.1 11/01/2015 1127    HCT 42.4 05/23/2015 0003    HCT 42.3 12/01/2014 0307    HGB 10.1 (L) 06/17/2025 0924    HGB 9.6 (L) 05/19/2025 0941    HGB 10.0 (L) 04/28/2025 0916    HGB 15.3 11/01/2015 1127    HGB 14.7 05/23/2015 0003    HGB 14.6 12/01/2014 0307    INR 1.15 03/04/2025 0833    INR 1.10 12/01/2014 0307    WBC 4.33 06/17/2025 0924    WBC 3.68 (L) 05/19/2025 0941    WBC 5.04 04/28/2025 0916    WBC 7.71 11/01/2015 1127    WBC 10.75 (H) 05/23/2015 0003    WBC 7.63 12/01/2014 0307    ESR 12 03/29/2019 0934    CRP <3.0 03/29/2019 0934     Meds:  Current Medications[1]    Assessment/Plan:   Assessment:   54 y.o.female post operative day 0 s/p right modified radical mastectomy.  Patient doing well postoperatively feeling subjectively well.  Objectively, incision sites are clean dry and intact.  Neurovascularly intact.      Plan:  Incentive spirometry  Surgical bra for support  Monitor incision  sites  AM CBC  Up and out of bed  Sling for Comfort, may remove for pendulums and hygiene  DVT prophylaxis  Ice and analgesics  Will continue to assess for acute blood loss anemia  Monitor drain output  Atif Deal PA-C                [1]   Current Facility-Administered Medications:     acetaminophen (TYLENOL) tablet 975 mg, 975 mg, Oral, Q8H, Priyanka Mcnulty PA-C, 975 mg at 07/01/25 1252    atorvastatin (LIPITOR) tablet 10 mg, 10 mg, Oral, Daily With Dinner, Priyanka Mcnulty PA-C, 10 mg at 07/01/25 1531    ceFAZolin (ANCEF) IVPB (premix in dextrose) 1,000 mg 50 mL, 1,000 mg, Intravenous, Q8H, Priyanka Mcnulty PA-C, Last Rate: 100 mL/hr at 07/01/25 1531, 1,000 mg at 07/01/25 1531    diphenhydrAMINE (BENADRYL) tablet 25 mg, 25 mg, Oral, Q6H PRN, Priyanka Mcnulty PA-C    gabapentin (NEURONTIN) capsule 300 mg, 300 mg, Oral, TID, Priyanka Mcnulty PA-C, 300 mg at 07/01/25 1531    [START ON 7/2/2025] lisinopril (ZESTRIL) tablet 10 mg, 10 mg, Oral, Daily **AND** [START ON 7/2/2025] hydroCHLOROthiazide tablet 12.5 mg, 12.5 mg, Oral, Daily, Priyanka Mcnulty PA-C    HYDROmorphone (DILAUDID) injection 0.5 mg, 0.5 mg, Intravenous, Q4H PRN, Priyanka Mcnulyt PA-C    ondansetron (ZOFRAN-ODT) dispersible tablet 4 mg, 4 mg, Oral, Q6H PRN, Priyanka Mcnulty PA-C    oxyCODONE (ROXICODONE) immediate release tablet 10 mg, 10 mg, Oral, Q4H PRN, Priyanka Mcnulty PA-C, 10 mg at 07/01/25 1537    oxyCODONE (ROXICODONE) IR tablet 5 mg, 5 mg, Oral, Q4H PRN, Priyanka Mcnulty PA-C

## 2025-07-01 NOTE — ASSESSMENT & PLAN NOTE
54-year-old female POD 1 s/p right modified radical mastectomy.    EFRAIN drain output: 110 cc serosanguineous    Plan:  -Discharge home today  -Follow-up case management for VNA services secondary to drain care and output  -Monitor incision sites  -Record drain output  -Frequent vitals  -Neurovascular checks  -DVT prophylaxis  -Pain and nausea control as needed  -Follow-up morning CBC  -OOB, ambulate  -I-S use

## 2025-07-01 NOTE — CASE MANAGEMENT
Case Management Discharge Planning Note    Patient name Romina Cleaning  Location [facility] /E2 -* MRN [id number]  : 1970 Date 2025       Current Admission Date: 2025  Current Admission Diagnosis:Malignant neoplasm of overlapping sites of right breast in female, estrogen receptor positive (HCC)   Patient Active Problem List    Diagnosis Date Noted    Chemotherapy induced neutropenia (HCC) 2025    Hypokalemia 2024    COVID-19 2024    Sepsis without acute organ dysfunction (HCC) 2024    Malignant neoplasm of overlapping sites of right breast in female, estrogen receptor positive (HCC) 10/01/2024    Carcinoma of right breast metastatic to axillary lymph node (HCC) 2024    Encounter for tobacco use cessation counseling 2023    Mixed hyperlipidemia 2022    Lumbar back pain 03/15/2019    Foster care (status) 10/18/2018    Microscopic hematuria 2018    Smoker 2018    Abnormal CT scan, pelvis 2018    Anxiety 2018    Dysuria 2018    Trapezius muscle spasm 2018    Neck pain 2018    Mitral regurgitation 2017    Hypertension 03/10/2015    Episode of recurrent major depressive disorder (HCC) 2015      LOS (days): 0  Geometric Mean LOS (GMLOS) (days):   Days to GMLOS:     OBJECTIVE:            Current admission status: Outpatient Surgery   Preferred Pharmacy:   [facility] - [place], [place] - [address]  [address]  [address]  [place] [address]  Phone: [phone] Fax: [phone]    [id number] - [place], [place] - [address]  [address]  [place] [address]  Phone: [phone] Fax: [phone]    Primary Care Provider: Alessandro Milton DO    Primary Insurance: [employer]  Secondary Insurance:     DISCHARGE DETAILS:    Discharge planning discussed with:: Patient  Freedom of Choice: Yes  Comments - Freedom of Choice: Agreeable to [facility] for SN                     Requested  Home Health Care         Is the patient interested in HHC at discharge?: Yes  Home Health Discipline requested:: Nursing  Home Health Agency Name::  [facility]  Home Health Follow-Up Provider:: Referring Provider  Home Health Services Needed:: Other (comment) (drain care)  Homebound Criteria Met:: Immunosuppressed  Supporting Clincal Findings:: Limited Endurance

## 2025-07-01 NOTE — H&P
Name: Romina Cleaning      : 1970      MRN: [id number]  Encounter Provider: No name on file  Encounter Date: 5/15/2025   Encounter department: [facility] OPERATING ROOM  :  Assessment & Plan    54-year-old female who presented with right breast invasive lobular carcinoma ER/AK positive and HER2 negative.  She had multiple enlarged at least level 1 and 2 axillary nodes which were triple negative in status.  Breast MRI showed another focus in the right breast that was biopsied.  This was also hormone positive.  The initial biopsy was done in the 10:00 axis of the right breast.  The second was at the 5:00 axis of the right breast.  She therefore was counseled on a right mastectomy along with axillary node dissection.  I am referring her to lymphedema therapy preoperatively.  She is interested in reconstruction.  She has successfully quit smoking as of February of this year.  I will therefore refer her to plastic surgery.  She understands that she will also need radiation therapy given the extensive yoseph involvement.  All of her questions were answered.  Consent was signed today in the office.  I will coordinate surgery with plastics pending her consultation with them.      History of Present Illness   Romina Cleaning is a 54 y.o. year old female who presents to discuss surgical management of her right breast carcinoma.  She is completing neoadjuvant chemotherapy next week.  After my last encounter with her she did have an additional biopsy in the 5:00 axis of the right breast revealing malignancy as well.  She also had staging scans that did not show systemic disease; however, multiple enlarged axillary and subpectoral lymph nodes were identified.  The patient states that she is no longer smoking.    Oncology History   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 (HCC)  Staging form: Breast, AJCC 8th Edition  - Clinical stage from 2024: cT0, cN1(f), cM0,  GX, ER-, TX-, HER2- - Signed by Jodie Philippe MD on 10/7/2024  Stage prefix: Initial diagnosis  Method of lymph node assessment: Core biopsy  Histologic grading system: 3 grade system    Malignant neoplasm of overlapping sites of right breast in female, estrogen receptor positive (HCC)  Staging form: Breast, AJCC 8th Edition  - Clinical stage from 9/20/2024: Stage IB (cT1c, cN1, cM0, G2, ER+, TX+, HER2-) - Signed by Jodie Philippe MD on 5/14/2025  Stage prefix: Initial diagnosis  Histologic grading system: 3 grade system  Oncology History   Carcinoma of right breast metastatic to axillary lymph node (HCC)   6/11/2024 Initial Diagnosis    Carcinoma of right breast metastatic to axillary lymph node (HCC)     9/20/2024 -  Cancer Staged    Staging form: Breast, AJCC 8th Edition  - Clinical stage from 9/20/2024: cT0, cN1(f), cM0, GX, ER-, TX-, HER2- - Signed by Jodie Philippe MD on 10/7/2024  Stage prefix: Initial diagnosis  Method of lymph node assessment: Core biopsy  Histologic grading system: 3 grade system       10/14/2024 Observation    CT CAP:  1. Enlarged right axillary and subpectoral lymphadenopathy correlating to known history of breast cancer. No pulmonary nodules  No mediastinal or hilar lymphadenopathy. No contralateral axillary lymphadenopathy. No intra-abdominal metastatic disease. No adrenal or hepatic metastatic lesions. No suspicious osseous lesions     2. Stable right hepatic lobe hemangioma and cyst.     3. Stable prominent central mesenteric lymph nodes unchanged since 2018 unrelated to patient's breast cancer.    NM BONE SCAN:    1. No scintigraphic evidence of osseous metastasis.      11/14/2024 -  Chemotherapy    DOXOrubicin (ADRIAMYCIN), 60 mg/m2 = 103 mg, 4 of 4 cycles  Administration: 103 mg (3/19/2025), 103 mg (4/9/2025), 103 mg (4/30/2025), 103 mg (5/21/2025)  cyclophosphamide (CYTOXAN) IVPB, 600 mg/m2 = 1,026 mg, 4 of 4 cycles  Administration: 1,000 mg (3/19/2025), 1,000 mg (4/9/2025), 1,000  mg (4/30/2025), 1,000 mg (5/21/2025)  paclitaxel protein-bound (ABRAXANE) IVPB, 100 mg/m2 = 171 mg (original dose ), 4 of 4 cycles  Dose modification: 100 mg/m2 (original dose 100 mg/m2, Cycle 1)  Administration: 171 mg (12/5/2024), 171 mg (12/12/2024), 171 mg (12/19/2024), 171 mg (1/9/2025), 171 mg (1/16/2025), 171 mg (1/23/2025), 171 mg (1/30/2025), 171 mg (2/7/2025), 171 mg (2/13/2025), 171 mg (2/20/2025)  CARBOplatin (PARAPLATIN) IVPB ([facility] AUC DOSING), 180 mg, 4 of 4 cycles  Administration: 180 mg (11/14/2024), 180 mg (11/21/2024), 180 mg (12/5/2024), 180 mg (12/12/2024), 180 mg (12/19/2024), 180 mg (1/9/2025), 180 mg (1/16/2025), 180 mg (1/23/2025), 180 mg (1/30/2025), 180 mg (2/7/2025), 180 mg (2/13/2025), 180 mg (2/20/2025)  PACLItaxel (TAXOL) chemo IVPB, 80 mg/m2 = 136.8 mg, 1 of 1 cycle  Administration: 136.8 mg (11/14/2024), 136.8 mg (11/21/2024)  pembrolizumab (KEYTRUDA) IVPB, 200 mg, 8 of 17 cycles  Administration: 200 mg (11/14/2024), 200 mg (3/19/2025), 200 mg (12/12/2024), 200 mg (1/9/2025), 200 mg (1/30/2025), 200 mg (4/9/2025), 200 mg (4/30/2025), 200 mg (5/21/2025)     Malignant neoplasm of overlapping sites of right breast in female, estrogen receptor positive (HCC)   9/20/2024 Biopsy    Right breast ultrasound-guided biopsy  A. 10 o'clock, 7 cm from nipple (MARTHA)  Invasive lobular carcinoma  Grade 2  ER >95, WV 70, HER2 0  Lymphovascular invasion not identified    B. Right axillary lymph node biopsy (MARTHA)  Infiltrating carcinoma  Although no definitive capsule is noted, it is favored to represent lymph node involvement by th e carcinoma  ER <1, WV <1, HER2 1+    Concordant. Malignancy appears unifocal; suspicious masses cover an area up to 1.8 cm. US of right axilla showed multiple enlarged, morphologically abnormal axillary lymph nodes with biopsy confirmed metastatic disease. Left breast clear.     9/20/2024 -  Cancer Staged    Staging form: Breast, AJCC 8th Edition  - Clinical stage from  9/20/2024: Stage IB (cT1c, cN1, cM0, G2, ER+, AZ+, HER2-) - Signed by Jodie Philippe MD on 5/14/2025  Stage prefix: Initial diagnosis  Histologic grading system: 3 grade system       9/24/2024 Genomic Testing    MammaPrint/BluePrint ordered on breast specimen block A  Ultra-Low risk - luminal A     9/27/2024 Genetic Testing    Genetic testing with RNA analysis ordered  [place]    NEGATIVE     10/14/2024 Observation    CT CAP:  1. Enlarged right axillary and subpectoral lymphadenopathy correlating to known history of breast cancer. No pulmonary nodules  No mediastinal or hilar lymphadenopathy. No contralateral axillary lymphadenopathy. No intra-abdominal metastatic disease. No adrenal or hepatic metastatic lesions. No suspicious osseous lesions     2. Stable right hepatic lobe hemangioma and cyst.     3. Stable prominent central mesenteric lymph nodes unchanged since 2018 unrelated to patient's breast cancer.    NM BONE SCAN:    1. No scintigraphic evidence of osseous metastasis.      10/23/2024 Observation    B/L Breast MRI    IMPRESSION:   Redemonstration of a spiculated mass in the upper outer right breast in keeping with biopsy-proven invasive lobular carcinoma and bulky right axillary lymphadenopathy in keeping with metastatic nodes.  Indeterminate 6 mm mass at the 5 o'clock position right breast for which further characterization with targeted ultrasound is recommended.  If there is no ultrasound correlate MRI guided biopsy could be performed.  No suspicious enhancement in the left breast.       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 No definitive invasion carcinoma present on examined sections.  ER , AZ   8mm        Review of Systems   Constitutional: Negative.  Negative for appetite change, fever and unexpected weight change.   HENT: Negative.  Negative for trouble swallowing.    Eyes: Negative.     Respiratory: Negative.  Negative for cough and shortness of breath.    Cardiovascular: Negative.  Negative for chest pain.   Gastrointestinal: Negative.  Negative for abdominal pain, nausea and vomiting.   Endocrine: Negative.    Genitourinary: Negative.  Negative for dysuria.   Musculoskeletal: Negative.  Negative for arthralgias and myalgias.   Skin: Negative.    Allergic/Immunologic: Negative.    Neurological: Negative.  Negative for headaches.   Hematological: Negative.  Negative for adenopathy. Does not bruise/bleed easily.   Psychiatric/Behavioral: Negative.      A complete review of systems is negative other than that noted above in the HPI.    Past Medical History   Past Medical History:   Diagnosis Date    Breast cancer (HCC)     GERD (gastroesophageal reflux disease)     Headache     Hematuria     History of chemotherapy     Hypertension     IUD (intrauterine device) in place 02/15/2019    Mirena placed 2/2015 effective through 2/2020      Lateral epicondylitis, right elbow 01/08/2019    Panic attacks     Psychiatric disorder      Past Surgical History:   Procedure Laterality Date    BREAST BIOPSY Right 09/20/2024    BREAST BIOPSY Right 09/20/2024    BREAST BIOPSY Right 11/01/2024    CYSTOSCOPY  06/08/2018    IR PORT PLACEMENT  11/06/2024    US GUIDED BREAST BIOPSY RIGHT COMPLETE Right 09/20/2024    US GUIDED BREAST BIOPSY RIGHT COMPLETE Right 11/01/2024    US GUIDED BREAST LYMPH NODE BIOPSY RIGHT Right 09/20/2024     Family History   Problem Relation Name Age of Onset    Hypertension Mother Renetta Lira     Colonic polyp Mother Renetta Lira     Colon cancer Mother Renetta Lira     Arthritis Father Abhi Cleaning     Diabetes Father Abhi Cleaning     Hypertension Father Abhi Cleaning     Hyperlipidemia Father Abhi Cleaning     No Known Problems Sister      No Known Problems Sister      No Known Problems Son      No Known Problems Daughter      No Known Problems Maternal Aunt      No Known  "Problems Maternal Aunt      No Known Problems Maternal Aunt      No Known Problems Maternal Aunt      No Known Problems Maternal Aunt      No Known Problems Paternal Aunt      No Known Problems Paternal Aunt      No Known Problems Paternal Aunt      Colonic polyp Maternal Grandmother      Colon cancer Maternal Grandmother      Throat cancer Maternal Grandfather      No Known Problems Paternal Grandmother      No Known Problems Paternal Grandfather        reports that she quit smoking about 9 months ago. Her smoking use included cigarettes. She started smoking about 37 years ago. She has a 8.8 pack-year smoking history. She has been exposed to tobacco smoke. She has never used smokeless tobacco. She reports that she does not drink alcohol and does not use drugs.  Current Outpatient Medications   Medication Instructions    acetaminophen (TYLENOL) 500 mg, Oral, Every 6 hours PRN    acetaminophen (TYLENOL) 500 mg, Oral, Every 6 hours    atorvastatin (LIPITOR) 10 mg, Oral, Daily    cephalexin (KEFLEX) 500 mg, Oral, Every 12 hours scheduled    Cholecalciferol (VITAMIN D3 PO) Daily    cyclobenzaprine (FLEXERIL) 5 mg, Oral, 3 times daily PRN    gabapentin (NEURONTIN) 300 mg, Oral, 3 times daily    ibuprofen (MOTRIN) 800 mg, Oral, Every 8 hours scheduled, Please begin 24 hours after surgery.    lidocaine-prilocaine (EMLA) cream Topical, As needed    lisinopril-hydrochlorothiazide (PRINZIDE,ZESTORETIC) 10-12.5 MG per tablet 1 tablet, Oral, Daily    ondansetron (ZOFRAN) 4 mg, Oral, Every 8 hours PRN    oxyCODONE (ROXICODONE) 5 mg, Oral, Every 6 hours PRN    traMADol (ULTRAM) 50 mg, Oral, Every 6 hours PRN   Allergies[1]        Objective   /75   Pulse 82   Temp 97.8 °F (36.6 °C) (Temporal)   Resp 16   Ht 5' 6\" (1.676 m)   Wt 63.6 kg (140 lb 3.4 oz)   LMP  (LMP Unknown)   SpO2 96%   BMI 22.63 kg/m²     Pain Screening:     ECOG    Physical Exam  Constitutional:       General: She is not in acute distress.     " Appearance: Normal appearance. She is well-developed.   HENT:      Head: Normocephalic and atraumatic.     Cardiovascular:      Heart sounds: Normal heart sounds.   Pulmonary:      Breath sounds: Normal breath sounds.   Chest:   Breasts:     Right: No swelling, bleeding, inverted nipple, mass, nipple discharge, skin change or tenderness.      Left: No swelling, bleeding, inverted nipple, mass, nipple discharge, skin change or tenderness.      Comments: Port left infraclavicular  Abdominal:      Palpations: Abdomen is soft.     Musculoskeletal:      Right lower leg: No edema.      Left lower leg: No edema.   Lymphadenopathy:      Upper Body:      Right upper body: Axillary adenopathy (multiple enlarged but mobile) present. No supraclavicular or pectoral adenopathy.      Left upper body: No supraclavicular, axillary or pectoral adenopathy.     Neurological:      Mental Status: She is alert and oriented to person, place, and time.     Psychiatric:         Mood and Affect: Mood normal.          Labs: I have reviewed pertinent labs.     [facility] genetics were negative    11/1/24 core biopsy right breast 5:00 periareolar revealed a papillary neoplasm ER/MO positive    Appointment on 06/27/2025   Component Date Value Ref Range Status    Cotinine, Urine 06/27/2025 Negative  Egupqt=850 ng/mL Final    Drug Screen Comment: 06/27/2025 Comment   Final    This analysis is performed by immunoassay. Positive findings are  unconfirmed analytical test results; if results do not support  expected clinical finding, confirmation by an alternate methodology is  recommended. Patient metabolic variables, specific drug chemistry, and  specimen characteristics can affect test outcome.  Technical consultation is available at [email or web address], or call  toll free [phone].   Appointment on 06/17/2025   Component Date Value Ref Range Status    WBC 06/17/2025 4.33  4.31 - 10.16 Thousand/uL Final    RBC 06/17/2025 3.13 (L)  3.81 - 5.12  Million/uL Final    Hemoglobin 06/17/2025 10.1 (L)  11.5 - 15.4 g/dL Final    Hematocrit 06/17/2025 32.2 (L)  34.8 - 46.1 % Final    MCV 06/17/2025 103 (H)  82 - 98 fL Final    MCH 06/17/2025 32.3  26.8 - 34.3 pg Final    MCHC 06/17/2025 31.4  31.4 - 37.4 g/dL Final    RDW 06/17/2025 14.5  11.6 - 15.1 % Final    MPV 06/17/2025 10.7  8.9 - 12.7 fL Final    Platelets 06/17/2025 285  149 - 390 Thousands/uL Final    nRBC 06/17/2025 0  /100 WBCs Final    Segmented % 06/17/2025 57  43 - 75 % Final    Immature Grans % 06/17/2025 0  0 - 2 % Final    Lymphocytes % 06/17/2025 19  14 - 44 % Final    Monocytes % 06/17/2025 7  4 - 12 % Final    Eosinophils Relative 06/17/2025 16 (H)  0 - 6 % Final    Basophils Relative 06/17/2025 1  0 - 1 % Final    Absolute Neutrophils 06/17/2025 2.50  1.85 - 7.62 Thousands/µL Final    Absolute Immature Grans 06/17/2025 0.00  0.00 - 0.20 Thousand/uL Final    Absolute Lymphocytes 06/17/2025 0.82  0.60 - 4.47 Thousands/µL Final    Absolute Monocytes 06/17/2025 0.28  0.17 - 1.22 Thousand/µL Final    Eosinophils Absolute 06/17/2025 0.68 (H)  0.00 - 0.61 Thousand/µL Final    Basophils Absolute 06/17/2025 0.05  0.00 - 0.10 Thousands/µL Final    Sodium 06/17/2025 140  135 - 147 mmol/L Final    Potassium 06/17/2025 4.0  3.5 - 5.3 mmol/L Final    Chloride 06/17/2025 104  96 - 108 mmol/L Final    CO2 06/17/2025 31  21 - 32 mmol/L Final    ANION GAP 06/17/2025 5  4 - 13 mmol/L Final    BUN 06/17/2025 9  5 - 25 mg/dL Final    Creatinine 06/17/2025 0.49 (L)  0.60 - 1.30 mg/dL Final    Standardized to IDMS reference method    Glucose 06/17/2025 98  65 - 140 mg/dL Final    If the patient is fasting, the ADA then defines impaired fasting glucose as > 100 mg/dL and diabetes as > or equal to 123 mg/dL.    Calcium 06/17/2025 9.3  8.4 - 10.2 mg/dL Final    AST 06/17/2025 24  13 - 39 U/L Final    ALT 06/17/2025 27  7 - 52 U/L Final    Specimen collection should occur prior to Sulfasalazine administration due to the  potential for falsely depressed results.     Alkaline Phosphatase 06/17/2025 107 (H)  34 - 104 U/L Final    Total Protein 06/17/2025 7.2  6.4 - 8.4 g/dL Final    Albumin 06/17/2025 4.4  3.5 - 5.0 g/dL Final    Total Bilirubin 06/17/2025 0.42  0.20 - 1.00 mg/dL Final    Use of this assay is not recommended for patients undergoing treatment with eltrombopag due to the potential for falsely elevated results.  N-acetyl-p-benzoquinone imine (metabolite of Acetaminophen) will generate erroneously low results in samples for patients that have taken an overdose of Acetaminophen.    eGFR 06/17/2025 112  ml/min/1.73sq m Final    TSH 3RD GENERATION 06/17/2025 1.014  0.450 - 4.500 uIU/mL Final    The recommended reference ranges for TSH during pregnancy are as follows:   First trimester 0.100 to 2.500 uIU/mL   Second trimester  0.200 to 3.000 uIU/mL   Third trimester 0.300 to 3.000 uIU/m    Note: Normal ranges may not apply to patients who are transgender, non-binary, or whose legal sex, sex at birth, and gender identity differ.  Adult TSH (3rd generation) reference range follows the recommended guidelines of the American Thyroid Association, January, 2020.    T3, Free 06/17/2025 3.56  2.50 - 3.90 pg/mL Final    Specimens with biotin concentrations > 10 ng/mL can lead to significant (> 10%) positive bias in result.     Pathology: I have reviewed pathology reports described above.    Radiology Results Review: I personally reviewed the following image studies in PACS and associated radiology reports: 10/23/2024 bilateral breast MRI, 11/1/2024 right breast ultrasound and postbiopsy mammogram. My interpretation of the radiology images/reports is: Additional focus in the right breast at 5:00 which is biopsy-proven as noted above.  Concordance: yes           [1] No Known Allergies

## 2025-07-02 ENCOUNTER — TELEPHONE (OUTPATIENT)
Age: 55
End: 2025-07-02

## 2025-07-02 VITALS
RESPIRATION RATE: 15 BRPM | WEIGHT: 140.21 LBS | HEIGHT: 66 IN | HEART RATE: 81 BPM | BODY MASS INDEX: 22.53 KG/M2 | DIASTOLIC BLOOD PRESSURE: 66 MMHG | TEMPERATURE: 98.3 F | OXYGEN SATURATION: 98 % | SYSTOLIC BLOOD PRESSURE: 118 MMHG

## 2025-07-02 LAB
BASOPHILS # BLD AUTO: 0.02 THOUSANDS/ÂΜL (ref 0–0.1)
BASOPHILS NFR BLD AUTO: 0 % (ref 0–1)
COTININE SERPL-MCNC: <1 NG/ML
EOSINOPHIL # BLD AUTO: 0.11 THOUSAND/ÂΜL (ref 0–0.61)
EOSINOPHIL NFR BLD AUTO: 2 % (ref 0–6)
ERYTHROCYTE [DISTWIDTH] IN BLOOD BY AUTOMATED COUNT: 12.9 % (ref 11.6–15.1)
HCT VFR BLD AUTO: 35.1 % (ref 34.8–46.1)
HGB BLD-MCNC: 10.7 G/DL (ref 11.5–15.4)
IMM GRANULOCYTES # BLD AUTO: 0.01 THOUSAND/UL (ref 0–0.2)
IMM GRANULOCYTES NFR BLD AUTO: 0 % (ref 0–2)
LYMPHOCYTES # BLD AUTO: 1.47 THOUSANDS/ÂΜL (ref 0.6–4.47)
LYMPHOCYTES NFR BLD AUTO: 20 % (ref 14–44)
MCH RBC QN AUTO: 33.1 PG (ref 26.8–34.3)
MCHC RBC AUTO-ENTMCNC: 30.5 G/DL (ref 31.4–37.4)
MCV RBC AUTO: 109 FL (ref 82–98)
MONOCYTES # BLD AUTO: 0.5 THOUSAND/ÂΜL (ref 0.17–1.22)
MONOCYTES NFR BLD AUTO: 7 % (ref 4–12)
NEUTROPHILS # BLD AUTO: 5.26 THOUSANDS/ÂΜL (ref 1.85–7.62)
NEUTS SEG NFR BLD AUTO: 71 % (ref 43–75)
NICOTINE SERPL-MCNC: <1 NG/ML
NRBC BLD AUTO-RTO: 0 /100 WBCS
PLATELET # BLD AUTO: 183 THOUSANDS/UL (ref 149–390)
PMV BLD AUTO: 10.3 FL (ref 8.9–12.7)
RBC # BLD AUTO: 3.23 MILLION/UL (ref 3.81–5.12)
WBC # BLD AUTO: 7.37 THOUSAND/UL (ref 4.31–10.16)

## 2025-07-02 PROCEDURE — 99024 POSTOP FOLLOW-UP VISIT: CPT | Performed by: SURGERY

## 2025-07-02 PROCEDURE — 85025 COMPLETE CBC W/AUTO DIFF WBC: CPT

## 2025-07-02 RX ADMIN — LISINOPRIL 10 MG: 10 TABLET ORAL at 08:17

## 2025-07-02 RX ADMIN — ACETAMINOPHEN 975 MG: 325 TABLET ORAL at 12:17

## 2025-07-02 RX ADMIN — HYDROCHLOROTHIAZIDE 12.5 MG: 12.5 TABLET ORAL at 08:16

## 2025-07-02 RX ADMIN — GABAPENTIN 300 MG: 300 CAPSULE ORAL at 08:16

## 2025-07-02 RX ADMIN — ACETAMINOPHEN 975 MG: 325 TABLET ORAL at 04:21

## 2025-07-02 NOTE — TELEPHONE ENCOUNTER
Pt calling with post op questions, joint case w/Dr Philippe/Dominique 7/1/25    Advised pt (with [place]occan interpretor) Susan is with a pt but I will have her call pt back at [phone]

## 2025-07-02 NOTE — PLAN OF CARE
Problem: PAIN - ADULT  Goal: Verbalizes/displays adequate comfort level or baseline comfort level  Description: Interventions:  - Encourage patient to monitor pain and request assistance  - Assess pain using appropriate pain scale  - Administer analgesics as ordered based on type and severity of pain and evaluate response  - Implement non-pharmacological measures as appropriate and evaluate response  - Consider cultural and social influences on pain and pain management  - Notify physician/advanced practitioner if interventions unsuccessful or patient reports new pain  - Educate patient/family on pain management process including their role and importance of  reporting pain   - Provide non-pharmacologic/complimentary pain relief interventions  Outcome: Progressing     Problem: INFECTION - ADULT  Goal: Absence or prevention of progression during hospitalization  Description: INTERVENTIONS:  - Assess and monitor for signs and symptoms of infection  - Monitor lab/diagnostic results  - Monitor all insertion sites, i.e. indwelling lines, tubes, and drains  - Monitor endotracheal if appropriate and nasal secretions for changes in amount and color  - [place] appropriate cooling/warming therapies per order  - Administer medications as ordered  - Instruct and encourage patient and family to use good hand hygiene technique  - Identify and instruct in appropriate isolation precautions for identified infection/condition  Outcome: Progressing     Problem: SAFETY ADULT  Goal: Maintain or return to baseline ADL function  Description: INTERVENTIONS:  -  Assess patient's ability to carry out ADLs; assess patient's baseline for ADL function and identify physical deficits which impact ability to perform ADLs (bathing, care of mouth/teeth, toileting, grooming, dressing, etc.)  - Assess/evaluate cause of self-care deficits   - Assess range of motion  - Assess patient's mobility; develop plan if impaired  - Assess patient's need for  assistive devices and provide as appropriate  - Encourage maximum independence but intervene and supervise when necessary  - Involve family in performance of ADLs  - Assess for home care needs following discharge   - Consider OT consult to assist with ADL evaluation and planning for discharge  - Provide patient education as appropriate  - Monitor functional capacity and physical performance, use of AM PAC & JH-HLM   - Monitor gait, balance and fatigue with ambulation    Outcome: Progressing     Problem: DISCHARGE PLANNING  Goal: Discharge to home or other facility with appropriate resources  Description: INTERVENTIONS:  - Identify barriers to discharge w/patient and caregiver  - Arrange for needed discharge resources and transportation as appropriate  - Identify discharge learning needs (meds, wound care, etc.)  - Arrange for interpretive services to assist at discharge as needed  - Refer to Case Management Department for coordinating discharge planning if the patient needs post-hospital services based on physician/advanced practitioner order or complex needs related to functional status, cognitive ability, or social support system  Outcome: Progressing     Problem: Knowledge Deficit  Goal: Patient/family/caregiver demonstrates understanding of disease process, treatment plan, medications, and discharge instructions  Description: Complete learning assessment and assess knowledge base.  Interventions:  - Provide teaching at level of understanding  - Provide teaching via preferred learning methods  Outcome: Progressing     Problem: Prexisting or High Potential for Compromised Skin Integrity  Goal: Skin integrity is maintained or improved  Description: INTERVENTIONS:  - Identify patients at risk for skin breakdown  - Assess and monitor skin integrity including under and around medical devices   - Assess and monitor nutrition and hydration status  - Monitor labs  - Assess for incontinence   - Turn and reposition  patient  - Assist with mobility/ambulation  - Relieve pressure over geoff prominences   - Avoid friction and shearing  - Provide appropriate hygiene as needed including keeping skin clean and dry  - Evaluate need for skin moisturizer/barrier cream  - Collaborate with interdisciplinary team  - Patient/family teaching  - Consider wound care consult    Assess:  - Review Bryan scale daily  - Clean and moisturize skin   - Inspect skin when repositioning, toileting, and assisting with ADLS  - Assess under medical devices   - Assess extremities for adequate circulation and sensation     Bed Management:  - Have minimal linens on bed & keep smooth, unwrinkled  - Change linens as needed when moist or perspiring  - Avoid sitting or lying in one position for more than 2 hours while in bed?Keep HOB at 30 degrees   - Toileting:  - Offer bedside commode  - Assess for incontinence   - Use incontinent care products after each incontinent episode     Activity:  - Mobilize patient 3 times a day  - Encourage activity and walks on unit  - Encourage or provide ROM exercises   - Turn and reposition patient every 2 Hours  - Use appropriate equipment to lift or move patient in bed  - Instruct/ Assist with weight shifting every 2 hrs when out of bed in chair  - Consider limitation of chair time 2 hour intervals    Skin Care:  - Avoid use of baby powder, tape, friction and shearing, hot water or constrictive clothing  - Relieve pressure over bony prominences  - Do not massage red bony areas      Outcome: Progressing

## 2025-07-02 NOTE — PLAN OF CARE
Problem: PAIN - ADULT  Goal: Verbalizes/displays adequate comfort level or baseline comfort level  Description: Interventions:  - Encourage patient to monitor pain and request assistance  - Assess pain using appropriate pain scale  - Administer analgesics as ordered based on type and severity of pain and evaluate response  - Implement non-pharmacological measures as appropriate and evaluate response  - Consider cultural and social influences on pain and pain management  - Notify physician/advanced practitioner if interventions unsuccessful or patient reports new pain  - Educate patient/family on pain management process including their role and importance of  reporting pain   - Provide non-pharmacologic/complimentary pain relief interventions  Outcome: Progressing     Problem: INFECTION - ADULT  Goal: Absence or prevention of progression during hospitalization  Description: INTERVENTIONS:  - Assess and monitor for signs and symptoms of infection  - Monitor lab/diagnostic results  - Monitor all insertion sites, i.e. indwelling lines, tubes, and drains  - Monitor endotracheal if appropriate and nasal secretions for changes in amount and color  - [place] appropriate cooling/warming therapies per order  - Administer medications as ordered  - Instruct and encourage patient and family to use good hand hygiene technique  - Identify and instruct in appropriate isolation precautions for identified infection/condition  Outcome: Progressing     Problem: SAFETY ADULT  Goal: Maintain or return to baseline ADL function  Description: INTERVENTIONS:  -  Assess patient's ability to carry out ADLs; assess patient's baseline for ADL function and identify physical deficits which impact ability to perform ADLs (bathing, care of mouth/teeth, toileting, grooming, dressing, etc.)  - Assess/evaluate cause of self-care deficits   - Assess range of motion  - Assess patient's mobility; develop plan if impaired  - Assess patient's need for  assistive devices and provide as appropriate  - Encourage maximum independence but intervene and supervise when necessary  - Involve family in performance of ADLs  - Assess for home care needs following discharge   - Consider OT consult to assist with ADL evaluation and planning for discharge  - Provide patient education as appropriate  - Monitor functional capacity and physical performance, use of AM PAC & JH-HLM   - Monitor gait, balance and fatigue with ambulation    Outcome: Progressing     Problem: DISCHARGE PLANNING  Goal: Discharge to home or other facility with appropriate resources  Description: INTERVENTIONS:  - Identify barriers to discharge w/patient and caregiver  - Arrange for needed discharge resources and transportation as appropriate  - Identify discharge learning needs (meds, wound care, etc.)  - Arrange for interpretive services to assist at discharge as needed  - Refer to Case Management Department for coordinating discharge planning if the patient needs post-hospital services based on physician/advanced practitioner order or complex needs related to functional status, cognitive ability, or social support system  Outcome: Progressing     Problem: Knowledge Deficit  Goal: Patient/family/caregiver demonstrates understanding of disease process, treatment plan, medications, and discharge instructions  Description: Complete learning assessment and assess knowledge base.  Interventions:  - Provide teaching at level of understanding  - Provide teaching via preferred learning methods  Outcome: Progressing     Problem: Prexisting or High Potential for Compromised Skin Integrity  Goal: Skin integrity is maintained or improved  Description: INTERVENTIONS:  - Identify patients at risk for skin breakdown  - Assess and monitor skin integrity including under and around medical devices   - Assess and monitor nutrition and hydration status  - Monitor labs  - Assess for incontinence   - Turn and reposition  patient  - Assist with mobility/ambulation  - Relieve pressure over geoff prominences   - Avoid friction and shearing  - Provide appropriate hygiene as needed including keeping skin clean and dry  - Evaluate need for skin moisturizer/barrier cream  - Collaborate with interdisciplinary team  - Patient/family teaching  - Consider wound care consult    Assess:  - Review Bryan scale daily  - Clean and moisturize skin   - Inspect skin when repositioning, toileting, and assisting with ADLS  - Assess under medical devices   - Assess extremities for adequate circulation and sensation     Bed Management:  - Have minimal linens on bed & keep smooth, unwrinkled  - Change linens as needed when moist or perspiring  - Avoid sitting or lying in one position for more than 2 hours while in bed?Keep HOB at 30 degrees   - Toileting:  - Offer bedside commode  - Assess for incontinence   - Use incontinent care products after each incontinent episode     Activity:  - Mobilize patient 3 times a day  - Encourage activity and walks on unit  - Encourage or provide ROM exercises   - Turn and reposition patient every 2 Hours  - Use appropriate equipment to lift or move patient in bed  - Instruct/ Assist with weight shifting every 2 hrs when out of bed in chair  - Consider limitation of chair time 2 hour intervals    Skin Care:  - Avoid use of baby powder, tape, friction and shearing, hot water or constrictive clothing  - Relieve pressure over bony prominences  - Do not massage red bony areas      Outcome: Progressing

## 2025-07-03 ENCOUNTER — TELEPHONE (OUTPATIENT)
Age: 55
End: 2025-07-03

## 2025-07-03 ENCOUNTER — HOME CARE VISIT (OUTPATIENT)
Dept: HOME HEALTH SERVICES | Facility: HOME HEALTHCARE | Age: 55
End: 2025-07-03
Attending: SURGERY
Payer: COMMERCIAL

## 2025-07-03 VITALS
RESPIRATION RATE: 18 BRPM | SYSTOLIC BLOOD PRESSURE: 116 MMHG | TEMPERATURE: 97.5 F | DIASTOLIC BLOOD PRESSURE: 78 MMHG | OXYGEN SATURATION: 98 % | HEART RATE: 88 BPM

## 2025-07-03 PROCEDURE — G0299 HHS/HOSPICE OF RN EA 15 MIN: HCPCS

## 2025-07-03 PROCEDURE — 400013 VN SOC

## 2025-07-03 NOTE — TELEPHONE ENCOUNTER
How does the incision look? WNL    Do you have fever or chills? No    Are you having any pain? No, patient is taking Tylenol and Gabapentin.    Do you have a drain(s)? Yes     Are there any concerns with your drain? No, denies.    Verify post-op appointment date and time  [x]    Do you have any other questions or concerns? No, denies.    **NOTE TO TRIAGER: If patient requires further triage, based upon the answers above, move to appropriate triage protocol.      Patient denies any pain at this time but is taking Tylenol and Gabapentin.  Patient stated that drains are intact and working without any issues.  Visiting nurse will be coming to home today.  Confirmed that patient has number to call, should she need to.

## 2025-07-07 ENCOUNTER — PATIENT OUTREACH (OUTPATIENT)
Dept: HEMATOLOGY ONCOLOGY | Facility: CLINIC | Age: 55
End: 2025-07-07

## 2025-07-07 ENCOUNTER — TELEPHONE (OUTPATIENT)
Age: 55
End: 2025-07-07

## 2025-07-07 ENCOUNTER — HOME CARE VISIT (OUTPATIENT)
Dept: HOME HEALTH SERVICES | Facility: HOME HEALTHCARE | Age: 55
End: 2025-07-07
Payer: COMMERCIAL

## 2025-07-07 PROBLEM — Z45.2 ENCOUNTER FOR CARE RELATED TO VASCULAR ACCESS PORT: Status: ACTIVE | Noted: 2025-07-07

## 2025-07-07 PROCEDURE — G0299 HHS/HOSPICE OF RN EA 15 MIN: HCPCS

## 2025-07-07 NOTE — PROGRESS NOTES
Sent my chart message with [facility] services info stating the following:    Nagi Vanegas,     We previously spoke back in March about transportation.  Unfortunately you are still [facility] [employer] [facility]- [employer] does not take medicaid insurance.  You can get transportation services though [facility]- which I reached out to my contact again regarding transportation services. Someone from her team would be reaching out to you- here is the information for your to look into your self.     I did attach the form along with some instructions that they require you to complete in order to use their services.     Thank you,           Sharri Perdomo MA, PN  Breast Patient Navigator  Oncology Care Coordination   [address] [place] [address]  P:[phone]  F:[phone]  [email or web address]           -[facility] INFORMATION-     Medical Assistance Transportation Program   [email or web address].[email or web address]/  Johanne Gill  Director of [facility] Services       [facility]  [address]  [place] [address]  P: [phone]  F: [phone]  [email or web address]      -Additionally sent an email to Johanne as well stating the following:    Hello Good Morning!    I touched base with you previously back in March about patient: Romina Cleaning [birth year][facility] [employer] [facility].  She reached out about transportation services again recently-which we are unable to provide to patient as she is still a Medicaid member.  Wondering if someone would be able to touch base with patient again?    Thank you for your time,      Sharri Perdomo MA, PN  Breast Patient Navigator  Oncology Care Coordination   [address] [place] [address]  P:[phone]  F:[phone]  [email or web address]

## 2025-07-07 NOTE — TELEPHONE ENCOUNTER
Geetha with Visiting Nurse called, stating she is with the patient now and they were discussing how she gets to and from her appointments and the patient stated she used [employer] for all her appointments. Geetha wanted to know if the patient qualifies for [facility] transport and see if we can get her set up if she does.

## 2025-07-08 ENCOUNTER — TELEPHONE (OUTPATIENT)
Age: 55
End: 2025-07-08

## 2025-07-08 VITALS
OXYGEN SATURATION: 98 % | TEMPERATURE: 96.7 F | RESPIRATION RATE: 18 BRPM | HEART RATE: 80 BPM | DIASTOLIC BLOOD PRESSURE: 60 MMHG | SYSTOLIC BLOOD PRESSURE: 106 MMHG

## 2025-07-08 NOTE — TELEPHONE ENCOUNTER
Rec'd call from patient. Utilized language line for Kyrgyz speaking patient.    Patient is requesting an [employer] for tomorrows POPV appointment.    Patient states that she uses [employer] for all her medical appointments. (I asked patient about L[employer] and LYFT qualifiers - patient was very confused and states that she doesn't know what [employer] is but she uses [employer] for medical appointments.)    Please return call to patient either to explain [employer] transportation OR to find out what patient is referring to / what patient needs.    Thank you.

## 2025-07-09 ENCOUNTER — OFFICE VISIT (OUTPATIENT)
Age: 55
End: 2025-07-09

## 2025-07-09 ENCOUNTER — PATIENT OUTREACH (OUTPATIENT)
Dept: HEMATOLOGY ONCOLOGY | Facility: CLINIC | Age: 55
End: 2025-07-09

## 2025-07-09 ENCOUNTER — HOSPITAL ENCOUNTER (OUTPATIENT)
Dept: INFUSION CENTER | Facility: CLINIC | Age: 55
Discharge: HOME | End: 2025-07-09
Payer: COMMERCIAL

## 2025-07-09 DIAGNOSIS — C77.3 CARCINOMA OF RIGHT BREAST METASTATIC TO AXILLARY LYMPH NODE (HCC): ICD-10-CM

## 2025-07-09 DIAGNOSIS — C50.811 MALIGNANT NEOPLASM OF OVERLAPPING SITES OF RIGHT BREAST IN FEMALE, ESTROGEN RECEPTOR POSITIVE (HCC): Primary | ICD-10-CM

## 2025-07-09 DIAGNOSIS — Z98.890 S/P BREAST RECONSTRUCTION, RIGHT: ICD-10-CM

## 2025-07-09 DIAGNOSIS — C50.911 CARCINOMA OF RIGHT BREAST METASTATIC TO AXILLARY LYMPH NODE (HCC): ICD-10-CM

## 2025-07-09 DIAGNOSIS — Z45.2 ENCOUNTER FOR CARE RELATED TO VASCULAR ACCESS PORT: Primary | ICD-10-CM

## 2025-07-09 DIAGNOSIS — Z17.0 MALIGNANT NEOPLASM OF OVERLAPPING SITES OF RIGHT BREAST IN FEMALE, ESTROGEN RECEPTOR POSITIVE (HCC): Primary | ICD-10-CM

## 2025-07-09 LAB
ALBUMIN SERPL BCG-MCNC: 4 G/DL (ref 3.5–5)
ALP SERPL-CCNC: 82 U/L (ref 34–104)
ALT SERPL W P-5'-P-CCNC: 80 U/L (ref 7–52)
ANION GAP SERPL CALCULATED.3IONS-SCNC: 5 MMOL/L (ref 4–13)
AST SERPL W P-5'-P-CCNC: 60 U/L (ref 13–39)
BASOPHILS # BLD AUTO: 0.03 THOUSANDS/ÂΜL (ref 0–0.1)
BASOPHILS NFR BLD AUTO: 1 % (ref 0–1)
BILIRUB SERPL-MCNC: 0.3 MG/DL (ref 0.2–1)
BUN SERPL-MCNC: 9 MG/DL (ref 5–25)
CALCIUM SERPL-MCNC: 9.2 MG/DL (ref 8.4–10.2)
CHLORIDE SERPL-SCNC: 104 MMOL/L (ref 96–108)
CO2 SERPL-SCNC: 32 MMOL/L (ref 21–32)
CREAT SERPL-MCNC: 0.53 MG/DL (ref 0.6–1.3)
EOSINOPHIL # BLD AUTO: 0.48 THOUSAND/ÂΜL (ref 0–0.61)
EOSINOPHIL NFR BLD AUTO: 9 % (ref 0–6)
ERYTHROCYTE [DISTWIDTH] IN BLOOD BY AUTOMATED COUNT: 12.9 % (ref 11.6–15.1)
GFR SERPL CREATININE-BSD FRML MDRD: 109 ML/MIN/1.73SQ M
GLUCOSE SERPL-MCNC: 110 MG/DL (ref 65–140)
HCT VFR BLD AUTO: 33.7 % (ref 34.8–46.1)
HGB BLD-MCNC: 10.9 G/DL (ref 11.5–15.4)
IMM GRANULOCYTES # BLD AUTO: 0.02 THOUSAND/UL (ref 0–0.2)
IMM GRANULOCYTES NFR BLD AUTO: 0 % (ref 0–2)
LYMPHOCYTES # BLD AUTO: 1 THOUSANDS/ÂΜL (ref 0.6–4.47)
LYMPHOCYTES NFR BLD AUTO: 18 % (ref 14–44)
MCH RBC QN AUTO: 32.8 PG (ref 26.8–34.3)
MCHC RBC AUTO-ENTMCNC: 32.3 G/DL (ref 31.4–37.4)
MCV RBC AUTO: 102 FL (ref 82–98)
MONOCYTES # BLD AUTO: 0.27 THOUSAND/ÂΜL (ref 0.17–1.22)
MONOCYTES NFR BLD AUTO: 5 % (ref 4–12)
NEUTROPHILS # BLD AUTO: 3.77 THOUSANDS/ÂΜL (ref 1.85–7.62)
NEUTS SEG NFR BLD AUTO: 67 % (ref 43–75)
NRBC BLD AUTO-RTO: 0 /100 WBCS
PLATELET # BLD AUTO: 233 THOUSANDS/UL (ref 149–390)
PMV BLD AUTO: 10.3 FL (ref 8.9–12.7)
POTASSIUM SERPL-SCNC: 3.5 MMOL/L (ref 3.5–5.3)
PROT SERPL-MCNC: 6.8 G/DL (ref 6.4–8.4)
RBC # BLD AUTO: 3.32 MILLION/UL (ref 3.81–5.12)
SODIUM SERPL-SCNC: 141 MMOL/L (ref 135–147)
T3FREE SERPL-MCNC: 3.68 PG/ML (ref 2.5–3.9)
TSH SERPL DL<=0.05 MIU/L-ACNC: 1.02 UIU/ML (ref 0.45–4.5)
WBC # BLD AUTO: 5.57 THOUSAND/UL (ref 4.31–10.16)

## 2025-07-09 PROCEDURE — 99024 POSTOP FOLLOW-UP VISIT: CPT

## 2025-07-09 PROCEDURE — 85025 COMPLETE CBC W/AUTO DIFF WBC: CPT | Performed by: INTERNAL MEDICINE

## 2025-07-09 PROCEDURE — 84481 FREE ASSAY (FT-3): CPT | Performed by: INTERNAL MEDICINE

## 2025-07-09 PROCEDURE — 84443 ASSAY THYROID STIM HORMONE: CPT | Performed by: INTERNAL MEDICINE

## 2025-07-09 PROCEDURE — 80053 COMPREHEN METABOLIC PANEL: CPT | Performed by: INTERNAL MEDICINE

## 2025-07-09 RX ORDER — CEPHALEXIN 500 MG/1
500 CAPSULE ORAL EVERY 12 HOURS SCHEDULED
Qty: 14 CAPSULE | Refills: 0 | Status: SHIPPED | OUTPATIENT
Start: 2025-07-09 | End: 2025-07-16 | Stop reason: SDUPTHER

## 2025-07-09 RX ORDER — GABAPENTIN 300 MG/1
300 CAPSULE ORAL 3 TIMES DAILY PRN
Qty: 90 CAPSULE | Refills: 0 | Status: SHIPPED | OUTPATIENT
Start: 2025-07-09 | End: 2025-08-08

## 2025-07-09 RX ORDER — IBUPROFEN 800 MG/1
800 TABLET, FILM COATED ORAL EVERY 8 HOURS PRN
Qty: 90 TABLET | Refills: 0 | Status: SHIPPED | OUTPATIENT
Start: 2025-07-09 | End: 2025-08-08

## 2025-07-09 RX ORDER — ACETAMINOPHEN 500 MG
500 TABLET ORAL EVERY 6 HOURS PRN
Qty: 120 TABLET | Refills: 0 | Status: SHIPPED | OUTPATIENT
Start: 2025-07-09 | End: 2025-08-08

## 2025-07-09 NOTE — PROGRESS NOTES
Called and briefly spoke with patient as her english is not very well.  Wanted to make sure patient read my my-chart message and got the assistance she needed as far as transportation with [employer]. Johanne from [facility] mentioned to me back on Monday 7/7/25 stating the following:     Dain Harrell,     Thank you for the information. I'll have my team reach out.     Thank you.     Johanne Gill  Director of [facility]      -Patient mentioned that she was able to get help with transportation assistance with [facility] services.

## 2025-07-09 NOTE — PROGRESS NOTES
Tolerated procedure without incident: No adverse reactions noted: No further appt's scheduled: Will notify unit after MD visit: AVS offered and declined

## 2025-07-09 NOTE — PROGRESS NOTES
Patient to Infusion Center for Lab Testing / Port Maintenance: Offers no complaints at present time: Left PAC accessed without difficulty: Good blood return noted: Labs drawn per MD order

## 2025-07-09 NOTE — PROGRESS NOTES
[facility] Plastic and Reconstructive Surgery  [phone]    Patient Identification: Romina Cleaning is a 54 y.o. female     History of Present Illness: The patient is a 54 y.o.  year-old female  who presents to the office with her mom for 1 week post op. Patient is 8 days s/p Right Modified Radical Mastectomy - Right and Right Breast Reconstruction with tissue expander, galaflex, dallas flap - Right  on 7/1/2025 by Dr. Philippe and Dr. Fox.     Placement of right mentor smooth low height silicone tissue expander 375 cc (12.7 cm) - filled to 100 cc intra operatively    Patient reports doing well since surgery. Pain is manageable with tylenol, ibuprofen, and gabapentin. She continues to compress with surgical bra. She has been ambulating daily and reports adequate ROM at this time. She is interested in PT. Denies fevers, chills, erythema, or drainage.     Denies issues with drain. Home health has been helpful.  Right drain; 7/6 140 cc, 7/7 100 cc, 7/8 55 cc, 7/9 35 cc  as of this visit.    Past Medical History[1]       Review of Systems  Constitutional: Denies fevers, chills or pain.  Skin: Denies any warmth, erythema, edema or drainage.     Physical Exam  General: AAOx3, NAD, well appearing  Breast: Right breast with steri strips intact over incision. Tissue expander in place and intact. Expected amount of post operative edema and ecchymosis. Drain is patent with serosanguinous fluid in bulb. See media       Assessment and Plan:  The patient is an 54 y.o.  year-old female who presents to the office for 1 week post op. Patient is 8 days s/p Right Modified Radical Mastectomy - Right and Right Breast Reconstruction with tissue expander, galaflex, dallas flap - Right  on 7/1/2025 by Dr. Philippe and Dr. Fox.      -At today's visit biopatch and tegaderm replaced at drain site.   -Demonstrated massage of lateral breast, patient to begin performing daily for 2-3 minutes  -Start moisturizing breast with Aquaphor, may apply to  incisions as steri strips fall off  -Refill of keflex, ibuprofen, tylenol, and gabapentin sent to pharmacy  -Referral for PT sent, encouraged patient to call and schedule first appointment  -Continue to strip and record drain output  -Continue to compress with surgical bra  -May start progressing activities as tolerated  -The patient is to return in 1 week for first fill  -The patient is to call the office with any questions or concerns. All of the patient's questions were answered at this time and they agree with the plan of care.      Priyanka Mcnulty PA-C  [facility] Plastic and Reconstructive Surgery           [1]   Past Medical History:  Diagnosis Date    Breast cancer (HCC)     GERD (gastroesophageal reflux disease)     Headache     Hematuria     History of chemotherapy     Hypertension     IUD (intrauterine device) in place 02/15/2019    Mirena placed 2/2015 effective through 2/2020      Lateral epicondylitis, right elbow 01/08/2019    Panic attacks     Psychiatric disorder

## 2025-07-10 ENCOUNTER — OFFICE VISIT (OUTPATIENT)
Dept: SURGICAL ONCOLOGY | Facility: CLINIC | Age: 55
End: 2025-07-10

## 2025-07-10 VITALS
OXYGEN SATURATION: 96 % | HEIGHT: 66 IN | HEART RATE: 84 BPM | BODY MASS INDEX: 24.27 KG/M2 | WEIGHT: 151 LBS | DIASTOLIC BLOOD PRESSURE: 74 MMHG | RESPIRATION RATE: 18 BRPM | SYSTOLIC BLOOD PRESSURE: 138 MMHG | TEMPERATURE: 98.1 F

## 2025-07-10 DIAGNOSIS — C50.811 MALIGNANT NEOPLASM OF OVERLAPPING SITES OF RIGHT BREAST IN FEMALE, ESTROGEN RECEPTOR POSITIVE (HCC): ICD-10-CM

## 2025-07-10 DIAGNOSIS — C50.911 CARCINOMA OF RIGHT BREAST METASTATIC TO AXILLARY LYMPH NODE (HCC): Primary | ICD-10-CM

## 2025-07-10 DIAGNOSIS — Z17.0 MALIGNANT NEOPLASM OF OVERLAPPING SITES OF RIGHT BREAST IN FEMALE, ESTROGEN RECEPTOR POSITIVE (HCC): ICD-10-CM

## 2025-07-10 DIAGNOSIS — C77.3 CARCINOMA OF RIGHT BREAST METASTATIC TO AXILLARY LYMPH NODE (HCC): Primary | ICD-10-CM

## 2025-07-10 PROCEDURE — 88341 IMHCHEM/IMCYTCHM EA ADD ANTB: CPT | Performed by: PATHOLOGY

## 2025-07-10 PROCEDURE — 88309 TISSUE EXAM BY PATHOLOGIST: CPT | Performed by: PATHOLOGY

## 2025-07-10 PROCEDURE — 99024 POSTOP FOLLOW-UP VISIT: CPT | Performed by: SURGERY

## 2025-07-10 PROCEDURE — 88342 IMHCHEM/IMCYTCHM 1ST ANTB: CPT | Performed by: PATHOLOGY

## 2025-07-10 NOTE — PROGRESS NOTES
Name: Romina Cleaning      : 1970      MRN: [id number]  Encounter Provider: Jodie Philippe MD  Encounter Date: 7/10/2025   Encounter department: [facility] [place]  :  Assessment & Plan  Carcinoma of right breast metastatic to axillary lymph node (HCC)         Malignant neoplasm of overlapping sites of right breast in female, estrogen receptor positive (HCC)           54-year-old female status post neoadjuvant chemotherapy secondary to extensive triple negative lymphadenopathy.  She had multifocal hormone positive disease within the breast itself.  She had a right modified radical mastectomy with expander based reconstruction.  She is healing well with no signs of infection.  She had a complete pathologic response within the axilla.  She is scheduled to meet with radiation oncology.  She also has follow-up with medical oncology.  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  We will see her again in 6 months for follow-up exam or sooner should the need arise.      History of Present Illness   Romina Cleaning is a 54 y.o. year old female who presents for a postop visit after right modified radical mastectomy and expander based reconstruction.  She states that she is feeling well.  She denies any pain.     Oncology History   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 (HCC)  Staging form: Breast, AJCC 8th Edition  - Clinical stage from 2024: cT0, cN1(f), cM0, GX, ER-, WA-, HER2- - Signed by Jodie Philippe MD on 10/7/2024  Stage prefix: Initial diagnosis  Method of lymph node assessment: Core biopsy  Histologic grading system: 3 grade system  - Pathologic stage from 2025: ypN0 - Signed by Jodie Philippe MD on 7/10/2025  Stage prefix: Post-therapy    Malignant neoplasm of overlapping sites of right breast in female, estrogen receptor positive (HCC)  Staging form: Breast, AJCC 8th Edition  - Clinical stage from 2024: Stage IB (cT1c, cN1, cM0,  G2, ER+, MS+, HER2-) - Signed by Jodie Philippe MD on 5/14/2025  Stage prefix: Initial diagnosis  Histologic grading system: 3 grade system  - Pathologic stage from 7/1/2025: ypT1c - Signed by Jodie Philippe MD on 7/10/2025  Stage prefix: Post-therapy  Oncology History   Carcinoma of right breast metastatic to axillary lymph node (HCC)   6/11/2024 Initial Diagnosis    Carcinoma of right breast metastatic to axillary lymph node (HCC)     9/20/2024 -  Cancer Staged    Staging form: Breast, AJCC 8th Edition  - Clinical stage from 9/20/2024: cT0, cN1(f), cM0, GX, ER-, MS-, HER2- - Signed by Jodie Philippe MD on 10/7/2024  Stage prefix: Initial diagnosis  Method of lymph node assessment: Core biopsy  Histologic grading system: 3 grade system       9/20/2024 Biopsy    Right two-site bx    10:00 7cmfn  ILC  Grade 2  ER >95, MS 70, HER2 0    Right axillary lymph node  Infiltrating carcinoma, ER <1, MS <1, HER2 1+     10/14/2024 Observation    CT CAP:  1. Enlarged right axillary and subpectoral lymphadenopathy correlating to known history of breast cancer. No pulmonary nodules  No mediastinal or hilar lymphadenopathy. No contralateral axillary lymphadenopathy. No intra-abdominal metastatic disease. No adrenal or hepatic metastatic lesions. No suspicious osseous lesions     2. Stable right hepatic lobe hemangioma and cyst.     3. Stable prominent central mesenteric lymph nodes unchanged since 2018 unrelated to patient's breast cancer.    NM BONE SCAN:    1. No scintigraphic evidence of osseous metastasis.      10/23/2024 Observation    B/L breast MRI    Redemonstration of a spiculated mass in the upper outer right breast in keeping with biopsy-proven invasive lobular carcinoma and bulky right axillary lymphadenopathy in keeping with metastatic nodes. Indeterminate 6 mm mass at the 5 o'clock position right breast for which further characterization with targeted ultrasound is recommended.  If there is no ultrasound correlate MRI  guided biopsy could be performed. No suspicious enhancement in the left breast.     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 No definitive invasion carcinoma present on examined sections.  ER , [place]      11/14/2024 -  Chemotherapy    DOXOrubicin (ADRIAMYCIN), 60 mg/m2 = 103 mg, 4 of 4 cycles  Administration: 103 mg (3/19/2025), 103 mg (4/9/2025), 103 mg (4/30/2025), 103 mg (5/21/2025)  cyclophosphamide (CYTOXAN) IVPB, 600 mg/m2 = 1,026 mg, 4 of 4 cycles  Administration: 1,000 mg (3/19/2025), 1,000 mg (4/9/2025), 1,000 mg (4/30/2025), 1,000 mg (5/21/2025)  paclitaxel protein-bound (ABRAXANE) IVPB, 100 mg/m2 = 171 mg (original dose ), 4 of 4 cycles  Dose modification: 100 mg/m2 (original dose 100 mg/m2, Cycle 1)  Administration: 171 mg (12/5/2024), 171 mg (12/12/2024), 171 mg (12/19/2024), 171 mg (1/9/2025), 171 mg (1/16/2025), 171 mg (1/23/2025), 171 mg (1/30/2025), 171 mg (2/7/2025), 171 mg (2/13/2025), 171 mg (2/20/2025)  CARBOplatin (PARAPLATIN) IVPB (GO AUC DOSING), 180 mg, 4 of 4 cycles  Administration: 180 mg (11/14/2024), 180 mg (11/21/2024), 180 mg (12/5/2024), 180 mg (12/12/2024), 180 mg (12/19/2024), 180 mg (1/9/2025), 180 mg (1/16/2025), 180 mg (1/23/2025), 180 mg (1/30/2025), 180 mg (2/7/2025), 180 mg (2/13/2025), 180 mg (2/20/2025)  PACLItaxel (TAXOL) chemo IVPB, 80 mg/m2 = 136.8 mg, 1 of 1 cycle  Administration: 136.8 mg (11/14/2024), 136.8 mg (11/21/2024)  pembrolizumab (KEYTRUDA) IVPB, 200 mg, 8 of 17 cycles  Administration: 200 mg (11/14/2024), 200 mg (3/19/2025), 200 mg (12/12/2024), 200 mg (1/9/2025), 200 mg (1/30/2025), 200 mg (4/9/2025), 200 mg (4/30/2025), 200 mg (5/21/2025)     7/1/2025 Surgery    Right MRM, right breast recon w/ tissue expander, galaflex, [place] flap    Multiple foci of ILC  Grade 2  20mm, 6mm, 2mm, 1mm  Encapsulated papillary carcinoma (5mm, grade 1)  Invasive carcinoma involving  anterior/superficial margin in upper outer quadrant.  0/16 Lymph nodes     7/1/2025 -  Cancer Staged    Staging form: Breast, AJCC 8th Edition  - Pathologic stage from 7/1/2025: ypN0 - Signed by Jodie Philippe MD on 7/10/2025  Stage prefix: Post-therapy       Malignant neoplasm of overlapping sites of right breast in female, estrogen receptor positive (HCC)   9/20/2024 Biopsy    Right breast ultrasound-guided biopsy  A. 10 o'clock, 7 cm from nipple (MARTHA)  Invasive lobular carcinoma  Grade 2  ER >95, AZ 70, HER2 0  Lymphovascular invasion not identified    B. Right axillary lymph node biopsy (MARTHA)  Infiltrating carcinoma  Although no definitive capsule is noted, it is favored to represent lymph node involvement by th e carcinoma  ER <1, AZ <1, HER2 1+    Concordant. Malignancy appears unifocal; suspicious masses cover an area up to 1.8 cm. US of right axilla showed multiple enlarged, morphologically abnormal axillary lymph nodes with biopsy confirmed metastatic disease. Left breast clear.     9/20/2024 -  Cancer Staged    Staging form: Breast, AJCC 8th Edition  - Clinical stage from 9/20/2024: Stage IB (cT1c, cN1, cM0, G2, ER+, AZ+, HER2-) - Signed by Jodie Philippe MD on 5/14/2025  Stage prefix: Initial diagnosis  Histologic grading system: 3 grade system       9/24/2024 Genomic Testing    MammaPrint/BluePrint ordered on breast specimen block A  Ultra-Low risk - luminal A     9/27/2024 Genetic Testing    Genetic testing with RNA analysis ordered  [place]    NEGATIVE     10/14/2024 Observation    CT CAP:  1. Enlarged right axillary and subpectoral lymphadenopathy correlating to known history of breast cancer. No pulmonary nodules  No mediastinal or hilar lymphadenopathy. No contralateral axillary lymphadenopathy. No intra-abdominal metastatic disease. No adrenal or hepatic metastatic lesions. No suspicious osseous lesions     2. Stable right hepatic lobe hemangioma and cyst.     3. Stable prominent central mesenteric  "lymph nodes unchanged since 2018 unrelated to patient's breast cancer.    NM BONE SCAN:    1. No scintigraphic evidence of osseous metastasis.      10/23/2024 Observation    B/L Breast MRI    IMPRESSION:   Redemonstration of a spiculated mass in the upper outer right breast in keeping with biopsy-proven invasive lobular carcinoma and bulky right axillary lymphadenopathy in keeping with metastatic nodes.  Indeterminate 6 mm mass at the 5 o'clock position right breast for which further characterization with targeted ultrasound is recommended.  If there is no ultrasound correlate MRI guided biopsy could be performed.  No suspicious enhancement in the left breast.       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 No definitive invasion carcinoma present on examined sections.  ER , [place]   8mm     7/1/2025 -  Cancer Staged    Staging form: Breast, AJCC 8th Edition  - Pathologic stage from 7/1/2025: ypT1c - Signed by Jodie Philippe MD on 7/10/2025  Stage prefix: Post-therapy          Review of Systems A complete review of systems is negative other than that noted above in the HPI.           Objective   /74 (Patient Position: Sitting, Cuff Size: Standard)   Pulse 84   Temp 98.1 °F (36.7 °C) (Temporal)   Resp 18   Ht 5' 6\" (1.676 m)   Wt 68.5 kg (151 lb)   LMP  (LMP Unknown)   SpO2 96%   BMI 24.37 kg/m²     Pain Screening:     ECOG    Physical Exam  Constitutional:       General: She is not in acute distress.     Appearance: Normal appearance.   Chest:   Breasts:     Right: Skin change (Well-healing incision with no signs of infection, expander in place, drain in place) present.     Neurological:      Mental Status: She is alert and oriented to person, place, and time.     Psychiatric:         Mood and Affect: Mood normal.          Labs: I have reviewed pertinent labs.     Hospital Outpatient Visit on 07/09/2025 "   Component Date Value Ref Range Status    WBC 07/09/2025 5.57  4.31 - 10.16 Thousand/uL Final    RBC 07/09/2025 3.32 (L)  3.81 - 5.12 Million/uL Final    Hemoglobin 07/09/2025 10.9 (L)  11.5 - 15.4 g/dL Final    Hematocrit 07/09/2025 33.7 (L)  34.8 - 46.1 % Final    MCV 07/09/2025 102 (H)  82 - 98 fL Final    MCH 07/09/2025 32.8  26.8 - 34.3 pg Final    MCHC 07/09/2025 32.3  31.4 - 37.4 g/dL Final    RDW 07/09/2025 12.9  11.6 - 15.1 % Final    MPV 07/09/2025 10.3  8.9 - 12.7 fL Final    Platelets 07/09/2025 233  149 - 390 Thousands/uL Final    nRBC 07/09/2025 0  /100 WBCs Final    Segmented % 07/09/2025 67  43 - 75 % Final    Immature Grans % 07/09/2025 0  0 - 2 % Final    Lymphocytes % 07/09/2025 18  14 - 44 % Final    Monocytes % 07/09/2025 5  4 - 12 % Final    Eosinophils Relative 07/09/2025 9 (H)  0 - 6 % Final    Basophils Relative 07/09/2025 1  0 - 1 % Final    Absolute Neutrophils 07/09/2025 3.77  1.85 - 7.62 Thousands/µL Final    Absolute Immature Grans 07/09/2025 0.02  0.00 - 0.20 Thousand/uL Final    Absolute Lymphocytes 07/09/2025 1.00  0.60 - 4.47 Thousands/µL Final    Absolute Monocytes 07/09/2025 0.27  0.17 - 1.22 Thousand/µL Final    Eosinophils Absolute 07/09/2025 0.48  0.00 - 0.61 Thousand/µL Final    Basophils Absolute 07/09/2025 0.03  0.00 - 0.10 Thousands/µL Final    Sodium 07/09/2025 141  135 - 147 mmol/L Final    Potassium 07/09/2025 3.5  3.5 - 5.3 mmol/L Final    Chloride 07/09/2025 104  96 - 108 mmol/L Final    CO2 07/09/2025 32  21 - 32 mmol/L Final    ANION GAP 07/09/2025 5  4 - 13 mmol/L Final    BUN 07/09/2025 9  5 - 25 mg/dL Final    Creatinine 07/09/2025 0.53 (L)  0.60 - 1.30 mg/dL Final    Standardized to IDMS reference method    Glucose 07/09/2025 110  65 - 140 mg/dL Final    If the patient is fasting, the ADA then defines impaired fasting glucose as > 100 mg/dL and diabetes as > or equal to 123 mg/dL.    Calcium 07/09/2025 9.2  8.4 - 10.2 mg/dL Final    AST 07/09/2025 60 (H)  13 -  39 U/L Final    ALT 07/09/2025 80 (H)  7 - 52 U/L Final    Specimen collection should occur prior to Sulfasalazine administration due to the potential for falsely depressed results.     Alkaline Phosphatase 07/09/2025 82  34 - 104 U/L Final    Total Protein 07/09/2025 6.8  6.4 - 8.4 g/dL Final    Albumin 07/09/2025 4.0  3.5 - 5.0 g/dL Final    Total Bilirubin 07/09/2025 0.30  0.20 - 1.00 mg/dL Final    Use of this assay is not recommended for patients undergoing treatment with eltrombopag due to the potential for falsely elevated results.  N-acetyl-p-benzoquinone imine (metabolite of Acetaminophen) will generate erroneously low results in samples for patients that have taken an overdose of Acetaminophen.    eGFR 07/09/2025 109  ml/min/1.73sq m Final    TSH 3RD GENERATION 07/09/2025 1.016  0.450 - 4.500 uIU/mL Final    The recommended reference ranges for TSH during pregnancy are as follows:   First trimester 0.100 to 2.500 uIU/mL   Second trimester  0.200 to 3.000 uIU/mL   Third trimester 0.300 to 3.000 uIU/m    Note: Normal ranges may not apply to patients who are transgender, non-binary, or whose legal sex, sex at birth, and gender identity differ.  Adult TSH (3rd generation) reference range follows the recommended guidelines of the American Thyroid Association, January, 2020.    T3, Free 07/09/2025 3.68  2.50 - 3.90 pg/mL Final    Specimens with biotin concentrations > 10 ng/mL can lead to significant (> 10%) positive bias in result.   Admission on 07/01/2025, Discharged on 07/02/2025   Component Date Value Ref Range Status    Case Report 07/01/2025    Final                    Value:Surgical Pathology Report                         Case: [id number]                                  Authorizing Provider:  Jodie Philippe MD           Collected:           07/01/2025 0814              Ordering Location:     [facility]        Received:            07/01/2025 [address] [place] [address]  Operating Room                                                     Pathologist:           Kath Olivia MD                                                       Specimen:    Breast, Right, Right breast and axillary contents short stitch superior margin long                 stitch lateral margin                                                                      Final Diagnosis 07/01/2025    Final                    Value:A. Breast, right, mastectomy:  - Invasive lobular carcinoma, classic pattern, ypT1c(m), ypN0 (see synoptic report)      -- Multiple foci measuring 20 mm (upper outer quadrant); 6, 2, and 1 mm (lower outer quadrant); 9 mm (axillary tail (see note)) identified    -- Collins grade 2 of 3 (total score: 6 of 9)        * Tubule formation < 10%, score 3        * Nuclear grade 2 of 3, score 2        * Mitoses < 3/mm2, (</= 7 mitoses/10HPF; 0 mitoses/10 HPF), score 1    - Invasive carcinoma involving anterior/superficial margin in upper outer quadrant      - Confirmed with CK AE1/AE3 immunostain  - Encapsulated papillary carcinoma, 5 mm    - Confirmed with absence of staining with p63 and SMMHC immunostains and positive E-cadherin (membranous staining), p120 catenin (membranous staining) immunostains  - Lymphatic and/or vascular invasion: Present  - Microcalcifications: Present within benign lobules  - Atypical lobular hyperplasia, focal  - Unremarkable nipple and skin  - Renetta scouts                           identified grossly, microscopically changes associated with previous core biopsies  - 16 lymph nodes, negative for carcinoma (0/16)     - Treatment effect present     - Confirmed with CK AE1/AE3 immunostain    Note: Invasive carcinoma is present in 4 of 23 sections submitted from the axillary tail.  Due to the presence of carcinoma on multiple unoriented sections, the size of the tumor cannot be definitively determined, however the largest focus measures 9 mm.  Carcinoma involves the adipose  tissue surrounding the axillary lymph nodes.  There is no residual carcinoma present within the lymph nodes.       Note 07/01/2025    Final                    Value:- Best representative tumor block: A23, A25     - Intradepartmental consultation is in agreement ()       Additional Information 07/01/2025    Final                    Value:All reported additional testing was performed with appropriately reactive controls.  These tests were developed and their performance characteristics determined by [facility] or appropriate performing facility, though some tests may be performed on tissues which have not been validated for performance characteristics (such as staining performed on alcohol exposed cell blocks and decalcified tissues).  Results should be interpreted with caution and in the context of the patients’ clinical condition. These tests may not be cleared or approved by the U.S. Food and Drug Administration, though the FDA has determined that such clearance or approval is not necessary. These tests are used for clinical purposes and they should not be regarded as investigational or for research. This laboratory has been approved by CLIA 88, designated as a high-complexity laboratory and is qualified to perform these tests.    Interpretation performed at [facility], [facility] [facility] [address]   .      Synoptic Checklist 07/01/2025    Final                    Value:INVASIVE CARCINOMA OF THE BREAST: Resection                            INVASIVE CARCINOMA OF THE BREAST: RESECTION - All Specimens                            8th Edition - Protocol posted: 6/19/2024                                                        SPECIMEN                               Procedure:    Total mastectomy                               Specimen Laterality:    Right                                                        TUMOR                               Tumor Site:     Upper outer quadrant                               Histologic Type:    Invasive lobular carcinoma                               Histologic Grade (Kiran Histologic Score):                                     Glandular (Acinar) / Tubular Differentiation:    Score 3                                 Nuclear Pleomorphism:    Score 2                                 Mitotic Rate:    Score 1                                 Overall Grade:    Grade 2 (scores of 6 or 7)                               Tumor Size:    Greatest dimension of largest invasive focus (Millimeters): 20 mm                               Tumor Focality:    Multiple foci of invasive carcinoma                                 Number of Foci:    At least: 5                                 Sizes of Individual Foci in Millimeters (mm):    20; 6; 2; 1; 9                               Ductal Carcinoma In Situ (DCIS):    Present                                 :    Negative for extensive intraductal component (EIC)                                 Size (Extent) of DCIS:    Estimated size (extent) of DCIS is at least (Millimeters): 5 mm                                 Architectural Patterns:    Encapsulated papillary carcinoma                                 Nuclear Grade:    Grade I (low)                                 Necrosis:    Not identified                               Lobular Carcinoma In Situ (LCIS):    Not identified                               Lymphatic and / or Vascular Invasion:    Present                               Dermal Lymphatic and / or Vascular Invasion:    Not identified                               Microcalcifications:    Present in non-neoplastic tissue                               Treatment Effect in the Breast:    No definite response to presurgical therapy in the invasive carcinoma                               Treatment Effect in the Lymph Nodes:    No lymph node metastases. Fibrous scarring or histiocytic aggregates, possibly  related to prior lymph node metastases with pathologic complete response                               Residual Cancer [place] (RCB) Parameters:                                     Greatest Dimension of Primary Tumor Bed Area (Millimeters):    20 mm                                 Second Greatest Dimension of Primary Tumor Bed Area (Millimeters):    18 mm                                 Percentage of Overall Cancer Cellularity:    30 %                                 Percentage of Cancer that is In Situ Disease:    0 %                                 Number of Positive Lymph Nodes:    0                                 Diameter of Largest [place] Metastasis (Millimeters):    0 mm                                 Residual Cancer [place]:    1.882                                 Residual Cancer [place] Class:    RCB-II                                                        MARGINS                               Margin Status for Invasive Carcinoma:    Invasive carcinoma present at margin                                 Margin(s) Involved by Invasive Carcinoma:    Anterior: multifocal                               Margin Status for DCIS:    All margins negative for DCIS                                 Distance from DCIS to Closest Margin:    10 mm                                 Closest Margin(s) to DCIS:    Medial                                 Closest Margin(s) to DCIS:    Lateral                                                        REGIONAL LYMPH NODES                               Regional Lymph Node Status:                                     :    All regional lymph nodes negative for tumor                                 Total Number of Lymph Nodes Examined (sentinel and non-sentinel):    16                               Regional Lymph Node Comment:    No residual carcinoma                                                        pTNM CLASSIFICATION (AJCC 8th Edition)                               Reporting of pT, pN,  "and (when applicable) pM categories is based on information available to the pathologist at the time the report is issued. As per the AJCC (Chapter 1, 8th Ed.) it is the managing physician's responsibility to establish the final pathologic stage based upon all pertinent information, including but potentially not limited to this pathology report.                               Modified Classification:    y                               pT Category:    pT1c                               T Suffix:    (m)                               pN Category:    pN0                                                           Breast Biomarker Testing Performed on Previous Biopsy:                                     Estrogen Receptor (ER) Status:    Positive (greater than 10% of cells demonstrate nuclear positivity)                                   Percentage of Cells with Nuclear Positivity:    %                               Breast Biomarker Testing Performed on Previous Biopsy:                                     Progesterone Receptor (PgR) Status:    Positive                                   Percentage of Cells with Nuclear Positivity:    61-70%                               Breast Biomarker Testing Performed on Previous Biopsy:                                     HER2 (by immunohistochemistry):    Negative (Score 0)                                 Testing Performed on Case Number:    [id number]      Gross Description 07/01/2025    Final                    Value:A. The specimen is received in formalin, labeled with the patient's name and hospital number, and is designated \" right breast and axillary contents short stitch superior margin long stitch lateral margin\".  The specimen consists of a 492.7 g right modified radical mastectomy specimen (17.5 x 17.1 x 4.0 cm) with an attached tan-pink skin ellipse (8.9 x 3.7 cm) and an attached axillary tail (12.5 x 10.4 x 3.5 cm).  The nipple areolar complex is flush with the " surrounding skin and measures 3.5 x 3.5 cm.  The grossly unremarkable nipple measures 1.1 cm in diameter.  No lesions are identified on the skin surface.  A short suture designates superior, and a long suture designates lateral.  The specimen is inked as follows: Upper outer quadrant-yellow, upper inner quadrant-blue, lower outer quadrant-green, lower inner quadrant-orange and entire deep margin-black.  The deep margin is a smooth fascial plane without skeletal muscle present.  The specimen is serially sectioned to reveal 2                           previous biopsy sites.    Biopsy site 1  At 5:00, 5 cm from the nipple, in the lower inner quadrant, a Renetta  reflector is found within tan-white fibrous tissue.  The fibrous tissue measures 1.0 cm medial-lateral, 2.5 cm superior-inferior and 1.3 cm anterior-posterior.  The Renetta  is 1.2 cm from the deep and superior margins and greater than 2.0 cm from the remaining margins.    Biopsy site 2  At 10:00, 7 cm from the nipple, in the upper outer quadrant, a tan-white firm spiculated fibrous area is found measuring 1.8 cm medial-lateral, 2.0 cm superior-inferior and 1.0 cm anterior-posterior.  Within this area, a Renetta  reflector is identified.  This area is 0.1 cm from the anterior/superficial margin, 0.5 cm from the deep margin and is greater than 2.0 cm from the remaining margins.    Both areas of concern measure 9.5 cm in greatest medial-lateral diameter.  The remainder of the breast parenchyma consists of 40% dense white tissue and the remainder yellow/white grossly                           unremarkable adipose tissue.  16 possible tan lymph nodes are found in the axillary tail, ranging in size from 0.2 to 3.2 cm in maximal dimension.  One of the lymph nodes contains a mini Renetta  reflector.  Representative sections are submitted as follows:    1: Nipple, perpendicular section  2: Section deep to nipple  3: Section of skin closest to biopsy site 1  4:  Deep margin closest to biopsy site 1  5: Superior margin closest to biopsy site 1  6: Section medial to biopsy site 1  7-11: Biopsy site 1 entirely submitted sequentially from medial to lateral, cassette 8 contains section with Renetta reflector  12: Section lateral to biopsy site 1  13-17: Sections in between biopsy site 1 and biopsy site 2 submitted from medial to lateral  18: Section medial to biopsy site 2  19-28: Biopsy site 2 entirely submitted sequentially from medial to lateral, includes nearest deep and anterior/superficial margins, cassette 24 contains section with Renetta reflector  29: Section lateral to biopsy                           site 2  30-31: Representative sections from the upper outer quadrant  32-33: Representative sections from the lower outer quadrant  34-35: Representative sections from the upper inner quadrant  36-37: Representative sections from the lower inner quadrant  38: 3 possible lymph nodes, entirely submitted  39: 2 possible lymph nodes, entirely submitted  40-44: Each cassette contains 1 possible lymph node, bisected, entirely submitted  45-46: Each cassette contains 1 possible lymph node, trisected, entirely submitted  47: 1 possible lymph node, quadrisected, entirely submitted  48-49: 1 possible lymph node, serially sectioned  50-54: 1 possible lymph node, serially sectioned, lymph node with mini Renetta   55-60: 1 possible lymph node, serially sectioned    Note: The estimated total formalin fixation time based upon information provided by the submitting clinician and the standard processing schedule is 35.75 hours.  The cold ischemia time based upon information provided by the submitting                           clinician and receiving staff in the laboratory is within 1 minute.  RRavotti      Clinical Information 07/01/2025    Final                    Value:RIGHT  1) MASS E: There is a 20 mm x 15 mm x 17 mm irregularly shaped mass with heterogeneous internal enhancement seen in  the upper outer quadrant of the right breast at 10 o'clock, 7 cm from the nipple, which is isointense on T2 weighted images.  This is compatible with the biopsy-proven invasive lobular carcinoma.     This is located 5 mm anterior to the underlying pectoralis musculature, and 23 mm from the lateral skin surface.      2) LYMPH NODE F: There are bulky right axillary lymph nodes to include 1 with a biopsy marker clip in keeping with metastatic axillary node.  The largest node measures 27 x 37 mm (series [id number] image 48).   3) MASS G: There is a 6 mm round mass with circumscribed margins seen in the right breast at 5 o'clock, 4 cm from the nipple, which is hyperintense on T2 weighted images.  This has heterogeneous enhancement and mixed kinetics.  This is best visualized on series [id number] image 120.  Further characterization with targeted ultrasound is recommended.       WBC 07/02/2025 7.37  4.31 - 10.16 Thousand/uL Final    RBC 07/02/2025 3.23 (L)  3.81 - 5.12 Million/uL Final    Hemoglobin 07/02/2025 10.7 (L)  11.5 - 15.4 g/dL Final    Hematocrit 07/02/2025 35.1  34.8 - 46.1 % Final    MCV 07/02/2025 109 (H)  82 - 98 fL Final    MCH 07/02/2025 33.1  26.8 - 34.3 pg Final    MCHC 07/02/2025 30.5 (L)  31.4 - 37.4 g/dL Final    RDW 07/02/2025 12.9  11.6 - 15.1 % Final    MPV 07/02/2025 10.3  8.9 - 12.7 fL Final    Platelets 07/02/2025 183  149 - 390 Thousands/uL Final    nRBC 07/02/2025 0  /100 WBCs Final    Segmented % 07/02/2025 71  43 - 75 % Final    Immature Grans % 07/02/2025 0  0 - 2 % Final    Lymphocytes % 07/02/2025 20  14 - 44 % Final    Monocytes % 07/02/2025 7  4 - 12 % Final    Eosinophils Relative 07/02/2025 2  0 - 6 % Final    Basophils Relative 07/02/2025 0  0 - 1 % Final    Absolute Neutrophils 07/02/2025 5.26  1.85 - 7.62 Thousands/µL Final    Absolute Immature Grans 07/02/2025 0.01  0.00 - 0.20 Thousand/uL Final    Absolute Lymphocytes 07/02/2025 1.47  0.60 - 4.47 Thousands/µL Final    Absolute Monocytes  07/02/2025 0.50  0.17 - 1.22 Thousand/µL Final    Eosinophils Absolute 07/02/2025 0.11  0.00 - 0.61 Thousand/µL Final    Basophils Absolute 07/02/2025 0.02  0.00 - 0.10 Thousands/µL Final   Appointment on 06/27/2025   Component Date Value Ref Range Status    Nicotine 06/27/2025 <1.0  ng/mL Final    This test was developed and its performance characteristics  determined by [employer]. It has not been cleared or approved  by the Food and Drug Administration.  Nicotine levels greater than 2.0 are consistent with the use of  tobacco or tobacco cessation products.    Cotinine 06/27/2025 <1.0  ng/mL Final    This test was developed and its performance characteristics  determined by [facility]. It has not been cleared or approved  by the Food and Drug Administration.  Cotinine levels greater than 20.0 are consistent with the use of  tobacco or tobacco cessation products.    Cotinine, Urine 06/27/2025 Negative  Egdxpg=835 ng/mL Final    Drug Screen Comment: 06/27/2025 Comment   Final    This analysis is performed by immunoassay. Positive findings are  unconfirmed analytical test results; if results do not support  expected clinical finding, confirmation by an alternate methodology is  recommended. Patient metabolic variables, specific drug chemistry, and  specimen characteristics can affect test outcome.  Technical consultation is available at [email or web address], or call  toll free [phone].   Appointment on 06/17/2025   Component Date Value Ref Range Status    WBC 06/17/2025 4.33  4.31 - 10.16 Thousand/uL Final    RBC 06/17/2025 3.13 (L)  3.81 - 5.12 Million/uL Final    Hemoglobin 06/17/2025 10.1 (L)  11.5 - 15.4 g/dL Final    Hematocrit 06/17/2025 32.2 (L)  34.8 - 46.1 % Final    MCV 06/17/2025 103 (H)  82 - 98 fL Final    MCH 06/17/2025 32.3  26.8 - 34.3 pg Final    MCHC 06/17/2025 31.4  31.4 - 37.4 g/dL Final    RDW 06/17/2025 14.5  11.6 - 15.1 % Final    MPV 06/17/2025 10.7  8.9 - 12.7 fL Final    Platelets  06/17/2025 285  149 - 390 Thousands/uL Final    nRBC 06/17/2025 0  /100 WBCs Final    Segmented % 06/17/2025 57  43 - 75 % Final    Immature Grans % 06/17/2025 0  0 - 2 % Final    Lymphocytes % 06/17/2025 19  14 - 44 % Final    Monocytes % 06/17/2025 7  4 - 12 % Final    Eosinophils Relative 06/17/2025 16 (H)  0 - 6 % Final    Basophils Relative 06/17/2025 1  0 - 1 % Final    Absolute Neutrophils 06/17/2025 2.50  1.85 - 7.62 Thousands/µL Final    Absolute Immature Grans 06/17/2025 0.00  0.00 - 0.20 Thousand/uL Final    Absolute Lymphocytes 06/17/2025 0.82  0.60 - 4.47 Thousands/µL Final    Absolute Monocytes 06/17/2025 0.28  0.17 - 1.22 Thousand/µL Final    Eosinophils Absolute 06/17/2025 0.68 (H)  0.00 - 0.61 Thousand/µL Final    Basophils Absolute 06/17/2025 0.05  0.00 - 0.10 Thousands/µL Final    Sodium 06/17/2025 140  135 - 147 mmol/L Final    Potassium 06/17/2025 4.0  3.5 - 5.3 mmol/L Final    Chloride 06/17/2025 104  96 - 108 mmol/L Final    CO2 06/17/2025 31  21 - 32 mmol/L Final    ANION GAP 06/17/2025 5  4 - 13 mmol/L Final    BUN 06/17/2025 9  5 - 25 mg/dL Final    Creatinine 06/17/2025 0.49 (L)  0.60 - 1.30 mg/dL Final    Standardized to IDMS reference method    Glucose 06/17/2025 98  65 - 140 mg/dL Final    If the patient is fasting, the ADA then defines impaired fasting glucose as > 100 mg/dL and diabetes as > or equal to 123 mg/dL.    Calcium 06/17/2025 9.3  8.4 - 10.2 mg/dL Final    AST 06/17/2025 24  13 - 39 U/L Final    ALT 06/17/2025 27  7 - 52 U/L Final    Specimen collection should occur prior to Sulfasalazine administration due to the potential for falsely depressed results.     Alkaline Phosphatase 06/17/2025 107 (H)  34 - 104 U/L Final    Total Protein 06/17/2025 7.2  6.4 - 8.4 g/dL Final    Albumin 06/17/2025 4.4  3.5 - 5.0 g/dL Final    Total Bilirubin 06/17/2025 0.42  0.20 - 1.00 mg/dL Final    Use of this assay is not recommended for patients undergoing treatment with eltrombopag due to  the potential for falsely elevated results.  N-acetyl-p-benzoquinone imine (metabolite of Acetaminophen) will generate erroneously low results in samples for patients that have taken an overdose of Acetaminophen.    eGFR 06/17/2025 112  ml/min/1.73sq m Final    TSH 3RD GENERATION 06/17/2025 1.014  0.450 - 4.500 uIU/mL Final    The recommended reference ranges for TSH during pregnancy are as follows:   First trimester 0.100 to 2.500 uIU/mL   Second trimester  0.200 to 3.000 uIU/mL   Third trimester 0.300 to 3.000 uIU/m    Note: Normal ranges may not apply to patients who are transgender, non-binary, or whose legal sex, sex at birth, and gender identity differ.  Adult TSH (3rd generation) reference range follows the recommended guidelines of the American [employer] Association, January, 2020.    T3, Free 06/17/2025 3.56  2.50 - 3.90 pg/mL Final    Specimens with biotin concentrations > 10 ng/mL can lead to significant (> 10%) positive bias in result.     Pathology: I have reviewed pathology reports described above.

## 2025-07-11 ENCOUNTER — HOME CARE VISIT (OUTPATIENT)
Dept: HOME HEALTH SERVICES | Facility: HOME HEALTHCARE | Age: 55
End: 2025-07-11
Payer: COMMERCIAL

## 2025-07-11 VITALS
DIASTOLIC BLOOD PRESSURE: 82 MMHG | TEMPERATURE: 97.7 F | RESPIRATION RATE: 17 BRPM | HEART RATE: 83 BPM | SYSTOLIC BLOOD PRESSURE: 124 MMHG | OXYGEN SATURATION: 98 %

## 2025-07-11 PROCEDURE — G0299 HHS/HOSPICE OF RN EA 15 MIN: HCPCS

## 2025-07-13 ENCOUNTER — HOSPITAL ENCOUNTER (EMERGENCY)
Facility: HOSPITAL | Age: 55
Discharge: HOME | End: 2025-07-13
Attending: EMERGENCY MEDICINE | Admitting: EMERGENCY MEDICINE
Payer: COMMERCIAL

## 2025-07-13 VITALS
RESPIRATION RATE: 18 BRPM | HEART RATE: 89 BPM | DIASTOLIC BLOOD PRESSURE: 65 MMHG | OXYGEN SATURATION: 99 % | TEMPERATURE: 98.9 F | SYSTOLIC BLOOD PRESSURE: 160 MMHG

## 2025-07-13 DIAGNOSIS — Z48.00 DRESSING CHANGE: Primary | ICD-10-CM

## 2025-07-13 PROCEDURE — 99283 EMERGENCY DEPT VISIT LOW MDM: CPT | Performed by: EMERGENCY MEDICINE

## 2025-07-13 PROCEDURE — 99282 EMERGENCY DEPT VISIT SF MDM: CPT

## 2025-07-13 NOTE — DISCHARGE INSTRUCTIONS
You were seen in the ED for a dressing change of your drain. Please continue to keep the area clean and dry. If you experience redness, swelling, or pain at the drain site please contact your surgeon for evaluation or return to the emergency department.

## 2025-07-15 ENCOUNTER — TELEPHONE (OUTPATIENT)
Age: 55
End: 2025-07-15

## 2025-07-15 ENCOUNTER — HOME CARE VISIT (OUTPATIENT)
Dept: HOME HEALTH SERVICES | Facility: HOME HEALTHCARE | Age: 55
End: 2025-07-15
Payer: COMMERCIAL

## 2025-07-15 VITALS
TEMPERATURE: 97 F | RESPIRATION RATE: 18 BRPM | DIASTOLIC BLOOD PRESSURE: 64 MMHG | OXYGEN SATURATION: 98 % | HEART RATE: 78 BPM | SYSTOLIC BLOOD PRESSURE: 102 MMHG

## 2025-07-15 PROCEDURE — G0299 HHS/HOSPICE OF RN EA 15 MIN: HCPCS

## 2025-07-16 ENCOUNTER — OFFICE VISIT (OUTPATIENT)
Age: 55
End: 2025-07-16

## 2025-07-16 DIAGNOSIS — Z17.0 MALIGNANT NEOPLASM OF OVERLAPPING SITES OF RIGHT BREAST IN FEMALE, ESTROGEN RECEPTOR POSITIVE (HCC): Primary | ICD-10-CM

## 2025-07-16 DIAGNOSIS — C50.811 MALIGNANT NEOPLASM OF OVERLAPPING SITES OF RIGHT BREAST IN FEMALE, ESTROGEN RECEPTOR POSITIVE (HCC): Primary | ICD-10-CM

## 2025-07-16 DIAGNOSIS — Z98.890 S/P BREAST RECONSTRUCTION, RIGHT: ICD-10-CM

## 2025-07-16 PROCEDURE — 99024 POSTOP FOLLOW-UP VISIT: CPT

## 2025-07-16 RX ORDER — CEPHALEXIN 500 MG/1
500 CAPSULE ORAL EVERY 12 HOURS SCHEDULED
Qty: 14 CAPSULE | Refills: 0 | Status: SHIPPED | OUTPATIENT
Start: 2025-07-16 | End: 2025-07-23

## 2025-07-16 NOTE — PROGRESS NOTES
[facility]  [phone]    Patient Identification: Romina Cleaning is a 54 y.o. female     History of Present Illness: The patient is a 54 y.o.  year-old female  who presents to the office with her mom for 2 week post op and first fill. Patient is 15 days s/p Right Modified Radical Mastectomy - Right and Right Breast Reconstruction with tissue expander, galaflex, dallas flap - Right  on 7/1/2025 by Dr. Philippe and Dr. Fox.     Placement of right mentor smooth low height silicone tissue expander 375 cc (12.7 cm) - filled to 100 cc intra operatively    She denies any issues over the past week. She continues to have some discomfort of right lateral breast, denies pain. She continues to take oral antibiotics for drain. She has been moisturizing skin of breast with Aquaphor, compressing with surgical bra, and massaging right lateral breast. She denies fevers, chills, redness, or pain.    Drain with light serosanguinous output over the past week. 7/13 55 cc, 7/14 55 cc, 7/15 15 cc, 7/16 10 cc.     Past Medical History[1]       Review of Systems  Constitutional: Denies fevers, chills or pain.  Breast: + right breast discomfort   Skin: Denies any warmth, erythema, edema or drainage.     Physical Exam  General: AAOx3, NAD, well appearing  Breast: Steri strips intact over incision - removed. Exofin and suture ends intact - removed. Tissue expander is intact. Minimal post operative edema and ecchymosis. Drain is patent with serosanguinous fluid in bulb. See media      Assessment and Plan:  The patient is an 54 y.o.  year-old female who presents to the office for 2 week post op and first fill. Patient is 15 days s/p Right Modified Radical Mastectomy - Right and Right Breast Reconstruction with tissue expander, galaflex, dallas flap - Right  on 7/1/2025 by Dr. Philippe and Dr. Fox. Patient presented to [facility] 7/13 for dressing change of her drain site- no other concerns at that time.      -At  today's visit tissue expander filled with 100 cc of injectable saline. Patient now at 200/375 cc. Steri strips, exofin, and suture ends removed. Auqphor applied to incision. Patient wrapped with ACE  -Drain site dressing replaced with biopatch and tegaderm. Informed patient should this become loose she may reinforce with gauze or band aid at home  -Refill sent for Keflex, patient to continue while drains remain  -Continue to strip drain frequently and record output  -Continue compression with ACE wrap at all times except when bathing  -Continue to apply Aquaphor to incision daily  -Continue to massage breast, demonstrated scar massage for patient to begin doing  -The patient is to return in 1 week for 2nd fill and drain removal.  -The patient is to call the office with any questions or concerns. All of the patient's questions were answered at this time and they agree with the plan of care.        Priyanka Mcnulty PA-C  [facility]           [1]   Past Medical History:  Diagnosis Date    GERD (gastroesophageal reflux disease)     Headache     Hematuria     History of chemotherapy     Hypertension     IUD (intrauterine device) in place 02/15/2019    Mirena placed 2/2015 effective through 2/2020      Lateral epicondylitis, right elbow 01/08/2019    Panic attacks     Psychiatric disorder

## 2025-07-17 ENCOUNTER — HOME CARE VISIT (OUTPATIENT)
Dept: HOME HEALTH SERVICES | Facility: HOME HEALTHCARE | Age: 55
End: 2025-07-17
Payer: COMMERCIAL

## 2025-07-17 VITALS
OXYGEN SATURATION: 98 % | SYSTOLIC BLOOD PRESSURE: 122 MMHG | TEMPERATURE: 97.6 F | DIASTOLIC BLOOD PRESSURE: 62 MMHG | HEART RATE: 72 BPM | RESPIRATION RATE: 18 BRPM

## 2025-07-17 PROCEDURE — G0299 HHS/HOSPICE OF RN EA 15 MIN: HCPCS

## 2025-07-21 ENCOUNTER — TELEPHONE (OUTPATIENT)
Age: 55
End: 2025-07-21

## 2025-07-21 NOTE — TELEPHONE ENCOUNTER
Called Pt and informed her she has used all 5 lyft rides for the year and that we cannot provide her with transportation at this time

## 2025-07-23 ENCOUNTER — OFFICE VISIT (OUTPATIENT)
Age: 55
End: 2025-07-23

## 2025-07-23 DIAGNOSIS — Z48.89 ENCOUNTER FOR FOLLOW-UP CARE INVOLVING PLASTIC SURGERY: ICD-10-CM

## 2025-07-23 DIAGNOSIS — Z98.890 S/P BREAST RECONSTRUCTION, RIGHT: Primary | ICD-10-CM

## 2025-07-23 PROCEDURE — 99024 POSTOP FOLLOW-UP VISIT: CPT

## 2025-07-23 NOTE — PROGRESS NOTES
[facility]  [phone]    History of Present Illness: The patient is a 54 y.o.  year-old female  who presents to the office with her mom for 2 week post op and first fill. Patient is 22 days s/p Right Modified Radical Mastectomy - Right and Right Breast Reconstruction with tissue expander, galaflex, dallas flap - Right  on 7/1/2025 by Dr. Philippe and Dr. Fox.     Placement of right mentor smooth low height silicone tissue expander 375 cc (12.7 cm) - filled to 100 cc intra operatively    Patient presents for her second fill today. She is overall doing very well. Says she took her last abx for her drain. She has been applying Aquaphor to right breast. Continues wearing compression. She is feeing good. She denies fever, chills, pain, redness, drainage, or right breast tightness. Right drain outputting 10-15 cc of serosang drainage for past few days. Requesting disability documentation be filled out for transportation.     Right drain with light serosanguinous output over the past week. 7/21 -15 cc, 7/22 -10 cc    Past Medical History[1]     Problem List[2]     Current Medications[3]     Past Surgical History[4]     Allergies[5]     Review of Systems  Constitutional: Denies fevers, chills or pain.  Skin: Denies any warmth, erythema, edema or drainage.     Physical Exam  General: AAOx3, NAD, well appearing, very pleasant   Breast:  Right tissue expander is intact and in good position. Surgical incision healing nicely and is dry, clean, and intact. Drain is patent with serosanguinous fluid in bulb. Drain removed today.     Procedure: Tissue Expander Fill  Under sterile conditions the right tissue expander was percutaneously accessed without difficulty.    50 ml of NS was injected into the right  Patient tolerated procedure well.     A ''time out'' was initiated prior to procedure. Non-OR time Out:  Time out was initiated under direction and supervision of provider: Maricel Ruelas  CLIFF  Correct patient identity - Yes  Correct side and site - Yes  Procedure matches verbalized consent -Yes  Correct patient position - Yes  Availability of correct implants and any special equipment or special requirements - Yes  Time out occurred prior to procedure start - Yes     Total TE volume:   Right: 250/375 ml     Assessment and Plan:  The patient is an 54 y.o.  year-old female who presents to the office for 2 week post op and first fill. Patient is 22 days s/p Right Modified Radical Mastectomy - Right and Right Breast Reconstruction with tissue expander, galaflex, dallas flap - Right  on 7/1/2025 by Dr. Philippe and Dr. Fox.     -At today's visit Dr. Fox recommended fill with 50 cc of injectable saline. Patient now at 250/375 cc.   -Drain log reviewed and removed today without difficulty. Aquaphor and band-aid applied to drain site.   -Continue compression with ACE wrap/compression at all times except when bathing.  -Continue to apply Aquaphor to incision daily.  -Continue to massage breast, demonstrated scar massage for patient to begin doing.  -I do not feel it is appropriate to fill out her disability documents for transportation as we don't have driving restrictions from our standpoint. Has appointment with PCP tomorrow 7/24/2025. She should discuss with them.   -The patient is to return in 1 week   -The patient is to call the office with any questions or concerns. All of the patient's questions were answered at this time and they agree with the plan of care.        Maricel Ruelas PA-C  [facility]         [1]   Past Medical History:  Diagnosis Date    GERD (gastroesophageal reflux disease)     Headache     Hematuria     History of chemotherapy     Hypertension     IUD (intrauterine device) in place 02/15/2019    Mirena placed 2/2015 effective through 2/2020      Lateral epicondylitis, right elbow 01/08/2019    Panic attacks     Psychiatric disorder    [2]   Patient  Active Problem List  Diagnosis    Neck pain    Trapezius muscle spasm    Abnormal CT scan, pelvis    Anxiety    Dysuria    Microscopic hematuria    Smoker    Episode of recurrent major depressive disorder (HCC)    Hypertension    Mitral regurgitation    Foster care (status)    Lumbar back pain    Mixed hyperlipidemia    Encounter for tobacco use cessation counseling    Carcinoma of right breast metastatic to axillary lymph node (HCC)    Malignant neoplasm of overlapping sites of right breast in female, estrogen receptor positive (HCC)    Hypokalemia    COVID-19    Sepsis without acute organ dysfunction (HCC)    Chemotherapy induced neutropenia (HCC)    Encounter for care related to vascular access port   [3]   Current Outpatient Medications:     acetaminophen (TYLENOL) 500 mg tablet, Take 1 tablet (500 mg total) by mouth every 6 (six) hours as needed for mild pain, Disp: 120 tablet, Rfl: 0    atorvastatin (LIPITOR) 10 mg tablet, Take 1 tablet (10 mg total) by mouth daily, Disp: 90 tablet, Rfl: 1    cephalexin (KEFLEX) 500 mg capsule, Take 1 capsule (500 mg total) by mouth every 12 (twelve) hours for 7 days, Disp: 14 capsule, Rfl: 0    Cholecalciferol (VITAMIN D3 PO), Take 25 mcg by mouth in the morning., Disp: , Rfl:     cyclobenzaprine (FLEXERIL) 5 mg tablet, Take 1 tablet (5 mg total) by mouth 3 (three) times a day as needed for muscle spasms, Disp: 30 tablet, Rfl: 0    gabapentin (Neurontin) 300 mg capsule, Take 1 capsule (300 mg total) by mouth 3 (three) times a day as needed (Take up to 3 times daily as needed for pain), Disp: 90 capsule, Rfl: 0    ibuprofen (MOTRIN) 800 mg tablet, Take 1 tablet (800 mg total) by mouth every 8 (eight) hours as needed for mild pain, Disp: 90 tablet, Rfl: 0    lidocaine-prilocaine (EMLA) cream, Apply topically as needed for mild pain (Patient not taking: Reported on 7/10/2025), Disp: 1 g, Rfl: 0    lisinopril-hydrochlorothiazide (PRINZIDE,ZESTORETIC) 10-12.5 MG per tablet, Take 1  tablet by mouth daily, Disp: 90 tablet, Rfl: 1    ondansetron (ZOFRAN) 4 mg tablet, Take 1 tablet (4 mg total) by mouth every 8 (eight) hours as needed for nausea or vomiting, Disp: 30 tablet, Rfl: 2  [4]   Past Surgical History:  Procedure Laterality Date    BREAST BIOPSY Right 09/20/2024    BREAST BIOPSY Right 09/20/2024    BREAST BIOPSY Right 11/01/2024    CYSTOSCOPY  06/08/2018    IR PORT PLACEMENT  11/06/2024    NE MAST MODF RAD W/AX LYMPH NOD W/WO PECT/KETURAH MIN Right 7/1/2025    Procedure: RIGHT MODIFIED RADICAL MASTECTOMY;  Surgeon: Jodie Philippe MD;  Location: [facility];  Service: Surgical Oncology    NE TISSUE EXPANDER PLACEMENT BREAST RECONSTRUCTION Right 7/1/2025    Procedure: Right Breast Reconstruction with tissue expander, galaflex, dallas flap;  Surgeon: Mary Anne Fox DO;  Location: [facility];  Service: Plastics    US GUIDED BREAST BIOPSY RIGHT COMPLETE Right 09/20/2024    US GUIDED BREAST BIOPSY RIGHT COMPLETE Right 11/01/2024    US GUIDED BREAST LYMPH NODE BIOPSY RIGHT Right 09/20/2024   [5] No Known Allergies

## 2025-07-24 ENCOUNTER — OFFICE VISIT (OUTPATIENT)
Dept: HEMATOLOGY ONCOLOGY | Facility: CLINIC | Age: 55
End: 2025-07-24
Payer: COMMERCIAL

## 2025-07-24 VITALS
BODY MASS INDEX: 24.59 KG/M2 | SYSTOLIC BLOOD PRESSURE: 138 MMHG | RESPIRATION RATE: 18 BRPM | OXYGEN SATURATION: 99 % | WEIGHT: 153 LBS | HEIGHT: 66 IN | DIASTOLIC BLOOD PRESSURE: 66 MMHG | TEMPERATURE: 97.9 F | HEART RATE: 95 BPM

## 2025-07-24 DIAGNOSIS — C77.3 CARCINOMA OF RIGHT BREAST METASTATIC TO AXILLARY LYMPH NODE (HCC): Primary | ICD-10-CM

## 2025-07-24 DIAGNOSIS — C50.911 CARCINOMA OF RIGHT BREAST METASTATIC TO AXILLARY LYMPH NODE (HCC): Primary | ICD-10-CM

## 2025-07-24 DIAGNOSIS — Z17.0 MALIGNANT NEOPLASM OF UPPER-OUTER QUADRANT OF RIGHT BREAST IN FEMALE, ESTROGEN RECEPTOR POSITIVE (HCC): ICD-10-CM

## 2025-07-24 DIAGNOSIS — C50.411 MALIGNANT NEOPLASM OF UPPER-OUTER QUADRANT OF RIGHT BREAST IN FEMALE, ESTROGEN RECEPTOR POSITIVE (HCC): ICD-10-CM

## 2025-07-24 PROCEDURE — 99215 OFFICE O/P EST HI 40 MIN: CPT | Performed by: INTERNAL MEDICINE

## 2025-07-25 ENCOUNTER — TELEPHONE (OUTPATIENT)
Age: 55
End: 2025-07-25

## 2025-07-25 NOTE — TELEPHONE ENCOUNTER
PROVIDER:Dr Philippe     Pt calling ,with  on the call,asking if she can go to the pools at [facility]?    Pt had sx 7/1 was last seen by Plastics 7/23 had drain removed.  Advised pt to avoid public pools at this time to to her increased risk of infection r/t her sx sites and recent drain removal incision not completely healed at this time.    Pt understood.  Pt was advise she may go to the park for the attractions.

## 2025-07-30 ENCOUNTER — OFFICE VISIT (OUTPATIENT)
Age: 55
End: 2025-07-30

## 2025-07-30 ENCOUNTER — TELEPHONE (OUTPATIENT)
Dept: FAMILY MEDICINE CLINIC | Facility: CLINIC | Age: 55
End: 2025-07-30

## 2025-07-30 DIAGNOSIS — Z98.890 S/P BREAST RECONSTRUCTION, RIGHT: Primary | ICD-10-CM

## 2025-07-30 DIAGNOSIS — Z74.8 ASSISTANCE NEEDED WITH TRANSPORTATION: Primary | ICD-10-CM

## 2025-07-30 PROCEDURE — 99024 POSTOP FOLLOW-UP VISIT: CPT

## 2025-07-31 ENCOUNTER — TELEPHONE (OUTPATIENT)
Dept: FAMILY MEDICINE CLINIC | Facility: CLINIC | Age: 55
End: 2025-07-31

## 2025-08-01 ENCOUNTER — PATIENT OUTREACH (OUTPATIENT)
Dept: FAMILY MEDICINE CLINIC | Facility: CLINIC | Age: 55
End: 2025-08-01

## 2025-08-06 ENCOUNTER — DOCUMENTATION (OUTPATIENT)
Dept: HEMATOLOGY ONCOLOGY | Facility: CLINIC | Age: 55
End: 2025-08-06

## 2025-08-06 ENCOUNTER — OFFICE VISIT (OUTPATIENT)
Dept: RADIATION ONCOLOGY | Facility: HOSPITAL | Age: 55
End: 2025-08-06
Attending: RADIOLOGY
Payer: COMMERCIAL

## 2025-08-06 ENCOUNTER — PATIENT OUTREACH (OUTPATIENT)
Dept: HEMATOLOGY ONCOLOGY | Facility: CLINIC | Age: 55
End: 2025-08-06

## 2025-08-06 VITALS — HEART RATE: 98 BPM | RESPIRATION RATE: 18 BRPM | OXYGEN SATURATION: 100 % | TEMPERATURE: 97 F

## 2025-08-06 DIAGNOSIS — C50.811 MALIGNANT NEOPLASM OF OVERLAPPING SITES OF RIGHT BREAST IN FEMALE, ESTROGEN RECEPTOR POSITIVE (HCC): Primary | ICD-10-CM

## 2025-08-06 DIAGNOSIS — C77.3 CARCINOMA OF RIGHT BREAST METASTATIC TO AXILLARY LYMPH NODE (HCC): ICD-10-CM

## 2025-08-06 DIAGNOSIS — Z17.0 MALIGNANT NEOPLASM OF OVERLAPPING SITES OF RIGHT BREAST IN FEMALE, ESTROGEN RECEPTOR POSITIVE (HCC): Primary | ICD-10-CM

## 2025-08-06 DIAGNOSIS — C50.911 CARCINOMA OF RIGHT BREAST METASTATIC TO AXILLARY LYMPH NODE (HCC): ICD-10-CM

## 2025-08-06 PROCEDURE — 99214 OFFICE O/P EST MOD 30 MIN: CPT | Performed by: RADIOLOGY

## 2025-08-07 ENCOUNTER — DOCUMENTATION (OUTPATIENT)
Dept: HEMATOLOGY ONCOLOGY | Facility: CLINIC | Age: 55
End: 2025-08-07

## 2025-08-07 ENCOUNTER — OFFICE VISIT (OUTPATIENT)
Dept: FAMILY MEDICINE CLINIC | Facility: CLINIC | Age: 55
End: 2025-08-07

## 2025-08-07 VITALS
DIASTOLIC BLOOD PRESSURE: 91 MMHG | TEMPERATURE: 98.2 F | RESPIRATION RATE: 18 BRPM | HEART RATE: 77 BPM | WEIGHT: 153.4 LBS | BODY MASS INDEX: 24.76 KG/M2 | OXYGEN SATURATION: 100 % | SYSTOLIC BLOOD PRESSURE: 141 MMHG

## 2025-08-07 DIAGNOSIS — E78.2 MIXED HYPERLIPIDEMIA: ICD-10-CM

## 2025-08-07 DIAGNOSIS — R53.81 MULTIFACTORIAL FUNCTIONAL IMPAIRMENT: Primary | ICD-10-CM

## 2025-08-07 DIAGNOSIS — I10 HYPERTENSION: ICD-10-CM

## 2025-08-07 PROCEDURE — 3080F DIAST BP >= 90 MM HG: CPT

## 2025-08-07 PROCEDURE — 99214 OFFICE O/P EST MOD 30 MIN: CPT

## 2025-08-07 PROCEDURE — 3077F SYST BP >= 140 MM HG: CPT

## 2025-08-07 RX ORDER — ATORVASTATIN CALCIUM 10 MG/1
10 TABLET, FILM COATED ORAL DAILY
Qty: 90 TABLET | Refills: 1 | Status: SHIPPED | OUTPATIENT
Start: 2025-08-07

## 2025-08-07 RX ORDER — LISINOPRIL AND HYDROCHLOROTHIAZIDE 10; 12.5 MG/1; MG/1
1 TABLET ORAL DAILY
Qty: 90 TABLET | Refills: 1 | Status: SHIPPED | OUTPATIENT
Start: 2025-08-07

## 2025-08-11 ENCOUNTER — DOCUMENTATION (OUTPATIENT)
Dept: HEMATOLOGY ONCOLOGY | Facility: CLINIC | Age: 55
End: 2025-08-11

## 2025-08-12 ENCOUNTER — TELEPHONE (OUTPATIENT)
Age: 55
End: 2025-08-12

## 2025-08-13 ENCOUNTER — TELEPHONE (OUTPATIENT)
Dept: RADIATION ONCOLOGY | Facility: HOSPITAL | Age: 55
End: 2025-08-13

## 2025-08-21 ENCOUNTER — OFFICE VISIT (OUTPATIENT)
Dept: HEMATOLOGY ONCOLOGY | Facility: CLINIC | Age: 55
End: 2025-08-21
Payer: COMMERCIAL

## 2025-08-21 VITALS
HEIGHT: 66 IN | OXYGEN SATURATION: 99 % | SYSTOLIC BLOOD PRESSURE: 140 MMHG | WEIGHT: 159 LBS | HEART RATE: 95 BPM | RESPIRATION RATE: 17 BRPM | BODY MASS INDEX: 25.55 KG/M2 | TEMPERATURE: 98.3 F | DIASTOLIC BLOOD PRESSURE: 96 MMHG

## 2025-08-21 DIAGNOSIS — C77.3 CARCINOMA OF RIGHT BREAST METASTATIC TO AXILLARY LYMPH NODE (HCC): Primary | ICD-10-CM

## 2025-08-21 DIAGNOSIS — C50.911 CARCINOMA OF RIGHT BREAST METASTATIC TO AXILLARY LYMPH NODE (HCC): Primary | ICD-10-CM

## 2025-08-21 PROCEDURE — 99214 OFFICE O/P EST MOD 30 MIN: CPT | Performed by: INTERNAL MEDICINE

## (undated) DEVICE — Device

## (undated) DEVICE — DRAPE SHEET THREE QUARTER

## (undated) DEVICE — INTENDED FOR TISSUE SEPARATION, AND OTHER PROCEDURES THAT REQUIRE A SHARP SURGICAL BLADE TO PUNCTURE OR CUT.: Brand: BARD-PARKER SAFETY BLADES SIZE 10, STERILE

## (undated) DEVICE — SUT VICRYL PLUS 2-0 54IN VCP286G

## (undated) DEVICE — BETHLEHEM UNIVERSAL MINOR GEN: Brand: CARDINAL HEALTH

## (undated) DEVICE — JACKSON-PRATT 100CC BULB RESERVOIR: Brand: CARDINAL HEALTH

## (undated) DEVICE — 450 ML BOTTLE OF 0.05% CHLORHEXIDINE GLUCONATE IN 99.95% STERILE WATER FOR IRRIGATION, USP AND APPLICATOR.: Brand: IRRISEPT ANTIMICROBIAL WOUND LAVAGE

## (undated) DEVICE — 3M™ TEGADERM™ TRANSPARENT FILM DRESSING FRAME STYLE, 1624W, 2-3/8 IN X 2-3/4 IN (6 CM X 7 CM), 100/CT 4CT/CASE: Brand: 3M™ TEGADERM™

## (undated) DEVICE — MEDI-VAC YANKAUER SUCTION HANDLE W/BULBOUS AND CONTROL VENT: Brand: CARDINAL HEALTH

## (undated) DEVICE — KENDALL SCD SEQUENTIAL COMPRESSION COMFORT SLEEVES, KNEE LENGTH, MEDIUM: Brand: KENDALL SCD

## (undated) DEVICE — NEPTUNE E-SEP SMOKE EVACUATION PENCIL, COATED, 70MM BLADE, ROCKER SWITCH: Brand: NEPTUNE E-SEP

## (undated) DEVICE — CLIP APPLIER: Brand: PREMIUM SURGICLIP II

## (undated) DEVICE — SOLUTION BOWL: Brand: KENDALL

## (undated) DEVICE — SUT SILK 2-0 SH 30 IN K833H

## (undated) DEVICE — JP CHAN DRN SIL HUBLESS 19FR W/TRO: Brand: CARDINAL HEALTH

## (undated) DEVICE — INSULATED BLADE ELECTRODE;CAUTION: FOR MANUFACTURING, PROCESSING, OR REPACKING.: Brand: EDGE

## (undated) DEVICE — POV-IOD SOLUTION 4OZ BT

## (undated) DEVICE — DECANTER: Brand: UNBRANDED

## (undated) DEVICE — SKIN MARKER DUAL TIP WITH RULER CAP, FLEXIBLE RULER AND LABELS: Brand: DEVON

## (undated) DEVICE — BULB SYRINGE, IRRIGATION WITH PROTECTIVE CAP, 60 CC, INDIVIDUALLY WRAPPED: Brand: DOVER

## (undated) DEVICE — DISPOSABLE OR TOWEL: Brand: CARDINAL HEALTH

## (undated) DEVICE — EXOFIN PRECISION PEN HIGH VISCOSITY TOPICAL SKIN ADHESIVE: Brand: EXOFIN PRECISION PEN, 1G

## (undated) DEVICE — DRESSING BIOPATCH ANTIMICROBIAL 1 IN DISC

## (undated) DEVICE — JP CHAN DRN SIL HUBLESS 15FR W/TRO: Brand: CARDINAL HEALTH

## (undated) DEVICE — REM POLYHESIVE ADULT PATIENT RETURN ELECTRODE: Brand: VALLEYLAB

## (undated) DEVICE — LAPAROTOMY SPONGE - RF AND X-RAY DETECTABLE PRE-WASHED: Brand: SITUATE

## (undated) DEVICE — CHEST/BREAST DRAPE: Brand: CONVERTORS

## (undated) DEVICE — INTENDED FOR TISSUE SEPARATION, AND OTHER PROCEDURES THAT REQUIRE A SHARP SURGICAL BLADE TO PUNCTURE OR CUT.: Brand: BARD-PARKER ® CARBON RIB-BACK BLADES

## (undated) DEVICE — SURGICEL 2 X 14

## (undated) DEVICE — 4-PORT MANIFOLD: Brand: NEPTUNE 2

## (undated) DEVICE — BETHLEHEM UNIVERSAL LAPAROTOMY: Brand: CARDINAL HEALTH

## (undated) DEVICE — PREP PAD BNS: Brand: CONVERTORS